# Patient Record
Sex: MALE | Race: BLACK OR AFRICAN AMERICAN | NOT HISPANIC OR LATINO
[De-identification: names, ages, dates, MRNs, and addresses within clinical notes are randomized per-mention and may not be internally consistent; named-entity substitution may affect disease eponyms.]

---

## 2017-01-23 ENCOUNTER — APPOINTMENT (OUTPATIENT)
Dept: ENDOCRINOLOGY | Facility: CLINIC | Age: 77
End: 2017-01-23

## 2017-01-23 VITALS
DIASTOLIC BLOOD PRESSURE: 83 MMHG | BODY MASS INDEX: 38.96 KG/M2 | SYSTOLIC BLOOD PRESSURE: 130 MMHG | WEIGHT: 213 LBS | HEART RATE: 69 BPM

## 2017-01-27 ENCOUNTER — OUTPATIENT (OUTPATIENT)
Dept: OUTPATIENT SERVICES | Facility: HOSPITAL | Age: 77
LOS: 1 days | End: 2017-01-27

## 2017-01-27 ENCOUNTER — APPOINTMENT (OUTPATIENT)
Dept: OPHTHALMOLOGY | Facility: CLINIC | Age: 77
End: 2017-01-27

## 2017-01-27 DIAGNOSIS — H40.1111 PRIMARY OPEN-ANGLE GLAUCOMA, RIGHT EYE, MILD STAGE: ICD-10-CM

## 2017-01-27 DIAGNOSIS — E89.0 POSTPROCEDURAL HYPOTHYROIDISM: Chronic | ICD-10-CM

## 2017-02-24 ENCOUNTER — APPOINTMENT (OUTPATIENT)
Dept: OPHTHALMOLOGY | Facility: CLINIC | Age: 77
End: 2017-02-24

## 2017-02-24 ENCOUNTER — OUTPATIENT (OUTPATIENT)
Dept: OUTPATIENT SERVICES | Facility: HOSPITAL | Age: 77
LOS: 1 days | End: 2017-02-24

## 2017-02-24 DIAGNOSIS — E89.0 POSTPROCEDURAL HYPOTHYROIDISM: Chronic | ICD-10-CM

## 2017-02-27 DIAGNOSIS — H40.89 OTHER SPECIFIED GLAUCOMA: ICD-10-CM

## 2017-03-16 ENCOUNTER — APPOINTMENT (OUTPATIENT)
Dept: ENDOCRINOLOGY | Facility: CLINIC | Age: 77
End: 2017-03-16

## 2017-03-17 ENCOUNTER — APPOINTMENT (OUTPATIENT)
Dept: OPHTHALMOLOGY | Facility: CLINIC | Age: 77
End: 2017-03-17

## 2017-03-31 ENCOUNTER — APPOINTMENT (OUTPATIENT)
Dept: OPHTHALMOLOGY | Facility: CLINIC | Age: 77
End: 2017-03-31

## 2017-03-31 ENCOUNTER — OUTPATIENT (OUTPATIENT)
Dept: OUTPATIENT SERVICES | Facility: HOSPITAL | Age: 77
LOS: 1 days | End: 2017-03-31

## 2017-03-31 DIAGNOSIS — E89.0 POSTPROCEDURAL HYPOTHYROIDISM: Chronic | ICD-10-CM

## 2017-03-31 DIAGNOSIS — H40.10X0 UNSPECIFIED OPEN-ANGLE GLAUCOMA, STAGE UNSPECIFIED: ICD-10-CM

## 2017-04-03 ENCOUNTER — APPOINTMENT (OUTPATIENT)
Dept: ENDOCRINOLOGY | Facility: CLINIC | Age: 77
End: 2017-04-03

## 2017-04-03 VITALS
SYSTOLIC BLOOD PRESSURE: 136 MMHG | BODY MASS INDEX: 38.96 KG/M2 | DIASTOLIC BLOOD PRESSURE: 87 MMHG | HEART RATE: 78 BPM | WEIGHT: 213 LBS

## 2017-04-07 ENCOUNTER — OUTPATIENT (OUTPATIENT)
Dept: OUTPATIENT SERVICES | Facility: HOSPITAL | Age: 77
LOS: 1 days | End: 2017-04-07

## 2017-04-07 ENCOUNTER — APPOINTMENT (OUTPATIENT)
Dept: OPHTHALMOLOGY | Facility: CLINIC | Age: 77
End: 2017-04-07

## 2017-04-07 DIAGNOSIS — E89.0 POSTPROCEDURAL HYPOTHYROIDISM: Chronic | ICD-10-CM

## 2017-04-10 DIAGNOSIS — H40.2290 CHRONIC ANGLE-CLOSURE GLAUCOMA, UNSPECIFIED EYE, STAGE UNSPECIFIED: ICD-10-CM

## 2017-04-11 ENCOUNTER — OUTPATIENT (OUTPATIENT)
Dept: OUTPATIENT SERVICES | Facility: HOSPITAL | Age: 77
LOS: 1 days | End: 2017-04-11

## 2017-04-11 ENCOUNTER — APPOINTMENT (OUTPATIENT)
Dept: OPHTHALMOLOGY | Facility: CLINIC | Age: 77
End: 2017-04-11

## 2017-04-11 DIAGNOSIS — E89.0 POSTPROCEDURAL HYPOTHYROIDISM: Chronic | ICD-10-CM

## 2017-04-11 DIAGNOSIS — H40.10X0 UNSPECIFIED OPEN-ANGLE GLAUCOMA, STAGE UNSPECIFIED: ICD-10-CM

## 2017-05-09 ENCOUNTER — OUTPATIENT (OUTPATIENT)
Dept: OUTPATIENT SERVICES | Facility: HOSPITAL | Age: 77
LOS: 1 days | End: 2017-05-09

## 2017-05-09 ENCOUNTER — APPOINTMENT (OUTPATIENT)
Dept: OPHTHALMOLOGY | Facility: CLINIC | Age: 77
End: 2017-05-09

## 2017-05-09 DIAGNOSIS — E89.0 POSTPROCEDURAL HYPOTHYROIDISM: Chronic | ICD-10-CM

## 2017-05-09 DIAGNOSIS — H40.9 UNSPECIFIED GLAUCOMA: ICD-10-CM

## 2017-06-09 ENCOUNTER — OUTPATIENT (OUTPATIENT)
Dept: OUTPATIENT SERVICES | Facility: HOSPITAL | Age: 77
LOS: 1 days | End: 2017-06-09

## 2017-06-09 ENCOUNTER — APPOINTMENT (OUTPATIENT)
Dept: OPHTHALMOLOGY | Facility: CLINIC | Age: 77
End: 2017-06-09

## 2017-06-09 DIAGNOSIS — E89.0 POSTPROCEDURAL HYPOTHYROIDISM: Chronic | ICD-10-CM

## 2017-06-12 DIAGNOSIS — H40.10X0 UNSPECIFIED OPEN-ANGLE GLAUCOMA, STAGE UNSPECIFIED: ICD-10-CM

## 2017-06-21 ENCOUNTER — OUTPATIENT (OUTPATIENT)
Dept: OUTPATIENT SERVICES | Facility: HOSPITAL | Age: 77
LOS: 1 days | End: 2017-06-21

## 2017-06-21 ENCOUNTER — APPOINTMENT (OUTPATIENT)
Dept: OPHTHALMOLOGY | Facility: CLINIC | Age: 77
End: 2017-06-21

## 2017-06-21 DIAGNOSIS — E89.0 POSTPROCEDURAL HYPOTHYROIDISM: Chronic | ICD-10-CM

## 2017-06-22 DIAGNOSIS — H40.2290 CHRONIC ANGLE-CLOSURE GLAUCOMA, UNSPECIFIED EYE, STAGE UNSPECIFIED: ICD-10-CM

## 2017-07-13 ENCOUNTER — OUTPATIENT (OUTPATIENT)
Dept: OUTPATIENT SERVICES | Facility: HOSPITAL | Age: 77
LOS: 1 days | End: 2017-07-13
Payer: MEDICARE

## 2017-07-13 ENCOUNTER — APPOINTMENT (OUTPATIENT)
Dept: OPHTHALMOLOGY | Facility: CLINIC | Age: 77
End: 2017-07-13

## 2017-07-13 DIAGNOSIS — E89.0 POSTPROCEDURAL HYPOTHYROIDISM: Chronic | ICD-10-CM

## 2017-07-13 DIAGNOSIS — E11.311 TYPE 2 DIABETES MELLITUS WITH UNSPECIFIED DIABETIC RETINOPATHY WITH MACULAR EDEMA: ICD-10-CM

## 2017-07-13 DIAGNOSIS — H40.1111 PRIMARY OPEN-ANGLE GLAUCOMA, RIGHT EYE, MILD STAGE: ICD-10-CM

## 2017-07-13 PROCEDURE — 99213 OFFICE O/P EST LOW 20 MIN: CPT

## 2017-07-13 PROCEDURE — 92134 CPTRZ OPH DX IMG PST SGM RTA: CPT | Mod: 26

## 2017-07-14 DIAGNOSIS — H40.1112 PRIMARY OPEN-ANGLE GLAUCOMA, RIGHT EYE, MODERATE STAGE: ICD-10-CM

## 2017-07-25 ENCOUNTER — APPOINTMENT (OUTPATIENT)
Dept: INTERNAL MEDICINE | Facility: CLINIC | Age: 77
End: 2017-07-25

## 2017-08-25 ENCOUNTER — APPOINTMENT (OUTPATIENT)
Dept: ENDOCRINOLOGY | Facility: CLINIC | Age: 77
End: 2017-08-25
Payer: MEDICARE

## 2017-08-25 VITALS — WEIGHT: 207 LBS | HEART RATE: 63 BPM | DIASTOLIC BLOOD PRESSURE: 95 MMHG | SYSTOLIC BLOOD PRESSURE: 164 MMHG

## 2017-08-25 PROCEDURE — 99214 OFFICE O/P EST MOD 30 MIN: CPT

## 2017-08-29 LAB
ALBUMIN SERPL ELPH-MCNC: 4.4 G/DL
ALP BLD-CCNC: 61 U/L
ALT SERPL-CCNC: 38 U/L
ANION GAP SERPL CALC-SCNC: 16 MMOL/L
AST SERPL-CCNC: 25 U/L
BILIRUB SERPL-MCNC: 0.4 MG/DL
BUN SERPL-MCNC: 13 MG/DL
CALCIUM SERPL-MCNC: 10.1 MG/DL
CHLORIDE SERPL-SCNC: 104 MMOL/L
CHOLEST SERPL-MCNC: 219 MG/DL
CHOLEST/HDLC SERPL: 3.3 RATIO
CO2 SERPL-SCNC: 22 MMOL/L
CREAT SERPL-MCNC: 1.01 MG/DL
GLUCOSE SERPL-MCNC: 154 MG/DL
HBA1C MFR BLD HPLC: 9.2 %
HDLC SERPL-MCNC: 66 MG/DL
LDLC SERPL CALC-MCNC: 119 MG/DL
POTASSIUM SERPL-SCNC: 4.5 MMOL/L
PROT SERPL-MCNC: 7.7 G/DL
SODIUM SERPL-SCNC: 142 MMOL/L
TRIGL SERPL-MCNC: 171 MG/DL
TSH SERPL-ACNC: 1.49 UIU/ML

## 2017-10-24 ENCOUNTER — APPOINTMENT (OUTPATIENT)
Dept: ENDOCRINOLOGY | Facility: CLINIC | Age: 77
End: 2017-10-24
Payer: MEDICARE

## 2017-10-24 VITALS
SYSTOLIC BLOOD PRESSURE: 158 MMHG | HEART RATE: 73 BPM | WEIGHT: 205.5 LBS | HEIGHT: 62 IN | BODY MASS INDEX: 37.81 KG/M2 | DIASTOLIC BLOOD PRESSURE: 93 MMHG

## 2017-10-24 PROCEDURE — 99214 OFFICE O/P EST MOD 30 MIN: CPT

## 2017-10-24 PROCEDURE — 97802 MEDICAL NUTRITION INDIV IN: CPT

## 2017-10-25 LAB
ANION GAP SERPL CALC-SCNC: 13 MMOL/L
BUN SERPL-MCNC: 10 MG/DL
CALCIUM SERPL-MCNC: 10 MG/DL
CHLORIDE SERPL-SCNC: 104 MMOL/L
CO2 SERPL-SCNC: 23 MMOL/L
CREAT SERPL-MCNC: 1 MG/DL
GLUCOSE SERPL-MCNC: 127 MG/DL
HBA1C MFR BLD HPLC: 7.7 %
POTASSIUM SERPL-SCNC: 4.9 MMOL/L
SODIUM SERPL-SCNC: 140 MMOL/L

## 2017-11-02 ENCOUNTER — APPOINTMENT (OUTPATIENT)
Dept: INTERNAL MEDICINE | Facility: HOME HEALTH | Age: 77
End: 2017-11-02
Payer: MEDICARE

## 2017-11-02 VITALS
SYSTOLIC BLOOD PRESSURE: 150 MMHG | HEIGHT: 69 IN | OXYGEN SATURATION: 97 % | WEIGHT: 211 LBS | TEMPERATURE: 97.6 F | BODY MASS INDEX: 31.25 KG/M2 | DIASTOLIC BLOOD PRESSURE: 100 MMHG | HEART RATE: 82 BPM | RESPIRATION RATE: 14 BRPM

## 2017-11-02 VITALS — DIASTOLIC BLOOD PRESSURE: 100 MMHG | SYSTOLIC BLOOD PRESSURE: 152 MMHG

## 2017-11-02 DIAGNOSIS — Z80.9 FAMILY HISTORY OF MALIGNANT NEOPLASM, UNSPECIFIED: ICD-10-CM

## 2017-11-02 DIAGNOSIS — Z86.79 PERSONAL HISTORY OF OTHER DISEASES OF THE CIRCULATORY SYSTEM: ICD-10-CM

## 2017-11-02 DIAGNOSIS — Z82.3 FAMILY HISTORY OF STROKE: ICD-10-CM

## 2017-11-02 DIAGNOSIS — Z82.49 FAMILY HISTORY OF ISCHEMIC HEART DISEASE AND OTHER DISEASES OF THE CIRCULATORY SYSTEM: ICD-10-CM

## 2017-11-02 PROCEDURE — 99345 HOME/RES VST NEW HIGH MDM 75: CPT

## 2017-11-02 PROCEDURE — 99497 ADVNCD CARE PLAN 30 MIN: CPT

## 2017-11-03 PROBLEM — Z82.49 FAMILY HISTORY OF ESSENTIAL HYPERTENSION: Status: ACTIVE | Noted: 2017-11-03

## 2017-11-03 PROBLEM — Z80.9 FAMILY HISTORY OF MALIGNANT NEOPLASM: Status: ACTIVE | Noted: 2017-11-03

## 2017-11-03 PROBLEM — Z86.79 HISTORY OF HYPERTENSION: Status: RESOLVED | Noted: 2017-11-03 | Resolved: 2017-11-03

## 2017-11-03 PROBLEM — Z82.3 FAMILY HISTORY OF CEREBROVASCULAR ACCIDENT (CVA): Status: ACTIVE | Noted: 2017-11-03

## 2017-11-30 ENCOUNTER — APPOINTMENT (OUTPATIENT)
Dept: INTERNAL MEDICINE | Facility: HOME HEALTH | Age: 77
End: 2017-11-30
Payer: MEDICARE

## 2017-11-30 PROCEDURE — 99349 HOME/RES VST EST MOD MDM 40: CPT

## 2017-12-01 VITALS
HEART RATE: 80 BPM | RESPIRATION RATE: 14 BRPM | SYSTOLIC BLOOD PRESSURE: 140 MMHG | TEMPERATURE: 97.4 F | OXYGEN SATURATION: 98 % | DIASTOLIC BLOOD PRESSURE: 80 MMHG

## 2017-12-01 RX ORDER — METFORMIN HYDROCHLORIDE 500 MG/1
500 TABLET, COATED ORAL
Qty: 60 | Refills: 0 | Status: DISCONTINUED | COMMUNITY
Start: 2016-11-01

## 2018-01-05 ENCOUNTER — APPOINTMENT (OUTPATIENT)
Dept: INTERNAL MEDICINE | Facility: HOME HEALTH | Age: 78
End: 2018-01-05
Payer: MEDICARE

## 2018-01-05 VITALS
HEART RATE: 75 BPM | TEMPERATURE: 97.4 F | DIASTOLIC BLOOD PRESSURE: 100 MMHG | SYSTOLIC BLOOD PRESSURE: 150 MMHG | RESPIRATION RATE: 14 BRPM | OXYGEN SATURATION: 98 %

## 2018-01-05 PROCEDURE — 99349 HOME/RES VST EST MOD MDM 40: CPT

## 2018-01-15 ENCOUNTER — RX RENEWAL (OUTPATIENT)
Age: 78
End: 2018-01-15

## 2018-01-18 ENCOUNTER — APPOINTMENT (OUTPATIENT)
Dept: ENDOCRINOLOGY | Facility: CLINIC | Age: 78
End: 2018-01-18
Payer: MEDICARE

## 2018-01-18 VITALS
HEIGHT: 69 IN | BODY MASS INDEX: 29.92 KG/M2 | DIASTOLIC BLOOD PRESSURE: 96 MMHG | SYSTOLIC BLOOD PRESSURE: 140 MMHG | WEIGHT: 202 LBS | HEART RATE: 89 BPM

## 2018-01-18 PROCEDURE — 99214 OFFICE O/P EST MOD 30 MIN: CPT

## 2018-01-18 RX ORDER — ATENOLOL 25 MG/1
25 TABLET ORAL
Qty: 60 | Refills: 0 | Status: DISCONTINUED | COMMUNITY
Start: 2017-10-03 | End: 2018-01-18

## 2018-02-15 ENCOUNTER — APPOINTMENT (OUTPATIENT)
Dept: INTERNAL MEDICINE | Facility: HOME HEALTH | Age: 78
End: 2018-02-15
Payer: MEDICARE

## 2018-02-15 VITALS
DIASTOLIC BLOOD PRESSURE: 80 MMHG | HEART RATE: 71 BPM | SYSTOLIC BLOOD PRESSURE: 140 MMHG | RESPIRATION RATE: 14 BRPM | OXYGEN SATURATION: 98 %

## 2018-02-15 PROCEDURE — 99349 HOME/RES VST EST MOD MDM 40: CPT

## 2018-02-22 ENCOUNTER — APPOINTMENT (OUTPATIENT)
Dept: INTERNAL MEDICINE | Facility: HOME HEALTH | Age: 78
End: 2018-02-22
Payer: MEDICARE

## 2018-02-22 VITALS
TEMPERATURE: 96 F | RESPIRATION RATE: 14 BRPM | OXYGEN SATURATION: 97 % | HEART RATE: 78 BPM | DIASTOLIC BLOOD PRESSURE: 100 MMHG | SYSTOLIC BLOOD PRESSURE: 140 MMHG

## 2018-02-22 DIAGNOSIS — M43.10 SPONDYLOLISTHESIS, SITE UNSPECIFIED: ICD-10-CM

## 2018-02-22 PROCEDURE — 99349 HOME/RES VST EST MOD MDM 40: CPT

## 2018-02-23 VITALS — DIASTOLIC BLOOD PRESSURE: 88 MMHG | SYSTOLIC BLOOD PRESSURE: 138 MMHG

## 2018-02-23 PROBLEM — M43.10 RETROLISTHESIS OF VERTEBRAE: Status: ACTIVE | Noted: 2018-02-23

## 2018-03-15 ENCOUNTER — APPOINTMENT (OUTPATIENT)
Dept: INTERNAL MEDICINE | Facility: HOME HEALTH | Age: 78
End: 2018-03-15
Payer: MEDICARE

## 2018-03-15 ENCOUNTER — APPOINTMENT (OUTPATIENT)
Dept: INTERNAL MEDICINE | Facility: HOME HEALTH | Age: 78
End: 2018-03-15

## 2018-03-15 VITALS
RESPIRATION RATE: 16 BRPM | DIASTOLIC BLOOD PRESSURE: 100 MMHG | HEART RATE: 66 BPM | SYSTOLIC BLOOD PRESSURE: 130 MMHG | OXYGEN SATURATION: 98 % | TEMPERATURE: 97.52 F

## 2018-03-15 VITALS — SYSTOLIC BLOOD PRESSURE: 144 MMHG | DIASTOLIC BLOOD PRESSURE: 78 MMHG

## 2018-03-15 PROCEDURE — 99350 HOME/RES VST EST HIGH MDM 60: CPT

## 2018-03-19 ENCOUNTER — OTHER (OUTPATIENT)
Age: 78
End: 2018-03-19

## 2018-03-22 ENCOUNTER — RX RENEWAL (OUTPATIENT)
Age: 78
End: 2018-03-22

## 2018-03-29 ENCOUNTER — OTHER (OUTPATIENT)
Age: 78
End: 2018-03-29

## 2018-04-04 ENCOUNTER — RX RENEWAL (OUTPATIENT)
Age: 78
End: 2018-04-04

## 2018-04-19 ENCOUNTER — APPOINTMENT (OUTPATIENT)
Dept: INTERNAL MEDICINE | Facility: HOME HEALTH | Age: 78
End: 2018-04-19
Payer: MEDICARE

## 2018-04-19 VITALS
RESPIRATION RATE: 14 BRPM | HEART RATE: 66 BPM | OXYGEN SATURATION: 98 % | TEMPERATURE: 97.3 F | DIASTOLIC BLOOD PRESSURE: 80 MMHG | SYSTOLIC BLOOD PRESSURE: 140 MMHG

## 2018-04-19 PROCEDURE — 99349 HOME/RES VST EST MOD MDM 40: CPT

## 2018-04-23 ENCOUNTER — LABORATORY RESULT (OUTPATIENT)
Age: 78
End: 2018-04-23

## 2018-04-24 LAB
ALBUMIN SERPL ELPH-MCNC: 5 G/DL
ALP BLD-CCNC: 67 U/L
ALT SERPL-CCNC: 37 U/L
ANION GAP SERPL CALC-SCNC: 17 MMOL/L
AST SERPL-CCNC: 26 U/L
BASOPHILS # BLD AUTO: 0.02 K/UL
BASOPHILS NFR BLD AUTO: 0.3 %
BILIRUB SERPL-MCNC: 0.3 MG/DL
BUN SERPL-MCNC: 19 MG/DL
CALCIUM SERPL-MCNC: 10.5 MG/DL
CHLORIDE SERPL-SCNC: 101 MMOL/L
CHOLEST SERPL-MCNC: 223 MG/DL
CHOLEST/HDLC SERPL: 2.9 RATIO
CO2 SERPL-SCNC: 23 MMOL/L
CREAT SERPL-MCNC: 0.97 MG/DL
EOSINOPHIL # BLD AUTO: 0.56 K/UL
EOSINOPHIL NFR BLD AUTO: 8 %
GLUCOSE SERPL-MCNC: 127 MG/DL
HBA1C MFR BLD HPLC: 7.2 %
HCT VFR BLD CALC: 41.8 %
HDLC SERPL-MCNC: 77 MG/DL
HGB BLD-MCNC: 13.9 G/DL
IMM GRANULOCYTES NFR BLD AUTO: 0.1 %
LDLC SERPL CALC-MCNC: 115 MG/DL
LYMPHOCYTES # BLD AUTO: 2.67 K/UL
LYMPHOCYTES NFR BLD AUTO: 38.3 %
MAN DIFF?: NORMAL
MCHC RBC-ENTMCNC: 27.3 PG
MCHC RBC-ENTMCNC: 33.3 GM/DL
MCV RBC AUTO: 82.1 FL
MONOCYTES # BLD AUTO: 0.43 K/UL
MONOCYTES NFR BLD AUTO: 6.2 %
NEUTROPHILS # BLD AUTO: 3.28 K/UL
NEUTROPHILS NFR BLD AUTO: 47.1 %
PLATELET # BLD AUTO: 331 K/UL
POTASSIUM SERPL-SCNC: 4.6 MMOL/L
PROT SERPL-MCNC: 7.8 G/DL
RBC # BLD: 5.09 M/UL
RBC # FLD: 15.6 %
SODIUM SERPL-SCNC: 141 MMOL/L
TRIGL SERPL-MCNC: 157 MG/DL
WBC # FLD AUTO: 6.97 K/UL

## 2018-04-27 ENCOUNTER — APPOINTMENT (OUTPATIENT)
Dept: ENDOCRINOLOGY | Facility: CLINIC | Age: 78
End: 2018-04-27
Payer: MEDICARE

## 2018-04-27 VITALS
WEIGHT: 205 LBS | HEART RATE: 71 BPM | SYSTOLIC BLOOD PRESSURE: 153 MMHG | BODY MASS INDEX: 30.36 KG/M2 | HEIGHT: 69 IN | DIASTOLIC BLOOD PRESSURE: 90 MMHG

## 2018-04-27 PROCEDURE — 99213 OFFICE O/P EST LOW 20 MIN: CPT

## 2018-06-07 ENCOUNTER — APPOINTMENT (OUTPATIENT)
Dept: INTERNAL MEDICINE | Facility: HOME HEALTH | Age: 78
End: 2018-06-07
Payer: MEDICARE

## 2018-06-07 VITALS
DIASTOLIC BLOOD PRESSURE: 70 MMHG | HEART RATE: 86 BPM | TEMPERATURE: 95.9 F | OXYGEN SATURATION: 98 % | RESPIRATION RATE: 14 BRPM | SYSTOLIC BLOOD PRESSURE: 130 MMHG

## 2018-06-07 PROCEDURE — 99348 HOME/RES VST EST LOW MDM 30: CPT

## 2018-06-26 ENCOUNTER — RX RENEWAL (OUTPATIENT)
Age: 78
End: 2018-06-26

## 2018-06-28 ENCOUNTER — APPOINTMENT (OUTPATIENT)
Dept: INTERNAL MEDICINE | Facility: HOME HEALTH | Age: 78
End: 2018-06-28
Payer: MEDICARE

## 2018-06-28 VITALS
RESPIRATION RATE: 14 BRPM | DIASTOLIC BLOOD PRESSURE: 70 MMHG | TEMPERATURE: 97.2 F | HEART RATE: 72 BPM | WEIGHT: 205.13 LBS | SYSTOLIC BLOOD PRESSURE: 138 MMHG | OXYGEN SATURATION: 97 % | BODY MASS INDEX: 30.29 KG/M2

## 2018-06-28 PROCEDURE — G0439: CPT

## 2018-08-08 ENCOUNTER — RX RENEWAL (OUTPATIENT)
Age: 78
End: 2018-08-08

## 2018-08-09 ENCOUNTER — APPOINTMENT (OUTPATIENT)
Dept: INTERNAL MEDICINE | Facility: HOME HEALTH | Age: 78
End: 2018-08-09
Payer: MEDICARE

## 2018-08-09 DIAGNOSIS — Z96.659 PRESENCE OF UNSPECIFIED ARTIFICIAL KNEE JOINT: ICD-10-CM

## 2018-08-09 PROCEDURE — 99350 HOME/RES VST EST HIGH MDM 60: CPT

## 2018-08-10 VITALS
DIASTOLIC BLOOD PRESSURE: 88 MMHG | HEART RATE: 80 BPM | RESPIRATION RATE: 18 BRPM | OXYGEN SATURATION: 97 % | SYSTOLIC BLOOD PRESSURE: 120 MMHG | WEIGHT: 207 LBS | TEMPERATURE: 97.5 F | BODY MASS INDEX: 30.57 KG/M2

## 2018-08-10 RX ORDER — GABAPENTIN 100 MG/1
100 CAPSULE ORAL
Qty: 60 | Refills: 1 | Status: DISCONTINUED | COMMUNITY
Start: 2018-08-10 | End: 2018-08-10

## 2018-08-16 ENCOUNTER — RX RENEWAL (OUTPATIENT)
Age: 78
End: 2018-08-16

## 2018-08-23 ENCOUNTER — RX RENEWAL (OUTPATIENT)
Age: 78
End: 2018-08-23

## 2018-08-29 ENCOUNTER — RX RENEWAL (OUTPATIENT)
Age: 78
End: 2018-08-29

## 2018-09-06 ENCOUNTER — APPOINTMENT (OUTPATIENT)
Dept: INTERNAL MEDICINE | Facility: HOME HEALTH | Age: 78
End: 2018-09-06

## 2018-09-06 ENCOUNTER — APPOINTMENT (OUTPATIENT)
Dept: ENDOCRINOLOGY | Facility: CLINIC | Age: 78
End: 2018-09-06
Payer: MEDICARE

## 2018-09-06 VITALS
HEART RATE: 81 BPM | DIASTOLIC BLOOD PRESSURE: 94 MMHG | WEIGHT: 200 LBS | SYSTOLIC BLOOD PRESSURE: 149 MMHG | BODY MASS INDEX: 29.54 KG/M2

## 2018-09-06 LAB
GLUCOSE BLDC GLUCOMTR-MCNC: 155
HBA1C MFR BLD HPLC: 6.5

## 2018-09-06 PROCEDURE — 82962 GLUCOSE BLOOD TEST: CPT

## 2018-09-06 PROCEDURE — 99214 OFFICE O/P EST MOD 30 MIN: CPT | Mod: 25

## 2018-09-06 PROCEDURE — 83036 HEMOGLOBIN GLYCOSYLATED A1C: CPT | Mod: QW

## 2018-09-19 ENCOUNTER — RX RENEWAL (OUTPATIENT)
Age: 78
End: 2018-09-19

## 2018-09-27 ENCOUNTER — APPOINTMENT (OUTPATIENT)
Dept: INTERNAL MEDICINE | Facility: HOME HEALTH | Age: 78
End: 2018-09-27
Payer: MEDICARE

## 2018-09-27 VITALS
DIASTOLIC BLOOD PRESSURE: 80 MMHG | HEART RATE: 84 BPM | RESPIRATION RATE: 16 BRPM | SYSTOLIC BLOOD PRESSURE: 130 MMHG | OXYGEN SATURATION: 97 % | TEMPERATURE: 96.8 F

## 2018-09-27 PROCEDURE — 90662 IIV NO PRSV INCREASED AG IM: CPT

## 2018-09-27 PROCEDURE — G0008: CPT

## 2018-09-27 PROCEDURE — 99349 HOME/RES VST EST MOD MDM 40: CPT

## 2018-10-04 ENCOUNTER — OTHER (OUTPATIENT)
Age: 78
End: 2018-10-04

## 2018-10-05 LAB
BASOPHILS # BLD AUTO: 0.01 K/UL
BASOPHILS NFR BLD AUTO: 0.1 %
EOSINOPHIL # BLD AUTO: 0.64 K/UL
EOSINOPHIL NFR BLD AUTO: 8.1 %
HCT VFR BLD CALC: 42.5 %
HGB BLD-MCNC: 13.9 G/DL
IMM GRANULOCYTES NFR BLD AUTO: 0.3 %
LYMPHOCYTES # BLD AUTO: 2.41 K/UL
LYMPHOCYTES NFR BLD AUTO: 30.6 %
MAN DIFF?: NORMAL
MCHC RBC-ENTMCNC: 27.1 PG
MCHC RBC-ENTMCNC: 32.7 GM/DL
MCV RBC AUTO: 83 FL
MONOCYTES # BLD AUTO: 0.52 K/UL
MONOCYTES NFR BLD AUTO: 6.6 %
NEUTROPHILS # BLD AUTO: 4.27 K/UL
NEUTROPHILS NFR BLD AUTO: 54.3 %
PLATELET # BLD AUTO: 355 K/UL
RBC # BLD: 5.12 M/UL
RBC # FLD: 15.6 %
WBC # FLD AUTO: 7.87 K/UL

## 2018-10-07 LAB
ALBUMIN SERPL ELPH-MCNC: 4.8 G/DL
ALP BLD-CCNC: 66 U/L
ALT SERPL-CCNC: 24 U/L
ANION GAP SERPL CALC-SCNC: 15 MMOL/L
AST SERPL-CCNC: 16 U/L
BILIRUB SERPL-MCNC: 0.2 MG/DL
BUN SERPL-MCNC: 11 MG/DL
CALCIUM SERPL-MCNC: 10.2 MG/DL
CHLORIDE SERPL-SCNC: 102 MMOL/L
CHOLEST SERPL-MCNC: 174 MG/DL
CHOLEST/HDLC SERPL: 2.6 RATIO
CO2 SERPL-SCNC: 25 MMOL/L
CREAT SERPL-MCNC: 0.89 MG/DL
GLUCOSE SERPL-MCNC: 87 MG/DL
HDLC SERPL-MCNC: 68 MG/DL
LDLC SERPL CALC-MCNC: 74 MG/DL
POTASSIUM SERPL-SCNC: 4.4 MMOL/L
PROT SERPL-MCNC: 7.9 G/DL
PSA FREE FLD-MCNC: 10.1
PSA FREE SERPL-MCNC: 1.14 NG/ML
PSA SERPL-MCNC: 11.24 NG/ML
SODIUM SERPL-SCNC: 142 MMOL/L
TRIGL SERPL-MCNC: 159 MG/DL

## 2018-10-10 ENCOUNTER — APPOINTMENT (OUTPATIENT)
Dept: CT IMAGING | Facility: HOSPITAL | Age: 78
End: 2018-10-10

## 2018-10-10 ENCOUNTER — OUTPATIENT (OUTPATIENT)
Dept: OUTPATIENT SERVICES | Facility: HOSPITAL | Age: 78
LOS: 1 days | End: 2018-10-10
Payer: MEDICARE

## 2018-10-10 DIAGNOSIS — E89.0 POSTPROCEDURAL HYPOTHYROIDISM: Chronic | ICD-10-CM

## 2018-10-10 PROCEDURE — 72128 CT CHEST SPINE W/O DYE: CPT

## 2018-10-10 PROCEDURE — 72128 CT CHEST SPINE W/O DYE: CPT | Mod: 26

## 2018-10-19 ENCOUNTER — OTHER (OUTPATIENT)
Age: 78
End: 2018-10-19

## 2018-10-26 ENCOUNTER — RX RENEWAL (OUTPATIENT)
Age: 78
End: 2018-10-26

## 2018-10-29 ENCOUNTER — RX RENEWAL (OUTPATIENT)
Age: 78
End: 2018-10-29

## 2018-10-29 ENCOUNTER — APPOINTMENT (OUTPATIENT)
Dept: NEUROSURGERY | Facility: CLINIC | Age: 78
End: 2018-10-29

## 2018-11-05 ENCOUNTER — APPOINTMENT (OUTPATIENT)
Dept: NEUROSURGERY | Facility: CLINIC | Age: 78
End: 2018-11-05

## 2018-11-05 ENCOUNTER — APPOINTMENT (OUTPATIENT)
Dept: NEUROSURGERY | Facility: CLINIC | Age: 78
End: 2018-11-05
Payer: MEDICARE

## 2018-11-19 ENCOUNTER — APPOINTMENT (OUTPATIENT)
Dept: NEUROSURGERY | Facility: CLINIC | Age: 78
End: 2018-11-19
Payer: MEDICARE

## 2018-11-19 VITALS
RESPIRATION RATE: 16 BRPM | BODY MASS INDEX: 29.77 KG/M2 | OXYGEN SATURATION: 97 % | HEIGHT: 69 IN | WEIGHT: 201 LBS | SYSTOLIC BLOOD PRESSURE: 148 MMHG | HEART RATE: 71 BPM | DIASTOLIC BLOOD PRESSURE: 95 MMHG | TEMPERATURE: 98.6 F

## 2018-11-19 PROCEDURE — 99205 OFFICE O/P NEW HI 60 MIN: CPT

## 2018-12-03 ENCOUNTER — APPOINTMENT (OUTPATIENT)
Dept: ENDOCRINOLOGY | Facility: CLINIC | Age: 78
End: 2018-12-03
Payer: MEDICARE

## 2018-12-03 VITALS
DIASTOLIC BLOOD PRESSURE: 82 MMHG | BODY MASS INDEX: 29.39 KG/M2 | SYSTOLIC BLOOD PRESSURE: 130 MMHG | WEIGHT: 199 LBS | HEART RATE: 83 BPM

## 2018-12-03 LAB
GLUCOSE BLDC GLUCOMTR-MCNC: 223
HBA1C MFR BLD HPLC: 6.8

## 2018-12-03 PROCEDURE — 83036 HEMOGLOBIN GLYCOSYLATED A1C: CPT | Mod: QW

## 2018-12-03 PROCEDURE — 82962 GLUCOSE BLOOD TEST: CPT

## 2018-12-03 PROCEDURE — 99214 OFFICE O/P EST MOD 30 MIN: CPT | Mod: 25

## 2018-12-06 ENCOUNTER — RX RENEWAL (OUTPATIENT)
Age: 78
End: 2018-12-06

## 2018-12-19 RX ORDER — BRIMONIDINE TARTRATE 1.5 MG/ML
0.15 SOLUTION/ DROPS OPHTHALMIC
Qty: 15 | Refills: 0 | Status: DISCONTINUED | COMMUNITY
Start: 2017-02-24 | End: 2018-12-19

## 2018-12-19 RX ORDER — DORZOLAMIDE HYDROCHLORIDE TIMOLOL MALEATE 20; 5 MG/ML; MG/ML
22.3-6.8 SOLUTION/ DROPS OPHTHALMIC
Qty: 10 | Refills: 0 | Status: DISCONTINUED | COMMUNITY
Start: 2017-04-04 | End: 2018-12-19

## 2018-12-20 ENCOUNTER — APPOINTMENT (OUTPATIENT)
Dept: INTERNAL MEDICINE | Facility: HOME HEALTH | Age: 78
End: 2018-12-20
Payer: MEDICARE

## 2018-12-20 VITALS
SYSTOLIC BLOOD PRESSURE: 130 MMHG | HEART RATE: 74 BPM | TEMPERATURE: 96.1 F | RESPIRATION RATE: 14 BRPM | DIASTOLIC BLOOD PRESSURE: 70 MMHG | OXYGEN SATURATION: 98 %

## 2018-12-20 PROCEDURE — 99349 HOME/RES VST EST MOD MDM 40: CPT

## 2019-02-18 ENCOUNTER — RX RENEWAL (OUTPATIENT)
Age: 79
End: 2019-02-18

## 2019-03-08 ENCOUNTER — RX RENEWAL (OUTPATIENT)
Age: 79
End: 2019-03-08

## 2019-03-21 ENCOUNTER — APPOINTMENT (OUTPATIENT)
Dept: INTERNAL MEDICINE | Facility: HOME HEALTH | Age: 79
End: 2019-03-21
Payer: MEDICARE

## 2019-03-21 VITALS
SYSTOLIC BLOOD PRESSURE: 130 MMHG | TEMPERATURE: 97.2 F | RESPIRATION RATE: 14 BRPM | HEART RATE: 88 BPM | OXYGEN SATURATION: 97 % | DIASTOLIC BLOOD PRESSURE: 80 MMHG

## 2019-03-21 PROCEDURE — 99349 HOME/RES VST EST MOD MDM 40: CPT

## 2019-03-21 NOTE — PHYSICAL EXAM
[Well Nourished] : well nourished [Normal Sclera/Conjunctiva] : normal sclera/conjunctiva [Normal Outer Ear/Nose] : the ears and nose were normal in appearance [Supple] : the neck was supple [Clear to Auscultation] : lungs were clear to auscultation bilaterally [No Respiratory Distress] : no respiratory distress [Normal S1, S2] : normal S1 and S2 [No Accessory Muscle Use] : no accessory muscle use [No Edema] : there was no peripheral edema [Normal Bowel Sounds] : normal bowel sounds [Soft] : abdomen soft [Normal Gait] : normal gait [No Skin Lesions] : no skin lesions [Cranial Nerves Intact] : cranial nerves 2-12 were intact [No Gross Sensory Deficits] : no gross sensory deficits [Normal Mood] : the mood was normal [Oriented x3] : oriented to person, place, and time [Normal Affect] : the affect was normal [Normal Insight/Judgement] : insight and judgment were intact [Foot Ulcers] : no foot ulcers [de-identified] : unsteady gait order PT,

## 2019-03-21 NOTE — REVIEW OF SYSTEMS
[Vision Problems] : vision problems [Joint Pain] : joint pain [Muscle Pain] : muscle pain [Muscle Weakness] : muscle weakness [Negative] : Heme/Lymph [Fever] : no fever [Chills] : no chills [Discharge] : no discharge [Pain] : no pain [Earache] : no earache [Postnasal Drip] : no postnasal drip [Nasal Discharge] : no nasal discharge [Sore Throat] : no sore throat [Hoarseness] : no hoarseness [Chest Pain] : no chest pain [Palpitations] : no palpitations [Lower Ext Edema] : no lower extremity edema [Shortness Of Breath] : no shortness of breath [Wheezing] : no wheezing [Cough] : no cough [Abdominal Pain] : no abdominal pain [Constipation] : no constipation [Incontinence] : no incontinence [Impotence] : no impotency [Insomnia] : no insomnia [FreeTextEntry3] : new glasses, eye drops, f/u OPH appointments [FreeTextEntry9] : pain 5/10, balance impaired

## 2019-03-21 NOTE — REASON FOR VISIT
[Follow-Up] : a follow-up visit [Pre-Visit Preparation] : pre-visit preparation was done [FreeTextEntry2] : notes, meds, labs reviewed

## 2019-03-21 NOTE — HISTORY OF PRESENT ILLNESS
[Patient] : patient [FreeTextEntry1] : gait and balance impairment [FreeTextEntry2] : 77 y.o. male lives alone at [facility]. Pt is alert and oriented 3, he has dx of DM T2, HTN, glaucoma, knee pain, osteoarthritis. Pt is very concerned about his blood pressure and his eyes.\par \par Was seen by Dr. De La Torre, A1c was 6.8 mg/dl, pt has made tremendous improvements and is very pleased with the direction his blood sugar is going. Much more conscious about the types of foods he is eating. Explored ways he can increase his vegetable intake such as buying frozen vegetables and adding to his meals.\par \par Very concerned about his decreasing vision, wants to just use drops and not f/u with oph. Instructed him on risks of not knowing changes in vision problems with not seeing oph. Next appt in January did not keep appointment. States he will not be going back. Will refer him to another  Encouraged him that maintaining BP in range, today 130/70, will help maintain eye pressure within range to decrease progression of vision loss.\par \andry  Sees Dr. Gibson for psyche issues. Recently re connect with his family, his dgtr and grand children.\par \par

## 2019-03-27 LAB
25(OH)D3 SERPL-MCNC: 20.4 NG/ML
ALBUMIN SERPL ELPH-MCNC: 4.3 G/DL
ALP BLD-CCNC: 54 U/L
ALT SERPL-CCNC: 24 U/L
ANION GAP SERPL CALC-SCNC: 12 MMOL/L
AST SERPL-CCNC: 20 U/L
BASOPHILS # BLD AUTO: 0.03 K/UL
BASOPHILS NFR BLD AUTO: 0.5 %
BILIRUB SERPL-MCNC: 0.2 MG/DL
BUN SERPL-MCNC: 14 MG/DL
CALCIUM SERPL-MCNC: 9.9 MG/DL
CHLORIDE SERPL-SCNC: 105 MMOL/L
CHOLEST SERPL-MCNC: 196 MG/DL
CHOLEST/HDLC SERPL: 3.6 RATIO
CO2 SERPL-SCNC: 24 MMOL/L
CREAT SERPL-MCNC: 0.95 MG/DL
EOSINOPHIL # BLD AUTO: 0.46 K/UL
EOSINOPHIL NFR BLD AUTO: 7.4 %
GLUCOSE SERPL-MCNC: 116 MG/DL
HCT VFR BLD CALC: 43 %
HDLC SERPL-MCNC: 55 MG/DL
HGB BLD-MCNC: 13.9 G/DL
IMM GRANULOCYTES NFR BLD AUTO: 0.3 %
IRON SATN MFR SERPL: 32 %
IRON SERPL-MCNC: 90 UG/DL
LDLC SERPL CALC-MCNC: 70 MG/DL
LYMPHOCYTES # BLD AUTO: 2.28 K/UL
LYMPHOCYTES NFR BLD AUTO: 36.6 %
MAN DIFF?: NORMAL
MCHC RBC-ENTMCNC: 27.1 PG
MCHC RBC-ENTMCNC: 32.3 GM/DL
MCV RBC AUTO: 84 FL
MONOCYTES # BLD AUTO: 0.48 K/UL
MONOCYTES NFR BLD AUTO: 7.7 %
NEUTROPHILS # BLD AUTO: 2.96 K/UL
NEUTROPHILS NFR BLD AUTO: 47.5 %
PLATELET # BLD AUTO: 358 K/UL
POTASSIUM SERPL-SCNC: 5 MMOL/L
PROT SERPL-MCNC: 7.1 G/DL
RBC # BLD: 5.12 M/UL
RBC # FLD: 15.4 %
SODIUM SERPL-SCNC: 141 MMOL/L
TIBC SERPL-MCNC: 284 UG/DL
TRIGL SERPL-MCNC: 353 MG/DL
TSH SERPL-ACNC: 0.75 UIU/ML
UIBC SERPL-MCNC: 194 UG/DL
VIT B12 SERPL-MCNC: 388 PG/ML
WBC # FLD AUTO: 6.23 K/UL

## 2019-03-28 ENCOUNTER — APPOINTMENT (OUTPATIENT)
Dept: INTERNAL MEDICINE | Facility: HOME HEALTH | Age: 79
End: 2019-03-28
Payer: MEDICARE

## 2019-03-28 PROCEDURE — 99350 HOME/RES VST EST HIGH MDM 60: CPT

## 2019-03-30 NOTE — CURRENT MEDS
[Medication and Allergies Reconciled] : medication and allergies reconciled [High Risk Medications Reviewed and Reconciled (Beers Criteria)] : high risk medications reviewed and reconciled [Reviewed patient reported medication adherence from Comprehensive Assessment] : Reviewed patient reported medication adherence from comprehensive assessment

## 2019-03-30 NOTE — PHYSICAL EXAM
[Well Nourished] : well nourished [Normal Sclera/Conjunctiva] : normal sclera/conjunctiva [Normal Outer Ear/Nose] : the ears and nose were normal in appearance [Supple] : the neck was supple [No Respiratory Distress] : no respiratory distress [Clear to Auscultation] : lungs were clear to auscultation bilaterally [No Accessory Muscle Use] : no accessory muscle use [Normal S1, S2] : normal S1 and S2 [No Edema] : there was no peripheral edema [Normal Bowel Sounds] : normal bowel sounds [Soft] : abdomen soft [Normal Gait] : normal gait [No Skin Lesions] : no skin lesions [Cranial Nerves Intact] : cranial nerves 2-12 were intact [No Gross Sensory Deficits] : no gross sensory deficits [Oriented x3] : oriented to person, place, and time [Normal Affect] : the affect was normal [Normal Mood] : the mood was normal [Normal Insight/Judgement] : insight and judgment were intact [Foot Ulcers] : no foot ulcers [de-identified] : unsteady gait order PT,

## 2019-03-30 NOTE — HISTORY OF PRESENT ILLNESS
[Patient] : patient [FreeTextEntry1] : gait and balance impairment [FreeTextEntry2] : 77 y.o. male lives alone at [facility]. Pt is alert and oriented 3, he has dx of DM T2, HTN, glaucoma, knee pain, osteoarthritis. Pt is very concerned about his blood pressure and his eyes.\par \par Was seen by Dr. De La Torre, A1c was 6.8 mg/dl, pt has made tremendous improvements and is very pleased with the direction his blood sugar is going. Much more conscious about the types of foods he is eating. Explored ways he can increase his vegetable intake such as buying frozen vegetables and adding to his meals.\par \par Discuss lab results Triglycerides 300+, not on any statin at this time. Reviewed diet to decreased intake of sugary drinks, fatty meats, baked goods. [place] meals on wheels.\par \par  Sees Dr. Gibson for psyche issues. Recently re connect with his family, his dgtr and grand children.\par \par

## 2019-04-01 ENCOUNTER — RX RENEWAL (OUTPATIENT)
Age: 79
End: 2019-04-01

## 2019-04-04 ENCOUNTER — OTHER (OUTPATIENT)
Age: 79
End: 2019-04-04

## 2019-04-15 ENCOUNTER — APPOINTMENT (OUTPATIENT)
Dept: ENDOCRINOLOGY | Facility: CLINIC | Age: 79
End: 2019-04-15
Payer: MEDICARE

## 2019-04-15 VITALS
HEIGHT: 69 IN | DIASTOLIC BLOOD PRESSURE: 97 MMHG | HEART RATE: 81 BPM | BODY MASS INDEX: 29.77 KG/M2 | WEIGHT: 201 LBS | SYSTOLIC BLOOD PRESSURE: 138 MMHG

## 2019-04-15 LAB — HBA1C MFR BLD HPLC: 7.1

## 2019-04-15 PROCEDURE — 83036 HEMOGLOBIN GLYCOSYLATED A1C: CPT | Mod: QW

## 2019-04-15 PROCEDURE — 99214 OFFICE O/P EST MOD 30 MIN: CPT | Mod: 25

## 2019-04-15 NOTE — HISTORY OF PRESENT ILLNESS
[FreeTextEntry1] : no health issues since last visit\par eating salads.  like cheese and rolls.  not eating bagels\par walks to store daily, but store is only a few blocks away\par says is too lazy to exercise\par no polyuria, polydipsia, SOB, chest pain, or neuropathy symptoms \par no hypoglycemia symptoms\par says eyesight is "messed up"; seeing ophtho and using eye drops\par \par \par Meds: amlodipine 5mg, metoprolol ER 100mg/day\par metformin 850mg,BID;  glimepiride 2mg BID\par levothyroxine 75mcg/day\par Seroquel 400mg (2 tab) and 300mg (2 tab) but pt says he doesn't have it\par eye drops.\par

## 2019-04-15 NOTE — DATA REVIEWED
[FreeTextEntry1] : 3/19: A1c 7.1% (point of care).  TSH 0.75, tot chol 196, trig 353, HDL 55, LDL 70, B12 388\par 12/18: A1c 6.8%, point of care\par 10/18: tot chol 174, trig 159, HDL 68, LDL 74\par 9/18: A1c 6.5% (point of care)\par 4/18: A1c 7.2%, tot chol 223, trig 157, HDL 77, \par 1/18: A1c 6.8%, point of care\par 10/17: A1c 7.7%\par 8/17: A1c 9.2%, tot chol 219, trig 171, HDL 66, , TSH 1.49\par 4/17: A1c 8.0% (point of care)\par 1/17: A1c 9.0% (point of care)\par 11/16: A1c 10.2%, Cr 0.89, eGFR 97\par 8/16: A1c 7.7% (point of care)\par 6/16: A1c 8.9%, fructosamine 321\par 5/16: A1c 10.5%, tot chol 196, trig 184, HDL 60, LDL 99, urine microalb/cr 32, TSH 1.48,  Cr 0.93

## 2019-04-15 NOTE — ASSESSMENT
[FreeTextEntry1] : Diabetes, A1c at goal.  Limited insight to health conditions.  \par Encouraged more exercise; try to walk around the block a couple times a day.\par watch food portions; even though A1c is ok, it is higher than last time (6.8), so he is probably not eating as well as he was before.\par continue current regimen\par RTO 6 months

## 2019-04-15 NOTE — PHYSICAL EXAM
[Alert] : alert [Normal Voice/Communication] : normal voice communication [Healthy Appearance] : healthy appearance [No Lid Lag] : no lid lag [No Proptosis] : no proptosis [Thyroid Not Enlarged] : the thyroid was not enlarged [Clear to Auscultation] : lungs were clear to auscultation bilaterally [No Thyroid Nodules] : there were no palpable thyroid nodules [Normal S1, S2] : normal S1 and S2 [Regular Rhythm] : with a regular rhythm [No Edema] : there was no peripheral edema [Normal Sensation on Monofilament Testing] : normal sensation on monofilament testing of lower extremities [Normal Affect] : the affect was normal [Normal Mood] : the mood was normal [Foot Ulcers] : no foot ulcers [de-identified] : fingertsick 129, after coffee [de-identified] : slightly hard of hearing [de-identified] : reduced pedal pulse [de-identified] : no hair growth. mild to moderate acanthosis nigricans [de-identified] : limited insight to condition

## 2019-04-17 ENCOUNTER — RX RENEWAL (OUTPATIENT)
Age: 79
End: 2019-04-17

## 2019-04-17 DIAGNOSIS — E78.1 PURE HYPERGLYCERIDEMIA: ICD-10-CM

## 2019-05-16 ENCOUNTER — APPOINTMENT (OUTPATIENT)
Dept: INTERNAL MEDICINE | Facility: HOME HEALTH | Age: 79
End: 2019-05-16
Payer: MEDICARE

## 2019-05-16 VITALS
DIASTOLIC BLOOD PRESSURE: 80 MMHG | RESPIRATION RATE: 16 BRPM | SYSTOLIC BLOOD PRESSURE: 120 MMHG | TEMPERATURE: 96.5 F | OXYGEN SATURATION: 98 % | HEART RATE: 97 BPM

## 2019-05-16 PROCEDURE — 99349 HOME/RES VST EST MOD MDM 40: CPT

## 2019-05-16 NOTE — PHYSICAL EXAM
[Well Nourished] : well nourished [Normal Sclera/Conjunctiva] : normal sclera/conjunctiva [Normal Outer Ear/Nose] : the ears and nose were normal in appearance [Supple] : the neck was supple [No Respiratory Distress] : no respiratory distress [Clear to Auscultation] : lungs were clear to auscultation bilaterally [No Accessory Muscle Use] : no accessory muscle use [Normal S1, S2] : normal S1 and S2 [No Edema] : there was no peripheral edema [Normal Bowel Sounds] : normal bowel sounds [Soft] : abdomen soft [Normal Gait] : normal gait [Cranial Nerves Intact] : cranial nerves 2-12 were intact [No Skin Lesions] : no skin lesions [No Gross Sensory Deficits] : no gross sensory deficits [Oriented x3] : oriented to person, place, and time [Normal Affect] : the affect was normal [Normal Mood] : the mood was normal [Normal Insight/Judgement] : insight and judgment were intact [Foot Ulcers] : no foot ulcers [de-identified] : unsteady gait order PT,

## 2019-05-16 NOTE — HISTORY OF PRESENT ILLNESS
[Patient] : patient [FreeTextEntry2] : 77 y.o. male lives alone at [facility]. Pt is alert and oriented 3, he has dx of DM T2, HTN, glaucoma, knee pain, osteoarthritis. Pt request acute visit states " I have a problem with my penis"\par \par States he has abrasion to his  "penis tip" he has had this before when he bought a smaller size underwear. He had stopped wearing them but restarted "wearing them again", recently gained weight . Instructed pt to keep the area clean and dry. \par \par Refused to show the penis. Pt states he used Mupirocin in previous time with good results. Renewed. Labs ordered. [FreeTextEntry1] : gait and balance impairment

## 2019-05-23 ENCOUNTER — OTHER (OUTPATIENT)
Age: 79
End: 2019-05-23

## 2019-06-05 ENCOUNTER — RX RENEWAL (OUTPATIENT)
Age: 79
End: 2019-06-05

## 2019-06-06 ENCOUNTER — APPOINTMENT (OUTPATIENT)
Dept: INTERNAL MEDICINE | Facility: HOME HEALTH | Age: 79
End: 2019-06-06
Payer: MEDICARE

## 2019-06-06 VITALS
TEMPERATURE: 96.6 F | SYSTOLIC BLOOD PRESSURE: 130 MMHG | DIASTOLIC BLOOD PRESSURE: 70 MMHG | RESPIRATION RATE: 16 BRPM | WEIGHT: 204.5 LBS | HEART RATE: 74 BPM | OXYGEN SATURATION: 98 % | BODY MASS INDEX: 30.2 KG/M2

## 2019-06-06 PROCEDURE — 99349 HOME/RES VST EST MOD MDM 40: CPT

## 2019-06-07 LAB
BASOPHILS # BLD AUTO: 0.03 K/UL
BASOPHILS NFR BLD AUTO: 0.4 %
EOSINOPHIL # BLD AUTO: 0.56 K/UL
EOSINOPHIL NFR BLD AUTO: 8 %
HCT VFR BLD CALC: 42.2 %
HGB BLD-MCNC: 13.3 G/DL
IMM GRANULOCYTES NFR BLD AUTO: 0.1 %
LYMPHOCYTES # BLD AUTO: 2.52 K/UL
LYMPHOCYTES NFR BLD AUTO: 36.2 %
MAN DIFF?: NORMAL
MCHC RBC-ENTMCNC: 27 PG
MCHC RBC-ENTMCNC: 31.5 GM/DL
MCV RBC AUTO: 85.6 FL
MONOCYTES # BLD AUTO: 0.56 K/UL
MONOCYTES NFR BLD AUTO: 8 %
NEUTROPHILS # BLD AUTO: 3.29 K/UL
NEUTROPHILS NFR BLD AUTO: 47.3 %
PLATELET # BLD AUTO: 327 K/UL
RBC # BLD: 4.93 M/UL
RBC # FLD: 15 %
WBC # FLD AUTO: 6.97 K/UL

## 2019-07-01 LAB
ALBUMIN SERPL ELPH-MCNC: 4.5 G/DL
ALP BLD-CCNC: 52 U/L
ALT SERPL-CCNC: 30 U/L
ANION GAP SERPL CALC-SCNC: 20 MMOL/L
AST SERPL-CCNC: 25 U/L
BASOPHILS # BLD AUTO: 0.03 K/UL
BASOPHILS NFR BLD AUTO: 0.4 %
BILIRUB SERPL-MCNC: 0.4 MG/DL
BUN SERPL-MCNC: 14 MG/DL
CALCIUM SERPL-MCNC: 10 MG/DL
CHLORIDE SERPL-SCNC: 101 MMOL/L
CO2 SERPL-SCNC: 20 MMOL/L
CREAT SERPL-MCNC: 0.87 MG/DL
EOSINOPHIL # BLD AUTO: 0.23 K/UL
EOSINOPHIL NFR BLD AUTO: 2.9 %
GLUCOSE SERPL-MCNC: 174 MG/DL
HCT VFR BLD CALC: 44.1 %
HGB BLD-MCNC: 13.7 G/DL
IMM GRANULOCYTES NFR BLD AUTO: 0.4 %
LYMPHOCYTES # BLD AUTO: 2.06 K/UL
LYMPHOCYTES NFR BLD AUTO: 26.1 %
MAN DIFF?: NORMAL
MCHC RBC-ENTMCNC: 26.7 PG
MCHC RBC-ENTMCNC: 31.1 GM/DL
MCV RBC AUTO: 86 FL
MONOCYTES # BLD AUTO: 0.48 K/UL
MONOCYTES NFR BLD AUTO: 6.1 %
NEUTROPHILS # BLD AUTO: 5.06 K/UL
NEUTROPHILS NFR BLD AUTO: 64.1 %
NT-PROBNP SERPL-MCNC: 61 PG/ML
PLATELET # BLD AUTO: 294 K/UL
POTASSIUM SERPL-SCNC: 3.6 MMOL/L
PROT SERPL-MCNC: 7.6 G/DL
RBC # BLD: 5.13 M/UL
RBC # FLD: 16.5 %
SODIUM SERPL-SCNC: 141 MMOL/L
WBC # FLD AUTO: 7.89 K/UL

## 2019-07-11 NOTE — REVIEW OF SYSTEMS
[Vision Problems] : vision problems [Fever] : no fever [Chills] : no chills [Nasal Discharge] : no nasal discharge [Sore Throat] : no sore throat [Earache] : no earache [Lower Ext Edema] : no lower extremity edema [Chest Pain] : no chest pain [Wheezing] : no wheezing [Shortness Of Breath] : no shortness of breath [Cough] : no cough [FreeTextEntry3] : uses glasses and prescribed eye drops  [Anxiety] : no anxiety [FreeTextEntry4] : upper lip with swelling and abrasion

## 2019-07-11 NOTE — HISTORY OF PRESENT ILLNESS
[Patient] : patient [FreeTextEntry1] : gait and balance impairment [FreeTextEntry2] : 77 y.o. male lives alone at [facility]. Pt is alert and oriented 3, he has dx of DM T2, HTN, glaucoma, knee pain, osteoarthritis. Pt request acute visit states " I have a problem with my lips.\par \par Ppper lip is swollen with small abrasion, pt states he drank very hot black coffee prior to noticing the swelling, 3 days ago. Reports pain, discomfort, no s/s infections noted or reported.\par \par  Labs ordered,  previously to be done today. instructed on using cold compress on the area, Vit A & D on the abrasion and to avoid scalding hot food and proper mouth care. Neosporin ordered if pt continues to experience symptoms. Report to his SW if no change in symptoms.

## 2019-07-11 NOTE — PHYSICAL EXAM
[Well Nourished] : well nourished [Supple] : the neck was supple [Normal Sclera/Conjunctiva] : normal sclera/conjunctiva [No Respiratory Distress] : no respiratory distress [No Accessory Muscle Use] : no accessory muscle use [Clear to Auscultation] : lungs were clear to auscultation bilaterally [No Edema] : there was no peripheral edema [Normal Bowel Sounds] : normal bowel sounds [Normal S1, S2] : normal S1 and S2 [Normal Gait] : normal gait [Soft] : abdomen soft [No Gross Sensory Deficits] : no gross sensory deficits [Cranial Nerves Intact] : cranial nerves 2-12 were intact [No Skin Lesions] : no skin lesions [Oriented x3] : oriented to person, place, and time [Normal Affect] : the affect was normal [Normal Mood] : the mood was normal [Normal Insight/Judgement] : insight and judgment were intact [Foot Ulcers] : no foot ulcers [de-identified] : unsteady gait order PT,  [de-identified] : upper lip swollen, no other issues with mouth

## 2019-08-05 ENCOUNTER — RX RENEWAL (OUTPATIENT)
Age: 79
End: 2019-08-05

## 2019-08-06 ENCOUNTER — RX RENEWAL (OUTPATIENT)
Age: 79
End: 2019-08-06

## 2019-08-15 ENCOUNTER — APPOINTMENT (OUTPATIENT)
Dept: INTERNAL MEDICINE | Facility: HOME HEALTH | Age: 79
End: 2019-08-15
Payer: MEDICARE

## 2019-08-15 PROCEDURE — 99350 HOME/RES VST EST HIGH MDM 60: CPT | Mod: 25

## 2019-08-15 PROCEDURE — G0439: CPT

## 2019-08-16 VITALS
SYSTOLIC BLOOD PRESSURE: 130 MMHG | HEART RATE: 97 BPM | BODY MASS INDEX: 30.42 KG/M2 | WEIGHT: 206 LBS | OXYGEN SATURATION: 97 % | DIASTOLIC BLOOD PRESSURE: 88 MMHG | RESPIRATION RATE: 18 BRPM | TEMPERATURE: 95.4 F

## 2019-08-16 NOTE — COUNSELING
[Obese -  ( BMI  >29.9 )] : obese - ( BMI  >29.9 ) [Sodium restriction 2gm recommended] : sodium restriction 2 gm recommended [Non - Smoker] : non-smoker [Smoke/CO Detectors] : smoke/CO detectors [Decrease stress] : decrease stress [Maintain functional ability] : maintain functional ability [Discussed disease trajectory with patient/caregiver] : discussed disease trajectory with patient/caregiver [Established patient, extensive review of history, medical and functional status, risk factors and patient education] : Established patient, extensive review of history, medical and functional status, risk factors and patient education and counseling provided for subsequent annual wellness visit

## 2019-08-16 NOTE — HEALTH RISK ASSESSMENT
[HRA Reviewed] : Health risk assessment reviewed [Independent] : managing finances [Some assistance needed] : managing medications [No falls in past year] : Patient reported no falls in the past year [Yes] : The patient does have visual impairment

## 2019-08-20 ENCOUNTER — RX RENEWAL (OUTPATIENT)
Age: 79
End: 2019-08-20

## 2019-08-28 NOTE — HISTORY OF PRESENT ILLNESS
[Patient] : patient [FreeTextEntry1] : gait and balance impairment [FreeTextEntry2] : 77 [facility]. [facility] Pt is alert and oriented 3, he has dx of DM T2, HTN, glaucoma, knee pain, osteoarthritis. Being seen for annual assessment. \par \par Pt denies any s/s cardiac or respiratory changes, falls. Only concern is his decreasing vision. Refusing to go to the oph, not sure if he is using the eye drops. Discussed the relationship of eye pressure with blood pressure and importance of taking BP meds as prescribed. He has gained 2 lbs in 2 mths, not exercising. Will see Endo in a few mths. Denies s/s hypo/hyperglycemia. Skin is intact. Sees Dr. Gibson for psych f/u. \par \par   Report to his SW if no change in symptoms.

## 2019-08-28 NOTE — REASON FOR VISIT
[Subsequent Annual Medicare Wellness Visit] : a subsequent annual Medicare wellness visit [Pre-Visit Preparation] : pre-visit preparation was done [FreeTextEntry2] : notes, meds, labs reviewed

## 2019-08-28 NOTE — PHYSICAL EXAM
[Well Nourished] : well nourished [Normal Sclera/Conjunctiva] : normal sclera/conjunctiva [Supple] : the neck was supple [No Respiratory Distress] : no respiratory distress [Clear to Auscultation] : lungs were clear to auscultation bilaterally [No Accessory Muscle Use] : no accessory muscle use [Normal S1, S2] : normal S1 and S2 [No Edema] : there was no peripheral edema [Normal Bowel Sounds] : normal bowel sounds [Soft] : abdomen soft [Normal Gait] : normal gait [No Skin Lesions] : no skin lesions [Cranial Nerves Intact] : cranial nerves 2-12 were intact [No Gross Sensory Deficits] : no gross sensory deficits [Oriented x3] : oriented to person, place, and time [Normal Affect] : the affect was normal [Normal Mood] : the mood was normal [Normal Insight/Judgement] : insight and judgment were intact [Foot Ulcers] : no foot ulcers [de-identified] : upper lip swollen, no other issues with mouth [de-identified] : unsteady gait order PT,

## 2019-08-28 NOTE — REVIEW OF SYSTEMS
[Vision Problems] : vision problems [Fever] : no fever [Chills] : no chills [Earache] : no earache [Nasal Discharge] : no nasal discharge [Sore Throat] : no sore throat [Chest Pain] : no chest pain [Lower Ext Edema] : no lower extremity edema [Shortness Of Breath] : no shortness of breath [Wheezing] : no wheezing [Cough] : no cough [Anxiety] : no anxiety [FreeTextEntry3] : uses glasses and prescribed eye drops  [FreeTextEntry4] : upper lip with swelling and abrasion

## 2019-09-03 ENCOUNTER — RX RENEWAL (OUTPATIENT)
Age: 79
End: 2019-09-03

## 2019-09-19 ENCOUNTER — APPOINTMENT (OUTPATIENT)
Dept: INTERNAL MEDICINE | Facility: HOME HEALTH | Age: 79
End: 2019-09-19
Payer: MEDICARE

## 2019-09-19 VITALS
DIASTOLIC BLOOD PRESSURE: 60 MMHG | SYSTOLIC BLOOD PRESSURE: 140 MMHG | HEART RATE: 84 BPM | RESPIRATION RATE: 1 BRPM | OXYGEN SATURATION: 98 % | TEMPERATURE: 97.2 F

## 2019-09-19 PROCEDURE — 99349 HOME/RES VST EST MOD MDM 40: CPT

## 2019-09-19 NOTE — HEALTH RISK ASSESSMENT
[HRA Reviewed] : Health risk assessment reviewed [Independent] : managing finances [Some assistance needed] : using telephone [Full assistance needed] : using transportation

## 2019-09-20 NOTE — HISTORY OF PRESENT ILLNESS
[Patient] : patient [FreeTextEntry1] : gait and balance impairment [FreeTextEntry2] : 77 y.o. male lives alone at [facility]. Pt is alert and oriented 3, he has dx of DM T2, HTN, glaucoma, knee pain, osteoarthritis. Being seen for annual assessment. \par \par Pt denies any s/s cardiac or respiratory changes, falls. Only concern is his decreasing vision. Refusing to go to the oph, not sure if he is using the eye drops. He was referred to [facility] Was seen by Dr. Viviana Bowens at [phone], called her she will return call later. Spoke with the SW Valeria and Dr. Bowens recommended pt return to oph for further work-up as there was blood in the back of the eye.    Will discuss with his SW to set up appointment.                                        \par \par . Denies s/s hypo/hyperglycemia. Skin is intact. Sees Dr. Gibson for psych f/u. \par \par   Report to his SW if no change in symptoms.

## 2019-09-20 NOTE — PHYSICAL EXAM
[Well Nourished] : well nourished [Normal Sclera/Conjunctiva] : normal sclera/conjunctiva [Supple] : the neck was supple [No Respiratory Distress] : no respiratory distress [Clear to Auscultation] : lungs were clear to auscultation bilaterally [No Accessory Muscle Use] : no accessory muscle use [Normal S1, S2] : normal S1 and S2 [No Edema] : there was no peripheral edema [Normal Bowel Sounds] : normal bowel sounds [Soft] : abdomen soft [Normal Gait] : normal gait [No Skin Lesions] : no skin lesions [Cranial Nerves Intact] : cranial nerves 2-12 were intact [No Gross Sensory Deficits] : no gross sensory deficits [Oriented x3] : oriented to person, place, and time [Normal Affect] : the affect was normal [Normal Mood] : the mood was normal [Normal Insight/Judgement] : insight and judgment were intact [Foot Ulcers] : no foot ulcers [de-identified] : upper lip swollen, no other issues with mouth [de-identified] : unsteady gait order PT,

## 2019-09-20 NOTE — REASON FOR VISIT
[Follow-Up] : a follow-up visit [Pre-Visit Preparation] : pre-visit preparation was done [Intercurrent Specialty/Sub-specialty Visits] : the patient has intercurrent specialty/sub-specialty visits [FreeTextEntry2] : notes, meds, labs reviewed [FreeTextEntry3] [facility]

## 2019-10-14 ENCOUNTER — APPOINTMENT (OUTPATIENT)
Dept: ENDOCRINOLOGY | Facility: CLINIC | Age: 79
End: 2019-10-14
Payer: MEDICARE

## 2019-10-14 VITALS
SYSTOLIC BLOOD PRESSURE: 131 MMHG | BODY MASS INDEX: 30.21 KG/M2 | HEIGHT: 69 IN | WEIGHT: 204 LBS | DIASTOLIC BLOOD PRESSURE: 86 MMHG | HEART RATE: 85 BPM

## 2019-10-14 PROCEDURE — 90662 IIV NO PRSV INCREASED AG IM: CPT

## 2019-10-14 PROCEDURE — G0008: CPT

## 2019-10-14 PROCEDURE — 99214 OFFICE O/P EST MOD 30 MIN: CPT | Mod: 25

## 2019-10-14 NOTE — HISTORY OF PRESENT ILLNESS
[FreeTextEntry1] : no health issues since last visit\par still eating salads.  has bagels "not that often".  Drinking Sprite 1-2 cans per day with meals.\par walks to the store, 2 blocks away\par urinating " a lot".  nocturia 1-2x/night\par no  SOB, chest pain, or neuropathy symptoms \par no hypoglycemia symptoms\par up to date with ophtho, saw recently,  says he has problems with left eye\par last night dinner: fish and rice.  lunch--salad,  "not a breakfast person" (has coffee)\par trying to lose weight, says it is hard for him to move.\par \par Meds: amlodipine 5mg, metoprolol ER 100mg/day\par metformin 850mg,BID;  glimepiride 2mg BID\par levothyroxine 75mcg/day\par Seroquel 400mg (2 tab) and 300mg (2 tab) but pt says he doesn't have it\par eye drops.\par

## 2019-10-14 NOTE — DATA REVIEWED
[FreeTextEntry1] : 10/19: A1c 6.7%, point of care\par 7/19: Cr 0.87, eGFR 83\par 3/19: A1c 7.1% (point of care).  TSH 0.75, tot chol 196, trig 353, HDL 55, LDL 70, B12 388\par 12/18: A1c 6.8%, point of care\par 10/18: tot chol 174, trig 159, HDL 68, LDL 74\par 9/18: A1c 6.5% (point of care)\par 4/18: A1c 7.2%, tot chol 223, trig 157, HDL 77, \par 1/18: A1c 6.8%, point of care\par 10/17: A1c 7.7%\par 8/17: A1c 9.2%, tot chol 219, trig 171, HDL 66, , TSH 1.49\par 4/17: A1c 8.0% (point of care)\par 1/17: A1c 9.0% (point of care)\par 11/16: A1c 10.2%, Cr 0.89, eGFR 97\par 8/16: A1c 7.7% (point of care)\par 6/16: A1c 8.9%, fructosamine 321\par 5/16: A1c 10.5%, tot chol 196, trig 184, HDL 60, LDL 99, urine microalb/cr 32, TSH 1.48,  Cr 0.93

## 2019-10-14 NOTE — ASSESSMENT
[FreeTextEntry1] : Diabetes, A1c at goal.  Limited insight to health conditions.  \par continue current regimen.  Recommended not drinking Sprite and try to walk more (goal 10 blocks/day, in multiple sessions).  Not drinking soda will also help his weight loss efforts.\par flu vaccine given\par RTO 6 months

## 2019-10-14 NOTE — PHYSICAL EXAM
[Alert] : alert [Healthy Appearance] : healthy appearance [Normal Voice/Communication] : normal voice communication [No Proptosis] : no proptosis [No Lid Lag] : no lid lag [Thyroid Not Enlarged] : the thyroid was not enlarged [No Thyroid Nodules] : there were no palpable thyroid nodules [Clear to Auscultation] : lungs were clear to auscultation bilaterally [Normal S1, S2] : normal S1 and S2 [Regular Rhythm] : with a regular rhythm [No Edema] : there was no peripheral edema [Normal Affect] : the affect was normal [Normal Mood] : the mood was normal [de-identified] : jung 107, fasting [de-identified] : slightly hard of hearing [de-identified] : mild to moderate acanthosis nigricans [de-identified] : limited insight to condition

## 2019-10-15 ENCOUNTER — MEDICATION RENEWAL (OUTPATIENT)
Age: 79
End: 2019-10-15

## 2019-11-07 ENCOUNTER — APPOINTMENT (OUTPATIENT)
Dept: INTERNAL MEDICINE | Facility: HOME HEALTH | Age: 79
End: 2019-11-07
Payer: MEDICARE

## 2019-11-07 PROCEDURE — 99349 HOME/RES VST EST MOD MDM 40: CPT

## 2019-11-08 ENCOUNTER — APPOINTMENT (OUTPATIENT)
Dept: OPHTHALMOLOGY | Facility: CLINIC | Age: 79
End: 2019-11-08
Payer: MEDICARE

## 2019-11-08 ENCOUNTER — NON-APPOINTMENT (OUTPATIENT)
Age: 79
End: 2019-11-08

## 2019-11-08 VITALS
TEMPERATURE: 97 F | SYSTOLIC BLOOD PRESSURE: 130 MMHG | DIASTOLIC BLOOD PRESSURE: 80 MMHG | OXYGEN SATURATION: 97 % | HEART RATE: 86 BPM | RESPIRATION RATE: 16 BRPM

## 2019-11-08 PROCEDURE — 92004 COMPRE OPH EXAM NEW PT 1/>: CPT

## 2019-11-08 PROCEDURE — 92250 FUNDUS PHOTOGRAPHY W/I&R: CPT

## 2019-11-08 PROCEDURE — 76514 ECHO EXAM OF EYE THICKNESS: CPT

## 2019-11-08 PROCEDURE — 92083 EXTENDED VISUAL FIELD XM: CPT

## 2019-11-08 NOTE — REVIEW OF SYSTEMS
[Vision Problems] : vision problems [Earache] : no earache [Chills] : no chills [Fever] : no fever [Nasal Discharge] : no nasal discharge [Sore Throat] : no sore throat [Chest Pain] : no chest pain [Shortness Of Breath] : no shortness of breath [Lower Ext Edema] : no lower extremity edema [Cough] : no cough [Wheezing] : no wheezing [Anxiety] : no anxiety [FreeTextEntry3] : uses glasses and prescribed eye drops  [FreeTextEntry4] : upper lip with swelling and abrasion

## 2019-11-08 NOTE — HISTORY OF PRESENT ILLNESS
[Patient] : patient [FreeTextEntry2] : 77 y.o. male lives alone at [facility]. Pt is alert and oriented 3, he has dx of DM T2, HTN, glaucoma, knee pain, osteoarthritis. Being seen for annual assessment. \par \par Pt reports pain to right hip started 3 days ago, with heaviness to the right leg. Affects his ability to dress and undress. He denies any pain when ambulating, injury to the area, swelling, falls.                             \par \par . Denies s/s hypo/hyperglycemia. Skin is intact. Sees Dr. Gibson for psych f/u. \par \par   Report to his SW if no change in symptoms. [FreeTextEntry1] : gait and balance impairment

## 2019-11-08 NOTE — REASON FOR VISIT
[Acute] : an acute visit [Pre-Visit Preparation] : pre-visit preparation was done [FreeTextEntry2] : notes, meds, labs reviewed

## 2019-11-08 NOTE — PHYSICAL EXAM
[Well Nourished] : well nourished [Normal Sclera/Conjunctiva] : normal sclera/conjunctiva [Supple] : the neck was supple [No Respiratory Distress] : no respiratory distress [Clear to Auscultation] : lungs were clear to auscultation bilaterally [No Accessory Muscle Use] : no accessory muscle use [Normal S1, S2] : normal S1 and S2 [No Edema] : there was no peripheral edema [Normal Bowel Sounds] : normal bowel sounds [Soft] : abdomen soft [Normal Gait] : normal gait [No Skin Lesions] : no skin lesions [Cranial Nerves Intact] : cranial nerves 2-12 were intact [No Gross Sensory Deficits] : no gross sensory deficits [Oriented x3] : oriented to person, place, and time [Normal Affect] : the affect was normal [Normal Mood] : the mood was normal [Normal Insight/Judgement] : insight and judgment were intact [de-identified] : upper lip swollen, no other issues with mouth [Foot Ulcers] : no foot ulcers [de-identified] : unsteady gait order PT,

## 2019-11-13 ENCOUNTER — NON-APPOINTMENT (OUTPATIENT)
Age: 79
End: 2019-11-13

## 2019-11-13 ENCOUNTER — APPOINTMENT (OUTPATIENT)
Dept: OPHTHALMOLOGY | Facility: CLINIC | Age: 79
End: 2019-11-13
Payer: MEDICARE

## 2019-11-13 PROCEDURE — 92225: CPT | Mod: LT

## 2019-11-13 PROCEDURE — 92134 CPTRZ OPH DX IMG PST SGM RTA: CPT

## 2019-11-13 PROCEDURE — 92014 COMPRE OPH EXAM EST PT 1/>: CPT

## 2019-11-13 PROCEDURE — 92235 FLUORESCEIN ANGRPH MLTIFRAME: CPT

## 2019-11-21 ENCOUNTER — APPOINTMENT (OUTPATIENT)
Dept: OPHTHALMOLOGY | Facility: CLINIC | Age: 79
End: 2019-11-21

## 2019-11-22 ENCOUNTER — NON-APPOINTMENT (OUTPATIENT)
Age: 79
End: 2019-11-22

## 2019-11-22 ENCOUNTER — APPOINTMENT (OUTPATIENT)
Dept: OPHTHALMOLOGY | Facility: CLINIC | Age: 79
End: 2019-11-22
Payer: MEDICARE

## 2019-11-22 PROCEDURE — 92020 GONIOSCOPY: CPT

## 2019-11-22 PROCEDURE — 92014 COMPRE OPH EXAM EST PT 1/>: CPT

## 2019-11-22 PROCEDURE — 92250 FUNDUS PHOTOGRAPHY W/I&R: CPT

## 2019-12-06 ENCOUNTER — RX RENEWAL (OUTPATIENT)
Age: 79
End: 2019-12-06

## 2019-12-12 ENCOUNTER — APPOINTMENT (OUTPATIENT)
Dept: INTERNAL MEDICINE | Facility: HOME HEALTH | Age: 79
End: 2019-12-12
Payer: MEDICARE

## 2019-12-12 VITALS
OXYGEN SATURATION: 97 % | HEART RATE: 75 BPM | RESPIRATION RATE: 16 BRPM | DIASTOLIC BLOOD PRESSURE: 80 MMHG | SYSTOLIC BLOOD PRESSURE: 124 MMHG

## 2019-12-12 DIAGNOSIS — Z00.00 ENCOUNTER FOR GENERAL ADULT MEDICAL EXAMINATION W/OUT ABNORMAL FINDINGS: ICD-10-CM

## 2019-12-12 PROCEDURE — 99349 HOME/RES VST EST MOD MDM 40: CPT

## 2019-12-12 NOTE — PHYSICAL EXAM
[Well Nourished] : well nourished [Normal Sclera/Conjunctiva] : normal sclera/conjunctiva [Supple] : the neck was supple [No Respiratory Distress] : no respiratory distress [Clear to Auscultation] : lungs were clear to auscultation bilaterally [No Accessory Muscle Use] : no accessory muscle use [Normal S1, S2] : normal S1 and S2 [No Edema] : there was no peripheral edema [Normal Bowel Sounds] : normal bowel sounds [Soft] : abdomen soft [Normal Gait] : normal gait [No Skin Lesions] : no skin lesions [Cranial Nerves Intact] : cranial nerves 2-12 were intact [No Gross Sensory Deficits] : no gross sensory deficits [Oriented x3] : oriented to person, place, and time [Normal Affect] : the affect was normal [Normal Mood] : the mood was normal [Normal Insight/Judgement] : insight and judgment were intact [Foot Ulcers] : no foot ulcers [de-identified] : upper lip swollen, no other issues with mouth [de-identified] : unsteady gait order PT,

## 2019-12-12 NOTE — HISTORY OF PRESENT ILLNESS
[Patient] : patient [FreeTextEntry1] : gait and balance impairment [FreeTextEntry2] : 77 y.o. male lives alone at [facility]. Pt is alert and oriented 3, he has dx of DM T2, HTN, glaucoma, knee pain, osteoarthritis. Being seen for annual assessment. \par \par Pt reports pain to right hip started 3 days ago, with heaviness to the right leg. Affects his ability to dress and undress. He denies any pain when ambulating, injury to the area, swelling, falls. X-ray was done shows joint space is preserved, soft tissue is intact. study was unremarkable.                         \par \par . Denies s/s hypo/hyperglycemia. Skin is intact. Sees Dr. Gibson for psych f/u. \par \par   Pt to report to his SW if no change in symptoms.

## 2019-12-12 NOTE — HEALTH RISK ASSESSMENT
[HRA Reviewed] : Health risk assessment reviewed [No falls in past year] : Patient reported no falls in the past year

## 2019-12-12 NOTE — REASON FOR VISIT
[Follow-Up] : a follow-up visit [Pre-Visit Preparation] : pre-visit preparation was not done [FreeTextEntry2] : notes, meds, labs reviewed

## 2019-12-20 ENCOUNTER — NON-APPOINTMENT (OUTPATIENT)
Age: 79
End: 2019-12-20

## 2019-12-20 ENCOUNTER — APPOINTMENT (OUTPATIENT)
Dept: OPHTHALMOLOGY | Facility: CLINIC | Age: 79
End: 2019-12-20
Payer: MEDICARE

## 2019-12-20 PROCEDURE — 92083 EXTENDED VISUAL FIELD XM: CPT

## 2019-12-20 PROCEDURE — 92012 INTRM OPH EXAM EST PATIENT: CPT

## 2019-12-20 PROCEDURE — 92133 CPTRZD OPH DX IMG PST SGM ON: CPT

## 2019-12-27 ENCOUNTER — RX RENEWAL (OUTPATIENT)
Age: 79
End: 2019-12-27

## 2020-01-23 ENCOUNTER — APPOINTMENT (OUTPATIENT)
Dept: HOME HEALTH SERVICES | Facility: HOME HEALTH | Age: 80
End: 2020-01-23
Payer: MEDICARE

## 2020-01-23 VITALS
TEMPERATURE: 97.4 F | DIASTOLIC BLOOD PRESSURE: 70 MMHG | HEART RATE: 98 BPM | SYSTOLIC BLOOD PRESSURE: 120 MMHG | RESPIRATION RATE: 18 BRPM | OXYGEN SATURATION: 96 %

## 2020-01-23 PROCEDURE — 99349 HOME/RES VST EST MOD MDM 40: CPT

## 2020-01-23 NOTE — HISTORY OF PRESENT ILLNESS
[Patient] : patient [FreeTextEntry1] : gait and balance impairment [FreeTextEntry2] : 77 y.o. male lives alone at [facility]. Pt is alert and oriented 3, he has dx of DM T2, HTN, glaucoma, knee pain, osteoarthritis. Being seen for annual assessment. \par \par Pt reports pain to penis started 3 days ago, redness and skin break to head of penis, denies any pain or burning with urination. Thinks it could be from wearing tight clothing, denies any recent sexual activity. Refused to show to NP with someone else in the room. Requesting urology appointment. Appointment made for Dr. Santos [address] [place] [address], Tuesday 1/28/2020. 1:50 pm.\par \par . Denies s/s hypo/hyperglycemia. Skin is intact. Sees Dr. Gibson for psych f/u. \par \par JOSE reviewed and updated, pt elected DNR, DNI, send to the hospital, no feeding tube, no IV, use antibiotics, no dialysis or

## 2020-01-23 NOTE — REVIEW OF SYSTEMS
[Vision Problems] : vision problems [Fever] : no fever [Chills] : no chills [Fatigue] : no fatigue [Recent Change In Weight] : ~T no recent weight change [Earache] : no earache [Nasal Discharge] : no nasal discharge [Sore Throat] : no sore throat [Chest Pain] : no chest pain [Shortness Of Breath] : no shortness of breath [Wheezing] : no wheezing [Lower Ext Edema] : no lower extremity edema [Abdominal Pain] : no abdominal pain [Cough] : no cough [Constipation] : no constipation [Dysuria] : no dysuria [Incontinence] : no incontinence [Muscle Weakness] : no muscle weakness [Memory Loss] : no memory loss [Fainting] : no fainting [Suicidal] : not suicidal [Insomnia] : no insomnia [Easy Bleeding] : no easy bleeding [Anxiety] : no anxiety [FreeTextEntry3] : uses glasses and prescribed eye drops  [FreeTextEntry8] : pain and redness to head of penis with skin break

## 2020-01-23 NOTE — REASON FOR VISIT
[Pre-Visit Preparation] : pre-visit preparation was done [Acute] : an acute visit [FreeTextEntry2] : notes, meds, labs reviewed

## 2020-01-28 ENCOUNTER — APPOINTMENT (OUTPATIENT)
Dept: UROLOGY | Facility: CLINIC | Age: 80
End: 2020-01-28
Payer: MEDICARE

## 2020-01-28 VITALS — HEART RATE: 83 BPM | TEMPERATURE: 97.4 F | SYSTOLIC BLOOD PRESSURE: 136 MMHG | DIASTOLIC BLOOD PRESSURE: 79 MMHG

## 2020-01-28 PROCEDURE — 99203 OFFICE O/P NEW LOW 30 MIN: CPT

## 2020-01-28 NOTE — PHYSICAL EXAM
[General Appearance - Well Developed] : well developed [General Appearance - Well Nourished] : well nourished [Well Groomed] : well groomed [Normal Appearance] : normal appearance [General Appearance - In No Acute Distress] : no acute distress [] : no respiratory distress [Respiration, Rhythm And Depth] : normal respiratory rhythm and effort [Edema] : no peripheral edema [Exaggerated Use Of Accessory Muscles For Inspiration] : no accessory muscle use [Abdomen Soft] : soft [Abdomen Tenderness] : non-tender [Costovertebral Angle Tenderness] : no ~M costovertebral angle tenderness [Urinary Bladder Findings] : the bladder was normal on palpation [Penis Abnormality] : normal uncircumcised penis [Urethral Meatus] : meatus normal [Epididymis] : the epididymides were normal [Scrotum] : the scrotum was normal [Testes Tenderness] : no tenderness of the testes [Testes Mass (___cm)] : there were no testicular masses [FreeTextEntry1] : 1x2 cm area of blanching erythema, flat, no lesions, non tender [No Palpable Adenopathy] : no palpable adenopathy

## 2020-01-28 NOTE — HISTORY OF PRESENT ILLNESS
[FreeTextEntry1] : CC: rash on penis\par \par Two weeks ago, noticed a rash on penis.  A nurse gave him Mupirocin 2% and it has improved the rash.  He is not circumcised. The rash was on the glans penis.  It was "sore" feeling.  He has had this before. No urinary symptoms.  No burning/dysuria.  No pus.  No fever.  He states it is "better".  \par \par SOCIAL: past smoker\par SURG:  Knee replacement\par FAMHX: No known  cancer, disease \par ROS: negative 10 system other than glans rash

## 2020-01-28 NOTE — ASSESSMENT
[FreeTextEntry1] : Balanitis; will treat with nystatin/steroid cream \par If does not resolve in 2 weeks will need biopsy\par Patient informed of need to follow up and risks of cancer/cis/progression if he fails to follow up .

## 2020-02-11 ENCOUNTER — APPOINTMENT (OUTPATIENT)
Dept: UROLOGY | Facility: CLINIC | Age: 80
End: 2020-02-11

## 2020-02-26 ENCOUNTER — APPOINTMENT (OUTPATIENT)
Dept: OPHTHALMOLOGY | Facility: CLINIC | Age: 80
End: 2020-02-26

## 2020-03-01 ENCOUNTER — OUTPATIENT (OUTPATIENT)
Dept: OUTPATIENT SERVICES | Facility: HOSPITAL | Age: 80
LOS: 1 days | End: 2020-03-01
Payer: MEDICARE

## 2020-03-01 ENCOUNTER — RX RENEWAL (OUTPATIENT)
Age: 80
End: 2020-03-01

## 2020-03-01 DIAGNOSIS — E89.0 POSTPROCEDURAL HYPOTHYROIDISM: Chronic | ICD-10-CM

## 2020-03-01 PROCEDURE — G9001: CPT

## 2020-03-12 ENCOUNTER — APPOINTMENT (OUTPATIENT)
Dept: HOME HEALTH SERVICES | Facility: HOME HEALTH | Age: 80
End: 2020-03-12
Payer: MEDICARE

## 2020-03-12 DIAGNOSIS — Z28.21 IMMUNIZATION NOT CARRIED OUT BECAUSE OF PATIENT REFUSAL: ICD-10-CM

## 2020-03-12 PROCEDURE — 99350 HOME/RES VST EST HIGH MDM 60: CPT

## 2020-03-13 VITALS
TEMPERATURE: 97.5 F | OXYGEN SATURATION: 97 % | HEART RATE: 71 BPM | SYSTOLIC BLOOD PRESSURE: 120 MMHG | DIASTOLIC BLOOD PRESSURE: 80 MMHG | RESPIRATION RATE: 16 BRPM

## 2020-03-13 PROBLEM — Z28.21 REFUSED INFLUENZA VACCINE: Status: RESOLVED | Noted: 2017-11-03 | Resolved: 2020-03-13

## 2020-03-23 ENCOUNTER — INPATIENT (INPATIENT)
Facility: HOSPITAL | Age: 80
LOS: 23 days | Discharge: NURSING, REHAB OR LONG-TERM CARE | DRG: 871 | End: 2020-04-16
Attending: INTERNAL MEDICINE | Admitting: INTERNAL MEDICINE
Payer: MEDICARE

## 2020-03-23 VITALS
OXYGEN SATURATION: 95 % | HEART RATE: 100 BPM | TEMPERATURE: 98 F | DIASTOLIC BLOOD PRESSURE: 98 MMHG | RESPIRATION RATE: 17 BRPM | SYSTOLIC BLOOD PRESSURE: 164 MMHG

## 2020-03-23 DIAGNOSIS — E03.9 HYPOTHYROIDISM, UNSPECIFIED: ICD-10-CM

## 2020-03-23 DIAGNOSIS — R73.9 HYPERGLYCEMIA, UNSPECIFIED: ICD-10-CM

## 2020-03-23 DIAGNOSIS — I10 ESSENTIAL (PRIMARY) HYPERTENSION: ICD-10-CM

## 2020-03-23 DIAGNOSIS — E89.0 POSTPROCEDURAL HYPOTHYROIDISM: Chronic | ICD-10-CM

## 2020-03-23 DIAGNOSIS — R63.8 OTHER SYMPTOMS AND SIGNS CONCERNING FOOD AND FLUID INTAKE: ICD-10-CM

## 2020-03-23 DIAGNOSIS — J18.9 PNEUMONIA, UNSPECIFIED ORGANISM: ICD-10-CM

## 2020-03-23 DIAGNOSIS — E11.9 TYPE 2 DIABETES MELLITUS WITHOUT COMPLICATIONS: ICD-10-CM

## 2020-03-23 DIAGNOSIS — R53.1 WEAKNESS: ICD-10-CM

## 2020-03-23 DIAGNOSIS — A41.9 SEPSIS, UNSPECIFIED ORGANISM: ICD-10-CM

## 2020-03-23 DIAGNOSIS — E78.5 HYPERLIPIDEMIA, UNSPECIFIED: ICD-10-CM
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
## 2020-03-23 DIAGNOSIS — Z29.9 ENCOUNTER FOR PROPHYLACTIC MEASURES, UNSPECIFIED: ICD-10-CM

## 2020-03-23 DIAGNOSIS — R50.9 FEVER, UNSPECIFIED: ICD-10-CM

## 2020-03-23 LAB
ALBUMIN SERPL ELPH-MCNC: 3.3 G/DL — SIGNIFICANT CHANGE UP (ref 3.3–5)
ALBUMIN SERPL ELPH-MCNC: 3.4 G/DL — SIGNIFICANT CHANGE UP (ref 3.3–5)
ALP SERPL-CCNC: 39 U/L — LOW (ref 40–120)
ALP SERPL-CCNC: 39 U/L — LOW (ref 40–120)
ALT FLD-CCNC: 29 U/L — SIGNIFICANT CHANGE UP (ref 10–45)
ALT FLD-CCNC: SIGNIFICANT CHANGE UP U/L (ref 10–45)
ANION GAP SERPL CALC-SCNC: 15 MMOL/L — SIGNIFICANT CHANGE UP (ref 5–17)
ANION GAP SERPL CALC-SCNC: 16 MMOL/L — SIGNIFICANT CHANGE UP (ref 5–17)
APPEARANCE UR: CLEAR — SIGNIFICANT CHANGE UP
AST SERPL-CCNC: 39 U/L — SIGNIFICANT CHANGE UP (ref 10–40)
AST SERPL-CCNC: SIGNIFICANT CHANGE UP U/L (ref 10–40)
B-OH-BUTYR SERPL-SCNC: 1 MMOL/L — HIGH
B-OH-BUTYR SERPL-SCNC: 1.3 MMOL/L — HIGH
BASOPHILS # BLD AUTO: 0.05 K/UL — SIGNIFICANT CHANGE UP (ref 0–0.2)
BASOPHILS NFR BLD AUTO: 0.9 % — SIGNIFICANT CHANGE UP (ref 0–2)
BILIRUB SERPL-MCNC: 0.4 MG/DL — SIGNIFICANT CHANGE UP (ref 0.2–1.2)
BILIRUB SERPL-MCNC: 0.6 MG/DL — SIGNIFICANT CHANGE UP (ref 0.2–1.2)
BILIRUB UR-MCNC: NEGATIVE — SIGNIFICANT CHANGE UP
BUN SERPL-MCNC: 14 MG/DL — SIGNIFICANT CHANGE UP (ref 7–23)
BUN SERPL-MCNC: 16 MG/DL — SIGNIFICANT CHANGE UP (ref 7–23)
CALCIUM SERPL-MCNC: 8.9 MG/DL — SIGNIFICANT CHANGE UP (ref 8.4–10.5)
CALCIUM SERPL-MCNC: 9.1 MG/DL — SIGNIFICANT CHANGE UP (ref 8.4–10.5)
CHLORIDE SERPL-SCNC: 97 MMOL/L — SIGNIFICANT CHANGE UP (ref 96–108)
CHLORIDE SERPL-SCNC: 99 MMOL/L — SIGNIFICANT CHANGE UP (ref 96–108)
CK MB CFR SERPL CALC: <1 NG/ML — SIGNIFICANT CHANGE UP (ref 0–6.7)
CK SERPL-CCNC: 494 U/L — HIGH (ref 30–200)
CO2 SERPL-SCNC: 21 MMOL/L — LOW (ref 22–31)
CO2 SERPL-SCNC: 22 MMOL/L — SIGNIFICANT CHANGE UP (ref 22–31)
COLOR SPEC: YELLOW — SIGNIFICANT CHANGE UP
CREAT SERPL-MCNC: 0.97 MG/DL — SIGNIFICANT CHANGE UP (ref 0.5–1.3)
CREAT SERPL-MCNC: 1.06 MG/DL — SIGNIFICANT CHANGE UP (ref 0.5–1.3)
CRP SERPL-MCNC: 6.43 MG/DL — HIGH (ref 0–0.4)
DIFF PNL FLD: ABNORMAL
EOSINOPHIL # BLD AUTO: 0 K/UL — SIGNIFICANT CHANGE UP (ref 0–0.5)
EOSINOPHIL NFR BLD AUTO: 0 % — SIGNIFICANT CHANGE UP (ref 0–6)
ERYTHROCYTE [SEDIMENTATION RATE] IN BLOOD: 16 MM/HR — SIGNIFICANT CHANGE UP
FERRITIN SERPL-MCNC: 1070 NG/ML — HIGH (ref 30–400)
GLUCOSE BLDC GLUCOMTR-MCNC: 304 MG/DL — HIGH (ref 70–99)
GLUCOSE SERPL-MCNC: 298 MG/DL — HIGH (ref 70–99)
GLUCOSE SERPL-MCNC: 366 MG/DL — HIGH (ref 70–99)
GLUCOSE UR QL: 500
HCT VFR BLD CALC: 43.4 % — SIGNIFICANT CHANGE UP (ref 39–50)
HGB BLD-MCNC: 14.1 G/DL — SIGNIFICANT CHANGE UP (ref 13–17)
KETONES UR-MCNC: ABNORMAL MG/DL
LACTATE SERPL-SCNC: 2.2 MMOL/L — HIGH (ref 0.5–2)
LACTATE SERPL-SCNC: 2.3 MMOL/L — HIGH (ref 0.5–2)
LDH SERPL L TO P-CCNC: 376 U/L — HIGH (ref 50–242)
LEUKOCYTE ESTERASE UR-ACNC: NEGATIVE — SIGNIFICANT CHANGE UP
LIDOCAIN IGE QN: 25 U/L — SIGNIFICANT CHANGE UP (ref 7–60)
LYMPHOCYTES # BLD AUTO: 0.55 K/UL — LOW (ref 1–3.3)
LYMPHOCYTES # BLD AUTO: 10.7 % — LOW (ref 13–44)
MCHC RBC-ENTMCNC: 26.3 PG — LOW (ref 27–34)
MCHC RBC-ENTMCNC: 32.5 GM/DL — SIGNIFICANT CHANGE UP (ref 32–36)
MCV RBC AUTO: 81 FL — SIGNIFICANT CHANGE UP (ref 80–100)
MONOCYTES # BLD AUTO: 0.46 K/UL — SIGNIFICANT CHANGE UP (ref 0–0.9)
MONOCYTES NFR BLD AUTO: 8.9 % — SIGNIFICANT CHANGE UP (ref 2–14)
NEUTROPHILS # BLD AUTO: 4.05 K/UL — SIGNIFICANT CHANGE UP (ref 1.8–7.4)
NEUTROPHILS NFR BLD AUTO: 76.8 % — SIGNIFICANT CHANGE UP (ref 43–77)
NITRITE UR-MCNC: NEGATIVE — SIGNIFICANT CHANGE UP
NRBC # BLD: SIGNIFICANT CHANGE UP /100 WBCS (ref 0–0)
PH UR: 6 — SIGNIFICANT CHANGE UP (ref 5–8)
PLATELET # BLD AUTO: 177 K/UL — SIGNIFICANT CHANGE UP (ref 150–400)
POTASSIUM SERPL-MCNC: 4.5 MMOL/L — SIGNIFICANT CHANGE UP (ref 3.5–5.3)
POTASSIUM SERPL-MCNC: SIGNIFICANT CHANGE UP MMOL/L (ref 3.5–5.3)
POTASSIUM SERPL-SCNC: 4.5 MMOL/L — SIGNIFICANT CHANGE UP (ref 3.5–5.3)
POTASSIUM SERPL-SCNC: SIGNIFICANT CHANGE UP MMOL/L (ref 3.5–5.3)
PROT SERPL-MCNC: 6.8 G/DL — SIGNIFICANT CHANGE UP (ref 6–8.3)
PROT SERPL-MCNC: 7 G/DL — SIGNIFICANT CHANGE UP (ref 6–8.3)
PROT UR-MCNC: 100 MG/DL
RAPID RVP RESULT: SIGNIFICANT CHANGE UP
RBC # BLD: 5.36 M/UL — SIGNIFICANT CHANGE UP (ref 4.2–5.8)
RBC # FLD: 14.3 % — SIGNIFICANT CHANGE UP (ref 10.3–14.5)
SODIUM SERPL-SCNC: 134 MMOL/L — LOW (ref 135–145)
SODIUM SERPL-SCNC: 136 MMOL/L — SIGNIFICANT CHANGE UP (ref 135–145)
SP GR SPEC: >=1.03 — SIGNIFICANT CHANGE UP (ref 1–1.03)
TROPONIN T SERPL-MCNC: <0.01 NG/ML — SIGNIFICANT CHANGE UP (ref 0–0.01)
TSH SERPL-MCNC: 0.56 UIU/ML — SIGNIFICANT CHANGE UP (ref 0.35–4.94)
UROBILINOGEN FLD QL: 0.2 E.U./DL — SIGNIFICANT CHANGE UP
WBC # BLD: 5.15 K/UL — SIGNIFICANT CHANGE UP (ref 3.8–10.5)
WBC # FLD AUTO: 5.15 K/UL — SIGNIFICANT CHANGE UP (ref 3.8–10.5)

## 2020-03-23 PROCEDURE — 71045 X-RAY EXAM CHEST 1 VIEW: CPT | Mod: 26

## 2020-03-23 PROCEDURE — 99223 1ST HOSP IP/OBS HIGH 75: CPT | Mod: GC

## 2020-03-23 PROCEDURE — 99285 EMERGENCY DEPT VISIT HI MDM: CPT

## 2020-03-23 PROCEDURE — 93010 ELECTROCARDIOGRAM REPORT: CPT

## 2020-03-23 PROCEDURE — 70450 CT HEAD/BRAIN W/O DYE: CPT | Mod: 26

## 2020-03-23 PROCEDURE — 73523 X-RAY EXAM HIPS BI 5/> VIEWS: CPT | Mod: 26

## 2020-03-23 RX ORDER — DEXTROSE 50 % IN WATER 50 %
25 SYRINGE (ML) INTRAVENOUS ONCE
Refills: 0 | Status: DISCONTINUED | OUTPATIENT
Start: 2020-03-23 | End: 2020-04-16

## 2020-03-23 RX ORDER — INSULIN GLARGINE 100 [IU]/ML
10 INJECTION, SOLUTION SUBCUTANEOUS AT BEDTIME
Refills: 0 | Status: DISCONTINUED | OUTPATIENT
Start: 2020-03-23 | End: 2020-03-23

## 2020-03-23 RX ORDER — INSULIN LISPRO 100/ML
VIAL (ML) SUBCUTANEOUS
Refills: 0 | Status: DISCONTINUED | OUTPATIENT
Start: 2020-03-23 | End: 2020-03-29

## 2020-03-23 RX ORDER — AZITHROMYCIN 500 MG/1
250 TABLET, FILM COATED ORAL EVERY 24 HOURS
Refills: 0 | Status: DISCONTINUED | OUTPATIENT
Start: 2020-03-23 | End: 2020-03-27

## 2020-03-23 RX ORDER — INSULIN LISPRO 100/ML
5 VIAL (ML) SUBCUTANEOUS
Refills: 0 | Status: DISCONTINUED | OUTPATIENT
Start: 2020-03-24 | End: 2020-03-26

## 2020-03-23 RX ORDER — LEVOTHYROXINE SODIUM 125 MCG
75 TABLET ORAL DAILY
Refills: 0 | Status: DISCONTINUED | OUTPATIENT
Start: 2020-03-23 | End: 2020-04-16

## 2020-03-23 RX ORDER — DEXTROSE 50 % IN WATER 50 %
12.5 SYRINGE (ML) INTRAVENOUS ONCE
Refills: 0 | Status: DISCONTINUED | OUTPATIENT
Start: 2020-03-23 | End: 2020-04-16

## 2020-03-23 RX ORDER — ACETAMINOPHEN 500 MG
650 TABLET ORAL EVERY 6 HOURS
Refills: 0 | Status: DISCONTINUED | OUTPATIENT
Start: 2020-03-23 | End: 2020-03-25

## 2020-03-23 RX ORDER — FAMOTIDINE 10 MG/ML
20 INJECTION INTRAVENOUS DAILY
Refills: 0 | Status: DISCONTINUED | OUTPATIENT
Start: 2020-03-23 | End: 2020-03-27

## 2020-03-23 RX ORDER — THIAMINE MONONITRATE (VIT B1) 100 MG
200 TABLET ORAL
Refills: 0 | Status: DISCONTINUED | OUTPATIENT
Start: 2020-03-23 | End: 2020-03-24

## 2020-03-23 RX ORDER — SODIUM CHLORIDE 9 MG/ML
1000 INJECTION INTRAMUSCULAR; INTRAVENOUS; SUBCUTANEOUS ONCE
Refills: 0 | Status: COMPLETED | OUTPATIENT
Start: 2020-03-23 | End: 2020-03-23

## 2020-03-23 RX ORDER — ASCORBIC ACID 60 MG
1500 TABLET,CHEWABLE ORAL EVERY 6 HOURS
Refills: 0 | Status: DISCONTINUED | OUTPATIENT
Start: 2020-03-23 | End: 2020-03-26

## 2020-03-23 RX ORDER — ATORVASTATIN CALCIUM 80 MG/1
20 TABLET, FILM COATED ORAL AT BEDTIME
Refills: 0 | Status: DISCONTINUED | OUTPATIENT
Start: 2020-03-23 | End: 2020-03-24

## 2020-03-23 RX ORDER — INSULIN GLARGINE 100 [IU]/ML
15 INJECTION, SOLUTION SUBCUTANEOUS AT BEDTIME
Refills: 0 | Status: DISCONTINUED | OUTPATIENT
Start: 2020-03-23 | End: 2020-03-26

## 2020-03-23 RX ORDER — ASCORBIC ACID 60 MG
1500 TABLET,CHEWABLE ORAL
Refills: 0 | Status: DISCONTINUED | OUTPATIENT
Start: 2020-03-23 | End: 2020-03-23

## 2020-03-23 RX ORDER — DEXTROSE 50 % IN WATER 50 %
15 SYRINGE (ML) INTRAVENOUS ONCE
Refills: 0 | Status: DISCONTINUED | OUTPATIENT
Start: 2020-03-23 | End: 2020-04-16

## 2020-03-23 RX ORDER — SODIUM CHLORIDE 9 MG/ML
1000 INJECTION, SOLUTION INTRAVENOUS
Refills: 0 | Status: DISCONTINUED | OUTPATIENT
Start: 2020-03-23 | End: 2020-03-26

## 2020-03-23 RX ORDER — GLUCAGON INJECTION, SOLUTION 0.5 MG/.1ML
1 INJECTION, SOLUTION SUBCUTANEOUS ONCE
Refills: 0 | Status: DISCONTINUED | OUTPATIENT
Start: 2020-03-23 | End: 2020-04-16

## 2020-03-23 RX ORDER — SODIUM CHLORIDE 9 MG/ML
1000 INJECTION INTRAMUSCULAR; INTRAVENOUS; SUBCUTANEOUS
Refills: 0 | Status: DISCONTINUED | OUTPATIENT
Start: 2020-03-23 | End: 2020-03-23

## 2020-03-23 RX ORDER — HYDROXYCHLOROQUINE SULFATE 200 MG
400 TABLET ORAL EVERY 12 HOURS
Refills: 0 | Status: COMPLETED | OUTPATIENT
Start: 2020-03-23 | End: 2020-03-24

## 2020-03-23 RX ORDER — ENOXAPARIN SODIUM 100 MG/ML
40 INJECTION SUBCUTANEOUS EVERY 24 HOURS
Refills: 0 | Status: DISCONTINUED | OUTPATIENT
Start: 2020-03-23 | End: 2020-04-05

## 2020-03-23 RX ORDER — ACETAMINOPHEN 500 MG
650 TABLET ORAL ONCE
Refills: 0 | Status: COMPLETED | OUTPATIENT
Start: 2020-03-23 | End: 2020-03-23

## 2020-03-23 RX ADMIN — Medication 4: at 21:48

## 2020-03-23 RX ADMIN — SODIUM CHLORIDE 1000 MILLILITER(S): 9 INJECTION INTRAMUSCULAR; INTRAVENOUS; SUBCUTANEOUS at 15:22

## 2020-03-23 RX ADMIN — SODIUM CHLORIDE 1000 MILLILITER(S): 9 INJECTION INTRAMUSCULAR; INTRAVENOUS; SUBCUTANEOUS at 13:58

## 2020-03-23 RX ADMIN — INSULIN GLARGINE 15 UNIT(S): 100 INJECTION, SOLUTION SUBCUTANEOUS at 23:50

## 2020-03-23 RX ADMIN — SODIUM CHLORIDE 1000 MILLILITER(S): 9 INJECTION INTRAMUSCULAR; INTRAVENOUS; SUBCUTANEOUS at 22:34

## 2020-03-23 RX ADMIN — Medication 650 MILLIGRAM(S): at 16:26

## 2020-03-23 RX ADMIN — AZITHROMYCIN 250 MILLIGRAM(S): 500 TABLET, FILM COATED ORAL at 23:49

## 2020-03-23 RX ADMIN — Medication 400 MILLIGRAM(S): at 23:49

## 2020-03-23 RX ADMIN — SODIUM CHLORIDE 1000 MILLILITER(S): 9 INJECTION INTRAMUSCULAR; INTRAVENOUS; SUBCUTANEOUS at 12:58

## 2020-03-23 RX ADMIN — Medication 200 MILLIGRAM(S): at 23:48

## 2020-03-23 RX ADMIN — ATORVASTATIN CALCIUM 20 MILLIGRAM(S): 80 TABLET, FILM COATED ORAL at 21:49

## 2020-03-23 RX ADMIN — SODIUM CHLORIDE 1000 MILLILITER(S): 9 INJECTION INTRAMUSCULAR; INTRAVENOUS; SUBCUTANEOUS at 16:30

## 2020-03-23 RX ADMIN — Medication 153 MILLIGRAM(S): at 23:50

## 2020-03-23 RX ADMIN — Medication 650 MILLIGRAM(S): at 17:15

## 2020-03-23 NOTE — ED ADULT TRIAGE NOTE - OTHER COMPLAINTS
pt presents to ed s/p mechanical fall while at home. denies head trauma. c.o generalized weakness. fs [id number]

## 2020-03-23 NOTE — H&P ADULT - ASSESSMENT
79 M, poor historian, with past medical history of HTN, DM2, and hypothyroidism who presents to ED BIBEMS after being found down for a fall. Found to have elevated blood glucose 350's and elevated beta hydroxy buturate likely 2/2 starvation ketosis. In ED, found to be febrile and desaturated to 89% on room air. In setting of recent pandemic, swabbed for COVID 19 and admitted to [address] [place] [address]. 79 M, poor historian, with past medical history of HTN, DM2, and hypothyroidism who presents to ED BIBEMS after being found down for a fall. Found to have elevated beta hydroxy buturate likely 2/2 dehydration vs starvation ketosis. While being worked up for fall in ED, found to be febrile 101.1 and desaturated to 89% on room air. In setting of recent pandemic and CXR, swabbed for COVID 19 and admitted to [address] [place] [address]. 79 M, poor historian, with past medical history of HTN, DM2, and hypothyroidism who presents to ED BIBEMS after being found down for a fall. Found to have elevated beta hydroxy buturate likely 2/2 dehydration vs starvation ketosis. While being worked up for fall in ED, found to be febrile 101.1 and desaturated to 89% on room air. In setting of recent pandemic and CXR, swabbed for COVID 19 and admitted to [address] [place] [address].

## 2020-03-23 NOTE — H&P ADULT - PROBLEM SELECTOR PLAN 9
DVT PPx: Lovenox subq daily  GI PPx: Diet    Transition of Care:  - f/u with PCP Dr. Diya Padilla  - f/u with full med rec    DISPO: COVID RMTIANNA  COD: FULL F: NS @ 125cc/hr for 12 hours  E: Replete PRN for Mg<2 and K<4  N: DASH Diet (carb consistent) F: NS 1L bolus overnight  E: Replete PRN for Mg<2 and K<4  N: DASH Diet (carb consistent)

## 2020-03-23 NOTE — H&P ADULT - PROBLEM SELECTOR PLAN 3
Febrile to 101.1 in ED with desaturation to 89% on room air. Febrile to 101.1 in ED with desaturation to 89% on room air.   - f/u CXR  - f/u COVID PCR and COVID labs  - f/u blood cultures Febrile to 101.1 in ED with desaturation to 89% on room air.   - f/u RVP  - f/u CXR  - f/u COVID PCR and COVID labs  - f/u blood cultures Febrile to 101.1 in ED with desaturation to 89% on room air. No sick contacts, no diarrhea, no cough, fever, other respiratory symptoms.  - f/u RVP  - f/u CXR  - f/u COVID PCR and COVID labs  - f/u blood cultures Febrile to 101.1 in ED with desaturation to 89% on room air. No sick contacts, no diarrhea, no cough, fever, other respiratory symptoms.  - CXR, prelim wet read, abnormal with left lower lobe possible consolidation and right reticulonodular pattern consistent with infectious vs inflammatory process, new since 2018 (old CT).  - f/u final CXR read  - f/u RVP  - f/u COVID PCR and COVID labs  - f/u blood cultures Febrile to 101.1 in ED with desaturation to 89% on room air. No sick contacts, no diarrhea, no cough, fever, other respiratory symptoms.   - CXR, prelim wet read, abnormal with left lower lobe possible consolidation and right reticulonodular pattern consistent with infectious vs inflammatory process, new since 2018 (old CT).  - Ferritin elevated >1000 and lymphopenic.  - f/u final CXR read  - f/u RVP  - f/u COVID PCR and COVID labs  - f/u blood cultures Likely 2/2 weakness as described above.  CT Head: No acute intracranial hemorrhage.  Xray pelvis/hip: No acute osseous injury  Creatinine kinase: slightly elevated, 494  - physical therapy: will benefit from continued PT to address above deficits as tolerated in order to maximize functional independence and ensure safe D/C to home.  - f/u TSH Finger stick noted to be 400s in field per EMS. Finger stick on admission 366. Improved to Finger stick noted to be 400s in field per EMS. Finger stick on admission 366. Improved to 266. s/p 3L NS in ED.  - Will give 1 L NS overnight  - Started lantus 15units and lispro 5units premeal  - mISS  - f/u [id number]  - see diabetes plan below

## 2020-03-23 NOTE — PHYSICAL THERAPY INITIAL EVALUATION ADULT - PERTINENT HX OF CURRENT PROBLEM, REHAB EVAL
78 y/o M who lives in a resident home PMHx HTN, DM type 2 on all medications and hypothyroidism presents to the ED because pt was found on the floor this morning w/ finger stick in 400s. See [place] for further details. 78 y/o M who lives in a resident home PMHx HTN, DM type 2 on all medications and hypothyroidism presents to the ED because pt was found on the floor this morning w/ finger stick in 400s. Now febrile and r/o COVID-19. See [place] for further details.

## 2020-03-23 NOTE — H&P ADULT - PROBLEM SELECTOR PLAN 2
Likely 2/2 weakness as described above  CT Head  Xray hip  Creatinine kinase  physical therapy Likely 2/2 weakness as described above.  CT Head: No acute intracranial hemorrhage.  Xray pelvis/hip: No acute osseous injury  Creatinine kinase: slightly elevated, 494  - physical therapy: will benefit from continued PT to address above deficits as tolerated in order to maximize functional independence and ensure safe D/C to home. Likely 2/2 weakness as described above.  CT Head: No acute intracranial hemorrhage.  Xray pelvis/hip: No acute osseous injury  Creatinine kinase: slightly elevated, 494  - physical therapy: will benefit from continued PT to address above deficits as tolerated in order to maximize functional independence and ensure safe D/C to home.  - f/u TSH Likely 2/2 dehydration in setting of poor PO intake vs starvation ketoacidosis vs infectious etiology in setting of recent fever. Patient with couple week history of poor PO intake and only juice. Denies sick contacts. s/p 3L of NS in ED.  - c/w NS at 100 cc/hr for 12 hours  - encourage PO intake  - nutrition consult in AM DDx: includes viral infection r/o covid vs bacterial pna.  - see management per above DDx: includes viral infection r/o covid vs bacterial pna.  - see management per above    #Acute hypoxic respiratory failure  - supplemental O2

## 2020-03-23 NOTE — H&P ADULT - PROBLEM SELECTOR PLAN 1
Likely 2/2 dehydration in setting of poor PO intake vs starvation ketoacidosis. Patient with couple week history of poor PO intake and only juice. Likely 2/2 dehydration in setting of poor PO intake vs starvation ketoacidosis. Patient with couple week history of poor PO intake and only juice.  - NS at 125 cc/hr for 12 hours  - encouarage PO intake  - nutrition consult Likely 2/2 dehydration in setting of poor PO intake vs starvation ketoacidosis vs infectious etiology in setting of recent fever. Patient with couple week history of poor PO intake and only juice. Denies sick contacts. s/p 3L of NS in ED.  - c/w NS at 125 cc/hr for 12 hours  - encourage PO intake  - nutrition consult in AM Likely 2/2 dehydration in setting of poor PO intake vs starvation ketoacidosis vs infectious etiology in setting of recent fever. Patient with couple week history of poor PO intake and only juice. Denies sick contacts. s/p 3L of NS in ED.  - c/w NS at 100 cc/hr for 12 hours  - encourage PO intake  - nutrition consult in AM DDx: includes viral infection r/o covid vs bacterial pna. Febrile to 101.1 in ED with desaturation to 89% on room air. No sick contacts, no diarrhea, no cough, fever, other respiratory symptoms.   - CXR, prelim wet read, abnormal with left lower lobe possible consolidation and right reticulonodular pattern consistent with infectious vs inflammatory process, new since 2018 (old CT).  - Ferritin elevated >1000 and lymphopenic.  - f/u final CXR read  - f/u RVP  - f/u COVID PCR and COVID labs  - f/u blood cultures DDx: includes viral infection r/o covid vs bacterial pna. Febrile to 101.1 in ED with desaturation to 89% on room air. No sick contacts, no diarrhea, no cough, fever, other respiratory symptoms.   - CXR, prelim wet read, abnormal with left lower lobe possible consolidation and right reticulonodular pattern consistent with infectious vs inflammatory process, new since 2018 (old CT).  - Ferritin elevated >1000 and lymphopenic.  - f/u final CXR read  - f/u RVP  - f/u COVID PCR and COVID labs  - f/u blood cultures  - if procal elevated, would start CAP treatment with CTX and azithromycin  - hydroxychloroquine Sepsis (febrile 101.1 and HR 91) likely 2/2 viral infection r/o covid vs CAP. Febrile to 101.1 in ED with desaturation to 89% on room air. No sick contacts, no diarrhea, no cough, fever, other respiratory symptoms.   - CXR, prelim wet read, abnormal with left lower lobe possible consolidation and right reticulonodular pattern consistent with infectious vs inflammatory process, new since 2018 (old CT).  - RVP negative, Ferritin elevated >1000, and lymphopenic, leaning toward COVID infection  - f/u final CXR read  - f/u COVID PCR and COVID labs  - f/u blood cultures  - 1 L bolus NS o/n  - if procal elevated, would start CAP treatment with CTX and azithromycin  - Will start COVID protocol: Azithro, plaquenil, ascorbic acid, thiamine Sepsis (febrile 101.1 and HR 91) likely 2/2 viral infection r/o covid vs CAP. Febrile to 101.1 in ED with desaturation to 89% on room air. No sick contacts, no diarrhea, no cough, fever, other respiratory symptoms. s/p 3L NS in ED  - CXR, prelim wet read, abnormal with left lower lobe possible consolidation and right reticulonodular pattern consistent with infectious vs inflammatory process, new since 2018 (old CT).  - RVP negative, Ferritin elevated >1000, and lymphopenic, leaning toward COVID infection  - f/u final CXR read  - f/u COVID PCR and COVID labs  - f/u blood cultures  - 1 L bolus NS o/n  - if procal elevated, would start CAP treatment with CTX and azithromycin  - Will start COVID protocol: Azithro, plaquenil, ascorbic acid, thiamine Sepsis (febrile 101.1 and HR 91) likely 2/2 viral infection r/o covid vs CAP. Febrile to 101.1 in ED with desaturation to 89% on room air. No sick contacts, no diarrhea, no cough, fever, other respiratory symptoms. s/p 3L NS in ED  - CXR, prelim wet read, abnormal with left lower lobe possible consolidation and right reticulonodular pattern consistent with infectious vs inflammatory process, new since 2018 (old CT).  - RVP negative, Ferritin elevated >1000, and lymphopenic, leaning toward COVID infection  - f/u final CXR read  - f/u COVID PCR and COVID labs  - f/u blood cultures  - 1 L bolus NS o/n  - if procal elevated, would start CAP treatment with CTX and azithromycin  - Will start COVID treatment protocol: Azithro, plaquenil, ascorbic acid, thiamine

## 2020-03-23 NOTE — PHYSICAL THERAPY INITIAL EVALUATION ADULT - TRANSFER SAFETY CONCERNS NOTED: SIT/STAND, REHAB EVAL
decreased safety awareness/losing balance/decreased sequencing ability/decreased step length/decreased proprioception/decreased weight-shifting ability

## 2020-03-23 NOTE — ED PROVIDER NOTE - CARE PLAN
Principal Discharge DX:	Fall  Secondary Diagnosis:	Unsteady gait  Secondary Diagnosis:	Dehydration Principal Discharge DX:	Fever  Secondary Diagnosis:	Unsteady gait  Secondary Diagnosis:	Dehydration  Secondary Diagnosis:	Fall

## 2020-03-23 NOTE — H&P ADULT - HISTORY OF PRESENT ILLNESS
79 M, poor historian, with past medical history of HTN, DM2, and hypothyroidism who presents to ED [facility] after being found down for a fall. Patient states that his bed is tilted and that he slid off his bed and was unable to get up. Patient states that it happened in the last few days. Per ED provider, the patient was potentially down from yesterday evening to this morning and was found by workers at his assisted living home. It is unclear what kind of home, but he says single occupancy group home of some type. He does not have a rollator, walker, or cane at home. Patient denies hitting his head, loss of consciousness, or previous falls. Patient states that in past few weeks he has not been eating as much as he does not like the food around his home. He has mainly been drinking juices at home. Patient currently denies fever, headache, nausea, vomiting, chest pain, shortness of breath, abdominal pain. Patient does not feel as strong as usual. He states that he has bowel movements every few days and has had no changes in urination. Denies sick contacts or travel history.    ED Vitals: T 98.3-> P100 79 M, poor historian, with past medical history of HTN, DM2, and hypothyroidism who presents to ED [facility] after being found down for a fall. Patient states that his bed is tilted and that he slid off his bed and was unable to get up. Patient states that it happened in the last few days. Per ED provider, the patient was potentially down from yesterday evening to this morning and was found by workers at his assisted living home. It is unclear what kind of home, but he says single occupancy group home of some type. He does not have a rollator, walker, or cane at home. Patient denies hitting his head, loss of consciousness, or previous falls. Patient states that in past few weeks he has not been eating as much as he does not like the food around his home. He has mainly been drinking juices at home. Patient currently denies fever, headache, nausea, vomiting, chest pain, shortness of breath, abdominal pain. Patient does not feel as strong as usual. He states that he has bowel movements every few days and has had no changes in urination. Denies sick contacts or travel history.    ED Vitals: T 98.3, P100, /98, RR 17, O2 95% on RA ->> T 101.1 P 91 /89 RR 20 O2 89% on RA  ED Course: Patient received 3 L NS. With improvement in glucose. Received tyelenol for fever. Swabbed for RVP, COVID, pending CXR, blood cultures. Admitted to [facility] to r/o [facility] in setting of unknown fever. 79 M, poor historian, with past medical history of HTN, DM2, and hypothyroidism who presents to ED [facility] after being found down for a fall. Patient states that his bed is tilted and that he slid off his bed and was unable to get up. Patient states that it happened in the last few days. Per ED provider, the patient was potentially down from yesterday evening to this morning and was found by workers at his assisted living home. It is unclear what kind of home, but he says single occupancy group home of some type. He does not have a rollator, walker, or cane at home. Patient denies hitting his head, loss of consciousness, or previous falls. Patient states that in past few weeks he has not been eating as much as he does not like the food around his home. He has mainly been drinking juices at home. Patient currently denies fever, headache, nausea, vomiting, chest pain, shortness of breath, abdominal pain. Patient does not feel as strong as usual. He states that he has bowel movements every few days and has had no changes in urination. Denies sick contacts or travel history.    ED Vitals: T 98.3, P100, /98, RR 17, O2 95% on RA ->> T 101.1 P 91 /89 RR 20 O2 89% on RA  ED Course: Patient received 3 L NS. With improvement in glucose. Received tyelenol for fever. Swabbed for RVP, COVID, blood cultures. CXR, prelim wet read, abnormal with left lower lobe possible consolidation and right reticulonodular pattern consistent with infectious vs inflammatory process, new since 2018 (old CT). [facility] in setting of unknown fever. 79 M, poor historian, with past medical history of HTN, DM2, and hypothyroidism who presents to ED [facility] after being found down for a fall. Patient states that his bed is tilted and that he slid off his bed and was unable to get up. Patient states that it happened in the last few days. Per ED provider, the patient was potentially down from yesterday evening to this morning and was found by workers at his assisted living home. It is unclear what kind of home, but he says single occupancy group home of some type. He does not have a rollator, walker, or cane at home. Patient denies hitting his head, loss of consciousness, or previous falls. Patient states that in past few weeks he has not been eating as much as he does not like the food around his home. He has mainly been drinking juices at home. Patient currently denies fever, headache, nausea, vomiting, chest pain, shortness of breath, abdominal pain. Patient does not feel as strong as usual. He states that he has bowel movements every few days and has had no changes in urination. Denies sick contacts or travel history.    ED Vitals: T 98.3, P100, /98, RR 17, O2 95% on RA ->> T 101.1 P 91 /89 RR 20 O2 89% on RA  ED Course: Glucose 366-> 240s. Patient received 3 L NS With improvement in glucose. Received Tylenol for fever. Swabbed for RVP, COVID, blood cultures. CXR, prelim wet read, abnormal with left lower lobe possible consolidation and right reticulonodular pattern consistent with infectious vs inflammatory process, new since 2018 (old CT). Admitted to [facility] to r/o COVID in setting of unknown fever.

## 2020-03-23 NOTE — H&P ADULT - NSHPSOCIALHISTORY_GEN_ALL_CORE
Alcohol: Denies current alcohol usse  Tobacco: Endorses a multi-year smoking history >20 years, but is unable to quantify for how long or how many cigarretes per day  Drugs; Denies recreational drug use  Home: lives alone at home in assisted living facility

## 2020-03-23 NOTE — ED ADULT NURSE NOTE - OBJECTIVE STATEMENT
Pt w hx of HTN, DM BIBA from home, states he lives alone. Pt states he fell off the bed and "spent six hours waiting for assistance", states when EMS came "they did some checks and said I have to go to the hospital". Pt denies LOC or head injury. Pt w hx of HTN, DM BIBA from home, states he lives alone. Pt states he fell off the bed last night  and "spent six hours waiting for assistance", states when EMS came "they did some checks and said I have to go to the hospital". Pt denies LOC or head injury.

## 2020-03-23 NOTE — PHYSICAL THERAPY INITIAL EVALUATION ADULT - ADDITIONAL COMMENTS
Pt reports living alone in a senior living complex on the 4th floor with elevator access. Pt reports being completely independent with all ADL's and functional mobility without use of an assistive device.

## 2020-03-23 NOTE — H&P ADULT - NSICDXPASTMEDICALHX_GEN_ALL_CORE_FT
PAST MEDICAL HISTORY:  Diabetes mellitus, type 2     Essential hypertension Hypertension    Hypothyroid

## 2020-03-23 NOTE — H&P ADULT - PROBLEM SELECTOR PLAN 8
F: NS @ 125cc/hr for 12 hours  E: Replete PRN for Mg<2 and K<4  N: DASH Diet (carb consistent) - c/w home atorvastatin 20mg qhs

## 2020-03-23 NOTE — ED PROVIDER NOTE - PROGRESS NOTE DETAILS
Director - pt w elevated ck, bg improved w ivf.  PT eval pt and recommended admit for re-eval and poss LOYD.  Pt noted to have fever on repeat vs after PT eval - ? covid.  RVP/COVID, cxr ordered.  Pt w low sat on repeat vs - improves w o2, no resp distress.  Isolation initiated and admit order changed.

## 2020-03-23 NOTE — PHYSICAL THERAPY INITIAL EVALUATION ADULT - GAIT DEVIATIONS NOTED, PT EVAL
decreased step length/increased time in double stance/decreased stride length/decreased weight-shifting ability

## 2020-03-23 NOTE — H&P ADULT - PROBLEM SELECTOR PLAN 6
- c/w home synthroid 75mcg daily  - f/u TSH - hold home amlodipine 5mg in setting of severe sepsis  - Monitor pressure

## 2020-03-23 NOTE — H&P ADULT - PROBLEM SELECTOR PLAN 4
Hx of diabetes. Unknown last A1C. Previously recorded as on Januvia and glucophage. No diabetes medications at [facility], may use more than one pharmacy. Patient poor historian does not know what he takes at home  - moderate ISS  - Weight based insulin approx 200lbs-> 90 kg.  - Lantus 18 units and lispro 6 units premeal  - f/u Hgb A1c Hx of diabetes. Unknown last A1C. Previously recorded as on Januvia and glucophage. No diabetes medications at [facility], may use more than one pharmacy. Patient poor historian does not know what he takes at home  - Weight based insulin approx 90 kg per patient, would require (Lantus 18 units and lispro 6 units premeal)  - Will start 10 units of lantus tonight  - moderate ISS  - f/u Hgb A1c Likely 2/2 dehydration in setting of poor PO intake vs starvation ketoacidosis vs infectious etiology in setting of recent fever. Patient with couple week history of poor PO intake and only juice. Denies sick contacts. s/p 3L of NS in ED.    Likely 2/2 weakness as described above.  CT Head: No acute intracranial hemorrhage.  Xray pelvis/hip: No acute osseous injury  Creatinine kinase: slightly elevated, 494  - physical therapy: will benefit from continued PT to address above deficits as tolerated in order to maximize functional independence and ensure safe D/C to home.  - f/u TSH  - c/w NS at 100 cc/hr for 12 hours  - encourage PO intake  - nutrition consult in AM Likely 2/2 dehydration in setting of poor PO intake vs starvation ketoacidosis vs infectious etiology in setting of recent fever. Patient with couple week history of poor PO intake and only juice. Denies sick contacts. s/p 3L of NS in ED.  CT Head: No acute intracranial hemorrhage and Xray pelvis/hip: No acute osseous injury  Creatinine kinase: slightly elevated, 494  - physical therapy: will benefit from continued PT to address above deficits as tolerated in order to maximize functional independence and ensure safe D/C to home.  - f/u TSH  - c/w 1L NS o/n  - encourage PO intake  - nutrition consult in AM  - PT final recs

## 2020-03-23 NOTE — H&P ADULT - NSHPPHYSICALEXAM_GEN_ALL_CORE
.  VITAL SIGNS:  T(C): 37 (03-23-20 @ 18:25), Max: 38.4 (03-23-20 @ 16:30)  T(F): 98.6 (03-23-20 @ 18:25), Max: 101.1 (03-23-20 @ 16:30)  HR: 986 (03-23-20 @ 18:25) (91 - 986)  BP: 117/73 (03-23-20 @ 18:25) (117/73 - 164/98)  BP(mean): --  RR: 18 (03-23-20 @ 18:25) (17 - 20)  SpO2: 94% (03-23-20 @ 18:25) (89% - 95%)  Wt(kg): --    PHYSICAL EXAM:    Constitutional: obese male, NAD, answering questions in full sentences  Head: NC/AT  Eyes: PERRL, EOMI, anicteric sclera  ENT: no nasal discharge; uvula midline, no oropharyngeal erythema or exudates; dry mucious membranes  Neck: supple; no JVD or thyromegaly  Respiratory: bilateral posterior crackles in middle and lower lung fields, R>L. No wheezing appreciated.  Cardiac: +S1/S2; RRR; no M/R/G  Gastrointestinal: soft, moderately distended, non tender; no rebound or guarding; +BSx4  Extremities: WWP, no clubbing or cyanosis; no peripheral edema  Musculoskeletal: NROM x4; no joint swelling, tenderness or erythema  Vascular: 2+ radial, femoral, DP/PT pulses B/L  Dermatologic: skin warm, dry and intact; no rashes, wounds, or scars. Poor skin turgor.  Neurologic: AAOx3; CNII-XII grossly intact; no focal deficits  Psychiatric: poor insight and judgment, sometimes aloof, AAOx3, but poor memory .  VITAL SIGNS:  T(C): 37 (03-23-20 @ 18:25), Max: 38.4 (03-23-20 @ 16:30)  T(F): 98.6 (03-23-20 @ 18:25), Max: 101.1 (03-23-20 @ 16:30)  HR: 986 (03-23-20 @ 18:25) (91 - 986)  BP: 117/73 (03-23-20 @ 18:25) (117/73 - 164/98)  BP(mean): --  RR: 18 (03-23-20 @ 18:25) (17 - 20)  SpO2: 94% (03-23-20 @ 18:25) (89% - 95%)  Wt(kg): --    PHYSICAL EXAM:    Constitutional: obese male, NAD, answering questions in full sentences  Head: NC/AT  Eyes: PERRL, EOMI, anicteric sclera  ENT: no nasal discharge; uvula midline, no oropharyngeal erythema or exudates; dry mucious membranes  Neck: supple; no JVD or thyromegaly  Respiratory: bilateral posterior crackles in middle and lower lung fields, R>L. No wheezing appreciated.  Cardiac: +S1/S2; RRR; no M/R/G  Gastrointestinal: soft, moderately distended, non tender; no rebound or guarding; +BSx4  Extremities: WWP, no clubbing or cyanosis; no peripheral edema  Musculoskeletal: NROM x4; no joint swelling, tenderness or erythema  Vascular: 2+ radial, femoral, DP/PT pulses B/L  MSK: NROM, 3/5 strength upper and lower extremities  Dermatologic: skin warm, dry and intact; no rashes, wounds, or scars. Poor skin turgor.  Neurologic: AAOx3; CNII-XII grossly intact; no focal deficits  Psychiatric: poor insight and judgment, sometimes aloof, AAOx3, but poor memory

## 2020-03-23 NOTE — ED PROVIDER NOTE - CLINICAL SUMMARY MEDICAL DECISION MAKING FREE TEXT BOX
Patient s/p fall with unsteady gait normal xrays and glucose trending down with neg trop and elevated cpk. Lactic acid decrease by one. Pt was eval by PT determine fall risk and unsafe discharge. Upon admitting and pt re vital spike a fever. There was no prior c/o  of cold symptoms , cough  or sob when interviewed.  Pt was rvp. covid -19 swab  and cxr pending.

## 2020-03-23 NOTE — PHYSICAL THERAPY INITIAL EVALUATION ADULT - CRITERIA FOR SKILLED THERAPEUTIC INTERVENTIONS
rehab potential/risk reduction/prevention/therapy frequency/anticipated discharge recommendation/impairments found/anticipated equipment needs at discharge/functional limitations in following categories

## 2020-03-23 NOTE — H&P ADULT - PROBLEM SELECTOR PLAN 5
- c/w home amlodipine 5mg  - Monitor pressure Hx of diabetes. Unknown last A1C. Previously recorded as on Januvia and glucophage. No diabetes medications at [facility], may use more than one pharmacy. Patient poor historian does not know what he takes at home  - Weight based insulin approx 90 kg per patient, would require (Lantus 18 units and lispro 6 units premeal)  - Will start 10 units of lantus tonight  - moderate ISS  - f/u Hgb A1c Hx of diabetes. Unknown last A1C. Previously recorded as on Januvia and glucophage. No diabetes medications at [facility], may use more than one pharmacy. Patient poor historian does not know what he takes at home.  - Will start 15 units of lantus tonight and 5 units of lispro premeal  - moderate ISS  - f/u Hgb A1c

## 2020-03-23 NOTE — ED PROVIDER NOTE - OBJECTIVE STATEMENT
78 y/o M who lives in a resident home PMHx HTN, DM type 2 on all medications and hypothyroidism presents to the ED because pt was found on the floor this morning w/ finger stick in 400s. As per pt, yesterday at 6-7pm he tried to get up and complained that the bed was imbalanced and he skid off the bed falling onto his butt unable to get back up. He tried to call for help and no one answered. Denies head injury, recent illness, CP, dizziness, SOB, abdominal pain. Denies using walker or cane to normally walk at baseline but he was unable to pick his body up and stand. Pt is oriented x 2.

## 2020-03-23 NOTE — PHYSICAL THERAPY INITIAL EVALUATION ADULT - MODALITIES TREATMENT COMMENTS
Pt tolerated eval fairly. Pt requires CGA/min assist with all aspects of functional mobility secondary to LE weakness, fatigue and balance deficits. Pt was unable to ambulate without use of a RW and steadying assist from PT to prevent pt for falling/LOB. Pt will benefit from continued PT to address above deficits as tolerated in order to maximize functional independence and ensure safe D/C to home. Pt left in stretcher as found in NAD, all lines intact, ED RN / PA / MD / CM all informed. FIM: 1

## 2020-03-23 NOTE — PHYSICAL THERAPY INITIAL EVALUATION ADULT - PLANNED THERAPY INTERVENTIONS, PT EVAL
gait training/neuromuscular re-education/postural re-education/balance training/bed mobility training/transfer training/strengthening balance training/bed mobility training/gait training/transfer training

## 2020-03-23 NOTE — H&P ADULT - NSICDXPASTSURGICALHX_GEN_ALL_CORE_FT
PAST SURGICAL HISTORY:  H/O thyroidectomy     Other postprocedural status left total knee  replacement one   yr.   ago

## 2020-03-23 NOTE — ED PROVIDER NOTE - ATTENDING CONTRIBUTION TO CARE
80 yo male h/o HTN, DM type 2 on oral medications, hypothyroidism c/o fall - pt states he slid out of bed and then could not get up bc he was too weak.  Pt noted to have elevated bg.  Pt denies head trauma, neck/back pain, cp, sob, cough, abd pain, n/v/d.  Pt was on the floor since last night.  No sick contacts, travel.   Well appearing, nad, nc/at, lung cta, heart reg, abd soft, nt, neck/back nontender, ext no gross deformity, no gross neuro deficits  Pt w hyperglycemia, weakness, also on floor x many hrs - ? dehydration 2/2 hyperglycemia, ? dka, ? rhabdo; no apparent sig injury but will eval ct head, xray le/pelvis, PT eval for safe mobility vs need for LOYD.  Reassess.

## 2020-03-23 NOTE — ED PROVIDER NOTE - PHYSICAL EXAMINATION
VITAL SIGNS: I have reviewed nursing notes and confirm.  CONSTITUTIONAL: Well-developed; well-nourished; in no acute distress.   SKIN:  warm and dry, no acute rash.   HEAD:  No signs of acute head trauma. No fry signs behind mastoids or racoon eyes.   EYES: EOM intact; conjunctiva and sclera clear.  ENT: No nasal discharge; airway clear.   NECK: Supple; non tender.  CARD: S1, S2 normal; no murmurs, gallops, or rubs. Regular rate and rhythm.   RESP:  Clear to auscultation b/l, no wheezes, rales or rhonchi.  ABD: Normal bowel sounds; soft; non-distended; non-tender; no guarding/ rebound.  MSK: FROM of LE. Mild tenderness along buttock and hip area bilaterally w/ no noted swelling. Able to range UE/LE. No open abrasions or lac noted. No hematomas. No rib pain.   NEURO: Alert, oriented, grossly unremarkable  PSYCH: Cooperative, mood and affect appropriate.

## 2020-03-23 NOTE — H&P ADULT - PROBLEM SELECTOR PLAN 10
DVT PPx: Lovenox subq daily  GI PPx: Diet    Transition of Care:  - f/u with PCP Dr. Diya Padilla  - f/u with full med rec    DISPO: COVID RMTIANNA  COD: FULL DVT PPx: Lovenox subq daily  GI PPx: Famotidine 20mg    Transition of Care:  - f/u with PCP Dr. Diya Padilla  - f/u with full med rec    DISPO: COVID RMF  COD: FULL

## 2020-03-23 NOTE — PHYSICAL THERAPY INITIAL EVALUATION ADULT - GENERAL OBSERVATIONS, REHAB EVAL
Pt found on stretcher bed in ED in NAD, +hep lock, agreeable to PT Eval and cleared by Pt found on stretcher bed in ED in NAD, +hep lock, agreeable to PT Eval and cleared by Robina ARIZA. Pt found on stretcher bed in ED in NAD, without c/o pain, +hep lock, agreeable to PT Eval and cleared by Robina ARIZA.

## 2020-03-23 NOTE — PHYSICAL THERAPY INITIAL EVALUATION ADULT - IMPAIRMENTS FOUND, PT EVAL
arousal, attention, and cognition/gait, locomotion, and balance/aerobic capacity/endurance/muscle strength/poor safety awareness/posture/decreased midline orientation

## 2020-03-24 LAB
4/8 RATIO: 3.77 RATIO — SIGNIFICANT CHANGE UP (ref 0.86–4.14)
ABS CD8: 126 /UL — SIGNIFICANT CHANGE UP (ref 90–775)
ALBUMIN SERPL ELPH-MCNC: 2.8 G/DL — LOW (ref 3.3–5)
ALP SERPL-CCNC: 34 U/L — LOW (ref 40–120)
ALT FLD-CCNC: 27 U/L — SIGNIFICANT CHANGE UP (ref 10–45)
ANION GAP SERPL CALC-SCNC: 9 MMOL/L — SIGNIFICANT CHANGE UP (ref 5–17)
APTT BLD: 29.2 SEC — SIGNIFICANT CHANGE UP (ref 27.5–36.3)
AST SERPL-CCNC: 50 U/L — HIGH (ref 10–40)
BASOPHILS # BLD AUTO: 0 K/UL — SIGNIFICANT CHANGE UP (ref 0–0.2)
BASOPHILS NFR BLD AUTO: 0 % — SIGNIFICANT CHANGE UP (ref 0–2)
BILIRUB SERPL-MCNC: 0.4 MG/DL — SIGNIFICANT CHANGE UP (ref 0.2–1.2)
BUN SERPL-MCNC: 9 MG/DL — SIGNIFICANT CHANGE UP (ref 7–23)
CALCIUM SERPL-MCNC: 8.4 MG/DL — SIGNIFICANT CHANGE UP (ref 8.4–10.5)
CD3 BLASTS SPEC-ACNC: 59 % — SIGNIFICANT CHANGE UP (ref 58–84)
CD3 BLASTS SPEC-ACNC: 621 /UL — SIGNIFICANT CHANGE UP (ref 396–2024)
CD4 %: 45 % — SIGNIFICANT CHANGE UP (ref 30–56)
CD8 %: 12 % — SIGNIFICANT CHANGE UP (ref 11–43)
CHLORIDE SERPL-SCNC: 105 MMOL/L — SIGNIFICANT CHANGE UP (ref 96–108)
CK SERPL-CCNC: 956 U/L — HIGH (ref 30–200)
CO2 SERPL-SCNC: 25 MMOL/L — SIGNIFICANT CHANGE UP (ref 22–31)
CREAT SERPL-MCNC: 0.78 MG/DL — SIGNIFICANT CHANGE UP (ref 0.5–1.3)
CRP SERPL-MCNC: 5.88 MG/DL — HIGH (ref 0–0.4)
D DIMER BLD IA.RAPID-MCNC: 324 NG/ML DDU — HIGH
EOSINOPHIL # BLD AUTO: 0 K/UL — SIGNIFICANT CHANGE UP (ref 0–0.5)
EOSINOPHIL NFR BLD AUTO: 0 % — SIGNIFICANT CHANGE UP (ref 0–6)
ERYTHROCYTE [SEDIMENTATION RATE] IN BLOOD: 45 MM/HR — HIGH
FERRITIN SERPL-MCNC: 1104 NG/ML — HIGH (ref 30–400)
GLUCOSE BLDC GLUCOMTR-MCNC: 105 MG/DL — HIGH (ref 70–99)
GLUCOSE BLDC GLUCOMTR-MCNC: 124 MG/DL — HIGH (ref 70–99)
GLUCOSE BLDC GLUCOMTR-MCNC: 76 MG/DL — SIGNIFICANT CHANGE UP (ref 70–99)
GLUCOSE BLDC GLUCOMTR-MCNC: 91 MG/DL — SIGNIFICANT CHANGE UP (ref 70–99)
GLUCOSE SERPL-MCNC: 138 MG/DL — HIGH (ref 70–99)
HCT VFR BLD CALC: 41.4 % — SIGNIFICANT CHANGE UP (ref 39–50)
HGB BLD-MCNC: 13.4 G/DL — SIGNIFICANT CHANGE UP (ref 13–17)
INR BLD: 0.99 — SIGNIFICANT CHANGE UP (ref 0.88–1.16)
LYMPHOCYTES # BLD AUTO: 0.84 K/UL — LOW (ref 1–3.3)
LYMPHOCYTES # BLD AUTO: 13.3 % — SIGNIFICANT CHANGE UP (ref 13–44)
MAGNESIUM SERPL-MCNC: 2.1 MG/DL — SIGNIFICANT CHANGE UP (ref 1.6–2.6)
MCHC RBC-ENTMCNC: 26.6 PG — LOW (ref 27–34)
MCHC RBC-ENTMCNC: 32.4 GM/DL — SIGNIFICANT CHANGE UP (ref 32–36)
MCV RBC AUTO: 82.3 FL — SIGNIFICANT CHANGE UP (ref 80–100)
MONOCYTES # BLD AUTO: 0.22 K/UL — SIGNIFICANT CHANGE UP (ref 0–0.9)
MONOCYTES NFR BLD AUTO: 3.5 % — SIGNIFICANT CHANGE UP (ref 2–14)
NEUTROPHILS # BLD AUTO: 5.19 K/UL — SIGNIFICANT CHANGE UP (ref 1.8–7.4)
NEUTROPHILS NFR BLD AUTO: 82.3 % — HIGH (ref 43–77)
NT-PROBNP SERPL-SCNC: 227 PG/ML — SIGNIFICANT CHANGE UP (ref 0–300)
PHOSPHATE SERPL-MCNC: 3 MG/DL — SIGNIFICANT CHANGE UP (ref 2.5–4.5)
PLATELET # BLD AUTO: 179 K/UL — SIGNIFICANT CHANGE UP (ref 150–400)
POTASSIUM SERPL-MCNC: 4 MMOL/L — SIGNIFICANT CHANGE UP (ref 3.5–5.3)
POTASSIUM SERPL-SCNC: 4 MMOL/L — SIGNIFICANT CHANGE UP (ref 3.5–5.3)
PROCALCITONIN SERPL-MCNC: 0.15 NG/ML — HIGH (ref 0.02–0.1)
PROT SERPL-MCNC: 6.4 G/DL — SIGNIFICANT CHANGE UP (ref 6–8.3)
PROTHROM AB SERPL-ACNC: 11.3 SEC — SIGNIFICANT CHANGE UP (ref 10–12.9)
RBC # BLD: 5.03 M/UL — SIGNIFICANT CHANGE UP (ref 4.2–5.8)
RBC # FLD: 14.6 % — HIGH (ref 10.3–14.5)
SARS-COV-2 RNA SPEC QL NAA+PROBE: DETECTED
SODIUM SERPL-SCNC: 139 MMOL/L — SIGNIFICANT CHANGE UP (ref 135–145)
T-CELL CD4 SUBSET PNL BLD: 474 /UL — SIGNIFICANT CHANGE UP (ref 325–1251)
WBC # BLD: 6.31 K/UL — SIGNIFICANT CHANGE UP (ref 3.8–10.5)
WBC # FLD AUTO: 6.31 K/UL — SIGNIFICANT CHANGE UP (ref 3.8–10.5)

## 2020-03-24 PROCEDURE — 71045 X-RAY EXAM CHEST 1 VIEW: CPT | Mod: 26

## 2020-03-24 PROCEDURE — 93010 ELECTROCARDIOGRAM REPORT: CPT

## 2020-03-24 PROCEDURE — 99233 SBSQ HOSP IP/OBS HIGH 50: CPT | Mod: GC

## 2020-03-24 RX ORDER — ASPIRIN/CALCIUM CARB/MAGNESIUM 324 MG
81 TABLET ORAL DAILY
Refills: 0 | Status: DISCONTINUED | OUTPATIENT
Start: 2020-03-24 | End: 2020-03-25

## 2020-03-24 RX ORDER — HYDROXYCHLOROQUINE SULFATE 200 MG
400 TABLET ORAL EVERY 12 HOURS
Refills: 0 | Status: DISCONTINUED | OUTPATIENT
Start: 2020-03-24 | End: 2020-03-24

## 2020-03-24 RX ORDER — ATORVASTATIN CALCIUM 80 MG/1
80 TABLET, FILM COATED ORAL AT BEDTIME
Refills: 0 | Status: DISCONTINUED | OUTPATIENT
Start: 2020-03-24 | End: 2020-03-27

## 2020-03-24 RX ORDER — THIAMINE MONONITRATE (VIT B1) 100 MG
200 TABLET ORAL
Refills: 0 | Status: DISCONTINUED | OUTPATIENT
Start: 2020-03-24 | End: 2020-03-26

## 2020-03-24 RX ORDER — HYDROXYCHLOROQUINE SULFATE 200 MG
200 TABLET ORAL EVERY 12 HOURS
Refills: 0 | Status: DISCONTINUED | OUTPATIENT
Start: 2020-03-24 | End: 2020-03-24

## 2020-03-24 RX ORDER — CEFTRIAXONE 500 MG/1
1000 INJECTION, POWDER, FOR SOLUTION INTRAMUSCULAR; INTRAVENOUS EVERY 24 HOURS
Refills: 0 | Status: ACTIVE | OUTPATIENT
Start: 2020-03-24 | End: 2020-03-29

## 2020-03-24 RX ORDER — HYDROXYCHLOROQUINE SULFATE 200 MG
200 TABLET ORAL EVERY 12 HOURS
Refills: 0 | Status: COMPLETED | OUTPATIENT
Start: 2020-03-24 | End: 2020-03-28

## 2020-03-24 RX ADMIN — Medication 400 MILLIGRAM(S): at 07:49

## 2020-03-24 RX ADMIN — Medication 81 MILLIGRAM(S): at 17:05

## 2020-03-24 RX ADMIN — FAMOTIDINE 20 MILLIGRAM(S): 10 INJECTION INTRAVENOUS at 12:32

## 2020-03-24 RX ADMIN — INSULIN GLARGINE 15 UNIT(S): 100 INJECTION, SOLUTION SUBCUTANEOUS at 23:33

## 2020-03-24 RX ADMIN — Medication 153 MILLIGRAM(S): at 17:05

## 2020-03-24 RX ADMIN — Medication 153 MILLIGRAM(S): at 23:15

## 2020-03-24 RX ADMIN — Medication 5 UNIT(S): at 17:27

## 2020-03-24 RX ADMIN — Medication 153 MILLIGRAM(S): at 12:32

## 2020-03-24 RX ADMIN — Medication 5 UNIT(S): at 08:59

## 2020-03-24 RX ADMIN — Medication 75 MICROGRAM(S): at 07:50

## 2020-03-24 RX ADMIN — CEFTRIAXONE 100 MILLIGRAM(S): 500 INJECTION, POWDER, FOR SOLUTION INTRAMUSCULAR; INTRAVENOUS at 02:47

## 2020-03-24 RX ADMIN — ENOXAPARIN SODIUM 40 MILLIGRAM(S): 100 INJECTION SUBCUTANEOUS at 20:53

## 2020-03-24 RX ADMIN — Medication 650 MILLIGRAM(S): at 00:37

## 2020-03-24 RX ADMIN — Medication 102 MILLIGRAM(S): at 23:15

## 2020-03-24 RX ADMIN — ATORVASTATIN CALCIUM 80 MILLIGRAM(S): 80 TABLET, FILM COATED ORAL at 20:53

## 2020-03-24 RX ADMIN — Medication 200 MILLIGRAM(S): at 18:23

## 2020-03-24 RX ADMIN — Medication 153 MILLIGRAM(S): at 07:49

## 2020-03-24 RX ADMIN — Medication 200 MILLIGRAM(S): at 12:33

## 2020-03-24 RX ADMIN — AZITHROMYCIN 250 MILLIGRAM(S): 500 TABLET, FILM COATED ORAL at 20:53

## 2020-03-24 RX ADMIN — Medication 650 MILLIGRAM(S): at 01:35

## 2020-03-24 NOTE — DIETITIAN INITIAL EVALUATION ADULT. - OTHER INFO
78y/o male with history of HTN, Type 2 DM, Hypothyroidism, Weakness and decreased po x 1week.Admitted with severe sepsis/hyperglycemia/fevers/weakness 2/2 decreased po and now r/o COVID. RD spoke to patient over the phone due to isolation needs due to R/O COVID .As per patient, he is still has little appetite and admitted to taking mostly fluids and juice PTA. Denied N/V/D/C or pain. Skin intact. Presently 109% of IBW. RD encouraged patient to drink fluids and that I would be available for any additional nutritional needs. RD will monitor closely with RN and daily rounds for updates of appetite. Patient declined Glucerna shakes at this time. RD deferred detailed interview regarding diet compliance at this time due to noted weakness and fevers.

## 2020-03-24 NOTE — PROGRESS NOTE ADULT - ASSESSMENT
79 M, poor historian, with past medical history of HTN, DM2, and hypothyroidism who presents to ED BIBEMS after being found down for a fall. Found to have elevated beta hydroxy buturate likely 2/2 dehydration vs starvation ketosis. While being worked up for fall in ED, found to be febrile 101.1 and desaturated to 89% on room air. In setting of recent pandemic and CXR, swabbed for COVID 19 and admitted to [address] [place] [address].

## 2020-03-24 NOTE — PROGRESS NOTE ADULT - PROBLEM SELECTOR PLAN 4
Likely 2/2 dehydration in setting of poor PO intake vs starvation ketoacidosis vs infectious etiology in setting of recent fever. Patient with couple week history of poor PO intake and only juice. Denies sick contacts. s/p 3L of NS in ED.  CT Head: No acute intracranial hemorrhage and Xray pelvis/hip: No acute osseous injury  Creatinine kinase: slightly elevated, 494  - physical therapy: will benefit from continued PT to address above deficits as tolerated in order to maximize functional independence and ensure safe D/C to home.  - f/u TSH  - c/w 1L NS o/n  - encourage PO intake  - nutrition consult in AM  - PT final recs

## 2020-03-24 NOTE — PROGRESS NOTE ADULT - PROBLEM SELECTOR PLAN 1
Sepsis (febrile 101.1 and HR 91) likely 2/2 viral infection r/o covid vs CAP. Febrile to 101.1 in ED with desaturation to 89% on room air. No sick contacts, no diarrhea, no cough, fever, other respiratory symptoms. s/p 3L NS in ED  - CXR, prelim wet read, abnormal with left lower lobe possible consolidation and right reticulonodular pattern consistent with infectious vs inflammatory process, new since 2018 (old CT).  - RVP negative, Ferritin elevated >1000, and lymphopenic, leaning toward COVID infection  - f/u final CXR read  - f/u COVID PCR and COVID labs  - f/u blood cultures  - 1 L bolus NS o/n  - if procal elevated, would start CAP treatment with CTX and azithromycin  - Will start COVID treatment protocol: Azithro, plaquenil, ascorbic acid, thiamine Sepsis (febrile 101.1 and HR 91) likely 2/2 viral infection r/o covid vs CAP. Febrile to 101.1 in ED with desaturation to 89% on room air. No sick contacts, no diarrhea, no cough, fever, other respiratory symptoms. s/p 3L NS in ED  - CXR, prelim wet read, abnormal with left lower lobe possible consolidation and right reticulonodular pattern consistent with infectious vs inflammatory process, new since 2018 (old CT).  - RVP negative, Ferritin elevated >1000, and lymphopenic, leaning toward COVID infection  - CXr b/l infiltrates  - f/u COVID PCR and COVID labs  - f/u blood cultures  - 1 L bolus NS o/n  - if procal elevated, would start CAP treatment with CTX and azithromycin  - Will start COVID treatment protocol: Azithro, plaquenil, ascorbic acid, thiamine

## 2020-03-24 NOTE — CONSULT NOTE ADULT - SUBJECTIVE AND OBJECTIVE BOX
Patient is a 79y old  Male who presents with a chief complaint of weakness (24 Mar 2020 07:08)       HPI:  79 M, poor historian, with past medical history of HTN, DM2, and hypothyroidism who presents to ED [facility] after being found down for a fall. Patient states that his bed is tilted and that he slid off his bed and was unable to get up. Patient states that it happened in the last few days. Per ED provider, the patient was potentially down from yesterday evening to this morning and was found by workers at his assisted living home. It is unclear what kind of home, but he says single occupancy group home of some type. He does not have a rollator, walker, or cane at home. Patient denies hitting his head, loss of consciousness, or previous falls. Patient states that in past few weeks he has not been eating as much as he does not like the food around his home. He has mainly been drinking juices at home. Patient currently denies fever, headache, nausea, vomiting, chest pain, shortness of breath, abdominal pain. Patient does not feel as strong as usual. He states that he has bowel movements every few days and has had no changes in urination. Denies sick contacts or travel history.    ED Vitals: T 98.3, P100, /98, RR 17, O2 95% on RA ->> T 101.1 P 91 /89 RR 20 O2 89% on RA  ED Course: Glucose 366-> 240s. Patient received 3 L NS With improvement in glucose. Received Tylenol for fever. Swabbed for RVP, COVID, blood cultures. CXR, prelim wet read, abnormal with left lower lobe possible consolidation and right reticulonodular pattern consistent with infectious vs inflammatory process, new since 2018 (old CT). [facility] [place] [place] in setting of unknown fever. (23 Mar 2020 18:15)      PAST MEDICAL & SURGICAL HISTORY:  Diabetes mellitus, type 2  Hypothyroid  Essential hypertension: Hypertension  H/O thyroidectomy  Other postprocedural status: left total knee  replacement one   yr.   ago      MEDICATIONS  (STANDING):  ascorbic acid IVPB 1500 milliGRAM(s) IV Intermittent every 6 hours  atorvastatin 20 milliGRAM(s) Oral at bedtime  azithromycin   Tablet 250 milliGRAM(s) Oral every 24 hours  cefTRIAXone   IVPB 1000 milliGRAM(s) IV Intermittent every 24 hours  dextrose 5%. 1000 milliLiter(s) (50 mL/Hr) IV Continuous <Continuous>  dextrose 50% Injectable 12.5 Gram(s) IV Push once  dextrose 50% Injectable 25 Gram(s) IV Push once  dextrose 50% Injectable 25 Gram(s) IV Push once  enoxaparin Injectable 40 milliGRAM(s) SubCutaneous every 24 hours  famotidine    Tablet 20 milliGRAM(s) Oral daily  insulin glargine Injectable (LANTUS) 15 Unit(s) SubCutaneous at bedtime  insulin lispro (HumaLOG) corrective regimen sliding scale   SubCutaneous Before meals and at bedtime  insulin lispro Injectable (HumaLOG) 5 Unit(s) SubCutaneous three times a day before meals  levothyroxine 75 MICROGram(s) Oral daily  thiamine Injectable 200 milliGRAM(s) IV Push <User Schedule>    MEDICATIONS  (PRN):  acetaminophen   Tablet .. 650 milliGRAM(s) Oral every 6 hours PRN Temp greater or equal to 38C (100.4F)  dextrose 40% Gel 15 Gram(s) Oral once PRN Blood Glucose LESS THAN 70 milliGRAM(s)/deciliter  glucagon  Injectable 1 milliGRAM(s) IntraMuscular once PRN Glucose LESS THAN 70 milligrams/deciliter      Social History:           -  Occupation: X           -  Home Living Status: lives alone in senior housing, elevator accessible apartment building           -  Prior Home Care Services:  none    Baseline Functional Level Prior to Admission:           - ADL's:  states he is fully independent           - ambulates without assistive devices    FAMILY HISTORY:      CBC Full  -  ( 24 Mar 2020 08:22 )  WBC Count : 6.31 K/uL  RBC Count : 5.03 M/uL  Hemoglobin : 13.4 g/dL  Hematocrit : 41.4 %  Platelet Count - Automated : 179 K/uL  Mean Cell Volume : 82.3 fl  Mean Cell Hemoglobin : 26.6 pg  Mean Cell Hemoglobin Concentration : 32.4 gm/dL  Auto Neutrophil # : x  Auto Lymphocyte # : x  Auto Monocyte # : x  Auto Eosinophil # : x  Auto Basophil # : x  Auto Neutrophil % : x  Auto Lymphocyte % : x  Auto Monocyte % : x  Auto Eosinophil % : x  Auto Basophil % : x      03-23    136  |  99  |  14  ----------------------------<  298<H>  4.5   |  22  |  0.97    Ca    8.9      23 Mar 2020 15:26    TPro  6.8  /  Alb  3.4  /  TBili  0.6  /  DBili  x   /  AST  39  /  ALT  29  /  AlkPhos  39<L>  03-23      Urinalysis Basic - ( 23 Mar 2020 13:33 )    Color: Yellow / Appearance: Clear / SG: >=1.030 / pH: x  Gluc: x / Ketone: Trace mg/dL  / Bili: Negative / Urobili: 0.2 E.U./dL   Blood: x / Protein: 100 mg/dL / Nitrite: NEGATIVE   Leuk Esterase: NEGATIVE / RBC: 5-10 /HPF / WBC < 5 /HPF   Sq Epi: x / Non Sq Epi: x / Bacteria: Present /HPF          Radiology:    < from: Xray Pelvis 2 views (03.23.20 @ 14:04) >    EXAM:  XR PELVIS-1 OR 2 VIEWS                          EXAM:  XR HIPS BI 3-4V                          PROCEDURE DATE:  03/23/2020          INTERPRETATION:  INDICATION: hip pain    A single orthopedic view of the pelvis and 2 views of each hip havebeen submitted. A prior study performed 12/16/2013 is retrieved for correlation. No acute fracture or dislocation is present. There are minimal productive changes at each hip.      IMPRESSION: No acute osseous injury          Vital Signs Last 24 Hrs  T(C): 36.8 (24 Mar 2020 06:14), Max: 39.2 (24 Mar 2020 00:53)  T(F): 98.2 (24 Mar 2020 06:14), Max: 102.6 (24 Mar 2020 00:53)  HR: 91 (24 Mar 2020 06:14) (91 - 986)  BP: 125/71 (24 Mar 2020 06:14) (117/73 - 164/98)  BP(mean): --  RR: 17 (24 Mar 2020 06:14) (17 - 20)  SpO2: 91% (24 Mar 2020 06:14) (89% - 95%)    REVIEW OF SYSTEMS:    CONSTITUTIONAL:  fatigue  EYES: No eye pain, visual disturbances, or discharge  ENMT:  No difficulty hearing, tinnitus, vertigo; No sinus or throat pain  NECK: No pain or stiffness  BREASTS: No pain, masses, or nipple discharge  RESPIRATORY: No cough, wheezing, chills or hemoptysis; No shortness of breath  CARDIOVASCULAR: No chest pain, palpitations, dizziness, or leg swelling  GASTROINTESTINAL: No abdominal or epigastric pain. No nausea, vomiting, or hematemesis; No diarrhea or constipation. No melena or hematochezia.  GENITOURINARY: No dysuria, frequency, hematuria, or incontinence  NEUROLOGICAL: No headaches, memory loss, loss of strength, numbness, or tremors  SKIN: No itching, burning, rashes, or lesions   LYMPH NODES: No enlarged glands  ENDOCRINE: No heat or cold intolerance; No hair loss  MUSCULOSKELETAL: No joint pain or swelling; No muscle, back, or extremity pain  PSYCHIATRIC: No depression, anxiety, mood swings, or difficulty sleeping  HEME/LYMPH: No easy bruising, or bleeding gums  ALLERGY AND IMMUNOLOGIC: No hives or eczema  VASCULAR: no swelling, erythema        Physical Exam: on r/o COVID isolation precautions    Head: normocephalic, atraumatic    Eyes: PERRLA, EOMI, no nystagmus, sclera anicteric    ENT: nasal discharge, uvula midline, no oropharyngeal erythema/exudate    Neck: supple, negative JVD, negative carotid bruits, no thyromegaly    Chest: fine bibasilar crackles    Cardiovascular: regular rate and rhythm, neg murmurs/rubs/gallops    Abdomen: soft, non distended, non tender to palpation in all 4 quadrants, negative rebound/guarding, normal bowel sounds    Extremities: WWP, neg cyanosis/clubbing/edema, negative calf tenderness to palpation, negative Gena's sign      :     Neurologic Exam:    Alert and oriented to person, place, date/year, speech fluent w/o dysarthria, recent and remote memory intact, repetition intact, comprehension intact    Cranial Nerves:     II:                       pupils equal, round and reactive to light, visual fields intact   III/ IV/VI:            extraocular movements intact, neg nystagmus, neg ptosis  V:                       facial sensation intact, V1-3 normal  VII:                     face symmetric, no droop, normal eye closure and smile  VIII:                    hearing intact to finger rub bilaterally  IX/ X:                 soft palate rise symmetrical  XI:                      head turning, shoulder shrug normal  XII:                     tongue midline    Motor Exam:    Upper Extremities:     RIght:  no focal weakness               negative drift    Left :   no focal weakness               negative drift    Lower Extremities:                 Right:   no focal weakness                 Left:       no focal weakness                 Sensory:    intact to LT/PP in all UE/LE dermatomes                     DTR:             = biceps/     triceps/     brachioradialis                      = patella/   medial hamstring/ankle                      neg clonus                      neg Babinski                        Finger to Nose:  wnl    Heel to Shin:  wnl    Rapid Alternating movements:  wnl    Joint Position Sense:  intact    Romberg:  not tested    Tandem Walking:  not tested    Gait:  not tested        PM&R Impression:    1) s/p fall  2) deconditioned  3) no focal weakness    Plan:    1) Physical therapy focusing on therapeutic exercises, bed mobility/transfer out of bed evaluation, progressive ambulation with assistive devices prn.    2) Anticipated Disposition Plan/Recs:  pending functional progress

## 2020-03-24 NOTE — DIETITIAN INITIAL EVALUATION ADULT. - PROBLEM SELECTOR PLAN 1
Sepsis (febrile 101.1 and HR 91) likely 2/2 viral infection r/o covid vs CAP. Febrile to 101.1 in ED with desaturation to 89% on room air. No sick contacts, no diarrhea, no cough, fever, other respiratory symptoms. s/p 3L NS in ED  - CXR, prelim wet read, abnormal with left lower lobe possible consolidation and right reticulonodular pattern consistent with infectious vs inflammatory process, new since 2018 (old CT).  - RVP negative, Ferritin elevated >1000, and lymphopenic, leaning toward COVID infection  - f/u final CXR read  - f/u COVID PCR and COVID labs  - f/u blood cultures  - 1 L bolus NS o/n  - if procal elevated, would start CAP treatment with CTX and azithromycin  - Will start COVID treatment protocol: Azithro, plaquenil, ascorbic acid, thiamine

## 2020-03-24 NOTE — DIETITIAN INITIAL EVALUATION ADULT. - ADD RECOMMEND
1.Add Fluids 2.Honor food requests 3.Monitor po progression closely on daily rounds 4.Please encourage and assist as needed.

## 2020-03-24 NOTE — PROGRESS NOTE ADULT - PROBLEM SELECTOR PLAN 3
Finger stick noted to be 400s in field per EMS. Finger stick on admission 366. Improved to 266. s/p 3L NS in ED.  - Will give 1 L NS overnight  - Started lantus 15units and lispro 5units premeal  - mISS  - f/u hgba1c  - see diabetes plan below

## 2020-03-24 NOTE — DIETITIAN INITIAL EVALUATION ADULT. - PROBLEM SELECTOR PLAN 5
Hx of diabetes. Unknown last A1C. Previously recorded as on Januvia and glucophage. No diabetes medications at [facility], may use more than one pharmacy. Patient poor historian does not know what he takes at home.  - Will start 15 units of lantus tonight and 5 units of lispro premeal  - moderate ISS  - f/u Hgb A1c

## 2020-03-24 NOTE — PATIENT PROFILE ADULT - VISION (WITH CORRECTIVE LENSES IF THE PATIENT USUALLY WEARS THEM):
Uses reading glasses/Partially impaired: cannot see medication labels or newsprint, but can see obstacles in path, and the surrounding layout; can count fingers at arm's length

## 2020-03-24 NOTE — DIETITIAN INITIAL EVALUATION ADULT. - PHYSICAL APPEARANCE
debilitated/elderly male with fevers/co-morbidities/poor po.Unable to complete NFPE at this time due to isolation needs.

## 2020-03-24 NOTE — DIETITIAN INITIAL EVALUATION ADULT. - PROBLEM SELECTOR PLAN 10
DVT PPx: Lovenox subq daily  GI PPx: Famotidine 20mg    Transition of Care:  - f/u with PCP Dr. Diya Padilla  - f/u with full med rec    DISPO: COVID RMF  COD: FULL

## 2020-03-24 NOTE — DIETITIAN INITIAL EVALUATION ADULT. - ENERGY NEEDS
Ht:5ft 9inches,IBW:160lbs+/-10%,109% of IBW.BMI:25.9.Adjusted for adult demands and increased needs for fevers/COVID demands

## 2020-03-24 NOTE — DIETITIAN INITIAL EVALUATION ADULT. - PROBLEM SELECTOR PLAN 3
Stable.
Finger stick noted to be 400s in field per EMS. Finger stick on admission 366. Improved to 266. s/p 3L NS in ED.  - Will give 1 L NS overnight  - Started lantus 15units and lispro 5units premeal  - mISS  - f/u hgba1c  - see diabetes plan below

## 2020-03-24 NOTE — DIETITIAN INITIAL EVALUATION ADULT. - PROBLEM SELECTOR PLAN 2
DDx: includes viral infection r/o covid vs bacterial pna.  - see management per above    #Acute hypoxic respiratory failure  - supplemental O2

## 2020-03-24 NOTE — PROGRESS NOTE ADULT - SUBJECTIVE AND OBJECTIVE BOX
**Incomplete Note**    INTERVAL HPI/OVERNIGHT EVENTS:  Patient was seen and examined at bedside. As per nurse and patient, no o/n events, patient resting comfortably. No complaints at this time. Patient denies: fever, chills, dizziness, weakness, HA, Changes in vision, CP, palpitations, SOB, cough, N/V/D/C, dysuria, changes in bowel movements, LE edema. ROS otherwise negative.    VITAL SIGNS:  T(F): 98.2 (03-24-20 @ 06:14)  HR: 91 (03-24-20 @ 06:14)  BP: 125/71 (03-24-20 @ 06:14)  RR: 17 (03-24-20 @ 06:14)  SpO2: 91% (03-24-20 @ 06:14)  Wt(kg): --        PHYSICAL EXAM:    Constitutional: WDWN resting comfortably in bed; NAD  HEENT: NC/AT, PERRL, EOMI, anicteric sclera, no nasal discharge; uvula midline, no oropharyngeal erythema or exudates; MMM  Neck: supple; no JVD or thyromegaly  Respiratory: CTA B/L; no W/R/R, no retractions  Cardiac: +S1/S2; RRR; no M/R/G; PMI non-displaced  Gastrointestinal: soft, NT/ND; no rebound or guarding; +BSx4  Genitourinary: normal external genitalia  Back: spine midline, no bony tenderness or step-offs; no CVAT B/L  Extremities: WWP, no clubbing or cyanosis; no peripheral edema  Musculoskeletal: NROM x4; no joint swelling, tenderness or erythema  Vascular: 2+ radial, DP/PT pulses B/L  Dermatologic: skin warm, dry and intact; no rashes, wounds, or scars  Lymphatic: no submandibular or cervical LAD  Neurologic: AAOx3; CNII-XII grossly intact; no focal deficits  Psychiatric: affect and characteristics of appearance, verbalizations, behaviors are appropriate, denies SI/HI/AH/VH    MEDICATIONS  (STANDING):  ascorbic acid IVPB 1500 milliGRAM(s) IV Intermittent every 6 hours  atorvastatin 20 milliGRAM(s) Oral at bedtime  azithromycin   Tablet 250 milliGRAM(s) Oral every 24 hours  cefTRIAXone   IVPB 1000 milliGRAM(s) IV Intermittent every 24 hours  dextrose 5%. 1000 milliLiter(s) (50 mL/Hr) IV Continuous <Continuous>  dextrose 50% Injectable 12.5 Gram(s) IV Push once  dextrose 50% Injectable 25 Gram(s) IV Push once  dextrose 50% Injectable 25 Gram(s) IV Push once  enoxaparin Injectable 40 milliGRAM(s) SubCutaneous every 24 hours  famotidine    Tablet 20 milliGRAM(s) Oral daily  hydroxychloroquine 400 milliGRAM(s) Oral every 12 hours  insulin glargine Injectable (LANTUS) 15 Unit(s) SubCutaneous at bedtime  insulin lispro (HumaLOG) corrective regimen sliding scale   SubCutaneous Before meals and at bedtime  insulin lispro Injectable (HumaLOG) 5 Unit(s) SubCutaneous three times a day before meals  levothyroxine 75 MICROGram(s) Oral daily  thiamine Injectable 200 milliGRAM(s) IV Push <User Schedule>    MEDICATIONS  (PRN):  acetaminophen   Tablet .. 650 milliGRAM(s) Oral every 6 hours PRN Temp greater or equal to 38C (100.4F)  dextrose 40% Gel 15 Gram(s) Oral once PRN Blood Glucose LESS THAN 70 milliGRAM(s)/deciliter  glucagon  Injectable 1 milliGRAM(s) IntraMuscular once PRN Glucose LESS THAN 70 milligrams/deciliter      Allergies    No Known Allergies    Intolerances        LABS:                        14.1   5.15  )-----------( 177      ( 23 Mar 2020 12:45 )             43.4     03-23    136  |  99  |  14  ----------------------------<  298<H>  4.5   |  22  |  0.97    Ca    8.9      23 Mar 2020 15:26    TPro  6.8  /  Alb  3.4  /  TBili  0.6  /  DBili  x   /  AST  39  /  ALT  29  /  AlkPhos  39<L>  03-23      Urinalysis Basic - ( 23 Mar 2020 13:33 )    Color: Yellow / Appearance: Clear / SG: >=1.030 / pH: x  Gluc: x / Ketone: Trace mg/dL  / Bili: Negative / Urobili: 0.2 E.U./dL   Blood: x / Protein: 100 mg/dL / Nitrite: NEGATIVE   Leuk Esterase: NEGATIVE / RBC: 5-10 /HPF / WBC < 5 /HPF   Sq Epi: x / Non Sq Epi: x / Bacteria: Present /HPF        RADIOLOGY & ADDITIONAL TESTS:  Reviewed **Incomplete Note**    INTERVAL HPI/OVERNIGHT EVENTS:  Patient was seen and examined at bedside. As per nurse and patient, no o/n events, patient resting comfortably. No complaints at this time. Patient denies: fever, chills, dizziness, weakness, HA, Changes in vision, CP, palpitations, SOB, cough, N/V/D/C, dysuria, changes in bowel movements, LE edema. ROS otherwise negative.    VITAL SIGNS:  T(F): 98.2 (03-24-20 @ 06:14)  HR: 91 (03-24-20 @ 06:14)  BP: 125/71 (03-24-20 @ 06:14)  RR: 17 (03-24-20 @ 06:14)  SpO2: 91% (03-24-20 @ 06:14)  Wt(kg): --        PHYSICAL EXAM:    Constitutional: obese male, NAD, answering questions in full sentences  Head: NC/AT  Eyes: PERRL, EOMI, anicteric sclera  ENT: no nasal discharge; uvula midline, no oropharyngeal erythema or exudates; dry mucious membranes  Neck: supple; no JVD or thyromegaly  Respiratory: bilateral posterior crackles in middle and lower lung fields, R>L. No wheezing appreciated.  Cardiac: +S1/S2; RRR; no M/R/G  Gastrointestinal: soft, moderately distended, non tender; no rebound or guarding; +BSx4  Extremities: WWP, no clubbing or cyanosis; no peripheral edema  Musculoskeletal: NROM x4; no joint swelling, tenderness or erythema  Vascular: 2+ radial, femoral, DP/PT pulses B/L  MSK: NROM, 3/5 strength upper and lower extremities  Dermatologic: skin warm, dry and intact; no rashes, wounds, or scars. Poor skin turgor.  Neurologic: AAOx3; CNII-XII grossly intact; no focal deficits  Psychiatric: poor insight and judgment, sometimes aloof, AAOx3, but poor memory    MEDICATIONS  (STANDING):  ascorbic acid IVPB 1500 milliGRAM(s) IV Intermittent every 6 hours  atorvastatin 20 milliGRAM(s) Oral at bedtime  azithromycin   Tablet 250 milliGRAM(s) Oral every 24 hours  cefTRIAXone   IVPB 1000 milliGRAM(s) IV Intermittent every 24 hours  dextrose 5%. 1000 milliLiter(s) (50 mL/Hr) IV Continuous <Continuous>  dextrose 50% Injectable 12.5 Gram(s) IV Push once  dextrose 50% Injectable 25 Gram(s) IV Push once  dextrose 50% Injectable 25 Gram(s) IV Push once  enoxaparin Injectable 40 milliGRAM(s) SubCutaneous every 24 hours  famotidine    Tablet 20 milliGRAM(s) Oral daily  hydroxychloroquine 400 milliGRAM(s) Oral every 12 hours  insulin glargine Injectable (LANTUS) 15 Unit(s) SubCutaneous at bedtime  insulin lispro (HumaLOG) corrective regimen sliding scale   SubCutaneous Before meals and at bedtime  insulin lispro Injectable (HumaLOG) 5 Unit(s) SubCutaneous three times a day before meals  levothyroxine 75 MICROGram(s) Oral daily  thiamine Injectable 200 milliGRAM(s) IV Push <User Schedule>    MEDICATIONS  (PRN):  acetaminophen   Tablet .. 650 milliGRAM(s) Oral every 6 hours PRN Temp greater or equal to 38C (100.4F)  dextrose 40% Gel 15 Gram(s) Oral once PRN Blood Glucose LESS THAN 70 milliGRAM(s)/deciliter  glucagon  Injectable 1 milliGRAM(s) IntraMuscular once PRN Glucose LESS THAN 70 milligrams/deciliter      Allergies    No Known Allergies    Intolerances        LABS:                        14.1   5.15  )-----------( 177      ( 23 Mar 2020 12:45 )             43.4     03-23    136  |  99  |  14  ----------------------------<  298<H>  4.5   |  22  |  0.97    Ca    8.9      23 Mar 2020 15:26    TPro  6.8  /  Alb  3.4  /  TBili  0.6  /  DBili  x   /  AST  39  /  ALT  29  /  AlkPhos  39<L>  03-23      Urinalysis Basic - ( 23 Mar 2020 13:33 )    Color: Yellow / Appearance: Clear / SG: >=1.030 / pH: x  Gluc: x / Ketone: Trace mg/dL  / Bili: Negative / Urobili: 0.2 E.U./dL   Blood: x / Protein: 100 mg/dL / Nitrite: NEGATIVE   Leuk Esterase: NEGATIVE / RBC: 5-10 /HPF / WBC < 5 /HPF   Sq Epi: x / Non Sq Epi: x / Bacteria: Present /HPF        RADIOLOGY & ADDITIONAL TESTS:  Reviewed INTERVAL HPI/OVERNIGHT EVENTS:  Patient was seen and examined at bedside. As per nurse and patient, no o/n events, patient resting comfortably. No complaints at this time. Patient denies: fever, chills, dizziness, weakness, HA, Changes in vision, CP, palpitations, SOB, cough, N/V/D/C, dysuria, changes in bowel movements, LE edema. ROS otherwise negative.    VITAL SIGNS:  T(F): 98.2 (03-24-20 @ 06:14)  HR: 91 (03-24-20 @ 06:14)  BP: 125/71 (03-24-20 @ 06:14)  RR: 17 (03-24-20 @ 06:14)  SpO2: 91% (03-24-20 @ 06:14)  Wt(kg): --      PHYSICAL EXAM:    Constitutional: obese male, NAD, answering questions in full sentences  Head: NC/AT  Eyes: PERRL, EOMI, anicteric sclera  ENT: no nasal discharge; uvula midline, no oropharyngeal erythema or exudates; dry mucious membranes  Neck: supple; no JVD or thyromegaly  Respiratory: bilateral posterior crackles in middle and lower lung fields, R>L. No wheezing appreciated.  Cardiac: +S1/S2; RRR; no M/R/G  Gastrointestinal: soft, moderately distended, non tender; no rebound or guarding; +BSx4  Extremities: WWP, no clubbing or cyanosis; no peripheral edema  Musculoskeletal: NROM x4; no joint swelling, tenderness or erythema  Vascular: 2+ radial, femoral, DP/PT pulses B/L  MSK: NROM, 3/5 strength upper and lower extremities  Dermatologic: skin warm, dry and intact; no rashes, wounds, or scars. Poor skin turgor.  Neurologic: AAOx3; CNII-XII grossly intact; no focal deficits  Psychiatric: poor insight and judgment, sometimes aloof, AAOx3, but poor memory    MEDICATIONS  (STANDING):  ascorbic acid IVPB 1500 milliGRAM(s) IV Intermittent every 6 hours  atorvastatin 20 milliGRAM(s) Oral at bedtime  azithromycin   Tablet 250 milliGRAM(s) Oral every 24 hours  cefTRIAXone   IVPB 1000 milliGRAM(s) IV Intermittent every 24 hours  dextrose 5%. 1000 milliLiter(s) (50 mL/Hr) IV Continuous <Continuous>  dextrose 50% Injectable 12.5 Gram(s) IV Push once  dextrose 50% Injectable 25 Gram(s) IV Push once  dextrose 50% Injectable 25 Gram(s) IV Push once  enoxaparin Injectable 40 milliGRAM(s) SubCutaneous every 24 hours  famotidine    Tablet 20 milliGRAM(s) Oral daily  hydroxychloroquine 400 milliGRAM(s) Oral every 12 hours  insulin glargine Injectable (LANTUS) 15 Unit(s) SubCutaneous at bedtime  insulin lispro (HumaLOG) corrective regimen sliding scale   SubCutaneous Before meals and at bedtime  insulin lispro Injectable (HumaLOG) 5 Unit(s) SubCutaneous three times a day before meals  levothyroxine 75 MICROGram(s) Oral daily  thiamine Injectable 200 milliGRAM(s) IV Push <User Schedule>    MEDICATIONS  (PRN):  acetaminophen   Tablet .. 650 milliGRAM(s) Oral every 6 hours PRN Temp greater or equal to 38C (100.4F)  dextrose 40% Gel 15 Gram(s) Oral once PRN Blood Glucose LESS THAN 70 milliGRAM(s)/deciliter  glucagon  Injectable 1 milliGRAM(s) IntraMuscular once PRN Glucose LESS THAN 70 milligrams/deciliter      Allergies    No Known Allergies    Intolerances        LABS:                        14.1   5.15  )-----------( 177      ( 23 Mar 2020 12:45 )             43.4     03-23    136  |  99  |  14  ----------------------------<  298<H>  4.5   |  22  |  0.97    Ca    8.9      23 Mar 2020 15:26    TPro  6.8  /  Alb  3.4  /  TBili  0.6  /  DBili  x   /  AST  39  /  ALT  29  /  AlkPhos  39<L>  03-23      Urinalysis Basic - ( 23 Mar 2020 13:33 )    Color: Yellow / Appearance: Clear / SG: >=1.030 / pH: x  Gluc: x / Ketone: Trace mg/dL  / Bili: Negative / Urobili: 0.2 E.U./dL   Blood: x / Protein: 100 mg/dL / Nitrite: NEGATIVE   Leuk Esterase: NEGATIVE / RBC: 5-10 /HPF / WBC < 5 /HPF   Sq Epi: x / Non Sq Epi: x / Bacteria: Present /HPF        RADIOLOGY & ADDITIONAL TESTS:  Reviewed

## 2020-03-24 NOTE — PROGRESS NOTE ADULT - PROBLEM SELECTOR PLAN 2
DDx: includes viral infection r/o covid vs bacterial pna.  - see management per above    #Acute hypoxic respiratory failure  - supplemental O2 DDx: includes viral infection r/o covid vs bacterial pna.  - see management per above    #Acute hypoxic respiratory failure  - supplemental O2, goal of 95% spo2 and above    #R/O SARS-Coronavirus- elevated ferritin/CRP/LDH, b/l interstitial infiltrates worsening since admissions, see plan above  -trend LFTs, ferritin, CRP, ESR, d-dimer  -f/u COVID-19 PCR testing

## 2020-03-24 NOTE — CONSULT NOTE ADULT - ASSESSMENT
per Internal Medicine    79 M, poor historian, with past medical history of HTN, DM2, and hypothyroidism who presents to ED [facility] after being found down for a fall. Found to have elevated beta hydroxy buturate likely 2/2 dehydration vs starvation ketosis. While being worked up for fall in ED, found to be febrile 101.1 and desaturated to 89% on room air. In setting of recent pandemic and CXR, swabbed for COVID 19 and admitted to [address] [place] [address].        Problem/Plan - 1:  ·  Problem: Severe sepsis.  Plan: Sepsis (febrile 101.1 and HR 91) likely 2/2 viral infection r/o covid vs CAP. Febrile to 101.1 in ED with desaturation to 89% on room air. No sick contacts, no diarrhea, no cough, fever, other respiratory symptoms. s/p 3L NS in ED  - CXR, prelim wet read, abnormal with left lower lobe possible consolidation and right reticulonodular pattern consistent with infectious vs inflammatory process, new since 2018 (old CT).  - RVP negative, Ferritin elevated >1000, and lymphopenic, leaning toward COVID infection  - f/u final CXR read  - f/u COVID PCR and COVID labs  - f/u blood cultures  - 1 L bolus NS o/n  - if procal elevated, would start CAP treatment with CTX and azithromycin  - Will start COVID treatment protocol: Azithro, plaquenil, ascorbic acid, thiamine.     Problem/Plan - 2:  ·  Problem: Pneumonia.  Plan: DDx: includes viral infection r/o covid vs bacterial pna.  - see management per above    #Acute hypoxic respiratory failure  - supplemental O2.     Problem/Plan - 3:  ·  Problem: Hyperglycemia.  Plan: Finger stick noted to be 400s in field per EMS. Finger stick on admission 366. Improved to 266. s/p 3L NS in ED.  - Will give 1 L NS overnight  - Started lantus 15units and lispro 5units premeal  - mISS  - f/u hgba1c  - see diabetes plan below.     Problem/Plan - 4:  ·  Problem: Weakness.  Plan: Likely 2/2 dehydration in setting of poor PO intake vs starvation ketoacidosis vs infectious etiology in setting of recent fever. Patient with couple week history of poor PO intake and only juice. Denies sick contacts. s/p 3L of NS in ED.  CT Head: No acute intracranial hemorrhage and Xray pelvis/hip: No acute osseous injury  Creatinine kinase: slightly elevated, 494  - physical therapy: will benefit from continued PT to address above deficits as tolerated in order to maximize functional independence and ensure safe D/C to home.  - f/u TSH  - c/w 1L NS o/n  - encourage PO intake  - nutrition consult in AM    Problem/Plan - 5:  ·  Problem: Diabetes.  Plan: Hx of diabetes. Unknown last A1C. Previously recorded as on Januvia and glucophage. No diabetes medications at [facility], may use more than one pharmacy. Patient poor historian does not know what he takes at home.  - Will start 15 units of lantus tonight and 5 units of lispro premeal  - moderate ISS  - f/u Hgb A1c.     Problem/Plan - 6:  Problem: Essential hypertension. Plan: - hold home amlodipine 5mg in setting of severe sepsis  - Monitor pressure.    Problem/Plan - 7:  ·  Problem: Hypothyroidism.  Plan: - c/w home synthroid 75mcg daily  - f/u TSH.     Problem/Plan - 8:  ·  Problem: Hyperlipidemia.  Plan: - c/w home atorvastatin 20mg qhs.     Problem/Plan - 9:  ·  Problem: Nutrition, metabolism, and development symptoms.  Plan: F: NS 1L bolus overnight  E: Replete PRN for Mg<2 and K<4  N: DASH Diet (carb consistent).     Problem/Plan - 10:  Problem: Prophylactic measure. Plan; DVT PPx: Lovenox subq daily  GI PPx: Famotidine 20mg

## 2020-03-24 NOTE — PROGRESS NOTE ADULT - PROBLEM SELECTOR PLAN 5
Hx of diabetes. Unknown last A1C. Previously recorded as on Januvia and glucophage. No diabetes medications at [facility], may use more than one pharmacy. Patient poor historian does not know what he takes at home.  - Will start 15 units of lantus tonight and 5 units of lispro premeal  - moderate ISS  - f/u Hgb A1c

## 2020-03-25 LAB
ANION GAP SERPL CALC-SCNC: 14 MMOL/L — SIGNIFICANT CHANGE UP (ref 5–17)
BASOPHILS # BLD AUTO: 0 K/UL — SIGNIFICANT CHANGE UP (ref 0–0.2)
BASOPHILS NFR BLD AUTO: 0 % — SIGNIFICANT CHANGE UP (ref 0–2)
BUN SERPL-MCNC: 7 MG/DL — SIGNIFICANT CHANGE UP (ref 7–23)
CALCIUM SERPL-MCNC: 8.4 MG/DL — SIGNIFICANT CHANGE UP (ref 8.4–10.5)
CHLORIDE SERPL-SCNC: 104 MMOL/L — SIGNIFICANT CHANGE UP (ref 96–108)
CK MB CFR SERPL CALC: 1.3 NG/ML — SIGNIFICANT CHANGE UP (ref 0–6.7)
CO2 SERPL-SCNC: 23 MMOL/L — SIGNIFICANT CHANGE UP (ref 22–31)
CREAT SERPL-MCNC: 0.75 MG/DL — SIGNIFICANT CHANGE UP (ref 0.5–1.3)
CRP SERPL-MCNC: 6.38 MG/DL — HIGH (ref 0–0.4)
D DIMER BLD IA.RAPID-MCNC: 268 NG/ML DDU — HIGH
EOSINOPHIL # BLD AUTO: 0 K/UL — SIGNIFICANT CHANGE UP (ref 0–0.5)
EOSINOPHIL NFR BLD AUTO: 0 % — SIGNIFICANT CHANGE UP (ref 0–6)
ERYTHROCYTE [SEDIMENTATION RATE] IN BLOOD: 38 MM/HR — HIGH
FERRITIN SERPL-MCNC: 1320 NG/ML — HIGH (ref 30–400)
GAMMA INTERFERON BACKGROUND BLD IA-ACNC: 0.33 IU/ML — SIGNIFICANT CHANGE UP
GLUCOSE BLDC GLUCOMTR-MCNC: 118 MG/DL — HIGH (ref 70–99)
GLUCOSE BLDC GLUCOMTR-MCNC: 155 MG/DL — HIGH (ref 70–99)
GLUCOSE BLDC GLUCOMTR-MCNC: 86 MG/DL — SIGNIFICANT CHANGE UP (ref 70–99)
GLUCOSE BLDC GLUCOMTR-MCNC: 86 MG/DL — SIGNIFICANT CHANGE UP (ref 70–99)
GLUCOSE SERPL-MCNC: 89 MG/DL — SIGNIFICANT CHANGE UP (ref 70–99)
HCT VFR BLD CALC: 38 % — LOW (ref 39–50)
HGB BLD-MCNC: 12.7 G/DL — LOW (ref 13–17)
LYMPHOCYTES # BLD AUTO: 0.98 K/UL — LOW (ref 1–3.3)
LYMPHOCYTES # BLD AUTO: 13 % — SIGNIFICANT CHANGE UP (ref 13–44)
M TB IFN-G BLD-IMP: NEGATIVE — SIGNIFICANT CHANGE UP
M TB IFN-G CD4+ BCKGRND COR BLD-ACNC: 0.03 IU/ML — SIGNIFICANT CHANGE UP
M TB IFN-G CD4+CD8+ BCKGRND COR BLD-ACNC: 0.01 IU/ML — SIGNIFICANT CHANGE UP
MAGNESIUM SERPL-MCNC: 2.1 MG/DL — SIGNIFICANT CHANGE UP (ref 1.6–2.6)
MCHC RBC-ENTMCNC: 26.6 PG — LOW (ref 27–34)
MCHC RBC-ENTMCNC: 33.4 GM/DL — SIGNIFICANT CHANGE UP (ref 32–36)
MCV RBC AUTO: 79.7 FL — LOW (ref 80–100)
MONOCYTES # BLD AUTO: 0.2 K/UL — SIGNIFICANT CHANGE UP (ref 0–0.9)
MONOCYTES NFR BLD AUTO: 2.6 % — SIGNIFICANT CHANGE UP (ref 2–14)
NEUTROPHILS # BLD AUTO: 6.28 K/UL — SIGNIFICANT CHANGE UP (ref 1.8–7.4)
NEUTROPHILS NFR BLD AUTO: 82.6 % — HIGH (ref 43–77)
PHOSPHATE SERPL-MCNC: 3.2 MG/DL — SIGNIFICANT CHANGE UP (ref 2.5–4.5)
PLATELET # BLD AUTO: 206 K/UL — SIGNIFICANT CHANGE UP (ref 150–400)
POTASSIUM SERPL-MCNC: 3.8 MMOL/L — SIGNIFICANT CHANGE UP (ref 3.5–5.3)
POTASSIUM SERPL-SCNC: 3.8 MMOL/L — SIGNIFICANT CHANGE UP (ref 3.5–5.3)
PROLACTIN SERPL-MCNC: 8.1 NG/ML — SIGNIFICANT CHANGE UP (ref 4.1–18.4)
QUANT TB PLUS MITOGEN MINUS NIL: 4.59 IU/ML — SIGNIFICANT CHANGE UP
RBC # BLD: 4.77 M/UL — SIGNIFICANT CHANGE UP (ref 4.2–5.8)
RBC # FLD: 14.3 % — SIGNIFICANT CHANGE UP (ref 10.3–14.5)
SODIUM SERPL-SCNC: 141 MMOL/L — SIGNIFICANT CHANGE UP (ref 135–145)
WBC # BLD: 7.52 K/UL — SIGNIFICANT CHANGE UP (ref 3.8–10.5)
WBC # FLD AUTO: 7.52 K/UL — SIGNIFICANT CHANGE UP (ref 3.8–10.5)

## 2020-03-25 PROCEDURE — 71045 X-RAY EXAM CHEST 1 VIEW: CPT | Mod: 26

## 2020-03-25 PROCEDURE — 99233 SBSQ HOSP IP/OBS HIGH 50: CPT | Mod: GC

## 2020-03-25 PROCEDURE — 74018 RADEX ABDOMEN 1 VIEW: CPT | Mod: 26

## 2020-03-25 RX ORDER — ACETAMINOPHEN 500 MG
650 TABLET ORAL EVERY 6 HOURS
Refills: 0 | Status: DISCONTINUED | OUTPATIENT
Start: 2020-03-25 | End: 2020-03-27

## 2020-03-25 RX ADMIN — Medication 153 MILLIGRAM(S): at 22:30

## 2020-03-25 RX ADMIN — ATORVASTATIN CALCIUM 80 MILLIGRAM(S): 80 TABLET, FILM COATED ORAL at 22:30

## 2020-03-25 RX ADMIN — Medication 200 MILLIGRAM(S): at 17:56

## 2020-03-25 RX ADMIN — Medication 153 MILLIGRAM(S): at 05:14

## 2020-03-25 RX ADMIN — Medication 75 MICROGRAM(S): at 05:13

## 2020-03-25 RX ADMIN — Medication 650 MILLIGRAM(S): at 22:31

## 2020-03-25 RX ADMIN — Medication 153 MILLIGRAM(S): at 11:36

## 2020-03-25 RX ADMIN — Medication 153 MILLIGRAM(S): at 17:58

## 2020-03-25 RX ADMIN — Medication 81 MILLIGRAM(S): at 11:36

## 2020-03-25 RX ADMIN — Medication 102 MILLIGRAM(S): at 22:30

## 2020-03-25 RX ADMIN — Medication 102 MILLIGRAM(S): at 13:36

## 2020-03-25 RX ADMIN — Medication 200 MILLIGRAM(S): at 05:13

## 2020-03-25 RX ADMIN — FAMOTIDINE 20 MILLIGRAM(S): 10 INJECTION INTRAVENOUS at 11:36

## 2020-03-25 RX ADMIN — AZITHROMYCIN 250 MILLIGRAM(S): 500 TABLET, FILM COATED ORAL at 22:30

## 2020-03-25 RX ADMIN — Medication 1: at 12:02

## 2020-03-25 RX ADMIN — CEFTRIAXONE 100 MILLIGRAM(S): 500 INJECTION, POWDER, FOR SOLUTION INTRAMUSCULAR; INTRAVENOUS at 00:36

## 2020-03-25 RX ADMIN — Medication 5 UNIT(S): at 12:02

## 2020-03-25 RX ADMIN — ENOXAPARIN SODIUM 40 MILLIGRAM(S): 100 INJECTION SUBCUTANEOUS at 22:32

## 2020-03-25 NOTE — PROGRESS NOTE ADULT - PROBLEM SELECTOR PLAN 4
Hx of diabetes. Unknown last A1C. Previously recorded as on Januvia and glucophage. No diabetes medications at [facility], may use more than one pharmacy. Patient poor historian does not know what he takes at home.  - Will start 15 units of lantus tonight and 5 units of lispro premeal  - moderate ISS  - a1c 9.9, poor glycemic control  - out pt Endocrine f/u

## 2020-03-25 NOTE — PROGRESS NOTE ADULT - PROBLEM SELECTOR PLAN 3
Likely 2/2 dehydration in setting of poor PO intake vs starvation ketoacidosis vs infectious etiology in setting of recent fever. Patient with couple week history of poor PO intake and only juice. Denies sick contacts. s/p 3L of NS in ED.  CT Head: No acute intracranial hemorrhage and Xray pelvis/hip: No acute osseous injury  Creatinine kinase: slightly elevated, 494  - physical therapy: will benefit from continued PT to address above deficits as tolerated in order to maximize functional independence and ensure safe D/C to home.  - TSH WNL  - encourage PO intake  - PT final recs

## 2020-03-25 NOTE — PROGRESS NOTE ADULT - PROBLEM SELECTOR PLAN 1
Severe sepsis 2/2 viral pneumonia now COVID positive   - C/w Azithro, plaquenil, ascorbic acid, thiamine Severe sepsis 2/2 viral pneumonia now COVID positive   - C/w Azithro (day 3/5), plaquenil (Day 2/5), ascorbic acid, thiamine

## 2020-03-25 NOTE — PROGRESS NOTE ADULT - ASSESSMENT
per Internal Medicine    79 M, park historian, with past medical history of HTN, DM2, and hypothyroidism [facility]. Found to have elevated beta hydroxy buturate likely 2/2 dehydration vs starvation ketosis. While being worked up for fall in ED, found to be febrile 101.1 and desaturated to 89% on room air. In setting of recent pandemic and CXR, swabbed for COVID 19 and admitted to [address] [place] [address].        Problem/Plan - 1:  ·  Problem: Severe sepsis.  Plan: Severe sepsis 2/2 viral pneumonia now COVID positive   - C/w Azithro, plaquenil, ascorbic acid, thiamine.     Problem/Plan - 2:  ·  Problem: Pneumonia.  Plan: Viral pneumonia 2/2 COVID- see plan above  -Reswab in 03/31/2020, pt is from [facility]  -d/c CTX as low prob of CAP given low procal and elevated markers for COVID now with COVID + PCR    #Acute hypoxic respiratory failure- currently requiring   - supplemental O2, goal of 95% spo2 and above    #SARS-Coronavirus- COVID PCR positive   -see plan above.     Problem/Plan - 3:  ·  Problem: Weakness.  Plan: Likely 2/2 dehydration in setting of poor PO intake vs starvation ketoacidosis vs infectious etiology in setting of recent fever. Patient with couple week history of poor PO intake and only juice. Denies sick contacts. s/p 3L of NS in ED.  CT Head: No acute intracranial hemorrhage and Xray pelvis/hip: No acute osseous injury  Creatinine kinase: slightly elevated, 494  - physical therapy: will benefit from continued PT to address above deficits as tolerated in order to maximize functional independence and ensure safe D/C to home.  - TSH WNL  - encourage PO intake  - PT final recs.     Problem/Plan - 4:  ·  Problem: Diabetes.  Plan: Hx of diabetes. Unknown last A1C. Previously recorded as on Januvia and glucophage. No diabetes medications at [facility], may use more than one pharmacy. Patient park historian does not know what he takes at home.  - Will start 15 units of lantus tonight and 5 units of lispro premeal  - moderate ISS  - a1c 9.9, poor glycemic control  - out pt Endocrine f/u.     Problem/Plan - 5:  ·  Problem: Essential hypertension.  Plan: - hold home amlodipine 5mg in setting of severe sepsis  - Monitor pressure.     Problem/Plan - 6:  Problem: Hypothyroidism. Plan: - c/w home synthroid 75mcg daily  - TSH WNL.    Problem/Plan - 7:  ·  Problem: Hyperlipidemia.  Plan: - c/w home atorvastatin 20mg qhs.     Problem/Plan - 8:  ·  Problem: Nutrition, metabolism, and development symptoms.  Plan: F: NS 1L bolus overnight  E: Replete PRN for Mg<2 and K<4  N: DASH Diet (carb consistent).     Problem/Plan - 9:  ·  Problem: Prophylactic measure.  Plan: DVT PPx: Lovenox subq daily  GI PPx: Famotidine 20mg    Transition of Care:  - f/u with PCP Dr. Diya Padilla  - f/u with full med rec    DISPO: COVID RMF  COD: FULL.

## 2020-03-25 NOTE — PROGRESS NOTE ADULT - SUBJECTIVE AND OBJECTIVE BOX
**Incomplete Note**    INTERVAL HPI/OVERNIGHT EVENTS:  Patient was seen and examined at bedside. As per nurse and patient, no o/n events, patient resting comfortably. No complaints at this time. Patient denies: fever, chills, dizziness, weakness, HA, Changes in vision, CP, palpitations, SOB, cough, N/V/D/C, dysuria, changes in bowel movements, LE edema. ROS otherwise negative.    VITAL SIGNS:  T(F): 98 (03-25-20 @ 05:50)  HR: 85 (03-25-20 @ 05:50)  BP: 153/90 (03-25-20 @ 05:50)  RR: 18 (03-25-20 @ 05:50)  SpO2: 96% (03-25-20 @ 05:50)  Wt(kg): --        PHYSICAL EXAM:    Constitutional: obese male, NAD, answering questions in full sentences  Head: NC/AT  Eyes: PERRL, EOMI, anicteric sclera  ENT: no nasal discharge; uvula midline, no oropharyngeal erythema or exudates; dry mucious membranes  Neck: supple; no JVD or thyromegaly  Respiratory: bilateral posterior crackles in middle and lower lung fields, R>L. No wheezing appreciated.  Cardiac: +S1/S2; RRR; no M/R/G  Gastrointestinal: soft, moderately distended, non tender; no rebound or guarding; +BSx4  Extremities: WWP, no clubbing or cyanosis; no peripheral edema  Musculoskeletal: NROM x4; no joint swelling, tenderness or erythema  Vascular: 2+ radial, femoral, DP/PT pulses B/L  MSK: NROM, 3/5 strength upper and lower extremities  Dermatologic: skin warm, dry and intact; no rashes, wounds, or scars. Poor skin turgor.  Neurologic: AAOx3; CNII-XII grossly intact; no focal deficits  Psychiatric: poor insight and judgment, sometimes aloof, AAOx3, but poor memory    MEDICATIONS  (STANDING):  ascorbic acid IVPB 1500 milliGRAM(s) IV Intermittent every 6 hours  aspirin  chewable 81 milliGRAM(s) Oral daily  atorvastatin 80 milliGRAM(s) Oral at bedtime  azithromycin   Tablet 250 milliGRAM(s) Oral every 24 hours  cefTRIAXone   IVPB 1000 milliGRAM(s) IV Intermittent every 24 hours  dextrose 5%. 1000 milliLiter(s) (50 mL/Hr) IV Continuous <Continuous>  dextrose 50% Injectable 12.5 Gram(s) IV Push once  dextrose 50% Injectable 25 Gram(s) IV Push once  dextrose 50% Injectable 25 Gram(s) IV Push once  enoxaparin Injectable 40 milliGRAM(s) SubCutaneous every 24 hours  famotidine    Tablet 20 milliGRAM(s) Oral daily  hydroxychloroquine 200 milliGRAM(s) Oral every 12 hours  insulin glargine Injectable (LANTUS) 15 Unit(s) SubCutaneous at bedtime  insulin lispro (HumaLOG) corrective regimen sliding scale   SubCutaneous Before meals and at bedtime  insulin lispro Injectable (HumaLOG) 5 Unit(s) SubCutaneous three times a day before meals  levothyroxine 75 MICROGram(s) Oral daily  thiamine IVPB 200 milliGRAM(s) IV Intermittent <User Schedule>    MEDICATIONS  (PRN):  acetaminophen   Tablet .. 650 milliGRAM(s) Oral every 6 hours PRN Temp greater or equal to 38C (100.4F)  dextrose 40% Gel 15 Gram(s) Oral once PRN Blood Glucose LESS THAN 70 milliGRAM(s)/deciliter  glucagon  Injectable 1 milliGRAM(s) IntraMuscular once PRN Glucose LESS THAN 70 milligrams/deciliter      Allergies    No Known Allergies    Intolerances        LABS:                        13.4   6.31  )-----------( 179      ( 24 Mar 2020 08:22 )             41.4     03-24    139  |  105  |  9   ----------------------------<  138<H>  4.0   |  25  |  0.78    Ca    8.4      24 Mar 2020 08:22  Phos  3.0     03-24  Mg     2.1     03-24    TPro  6.4  /  Alb  2.8<L>  /  TBili  0.4  /  DBili  x   /  AST  50<H>  /  ALT  27  /  AlkPhos  34<L>  03-24    PT/INR - ( 24 Mar 2020 08:22 )   PT: 11.3 sec;   INR: 0.99          PTT - ( 24 Mar 2020 08:22 )  PTT:29.2 sec  Urinalysis Basic - ( 23 Mar 2020 13:33 )    Color: Yellow / Appearance: Clear / SG: >=1.030 / pH: x  Gluc: x / Ketone: Trace mg/dL  / Bili: Negative / Urobili: 0.2 E.U./dL   Blood: x / Protein: 100 mg/dL / Nitrite: NEGATIVE   Leuk Esterase: NEGATIVE / RBC: 5-10 /HPF / WBC < 5 /HPF   Sq Epi: x / Non Sq Epi: x / Bacteria: Present /HPF        RADIOLOGY & ADDITIONAL TESTS:  Reviewed INTERVAL HPI/OVERNIGHT EVENTS:  Patient was seen and examined at bedside. As per nurse and patient, no o/n events, patient resting comfortably. No complaints at this time. Patient denies: fever, chills, dizziness, weakness, HA, Changes in vision, CP, palpitations, SOB, cough, N/V/D/C, dysuria, changes in bowel movements, LE edema. ROS otherwise negative.    VITAL SIGNS:  T(F): 98 (03-25-20 @ 05:50)  HR: 85 (03-25-20 @ 05:50)  BP: 153/90 (03-25-20 @ 05:50)  RR: 18 (03-25-20 @ 05:50)  SpO2: 96% (03-25-20 @ 05:50)  Wt(kg): --        PHYSICAL EXAM:    Constitutional: obese male, NAD, answering questions in full sentences  Head: NC/AT  Eyes: PERRL, EOMI, anicteric sclera  ENT: no nasal discharge; uvula midline, no oropharyngeal erythema or exudates; dry mucious membranes  Neck: supple; no JVD or thyromegaly  Respiratory: bilateral posterior crackles in middle and lower lung fields, R>L. No wheezing appreciated.  Cardiac: +S1/S2; RRR; no M/R/G  Gastrointestinal: soft, moderately distended, non tender; no rebound or guarding; +BSx4  Extremities: WWP, no clubbing or cyanosis; no peripheral edema  Musculoskeletal: NROM x4; no joint swelling, tenderness or erythema  Vascular: 2+ radial, femoral, DP/PT pulses B/L  MSK: NROM, 3/5 strength upper and lower extremities  Dermatologic: skin warm, dry and intact; no rashes, wounds, or scars. Poor skin turgor.  Neurologic: AAOx3; CNII-XII grossly intact; no focal deficits  Psychiatric: poor insight and judgment, sometimes aloof, AAOx3, but poor memory    MEDICATIONS  (STANDING):  ascorbic acid IVPB 1500 milliGRAM(s) IV Intermittent every 6 hours  aspirin  chewable 81 milliGRAM(s) Oral daily  atorvastatin 80 milliGRAM(s) Oral at bedtime  azithromycin   Tablet 250 milliGRAM(s) Oral every 24 hours  cefTRIAXone   IVPB 1000 milliGRAM(s) IV Intermittent every 24 hours  dextrose 5%. 1000 milliLiter(s) (50 mL/Hr) IV Continuous <Continuous>  dextrose 50% Injectable 12.5 Gram(s) IV Push once  dextrose 50% Injectable 25 Gram(s) IV Push once  dextrose 50% Injectable 25 Gram(s) IV Push once  enoxaparin Injectable 40 milliGRAM(s) SubCutaneous every 24 hours  famotidine    Tablet 20 milliGRAM(s) Oral daily  hydroxychloroquine 200 milliGRAM(s) Oral every 12 hours  insulin glargine Injectable (LANTUS) 15 Unit(s) SubCutaneous at bedtime  insulin lispro (HumaLOG) corrective regimen sliding scale   SubCutaneous Before meals and at bedtime  insulin lispro Injectable (HumaLOG) 5 Unit(s) SubCutaneous three times a day before meals  levothyroxine 75 MICROGram(s) Oral daily  thiamine IVPB 200 milliGRAM(s) IV Intermittent <User Schedule>    MEDICATIONS  (PRN):  acetaminophen   Tablet .. 650 milliGRAM(s) Oral every 6 hours PRN Temp greater or equal to 38C (100.4F)  dextrose 40% Gel 15 Gram(s) Oral once PRN Blood Glucose LESS THAN 70 milliGRAM(s)/deciliter  glucagon  Injectable 1 milliGRAM(s) IntraMuscular once PRN Glucose LESS THAN 70 milligrams/deciliter      Allergies    No Known Allergies    Intolerances        LABS:                        13.4   6.31  )-----------( 179      ( 24 Mar 2020 08:22 )             41.4     03-24    139  |  105  |  9   ----------------------------<  138<H>  4.0   |  25  |  0.78    Ca    8.4      24 Mar 2020 08:22  Phos  3.0     03-24  Mg     2.1     03-24    TPro  6.4  /  Alb  2.8<L>  /  TBili  0.4  /  DBili  x   /  AST  50<H>  /  ALT  27  /  AlkPhos  34<L>  03-24    PT/INR - ( 24 Mar 2020 08:22 )   PT: 11.3 sec;   INR: 0.99          PTT - ( 24 Mar 2020 08:22 )  PTT:29.2 sec  Urinalysis Basic - ( 23 Mar 2020 13:33 )    Color: Yellow / Appearance: Clear / SG: >=1.030 / pH: x  Gluc: x / Ketone: Trace mg/dL  / Bili: Negative / Urobili: 0.2 E.U./dL   Blood: x / Protein: 100 mg/dL / Nitrite: NEGATIVE   Leuk Esterase: NEGATIVE / RBC: 5-10 /HPF / WBC < 5 /HPF   Sq Epi: x / Non Sq Epi: x / Bacteria: Present /HPF        RADIOLOGY & ADDITIONAL TESTS:  Reviewed

## 2020-03-25 NOTE — PROGRESS NOTE ADULT - SUBJECTIVE AND OBJECTIVE BOX
Physical Medicine and Rehabilitation Progress Note:    Patient is a 79y old  Male who presents with a chief complaint of weakness (25 Mar 2020 07:34)      HPI:  79 M, poor historian, with past medical history of HTN, DM2, and hypothyroidism who presents to ED [facility] after being found down for a fall. Patient states that his bed is tilted and that he slid off his bed and was unable to get up. Patient states that it happened in the last few days. Per ED provider, the patient was potentially down from yesterday evening to this morning and was found by workers at his assisted living home. It is unclear what kind of home, but he says single occupancy group home of some type. He does not have a rollator, walker, or cane at home. Patient denies hitting his head, loss of consciousness, or previous falls. Patient states that in past few weeks he has not been eating as much as he does not like the food around his home. He has mainly been drinking juices at home. Patient currently denies fever, headache, nausea, vomiting, chest pain, shortness of breath, abdominal pain. Patient does not feel as strong as usual. He states that he has bowel movements every few days and has had no changes in urination. Denies sick contacts or travel history.    ED Vitals: T 98.3, P100, /98, RR 17, O2 95% on RA ->> T 101.1 P 91 /89 RR 20 O2 89% on RA  ED Course: Glucose 366-> 240s. Patient received 3 L NS With improvement in glucose. Received Tylenol for fever. Swabbed for RVP, COVID, blood cultures. CXR, prelim wet read, abnormal with left lower lobe possible consolidation and right reticulonodular pattern consistent with infectious vs inflammatory process, new since 2018 (old CT). [facility] in setting of unknown fever. (23 Mar 2020 18:15)                            12.7   7.52  )-----------( 206      ( 25 Mar 2020 07:58 )             38.0       03-25    141  |  104  |  7   ----------------------------<  89  3.8   |  23  |  0.75    Ca    8.4      25 Mar 2020 07:58  Phos  3.2     03-25  Mg     2.1     03-25    TPro  6.4  /  Alb  2.8<L>  /  TBili  0.4  /  DBili  x   /  AST  50<H>  /  ALT  27  /  AlkPhos  34<L>  03-24    Vital Signs Last 24 Hrs  T(C): 36.7 (25 Mar 2020 05:50), Max: 36.9 (24 Mar 2020 21:11)  T(F): 98 (25 Mar 2020 05:50), Max: 98.4 (24 Mar 2020 21:11)  HR: 85 (25 Mar 2020 05:50) (81 - 88)  BP: 153/90 (25 Mar 2020 05:50) (132/82 - 153/90)  BP(mean): --  RR: 18 (25 Mar 2020 05:50) (18 - 18)  SpO2: 96% (25 Mar 2020 05:50) (94% - 97%)    MEDICATIONS  (STANDING):  ascorbic acid IVPB 1500 milliGRAM(s) IV Intermittent every 6 hours  aspirin  chewable 81 milliGRAM(s) Oral daily  atorvastatin 80 milliGRAM(s) Oral at bedtime  azithromycin   Tablet 250 milliGRAM(s) Oral every 24 hours  cefTRIAXone   IVPB 1000 milliGRAM(s) IV Intermittent every 24 hours  dextrose 5%. 1000 milliLiter(s) (50 mL/Hr) IV Continuous <Continuous>  dextrose 50% Injectable 12.5 Gram(s) IV Push once  dextrose 50% Injectable 25 Gram(s) IV Push once  dextrose 50% Injectable 25 Gram(s) IV Push once  enoxaparin Injectable 40 milliGRAM(s) SubCutaneous every 24 hours  famotidine    Tablet 20 milliGRAM(s) Oral daily  hydroxychloroquine 200 milliGRAM(s) Oral every 12 hours  insulin glargine Injectable (LANTUS) 15 Unit(s) SubCutaneous at bedtime  insulin lispro (HumaLOG) corrective regimen sliding scale   SubCutaneous Before meals and at bedtime  insulin lispro Injectable (HumaLOG) 5 Unit(s) SubCutaneous three times a day before meals  levothyroxine 75 MICROGram(s) Oral daily  thiamine IVPB 200 milliGRAM(s) IV Intermittent <User Schedule>    MEDICATIONS  (PRN):  acetaminophen   Tablet .. 650 milliGRAM(s) Oral every 6 hours PRN Temp greater or equal to 38C (100.4F)  dextrose 40% Gel 15 Gram(s) Oral once PRN Blood Glucose LESS THAN 70 milliGRAM(s)/deciliter  glucagon  Injectable 1 milliGRAM(s) IntraMuscular once PRN Glucose LESS THAN 70 milligrams/deciliter    Currently Undergoing Physical Therapy at bedside.    Initial Functional Status Assessment:     Previous Level of Function:     · Ambulation Skills	independent	  · Transfer Skills	independent	  · ADL Skills	independent	  · Additional Comments	Pt reports living alone in a senior living complex on the 4th floor with elevator access. Pt reports being completely independent with all ADL's and functional mobility without use of an assistive device.	    Cognitive Status Examination:   · Orientation	person; place; situation; Unable to state date	  · Level of Consciousness	alert	  · Follows Commands and Answers Questions	100% of the time; able to follow single-step instructions	  · Personal Safety and Judgment	intact	    Range of Motion Exam:   · Active Range of Motion Examination	bilateral upper extremity Active ROM was WFL (within functional limits); bilateral  lower extremity Active ROM was WFL (within functional limits)	    Manual Muscle Testing:   · Manual Muscle Testing Results	grossly >3+/5 t/o b/l UE's/LE's based on functional mobility	    Bed Mobility: Sit to Supine:     · Level of [place]	contact guard	  · Physical Assist/Nonphysical Assist	1 person assist; nonverbal cues (demo/gestures); verbal cues	    Bed Mobility: Supine to Sit:     · Level of [place]	contact guard; minimum assist (75% patients effort)	  · Physical Assist/Nonphysical Assist	1 person assist; nonverbal cues (demo/gestures); verbal cues	    Bed Mobility Analysis:     · Bed Mobility Limitations	decreased ability to use arms for pushing/pulling; decreased ability to use legs for bridging/pushing; impaired ability to control trunk for mobility	  · Impairments Contributing to Impaired Bed Mobility	impaired balance; impaired coordination; impaired postural control; decreased strength	    Transfer: Sit to Stand:     · Level of [place]	contact guard	  · Physical Assist/Nonphysical Assist	1 person assist; nonverbal cues (demo/gestures); verbal cues	  · Weight-Bearing Restrictions	weight-bearing as tolerated	  · Assistive Device	rolling walker	    Transfer: Stand to Sit:     · Level of [place]	minimum assist (75% patients effort)	  · Physical Assist/Nonphysical Assist	1 person assist; nonverbal cues (demo/gestures); verbal cues	  · Weight-Bearing Restrictions	weight-bearing as tolerated	  · Assistive Device	rolling walker	    Sit/Stand Transfer Safety Analysis:     · Transfer Safety Concerns Noted	decreased safety awareness; losing balance; decreased proprioception; decreased sequencing ability; decreased step length; decreased weight-shifting ability	  · Impairments Contributing to Impaired Transfers	impaired balance; impaired coordination; pain; impaired postural control; decreased strength	    Gait Skills:     · Level of [place]	contact guard; minimum assist (75% patients effort)	  · Physical Assist/Nonphysical Assist	1 person assist; nonverbal cues (demo/gestures); verbal cues	  · Weight-Bearing Restrictions	weight-bearing as tolerated	  · Assistive Device	rolling walker	  · Gait Distance	25 feet; Unable to ambulate further secondary to fatigue. 1 episode of LOB.	    Gait Analysis:     · Gait Pattern Used	2-point gait	  · Gait Deviations Noted	increased time in double stance; decreased step length; decreased weight-shifting ability; decreased stride length	  · Impairments Contributing to Gait Deviations	impaired balance; impaired coordination; impaired postural control; decreased strength	    Balance Skills Assessment:     · Sitting Balance: Static	good balance	  · Sitting Balance: Dynamic	fair balance	  · Sit-to-Stand Balance	fair balance	  · Standing Balance: Static	fair balance	  · Standing Balance: Dynamic	poor balance	  · Systems Impairment Contributing to Balance Disturbance	musculoskeletal	  · Identified Impairments Contributing to Balance Disturbance	decreased strength; impaired postural control	    Sensory Examination:   Sensory Examination:    Light Touch Sensation:   · Left UE	within normal limits	  · Right UE	within normal limits	  · Left LE	within normal limits	  · Right LE	within normal limits	      Treatment Location:   · Comments	Pt tolerated eval fairly. Pt requires CGA/min assist with all aspects of functional mobility secondary to LE weakness, fatigue and balance deficits. Pt was unable to ambulate without use of a RW and steadying assist from PT to prevent pt for falling/LOB. Pt will benefit from continued PT to address above deficits as tolerated in order to maximize functional independence and ensure safe D/C to home. Pt left in stretcher as found in NAD, all lines intact, ED RN / PA / MD / CM all informed. FIM: 1	    Clinical Impressions:   · Criteria for Skilled Therapeutic Interventions	impairments found; functional limitations in following categories; risk reduction/prevention; rehab potential; therapy frequency; anticipated equipment needs at discharge; anticipated discharge recommendation	  · Impairments Found (describe specific impairments)	aerobic capacity/endurance; decreased midline orientation; muscle strength; poor safety awareness; arousal, attention, and cognition; gait, locomotion, and balance; posture	  · Functional Limitations in Following Categories (describe specific limitations)	self-care; home management	  · Risk Reduction/Prevention (Describe Specific Areas of risk reduction/prevention)	risk factors	  · Risk Areas	fall	  · Rehab Potential	good, to achieve stated therapy goals	  · Therapy Frequency	2-3x/week	        PM&R Impression: as above    Current Disposition Plan Recommendations:  pending functional progress

## 2020-03-25 NOTE — CHART NOTE - NSCHARTNOTEFT_GEN_A_CORE
Spoke to Makenzie Brooke (sister) regarding pt's baseline mental status; according to her the pt is cognitively intact in executive fx, language, attention, and visuospatial skills (can write, navigate, etc). However, the pt has had an inability to find words and recollect most recent words for years. Additionally, he has had for years "odd tendencies such as confabulating stories and conversations that never occurred". On further asking of PMHX, the sister did endorse a significant drinking hx to the pt for at least 10-15 years. She could not provide how many drinks he would have per week or the sequelae of alcohol use such as liver cirrhosis, falls, eye issues concerning for Wernicke's encephalopathy. Spoke to Makenzie Brooke (sister) regarding pt's baseline mental status; according to her the pt is cognitively intact in executive fx, language, attention, and visuospatial skills (can write, navigate, etc). However, the pt has had an inability to find words and recollect most recent words for years. Additionally, he has had for years "odd tendencies such as making up stories and conversations that never occurred". On further asking of PMHX, the sister did endorse a significant drinking hx to the pt for at least 10-15 years. She could not provide how many drinks he would have per week or the sequelae of alcohol use such as liver cirrhosis, falls, eye issues concerning for Wernicke's encephalopathy.

## 2020-03-25 NOTE — PROGRESS NOTE ADULT - ASSESSMENT
79 M, poor historian, with past medical history of HTN, DM2, and hypothyroidism who presents to ED BIBEMS after being found down for a fall. Found to have elevated beta hydroxy buturate likely 2/2 dehydration vs starvation ketosis. While being worked up for fall in ED, found to be febrile 101.1 and desaturated to 89% on room air. In setting of recent pandemic and CXR, swabbed for COVID 19 and admitted to [address] [place] [address].

## 2020-03-26 DIAGNOSIS — Z71.89 OTHER SPECIFIED COUNSELING: ICD-10-CM

## 2020-03-26 LAB
ANION GAP SERPL CALC-SCNC: 14 MMOL/L — SIGNIFICANT CHANGE UP (ref 5–17)
BASE EXCESS BLDA CALC-SCNC: 0.4 MMOL/L — SIGNIFICANT CHANGE UP (ref -2–3)
BASOPHILS # BLD AUTO: 0 K/UL — SIGNIFICANT CHANGE UP (ref 0–0.2)
BASOPHILS NFR BLD AUTO: 0 % — SIGNIFICANT CHANGE UP (ref 0–2)
BUN SERPL-MCNC: 6 MG/DL — LOW (ref 7–23)
CALCIUM SERPL-MCNC: 8.4 MG/DL — SIGNIFICANT CHANGE UP (ref 8.4–10.5)
CHLORIDE SERPL-SCNC: 102 MMOL/L — SIGNIFICANT CHANGE UP (ref 96–108)
CK MB CFR SERPL CALC: 1.7 NG/ML — SIGNIFICANT CHANGE UP (ref 0–6.7)
CO2 SERPL-SCNC: 24 MMOL/L — SIGNIFICANT CHANGE UP (ref 22–31)
CREAT SERPL-MCNC: 0.64 MG/DL — SIGNIFICANT CHANGE UP (ref 0.5–1.3)
CRP SERPL-MCNC: 4.48 MG/DL — HIGH (ref 0–0.4)
D DIMER BLD IA.RAPID-MCNC: 254 NG/ML DDU — HIGH
EOSINOPHIL # BLD AUTO: 0 K/UL — SIGNIFICANT CHANGE UP (ref 0–0.5)
EOSINOPHIL NFR BLD AUTO: 0 % — SIGNIFICANT CHANGE UP (ref 0–6)
ERYTHROCYTE [SEDIMENTATION RATE] IN BLOOD: 48 MM/HR — HIGH
FERRITIN SERPL-MCNC: 1613 NG/ML — HIGH (ref 30–400)
G6PD RBC-CCNC: 14.7 U/G HGB — SIGNIFICANT CHANGE UP (ref 7–20.5)
GAS PNL BLDA: SIGNIFICANT CHANGE UP
GLUCOSE BLDC GLUCOMTR-MCNC: 117 MG/DL — HIGH (ref 70–99)
GLUCOSE BLDC GLUCOMTR-MCNC: 123 MG/DL — HIGH (ref 70–99)
GLUCOSE BLDC GLUCOMTR-MCNC: 64 MG/DL — LOW (ref 70–99)
GLUCOSE BLDC GLUCOMTR-MCNC: 89 MG/DL — SIGNIFICANT CHANGE UP (ref 70–99)
GLUCOSE BLDC GLUCOMTR-MCNC: 96 MG/DL — SIGNIFICANT CHANGE UP (ref 70–99)
GLUCOSE SERPL-MCNC: 77 MG/DL — SIGNIFICANT CHANGE UP (ref 70–99)
HCO3 BLDA-SCNC: 22 MMOL/L — SIGNIFICANT CHANGE UP (ref 21–28)
HCT VFR BLD CALC: 41.7 % — SIGNIFICANT CHANGE UP (ref 39–50)
HGB BLD-MCNC: 13.5 G/DL — SIGNIFICANT CHANGE UP (ref 13–17)
LYMPHOCYTES # BLD AUTO: 1.47 K/UL — SIGNIFICANT CHANGE UP (ref 1–3.3)
LYMPHOCYTES # BLD AUTO: 21.7 % — SIGNIFICANT CHANGE UP (ref 13–44)
MAGNESIUM SERPL-MCNC: 2 MG/DL — SIGNIFICANT CHANGE UP (ref 1.6–2.6)
MCHC RBC-ENTMCNC: 26.4 PG — LOW (ref 27–34)
MCHC RBC-ENTMCNC: 32.4 GM/DL — SIGNIFICANT CHANGE UP (ref 32–36)
MCV RBC AUTO: 81.4 FL — SIGNIFICANT CHANGE UP (ref 80–100)
MONOCYTES # BLD AUTO: 0.3 K/UL — SIGNIFICANT CHANGE UP (ref 0–0.9)
MONOCYTES NFR BLD AUTO: 4.4 % — SIGNIFICANT CHANGE UP (ref 2–14)
NEUTROPHILS # BLD AUTO: 5 K/UL — SIGNIFICANT CHANGE UP (ref 1.8–7.4)
NEUTROPHILS NFR BLD AUTO: 73.9 % — SIGNIFICANT CHANGE UP (ref 43–77)
PCO2 BLDA: 28 MMHG — LOW (ref 35–48)
PH BLDA: 7.52 — HIGH (ref 7.35–7.45)
PHOSPHATE SERPL-MCNC: 3.4 MG/DL — SIGNIFICANT CHANGE UP (ref 2.5–4.5)
PLATELET # BLD AUTO: 254 K/UL — SIGNIFICANT CHANGE UP (ref 150–400)
PO2 BLDA: 63 MMHG — LOW (ref 83–108)
POTASSIUM SERPL-MCNC: 3.4 MMOL/L — LOW (ref 3.5–5.3)
POTASSIUM SERPL-SCNC: 3.4 MMOL/L — LOW (ref 3.5–5.3)
RBC # BLD: 5.12 M/UL — SIGNIFICANT CHANGE UP (ref 4.2–5.8)
RBC # FLD: 14.3 % — SIGNIFICANT CHANGE UP (ref 10.3–14.5)
SAO2 % BLDA: 92 % — LOW (ref 95–100)
SODIUM SERPL-SCNC: 140 MMOL/L — SIGNIFICANT CHANGE UP (ref 135–145)
WBC # BLD: 6.76 K/UL — SIGNIFICANT CHANGE UP (ref 3.8–10.5)
WBC # FLD AUTO: 6.76 K/UL — SIGNIFICANT CHANGE UP (ref 3.8–10.5)

## 2020-03-26 PROCEDURE — 99233 SBSQ HOSP IP/OBS HIGH 50: CPT | Mod: GC

## 2020-03-26 PROCEDURE — 71045 X-RAY EXAM CHEST 1 VIEW: CPT | Mod: 26

## 2020-03-26 PROCEDURE — 93010 ELECTROCARDIOGRAM REPORT: CPT

## 2020-03-26 RX ORDER — AMLODIPINE BESYLATE 2.5 MG/1
5 TABLET ORAL EVERY 24 HOURS
Refills: 0 | Status: DISCONTINUED | OUTPATIENT
Start: 2020-03-26 | End: 2020-03-31

## 2020-03-26 RX ORDER — INSULIN GLARGINE 100 [IU]/ML
6 INJECTION, SOLUTION SUBCUTANEOUS ONCE
Refills: 0 | Status: COMPLETED | OUTPATIENT
Start: 2020-03-26 | End: 2020-03-26

## 2020-03-26 RX ORDER — INSULIN GLARGINE 100 [IU]/ML
10 INJECTION, SOLUTION SUBCUTANEOUS AT BEDTIME
Refills: 0 | Status: DISCONTINUED | OUTPATIENT
Start: 2020-03-26 | End: 2020-03-27

## 2020-03-26 RX ORDER — ASCORBIC ACID 60 MG
1500 TABLET,CHEWABLE ORAL
Refills: 0 | Status: COMPLETED | OUTPATIENT
Start: 2020-03-26 | End: 2020-03-28

## 2020-03-26 RX ORDER — POTASSIUM CHLORIDE 20 MEQ
40 PACKET (EA) ORAL ONCE
Refills: 0 | Status: COMPLETED | OUTPATIENT
Start: 2020-03-26 | End: 2020-03-26

## 2020-03-26 RX ORDER — THIAMINE MONONITRATE (VIT B1) 100 MG
200 TABLET ORAL
Refills: 0 | Status: COMPLETED | OUTPATIENT
Start: 2020-03-26 | End: 2020-03-29

## 2020-03-26 RX ORDER — CEFTRIAXONE 500 MG/1
1000 INJECTION, POWDER, FOR SOLUTION INTRAMUSCULAR; INTRAVENOUS EVERY 24 HOURS
Refills: 0 | Status: DISCONTINUED | OUTPATIENT
Start: 2020-03-26 | End: 2020-03-27

## 2020-03-26 RX ADMIN — INSULIN GLARGINE 10 UNIT(S): 100 INJECTION, SOLUTION SUBCUTANEOUS at 22:41

## 2020-03-26 RX ADMIN — Medication 200 MILLIGRAM(S): at 22:45

## 2020-03-26 RX ADMIN — Medication 153 MILLIGRAM(S): at 05:50

## 2020-03-26 RX ADMIN — Medication 200 MILLIGRAM(S): at 05:50

## 2020-03-26 RX ADMIN — AZITHROMYCIN 250 MILLIGRAM(S): 500 TABLET, FILM COATED ORAL at 22:18

## 2020-03-26 RX ADMIN — Medication 650 MILLIGRAM(S): at 18:18

## 2020-03-26 RX ADMIN — Medication 153 MILLIGRAM(S): at 10:50

## 2020-03-26 RX ADMIN — ATORVASTATIN CALCIUM 80 MILLIGRAM(S): 80 TABLET, FILM COATED ORAL at 22:17

## 2020-03-26 RX ADMIN — Medication 650 MILLIGRAM(S): at 14:01

## 2020-03-26 RX ADMIN — INSULIN GLARGINE 6 UNIT(S): 100 INJECTION, SOLUTION SUBCUTANEOUS at 02:53

## 2020-03-26 RX ADMIN — Medication 1500 MILLIGRAM(S): at 18:19

## 2020-03-26 RX ADMIN — Medication 650 MILLIGRAM(S): at 05:51

## 2020-03-26 RX ADMIN — Medication 200 MILLIGRAM(S): at 18:19

## 2020-03-26 RX ADMIN — FAMOTIDINE 20 MILLIGRAM(S): 10 INJECTION INTRAVENOUS at 14:01

## 2020-03-26 RX ADMIN — AMLODIPINE BESYLATE 5 MILLIGRAM(S): 2.5 TABLET ORAL at 18:19

## 2020-03-26 RX ADMIN — CEFTRIAXONE 100 MILLIGRAM(S): 500 INJECTION, POWDER, FOR SOLUTION INTRAMUSCULAR; INTRAVENOUS at 02:52

## 2020-03-26 RX ADMIN — Medication 40 MILLIEQUIVALENT(S): at 18:20

## 2020-03-26 RX ADMIN — ENOXAPARIN SODIUM 40 MILLIGRAM(S): 100 INJECTION SUBCUTANEOUS at 22:18

## 2020-03-26 RX ADMIN — Medication 650 MILLIGRAM(S): at 00:47

## 2020-03-26 NOTE — PROGRESS NOTE ADULT - PROBLEM SELECTOR PLAN 1
Severe sepsis 2/2 viral pneumonia now COVID positive   - C/w Azithro (day 3/5), plaquenil (Day 2/5), ascorbic acid, thiamine Severe sepsis 2/2 viral pneumonia now COVID positive   - C/w Azithro (day 4/5), plaquenil (Day 3/5), ascorbic acid, thiamine

## 2020-03-26 NOTE — PROGRESS NOTE ADULT - PROBLEM SELECTOR PLAN 4
Hx of diabetes. Unknown last A1C. Previously recorded as on Januvia and glucophage. No diabetes medications at [facility], may use more than one pharmacy. Patient poor historian does not know what he takes at home.  - Will start 15 units of lantus tonight and 5 units of lispro premeal  - moderate ISS  - a1c 9.9, poor glycemic control  - out pt Endocrine f/u Hx of diabetes. Unknown last A1C. Previously recorded as on Januvia and glucophage. No diabetes medications at [facility], may use more than one pharmacy. Patient poor historian does not know what he takes at home.  - Decreased Lantus to 10 units qhs given repeat episodes of relative hypoglycemia likely in setting of decreased PO intake/anorexia  - moderate ISS  - a1c 9.9, poor glycemic control  - out pt Endocrine f/u

## 2020-03-26 NOTE — PROGRESS NOTE ADULT - PROBLEM SELECTOR PLAN 2
Viral pneumonia 2/2 COVID- see plan above  -Reswab in 03/31/2020, pt is from [facility]  -d/c CTX as low prob of CAP given low procal and elevated markers for COVID now with COVID + PCR    #Acute hypoxic respiratory failure- currently requiring   - supplemental O2, goal of 95% spo2 and above    #SARS-Coronavirus- COVID PCR positive   -see plan above Viral pneumonia 2/2 COVID- see plan above  -Reswab in 03/31/2020, pt is from [facility]  -d/c CTX as low prob of CAP given low procal and elevated markers for COVID now with COVID + PCR    #Acute hypoxic respiratory failure- currently requiring   - Pt requiring 3L NC for sp02 of 94-95%, wean as tolerates with sp02 goal of 95%  - OOBTC     #SARS-Coronavirus- COVID PCR positive   -see plan above

## 2020-03-26 NOTE — PROGRESS NOTE ADULT - ASSESSMENT
79 M, poor historian, with past medical history of HTN, DM2, and hypothyroidism who presents to ED BIBEMS after being found down for a fall. Found to have elevated beta hydroxy buturate likely 2/2 dehydration vs starvation ketosis. While being worked up for fall in ED, found to be febrile 101.1 and desaturated to 89% on room air. In setting of recent pandemic and CXR, swabbed for COVID 19 and admitted to [address] [place] [address]. 79 M, poor historian, with past medical history of HTN, DM2, and hypothyroidism who presents to ED BIBEMS after being found down for a fall. Found to have elevated beta hydroxy buturate likely 2/2 dehydration vs starvation ketosis. While being worked up for fall in ED, found to be febrile 101.1 and desaturated to 89% on room air. In setting of recent pandemic and CXR, swabbed for COVID 19 and found positive now being further managed on RMF.

## 2020-03-27 LAB
ALBUMIN SERPL ELPH-MCNC: 2.9 G/DL — LOW (ref 3.3–5)
ALP SERPL-CCNC: 38 U/L — LOW (ref 40–120)
ALT FLD-CCNC: 34 U/L — SIGNIFICANT CHANGE UP (ref 10–45)
ANION GAP SERPL CALC-SCNC: 15 MMOL/L — SIGNIFICANT CHANGE UP (ref 5–17)
AST SERPL-CCNC: 60 U/L — HIGH (ref 10–40)
BASOPHILS # BLD AUTO: 0 K/UL — SIGNIFICANT CHANGE UP (ref 0–0.2)
BASOPHILS NFR BLD AUTO: 0 % — SIGNIFICANT CHANGE UP (ref 0–2)
BILIRUB SERPL-MCNC: 0.5 MG/DL — SIGNIFICANT CHANGE UP (ref 0.2–1.2)
BUN SERPL-MCNC: 8 MG/DL — SIGNIFICANT CHANGE UP (ref 7–23)
CALCIUM SERPL-MCNC: 8.5 MG/DL — SIGNIFICANT CHANGE UP (ref 8.4–10.5)
CHLORIDE SERPL-SCNC: 101 MMOL/L — SIGNIFICANT CHANGE UP (ref 96–108)
CO2 SERPL-SCNC: 23 MMOL/L — SIGNIFICANT CHANGE UP (ref 22–31)
CREAT SERPL-MCNC: 0.75 MG/DL — SIGNIFICANT CHANGE UP (ref 0.5–1.3)
CRP SERPL-MCNC: 5.51 MG/DL — HIGH (ref 0–0.4)
D DIMER BLD IA.RAPID-MCNC: 262 NG/ML DDU — HIGH
EOSINOPHIL # BLD AUTO: 0 K/UL — SIGNIFICANT CHANGE UP (ref 0–0.5)
EOSINOPHIL NFR BLD AUTO: 0 % — SIGNIFICANT CHANGE UP (ref 0–6)
FERRITIN SERPL-MCNC: 1782 NG/ML — HIGH (ref 30–400)
GLUCOSE SERPL-MCNC: 82 MG/DL — SIGNIFICANT CHANGE UP (ref 70–99)
HCT VFR BLD CALC: 39.5 % — SIGNIFICANT CHANGE UP (ref 39–50)
HGB BLD-MCNC: 13.1 G/DL — SIGNIFICANT CHANGE UP (ref 13–17)
LYMPHOCYTES # BLD AUTO: 1.21 K/UL — SIGNIFICANT CHANGE UP (ref 1–3.3)
LYMPHOCYTES # BLD AUTO: 20 % — SIGNIFICANT CHANGE UP (ref 13–44)
MAGNESIUM SERPL-MCNC: 2.2 MG/DL — SIGNIFICANT CHANGE UP (ref 1.6–2.6)
MCHC RBC-ENTMCNC: 26.5 PG — LOW (ref 27–34)
MCHC RBC-ENTMCNC: 33.2 GM/DL — SIGNIFICANT CHANGE UP (ref 32–36)
MCV RBC AUTO: 79.8 FL — LOW (ref 80–100)
MONOCYTES # BLD AUTO: 0.42 K/UL — SIGNIFICANT CHANGE UP (ref 0–0.9)
MONOCYTES NFR BLD AUTO: 7 % — SIGNIFICANT CHANGE UP (ref 2–14)
NEUTROPHILS # BLD AUTO: 4.41 K/UL — SIGNIFICANT CHANGE UP (ref 1.8–7.4)
NEUTROPHILS NFR BLD AUTO: 73 % — SIGNIFICANT CHANGE UP (ref 43–77)
PHOSPHATE SERPL-MCNC: 4.3 MG/DL — SIGNIFICANT CHANGE UP (ref 2.5–4.5)
PLATELET # BLD AUTO: 287 K/UL — SIGNIFICANT CHANGE UP (ref 150–400)
POTASSIUM SERPL-MCNC: 3.5 MMOL/L — SIGNIFICANT CHANGE UP (ref 3.5–5.3)
POTASSIUM SERPL-SCNC: 3.5 MMOL/L — SIGNIFICANT CHANGE UP (ref 3.5–5.3)
PROT SERPL-MCNC: 6.4 G/DL — SIGNIFICANT CHANGE UP (ref 6–8.3)
RBC # BLD: 4.95 M/UL — SIGNIFICANT CHANGE UP (ref 4.2–5.8)
RBC # FLD: 14.4 % — SIGNIFICANT CHANGE UP (ref 10.3–14.5)
SODIUM SERPL-SCNC: 139 MMOL/L — SIGNIFICANT CHANGE UP (ref 135–145)
WBC # BLD: 6.04 K/UL — SIGNIFICANT CHANGE UP (ref 3.8–10.5)
WBC # FLD AUTO: 6.04 K/UL — SIGNIFICANT CHANGE UP (ref 3.8–10.5)

## 2020-03-27 PROCEDURE — 93010 ELECTROCARDIOGRAM REPORT: CPT

## 2020-03-27 PROCEDURE — 99291 CRITICAL CARE FIRST HOUR: CPT | Mod: 25

## 2020-03-27 RX ORDER — LATANOPROST 0.05 MG/ML
1 SOLUTION/ DROPS OPHTHALMIC; TOPICAL AT BEDTIME
Refills: 0 | Status: DISCONTINUED | OUTPATIENT
Start: 2020-03-27 | End: 2020-04-16

## 2020-03-27 RX ORDER — ATORVASTATIN CALCIUM 80 MG/1
20 TABLET, FILM COATED ORAL AT BEDTIME
Refills: 0 | Status: DISCONTINUED | OUTPATIENT
Start: 2020-03-27 | End: 2020-04-07

## 2020-03-27 RX ORDER — QUETIAPINE FUMARATE 200 MG/1
400 TABLET, FILM COATED ORAL ONCE
Refills: 0 | Status: COMPLETED | OUTPATIENT
Start: 2020-03-27 | End: 2020-03-27

## 2020-03-27 RX ORDER — ACETAMINOPHEN 500 MG
650 TABLET ORAL EVERY 6 HOURS
Refills: 0 | Status: DISCONTINUED | OUTPATIENT
Start: 2020-03-27 | End: 2020-04-16

## 2020-03-27 RX ORDER — QUETIAPINE FUMARATE 200 MG/1
800 TABLET, FILM COATED ORAL AT BEDTIME
Refills: 0 | Status: DISCONTINUED | OUTPATIENT
Start: 2020-03-27 | End: 2020-04-16

## 2020-03-27 RX ORDER — TOCILIZUMAB 20 MG/ML
400 INJECTION, SOLUTION, CONCENTRATE INTRAVENOUS ONCE
Refills: 0 | Status: COMPLETED | OUTPATIENT
Start: 2020-03-27 | End: 2020-03-27

## 2020-03-27 RX ORDER — HALOPERIDOL DECANOATE 100 MG/ML
2 INJECTION INTRAMUSCULAR
Refills: 0 | Status: DISCONTINUED | OUTPATIENT
Start: 2020-03-27 | End: 2020-03-27

## 2020-03-27 RX ORDER — BRIMONIDINE TARTRATE 2 MG/MG
1 SOLUTION/ DROPS OPHTHALMIC
Refills: 0 | Status: DISCONTINUED | OUTPATIENT
Start: 2020-03-27 | End: 2020-04-16

## 2020-03-27 RX ORDER — DEXTROSE 50 % IN WATER 50 %
25 SYRINGE (ML) INTRAVENOUS ONCE
Refills: 0 | Status: COMPLETED | OUTPATIENT
Start: 2020-03-27 | End: 2020-03-27

## 2020-03-27 RX ORDER — AZITHROMYCIN 500 MG/1
250 TABLET, FILM COATED ORAL ONCE
Refills: 0 | Status: COMPLETED | OUTPATIENT
Start: 2020-03-27 | End: 2020-03-27

## 2020-03-27 RX ORDER — DORZOLAMIDE HYDROCHLORIDE TIMOLOL MALEATE 20; 5 MG/ML; MG/ML
1 SOLUTION/ DROPS OPHTHALMIC
Refills: 0 | Status: DISCONTINUED | OUTPATIENT
Start: 2020-03-27 | End: 2020-04-16

## 2020-03-27 RX ORDER — POLYETHYLENE GLYCOL 3350 17 G/17G
17 POWDER, FOR SOLUTION ORAL DAILY
Refills: 0 | Status: DISCONTINUED | OUTPATIENT
Start: 2020-03-27 | End: 2020-04-16

## 2020-03-27 RX ORDER — POTASSIUM CHLORIDE 20 MEQ
40 PACKET (EA) ORAL ONCE
Refills: 0 | Status: COMPLETED | OUTPATIENT
Start: 2020-03-27 | End: 2020-03-27

## 2020-03-27 RX ORDER — GABAPENTIN 400 MG/1
300 CAPSULE ORAL
Refills: 0 | Status: DISCONTINUED | OUTPATIENT
Start: 2020-03-27 | End: 2020-04-16

## 2020-03-27 RX ORDER — METOPROLOL TARTRATE 50 MG
100 TABLET ORAL DAILY
Refills: 0 | Status: DISCONTINUED | OUTPATIENT
Start: 2020-03-27 | End: 2020-04-16

## 2020-03-27 RX ORDER — INSULIN GLARGINE 100 [IU]/ML
5 INJECTION, SOLUTION SUBCUTANEOUS AT BEDTIME
Refills: 0 | Status: DISCONTINUED | OUTPATIENT
Start: 2020-03-27 | End: 2020-03-29

## 2020-03-27 RX ORDER — HALOPERIDOL DECANOATE 100 MG/ML
1 INJECTION INTRAMUSCULAR ONCE
Refills: 0 | Status: COMPLETED | OUTPATIENT
Start: 2020-03-27 | End: 2020-03-27

## 2020-03-27 RX ORDER — FENOFIBRATE,MICRONIZED 130 MG
48 CAPSULE ORAL DAILY
Refills: 0 | Status: DISCONTINUED | OUTPATIENT
Start: 2020-03-27 | End: 2020-04-16

## 2020-03-27 RX ORDER — DEXTROSE 50 % IN WATER 50 %
25 SYRINGE (ML) INTRAVENOUS ONCE
Refills: 0 | Status: DISCONTINUED | OUTPATIENT
Start: 2020-03-27 | End: 2020-03-27

## 2020-03-27 RX ORDER — SENNA PLUS 8.6 MG/1
10 TABLET ORAL DAILY
Refills: 0 | Status: DISCONTINUED | OUTPATIENT
Start: 2020-03-27 | End: 2020-04-05

## 2020-03-27 RX ORDER — FAMOTIDINE 10 MG/ML
20 INJECTION INTRAVENOUS
Refills: 0 | Status: DISCONTINUED | OUTPATIENT
Start: 2020-03-27 | End: 2020-04-16

## 2020-03-27 RX ADMIN — Medication 25 MILLILITER(S): at 18:11

## 2020-03-27 RX ADMIN — Medication 1500 MILLIGRAM(S): at 23:20

## 2020-03-27 RX ADMIN — ENOXAPARIN SODIUM 40 MILLIGRAM(S): 100 INJECTION SUBCUTANEOUS at 23:20

## 2020-03-27 RX ADMIN — Medication 1500 MILLIGRAM(S): at 17:13

## 2020-03-27 RX ADMIN — Medication 650 MILLIGRAM(S): at 14:20

## 2020-03-27 RX ADMIN — Medication 650 MILLIGRAM(S): at 20:06

## 2020-03-27 RX ADMIN — ATORVASTATIN CALCIUM 20 MILLIGRAM(S): 80 TABLET, FILM COATED ORAL at 23:19

## 2020-03-27 RX ADMIN — QUETIAPINE FUMARATE 800 MILLIGRAM(S): 200 TABLET, FILM COATED ORAL at 23:47

## 2020-03-27 RX ADMIN — TOCILIZUMAB 100 MILLIGRAM(S): 20 INJECTION, SOLUTION, CONCENTRATE INTRAVENOUS at 10:33

## 2020-03-27 RX ADMIN — Medication 650 MILLIGRAM(S): at 17:08

## 2020-03-27 RX ADMIN — FAMOTIDINE 20 MILLIGRAM(S): 10 INJECTION INTRAVENOUS at 17:12

## 2020-03-27 RX ADMIN — CEFTRIAXONE 100 MILLIGRAM(S): 500 INJECTION, POWDER, FOR SOLUTION INTRAMUSCULAR; INTRAVENOUS at 01:26

## 2020-03-27 RX ADMIN — Medication 200 MILLIGRAM(S): at 06:04

## 2020-03-27 RX ADMIN — Medication 1500 MILLIGRAM(S): at 14:19

## 2020-03-27 RX ADMIN — LATANOPROST 1 DROP(S): 0.05 SOLUTION/ DROPS OPHTHALMIC; TOPICAL at 23:22

## 2020-03-27 RX ADMIN — Medication 200 MILLIGRAM(S): at 17:14

## 2020-03-27 RX ADMIN — BRIMONIDINE TARTRATE 1 DROP(S): 2 SOLUTION/ DROPS OPHTHALMIC at 17:14

## 2020-03-27 RX ADMIN — Medication 200 MILLIGRAM(S): at 23:21

## 2020-03-27 RX ADMIN — Medication 40 MILLIGRAM(S): at 17:16

## 2020-03-27 RX ADMIN — Medication 100 MILLIGRAM(S): at 14:20

## 2020-03-27 RX ADMIN — Medication 1500 MILLIGRAM(S): at 06:04

## 2020-03-27 RX ADMIN — Medication 650 MILLIGRAM(S): at 06:07

## 2020-03-27 RX ADMIN — AZITHROMYCIN 250 MILLIGRAM(S): 500 TABLET, FILM COATED ORAL at 23:21

## 2020-03-27 RX ADMIN — Medication 40 MILLIEQUIVALENT(S): at 17:12

## 2020-03-27 RX ADMIN — QUETIAPINE FUMARATE 400 MILLIGRAM(S): 200 TABLET, FILM COATED ORAL at 02:23

## 2020-03-27 RX ADMIN — Medication 75 MICROGRAM(S): at 06:04

## 2020-03-27 RX ADMIN — GABAPENTIN 300 MILLIGRAM(S): 400 CAPSULE ORAL at 17:12

## 2020-03-27 RX ADMIN — Medication 200 MILLIGRAM(S): at 14:19

## 2020-03-27 RX ADMIN — INSULIN GLARGINE 5 UNIT(S): 100 INJECTION, SOLUTION SUBCUTANEOUS at 23:21

## 2020-03-27 RX ADMIN — DORZOLAMIDE HYDROCHLORIDE TIMOLOL MALEATE 1 DROP(S): 20; 5 SOLUTION/ DROPS OPHTHALMIC at 17:17

## 2020-03-27 RX ADMIN — AMLODIPINE BESYLATE 5 MILLIGRAM(S): 2.5 TABLET ORAL at 20:04

## 2020-03-27 RX ADMIN — Medication 48 MILLIGRAM(S): at 14:19

## 2020-03-27 NOTE — PROGRESS NOTE ADULT - SUBJECTIVE AND OBJECTIVE BOX
*** INCOMPLETE ***  HOSPITAL COURSE:    OVERNIGHT EVENTS:     INTERVAL EVENTS:    SUBJECTIVE HPI: Patient seen and examined at bedside.      VITAL SIGNS:  ICU Vital Signs Last 24 Hrs  T(C): 37 (27 Mar 2020 05:55), Max: 37.8 (26 Mar 2020 14:15)  T(F): 98.6 (27 Mar 2020 05:55), Max: 100.1 (26 Mar 2020 14:15)  HR: 105 (27 Mar 2020 05:55) (84 - 105)  BP: 122/76 (27 Mar 2020 05:55) (122/76 - 173/99)  BP(mean): --  ABP: --  ABP(mean): --  RR: 20 (27 Mar 2020 05:55) (18 - 32)  SpO2: 98% (27 Mar 2020 05:55) (91% - 98%)    PHYSICAL EXAM:  General: agitated, Ill-appearing, cachectic, in distress  Neurological: AAOx3, no focal deficits  HEENT: NC/AT; EOMI, PERRL, clear conjunctiva, no nasal or oropharyngeal discharge or exudates, MMM  Neck: supple, no cervical or post-auricular lymphadenopathy  Cardiovascular: +S1/S2, no murmurs/rubs/gallops, RRR  Respiratory: CTA B/L, no diminished breath sounds, no wheezes/rales/rhonchi, no increased work of breathing or accessory muscle use  Gastrointestinal: soft, NT/ND; active BSx4 quadrants  Genitourinary: no suprapubic tenderness, no CVA tenderness  Extremities: WWP; no edema, clubbing or cyanosis  Vascular: 2+ radial, DP/PT pulses B/L  Skin: no rashes  Lines/Drains:       MEDICATIONS:  MEDICATIONS  (STANDING):  acetaminophen   Tablet .. 650 milliGRAM(s) Oral every 6 hours  amLODIPine   Tablet 5 milliGRAM(s) Oral every 24 hours  ascorbic acid 1500 milliGRAM(s) Oral four times a day  atorvastatin 80 milliGRAM(s) Oral at bedtime  azithromycin   Tablet 250 milliGRAM(s) Oral every 24 hours  dextrose 50% Injectable 12.5 Gram(s) IV Push once  dextrose 50% Injectable 25 Gram(s) IV Push once  dextrose 50% Injectable 25 Gram(s) IV Push once  enoxaparin Injectable 40 milliGRAM(s) SubCutaneous every 24 hours  famotidine    Tablet 20 milliGRAM(s) Oral daily  hydroxychloroquine 200 milliGRAM(s) Oral every 12 hours  insulin glargine Injectable (LANTUS) 10 Unit(s) SubCutaneous at bedtime  insulin lispro (HumaLOG) corrective regimen sliding scale   SubCutaneous Before meals and at bedtime  levothyroxine 75 MICROGram(s) Oral daily  thiamine 200 milliGRAM(s) Oral <User Schedule>  tocilizumab IVPB 400 milliGRAM(s) IV Intermittent once    MEDICATIONS  (PRN):  dextrose 40% Gel 15 Gram(s) Oral once PRN Blood Glucose LESS THAN 70 milliGRAM(s)/deciliter  glucagon  Injectable 1 milliGRAM(s) IntraMuscular once PRN Glucose LESS THAN 70 milligrams/deciliter    ALLERGIES/INTOLERANCES:  Allergies- No Known Allergies    Intolerances    LABS:                        13.5   6.76  )-----------( 254      ( 26 Mar 2020 07:50 )             41.7     Auto Neutrophil %: 73.9 % (03-26-20 @ 07:50)  Auto Lymphocyte #: 1.47 K/uL (03-26-20 @ 07:50)      03-26    140  |  102  |  6<L>  ----------------------------<  77  3.4<L>   |  24  |  0.64    Ca    8.4      26 Mar 2020 07:50  Phos  3.4     03-26  Mg     2.0     03-26    CARDIAC MARKERS ( 26 Mar 2020 07:50 )  x     / x     / 587 U/L / x     / 1.7 ng/mL  CARDIAC MARKERS ( 25 Mar 2020 07:58 )  x     / x     / 961 U/L / x     / 1.3 ng/mL    ABG - ( 26 Mar 2020 23:55 )  pH, Arterial: 7.52  pH, Blood: x     /  pCO2: 28    /  pO2: 63    / HCO3: 22    / Base Excess: 0.4   /  SaO2: 92        CoVID-specific labs:  Ferritin, Serum: 1613 ng/mL <H> (03-26-20 @ 07:50)  C-Reactive Protein, Serum: 4.48 mg/dL <H> (03-26-20 @ 07:50)  D-Dimer Assay, Quantitative: 254 ng/mL DDU <H> (03-26-20 @ 07:50)    RADIOLOGY, EKG AND ADDITIONAL TESTS: Reviewed.  CoVID [facility] labs:  T Cell Subsets:  ABS CD3: 621 /uL (03-24-20 @ 11:57)  ABS CD4: 474 /uL (03-24-20 @ 11:57)  ABS CD8: 126 /uL (03-24-20 @ 11:57) HOSPITAL COURSE: Pt is a 80 y/o M with PMH of HTM, DM, and hypothyroidism found down in his apartment s/p fall and was brought to the ED presenting in starvation ketosis and febrile and desatting on room air to 89%. Found to have elevated blood glucose to >300s and elevated beta hydroxy buturate, suspected starvation ketosis. Swabbed for COVID, placed on nasal cannula and transferred to [facility]. Transferred to [facility] secondary to delirium and desatting overnight on 3/26    OVERNIGHT EVENTS: patient delirious and desaturating on nasal cannula. Switched to non-rebreather, which patient kept removing. Was given haldol 5mg IV & seroquel 400mg PO with mild relief. Transferred to [facility] for closer respiratory monitoring.     SUBJECTIVE HPI: Patient seen and examined at bedside.    VITAL SIGNS:  ICU Vital Signs Last 24 Hrs  T(C): 37 (27 Mar 2020 05:55), Max: 37.8 (26 Mar 2020 14:15)  T(F): 98.6 (27 Mar 2020 05:55), Max: 100.1 (26 Mar 2020 14:15)  HR: 105 (27 Mar 2020 05:55) (84 - 105)  BP: 122/76 (27 Mar 2020 05:55) (122/76 - 173/99)  BP(mean): --  ABP: --  ABP(mean): --  RR: 20 (27 Mar 2020 05:55) (18 - 32)  SpO2: 98% (27 Mar 2020 05:55) (91% - 98%)    PHYSICAL EXAM:  General: agitated, Ill-appearing, cachectic, in distress  Neurological: AAOx3, no focal deficits  HEENT: NC/AT; EOMI, PERRL, clear conjunctiva, no nasal or oropharyngeal discharge or exudates, MMM  Neck: supple, no cervical or post-auricular lymphadenopathy  Cardiovascular: +S1/S2, no murmurs/rubs/gallops, RRR  Respiratory: CTA B/L, no diminished breath sounds, no wheezes/rales/rhonchi, no increased work of breathing or accessory muscle use  Gastrointestinal: soft, NT/ND; active BSx4 quadrants  Genitourinary: no suprapubic tenderness, no CVA tenderness  Extremities: WWP; no edema, clubbing or cyanosis  Vascular: 2+ radial, DP/PT pulses B/L  Skin: no rashes  Lines/Drains: no drains or canseco in place    MEDICATIONS:  MEDICATIONS  (STANDING):  acetaminophen   Tablet .. 650 milliGRAM(s) Oral every 6 hours  amLODIPine   Tablet 5 milliGRAM(s) Oral every 24 hours  ascorbic acid 1500 milliGRAM(s) Oral four times a day  atorvastatin 80 milliGRAM(s) Oral at bedtime  azithromycin   Tablet 250 milliGRAM(s) Oral every 24 hours  dextrose 50% Injectable 12.5 Gram(s) IV Push once  dextrose 50% Injectable 25 Gram(s) IV Push once  dextrose 50% Injectable 25 Gram(s) IV Push once  enoxaparin Injectable 40 milliGRAM(s) SubCutaneous every 24 hours  famotidine    Tablet 20 milliGRAM(s) Oral daily  hydroxychloroquine 200 milliGRAM(s) Oral every 12 hours  insulin glargine Injectable (LANTUS) 10 Unit(s) SubCutaneous at bedtime  insulin lispro (HumaLOG) corrective regimen sliding scale   SubCutaneous Before meals and at bedtime  levothyroxine 75 MICROGram(s) Oral daily  thiamine 200 milliGRAM(s) Oral <User Schedule>  tocilizumab IVPB 400 milliGRAM(s) IV Intermittent once    MEDICATIONS  (PRN):  dextrose 40% Gel 15 Gram(s) Oral once PRN Blood Glucose LESS THAN 70 milliGRAM(s)/deciliter  glucagon  Injectable 1 milliGRAM(s) IntraMuscular once PRN Glucose LESS THAN 70 milligrams/deciliter    ALLERGIES/INTOLERANCES:  Allergies- No Known Allergies    Intolerances    LABS:                        13.5   6.76  )-----------( 254      ( 26 Mar 2020 07:50 )             41.7     Auto Neutrophil %: 73.9 % (03-26-20 @ 07:50)  Auto Lymphocyte #: 1.47 K/uL (03-26-20 @ 07:50)      03-26    140  |  102  |  6<L>  ----------------------------<  77  3.4<L>   |  24  |  0.64    Ca    8.4      26 Mar 2020 07:50  Phos  3.4     03-26  Mg     2.0     03-26    CARDIAC MARKERS ( 26 Mar 2020 07:50 )  x     / x     / 587 U/L / x     / 1.7 ng/mL  CARDIAC MARKERS ( 25 Mar 2020 07:58 )  x     / x     / 961 U/L / x     / 1.3 ng/mL    ABG - ( 26 Mar 2020 23:55 )  pH, Arterial: 7.52  pH, Blood: x     /  pCO2: 28    /  pO2: 63    / HCO3: 22    / Base Excess: 0.4   /  SaO2: 92        CoVID-specific labs:  Ferritin, Serum: 1613 ng/mL <H> (03-26-20 @ 07:50)  C-Reactive Protein, Serum: 4.48 mg/dL <H> (03-26-20 @ 07:50)  D-Dimer Assay, Quantitative: 254 ng/mL DDU <H> (03-26-20 @ 07:50)    RADIOLOGY, EKG AND ADDITIONAL TESTS: Reviewed.  CoVID [facility] labs:  T Cell Subsets:  ABS CD3: 621 /uL (03-24-20 @ 11:57)  ABS CD4: 474 /uL (03-24-20 @ 11:57)  ABS CD8: 126 /uL (03-24-20 @ 11:57) HOSPITAL COURSE: Pt is a 80 y/o M with PMH of HTM, DM, and hypothyroidism found down in his apartment s/p fall and was brought to the ED presenting in starvation ketosis and febrile and desatting on room air to 89%. Found to have elevated blood glucose to >300s and elevated beta hydroxy buturate, suspected starvation ketosis. Swabbed for COVID, placed on nasal cannula and transferred to [facility]. Transferred to [facility] secondary to delirium and desatting overnight on 3/26. Doing well after transfer, satting 100%on non-rebreather and maintaining once switched to nasal cannula    OVERNIGHT EVENTS: patient delirious and desaturating on nasal cannula. Switched to non-rebreather, which patient kept removing. Was given haldol 5mg IV & seroquel 400mg PO with mild relief. Transferred to [facility] for closer respiratory monitoring.     SUBJECTIVE HPI: Patient seen and examined at bedside.    VITAL SIGNS:  ICU Vital Signs Last 24 Hrs  T(C): 37 (27 Mar 2020 05:55), Max: 37.8 (26 Mar 2020 14:15)  T(F): 98.6 (27 Mar 2020 05:55), Max: 100.1 (26 Mar 2020 14:15)  HR: 105 (27 Mar 2020 05:55) (84 - 105)  BP: 122/76 (27 Mar 2020 05:55) (122/76 - 173/99)  BP(mean): --  ABP: --  ABP(mean): --  RR: 20 (27 Mar 2020 05:55) (18 - 32)  SpO2: 98% (27 Mar 2020 05:55) (91% - 98%)    PHYSICAL EXAM:  General: comfortable in bed  Neurological: AAOx3, no focal deficits  HEENT: NC/AT; EOMI, PERRL, clear conjunctiva, no nasal or oropharyngeal discharge or exudates, MMM  Neck: supple, no cervical or post-auricular lymphadenopathy  Cardiovascular: +S1/S2, no murmurs/rubs/gallops, RRR  Respiratory: CTA B/L, no diminished breath sounds, no wheezes/rales/rhonchi, no increased work of breathing or accessory muscle use  Gastrointestinal: soft, NT/ND; active BSx4 quadrants  Genitourinary: no suprapubic tenderness, no CVA tenderness  Extremities: WWP; no edema, clubbing or cyanosis  Vascular: 2+ radial, DP/PT pulses B/L  Skin: no rashes  Lines/Drains: no drains or canseco in place    MEDICATIONS:  MEDICATIONS  (STANDING):  acetaminophen   Tablet .. 650 milliGRAM(s) Oral every 6 hours  amLODIPine   Tablet 5 milliGRAM(s) Oral every 24 hours  ascorbic acid 1500 milliGRAM(s) Oral four times a day  atorvastatin 80 milliGRAM(s) Oral at bedtime  azithromycin   Tablet 250 milliGRAM(s) Oral every 24 hours  dextrose 50% Injectable 12.5 Gram(s) IV Push once  dextrose 50% Injectable 25 Gram(s) IV Push once  dextrose 50% Injectable 25 Gram(s) IV Push once  enoxaparin Injectable 40 milliGRAM(s) SubCutaneous every 24 hours  famotidine    Tablet 20 milliGRAM(s) Oral daily  hydroxychloroquine 200 milliGRAM(s) Oral every 12 hours  insulin glargine Injectable (LANTUS) 10 Unit(s) SubCutaneous at bedtime  insulin lispro (HumaLOG) corrective regimen sliding scale   SubCutaneous Before meals and at bedtime  levothyroxine 75 MICROGram(s) Oral daily  thiamine 200 milliGRAM(s) Oral <User Schedule>  tocilizumab IVPB 400 milliGRAM(s) IV Intermittent once    MEDICATIONS  (PRN):  dextrose 40% Gel 15 Gram(s) Oral once PRN Blood Glucose LESS THAN 70 milliGRAM(s)/deciliter  glucagon  Injectable 1 milliGRAM(s) IntraMuscular once PRN Glucose LESS THAN 70 milligrams/deciliter    ALLERGIES/INTOLERANCES:  Allergies- No Known Allergies    Intolerances    LABS:                        13.5   6.76  )-----------( 254      ( 26 Mar 2020 07:50 )             41.7     Auto Neutrophil %: 73.9 % (03-26-20 @ 07:50)  Auto Lymphocyte #: 1.47 K/uL (03-26-20 @ 07:50)      03-26    140  |  102  |  6<L>  ----------------------------<  77  3.4<L>   |  24  |  0.64    Ca    8.4      26 Mar 2020 07:50  Phos  3.4     03-26  Mg     2.0     03-26    CARDIAC MARKERS ( 26 Mar 2020 07:50 )  x     / x     / 587 U/L / x     / 1.7 ng/mL  CARDIAC MARKERS ( 25 Mar 2020 07:58 )  x     / x     / 961 U/L / x     / 1.3 ng/mL    ABG - ( 26 Mar 2020 23:55 )  pH, Arterial: 7.52  pH, Blood: x     /  pCO2: 28    /  pO2: 63    / HCO3: 22    / Base Excess: 0.4   /  SaO2: 92        CoVID-specific labs:  Ferritin, Serum: 1613 ng/mL <H> (03-26-20 @ 07:50)  C-Reactive Protein, Serum: 4.48 mg/dL <H> (03-26-20 @ 07:50)  D-Dimer Assay, Quantitative: 254 ng/mL DDU <H> (03-26-20 @ 07:50)    RADIOLOGY, EKG AND ADDITIONAL TESTS: Reviewed.  CoVID [facility] labs:  T Cell Subsets:  ABS CD3: 621 /uL (03-24-20 @ 11:57)  ABS CD4: 474 /uL (03-24-20 @ 11:57)  ABS CD8: 126 /uL (03-24-20 @ 11:57)

## 2020-03-27 NOTE — PROGRESS NOTE ADULT - SUBJECTIVE AND OBJECTIVE BOX
Transfer from [facility] to [facility]     Hospital Course:       SUBJECTIVE: Patient seen and examined at bedside.     CONSTITUTIONAL: No weakness, fevers or chills  EYES/ENT: No visual changes;  No vertigo or throat pain   NECK: No pain or stiffness  RESPIRATORY: No cough, wheezing, hemoptysis; No shortness of breath  CARDIOVASCULAR: No chest pain or palpitations  GASTROINTESTINAL: No abdominal or epigastric pain. No nausea, vomiting, or hematemesis; No diarrhea or constipation. No melena or hematochezia.  GENITOURINARY: No dysuria, frequency or hematuria  NEUROLOGICAL: No numbness or weakness  SKIN: No itching, rashes    OBJECTIVE:    VITAL SIGNS:  ICU Vital Signs Last 24 Hrs  T(C): 36.3 (26 Mar 2020 22:37), Max: 37.8 (26 Mar 2020 14:15)  T(F): 97.4 (26 Mar 2020 22:37), Max: 100.1 (26 Mar 2020 14:15)  HR: 89 (26 Mar 2020 22:37) (78 - 89)  BP: 156/93 (26 Mar 2020 22:37) (156/93 - 173/99)  BP(mean): --  ABP: --  ABP(mean): --  RR: 32 (26 Mar 2020 22:37) (18 - 32)  SpO2: 92% (26 Mar 2020 22:37) (91% - 94%)        CAPILLARY BLOOD GLUCOSE      POCT Blood Glucose.: 89 mg/dL (26 Mar 2020 22:22)      PHYSICAL EXAM:    General: NAD  HEENT: NC/AT; PERRL, clear conjunctiva  Neck: supple  Respiratory: CTA b/l  Cardiovascular: +S1/S2; RRR  Abdomen: soft, NT/ND; +BS x4  Extremities: WWP, 2+ peripheral pulses b/l; no LE edema  Skin: normal color and turgor; no rash  Neurological:    MEDICATIONS:  MEDICATIONS  (STANDING):  acetaminophen   Tablet .. 650 milliGRAM(s) Oral every 6 hours  amLODIPine   Tablet 5 milliGRAM(s) Oral every 24 hours  ascorbic acid 1500 milliGRAM(s) Oral four times a day  atorvastatin 80 milliGRAM(s) Oral at bedtime  azithromycin   Tablet 250 milliGRAM(s) Oral every 24 hours  cefTRIAXone   IVPB 1000 milliGRAM(s) IV Intermittent every 24 hours  dextrose 50% Injectable 12.5 Gram(s) IV Push once  dextrose 50% Injectable 25 Gram(s) IV Push once  dextrose 50% Injectable 25 Gram(s) IV Push once  enoxaparin Injectable 40 milliGRAM(s) SubCutaneous every 24 hours  famotidine    Tablet 20 milliGRAM(s) Oral daily  haloperidol    Injectable 1 milliGRAM(s) IntraMuscular once  hydroxychloroquine 200 milliGRAM(s) Oral every 12 hours  insulin glargine Injectable (LANTUS) 10 Unit(s) SubCutaneous at bedtime  insulin lispro (HumaLOG) corrective regimen sliding scale   SubCutaneous Before meals and at bedtime  levothyroxine 75 MICROGram(s) Oral daily  thiamine 200 milliGRAM(s) Oral <User Schedule>    MEDICATIONS  (PRN):  dextrose 40% Gel 15 Gram(s) Oral once PRN Blood Glucose LESS THAN 70 milliGRAM(s)/deciliter  glucagon  Injectable 1 milliGRAM(s) IntraMuscular once PRN Glucose LESS THAN 70 milligrams/deciliter      ALLERGIES:  Allergies    No Known Allergies    Intolerances        LABS:                        13.5   6.76  )-----------( 254      ( 26 Mar 2020 07:50 )             41.7     03-26    140  |  102  |  6<L>  ----------------------------<  77  3.4<L>   |  24  |  0.64    Ca    8.4      26 Mar 2020 07:50  Phos  3.4     03-26  Mg     2.0     03-26            RADIOLOGY & ADDITIONAL TESTS: Reviewed. INCOMPLETE  [facility]     Hospital Course:   79 M, poor historian, with HTN, DM2, and hypothyroidism who presents to ED BIBEMS after being found down for a fall. Found to have elevated beta hydroxy buturate likely 2/2 dehydration vs starvation ketosis. While being worked up for fall in ED, found to be febrile 101.1 and desaturated to 89% on room air, COVID+. Stepped up from [facility] to MICU for noncompliance on NRB and thus, desaturations, for possible intubation.     SUBJECTIVE: Patient seen and examined at bedside.     CONSTITUTIONAL: No weakness, fevers or chills  EYES/ENT: No visual changes;  No vertigo or throat pain   NECK: No pain or stiffness  RESPIRATORY: No cough, wheezing, hemoptysis; No shortness of breath  CARDIOVASCULAR: No chest pain or palpitations  GASTROINTESTINAL: No abdominal or epigastric pain. No nausea, vomiting, or hematemesis; No diarrhea or constipation. No melena or hematochezia.  GENITOURINARY: No dysuria, frequency or hematuria  NEUROLOGICAL: No numbness or weakness  SKIN: No itching, rashes    OBJECTIVE:    VITAL SIGNS:  ICU Vital Signs Last 24 Hrs  T(C): 36.3 (26 Mar 2020 22:37), Max: 37.8 (26 Mar 2020 14:15)  T(F): 97.4 (26 Mar 2020 22:37), Max: 100.1 (26 Mar 2020 14:15)  HR: 89 (26 Mar 2020 22:37) (78 - 89)  BP: 156/93 (26 Mar 2020 22:37) (156/93 - 173/99)  BP(mean): --  ABP: --  ABP(mean): --  RR: 32 (26 Mar 2020 22:37) (18 - 32)  SpO2: 92% (26 Mar 2020 22:37) (91% - 94%)        CAPILLARY BLOOD GLUCOSE      POCT Blood Glucose.: 89 mg/dL (26 Mar 2020 22:22)      PHYSICAL EXAM:    General: NAD  HEENT: NC/AT; PERRL, clear conjunctiva  Neck: supple  Respiratory: CTA b/l  Cardiovascular: +S1/S2; RRR  Abdomen: soft, NT/ND; +BS x4  Extremities: WWP, 2+ peripheral pulses b/l; no LE edema  Skin: normal color and turgor; no rash  Neurological:    MEDICATIONS:  MEDICATIONS  (STANDING):  acetaminophen   Tablet .. 650 milliGRAM(s) Oral every 6 hours  amLODIPine   Tablet 5 milliGRAM(s) Oral every 24 hours  ascorbic acid 1500 milliGRAM(s) Oral four times a day  atorvastatin 80 milliGRAM(s) Oral at bedtime  azithromycin   Tablet 250 milliGRAM(s) Oral every 24 hours  cefTRIAXone   IVPB 1000 milliGRAM(s) IV Intermittent every 24 hours  dextrose 50% Injectable 12.5 Gram(s) IV Push once  dextrose 50% Injectable 25 Gram(s) IV Push once  dextrose 50% Injectable 25 Gram(s) IV Push once  enoxaparin Injectable 40 milliGRAM(s) SubCutaneous every 24 hours  famotidine    Tablet 20 milliGRAM(s) Oral daily  haloperidol    Injectable 1 milliGRAM(s) IntraMuscular once  hydroxychloroquine 200 milliGRAM(s) Oral every 12 hours  insulin glargine Injectable (LANTUS) 10 Unit(s) SubCutaneous at bedtime  insulin lispro (HumaLOG) corrective regimen sliding scale   SubCutaneous Before meals and at bedtime  levothyroxine 75 MICROGram(s) Oral daily  thiamine 200 milliGRAM(s) Oral <User Schedule>    MEDICATIONS  (PRN):  dextrose 40% Gel 15 Gram(s) Oral once PRN Blood Glucose LESS THAN 70 milliGRAM(s)/deciliter  glucagon  Injectable 1 milliGRAM(s) IntraMuscular once PRN Glucose LESS THAN 70 milligrams/deciliter      ALLERGIES:  Allergies    No Known Allergies    Intolerances        LABS:                        13.5   6.76  )-----------( 254      ( 26 Mar 2020 07:50 )             41.7     03-26    140  |  102  |  6<L>  ----------------------------<  77  3.4<L>   |  24  |  0.64    Ca    8.4      26 Mar 2020 07:50  Phos  3.4     03-26  Mg     2.0     03-26            RADIOLOGY & ADDITIONAL TESTS: Reviewed. INCOMPLETE  [facility]     Hospital Course:   79 M, poor historian, with past medical history of HTN, DM2, and hypothyroidism who presents to ED BIBEMS after being found down for a fall. Found to have elevated beta hydroxy buturate likely 2/2 dehydration vs starvation ketosis. While being worked up for fall in ED, found to be febrile 101.1 and desaturated to 89% on room air. Patient admitted for acute hypoxic respiratory failure and severe sepsis 2/2 to COVID 19 and PNA. Started on Ceftriaxone/Azithromycin,  plaquenil, ascorbic acid, thiamine. Initially placed on NC 3L O2 saturating at 91-93%, however overnight patient became more hypoxic. He was found to be tachypneic, in the tripod position. He was transitioned to Venturi mask then NRB 15L min saturating at 95%.  Seroquel 400mg x1 and Haldol 1mg given as patient noncompliant with supplemental O2. ABG: pH 7.52, pCO2 28, pO2 63, HCO3 22, O2 sat 92. CXR with worsening b/l opacities. ICU consulted for increased work of breathing and concern for ARDS. Patient will be transferred to MICU for further management.     SUBJECTIVE: Patient seen and examined at bedside.     OBJECTIVE:    VITAL SIGNS:  ICU Vital Signs Last 24 Hrs  T(C): 36.3 (26 Mar 2020 22:37), Max: 37.8 (26 Mar 2020 14:15)  T(F): 97.4 (26 Mar 2020 22:37), Max: 100.1 (26 Mar 2020 14:15)  HR: 89 (26 Mar 2020 22:37) (78 - 89)  BP: 156/93 (26 Mar 2020 22:37) (156/93 - 173/99)  BP(mean): --  ABP: --  ABP(mean): --  RR: 32 (26 Mar 2020 22:37) (18 - 32)  SpO2: 92% (26 Mar 2020 22:37) (91% - 94%)        CAPILLARY BLOOD GLUCOSE      POCT Blood Glucose.: 89 mg/dL (26 Mar 2020 22:22)      PHYSICAL EXAM:    General: NAD  HEENT: NC/AT; PERRL, clear conjunctiva  Neck: supple  Respiratory: CTA b/l  Cardiovascular: +S1/S2; RRR  Abdomen: soft, NT/ND; +BS x4  Extremities: WWP, 2+ peripheral pulses b/l; no LE edema  Skin: normal color and turgor; no rash  Neurological:    MEDICATIONS:  MEDICATIONS  (STANDING):  acetaminophen   Tablet .. 650 milliGRAM(s) Oral every 6 hours  amLODIPine   Tablet 5 milliGRAM(s) Oral every 24 hours  ascorbic acid 1500 milliGRAM(s) Oral four times a day  atorvastatin 80 milliGRAM(s) Oral at bedtime  azithromycin   Tablet 250 milliGRAM(s) Oral every 24 hours  cefTRIAXone   IVPB 1000 milliGRAM(s) IV Intermittent every 24 hours  dextrose 50% Injectable 12.5 Gram(s) IV Push once  dextrose 50% Injectable 25 Gram(s) IV Push once  dextrose 50% Injectable 25 Gram(s) IV Push once  enoxaparin Injectable 40 milliGRAM(s) SubCutaneous every 24 hours  famotidine    Tablet 20 milliGRAM(s) Oral daily  haloperidol    Injectable 1 milliGRAM(s) IntraMuscular once  hydroxychloroquine 200 milliGRAM(s) Oral every 12 hours  insulin glargine Injectable (LANTUS) 10 Unit(s) SubCutaneous at bedtime  insulin lispro (HumaLOG) corrective regimen sliding scale   SubCutaneous Before meals and at bedtime  levothyroxine 75 MICROGram(s) Oral daily  thiamine 200 milliGRAM(s) Oral <User Schedule>    MEDICATIONS  (PRN):  dextrose 40% Gel 15 Gram(s) Oral once PRN Blood Glucose LESS THAN 70 milliGRAM(s)/deciliter  glucagon  Injectable 1 milliGRAM(s) IntraMuscular once PRN Glucose LESS THAN 70 milligrams/deciliter      ALLERGIES:  Allergies    No Known Allergies    Intolerances        LABS:                        13.5   6.76  )-----------( 254      ( 26 Mar 2020 07:50 )             41.7     03-26    140  |  102  |  6<L>  ----------------------------<  77  3.4<L>   |  24  |  0.64    Ca    8.4      26 Mar 2020 07:50  Phos  3.4     03-26  Mg     2.0     03-26            RADIOLOGY & ADDITIONAL TESTS: Reviewed. INCOMPLETE  Transfer from [facility] to MICU     Hospital Course:   79 M, poor historian, with past medical history of HTN, DM2, and hypothyroidism who presents to ED BIBEMS after being found down for a fall. Found to have elevated beta hydroxy buturate likely 2/2 dehydration vs starvation ketosis. While being worked up for fall in ED, found to be febrile 101.1 and desaturated to 89% on room air. Patient admitted for acute hypoxic respiratory failure and severe sepsis 2/2 to COVID 19 and PNA. Started on Ceftriaxone/Azithromycin,  plaquenil, ascorbic acid, thiamine. Initially placed on NC 3L O2 saturating at 91-93%, however overnight patient became more hypoxic. He was found to be tachypneic, in the tripod position. He was transitioned to Venturi mask then NRB 15L min saturating at 95%.  Seroquel 400mg x1 and Haldol 1mg given as patient noncompliant with supplemental O2. ABG: pH 7.52, pCO2 28, pO2 63, HCO3 22, O2 sat 92. CXR with worsening b/l opacities. ICU consulted for increased work of breathing and concern for ARDS. Patient will be transferred to MICU for further management.     SUBJECTIVE:    OBJECTIVE:    VITAL SIGNS:  ICU Vital Signs Last 24 Hrs  T(C): 36.3 (26 Mar 2020 22:37), Max: 37.8 (26 Mar 2020 14:15)  T(F): 97.4 (26 Mar 2020 22:37), Max: 100.1 (26 Mar 2020 14:15)  HR: 89 (26 Mar 2020 22:37) (78 - 89)  BP: 156/93 (26 Mar 2020 22:37) (156/93 - 173/99)  BP(mean): --  ABP: --  ABP(mean): --  RR: 32 (26 Mar 2020 22:37) (18 - 32)  SpO2: 92% (26 Mar 2020 22:37) (91% - 94%)        CAPILLARY BLOOD GLUCOSE      POCT Blood Glucose.: 89 mg/dL (26 Mar 2020 22:22)      PHYSICAL EXAM:    General: tachypneic,   HEENT: NC/AT; PERRL, clear conjunctiva  Neck: supple  Respiratory: bibilasilar crackles and poor inspiratory effort  Cardiovascular: +S1/S2; RRR  Abdomen: soft, NT/ND; +BS x4  Extremities: WWP, 2+ peripheral pulses b/l; no LE edema  Skin: normal color and turgor; no rash  Neurological: AOx3    MEDICATIONS:  MEDICATIONS  (STANDING):  acetaminophen   Tablet .. 650 milliGRAM(s) Oral every 6 hours  amLODIPine   Tablet 5 milliGRAM(s) Oral every 24 hours  ascorbic acid 1500 milliGRAM(s) Oral four times a day  atorvastatin 80 milliGRAM(s) Oral at bedtime  azithromycin   Tablet 250 milliGRAM(s) Oral every 24 hours  cefTRIAXone   IVPB 1000 milliGRAM(s) IV Intermittent every 24 hours  dextrose 50% Injectable 12.5 Gram(s) IV Push once  dextrose 50% Injectable 25 Gram(s) IV Push once  dextrose 50% Injectable 25 Gram(s) IV Push once  enoxaparin Injectable 40 milliGRAM(s) SubCutaneous every 24 hours  famotidine    Tablet 20 milliGRAM(s) Oral daily  haloperidol    Injectable 1 milliGRAM(s) IntraMuscular once  hydroxychloroquine 200 milliGRAM(s) Oral every 12 hours  insulin glargine Injectable (LANTUS) 10 Unit(s) SubCutaneous at bedtime  insulin lispro (HumaLOG) corrective regimen sliding scale   SubCutaneous Before meals and at bedtime  levothyroxine 75 MICROGram(s) Oral daily  thiamine 200 milliGRAM(s) Oral <User Schedule>    MEDICATIONS  (PRN):  dextrose 40% Gel 15 Gram(s) Oral once PRN Blood Glucose LESS THAN 70 milliGRAM(s)/deciliter  glucagon  Injectable 1 milliGRAM(s) IntraMuscular once PRN Glucose LESS THAN 70 milligrams/deciliter      ALLERGIES:  Allergies    No Known Allergies    Intolerances        LABS:                        13.5   6.76  )-----------( 254      ( 26 Mar 2020 07:50 )             41.7     03-26    140  |  102  |  6<L>  ----------------------------<  77  3.4<L>   |  24  |  0.64    Ca    8.4      26 Mar 2020 07:50  Phos  3.4     03-26  Mg     2.0     03-26            RADIOLOGY & ADDITIONAL TESTS: Reviewed. INCOMPLETE  Transfer from [facility] to MICU     Hospital Course:   79 M, poor historian, with past medical history of HTN, DM2, and hypothyroidism who presents to ED BIBEMS after being found down for a fall. Found to have elevated beta hydroxy buturate likely 2/2 dehydration vs starvation ketosis. While being worked up for fall in ED, found to be febrile 101.1 and desaturated to 89% on room air. Patient admitted for acute hypoxic respiratory failure and severe sepsis 2/2 to COVID 19 and PNA. Started on Ceftriaxone/Azithromycin,  plaquenil, ascorbic acid, thiamine. Initially placed on NC 3L O2 saturating at 91-93%, however overnight patient became more hypoxic. He was found to be tachypneic, in the tripod position. He was transitioned to Venturi mask then NRB 15L min saturating at 95%.  Seroquel 400mg x1 and Haldol 1mg given as patient noncompliant with supplemental O2. ABG: pH 7.52, pCO2 28, pO2 63, HCO3 22, O2 sat 92. CXR with worsening b/l opacities. ICU consulted for increased work of breathing and concern for ARDS. Patient will be transferred to MICU for further management.     SUBJECTIVE:    OBJECTIVE:    VITAL SIGNS:  ICU Vital Signs Last 24 Hrs  T(C): 36.3 (26 Mar 2020 22:37), Max: 37.8 (26 Mar 2020 14:15)  T(F): 97.4 (26 Mar 2020 22:37), Max: 100.1 (26 Mar 2020 14:15)  HR: 89 (26 Mar 2020 22:37) (78 - 89)  BP: 156/93 (26 Mar 2020 22:37) (156/93 - 173/99)  BP(mean): --  ABP: --  ABP(mean): --  RR: 32 (26 Mar 2020 22:37) (18 - 32)  SpO2: 92% (26 Mar 2020 22:37) (91% - 94%)        CAPILLARY BLOOD GLUCOSE      POCT Blood Glucose.: 89 mg/dL (26 Mar 2020 22:22)      PHYSICAL EXAM:    General: tachypneic, increased work of breathing, AOx1-2, on NRB  HEENT: NC/AT; PERRL, clear conjunctiva  Neck: supple  Respiratory: bibilasilar crackles and poor inspiratory effort, tachypneic, using accessory muscles and belly breathing   Neurological: AOx1-2    MEDICATIONS:  MEDICATIONS  (STANDING):  acetaminophen   Tablet .. 650 milliGRAM(s) Oral every 6 hours  amLODIPine   Tablet 5 milliGRAM(s) Oral every 24 hours  ascorbic acid 1500 milliGRAM(s) Oral four times a day  atorvastatin 80 milliGRAM(s) Oral at bedtime  azithromycin   Tablet 250 milliGRAM(s) Oral every 24 hours  cefTRIAXone   IVPB 1000 milliGRAM(s) IV Intermittent every 24 hours  dextrose 50% Injectable 12.5 Gram(s) IV Push once  dextrose 50% Injectable 25 Gram(s) IV Push once  dextrose 50% Injectable 25 Gram(s) IV Push once  enoxaparin Injectable 40 milliGRAM(s) SubCutaneous every 24 hours  famotidine    Tablet 20 milliGRAM(s) Oral daily  haloperidol    Injectable 1 milliGRAM(s) IntraMuscular once  hydroxychloroquine 200 milliGRAM(s) Oral every 12 hours  insulin glargine Injectable (LANTUS) 10 Unit(s) SubCutaneous at bedtime  insulin lispro (HumaLOG) corrective regimen sliding scale   SubCutaneous Before meals and at bedtime  levothyroxine 75 MICROGram(s) Oral daily  thiamine 200 milliGRAM(s) Oral <User Schedule>    MEDICATIONS  (PRN):  dextrose 40% Gel 15 Gram(s) Oral once PRN Blood Glucose LESS THAN 70 milliGRAM(s)/deciliter  glucagon  Injectable 1 milliGRAM(s) IntraMuscular once PRN Glucose LESS THAN 70 milligrams/deciliter      ALLERGIES:  Allergies    No Known Allergies    Intolerances        LABS:                        13.5   6.76  )-----------( 254      ( 26 Mar 2020 07:50 )             41.7     03-26    140  |  102  |  6<L>  ----------------------------<  77  3.4<L>   |  24  |  0.64    Ca    8.4      26 Mar 2020 07:50  Phos  3.4     03-26  Mg     2.0     03-26            RADIOLOGY & ADDITIONAL TESTS: Reviewed. Transfer from [facility] to MICU     Hospital Course:   79 M, poor historian, with past medical history of HTN, DM2, and hypothyroidism who presents to ED BIBEMS after being found down for a fall. Found to have elevated beta hydroxy buturate likely 2/2 dehydration vs starvation ketosis. While being worked up for fall in ED, found to be febrile 101.1 and desaturated to 89% on room air. Patient admitted for acute hypoxic respiratory failure and severe sepsis 2/2 to COVID 19 and PNA. Started on Ceftriaxone/Azithromycin,  plaquenil, ascorbic acid, thiamine. Initially placed on NC 3L O2 saturating at 91-93%, however overnight patient became more hypoxic. He was found to be tachypneic, in the tripod position. He was transitioned to Venturi mask then NRB 15L min saturating at 95%.  Seroquel 400mg x1 and Haldol 1mg given as patient noncompliant with supplemental O2. ABG: pH 7.52, pCO2 28, pO2 63, HCO3 22, O2 sat 92. CXR with worsening b/l opacities. ICU consulted for increased work of breathing and concern for ARDS. Patient will be transferred to MICU for further management.     SUBJECTIVE:    OBJECTIVE:    VITAL SIGNS:  ICU Vital Signs Last 24 Hrs  T(C): 36.3 (26 Mar 2020 22:37), Max: 37.8 (26 Mar 2020 14:15)  T(F): 97.4 (26 Mar 2020 22:37), Max: 100.1 (26 Mar 2020 14:15)  HR: 89 (26 Mar 2020 22:37) (78 - 89)  BP: 156/93 (26 Mar 2020 22:37) (156/93 - 173/99)  BP(mean): --  ABP: --  ABP(mean): --  RR: 32 (26 Mar 2020 22:37) (18 - 32)  SpO2: 92% (26 Mar 2020 22:37) (91% - 94%)        CAPILLARY BLOOD GLUCOSE      POCT Blood Glucose.: 89 mg/dL (26 Mar 2020 22:22)      PHYSICAL EXAM:    General: tachypneic, increased work of breathing, AOx1-2, on NRB  HEENT: NC/AT; PERRL, clear conjunctiva  Neck: supple  Respiratory: bibilasilar crackles and poor inspiratory effort, tachypneic, using accessory muscles and belly breathing   Neurological: AOx1-2    MEDICATIONS:  MEDICATIONS  (STANDING):  acetaminophen   Tablet .. 650 milliGRAM(s) Oral every 6 hours  amLODIPine   Tablet 5 milliGRAM(s) Oral every 24 hours  ascorbic acid 1500 milliGRAM(s) Oral four times a day  atorvastatin 80 milliGRAM(s) Oral at bedtime  azithromycin   Tablet 250 milliGRAM(s) Oral every 24 hours  cefTRIAXone   IVPB 1000 milliGRAM(s) IV Intermittent every 24 hours  dextrose 50% Injectable 12.5 Gram(s) IV Push once  dextrose 50% Injectable 25 Gram(s) IV Push once  dextrose 50% Injectable 25 Gram(s) IV Push once  enoxaparin Injectable 40 milliGRAM(s) SubCutaneous every 24 hours  famotidine    Tablet 20 milliGRAM(s) Oral daily  haloperidol    Injectable 1 milliGRAM(s) IntraMuscular once  hydroxychloroquine 200 milliGRAM(s) Oral every 12 hours  insulin glargine Injectable (LANTUS) 10 Unit(s) SubCutaneous at bedtime  insulin lispro (HumaLOG) corrective regimen sliding scale   SubCutaneous Before meals and at bedtime  levothyroxine 75 MICROGram(s) Oral daily  thiamine 200 milliGRAM(s) Oral <User Schedule>    MEDICATIONS  (PRN):  dextrose 40% Gel 15 Gram(s) Oral once PRN Blood Glucose LESS THAN 70 milliGRAM(s)/deciliter  glucagon  Injectable 1 milliGRAM(s) IntraMuscular once PRN Glucose LESS THAN 70 milligrams/deciliter      ALLERGIES:  Allergies    No Known Allergies    Intolerances        LABS:                        13.5   6.76  )-----------( 254      ( 26 Mar 2020 07:50 )             41.7     03-26    140  |  102  |  6<L>  ----------------------------<  77  3.4<L>   |  24  |  0.64    Ca    8.4      26 Mar 2020 07:50  Phos  3.4     03-26  Mg     2.0     03-26            RADIOLOGY & ADDITIONAL TESTS: Reviewed.

## 2020-03-27 NOTE — PROGRESS NOTE ADULT - ASSESSMENT
Pt is a 80 y/o M with PMH of HTM, DM, and hypothyroidism found down in his apartment s/p fall and was brought to the ED presenting in starvation ketosis and febrile and desatting on room air to 89%.    Neurology: AAOx3  - delirium overnight : given Haldol 5mg IV and Seroquel 400mg PO  	    Pulmonary:  #COVID+  Pt presented to ED s/p being found down for a fall but found to be febrile in the ED with desaturating to 89% on room air  - Continue to trend daily LFTs, ferritin, CRP, D-dimer  - Tocilizumab IV 400mg given today  - Azithromycin started 3/23  - Plaquenil started 3/24  - Thiamine & Vit C switched to PO  - Completed course of ceftriaxone for suspected pneumonia  - Tylenol PRN for fever, avoid NSAIDs  - Currently on non-rebreather     Cardiovascular:  #HLD   - continue home atorvastatin    Heme:     Gastrointestinal:  -starvation ketosis on admission    ID:   -finished course of ceftriazone for penumonia    Renal:   Cr: 0.64    Musculoskeletal: Not active    Endocrine:  h/o DM2  - insuline sliding scale   h/o hypothyroidism  - continue home synthroid     F: None  E: Replete PRN to K>4, Mg>2  N: Enteral feeds via NGT  PPx: famotidine  DVT: LVX 40mg QD  Code: FULL CODE  Dispo:  MICU Pt is a 80 y/o M with PMH of HTM, DM, and hypothyroidism found down in his apartment s/p fall and was brought to the ED presenting in starvation ketosis and febrile and desatting on room air to 89%.    Neurology: AAOx3  - delirium overnight : given Haldol 5mg IV and Seroquel 400mg PO  	    Pulmonary:  #COVID+  Pt presented to ED s/p being found down for a fall but found to be febrile in the ED with desaturating to 89% on room air  - Continue to trend daily LFTs, ferritin, CRP, D-dimer  - Tocilizumab IV 400mg given today  - Azithromycin started 3/23  - Plaquenil started 3/24  - Thiamine & Vit C switched to PO  - Completed course of ceftriaxone for suspected pneumonia  - Tylenol PRN for fever, avoid NSAIDs  - Currently on non-rebreather     Cardiovascular:  #HLD   - continue home     Heme:     Gastrointestinal:  -starvation ketosis on admission    ID:   -finished course of ceftriazone for penumonia    Renal:   Cr: 0.64    Musculoskeletal: Not active    Endocrine:  h/o DM2  - insuline sliding scale   h/o hypothyroidism  - continue home synthroid     F: None  E: Replete PRN to K>4, Mg>2  N: Enteral feeds via NGT  PPx: famotidine  DVT: LVX 40mg QD  Code: FULL CODE  Dispo:  MICU Pt is a 80 y/o M with PMH of HTM, DM, and hypothyroidism found down in his apartment s/p fall and was brought to the ED presenting in starvation ketosis and febrile and desatting on room air to 89%.    Neurology: AAOx3  - delirium overnight : given Haldol 5mg IV and Seroquel 400mg PO  - on seroquel 800mg qhs at home --> started home dose today    Pulmonary:  #COVID+  Pt presented to ED s/p being found down for a fall but found to be febrile in the ED with desaturating to 89% on room air  - Continue to trend daily LFTs, ferritin, CRP, D-dimer  - Tocilizumab IV 400mg given today  - Azithromycin started 3/23  - Plaquenil started 3/24  - Thiamine & Vit C switched to PO  - Completed course of ceftriaxone for suspected pneumonia  - Tylenol PRN for fever, avoid NSAIDs  - Currently on non-rebreather --> rotated to nasal cannula    Cardiovascular:  #HLD/HTN  - continie atovastatin & amlodipine & fenofibrate  - continue metoprolol ER 100mg ONCE daily   - takes ASA 81mg QD at home    Gastrointestinal:  -starvation ketosis on admission    ID:   -finished course of ceftriazone for penumonia    Renal:   Cr: 0.64    Musculoskeletal: Not active    Endocrine:  h/o DM2  - insuline sliding scale   - at home: on metformin 850mg BID & glipizide 2mg QD  h/o hypothyroidism  - continue home synthroid     F: None  E: Replete PRN to K>4, Mg>2  N: Enteral feeds via NGT  PPx: famotidine  DVT: LVX 40mg QD  Code: FULL CODE  Dispo:  MICU

## 2020-03-27 NOTE — PROGRESS NOTE ADULT - ASSESSMENT
79 M, poor historian, with past medical history of HTN, DM2, and hypothyroidism who presents to ED BIBEMS after being found down for a fall. Found to have elevated beta hydroxy buturate likely 2/2 dehydration vs starvation ketosis. While being worked up for fall in ED, found to be febrile 101.1 and desaturated to 89% on room air, COVID+. Stepped up from [facility] to MICU for noncompliance on NRB and thus, desaturations, possible intubation.     Neurology:   No active issues    Pulmonary:  #Sepsis 2/2 COVID+  - on arrival meeting 3/4 SIRS criteria (T 101/9F, , RR 24) with pulmonary source in setting of known COVID status  - WBC 7.8 with 87% neutrophils,  5.2% lymphocytes, lymphopenic to 410, lactate 1.3  - ferritin elevated to 1142, CRP elevated   - s/p 1L NS bolus in the ED  - hold further abx at present in setting of known COVID status  - UA negative, f/u UCx  - f/u BCx    #Hypoxic respiratory failure 2/2 ARDS 2/2 COVID+  - CXR on arrival with BL multilobar interstitial opacities c/w ARDS 2/2 COVID+  - O2sat 96% RA initially -->91% -->6L NC 96% with increasing work of breathing even on NRB  - will start hydroxychloroquine 400mg q12h x2 doses then 200mg q12h x8 doses (total 5 days)   - s/p IV tociluzumab 700mg x1  - azithromycin 250mg daily x14 days for anti-inflammatory effect(end 04/08)  - IV ascorbic acid 1500mg daily and IV thiamine 200mg daily   - tylenol 650mg q6h standing   - VBG on arrival: pH 7.39, pCO2 43, pO2 20  - repeat ABG post-intubation   - continue to trend ABG with vent adjustments  - daily CBC/CMP/Mg/Phos/CRP  - q3d labs: ESR, Tcell subset, d-dimer, LDH, ferritin, CK, trop, coags  - COVID isolation protocol      Cardiovascular:      #HTN   - holding home lisinopril 2.5mg daily in setting of sepsis     #HLD   - continue home simvastatin 20mg daily     Gastrointestinal:  #Prophylaxis  - famotidine 20mg daily     Genitourinary:  #monitoring urine output in critically ill   - canseco placed 3/25  - continue to monitor strict I&O  - UA neg  - Cr 1.11    ID:  #Sepsis 2/2 COVID+  - as above  - UA neg  - f/u BCx, UCx    Endocrine:   #DM2   - mISS  - hold home metformin     F: none  E: Replete K<4, Mg<2.5  N: NPO   G: none  DVT: lovenox 40mg subQ q24h    Code: FULL CODE    Dispo: Patient requires continued monitoring in MICU 79 M, poor historian, with HTN, DM2, and hypothyroidism who presents to ED BIBEMS after being found down for a fall. Found to have elevated beta hydroxy buturate likely 2/2 dehydration vs starvation ketosis. While being worked up for fall in ED, found to be febrile 101.1 and desaturated to 89% on room air, COVID+. Stepped up from [facility] to MICU for noncompliance on NRB and thus, desaturations, for possible intubation.       Pulmonary:  #severe sepsis 2/2 COVID+  - on arrival meeting 2/4 SIRS criteria (T 101.1F, HR 98) with pulmonary source --COVID+  - RR 20 and saturating 89% on RA  - WBC 5.15 with 76% neutrophils,  10.7% lymphocytes, lymphopenic to 550, lactate 2.3  - ferritin elevated to 1613, CRP elevated to 4.48, procalcitonin 0.15   - s/p 4L NS bolus in the ED  - hold further abx at present in setting of known COVID status  - UA negative, f/u UCx  - BCx NGTD    #Hypoxic respiratory failure 2/2 ARDS 2/2 COVID+  - CXR on arrival with BL multilobar interstitial opacities c/w ARDS 2/2 COVID+, now worsening  - O2sat 95% RA initially -->89% -->4L NC 92-94%; pt placed on NRB, however noncompliant and tenuous respiratory status; may need to be intubated  - s/p 4 doses of IV CTX 1g, d/c'ed due to no leukocytosis in setting of COVID+  - s/p hydroxychloroquine 400mg q12h x2 doses   - continue hydroxychloroquine 200mg q12h x 8 doses (total 5 days, end 3/28)  - azithromycin 250mg daily x5 days for anti-inflammatory effect (end 3/27)   - IV ascorbic acid 1500mg daily and IV thiamine 200mg daily x5 days  - tylenol 650mg q6h standing   - ABG before transfer: pH 7.52, pCO2 28, pO2 63, O2 sat 92%  - daily CBC/CMP/Mg/Phos/CRP  - q3d labs: ESR, Tcell subset, d-dimer, LDH, ferritin, CK, trop, coags  - COVID isolation protocol      Cardiovascular:  #HTN   - continue home amlodipine 5mg daily    #HLD   - continue home atorvastatin 20mg daily     Gastrointestinal:  #Prophylaxis  - famotidine 20mg daily     Genitourinary:  #monitoring urine output in critically ill   - canseco placed 3/25  - continue to monitor strict I&O  - UA neg  - Cr 1.11    ID:  #Sepsis 2/2 COVID+  - as above  - UA neg  - f/u BCx, UCx    Endocrine:   #DM2   Unknown last Hgb A1c. Previously recorded to be on Januvia and glucophage. No diabetes meds at [facility], however may use multiple pharmacies. Poor historian. Hgb A1c 9.9 this admission   - continue lantus 10U qhs   - mISS    #hypothyroidism   TSH wnl  - continue home synthroid 75mcg daily     Neurology:   #weakness   Pt presenting with weakness. CT head negative. Labs on admission most consistent with dehydration in setting of sepsis. Sepsis likely 2/2 COVID infection   - continue to monitor     F: none  E: Replete K<4, Mg<2.5  N: NPO   G: famotidine 20mg daily  DVT: lovenox 40mg subQ q24h    Code: FULL CODE    Dispo: Patient requires continued monitoring in MICU 79 M, poor historian, with HTN, DM2, and hypothyroidism who presents to ED BIBEMS after being found down for a fall. Found to have elevated beta hydroxy buturate likely 2/2 dehydration vs starvation ketosis. While being worked up for fall in ED, found to be febrile 101.1 and desaturated to 89% on room air, COVID+. Stepped up from [facility] to MICU for noncompliance on NRB and thus, desaturations, for possible intubation.       Pulmonary:  #severe sepsis 2/2 COVID+  - on arrival meeting 2/4 SIRS criteria (T 101.1F, HR 98) with pulmonary source --COVID+  - RR 20 and saturating 89% on RA  - WBC 5.15 with 76% neutrophils,  10.7% lymphocytes, lymphopenic to 550, lactate 2.3  - ferritin elevated to 1613, CRP elevated to 4.48, procalcitonin 0.15   - s/p 4L NS bolus in the ED  - hold further abx at present in setting of known COVID status  - UA negative, f/u UCx  - BCx NGTD    #Hypoxic respiratory failure 2/2 ARDS 2/2 COVID+  - CXR on arrival with BL multilobar interstitial opacities c/w ARDS 2/2 COVID+, now worsening  - O2sat 95% RA initially -->89% -->4L NC 92-94%; pt placed on NRB, however noncompliant and tenuous respiratory status; may need to be intubated  - s/p 4 doses of IV CTX 1g, d/c'ed due to no leukocytosis in setting of COVID+  - will dose for IV tocolizumab 400mg x1   - s/p hydroxychloroquine 400mg q12h x2 doses   - continue hydroxychloroquine 200mg q12h x 8 doses (total 5 days, end 3/28)  - azithromycin 250mg daily x5 days for anti-inflammatory effect (end 3/27)   - IV ascorbic acid 1500mg daily and IV thiamine 200mg daily x5 days  - tylenol 650mg q6h standing   - ABG before transfer: pH 7.52, pCO2 28, pO2 63, O2 sat 92%  - daily CBC/CMP/Mg/Phos/CRP  - q3d labs: ESR, Tcell subset, d-dimer, LDH, ferritin, CK, trop, coags  - COVID isolation protocol      Cardiovascular:  #HTN   - continue home amlodipine 5mg daily    #HLD   - continue home atorvastatin 20mg daily     Gastrointestinal:  #Prophylaxis  - famotidine 20mg daily     Genitourinary:  #monitoring urine output in critically ill   - canseco placed 3/25  - continue to monitor strict I&O  - UA neg  - Cr 1.11    ID:  #Sepsis 2/2 COVID+  - as above  - UA neg  - f/u BCx, UCx    Endocrine:   #DM2   Unknown last Hgb A1c. Previously recorded to be on Januvia and glucophage. No diabetes meds at [facility], however may use multiple pharmacies. Poor historian. Hgb A1c 9.9 this admission   - continue lantus 10U qhs   - mISS    #hypothyroidism   TSH wnl  - continue home synthroid 75mcg daily     Neurology:   #weakness   Pt presenting with weakness. CT head negative. Labs on admission most consistent with dehydration in setting of sepsis. Sepsis likely 2/2 COVID infection   - continue to monitor     F: none  E: Replete K<4, Mg<2.5  N: NPO   G: famotidine 20mg daily  DVT: lovenox 40mg subQ q24h    Code: FULL CODE    Dispo: Patient requires continued monitoring in MICU 79 M, poor historian, with HTN, DM2, and hypothyroidism who presents to ED BIBEMS after being found down for a fall. Found to have elevated beta hydroxy buturate likely 2/2 dehydration vs starvation ketosis. While being worked up for fall in ED, found to be febrile 101.1 and desaturated to 89% on room air, COVID+. Stepped up from [facility] to MICU for noncompliance on NRB and thus, desaturations, for possible intubation.       Pulmonary:  #severe sepsis 2/2 COVID+  - on arrival meeting 2/4 SIRS criteria (T 101.1F, HR 98) with pulmonary source --COVID+  - RR 20 and saturating 89% on RA  - WBC 5.15 with 76% neutrophils,  10.7% lymphocytes, lymphopenic to 550, lactate 2.3  - ferritin elevated to 1613, CRP elevated to 4.48, procalcitonin 0.15   - s/p 4L NS bolus in the ED  - hold further abx at present in setting of known COVID status  - UA negative, f/u UCx  - BCx NGTD    #Hypoxic respiratory failure 2/2 ARDS 2/2 COVID+  - CXR on arrival with BL multilobar interstitial opacities c/w ARDS 2/2 COVID+, now worsening  - O2sat 95% RA initially -->89% -->4L NC 92-94%; overnight, pt tachypneic and found to be tripoding, placed initially on venturi mask, then 15L NRB, however noncompliant and tenuous respiratory status; transferred to MICU early AM for possible intubation   - s/p 4 doses of IV CTX 1g, d/c'ed due to no leukocytosis in setting of COVID+  - will dose for IV tocolizumab 400mg x1   - s/p hydroxychloroquine 400mg q12h x2 doses   - continue hydroxychloroquine 200mg q12h x 8 doses (total 5 days, end 3/28)  - azithromycin 250mg daily x5 days for anti-inflammatory effect (end 3/27)   - IV ascorbic acid 1500mg daily and IV thiamine 200mg daily x5 days  - tylenol 650mg q6h standing   - ABG before transfer: pH 7.52, pCO2 28, pO2 63, O2 sat 92%  - daily CBC/CMP/Mg/Phos/CRP  - q3d labs: ESR, Tcell subset, d-dimer, LDH, ferritin, CK, trop, coags  - COVID isolation protocol      Cardiovascular:  #HTN   - continue home amlodipine 5mg daily    #HLD   - continue home atorvastatin 20mg daily     Gastrointestinal:  #Prophylaxis  - famotidine 20mg daily     Genitourinary:  #monitoring urine output in critically ill   - continue to monitor strict I&O  - UA neg  - Cr 0.64    ID:  #Sepsis 2/2 COVID+  - as above  - UA neg  - Bcx NGTD    Endocrine:   #DM2   Unknown last Hgb A1c. Previously recorded to be on Januvia and glucophage. No diabetes meds at [facility], however may use multiple pharmacies. Poor historian. Hgb A1c 9.9 this admission   - continue lantus 10U qhs   - mISS    #hypothyroidism   TSH wnl  - continue home synthroid 75mcg daily     Neurology:   #weakness   Pt presenting with weakness. CT head negative. Labs on admission most consistent with dehydration in setting of sepsis. Sepsis likely 2/2 COVID infection   - continue to monitor     F: none  E: Replete K<4, Mg<2.5  N: NPO   G: famotidine 20mg daily  DVT: lovenox 40mg subQ q24h    Code: FULL CODE    Dispo: Patient requires continued monitoring in MICU 79 M, poor historian, with HTN, DM2, and hypothyroidism who presents to ED BIBEMS after being found down for a fall. Found to have elevated beta hydroxy buturate likely 2/2 dehydration vs starvation ketosis. While being worked up for fall in ED, found to be febrile 101.1 and desaturated to 89% on room air, COVID+. Stepped up from [facility] to MICU for noncompliance on NRB and thus, desaturations, for possible intubation.       Pulmonary:  #severe sepsis 2/2 COVID+  - on arrival meeting 2/4 SIRS criteria (T 101.1F, HR 98) with pulmonary source --COVID+  - RR 20 and saturating 89% on RA  - WBC 5.15 with 76% neutrophils,  10.7% lymphocytes, lymphopenic to 550, lactate 2.3  - ferritin elevated to 1613, CRP elevated to 4.48, procalcitonin 0.15   - s/p 4L NS bolus in the ED  - hold further abx at present in setting of known COVID status  - UA negative, f/u UCx  - BCx NGTD    #Hypoxic respiratory failure 2/2 ARDS 2/2 COVID+  - CXR on arrival with BL multilobar interstitial opacities c/w ARDS 2/2 COVID+, now worsening  - O2sat 95% RA initially -->89% -->4L NC 92-94%; overnight, pt tachypneic and found to be tripoding, placed initially on venturi mask, then 15L NRB, however noncompliant and tenuous respiratory status; transferred to MICU early AM for possible intubation   - s/p 4 doses of IV CTX 1g, d/c'ed due to no leukocytosis in setting of COVID+  - will dose for IV tocolizumab 400mg x1   - s/p hydroxychloroquine 400mg q12h x2 doses   - continue hydroxychloroquine 200mg q12h x 8 doses (total 5 days, end 3/28)  - azithromycin 250mg daily x5 days for anti-inflammatory effect (end 3/27)   - IV ascorbic acid 1500mg daily and IV thiamine 200mg daily x5 days  - tylenol 650mg q6h standing   - ABG before transfer: pH 7.52, pCO2 28, pO2 63, O2 sat 92%  - daily CBC/CMP/Mg/Phos/CRP  - q3d labs: ESR, Tcell subset, d-dimer, LDH, ferritin, CK, trop, coags  - COVID isolation protocol      Cardiovascular:  #HTN   - continue home amlodipine 5mg daily    #HLD   - continue home atorvastatin 80mg daily     Gastrointestinal:  #Prophylaxis  - famotidine 20mg daily     Genitourinary:  #monitoring urine output in critically ill   - continue to monitor strict I&O  - UA neg  - Cr 0.64    ID:  #Sepsis 2/2 COVID+  - as above  - UA neg  - Bcx NGTD    Endocrine:   #DM2   Unknown last Hgb A1c. Previously recorded to be on Januvia and glucophage. No diabetes meds at [facility], however may use multiple pharmacies. Poor historian. Hgb A1c 9.9 this admission   - continue lantus 10U qhs   - mISS    #hypothyroidism   TSH wnl  - continue home synthroid 75mcg daily     Neurology:   #weakness   Pt presenting with weakness. CT head negative. Labs on admission most consistent with dehydration in setting of sepsis. Sepsis likely 2/2 COVID infection   - continue to monitor     F: none  E: Replete K<4, Mg<2.5  N: carb consistent diet  G: famotidine 20mg daily  DVT: lovenox 40mg subQ q24h    Code: FULL CODE    Dispo: Patient requires continued monitoring in MICU 79 M, poor historian, with HTN, DM2, and hypothyroidism who presents to ED BIBEMS after being found down for a fall. Found to have elevated beta hydroxy buturate likely 2/2 dehydration vs starvation ketosis. While being worked up for fall in ED, found to be febrile 101.1 and desaturated to 89% on room air, COVID+. Stepped up from [facility] to MICU for noncompliance on NRB and thus, desaturations, for possible intubation.       Pulmonary:  #severe sepsis 2/2 COVID+  - on arrival meeting 2/4 SIRS criteria (T 101.1F, HR 98) with pulmonary source --COVID+  - RR 20 and saturating 89% on RA  - WBC 5.15 with 76% neutrophils,  10.7% lymphocytes, lymphopenic to 550, lactate 2.3  - ferritin elevated to 1613, CRP elevated to 4.48, procalcitonin 0.15   - s/p 4L NS bolus in the ED  - hold further abx at present in setting of known COVID status  - UA negative, f/u UCx  - BCx NGTD    #Hypoxic respiratory failure 2/2 ARDS 2/2 COVID+  - CXR on arrival with BL multilobar interstitial opacities c/w ARDS 2/2 COVID+, now worsening  - O2sat 95% RA initially -->89% -->4L NC 92-94%; overnight, pt tachypneic and found to be tripoding, placed initially on venturi mask, then 15L NRB, however noncompliant and tenuous respiratory status; transferred to MICU early AM for possible intubation   - s/p 4 doses of IV CTX 1g, d/c'ed due to no leukocytosis in setting of COVID+  - will dose for IV tocolizumab 400mg x1   - s/p hydroxychloroquine 400mg q12h x2 doses   - continue hydroxychloroquine 200mg q12h x 8 doses (total 5 days, end 3/28)  - azithromycin 250mg daily x5 days for anti-inflammatory effect (end 3/27)   - IV ascorbic acid 1500mg daily and IV thiamine 200mg daily x5 days  - tylenol 650mg q6h standing   - ABG before transfer: pH 7.52, pCO2 28, pO2 63, O2 sat 92%  - daily CBC/CMP/Mg/Phos/CRP  - q3d labs: ESR, Tcell subset, d-dimer, LDH, ferritin, CK, trop, coags  - COVID isolation protocol    Cardiovascular:  #HTN   - continue home amlodipine 5mg daily    #HLD   - continue home atorvastatin 80mg daily     Gastrointestinal:  #Prophylaxis  - famotidine 20mg daily     ID:  #Sepsis 2/2 COVID+  - as above  - UA neg  - Bcx NGTD    Endocrine:   #DM2   Unknown last Hgb A1c. Previously recorded to be on Januvia and glucophage. No diabetes meds at [facility], however may use multiple pharmacies. Poor historian. Hgb A1c 9.9 this admission   - continue lantus 10U qhs   - mISS    #hypothyroidism   TSH wnl  - continue home synthroid 75mcg daily     Neurology:   #weakness   Pt presenting with weakness. CT head negative. Labs on admission most consistent with dehydration in setting of sepsis. Sepsis likely 2/2 COVID infection   - continue to monitor     F: none  E: Replete K<4, Mg<2.5  N: carb consistent diet  G: famotidine 20mg daily  DVT: lovenox 40mg subQ q24h    Code: FULL CODE    Dispo: Patient requires continued monitoring in MICU

## 2020-03-28 LAB
ALBUMIN SERPL ELPH-MCNC: 2.7 G/DL — LOW (ref 3.3–5)
ALP SERPL-CCNC: 42 U/L — SIGNIFICANT CHANGE UP (ref 40–120)
ALT FLD-CCNC: 45 U/L — SIGNIFICANT CHANGE UP (ref 10–45)
ANION GAP SERPL CALC-SCNC: 13 MMOL/L — SIGNIFICANT CHANGE UP (ref 5–17)
AST SERPL-CCNC: 69 U/L — HIGH (ref 10–40)
BASOPHILS # BLD AUTO: 0 K/UL — SIGNIFICANT CHANGE UP (ref 0–0.2)
BASOPHILS NFR BLD AUTO: 0 % — SIGNIFICANT CHANGE UP (ref 0–2)
BILIRUB SERPL-MCNC: 0.5 MG/DL — SIGNIFICANT CHANGE UP (ref 0.2–1.2)
BUN SERPL-MCNC: 11 MG/DL — SIGNIFICANT CHANGE UP (ref 7–23)
CALCIUM SERPL-MCNC: 8.6 MG/DL — SIGNIFICANT CHANGE UP (ref 8.4–10.5)
CHLORIDE SERPL-SCNC: 104 MMOL/L — SIGNIFICANT CHANGE UP (ref 96–108)
CO2 SERPL-SCNC: 21 MMOL/L — LOW (ref 22–31)
CREAT SERPL-MCNC: 0.66 MG/DL — SIGNIFICANT CHANGE UP (ref 0.5–1.3)
CRP SERPL-MCNC: 3.86 MG/DL — HIGH (ref 0–0.4)
CULTURE RESULTS: SIGNIFICANT CHANGE UP
CULTURE RESULTS: SIGNIFICANT CHANGE UP
D DIMER BLD IA.RAPID-MCNC: 342 NG/ML DDU — HIGH
EOSINOPHIL # BLD AUTO: 0 K/UL — SIGNIFICANT CHANGE UP (ref 0–0.5)
EOSINOPHIL NFR BLD AUTO: 0 % — SIGNIFICANT CHANGE UP (ref 0–6)
FERRITIN SERPL-MCNC: 1958 NG/ML — HIGH (ref 30–400)
GLUCOSE BLDC GLUCOMTR-MCNC: 177 MG/DL — HIGH (ref 70–99)
GLUCOSE BLDC GLUCOMTR-MCNC: 250 MG/DL — HIGH (ref 70–99)
GLUCOSE BLDC GLUCOMTR-MCNC: 253 MG/DL — HIGH (ref 70–99)
GLUCOSE BLDC GLUCOMTR-MCNC: 293 MG/DL — HIGH (ref 70–99)
GLUCOSE SERPL-MCNC: 218 MG/DL — HIGH (ref 70–99)
HCT VFR BLD CALC: 39.4 % — SIGNIFICANT CHANGE UP (ref 39–50)
HGB BLD-MCNC: 13.1 G/DL — SIGNIFICANT CHANGE UP (ref 13–17)
LYMPHOCYTES # BLD AUTO: 0.28 K/UL — LOW (ref 1–3.3)
LYMPHOCYTES # BLD AUTO: 5.6 % — LOW (ref 13–44)
MAGNESIUM SERPL-MCNC: 2.4 MG/DL — SIGNIFICANT CHANGE UP (ref 1.6–2.6)
MCHC RBC-ENTMCNC: 26.5 PG — LOW (ref 27–34)
MCHC RBC-ENTMCNC: 33.2 GM/DL — SIGNIFICANT CHANGE UP (ref 32–36)
MCV RBC AUTO: 79.8 FL — LOW (ref 80–100)
MONOCYTES # BLD AUTO: 0 K/UL — SIGNIFICANT CHANGE UP (ref 0–0.9)
MONOCYTES NFR BLD AUTO: 0 % — LOW (ref 2–14)
NEUTROPHILS # BLD AUTO: 4.48 K/UL — SIGNIFICANT CHANGE UP (ref 1.8–7.4)
NEUTROPHILS NFR BLD AUTO: 86.9 % — HIGH (ref 43–77)
NRBC # BLD: SIGNIFICANT CHANGE UP /100 WBCS (ref 0–0)
PHOSPHATE SERPL-MCNC: 3.2 MG/DL — SIGNIFICANT CHANGE UP (ref 2.5–4.5)
PLATELET # BLD AUTO: 333 K/UL — SIGNIFICANT CHANGE UP (ref 150–400)
POTASSIUM SERPL-MCNC: 4.4 MMOL/L — SIGNIFICANT CHANGE UP (ref 3.5–5.3)
POTASSIUM SERPL-SCNC: 4.4 MMOL/L — SIGNIFICANT CHANGE UP (ref 3.5–5.3)
PROT SERPL-MCNC: 6.3 G/DL — SIGNIFICANT CHANGE UP (ref 6–8.3)
RBC # BLD: 4.94 M/UL — SIGNIFICANT CHANGE UP (ref 4.2–5.8)
RBC # FLD: 14.5 % — SIGNIFICANT CHANGE UP (ref 10.3–14.5)
SODIUM SERPL-SCNC: 138 MMOL/L — SIGNIFICANT CHANGE UP (ref 135–145)
SPECIMEN SOURCE: SIGNIFICANT CHANGE UP
SPECIMEN SOURCE: SIGNIFICANT CHANGE UP
WBC # BLD: 4.95 K/UL — SIGNIFICANT CHANGE UP (ref 3.8–10.5)
WBC # FLD AUTO: 4.95 K/UL — SIGNIFICANT CHANGE UP (ref 3.8–10.5)

## 2020-03-28 PROCEDURE — 99233 SBSQ HOSP IP/OBS HIGH 50: CPT | Mod: GC

## 2020-03-28 RX ADMIN — POLYETHYLENE GLYCOL 3350 17 GRAM(S): 17 POWDER, FOR SOLUTION ORAL at 11:34

## 2020-03-28 RX ADMIN — Medication 650 MILLIGRAM(S): at 11:36

## 2020-03-28 RX ADMIN — AMLODIPINE BESYLATE 5 MILLIGRAM(S): 2.5 TABLET ORAL at 17:25

## 2020-03-28 RX ADMIN — ATORVASTATIN CALCIUM 20 MILLIGRAM(S): 80 TABLET, FILM COATED ORAL at 22:47

## 2020-03-28 RX ADMIN — Medication 2: at 22:59

## 2020-03-28 RX ADMIN — Medication 1500 MILLIGRAM(S): at 11:34

## 2020-03-28 RX ADMIN — Medication 650 MILLIGRAM(S): at 07:26

## 2020-03-28 RX ADMIN — Medication 650 MILLIGRAM(S): at 12:15

## 2020-03-28 RX ADMIN — Medication 650 MILLIGRAM(S): at 03:30

## 2020-03-28 RX ADMIN — FAMOTIDINE 20 MILLIGRAM(S): 10 INJECTION INTRAVENOUS at 05:20

## 2020-03-28 RX ADMIN — ENOXAPARIN SODIUM 40 MILLIGRAM(S): 100 INJECTION SUBCUTANEOUS at 22:48

## 2020-03-28 RX ADMIN — Medication 100 MILLIGRAM(S): at 05:21

## 2020-03-28 RX ADMIN — Medication 200 MILLIGRAM(S): at 17:26

## 2020-03-28 RX ADMIN — LATANOPROST 1 DROP(S): 0.05 SOLUTION/ DROPS OPHTHALMIC; TOPICAL at 22:59

## 2020-03-28 RX ADMIN — GABAPENTIN 300 MILLIGRAM(S): 400 CAPSULE ORAL at 17:25

## 2020-03-28 RX ADMIN — Medication 1500 MILLIGRAM(S): at 05:21

## 2020-03-28 RX ADMIN — Medication 650 MILLIGRAM(S): at 07:50

## 2020-03-28 RX ADMIN — Medication 1500 MILLIGRAM(S): at 17:25

## 2020-03-28 RX ADMIN — Medication 3: at 11:33

## 2020-03-28 RX ADMIN — Medication 200 MILLIGRAM(S): at 05:21

## 2020-03-28 RX ADMIN — Medication 1: at 07:25

## 2020-03-28 RX ADMIN — Medication 200 MILLIGRAM(S): at 22:57

## 2020-03-28 RX ADMIN — INSULIN GLARGINE 5 UNIT(S): 100 INJECTION, SOLUTION SUBCUTANEOUS at 22:57

## 2020-03-28 RX ADMIN — SENNA PLUS 10 MILLILITER(S): 8.6 TABLET ORAL at 11:34

## 2020-03-28 RX ADMIN — Medication 48 MILLIGRAM(S): at 11:34

## 2020-03-28 RX ADMIN — BRIMONIDINE TARTRATE 1 DROP(S): 2 SOLUTION/ DROPS OPHTHALMIC at 05:21

## 2020-03-28 RX ADMIN — Medication 650 MILLIGRAM(S): at 02:41

## 2020-03-28 RX ADMIN — Medication 40 MILLIGRAM(S): at 17:26

## 2020-03-28 RX ADMIN — Medication 40 MILLIGRAM(S): at 05:20

## 2020-03-28 RX ADMIN — DORZOLAMIDE HYDROCHLORIDE TIMOLOL MALEATE 1 DROP(S): 20; 5 SOLUTION/ DROPS OPHTHALMIC at 05:22

## 2020-03-28 RX ADMIN — QUETIAPINE FUMARATE 800 MILLIGRAM(S): 200 TABLET, FILM COATED ORAL at 22:48

## 2020-03-28 RX ADMIN — GABAPENTIN 300 MILLIGRAM(S): 400 CAPSULE ORAL at 05:21

## 2020-03-28 RX ADMIN — FAMOTIDINE 20 MILLIGRAM(S): 10 INJECTION INTRAVENOUS at 17:25

## 2020-03-28 RX ADMIN — BRIMONIDINE TARTRATE 1 DROP(S): 2 SOLUTION/ DROPS OPHTHALMIC at 17:27

## 2020-03-28 RX ADMIN — Medication 200 MILLIGRAM(S): at 11:34

## 2020-03-28 RX ADMIN — Medication 75 MICROGRAM(S): at 05:21

## 2020-03-28 RX ADMIN — DORZOLAMIDE HYDROCHLORIDE TIMOLOL MALEATE 1 DROP(S): 20; 5 SOLUTION/ DROPS OPHTHALMIC at 17:27

## 2020-03-28 RX ADMIN — Medication 3: at 17:26

## 2020-03-28 NOTE — PROGRESS NOTE ADULT - PROBLEM SELECTOR PLAN 2
Viral pneumonia 2/2 COVID- See plan above  -Reswab in 03/31/2020, [place]    #Acute hypoxic respiratory failure  - Pt requiring 3L NC for sp02 of 94-95%, wean as tolerates with sp02 goal of 95%  - OOBTC

## 2020-03-28 NOTE — PROGRESS NOTE ADULT - ASSESSMENT
79 M, poor historian, with past medical history of HTN, DM2, and hypothyroidism who presents to ED BIBEMS after being found down for a fall. Found to have elevated beta hydroxy buturate likely 2/2 dehydration vs starvation ketosis. While being worked up for fall in ED, found to be febrile 101.1 and desaturated to 89% on room air. In setting of recent pandemic and CXR, swabbed for COVID 19 and found positive now being further managed on RMF.

## 2020-03-28 NOTE — PROGRESS NOTE ADULT - PROBLEM SELECTOR PLAN 1
Severe sepsis 2/2 viral pneumonia and COVID 19   -See plan below     #SARS- Coronavirus COVID 19 PCR postive   - C/w Azithro,, plaquenil, ascorbic acid, thiamine  - S/p Tocilzumab 400mg x 1 (3/27)   - C/w Solumedrol 40mg BID   - Supplemental O2 prn

## 2020-03-28 NOTE — PROGRESS NOTE ADULT - PROBLEM SELECTOR PLAN 4
Hx of diabetes. Unknown last A1C. Previously recorded as on Januvia and glucophage. No diabetes medications at [facility], may use more than one pharmacy. Patient poor historian does not know what he takes at home.  - C/w Lantus 5u  - moderate ISS  - a1c 9.9, poor glycemic control  - out pt Endocrine f/u

## 2020-03-28 NOTE — PROGRESS NOTE ADULT - PROBLEM SELECTOR PLAN 5
- hold home amlodipine 5mg in setting of severe sepsis  - Monitor pressure - hold home amlodipine 5mg in setting of severe sepsis  - Monitor pressure    #Insomnia/Delirium   -Restarted on home Seroquel 800mg @ bedtime

## 2020-03-28 NOTE — PROGRESS NOTE ADULT - SUBJECTIVE AND OBJECTIVE BOX
Transfer [facility] MICU to [facility]   HOSPITAL COURSE:   Pt is a 78 y/o M with PMH of HTM, DM, and hypothyroidism found down in his apartment s/p fall and was brought to the ED presenting in starvation ketosis and febrile and desatting on room air to 89%. Found to have elevated blood glucose to >300s and elevated beta hydroxy buturate, suspected starvation ketosis. Swabbed for COVID, placed on nasal cannula and transferred to [facility]. Transferred to [facility] secondary to delirium and desatting overnight on 3/26. After transfer, transitioned to NRB, briefly to HFNC then to 3L NC satting at 94%. He received Tocilizumab 400mg x1 and started on Solumedrol 40mg BID and home Seroquel dose. At this time patient is stable for stepdown to COVID RMF.       SUBJECTIVE / INTERVAL HPI: Patient seen and examined at bedside. Patient resting in bed without any complaints at this time. Denies chest pain, sob, nausea, vomiting, dysuria.     VITAL SIGNS:  Vital Signs Last 24 Hrs  T(C): 36.1 (27 Mar 2020 17:43), Max: 37 (27 Mar 2020 05:55)  T(F): 96.9 (27 Mar 2020 17:43), Max: 98.6 (27 Mar 2020 05:55)  HR: 65 (27 Mar 2020 21:00) (60 - 105)  BP: 136/78 (27 Mar 2020 20:00) (118/73 - 185/93)  BP(mean): 101 (27 Mar 2020 20:00) (84 - 131)  RR: 23 (27 Mar 2020 21:00) (18 - 37)  SpO2: 94% (27 Mar 2020 21:00) (92% - 100%)    PHYSICAL EXAM:    General: WDWN  HEENT: NC/AT; PERRL, anicteric sclera; MMM  Neck: supple  Cardiovascular: +S1/S2; RRR  Respiratory: bibasilar crackles, no w/r   Gastrointestinal: soft, NT/ND; +BSx4  Extremities: WWP; no edema, clubbing or cyanosis  Vascular: 2+ radial, DP/PT pulses B/L  Neurological: AAOx3; no focal deficits    MEDICATIONS:  MEDICATIONS  (STANDING):  acetaminophen   Tablet .. 650 milliGRAM(s) Oral every 6 hours  amLODIPine   Tablet 5 milliGRAM(s) Oral every 24 hours  ascorbic acid 1500 milliGRAM(s) Oral four times a day  atorvastatin 20 milliGRAM(s) Oral at bedtime  brimonidine 0.2% Ophthalmic Solution 1 Drop(s) Both EYES two times a day  dextrose 50% Injectable 12.5 Gram(s) IV Push once  dextrose 50% Injectable 25 Gram(s) IV Push once  dextrose 50% Injectable 25 Gram(s) IV Push once  dorzolamide 2%/timolol 0.5% Ophthalmic Solution 1 Drop(s) Both EYES two times a day  enoxaparin Injectable 40 milliGRAM(s) SubCutaneous every 24 hours  famotidine    Tablet 20 milliGRAM(s) Oral two times a day  fenofibrate Tablet 48 milliGRAM(s) Oral daily  gabapentin 300 milliGRAM(s) Oral two times a day  hydroxychloroquine 200 milliGRAM(s) Oral every 12 hours  insulin glargine Injectable (LANTUS) 5 Unit(s) SubCutaneous at bedtime  insulin lispro (HumaLOG) corrective regimen sliding scale   SubCutaneous Before meals and at bedtime  latanoprost 0.005% Ophthalmic Solution 1 Drop(s) Both EYES at bedtime  levothyroxine 75 MICROGram(s) Oral daily  methylPREDNISolone sodium succinate Injectable 40 milliGRAM(s) IV Push every 12 hours  metoprolol succinate  milliGRAM(s) Oral daily  polyethylene glycol 3350 17 Gram(s) Oral daily  QUEtiapine 800 milliGRAM(s) Oral at bedtime  senna Syrup 10 milliLiter(s) Oral daily  thiamine 200 milliGRAM(s) Oral <User Schedule>    MEDICATIONS  (PRN):  bisacodyl Suppository 10 milliGRAM(s) Rectal daily PRN Constipation  dextrose 40% Gel 15 Gram(s) Oral once PRN Blood Glucose LESS THAN 70 milliGRAM(s)/deciliter  glucagon  Injectable 1 milliGRAM(s) IntraMuscular once PRN Glucose LESS THAN 70 milligrams/deciliter      ALLERGIES:  Allergies    No Known Allergies    Intolerances        LABS:                        13.1   6.04  )-----------( 287      ( 27 Mar 2020 11:09 )             39.5     03-27    139  |  101  |  8   ----------------------------<  82  3.5   |  23  |  0.75    Ca    8.5      27 Mar 2020 11:09  Phos  4.3     03-27  Mg     2.2     03-27    TPro  6.4  /  Alb  2.9<L>  /  TBili  0.5  /  DBili  x   /  AST  60<H>  /  ALT  34  /  AlkPhos  38<L>  03-27        CAPILLARY BLOOD GLUCOSE      POCT Blood Glucose.: 132 mg/dL (27 Mar 2020 18:53)      RADIOLOGY & ADDITIONAL TESTS: Reviewed.

## 2020-03-29 LAB
ALBUMIN SERPL ELPH-MCNC: 2.7 G/DL — LOW (ref 3.3–5)
ALP SERPL-CCNC: 51 U/L — SIGNIFICANT CHANGE UP (ref 40–120)
ALT FLD-CCNC: 63 U/L — HIGH (ref 10–45)
ANION GAP SERPL CALC-SCNC: 11 MMOL/L — SIGNIFICANT CHANGE UP (ref 5–17)
ANISOCYTOSIS BLD QL: SLIGHT — SIGNIFICANT CHANGE UP
AST SERPL-CCNC: 78 U/L — HIGH (ref 10–40)
BASOPHILS # BLD AUTO: 0 K/UL — SIGNIFICANT CHANGE UP (ref 0–0.2)
BASOPHILS NFR BLD AUTO: 0 % — SIGNIFICANT CHANGE UP (ref 0–2)
BILIRUB SERPL-MCNC: 0.3 MG/DL — SIGNIFICANT CHANGE UP (ref 0.2–1.2)
BUN SERPL-MCNC: 12 MG/DL — SIGNIFICANT CHANGE UP (ref 7–23)
BURR CELLS BLD QL SMEAR: PRESENT — SIGNIFICANT CHANGE UP
CALCIUM SERPL-MCNC: 8.9 MG/DL — SIGNIFICANT CHANGE UP (ref 8.4–10.5)
CHLORIDE SERPL-SCNC: 103 MMOL/L — SIGNIFICANT CHANGE UP (ref 96–108)
CO2 SERPL-SCNC: 24 MMOL/L — SIGNIFICANT CHANGE UP (ref 22–31)
CREAT SERPL-MCNC: 0.77 MG/DL — SIGNIFICANT CHANGE UP (ref 0.5–1.3)
CRP SERPL-MCNC: 1.5 MG/DL — HIGH (ref 0–0.4)
D DIMER BLD IA.RAPID-MCNC: 261 NG/ML DDU — HIGH
EOSINOPHIL # BLD AUTO: 0 K/UL — SIGNIFICANT CHANGE UP (ref 0–0.5)
EOSINOPHIL NFR BLD AUTO: 0 % — SIGNIFICANT CHANGE UP (ref 0–6)
ERYTHROCYTE [SEDIMENTATION RATE] IN BLOOD: 23 MM/HR — HIGH
FERRITIN SERPL-MCNC: 1601 NG/ML — HIGH (ref 30–400)
GIANT PLATELETS BLD QL SMEAR: PRESENT — SIGNIFICANT CHANGE UP
GLUCOSE BLDC GLUCOMTR-MCNC: 191 MG/DL — HIGH (ref 70–99)
GLUCOSE BLDC GLUCOMTR-MCNC: 198 MG/DL — HIGH (ref 70–99)
GLUCOSE BLDC GLUCOMTR-MCNC: 224 MG/DL — HIGH (ref 70–99)
GLUCOSE BLDC GLUCOMTR-MCNC: 272 MG/DL — HIGH (ref 70–99)
GLUCOSE SERPL-MCNC: 326 MG/DL — HIGH (ref 70–99)
HBA1C BLD-MCNC: 10.6 % — HIGH (ref 4–5.6)
HCT VFR BLD CALC: 41.6 % — SIGNIFICANT CHANGE UP (ref 39–50)
HGB BLD-MCNC: 13.2 G/DL — SIGNIFICANT CHANGE UP (ref 13–17)
LYMPHOCYTES # BLD AUTO: 0.65 K/UL — LOW (ref 1–3.3)
LYMPHOCYTES # BLD AUTO: 8 % — LOW (ref 13–44)
MAGNESIUM SERPL-MCNC: 2.3 MG/DL — SIGNIFICANT CHANGE UP (ref 1.6–2.6)
MANUAL SMEAR VERIFICATION: SIGNIFICANT CHANGE UP
MCHC RBC-ENTMCNC: 26 PG — LOW (ref 27–34)
MCHC RBC-ENTMCNC: 31.7 GM/DL — LOW (ref 32–36)
MCV RBC AUTO: 81.9 FL — SIGNIFICANT CHANGE UP (ref 80–100)
MONOCYTES # BLD AUTO: 0.36 K/UL — SIGNIFICANT CHANGE UP (ref 0–0.9)
MONOCYTES NFR BLD AUTO: 4.4 % — SIGNIFICANT CHANGE UP (ref 2–14)
NEUTROPHILS # BLD AUTO: 7.08 K/UL — SIGNIFICANT CHANGE UP (ref 1.8–7.4)
NEUTROPHILS NFR BLD AUTO: 84.9 % — HIGH (ref 43–77)
NEUTS BAND # BLD: 1.8 % — SIGNIFICANT CHANGE UP (ref 0–8)
NRBC # BLD: 1 /100 — HIGH (ref 0–0)
NRBC # BLD: SIGNIFICANT CHANGE UP /100 WBCS (ref 0–0)
OVALOCYTES BLD QL SMEAR: SLIGHT — SIGNIFICANT CHANGE UP
PHOSPHATE SERPL-MCNC: 2.5 MG/DL — SIGNIFICANT CHANGE UP (ref 2.5–4.5)
PLAT MORPH BLD: ABNORMAL
PLATELET # BLD AUTO: 463 K/UL — HIGH (ref 150–400)
POIKILOCYTOSIS BLD QL AUTO: SIGNIFICANT CHANGE UP
POTASSIUM SERPL-MCNC: 4 MMOL/L — SIGNIFICANT CHANGE UP (ref 3.5–5.3)
POTASSIUM SERPL-SCNC: 4 MMOL/L — SIGNIFICANT CHANGE UP (ref 3.5–5.3)
PROT SERPL-MCNC: 6.4 G/DL — SIGNIFICANT CHANGE UP (ref 6–8.3)
RBC # BLD: 5.08 M/UL — SIGNIFICANT CHANGE UP (ref 4.2–5.8)
RBC # FLD: 14.5 % — SIGNIFICANT CHANGE UP (ref 10.3–14.5)
RBC BLD AUTO: ABNORMAL
SODIUM SERPL-SCNC: 138 MMOL/L — SIGNIFICANT CHANGE UP (ref 135–145)
VARIANT LYMPHS # BLD: 0.9 % — SIGNIFICANT CHANGE UP (ref 0–6)
WBC # BLD: 8.17 K/UL — SIGNIFICANT CHANGE UP (ref 3.8–10.5)
WBC # FLD AUTO: 8.17 K/UL — SIGNIFICANT CHANGE UP (ref 3.8–10.5)

## 2020-03-29 PROCEDURE — 99233 SBSQ HOSP IP/OBS HIGH 50: CPT | Mod: GC

## 2020-03-29 PROCEDURE — 71045 X-RAY EXAM CHEST 1 VIEW: CPT | Mod: 26

## 2020-03-29 RX ORDER — INSULIN LISPRO 100/ML
3 VIAL (ML) SUBCUTANEOUS
Refills: 0 | Status: DISCONTINUED | OUTPATIENT
Start: 2020-03-29 | End: 2020-03-30

## 2020-03-29 RX ORDER — INSULIN LISPRO 100/ML
VIAL (ML) SUBCUTANEOUS
Refills: 0 | Status: DISCONTINUED | OUTPATIENT
Start: 2020-03-29 | End: 2020-04-01

## 2020-03-29 RX ORDER — INSULIN GLARGINE 100 [IU]/ML
10 INJECTION, SOLUTION SUBCUTANEOUS AT BEDTIME
Refills: 0 | Status: DISCONTINUED | OUTPATIENT
Start: 2020-03-29 | End: 2020-03-30

## 2020-03-29 RX ADMIN — LATANOPROST 1 DROP(S): 0.05 SOLUTION/ DROPS OPHTHALMIC; TOPICAL at 22:00

## 2020-03-29 RX ADMIN — Medication 40 MILLIGRAM(S): at 17:15

## 2020-03-29 RX ADMIN — Medication 4: at 21:58

## 2020-03-29 RX ADMIN — Medication 650 MILLIGRAM(S): at 07:56

## 2020-03-29 RX ADMIN — DORZOLAMIDE HYDROCHLORIDE TIMOLOL MALEATE 1 DROP(S): 20; 5 SOLUTION/ DROPS OPHTHALMIC at 06:52

## 2020-03-29 RX ADMIN — Medication 40 MILLIGRAM(S): at 06:52

## 2020-03-29 RX ADMIN — Medication 100 MILLIGRAM(S): at 06:49

## 2020-03-29 RX ADMIN — QUETIAPINE FUMARATE 800 MILLIGRAM(S): 200 TABLET, FILM COATED ORAL at 22:00

## 2020-03-29 RX ADMIN — Medication 6: at 17:21

## 2020-03-29 RX ADMIN — Medication 650 MILLIGRAM(S): at 22:00

## 2020-03-29 RX ADMIN — INSULIN GLARGINE 10 UNIT(S): 100 INJECTION, SOLUTION SUBCUTANEOUS at 22:01

## 2020-03-29 RX ADMIN — Medication 650 MILLIGRAM(S): at 23:00

## 2020-03-29 RX ADMIN — Medication 650 MILLIGRAM(S): at 02:37

## 2020-03-29 RX ADMIN — BRIMONIDINE TARTRATE 1 DROP(S): 2 SOLUTION/ DROPS OPHTHALMIC at 06:52

## 2020-03-29 RX ADMIN — Medication 650 MILLIGRAM(S): at 03:07

## 2020-03-29 RX ADMIN — GABAPENTIN 300 MILLIGRAM(S): 400 CAPSULE ORAL at 06:49

## 2020-03-29 RX ADMIN — ATORVASTATIN CALCIUM 20 MILLIGRAM(S): 80 TABLET, FILM COATED ORAL at 22:01

## 2020-03-29 RX ADMIN — FAMOTIDINE 20 MILLIGRAM(S): 10 INJECTION INTRAVENOUS at 06:48

## 2020-03-29 RX ADMIN — Medication 3 UNIT(S): at 17:16

## 2020-03-29 RX ADMIN — Medication 75 MICROGRAM(S): at 06:49

## 2020-03-29 RX ADMIN — ENOXAPARIN SODIUM 40 MILLIGRAM(S): 100 INJECTION SUBCUTANEOUS at 22:01

## 2020-03-29 RX ADMIN — Medication 1: at 09:16

## 2020-03-29 NOTE — PROGRESS NOTE ADULT - PROBLEM SELECTOR PLAN 5
English - hold home amlodipine 5mg in setting of severe sepsis  - Monitor pressure    #Insomnia/Delirium   -Restarted on home Seroquel 800mg @ bedtime

## 2020-03-29 NOTE — PROGRESS NOTE ADULT - ASSESSMENT
79 M, poor historian, with past medical history of HTN, DM2, and hypothyroidism who presents to ED BIBEMS after being found down for a fall. Found to have elevated beta hydroxy buturate likely 2/2 dehydration vs starvation ketosis; febrile 101.1 and desaturated to 89% on room air. In setting of recent pandemic and CXR, swabbed for COVID 19 and found positive now being further managed on RMF.

## 2020-03-29 NOTE — PROGRESS NOTE ADULT - SUBJECTIVE AND OBJECTIVE BOX
OVERNIGHT EVENTS: NAEO    SUBJECTIVE: Pt feeling better than prior, offers no concerns or complaints, asking when he can go home. Does not want to put nonrebreather mask on face. Denies fcnv, cp, sob at rest, abd pain, dysuria     Vital Signs Last 12 Hrs  T(F): 96.5 (03-29-20 @ 06:47), Max: 98.1 (03-29-20 @ 06:00)  HR: 86 (03-29-20 @ 06:00) (72 - 86)  BP: 181/777 (03-29-20 @ 06:00) (146/80 - 181/777)  BP(mean): --  RR: 20 (03-29-20 @ 06:00) (20 - 21)  SpO2: 93% (03-29-20 @ 06:00) (90% - 93%)  I&O's Summary    28 Mar 2020 07:01  -  29 Mar 2020 07:00  --------------------------------------------------------  IN: 180 mL / OUT: 0 mL / NET: 180 mL        PHYSICAL EXAM:  General: WDWN  HEENT: NC/AT; anicteric sclera; MMM  Neck: supple  Cardiovascular: +S1/S2; RRR  Respiratory: bibasilar crackles, no w/r   Gastrointestinal: soft, NT/ND; +BSx4  Extremities: WWP; no edema, clubbing or cyanosis  Vascular: 2+ radial, DP/PT pulses B/L  Neurological: AAOx3; no focal deficits      LABS:                        13.1   4.95  )-----------( 333      ( 28 Mar 2020 06:46 )             39.4     03-28    138  |  104  |  11  ----------------------------<  218<H>  4.4   |  21<L>  |  0.66    Ca    8.6      28 Mar 2020 06:46  Phos  3.2     03-28  Mg     2.4     03-28    TPro  6.3  /  Alb  2.7<L>  /  TBili  0.5  /  DBili  x   /  AST  69<H>  /  ALT  45  /  AlkPhos  42  03-28          RADIOLOGY & ADDITIONAL TESTS:    MEDICATIONS  (STANDING):  acetaminophen   Tablet .. 650 milliGRAM(s) Oral every 6 hours  amLODIPine   Tablet 5 milliGRAM(s) Oral every 24 hours  atorvastatin 20 milliGRAM(s) Oral at bedtime  brimonidine 0.2% Ophthalmic Solution 1 Drop(s) Both EYES two times a day  dextrose 50% Injectable 12.5 Gram(s) IV Push once  dextrose 50% Injectable 25 Gram(s) IV Push once  dextrose 50% Injectable 25 Gram(s) IV Push once  dorzolamide 2%/timolol 0.5% Ophthalmic Solution 1 Drop(s) Both EYES two times a day  enoxaparin Injectable 40 milliGRAM(s) SubCutaneous every 24 hours  famotidine    Tablet 20 milliGRAM(s) Oral two times a day  fenofibrate Tablet 48 milliGRAM(s) Oral daily  gabapentin 300 milliGRAM(s) Oral two times a day  insulin glargine Injectable (LANTUS) 5 Unit(s) SubCutaneous at bedtime  insulin lispro (HumaLOG) corrective regimen sliding scale   SubCutaneous Before meals and at bedtime  latanoprost 0.005% Ophthalmic Solution 1 Drop(s) Both EYES at bedtime  levothyroxine 75 MICROGram(s) Oral daily  methylPREDNISolone sodium succinate Injectable 40 milliGRAM(s) IV Push every 12 hours  metoprolol succinate  milliGRAM(s) Oral daily  polyethylene glycol 3350 17 Gram(s) Oral daily  QUEtiapine 800 milliGRAM(s) Oral at bedtime  senna Syrup 10 milliLiter(s) Oral daily  thiamine 200 milliGRAM(s) Oral <User Schedule>    MEDICATIONS  (PRN):  bisacodyl Suppository 10 milliGRAM(s) Rectal daily PRN Constipation  dextrose 40% Gel 15 Gram(s) Oral once PRN Blood Glucose LESS THAN 70 milliGRAM(s)/deciliter  glucagon  Injectable 1 milliGRAM(s) IntraMuscular once PRN Glucose LESS THAN 70 milligrams/deciliter OVERNIGHT EVENTS: NAEO    SUBJECTIVE: Pt feeling better than prior, offers no concerns or complaints, asking when he can go home. Does not want to put nonrebreather mask on face, also refusing nasal cannula saying it is more uncomfortable. Pt wearing mask half on/off, although sats are being documented on non-rebreather, pt is not using properly. Denies fcnv, cp, sob at rest, abd pain, dysuria     Vital Signs Last 12 Hrs  T(F): 96.5 (03-29-20 @ 06:47), Max: 98.1 (03-29-20 @ 06:00)  HR: 86 (03-29-20 @ 06:00) (72 - 86)  BP: 181/777 (03-29-20 @ 06:00) (146/80 - 181/777)  BP(mean): --  RR: 20 (03-29-20 @ 06:00) (20 - 21)  SpO2: 93% (03-29-20 @ 06:00) (90% - 93%)  I&O's Summary    28 Mar 2020 07:01  -  29 Mar 2020 07:00  --------------------------------------------------------  IN: 180 mL / OUT: 0 mL / NET: 180 mL        PHYSICAL EXAM:  General: WDWN  HEENT: NC/AT; anicteric sclera; MMM  Neck: supple  Cardiovascular: +S1/S2; RRR  Respiratory: bibasilar crackles, no w/r   Gastrointestinal: soft, NT/ND; +BSx4  Extremities: WWP; no edema, clubbing or cyanosis  Vascular: 2+ radial, DP/PT pulses B/L  Neurological: AAOx3; no focal deficits      LABS:                        13.1   4.95  )-----------( 333      ( 28 Mar 2020 06:46 )             39.4     03-28    138  |  104  |  11  ----------------------------<  218<H>  4.4   |  21<L>  |  0.66    Ca    8.6      28 Mar 2020 06:46  Phos  3.2     03-28  Mg     2.4     03-28    TPro  6.3  /  Alb  2.7<L>  /  TBili  0.5  /  DBili  x   /  AST  69<H>  /  ALT  45  /  AlkPhos  42  03-28          RADIOLOGY & ADDITIONAL TESTS:    MEDICATIONS  (STANDING):  acetaminophen   Tablet .. 650 milliGRAM(s) Oral every 6 hours  amLODIPine   Tablet 5 milliGRAM(s) Oral every 24 hours  atorvastatin 20 milliGRAM(s) Oral at bedtime  brimonidine 0.2% Ophthalmic Solution 1 Drop(s) Both EYES two times a day  dextrose 50% Injectable 12.5 Gram(s) IV Push once  dextrose 50% Injectable 25 Gram(s) IV Push once  dextrose 50% Injectable 25 Gram(s) IV Push once  dorzolamide 2%/timolol 0.5% Ophthalmic Solution 1 Drop(s) Both EYES two times a day  enoxaparin Injectable 40 milliGRAM(s) SubCutaneous every 24 hours  famotidine    Tablet 20 milliGRAM(s) Oral two times a day  fenofibrate Tablet 48 milliGRAM(s) Oral daily  gabapentin 300 milliGRAM(s) Oral two times a day  insulin glargine Injectable (LANTUS) 5 Unit(s) SubCutaneous at bedtime  insulin lispro (HumaLOG) corrective regimen sliding scale   SubCutaneous Before meals and at bedtime  latanoprost 0.005% Ophthalmic Solution 1 Drop(s) Both EYES at bedtime  levothyroxine 75 MICROGram(s) Oral daily  methylPREDNISolone sodium succinate Injectable 40 milliGRAM(s) IV Push every 12 hours  metoprolol succinate  milliGRAM(s) Oral daily  polyethylene glycol 3350 17 Gram(s) Oral daily  QUEtiapine 800 milliGRAM(s) Oral at bedtime  senna Syrup 10 milliLiter(s) Oral daily  thiamine 200 milliGRAM(s) Oral <User Schedule>    MEDICATIONS  (PRN):  bisacodyl Suppository 10 milliGRAM(s) Rectal daily PRN Constipation  dextrose 40% Gel 15 Gram(s) Oral once PRN Blood Glucose LESS THAN 70 milliGRAM(s)/deciliter  glucagon  Injectable 1 milliGRAM(s) IntraMuscular once PRN Glucose LESS THAN 70 milligrams/deciliter OVERNIGHT EVENTS: NAEO    SUBJECTIVE: Pt feeling better than prior, offers no concerns or complaints, asking when he can go home. Does not want to put nonrebreather mask on face, also refusing nasal cannula saying it is more uncomfortable. Pt wearing mask half on/off, although sats are being documented on non-rebreather, pt is not using properly. Refusing meds during day. Denies fcnv, cp, sob at rest, abd pain, dysuria     Vital Signs Last 12 Hrs  T(F): 96.5 (03-29-20 @ 06:47), Max: 98.1 (03-29-20 @ 06:00)  HR: 86 (03-29-20 @ 06:00) (72 - 86)  BP: 181/777 (03-29-20 @ 06:00) (146/80 - 181/777)  BP(mean): --  RR: 20 (03-29-20 @ 06:00) (20 - 21)  SpO2: 93% (03-29-20 @ 06:00) (90% - 93%)  I&O's Summary    28 Mar 2020 07:01  -  29 Mar 2020 07:00  --------------------------------------------------------  IN: 180 mL / OUT: 0 mL / NET: 180 mL        PHYSICAL EXAM:  General: WDWN  HEENT: NC/AT; anicteric sclera; MMM  Neck: supple  Cardiovascular: +S1/S2; RRR  Respiratory: bibasilar crackles, no w/r   Gastrointestinal: soft, NT/ND; +BSx4  Extremities: WWP; no edema, clubbing or cyanosis  Vascular: 2+ radial, DP/PT pulses B/L  Neurological: AAOx3; no focal deficits      LABS:                        13.1   4.95  )-----------( 333      ( 28 Mar 2020 06:46 )             39.4     03-28    138  |  104  |  11  ----------------------------<  218<H>  4.4   |  21<L>  |  0.66    Ca    8.6      28 Mar 2020 06:46  Phos  3.2     03-28  Mg     2.4     03-28    TPro  6.3  /  Alb  2.7<L>  /  TBili  0.5  /  DBili  x   /  AST  69<H>  /  ALT  45  /  AlkPhos  42  03-28          RADIOLOGY & ADDITIONAL TESTS:    MEDICATIONS  (STANDING):  acetaminophen   Tablet .. 650 milliGRAM(s) Oral every 6 hours  amLODIPine   Tablet 5 milliGRAM(s) Oral every 24 hours  atorvastatin 20 milliGRAM(s) Oral at bedtime  brimonidine 0.2% Ophthalmic Solution 1 Drop(s) Both EYES two times a day  dextrose 50% Injectable 12.5 Gram(s) IV Push once  dextrose 50% Injectable 25 Gram(s) IV Push once  dextrose 50% Injectable 25 Gram(s) IV Push once  dorzolamide 2%/timolol 0.5% Ophthalmic Solution 1 Drop(s) Both EYES two times a day  enoxaparin Injectable 40 milliGRAM(s) SubCutaneous every 24 hours  famotidine    Tablet 20 milliGRAM(s) Oral two times a day  fenofibrate Tablet 48 milliGRAM(s) Oral daily  gabapentin 300 milliGRAM(s) Oral two times a day  insulin glargine Injectable (LANTUS) 5 Unit(s) SubCutaneous at bedtime  insulin lispro (HumaLOG) corrective regimen sliding scale   SubCutaneous Before meals and at bedtime  latanoprost 0.005% Ophthalmic Solution 1 Drop(s) Both EYES at bedtime  levothyroxine 75 MICROGram(s) Oral daily  methylPREDNISolone sodium succinate Injectable 40 milliGRAM(s) IV Push every 12 hours  metoprolol succinate  milliGRAM(s) Oral daily  polyethylene glycol 3350 17 Gram(s) Oral daily  QUEtiapine 800 milliGRAM(s) Oral at bedtime  senna Syrup 10 milliLiter(s) Oral daily  thiamine 200 milliGRAM(s) Oral <User Schedule>    MEDICATIONS  (PRN):  bisacodyl Suppository 10 milliGRAM(s) Rectal daily PRN Constipation  dextrose 40% Gel 15 Gram(s) Oral once PRN Blood Glucose LESS THAN 70 milliGRAM(s)/deciliter  glucagon  Injectable 1 milliGRAM(s) IntraMuscular once PRN Glucose LESS THAN 70 milligrams/deciliter

## 2020-03-29 NOTE — PROGRESS NOTE ADULT - PROBLEM SELECTOR PLAN 2
Viral pneumonia 2/2 COVID- See plan above  -Reswab in 03/31/2020, pt is from [facility] side    #Acute hypoxic respiratory failure  - wean as tolerates with sp02 goal of 95%  - refusing to wear nonrebreather properly  - OOBTC

## 2020-03-29 NOTE — PROGRESS NOTE ADULT - PROBLEM SELECTOR PLAN 4
Hx of diabetes. Unknown last A1C. Previously recorded as on Januvia and glucophage. No diabetes medications at [facility], may use more than one pharmacy. Patient poor historian does not know what he takes at home.  - C/w Lantus 5u  - moderate ISS  - a1c 9.9, poor glycemic control  - out pt Endocrine f/u

## 2020-03-30 LAB
ALBUMIN SERPL ELPH-MCNC: 2.8 G/DL — LOW (ref 3.3–5)
ALP SERPL-CCNC: 49 U/L — SIGNIFICANT CHANGE UP (ref 40–120)
ALT FLD-CCNC: 65 U/L — HIGH (ref 10–45)
ANION GAP SERPL CALC-SCNC: 10 MMOL/L — SIGNIFICANT CHANGE UP (ref 5–17)
AST SERPL-CCNC: 58 U/L — HIGH (ref 10–40)
BASOPHILS # BLD AUTO: 0.01 K/UL — SIGNIFICANT CHANGE UP (ref 0–0.2)
BASOPHILS NFR BLD AUTO: 0.1 % — SIGNIFICANT CHANGE UP (ref 0–2)
BILIRUB SERPL-MCNC: 0.3 MG/DL — SIGNIFICANT CHANGE UP (ref 0.2–1.2)
BUN SERPL-MCNC: 13 MG/DL — SIGNIFICANT CHANGE UP (ref 7–23)
CALCIUM SERPL-MCNC: 8.9 MG/DL — SIGNIFICANT CHANGE UP (ref 8.4–10.5)
CHLORIDE SERPL-SCNC: 106 MMOL/L — SIGNIFICANT CHANGE UP (ref 96–108)
CO2 SERPL-SCNC: 26 MMOL/L — SIGNIFICANT CHANGE UP (ref 22–31)
CREAT SERPL-MCNC: 0.72 MG/DL — SIGNIFICANT CHANGE UP (ref 0.5–1.3)
CRP SERPL-MCNC: 0.81 MG/DL — HIGH (ref 0–0.4)
D DIMER BLD IA.RAPID-MCNC: 317 NG/ML DDU — HIGH
EOSINOPHIL # BLD AUTO: 0 K/UL — SIGNIFICANT CHANGE UP (ref 0–0.5)
EOSINOPHIL NFR BLD AUTO: 0 % — SIGNIFICANT CHANGE UP (ref 0–6)
FERRITIN SERPL-MCNC: 1105 NG/ML — HIGH (ref 30–400)
GLUCOSE BLDC GLUCOMTR-MCNC: 149 MG/DL — HIGH (ref 70–99)
GLUCOSE BLDC GLUCOMTR-MCNC: 190 MG/DL — HIGH (ref 70–99)
GLUCOSE BLDC GLUCOMTR-MCNC: 217 MG/DL — HIGH (ref 70–99)
GLUCOSE BLDC GLUCOMTR-MCNC: 252 MG/DL — HIGH (ref 70–99)
GLUCOSE SERPL-MCNC: 215 MG/DL — HIGH (ref 70–99)
HCT VFR BLD CALC: 38.2 % — LOW (ref 39–50)
HGB BLD-MCNC: 12.4 G/DL — LOW (ref 13–17)
IMM GRANULOCYTES NFR BLD AUTO: 4.7 % — HIGH (ref 0–1.5)
LYMPHOCYTES # BLD AUTO: 1.1 K/UL — SIGNIFICANT CHANGE UP (ref 1–3.3)
LYMPHOCYTES # BLD AUTO: 13.6 % — SIGNIFICANT CHANGE UP (ref 13–44)
MAGNESIUM SERPL-MCNC: 2.2 MG/DL — SIGNIFICANT CHANGE UP (ref 1.6–2.6)
MCHC RBC-ENTMCNC: 26.2 PG — LOW (ref 27–34)
MCHC RBC-ENTMCNC: 32.5 GM/DL — SIGNIFICANT CHANGE UP (ref 32–36)
MCV RBC AUTO: 80.8 FL — SIGNIFICANT CHANGE UP (ref 80–100)
MONOCYTES # BLD AUTO: 0.44 K/UL — SIGNIFICANT CHANGE UP (ref 0–0.9)
MONOCYTES NFR BLD AUTO: 5.5 % — SIGNIFICANT CHANGE UP (ref 2–14)
NEUTROPHILS # BLD AUTO: 6.14 K/UL — SIGNIFICANT CHANGE UP (ref 1.8–7.4)
NEUTROPHILS NFR BLD AUTO: 76.1 % — SIGNIFICANT CHANGE UP (ref 43–77)
NRBC # BLD: 0 /100 WBCS — SIGNIFICANT CHANGE UP (ref 0–0)
PHOSPHATE SERPL-MCNC: 2.6 MG/DL — SIGNIFICANT CHANGE UP (ref 2.5–4.5)
PLATELET # BLD AUTO: 479 K/UL — HIGH (ref 150–400)
POTASSIUM SERPL-MCNC: 4 MMOL/L — SIGNIFICANT CHANGE UP (ref 3.5–5.3)
POTASSIUM SERPL-SCNC: 4 MMOL/L — SIGNIFICANT CHANGE UP (ref 3.5–5.3)
PROT SERPL-MCNC: 6 G/DL — SIGNIFICANT CHANGE UP (ref 6–8.3)
RBC # BLD: 4.73 M/UL — SIGNIFICANT CHANGE UP (ref 4.2–5.8)
RBC # FLD: 14.4 % — SIGNIFICANT CHANGE UP (ref 10.3–14.5)
SODIUM SERPL-SCNC: 142 MMOL/L — SIGNIFICANT CHANGE UP (ref 135–145)
WBC # BLD: 8.07 K/UL — SIGNIFICANT CHANGE UP (ref 3.8–10.5)
WBC # FLD AUTO: 8.07 K/UL — SIGNIFICANT CHANGE UP (ref 3.8–10.5)

## 2020-03-30 PROCEDURE — 99233 SBSQ HOSP IP/OBS HIGH 50: CPT | Mod: GC

## 2020-03-30 PROCEDURE — 71045 X-RAY EXAM CHEST 1 VIEW: CPT | Mod: 26

## 2020-03-30 RX ORDER — LABETALOL HCL 100 MG
10 TABLET ORAL ONCE
Refills: 0 | Status: COMPLETED | OUTPATIENT
Start: 2020-03-30 | End: 2020-03-30

## 2020-03-30 RX ORDER — INSULIN LISPRO 100/ML
5 VIAL (ML) SUBCUTANEOUS
Refills: 0 | Status: DISCONTINUED | OUTPATIENT
Start: 2020-03-30 | End: 2020-04-01

## 2020-03-30 RX ORDER — INSULIN GLARGINE 100 [IU]/ML
16 INJECTION, SOLUTION SUBCUTANEOUS AT BEDTIME
Refills: 0 | Status: DISCONTINUED | OUTPATIENT
Start: 2020-03-30 | End: 2020-04-01

## 2020-03-30 RX ADMIN — SENNA PLUS 10 MILLILITER(S): 8.6 TABLET ORAL at 11:47

## 2020-03-30 RX ADMIN — Medication 10 MILLIGRAM(S): at 18:13

## 2020-03-30 RX ADMIN — Medication 2: at 07:25

## 2020-03-30 RX ADMIN — Medication 6: at 12:21

## 2020-03-30 RX ADMIN — Medication 3 UNIT(S): at 12:21

## 2020-03-30 RX ADMIN — Medication 10 MILLIGRAM(S): at 19:01

## 2020-03-30 RX ADMIN — DORZOLAMIDE HYDROCHLORIDE TIMOLOL MALEATE 1 DROP(S): 20; 5 SOLUTION/ DROPS OPHTHALMIC at 16:52

## 2020-03-30 RX ADMIN — GABAPENTIN 300 MILLIGRAM(S): 400 CAPSULE ORAL at 16:50

## 2020-03-30 RX ADMIN — BRIMONIDINE TARTRATE 1 DROP(S): 2 SOLUTION/ DROPS OPHTHALMIC at 06:32

## 2020-03-30 RX ADMIN — Medication 3 UNIT(S): at 07:25

## 2020-03-30 RX ADMIN — FAMOTIDINE 20 MILLIGRAM(S): 10 INJECTION INTRAVENOUS at 16:50

## 2020-03-30 RX ADMIN — Medication 650 MILLIGRAM(S): at 11:46

## 2020-03-30 RX ADMIN — AMLODIPINE BESYLATE 5 MILLIGRAM(S): 2.5 TABLET ORAL at 16:48

## 2020-03-30 RX ADMIN — Medication 40 MILLIGRAM(S): at 05:40

## 2020-03-30 RX ADMIN — ENOXAPARIN SODIUM 40 MILLIGRAM(S): 100 INJECTION SUBCUTANEOUS at 22:18

## 2020-03-30 RX ADMIN — LATANOPROST 1 DROP(S): 0.05 SOLUTION/ DROPS OPHTHALMIC; TOPICAL at 22:53

## 2020-03-30 RX ADMIN — ATORVASTATIN CALCIUM 20 MILLIGRAM(S): 80 TABLET, FILM COATED ORAL at 22:20

## 2020-03-30 RX ADMIN — Medication 5 UNIT(S): at 17:00

## 2020-03-30 RX ADMIN — Medication 75 MICROGRAM(S): at 05:41

## 2020-03-30 RX ADMIN — BRIMONIDINE TARTRATE 1 DROP(S): 2 SOLUTION/ DROPS OPHTHALMIC at 16:51

## 2020-03-30 RX ADMIN — GABAPENTIN 300 MILLIGRAM(S): 400 CAPSULE ORAL at 05:40

## 2020-03-30 RX ADMIN — Medication 4: at 22:18

## 2020-03-30 RX ADMIN — QUETIAPINE FUMARATE 800 MILLIGRAM(S): 200 TABLET, FILM COATED ORAL at 22:18

## 2020-03-30 RX ADMIN — Medication 650 MILLIGRAM(S): at 06:40

## 2020-03-30 RX ADMIN — Medication 40 MILLIGRAM(S): at 16:51

## 2020-03-30 RX ADMIN — DORZOLAMIDE HYDROCHLORIDE TIMOLOL MALEATE 1 DROP(S): 20; 5 SOLUTION/ DROPS OPHTHALMIC at 11:47

## 2020-03-30 RX ADMIN — POLYETHYLENE GLYCOL 3350 17 GRAM(S): 17 POWDER, FOR SOLUTION ORAL at 11:47

## 2020-03-30 RX ADMIN — Medication 650 MILLIGRAM(S): at 16:48

## 2020-03-30 RX ADMIN — Medication 48 MILLIGRAM(S): at 11:47

## 2020-03-30 RX ADMIN — Medication 100 MILLIGRAM(S): at 06:32

## 2020-03-30 RX ADMIN — INSULIN GLARGINE 16 UNIT(S): 100 INJECTION, SOLUTION SUBCUTANEOUS at 22:18

## 2020-03-30 RX ADMIN — Medication 650 MILLIGRAM(S): at 05:40

## 2020-03-30 RX ADMIN — FAMOTIDINE 20 MILLIGRAM(S): 10 INJECTION INTRAVENOUS at 05:41

## 2020-03-30 NOTE — PROGRESS NOTE ADULT - ASSESSMENT
79 M, poor historian, with past medical history of HTN, DM2, and hypothyroidism who presents to ED BIBEMS after being found down for a fall, found to have starvation ketosis now resolved, was desatting with imaging findings on cxr and found to be covid +, now continuing on oxygen support on RMF

## 2020-03-30 NOTE — CHART NOTE - NSCHARTNOTEFT_GEN_A_CORE
Admitting Diagnosis:   Patient is a 79y old  Male who presents with a chief complaint of weakness (29 Mar 2020 08:54)      PAST MEDICAL & SURGICAL HISTORY:  Diabetes mellitus, type 2  Hypothyroid  Essential hypertension: Hypertension  H/O thyroidectomy  Other postprocedural status: left total knee  replacement one   yr.   ago      Current Nutrition Order:   Diet, DASH/TLC:   Sodium & Cholesterol Restricted  Consistent Carbohydrate {No Snacks} (CSTCHO) (03-30-20 @ 08:59)      PO Intake: Good (%) [   ]  Fair (50-75%) [ x  ] Poor (<25%) [   ]- per team    GI Issues: +diarrhea, no n/v/d- per flow sheet    Pain: no reported pain per EMR    Skin Integrity: intact - per flow sheet     Labs:   03-30    142  |  106  |  13  ----------------------------<  215<H>  4.0   |  26  |  0.72    Ca    8.9      30 Mar 2020 09:23  Phos  2.6     03-30  Mg     2.2     03-30    TPro  6.0  /  Alb  2.8<L>  /  TBili  0.3  /  DBili  x   /  AST  58<H>  /  ALT  65<H>  /  AlkPhos  49  03-30    CAPILLARY BLOOD GLUCOSE      POCT Blood Glucose.: 190 mg/dL (30 Mar 2020 06:50)  POCT Blood Glucose.: 224 mg/dL (29 Mar 2020 21:55)  POCT Blood Glucose.: 272 mg/dL (29 Mar 2020 17:17)      Medications:  MEDICATIONS  (STANDING):  acetaminophen   Tablet .. 650 milliGRAM(s) Oral every 6 hours  amLODIPine   Tablet 5 milliGRAM(s) Oral every 24 hours  atorvastatin 20 milliGRAM(s) Oral at bedtime  brimonidine 0.2% Ophthalmic Solution 1 Drop(s) Both EYES two times a day  dextrose 50% Injectable 12.5 Gram(s) IV Push once  dextrose 50% Injectable 25 Gram(s) IV Push once  dextrose 50% Injectable 25 Gram(s) IV Push once  dorzolamide 2%/timolol 0.5% Ophthalmic Solution 1 Drop(s) Both EYES two times a day  enoxaparin Injectable 40 milliGRAM(s) SubCutaneous every 24 hours  famotidine    Tablet 20 milliGRAM(s) Oral two times a day  fenofibrate Tablet 48 milliGRAM(s) Oral daily  gabapentin 300 milliGRAM(s) Oral two times a day  insulin glargine Injectable (LANTUS) 10 Unit(s) SubCutaneous at bedtime  insulin lispro (HumaLOG) corrective regimen sliding scale   SubCutaneous Before meals and at bedtime  insulin lispro Injectable (HumaLOG) 3 Unit(s) SubCutaneous three times a day before meals  latanoprost 0.005% Ophthalmic Solution 1 Drop(s) Both EYES at bedtime  levothyroxine 75 MICROGram(s) Oral daily  methylPREDNISolone sodium succinate Injectable 40 milliGRAM(s) IV Push every 12 hours  metoprolol succinate  milliGRAM(s) Oral daily  polyethylene glycol 3350 17 Gram(s) Oral daily  QUEtiapine 800 milliGRAM(s) Oral at bedtime  senna Syrup 10 milliLiter(s) Oral daily    MEDICATIONS  (PRN):  bisacodyl Suppository 10 milliGRAM(s) Rectal daily PRN Constipation  dextrose 40% Gel 15 Gram(s) Oral once PRN Blood Glucose LESS THAN 70 milliGRAM(s)/deciliter  glucagon  Injectable 1 milliGRAM(s) IntraMuscular once PRN Glucose LESS THAN 70 milligrams/deciliter      Weight: 79.5kg     Weight Change: no new wts, please trend as feasible     Nutrition Focused Physical Exam: Completed [   ]  Not Pertinent [   ]- unable to perform 2/2 pt status     Estimated energy needs:   ABW used for calculations as pt between % of IBW.   ABW 79.5kg, IBW 72.7kg, 109% IBW, ht 69" (per report, no ht recorded in EMR), BMI 25.9   Adjusted for adult demands and increased needs for fevers/ COVID demands, fluid per team   1982-2379kcal (25-30kcal/kg)   79-95g pro (1-1.2g/kg pro)     Subjective:   80y/o male with history of HTN, Type 2 DM (A1C 10.6), Hypothyroidism, Weakness and decreased po x 1week.Admitted with severe sepsis/hyperglycemia/fevers/weakness 2/2 decreased po and now + COVID. RD spoke to patient over the phone on initial assessment due to isolation needs due to COVID. Writer attempted to call pt via room phone and cell phone, cell phone turned off and did not  room phone x2, information obtained from EMR and team. Discussed addition of Cst Cho diet with team this AM, added to DASH. Per report pt refusing meds, NRB and NC noting makes him uncomfortable. Pt eager to go home. Will continue to follow per protocol.     Previous Nutrition Diagnosis: Inadequate oral intake r/t decreased appetite 2/2 COVID AEB <25% EER PTA    Active [ x  ]  Resolved [   ]    If resolved, new PES:     Goal:  meet >75% of needs consistently    Recommendations:  1. Honor food preferences within guidelines   2. Manage pain prn   3. Monitor and replete lytes   4. Trend wts   5. Reinforce ed as needed     Education: unable to educate as pt did not answer phone upon multiple attempts. Unable to educate in person 2/2 pt status    Risk Level: High [   ] Moderate [ x  ] Low [   ]

## 2020-03-30 NOTE — PROGRESS NOTE ADULT - PROBLEM SELECTOR PLAN 5
- cw amlodipine 5mg, pt refusing meds despite logical explations to patient by staff  - Monitor pressure    #Insomnia/Delirium   -Restarted on home Seroquel 800mg @ bedtime

## 2020-03-30 NOTE — PROGRESS NOTE ADULT - PROBLEM SELECTOR PLAN 2
Viral pneumonia 2/2 COVID- See plan above  -Reswab in 03/31/2020, pt is from [facility] side    #Acute hypoxic respiratory failure  - wean as tolerates with sp02 goal of 95%  - refusing to wear nonrebreather properly at times  - OOBTC

## 2020-03-30 NOTE — PROGRESS NOTE ADULT - SUBJECTIVE AND OBJECTIVE BOX
OVERNIGHT EVENTS: NAEO    SUBJECTIVE: Pt continues to take of nonrebreather. In AM, asking to eat because he was hungry. Was desatting to mid 80s with mask off, was put back in place, up to 92 o2 sat. Pt was nonlabored and felt okay, just tired, was much happier after eat. Seen later in afternoon and was comfortable on NC eating lunch. Denies fcnv, chest pain, abd pain, dysuria, diarrhea.    Vital Signs Last 12 Hrs  T(F): 96.1 (03-30-20 @ 09:06), Max: 97 (03-30-20 @ 01:17)  HR: 76 (03-30-20 @ 11:52) (68 - 76)  BP: 166/77 (03-30-20 @ 11:52) (139/91 - 173/97)  BP(mean): --  RR: 18 (03-30-20 @ 11:52) (18 - 18)  SpO2: 92% (03-30-20 @ 11:52) (91% - 94%)  I&O's Summary      PHYSICAL EXAM:  General: WDWN  HEENT: NC/AT; anicteric sclera; MMM  Neck: supple  Cardiovascular: +S1/S2; RRR  Respiratory: bibasilar crackles, no w/r   Gastrointestinal: distended abdomen but soft and nontender  Extremities: WWP; no edema, clubbing or cyanosis  Vascular: 2+ radial, DP/PT pulses B/L  Neurological: AAOx3; no focal deficits        LABS:                        12.4   8.07  )-----------( 479      ( 30 Mar 2020 09:23 )             38.2     03-30    142  |  106  |  13  ----------------------------<  215<H>  4.0   |  26  |  0.72    Ca    8.9      30 Mar 2020 09:23  Phos  2.6     03-30  Mg     2.2     03-30    TPro  6.0  /  Alb  2.8<L>  /  TBili  0.3  /  DBili  x   /  AST  58<H>  /  ALT  65<H>  /  AlkPhos  49  03-30    RADIOLOGY & ADDITIONAL TESTS:    MEDICATIONS  (STANDING):  acetaminophen   Tablet .. 650 milliGRAM(s) Oral every 6 hours  amLODIPine   Tablet 5 milliGRAM(s) Oral every 24 hours  atorvastatin 20 milliGRAM(s) Oral at bedtime  brimonidine 0.2% Ophthalmic Solution 1 Drop(s) Both EYES two times a day  dextrose 50% Injectable 12.5 Gram(s) IV Push once  dextrose 50% Injectable 25 Gram(s) IV Push once  dextrose 50% Injectable 25 Gram(s) IV Push once  dorzolamide 2%/timolol 0.5% Ophthalmic Solution 1 Drop(s) Both EYES two times a day  enoxaparin Injectable 40 milliGRAM(s) SubCutaneous every 24 hours  famotidine    Tablet 20 milliGRAM(s) Oral two times a day  fenofibrate Tablet 48 milliGRAM(s) Oral daily  gabapentin 300 milliGRAM(s) Oral two times a day  insulin glargine Injectable (LANTUS) 10 Unit(s) SubCutaneous at bedtime  insulin lispro (HumaLOG) corrective regimen sliding scale   SubCutaneous Before meals and at bedtime  insulin lispro Injectable (HumaLOG) 3 Unit(s) SubCutaneous three times a day before meals  latanoprost 0.005% Ophthalmic Solution 1 Drop(s) Both EYES at bedtime  levothyroxine 75 MICROGram(s) Oral daily  methylPREDNISolone sodium succinate Injectable 40 milliGRAM(s) IV Push every 12 hours  metoprolol succinate  milliGRAM(s) Oral daily  polyethylene glycol 3350 17 Gram(s) Oral daily  QUEtiapine 800 milliGRAM(s) Oral at bedtime  senna Syrup 10 milliLiter(s) Oral daily    MEDICATIONS  (PRN):  bisacodyl Suppository 10 milliGRAM(s) Rectal daily PRN Constipation  dextrose 40% Gel 15 Gram(s) Oral once PRN Blood Glucose LESS THAN 70 milliGRAM(s)/deciliter  glucagon  Injectable 1 milliGRAM(s) IntraMuscular once PRN Glucose LESS THAN 70 milligrams/deciliter

## 2020-03-30 NOTE — PROGRESS NOTE ADULT - PROBLEM SELECTOR PLAN 4
Hx of diabetes. A1c 10 here, sugars high. Previously recorded as on Januvia and glucophage. No diabetes medications at [facility], may use more than one pharmacy. Patient poor historian does not know what he takes at home.  - on steroids from (3/27 - )  - lantus up to 10U  - 3U premeal  - MISS  - titrate insulin as needed, keep in mind steroids will end.   - consistent carb diet  - out pt Endocrine f/u

## 2020-03-31 LAB
ALBUMIN SERPL ELPH-MCNC: 3.1 G/DL — LOW (ref 3.3–5)
ALP SERPL-CCNC: 52 U/L — SIGNIFICANT CHANGE UP (ref 40–120)
ALT FLD-CCNC: SIGNIFICANT CHANGE UP U/L (ref 10–45)
ANION GAP SERPL CALC-SCNC: 15 MMOL/L — SIGNIFICANT CHANGE UP (ref 5–17)
AST SERPL-CCNC: SIGNIFICANT CHANGE UP U/L (ref 10–40)
BASOPHILS # BLD AUTO: 0.02 K/UL — SIGNIFICANT CHANGE UP (ref 0–0.2)
BASOPHILS NFR BLD AUTO: 0.2 % — SIGNIFICANT CHANGE UP (ref 0–2)
BILIRUB SERPL-MCNC: 0.3 MG/DL — SIGNIFICANT CHANGE UP (ref 0.2–1.2)
BUN SERPL-MCNC: 12 MG/DL — SIGNIFICANT CHANGE UP (ref 7–23)
CALCIUM SERPL-MCNC: 9.4 MG/DL — SIGNIFICANT CHANGE UP (ref 8.4–10.5)
CHLORIDE SERPL-SCNC: 102 MMOL/L — SIGNIFICANT CHANGE UP (ref 96–108)
CO2 SERPL-SCNC: 25 MMOL/L — SIGNIFICANT CHANGE UP (ref 22–31)
CREAT SERPL-MCNC: 0.66 MG/DL — SIGNIFICANT CHANGE UP (ref 0.5–1.3)
CRP SERPL-MCNC: 0.5 MG/DL — HIGH (ref 0–0.4)
D DIMER BLD IA.RAPID-MCNC: 334 NG/ML DDU — HIGH
EOSINOPHIL # BLD AUTO: 0.01 K/UL — SIGNIFICANT CHANGE UP (ref 0–0.5)
EOSINOPHIL NFR BLD AUTO: 0.1 % — SIGNIFICANT CHANGE UP (ref 0–6)
FERRITIN SERPL-MCNC: 970 NG/ML — HIGH (ref 30–400)
GLUCOSE BLDC GLUCOMTR-MCNC: 137 MG/DL — HIGH (ref 70–99)
GLUCOSE BLDC GLUCOMTR-MCNC: 160 MG/DL — HIGH (ref 70–99)
GLUCOSE BLDC GLUCOMTR-MCNC: 175 MG/DL — HIGH (ref 70–99)
GLUCOSE BLDC GLUCOMTR-MCNC: 231 MG/DL — HIGH (ref 70–99)
GLUCOSE SERPL-MCNC: 161 MG/DL — HIGH (ref 70–99)
HCT VFR BLD CALC: 42 % — SIGNIFICANT CHANGE UP (ref 39–50)
HGB BLD-MCNC: 13.3 G/DL — SIGNIFICANT CHANGE UP (ref 13–17)
IMM GRANULOCYTES NFR BLD AUTO: 5 % — HIGH (ref 0–1.5)
LDH SERPL L TO P-CCNC: SIGNIFICANT CHANGE UP U/L (ref 50–242)
LYMPHOCYTES # BLD AUTO: 1.45 K/UL — SIGNIFICANT CHANGE UP (ref 1–3.3)
LYMPHOCYTES # BLD AUTO: 16.1 % — SIGNIFICANT CHANGE UP (ref 13–44)
MAGNESIUM SERPL-MCNC: 2.2 MG/DL — SIGNIFICANT CHANGE UP (ref 1.6–2.6)
MCHC RBC-ENTMCNC: 26.3 PG — LOW (ref 27–34)
MCHC RBC-ENTMCNC: 31.7 GM/DL — LOW (ref 32–36)
MCV RBC AUTO: 83.2 FL — SIGNIFICANT CHANGE UP (ref 80–100)
MONOCYTES # BLD AUTO: 0.57 K/UL — SIGNIFICANT CHANGE UP (ref 0–0.9)
MONOCYTES NFR BLD AUTO: 6.3 % — SIGNIFICANT CHANGE UP (ref 2–14)
NEUTROPHILS # BLD AUTO: 6.53 K/UL — SIGNIFICANT CHANGE UP (ref 1.8–7.4)
NEUTROPHILS NFR BLD AUTO: 72.3 % — SIGNIFICANT CHANGE UP (ref 43–77)
NRBC # BLD: 0 /100 WBCS — SIGNIFICANT CHANGE UP (ref 0–0)
PHOSPHATE SERPL-MCNC: 3.4 MG/DL — SIGNIFICANT CHANGE UP (ref 2.5–4.5)
PLATELET # BLD AUTO: 485 K/UL — HIGH (ref 150–400)
POTASSIUM SERPL-MCNC: SIGNIFICANT CHANGE UP MMOL/L (ref 3.5–5.3)
POTASSIUM SERPL-SCNC: SIGNIFICANT CHANGE UP MMOL/L (ref 3.5–5.3)
PROT SERPL-MCNC: 6.5 G/DL — SIGNIFICANT CHANGE UP (ref 6–8.3)
RBC # BLD: 5.05 M/UL — SIGNIFICANT CHANGE UP (ref 4.2–5.8)
RBC # FLD: 14.5 % — SIGNIFICANT CHANGE UP (ref 10.3–14.5)
SARS-COV-2 RNA SPEC QL NAA+PROBE: DETECTED
SODIUM SERPL-SCNC: 142 MMOL/L — SIGNIFICANT CHANGE UP (ref 135–145)
WBC # BLD: 9.03 K/UL — SIGNIFICANT CHANGE UP (ref 3.8–10.5)
WBC # FLD AUTO: 9.03 K/UL — SIGNIFICANT CHANGE UP (ref 3.8–10.5)

## 2020-03-31 PROCEDURE — 71045 X-RAY EXAM CHEST 1 VIEW: CPT | Mod: 26

## 2020-03-31 PROCEDURE — 99233 SBSQ HOSP IP/OBS HIGH 50: CPT | Mod: GC

## 2020-03-31 RX ORDER — HYDRALAZINE HCL 50 MG
5 TABLET ORAL ONCE
Refills: 0 | Status: COMPLETED | OUTPATIENT
Start: 2020-03-31 | End: 2020-03-31

## 2020-03-31 RX ORDER — AMLODIPINE BESYLATE 2.5 MG/1
10 TABLET ORAL EVERY 24 HOURS
Refills: 0 | Status: DISCONTINUED | OUTPATIENT
Start: 2020-03-31 | End: 2020-04-16

## 2020-03-31 RX ADMIN — Medication 40 MILLIGRAM(S): at 16:31

## 2020-03-31 RX ADMIN — FAMOTIDINE 20 MILLIGRAM(S): 10 INJECTION INTRAVENOUS at 16:30

## 2020-03-31 RX ADMIN — Medication 5 UNIT(S): at 07:03

## 2020-03-31 RX ADMIN — INSULIN GLARGINE 16 UNIT(S): 100 INJECTION, SOLUTION SUBCUTANEOUS at 22:07

## 2020-03-31 RX ADMIN — Medication 5 UNIT(S): at 16:51

## 2020-03-31 RX ADMIN — Medication 4: at 11:54

## 2020-03-31 RX ADMIN — Medication 40 MILLIGRAM(S): at 05:20

## 2020-03-31 RX ADMIN — QUETIAPINE FUMARATE 800 MILLIGRAM(S): 200 TABLET, FILM COATED ORAL at 22:08

## 2020-03-31 RX ADMIN — Medication 75 MICROGRAM(S): at 05:19

## 2020-03-31 RX ADMIN — Medication 2: at 16:49

## 2020-03-31 RX ADMIN — DORZOLAMIDE HYDROCHLORIDE TIMOLOL MALEATE 1 DROP(S): 20; 5 SOLUTION/ DROPS OPHTHALMIC at 16:32

## 2020-03-31 RX ADMIN — ATORVASTATIN CALCIUM 20 MILLIGRAM(S): 80 TABLET, FILM COATED ORAL at 22:09

## 2020-03-31 RX ADMIN — GABAPENTIN 300 MILLIGRAM(S): 400 CAPSULE ORAL at 16:30

## 2020-03-31 RX ADMIN — Medication 650 MILLIGRAM(S): at 16:30

## 2020-03-31 RX ADMIN — Medication 2: at 07:03

## 2020-03-31 RX ADMIN — ENOXAPARIN SODIUM 40 MILLIGRAM(S): 100 INJECTION SUBCUTANEOUS at 22:08

## 2020-03-31 RX ADMIN — Medication 650 MILLIGRAM(S): at 22:08

## 2020-03-31 RX ADMIN — BRIMONIDINE TARTRATE 1 DROP(S): 2 SOLUTION/ DROPS OPHTHALMIC at 06:12

## 2020-03-31 RX ADMIN — Medication 5 MILLIGRAM(S): at 20:20

## 2020-03-31 RX ADMIN — DORZOLAMIDE HYDROCHLORIDE TIMOLOL MALEATE 1 DROP(S): 20; 5 SOLUTION/ DROPS OPHTHALMIC at 06:12

## 2020-03-31 RX ADMIN — SENNA PLUS 10 MILLILITER(S): 8.6 TABLET ORAL at 11:55

## 2020-03-31 RX ADMIN — FAMOTIDINE 20 MILLIGRAM(S): 10 INJECTION INTRAVENOUS at 05:20

## 2020-03-31 RX ADMIN — AMLODIPINE BESYLATE 10 MILLIGRAM(S): 2.5 TABLET ORAL at 16:30

## 2020-03-31 RX ADMIN — Medication 5 UNIT(S): at 11:54

## 2020-03-31 RX ADMIN — POLYETHYLENE GLYCOL 3350 17 GRAM(S): 17 POWDER, FOR SOLUTION ORAL at 11:54

## 2020-03-31 RX ADMIN — Medication 5 MILLIGRAM(S): at 09:27

## 2020-03-31 RX ADMIN — Medication 100 MILLIGRAM(S): at 05:20

## 2020-03-31 RX ADMIN — BRIMONIDINE TARTRATE 1 DROP(S): 2 SOLUTION/ DROPS OPHTHALMIC at 16:32

## 2020-03-31 RX ADMIN — Medication 48 MILLIGRAM(S): at 11:54

## 2020-03-31 RX ADMIN — Medication 650 MILLIGRAM(S): at 09:27

## 2020-03-31 RX ADMIN — GABAPENTIN 300 MILLIGRAM(S): 400 CAPSULE ORAL at 05:20

## 2020-03-31 NOTE — PROGRESS NOTE ADULT - PROBLEM SELECTOR PLAN 1
Severe sepsis 2/2 viral pneumonia and COVID 19   -See plan below     #SARS- Coronavirus COVID 19 PCR postive   - s/p Azithro, plaquenil, ascorbic acid, thiamine  - S/p Tocilzumab 400mg x 1 (3/27)   - C/w Solumedrol 40mg BID (3/27 PM - 4/1), for 5-7 days. Last day 4/1  - Supplemental O2 prn

## 2020-03-31 NOTE — PROGRESS NOTE ADULT - ASSESSMENT
per Internal Medicine    79 M, park historian, with past medical history of HTN, DM2, and hypothyroidism who presents to ED [facility] after being found down for a fall, found to have starvation ketosis now resolved, was desatting with imaging findings on cxr and found to be covid +, now continuing on oxygen support on RMF      Problem/Plan - 1:  ·  Problem: Severe sepsis.  Plan: Severe sepsis 2/2 viral pneumonia and COVID 19   -See plan below     #SARS- Coronavirus COVID 19 PCR postive   - s/p Azithro, plaquenil, ascorbic acid, thiamine  - S/p Tocilzumab 400mg x 1 (3/27)   - C/w Solumedrol 40mg BID (3/27 PM - 4/1), for 5-7 days. Last day 4/1  - Supplemental O2 prn.     Problem/Plan - 2:  ·  Problem: Pneumonia.  Plan: Viral pneumonia 2/2 COVID- See plan above  -Reswab in 03/31/2020, pt is from [facility]    #Acute hypoxic respiratory failure  - wean as tolerates with sp02 goal of 95%  - refusing to wear nonrebreather properly at times  - OOBTC.     Problem/Plan - 3:  ·  Problem: Weakness.  Plan: Likely 2/2 dehydration in setting of poor PO intake vs starvation ketoacidosis vs infectious etiology in setting of recent fever. Patient with couple week history of poor PO intake and only juice. Denies sick contacts. s/p 3L of NS in ED.  CT Head: No acute intracranial hemorrhage and Xray pelvis/hip: No acute osseous injury  Creatinine kinase: slightly elevated, 494  - physical therapy: will benefit from continued PT to address above deficits as tolerated in order to maximize functional independence and ensure safe D/C to home.  - TSH WNL  - encourage PO intake  - PT final recs.     Problem/Plan - 4:  ·  Problem: Diabetes.  Plan: Hx of diabetes. A1c 10 here, sugars high. Previously recorded as on Januvia and glucophage. No diabetes medications at [facility], may use more than one pharmacy. Patient park historian does not know what he takes at home.  - on steroids from (3/27 - 4/1)  - lantus up to 16U  - 5U premeal  - MISS  - titrate insulin as needed, keep in mind steroids will end.   - consistent carb diet  - out pt Endocrine f/u.     Problem/Plan - 5:  ·  Problem: Essential hypertension.  Plan: Pt received labetalol 10mg x2 and hydral on 3/30 and 3/31 for BPs ~190/100 in setting of refusing amlodipine day prior.  - Monitor pressure  - amlodipine up to 10mg on 3/31    #Insomnia/Delirium   -Restarted on home Seroquel 800mg @ bedtime.     Problem/Plan - 6:  Problem: Hypothyroidism. Plan: - c/w home synthroid 75mcg daily  - TSH WNL.    Problem/Plan - 7:  ·  Problem: Hyperlipidemia.  Plan: - c/w home atorvastatin 20mg qhs.     Problem/Plan - 8:  ·  Problem: Nutrition, metabolism, and development symptoms.  Plan: F: None   E: Replete PRN for Mg<2 and K<4  N: DASH Diet (carb consistent).     Problem/Plan - 9:  ·  Problem: Prophylactic measure.  Plan: DVT PPx: Lovenox subq daily  GI PPx: Famotidine 20mg    Transition of Care:  - f/u with PCP Dr. Diya Padilla  - f/u with full med rec    DISPO: COVID RMF  COD: FULL.

## 2020-03-31 NOTE — PROGRESS NOTE ADULT - SUBJECTIVE AND OBJECTIVE BOX
Physical Medicine and Rehabilitation Progress Note:    Patient is a 79y old  Male who presents with a chief complaint of weakness (31 Mar 2020 11:28)      HPI:  79 M, poor historian, with past medical history of HTN, DM2, and hypothyroidism who presents to ED [facility] after being found down for a fall. Patient states that his bed is tilted and that he slid off his bed and was unable to get up. Patient states that it happened in the last few days. Per ED provider, the patient was potentially down from yesterday evening to this morning and was found by workers at his assisted living home. It is unclear what kind of home, but he says single occupancy group home of some type. He does not have a rollator, walker, or cane at home. Patient denies hitting his head, loss of consciousness, or previous falls. Patient states that in past few weeks he has not been eating as much as he does not like the food around his home. He has mainly been drinking juices at home. Patient currently denies fever, headache, nausea, vomiting, chest pain, shortness of breath, abdominal pain. Patient does not feel as strong as usual. He states that he has bowel movements every few days and has had no changes in urination. Denies sick contacts or travel history.    ED Vitals: T 98.3, P100, /98, RR 17, O2 95% on RA ->> T 101.1 P 91 /89 RR 20 O2 89% on RA  ED Course: Glucose 366-> 240s. Patient received 3 L NS With improvement in glucose. Received Tylenol for fever. Swabbed for RVP, COVID, blood cultures. CXR, prelim wet read, abnormal with left lower lobe possible consolidation and right reticulonodular pattern consistent with infectious vs inflammatory process, new since 2018 (old CT). [facility] in setting of unknown fever. (23 Mar 2020 18:15)                            13.3   9.03  )-----------( 485      ( 31 Mar 2020 08:08 )             42.0       03-31    142  |  102  |  12  ----------------------------<  161<H>  See note   |  25  |  0.66    Ca    9.4      31 Mar 2020 08:08  Phos  3.4     03-31  Mg     2.2     03-31    TPro  6.5  /  Alb  3.1<L>  /  TBili  0.3  /  DBili  x   /  AST  See note  /  ALT  See note  /  AlkPhos  52  03-31    Vital Signs Last 24 Hrs  T(C): 36.4 (31 Mar 2020 08:35), Max: 37.2 (30 Mar 2020 20:41)  T(F): 97.6 (31 Mar 2020 08:35), Max: 98.9 (30 Mar 2020 20:41)  HR: 69 (31 Mar 2020 12:06) (66 - 69)  BP: 157/92 (31 Mar 2020 12:06) (138/68 - 193/104)  BP(mean): --  RR: 18 (31 Mar 2020 12:06) (18 - 18)  SpO2: 97% (31 Mar 2020 12:06) (91% - 97%)    MEDICATIONS  (STANDING):  acetaminophen   Tablet .. 650 milliGRAM(s) Oral every 6 hours  amLODIPine   Tablet 10 milliGRAM(s) Oral every 24 hours  atorvastatin 20 milliGRAM(s) Oral at bedtime  brimonidine 0.2% Ophthalmic Solution 1 Drop(s) Both EYES two times a day  dextrose 50% Injectable 12.5 Gram(s) IV Push once  dextrose 50% Injectable 25 Gram(s) IV Push once  dextrose 50% Injectable 25 Gram(s) IV Push once  dorzolamide 2%/timolol 0.5% Ophthalmic Solution 1 Drop(s) Both EYES two times a day  enoxaparin Injectable 40 milliGRAM(s) SubCutaneous every 24 hours  famotidine    Tablet 20 milliGRAM(s) Oral two times a day  fenofibrate Tablet 48 milliGRAM(s) Oral daily  gabapentin 300 milliGRAM(s) Oral two times a day  insulin glargine Injectable (LANTUS) 16 Unit(s) SubCutaneous at bedtime  insulin lispro (HumaLOG) corrective regimen sliding scale   SubCutaneous Before meals and at bedtime  insulin lispro Injectable (HumaLOG) 5 Unit(s) SubCutaneous three times a day before meals  latanoprost 0.005% Ophthalmic Solution 1 Drop(s) Both EYES at bedtime  levothyroxine 75 MICROGram(s) Oral daily  methylPREDNISolone sodium succinate Injectable 40 milliGRAM(s) IV Push every 12 hours  metoprolol succinate  milliGRAM(s) Oral daily  polyethylene glycol 3350 17 Gram(s) Oral daily  QUEtiapine 800 milliGRAM(s) Oral at bedtime  senna Syrup 10 milliLiter(s) Oral daily    MEDICATIONS  (PRN):  bisacodyl Suppository 10 milliGRAM(s) Rectal daily PRN Constipation  dextrose 40% Gel 15 Gram(s) Oral once PRN Blood Glucose LESS THAN 70 milliGRAM(s)/deciliter  glucagon  Injectable 1 milliGRAM(s) IntraMuscular once PRN Glucose LESS THAN 70 milligrams/deciliter    Currently Undergoing Physical Therapy at bedside.    Functional Status Assessment:  3/30/2020      Therapeutic Interventions      Bed Mobility  Bed Mobility Training Sit-to-Supine: minimum assist (75% patient effort);  1 person assist;  bed rails  Bed Mobility Training Supine-to-Sit: minimum assist (75% patient effort);  1 person assist;  bed rails  Bed Mobility Training Limitations: decreased strength    Sit-Stand Transfer Training  Transfer Training Sit-to-Stand Transfer: minimum assist (75% patient effort);  2 person assist;  verbal cues;  weight-bearing as tolerated   (B) handheld assist   Transfer Training Stand-to-Sit Transfer: minimum assist (75% patient effort);  1 person assist;  verbal cues;  weight-bearing as tolerated   (B) handheld assist   Sit-to-Stand Transfer Training Transfer Safety Analysis: decreased strength;  impaired balance    Gait Training  Gait Training: minimum assist (75% patient effort);  2 person assist;  verbal cues;  weight-bearing as tolerated   (B) handheld assist ;  Patient stood 2 x this session. First trial: took 10 marching steps in place. Second trial: 4 side steps along EOB. All completed with min assist x 2 person, (B) handheld assist.   Gait Analysis: swing-to gait   decreased juan francisco;  decreased step length;  decreased strength;  impaired balance;  impaired endurance    Therapeutic Exercise  Therapeutic Exercise Detail: Seated at EOB: knee extension x 10 reps             PM&R Impression: as above    Current Disposition Plan Recommendations: subacute rehab placement

## 2020-03-31 NOTE — PROGRESS NOTE ADULT - PROBLEM SELECTOR PLAN 5
Pt received labetalol 10mg x2 and hydral on 3/30 and 3/31 for BPs ~190/100 in setting of refusing amlodipine day prior.  - Monitor pressure  - amlodipine up to 10mg on 3/31    #Insomnia/Delirium   -Restarted on home Seroquel 800mg @ bedtime

## 2020-03-31 NOTE — PROGRESS NOTE ADULT - SUBJECTIVE AND OBJECTIVE BOX
***Pt continues to take off oxygen support at times, very difficult to accurately assess o2 requirements throughout the day without actively watchign patient over 5 minutes with constant reinforcement to keep on NC or NRB. Flowsheets difficult to keep accurate. Pt never having labored breathing, even when off o2 support, will desat to mid 80s though. Was comfortable on 6L NC this AM with sats ~94.    OVERNIGHT EVENTS: Yesterday with BPs from 170 SBP to 190, given IV labetalol 10mg x2 over 1 hr. Repeat BP in PM SBP 150s. Early AM with BPs again in 170s-190. Given hydral 5IV with f/u BPs 160s. Will start amlodipine 10mg daily and titrate up from there.    SUBJECTIVE: Pt examined, offers no complaints, was on nasal cannula 6L satting 92. Denies cp, sob, abd pain, headaches, lightheadedness, changes in vision, numbness/weakness/tingling.    Vital Signs Last 12 Hrs  T(F): 97.6 (03-31-20 @ 08:35), Max: 97.7 (03-31-20 @ 00:31)  HR: 67 (03-31-20 @ 08:35) (66 - 67)  BP: 167/88 (03-31-20 @ 09:32) (156/96 - 191/97)  BP(mean): --  RR: 18 (03-31-20 @ 09:44) (18 - 18)  SpO2: 93% (03-31-20 @ 09:44) (91% - 94%)  I&O's Summary    30 Mar 2020 07:01  -  31 Mar 2020 07:00  --------------------------------------------------------  IN: 0 mL / OUT: 650 mL / NET: -650 mL    PHYSICAL EXAM:  General: WDWN  HEENT: NC/AT; anicteric sclera; MMM  Neck: supple  Cardiovascular: +S1/S2; RRR  Respiratory: bibasilar crackles, no w/r   Gastrointestinal: distended abdomen but soft and nontender  Extremities: WWP; no edema, clubbing or cyanosis  Vascular: 2+ radial, DP/PT pulses B/L  Neurological: AAOx3; no focal deficits          LABS:                        13.3   9.03  )-----------( 485      ( 31 Mar 2020 08:08 )             42.0     03-31    142  |  102  |  12  ----------------------------<  161<H>  See note   |  25  |  0.66    Ca    9.4      31 Mar 2020 08:08  Phos  3.4     03-31  Mg     2.2     03-31    TPro  6.5  /  Alb  3.1<L>  /  TBili  0.3  /  DBili  x   /  AST  See note  /  ALT  See note  /  AlkPhos  52  03-31          RADIOLOGY & ADDITIONAL TESTS:    MEDICATIONS  (STANDING):  acetaminophen   Tablet .. 650 milliGRAM(s) Oral every 6 hours  amLODIPine   Tablet 10 milliGRAM(s) Oral every 24 hours  atorvastatin 20 milliGRAM(s) Oral at bedtime  brimonidine 0.2% Ophthalmic Solution 1 Drop(s) Both EYES two times a day  dextrose 50% Injectable 12.5 Gram(s) IV Push once  dextrose 50% Injectable 25 Gram(s) IV Push once  dextrose 50% Injectable 25 Gram(s) IV Push once  dorzolamide 2%/timolol 0.5% Ophthalmic Solution 1 Drop(s) Both EYES two times a day  enoxaparin Injectable 40 milliGRAM(s) SubCutaneous every 24 hours  famotidine    Tablet 20 milliGRAM(s) Oral two times a day  fenofibrate Tablet 48 milliGRAM(s) Oral daily  gabapentin 300 milliGRAM(s) Oral two times a day  insulin glargine Injectable (LANTUS) 16 Unit(s) SubCutaneous at bedtime  insulin lispro (HumaLOG) corrective regimen sliding scale   SubCutaneous Before meals and at bedtime  insulin lispro Injectable (HumaLOG) 5 Unit(s) SubCutaneous three times a day before meals  latanoprost 0.005% Ophthalmic Solution 1 Drop(s) Both EYES at bedtime  levothyroxine 75 MICROGram(s) Oral daily  methylPREDNISolone sodium succinate Injectable 40 milliGRAM(s) IV Push every 12 hours  metoprolol succinate  milliGRAM(s) Oral daily  polyethylene glycol 3350 17 Gram(s) Oral daily  QUEtiapine 800 milliGRAM(s) Oral at bedtime  senna Syrup 10 milliLiter(s) Oral daily    MEDICATIONS  (PRN):  bisacodyl Suppository 10 milliGRAM(s) Rectal daily PRN Constipation  dextrose 40% Gel 15 Gram(s) Oral once PRN Blood Glucose LESS THAN 70 milliGRAM(s)/deciliter  glucagon  Injectable 1 milliGRAM(s) IntraMuscular once PRN Glucose LESS THAN 70 milligrams/deciliter COVID Summary: Admitted 3/23 for fall and decreased strength, found with fevers, 89 on RA, covid positive. Finished quad therapy on 3/28. Got Toci 3/27 followed by initiation of IV steroids BID to end on 4/1. Insulin being uptitrated for hyperglycemia while on steroids, A1c 10. Pt intermittently refuses oxygen support (takes off NC and NRB) and pills. HTN needing labetalol and hydral on 3/30-31 for control in setting of refusing oral anti-htn. Active issues include weaning O2, titrating insulin while finishing steroids, HTN monitoring. Pt getting repeat swab on 3/31 (one week after first swab and has been afebrile for 72 hr).        OVERNIGHT EVENTS: Pt continues to take off oxygen support at times, very difficult to accurately assess o2 requirements throughout the day without actively watchign patient over 5 minutes with constant reinforcement to keep on NC or NRB. Flowsheets difficult to keep accurate. Pt never having labored breathing, even when off o2 support, will desat to mid 80s though. Was comfortable on 6L NC this AM with sats ~94.    Yesterday with BPs from 170 SBP to 190, given IV labetalol 10mg x2 over 1 hr. Repeat BP in PM SBP 150s. Early AM with BPs again in 170s-190. Given hydral 5IV with f/u BPs 160s. Will start amlodipine 10mg daily and titrate up from there.    SUBJECTIVE: Pt examined, offers no complaints, was on nasal cannula 6L satting 92. Denies cp, sob, abd pain, headaches, lightheadedness, changes in vision, numbness/weakness/tingling.    Vital Signs Last 12 Hrs  T(F): 97.6 (03-31-20 @ 08:35), Max: 97.7 (03-31-20 @ 00:31)  HR: 67 (03-31-20 @ 08:35) (66 - 67)  BP: 167/88 (03-31-20 @ 09:32) (156/96 - 191/97)  BP(mean): --  RR: 18 (03-31-20 @ 09:44) (18 - 18)  SpO2: 93% (03-31-20 @ 09:44) (91% - 94%)  I&O's Summary    30 Mar 2020 07:01  -  31 Mar 2020 07:00  --------------------------------------------------------  IN: 0 mL / OUT: 650 mL / NET: -650 mL    PHYSICAL EXAM:  General: WDWN  HEENT: NC/AT; anicteric sclera; MMM  Neck: supple  Cardiovascular: +S1/S2; RRR  Respiratory: bibasilar crackles, no w/r   Gastrointestinal: distended abdomen but soft and nontender  Extremities: WWP; no edema, clubbing or cyanosis  Vascular: 2+ radial, DP/PT pulses B/L  Neurological: AAOx3; no focal deficits          LABS:                        13.3   9.03  )-----------( 485      ( 31 Mar 2020 08:08 )             42.0     03-31    142  |  102  |  12  ----------------------------<  161<H>  See note   |  25  |  0.66    Ca    9.4      31 Mar 2020 08:08  Phos  3.4     03-31  Mg     2.2     03-31    TPro  6.5  /  Alb  3.1<L>  /  TBili  0.3  /  DBili  x   /  AST  See note  /  ALT  See note  /  AlkPhos  52  03-31          RADIOLOGY & ADDITIONAL TESTS:    MEDICATIONS  (STANDING):  acetaminophen   Tablet .. 650 milliGRAM(s) Oral every 6 hours  amLODIPine   Tablet 10 milliGRAM(s) Oral every 24 hours  atorvastatin 20 milliGRAM(s) Oral at bedtime  brimonidine 0.2% Ophthalmic Solution 1 Drop(s) Both EYES two times a day  dextrose 50% Injectable 12.5 Gram(s) IV Push once  dextrose 50% Injectable 25 Gram(s) IV Push once  dextrose 50% Injectable 25 Gram(s) IV Push once  dorzolamide 2%/timolol 0.5% Ophthalmic Solution 1 Drop(s) Both EYES two times a day  enoxaparin Injectable 40 milliGRAM(s) SubCutaneous every 24 hours  famotidine    Tablet 20 milliGRAM(s) Oral two times a day  fenofibrate Tablet 48 milliGRAM(s) Oral daily  gabapentin 300 milliGRAM(s) Oral two times a day  insulin glargine Injectable (LANTUS) 16 Unit(s) SubCutaneous at bedtime  insulin lispro (HumaLOG) corrective regimen sliding scale   SubCutaneous Before meals and at bedtime  insulin lispro Injectable (HumaLOG) 5 Unit(s) SubCutaneous three times a day before meals  latanoprost 0.005% Ophthalmic Solution 1 Drop(s) Both EYES at bedtime  levothyroxine 75 MICROGram(s) Oral daily  methylPREDNISolone sodium succinate Injectable 40 milliGRAM(s) IV Push every 12 hours  metoprolol succinate  milliGRAM(s) Oral daily  polyethylene glycol 3350 17 Gram(s) Oral daily  QUEtiapine 800 milliGRAM(s) Oral at bedtime  senna Syrup 10 milliLiter(s) Oral daily    MEDICATIONS  (PRN):  bisacodyl Suppository 10 milliGRAM(s) Rectal daily PRN Constipation  dextrose 40% Gel 15 Gram(s) Oral once PRN Blood Glucose LESS THAN 70 milliGRAM(s)/deciliter  glucagon  Injectable 1 milliGRAM(s) IntraMuscular once PRN Glucose LESS THAN 70 milligrams/deciliter COVID Summary: Admitted 3/23 for fall and decreased strength, found with fevers, 89 on RA, covid positive. Finished quad therapy on 3/28. Got Toci 3/27 followed by initiation of IV steroids BID to end on 4/1. Insulin being uptitrated for hyperglycemia while on steroids, A1c 10. Pt intermittently refuses oxygen support (takes off NC and NRB) and pills. HTN needing labetalol and hydral on 3/30-31 for control in setting of refusing oral anti-htn. Active issues include weaning O2, titrating insulin while finishing steroids, HTN monitoring. 3/31 covid reswab positive (one week after first swab and has been afebrile for 72 hr).        OVERNIGHT EVENTS: Pt continues to take off oxygen support at times, very difficult to accurately assess o2 requirements throughout the day without actively watchign patient over 5 minutes with constant reinforcement to keep on NC or NRB. Flowsheets difficult to keep accurate. Pt never having labored breathing, even when off o2 support, will desat to mid 80s though. Was comfortable on 6L NC this AM with sats ~94.    Yesterday with BPs from 170 SBP to 190, given IV labetalol 10mg x2 over 1 hr. Repeat BP in PM SBP 150s. Early AM with BPs again in 170s-190. Given hydral 5IV with f/u BPs 160s. Will start amlodipine 10mg daily and titrate up from there.    SUBJECTIVE: Pt examined, offers no complaints, was on nasal cannula 6L satting 92. Denies cp, sob, abd pain, headaches, lightheadedness, changes in vision, numbness/weakness/tingling.    Vital Signs Last 12 Hrs  T(F): 97.6 (03-31-20 @ 08:35), Max: 97.7 (03-31-20 @ 00:31)  HR: 67 (03-31-20 @ 08:35) (66 - 67)  BP: 167/88 (03-31-20 @ 09:32) (156/96 - 191/97)  BP(mean): --  RR: 18 (03-31-20 @ 09:44) (18 - 18)  SpO2: 93% (03-31-20 @ 09:44) (91% - 94%)  I&O's Summary    30 Mar 2020 07:01  -  31 Mar 2020 07:00  --------------------------------------------------------  IN: 0 mL / OUT: 650 mL / NET: -650 mL    PHYSICAL EXAM:  General: WDWN  HEENT: NC/AT; anicteric sclera; MMM  Neck: supple  Cardiovascular: +S1/S2; RRR  Respiratory: bibasilar crackles, no w/r   Gastrointestinal: distended abdomen but soft and nontender  Extremities: WWP; no edema, clubbing or cyanosis  Vascular: 2+ radial, DP/PT pulses B/L  Neurological: AAOx3; no focal deficits          LABS:                        13.3   9.03  )-----------( 485      ( 31 Mar 2020 08:08 )             42.0     03-31    142  |  102  |  12  ----------------------------<  161<H>  See note   |  25  |  0.66    Ca    9.4      31 Mar 2020 08:08  Phos  3.4     03-31  Mg     2.2     03-31    TPro  6.5  /  Alb  3.1<L>  /  TBili  0.3  /  DBili  x   /  AST  See note  /  ALT  See note  /  AlkPhos  52  03-31          RADIOLOGY & ADDITIONAL TESTS:    MEDICATIONS  (STANDING):  acetaminophen   Tablet .. 650 milliGRAM(s) Oral every 6 hours  amLODIPine   Tablet 10 milliGRAM(s) Oral every 24 hours  atorvastatin 20 milliGRAM(s) Oral at bedtime  brimonidine 0.2% Ophthalmic Solution 1 Drop(s) Both EYES two times a day  dextrose 50% Injectable 12.5 Gram(s) IV Push once  dextrose 50% Injectable 25 Gram(s) IV Push once  dextrose 50% Injectable 25 Gram(s) IV Push once  dorzolamide 2%/timolol 0.5% Ophthalmic Solution 1 Drop(s) Both EYES two times a day  enoxaparin Injectable 40 milliGRAM(s) SubCutaneous every 24 hours  famotidine    Tablet 20 milliGRAM(s) Oral two times a day  fenofibrate Tablet 48 milliGRAM(s) Oral daily  gabapentin 300 milliGRAM(s) Oral two times a day  insulin glargine Injectable (LANTUS) 16 Unit(s) SubCutaneous at bedtime  insulin lispro (HumaLOG) corrective regimen sliding scale   SubCutaneous Before meals and at bedtime  insulin lispro Injectable (HumaLOG) 5 Unit(s) SubCutaneous three times a day before meals  latanoprost 0.005% Ophthalmic Solution 1 Drop(s) Both EYES at bedtime  levothyroxine 75 MICROGram(s) Oral daily  methylPREDNISolone sodium succinate Injectable 40 milliGRAM(s) IV Push every 12 hours  metoprolol succinate  milliGRAM(s) Oral daily  polyethylene glycol 3350 17 Gram(s) Oral daily  QUEtiapine 800 milliGRAM(s) Oral at bedtime  senna Syrup 10 milliLiter(s) Oral daily    MEDICATIONS  (PRN):  bisacodyl Suppository 10 milliGRAM(s) Rectal daily PRN Constipation  dextrose 40% Gel 15 Gram(s) Oral once PRN Blood Glucose LESS THAN 70 milliGRAM(s)/deciliter  glucagon  Injectable 1 milliGRAM(s) IntraMuscular once PRN Glucose LESS THAN 70 milligrams/deciliter

## 2020-03-31 NOTE — PROGRESS NOTE ADULT - PROBLEM SELECTOR PLAN 2
Viral pneumonia 2/2 COVID- See plan above  -Reswab in 03/31/2020, pt is from [facility] side    #Acute hypoxic respiratory failure  - wean as tolerates with sp02 goal of 95%  - refusing to wear nonrebreather properly at times  - OOBTC Viral pneumonia 2/2 COVID- See plan above  -Reswab on 03/31/2020 positive. [place]  - Plan to reswab 4/7/2020, reassess as protocol changes often depending on dispo    #Acute hypoxic respiratory failure  - wean as tolerates with sp02 goal of 95%  - refusing to wear nonrebreather properly at times  - OOBTC

## 2020-03-31 NOTE — PROGRESS NOTE ADULT - PROBLEM SELECTOR PLAN 4
Hx of diabetes. A1c 10 here, sugars high. Previously recorded as on Januvia and glucophage. No diabetes medications at [facility], may use more than one pharmacy. Patient poor historian does not know what he takes at home.  - on steroids from (3/27 - 4/1)  - lantus up to 16U  - 5U premeal  - MISS  - titrate insulin as needed, keep in mind steroids will end.   - consistent carb diet  - out pt Endocrine f/u

## 2020-04-01 LAB
ALBUMIN SERPL ELPH-MCNC: 3.2 G/DL — LOW (ref 3.3–5)
ALP SERPL-CCNC: 54 U/L — SIGNIFICANT CHANGE UP (ref 40–120)
ALT FLD-CCNC: 56 U/L — HIGH (ref 10–45)
ANION GAP SERPL CALC-SCNC: 16 MMOL/L — SIGNIFICANT CHANGE UP (ref 5–17)
AST SERPL-CCNC: 33 U/L — SIGNIFICANT CHANGE UP (ref 10–40)
BASOPHILS # BLD AUTO: 0.03 K/UL — SIGNIFICANT CHANGE UP (ref 0–0.2)
BASOPHILS NFR BLD AUTO: 0.3 % — SIGNIFICANT CHANGE UP (ref 0–2)
BILIRUB SERPL-MCNC: 0.5 MG/DL — SIGNIFICANT CHANGE UP (ref 0.2–1.2)
BUN SERPL-MCNC: 15 MG/DL — SIGNIFICANT CHANGE UP (ref 7–23)
CALCIUM SERPL-MCNC: 9.8 MG/DL — SIGNIFICANT CHANGE UP (ref 8.4–10.5)
CHLORIDE SERPL-SCNC: 100 MMOL/L — SIGNIFICANT CHANGE UP (ref 96–108)
CO2 SERPL-SCNC: 25 MMOL/L — SIGNIFICANT CHANGE UP (ref 22–31)
CREAT SERPL-MCNC: 0.66 MG/DL — SIGNIFICANT CHANGE UP (ref 0.5–1.3)
CRP SERPL-MCNC: 0.32 MG/DL — SIGNIFICANT CHANGE UP (ref 0–0.4)
D DIMER BLD IA.RAPID-MCNC: 439 NG/ML DDU — HIGH
EOSINOPHIL # BLD AUTO: 0.01 K/UL — SIGNIFICANT CHANGE UP (ref 0–0.5)
EOSINOPHIL NFR BLD AUTO: 0.1 % — SIGNIFICANT CHANGE UP (ref 0–6)
FERRITIN SERPL-MCNC: 882 NG/ML — HIGH (ref 30–400)
GLUCOSE BLDC GLUCOMTR-MCNC: 164 MG/DL — HIGH (ref 70–99)
GLUCOSE BLDC GLUCOMTR-MCNC: 177 MG/DL — HIGH (ref 70–99)
GLUCOSE BLDC GLUCOMTR-MCNC: 209 MG/DL — HIGH (ref 70–99)
GLUCOSE BLDC GLUCOMTR-MCNC: 230 MG/DL — HIGH (ref 70–99)
GLUCOSE SERPL-MCNC: 178 MG/DL — HIGH (ref 70–99)
HCT VFR BLD CALC: 43.7 % — SIGNIFICANT CHANGE UP (ref 39–50)
HGB BLD-MCNC: 14.2 G/DL — SIGNIFICANT CHANGE UP (ref 13–17)
IMM GRANULOCYTES NFR BLD AUTO: 4.8 % — HIGH (ref 0–1.5)
LYMPHOCYTES # BLD AUTO: 1.73 K/UL — SIGNIFICANT CHANGE UP (ref 1–3.3)
LYMPHOCYTES # BLD AUTO: 15.6 % — SIGNIFICANT CHANGE UP (ref 13–44)
MAGNESIUM SERPL-MCNC: 2.1 MG/DL — SIGNIFICANT CHANGE UP (ref 1.6–2.6)
MCHC RBC-ENTMCNC: 26.2 PG — LOW (ref 27–34)
MCHC RBC-ENTMCNC: 32.5 GM/DL — SIGNIFICANT CHANGE UP (ref 32–36)
MCV RBC AUTO: 80.5 FL — SIGNIFICANT CHANGE UP (ref 80–100)
MONOCYTES # BLD AUTO: 0.55 K/UL — SIGNIFICANT CHANGE UP (ref 0–0.9)
MONOCYTES NFR BLD AUTO: 5 % — SIGNIFICANT CHANGE UP (ref 2–14)
NEUTROPHILS # BLD AUTO: 8.26 K/UL — HIGH (ref 1.8–7.4)
NEUTROPHILS NFR BLD AUTO: 74.2 % — SIGNIFICANT CHANGE UP (ref 43–77)
NRBC # BLD: 0 /100 WBCS — SIGNIFICANT CHANGE UP (ref 0–0)
PHOSPHATE SERPL-MCNC: 3.5 MG/DL — SIGNIFICANT CHANGE UP (ref 2.5–4.5)
PLATELET # BLD AUTO: 543 K/UL — HIGH (ref 150–400)
POTASSIUM SERPL-MCNC: 3.9 MMOL/L — SIGNIFICANT CHANGE UP (ref 3.5–5.3)
POTASSIUM SERPL-SCNC: 3.9 MMOL/L — SIGNIFICANT CHANGE UP (ref 3.5–5.3)
PROT SERPL-MCNC: 6.5 G/DL — SIGNIFICANT CHANGE UP (ref 6–8.3)
RBC # BLD: 5.43 M/UL — SIGNIFICANT CHANGE UP (ref 4.2–5.8)
RBC # FLD: 14.3 % — SIGNIFICANT CHANGE UP (ref 10.3–14.5)
SODIUM SERPL-SCNC: 141 MMOL/L — SIGNIFICANT CHANGE UP (ref 135–145)
WBC # BLD: 11.11 K/UL — HIGH (ref 3.8–10.5)
WBC # FLD AUTO: 11.11 K/UL — HIGH (ref 3.8–10.5)

## 2020-04-01 PROCEDURE — 99233 SBSQ HOSP IP/OBS HIGH 50: CPT | Mod: GC

## 2020-04-01 RX ORDER — INSULIN LISPRO 100/ML
VIAL (ML) SUBCUTANEOUS
Refills: 0 | Status: DISCONTINUED | OUTPATIENT
Start: 2020-04-01 | End: 2020-04-16

## 2020-04-01 RX ORDER — POTASSIUM CHLORIDE 20 MEQ
20 PACKET (EA) ORAL ONCE
Refills: 0 | Status: COMPLETED | OUTPATIENT
Start: 2020-04-01 | End: 2020-04-01

## 2020-04-01 RX ORDER — INSULIN GLARGINE 100 [IU]/ML
19 INJECTION, SOLUTION SUBCUTANEOUS AT BEDTIME
Refills: 0 | Status: DISCONTINUED | OUTPATIENT
Start: 2020-04-01 | End: 2020-04-02

## 2020-04-01 RX ORDER — HYDRALAZINE HCL 50 MG
10 TABLET ORAL EVERY 8 HOURS
Refills: 0 | Status: DISCONTINUED | OUTPATIENT
Start: 2020-04-01 | End: 2020-04-16

## 2020-04-01 RX ORDER — FUROSEMIDE 40 MG
20 TABLET ORAL ONCE
Refills: 0 | Status: COMPLETED | OUTPATIENT
Start: 2020-04-01 | End: 2020-04-01

## 2020-04-01 RX ORDER — INSULIN LISPRO 100/ML
6 VIAL (ML) SUBCUTANEOUS
Refills: 0 | Status: DISCONTINUED | OUTPATIENT
Start: 2020-04-01 | End: 2020-04-02

## 2020-04-01 RX ADMIN — LATANOPROST 1 DROP(S): 0.05 SOLUTION/ DROPS OPHTHALMIC; TOPICAL at 23:52

## 2020-04-01 RX ADMIN — Medication 6 UNIT(S): at 16:54

## 2020-04-01 RX ADMIN — DORZOLAMIDE HYDROCHLORIDE TIMOLOL MALEATE 1 DROP(S): 20; 5 SOLUTION/ DROPS OPHTHALMIC at 16:48

## 2020-04-01 RX ADMIN — GABAPENTIN 300 MILLIGRAM(S): 400 CAPSULE ORAL at 05:45

## 2020-04-01 RX ADMIN — Medication 5 MILLIGRAM(S): at 00:15

## 2020-04-01 RX ADMIN — FAMOTIDINE 20 MILLIGRAM(S): 10 INJECTION INTRAVENOUS at 05:45

## 2020-04-01 RX ADMIN — INSULIN GLARGINE 19 UNIT(S): 100 INJECTION, SOLUTION SUBCUTANEOUS at 22:05

## 2020-04-01 RX ADMIN — BRIMONIDINE TARTRATE 1 DROP(S): 2 SOLUTION/ DROPS OPHTHALMIC at 07:54

## 2020-04-01 RX ADMIN — SENNA PLUS 10 MILLILITER(S): 8.6 TABLET ORAL at 11:47

## 2020-04-01 RX ADMIN — ENOXAPARIN SODIUM 40 MILLIGRAM(S): 100 INJECTION SUBCUTANEOUS at 22:06

## 2020-04-01 RX ADMIN — DORZOLAMIDE HYDROCHLORIDE TIMOLOL MALEATE 1 DROP(S): 20; 5 SOLUTION/ DROPS OPHTHALMIC at 07:54

## 2020-04-01 RX ADMIN — Medication 10 MILLIGRAM(S): at 11:47

## 2020-04-01 RX ADMIN — Medication 10 MILLIGRAM(S): at 22:06

## 2020-04-01 RX ADMIN — Medication 40 MILLIGRAM(S): at 16:49

## 2020-04-01 RX ADMIN — BRIMONIDINE TARTRATE 1 DROP(S): 2 SOLUTION/ DROPS OPHTHALMIC at 16:48

## 2020-04-01 RX ADMIN — Medication 4: at 11:56

## 2020-04-01 RX ADMIN — LATANOPROST 1 DROP(S): 0.05 SOLUTION/ DROPS OPHTHALMIC; TOPICAL at 00:07

## 2020-04-01 RX ADMIN — Medication 4: at 08:59

## 2020-04-01 RX ADMIN — Medication 20 MILLIEQUIVALENT(S): at 16:47

## 2020-04-01 RX ADMIN — Medication 40 MILLIGRAM(S): at 05:45

## 2020-04-01 RX ADMIN — AMLODIPINE BESYLATE 10 MILLIGRAM(S): 2.5 TABLET ORAL at 11:48

## 2020-04-01 RX ADMIN — Medication 75 MICROGRAM(S): at 05:45

## 2020-04-01 RX ADMIN — Medication 650 MILLIGRAM(S): at 11:47

## 2020-04-01 RX ADMIN — GABAPENTIN 300 MILLIGRAM(S): 400 CAPSULE ORAL at 16:49

## 2020-04-01 RX ADMIN — ATORVASTATIN CALCIUM 20 MILLIGRAM(S): 80 TABLET, FILM COATED ORAL at 22:06

## 2020-04-01 RX ADMIN — Medication 100 MILLIGRAM(S): at 05:45

## 2020-04-01 RX ADMIN — Medication 650 MILLIGRAM(S): at 05:46

## 2020-04-01 RX ADMIN — Medication 20 MILLIGRAM(S): at 16:47

## 2020-04-01 RX ADMIN — POLYETHYLENE GLYCOL 3350 17 GRAM(S): 17 POWDER, FOR SOLUTION ORAL at 11:47

## 2020-04-01 RX ADMIN — Medication 48 MILLIGRAM(S): at 11:47

## 2020-04-01 RX ADMIN — QUETIAPINE FUMARATE 800 MILLIGRAM(S): 200 TABLET, FILM COATED ORAL at 22:06

## 2020-04-01 RX ADMIN — Medication 6 UNIT(S): at 11:56

## 2020-04-01 RX ADMIN — Medication 2: at 22:14

## 2020-04-01 RX ADMIN — FAMOTIDINE 20 MILLIGRAM(S): 10 INJECTION INTRAVENOUS at 16:49

## 2020-04-01 RX ADMIN — Medication 2: at 16:54

## 2020-04-01 RX ADMIN — Medication 5 UNIT(S): at 07:50

## 2020-04-01 RX ADMIN — Medication 650 MILLIGRAM(S): at 16:49

## 2020-04-01 NOTE — PROGRESS NOTE ADULT - ASSESSMENT
79 M, poor historian, with past medical history of HTN, DM2, and hypothyroidism who presents to ED BIBEMS after being found down for a fall, found to have starvation ketosis now resolved, was desatting with imaging findings on cxr and found to be covid +, now continuing on oxygen support on RMF. He is likely on NRB due to mouth breathing instead of worsening lung pathology.

## 2020-04-01 NOTE — PROGRESS NOTE ADULT - PROBLEM SELECTOR PLAN 2
Viral pneumonia 2/2 COVID- See plan above  -Reswab on 03/31/2020 positive. [place]  - Plan to reswab 4/7/2020, reassess as protocol changes often depending on dispo    #Acute hypoxic respiratory failure  - wean as tolerates with sp02 goal of 95%  - refusing to wear nonrebreather properly at times, however, NC is less than ideal as patient breathes with mouth open.   - OOBTC

## 2020-04-01 NOTE — PROGRESS NOTE ADULT - SUBJECTIVE AND OBJECTIVE BOX
COVID Summary: Admitted 3/23 for fall and decreased strength, found with fevers, 89 on RA, covid positive. Finished quad therapy on 3/28. Got Toci 3/27 followed by initiation of IV steroids BID to end on 4/1. Insulin being uptitrated for hyperglycemia while on steroids, A1c 10. Pt intermittently refuses oxygen support (takes off NC and NRB) and pills. HTN needing labetalol and hydral on 3/30-31 for control in setting of refusing oral anti-htn. Active issues include weaning O2, titrating insulin while finishing steroids, HTN monitoring. 3/31 covid reswab positive (one week after first swab and has been afebrile for 72 hr).    OVERNIGHT EVENTS: Pt continues to take off oxygen support at times, remains very difficult to accurately assess o2 requirements throughout the day, Pt never having labored breathing, even when off o2 support, continues to desat to mid 80s when off O2. Was comfortable on 6L NC this AM with sats ~93    Yesterday with BPs from 170 SBP to 190, given IV labetalol 10mg x2 over 1 hr. Repeat BP in PM SBP 150s. Early AM with BPs again in 170s-190. Given hydral 5IV with f/u BPs 160s. Will start amlodipine 10mg daily and titrate up from there.    SUBJECTIVE: Pt examined, offers no complaints, was on nasal cannula 6L satting 92. Denies cp, sob, abd pain, headaches, lightheadedness, changes in vision, numbness/weakness/tingling.    T(F): 98.8 (04-01-20 @ 11:57)  HR: 72 (04-01-20 @ 11:57)  BP: 125/72 (04-01-20 @ 11:57)  RR: 18 (04-01-20 @ 11:57)  SpO2: 91% (04-01-20 @ 11:57)    PHYSICAL EXAM:  General: WDWN, on NC while eating breakfast, of note, patient breathes with mouth open, limiting effectiveness of NC O2.   HEENT: NC/AT; anicteric sclera; MMM  Neck: supple  Cardiovascular: +S1/S2; RRR  Respiratory: bibasilar crackles, no w/r   Gastrointestinal: distended abdomen but soft and nontender  Extremities: WWP; no edema, clubbing or cyanosis  Vascular: 2+ radial, DP pulses B/L  Neurological: AAOx3; no focal deficits    LABS:                         14.2   11.11 )-----------( 543      ( 01 Apr 2020 07:38 )             43.7     04-01    141  |  100  |  15  ----------------------------<  178<H>  3.9   |  25  |  0.66    Ca    9.8      01 Apr 2020 07:38  Phos  3.5     04-01  Mg     2.1     04-01    TPro  6.5  /  Alb  3.2<L>  /  TBili  0.5  /  DBili  x   /  AST  33  /  ALT  56<H>  /  AlkPhos  54  04-01        RADIOLOGY, EKG & ADDITIONAL TESTS: Reviewed.     MEDICATIONS  (STANDING):  acetaminophen   Tablet .. 650 milliGRAM(s) Oral every 6 hours  amLODIPine   Tablet 10 milliGRAM(s) Oral every 24 hours  atorvastatin 20 milliGRAM(s) Oral at bedtime  brimonidine 0.2% Ophthalmic Solution 1 Drop(s) Both EYES two times a day  dextrose 50% Injectable 12.5 Gram(s) IV Push once  dextrose 50% Injectable 25 Gram(s) IV Push once  dextrose 50% Injectable 25 Gram(s) IV Push once  dorzolamide 2%/timolol 0.5% Ophthalmic Solution 1 Drop(s) Both EYES two times a day  enoxaparin Injectable 40 milliGRAM(s) SubCutaneous every 24 hours  famotidine    Tablet 20 milliGRAM(s) Oral two times a day  fenofibrate Tablet 48 milliGRAM(s) Oral daily  furosemide   Injectable 20 milliGRAM(s) IV Push once  gabapentin 300 milliGRAM(s) Oral two times a day  hydrALAZINE 10 milliGRAM(s) Oral every 8 hours  insulin glargine Injectable (LANTUS) 19 Unit(s) SubCutaneous at bedtime  insulin lispro (HumaLOG) corrective regimen sliding scale   SubCutaneous Before meals and at bedtime  insulin lispro Injectable (HumaLOG) 6 Unit(s) SubCutaneous three times a day before meals  latanoprost 0.005% Ophthalmic Solution 1 Drop(s) Both EYES at bedtime  levothyroxine 75 MICROGram(s) Oral daily  methylPREDNISolone sodium succinate Injectable 40 milliGRAM(s) IV Push every 12 hours  metoprolol succinate  milliGRAM(s) Oral daily  polyethylene glycol 3350 17 Gram(s) Oral daily  potassium chloride    Tablet ER 20 milliEquivalent(s) Oral once  QUEtiapine 800 milliGRAM(s) Oral at bedtime  senna Syrup 10 milliLiter(s) Oral daily    MEDICATIONS  (PRN):  bisacodyl Suppository 10 milliGRAM(s) Rectal daily PRN Constipation  dextrose 40% Gel 15 Gram(s) Oral once PRN Blood Glucose LESS THAN 70 milliGRAM(s)/deciliter  glucagon  Injectable 1 milliGRAM(s) IntraMuscular once PRN Glucose LESS THAN 70 milligrams/deciliter

## 2020-04-01 NOTE — PROGRESS NOTE ADULT - PROBLEM SELECTOR PLAN 4
Hx of diabetes. A1c 10 here, sugars high. Previously recorded as on Januvia and glucophage. No diabetes medications at [facility], may use more than one pharmacy. Patient poor historian does not know what he takes at home.  - on steroids from (3/27 - 4/1)  - lantus up to 19U  - 5U premeal increased to 6  - MISS  - titrate insulin as needed, keep in mind steroids will end.   - consistent carb diet  - out pt Endocrine f/u

## 2020-04-02 LAB
ALBUMIN SERPL ELPH-MCNC: 3.1 G/DL — LOW (ref 3.3–5)
ALP SERPL-CCNC: 50 U/L — SIGNIFICANT CHANGE UP (ref 40–120)
ALT FLD-CCNC: 40 U/L — SIGNIFICANT CHANGE UP (ref 10–45)
ANION GAP SERPL CALC-SCNC: 10 MMOL/L — SIGNIFICANT CHANGE UP (ref 5–17)
AST SERPL-CCNC: 25 U/L — SIGNIFICANT CHANGE UP (ref 10–40)
BASOPHILS # BLD AUTO: 0 K/UL — SIGNIFICANT CHANGE UP (ref 0–0.2)
BASOPHILS NFR BLD AUTO: 0 % — SIGNIFICANT CHANGE UP (ref 0–2)
BILIRUB SERPL-MCNC: 0.3 MG/DL — SIGNIFICANT CHANGE UP (ref 0.2–1.2)
BUN SERPL-MCNC: 24 MG/DL — HIGH (ref 7–23)
CALCIUM SERPL-MCNC: 9.5 MG/DL — SIGNIFICANT CHANGE UP (ref 8.4–10.5)
CHLORIDE SERPL-SCNC: 107 MMOL/L — SIGNIFICANT CHANGE UP (ref 96–108)
CO2 SERPL-SCNC: 24 MMOL/L — SIGNIFICANT CHANGE UP (ref 22–31)
CREAT SERPL-MCNC: 0.76 MG/DL — SIGNIFICANT CHANGE UP (ref 0.5–1.3)
CRP SERPL-MCNC: 0.22 MG/DL — SIGNIFICANT CHANGE UP (ref 0–0.4)
D DIMER BLD IA.RAPID-MCNC: 244 NG/ML DDU — HIGH
EOSINOPHIL # BLD AUTO: 0 K/UL — SIGNIFICANT CHANGE UP (ref 0–0.5)
EOSINOPHIL NFR BLD AUTO: 0 % — SIGNIFICANT CHANGE UP (ref 0–6)
FERRITIN SERPL-MCNC: 802 NG/ML — HIGH (ref 30–400)
GLUCOSE BLDC GLUCOMTR-MCNC: 115 MG/DL — HIGH (ref 70–99)
GLUCOSE BLDC GLUCOMTR-MCNC: 154 MG/DL — HIGH (ref 70–99)
GLUCOSE BLDC GLUCOMTR-MCNC: 156 MG/DL — HIGH (ref 70–99)
GLUCOSE BLDC GLUCOMTR-MCNC: 289 MG/DL — HIGH (ref 70–99)
GLUCOSE SERPL-MCNC: 131 MG/DL — HIGH (ref 70–99)
HCT VFR BLD CALC: 45.6 % — SIGNIFICANT CHANGE UP (ref 39–50)
HGB BLD-MCNC: 13.9 G/DL — SIGNIFICANT CHANGE UP (ref 13–17)
LYMPHOCYTES # BLD AUTO: 1.82 K/UL — SIGNIFICANT CHANGE UP (ref 1–3.3)
LYMPHOCYTES # BLD AUTO: 17 % — SIGNIFICANT CHANGE UP (ref 13–44)
MAGNESIUM SERPL-MCNC: 2.3 MG/DL — SIGNIFICANT CHANGE UP (ref 1.6–2.6)
MCHC RBC-ENTMCNC: 25.7 PG — LOW (ref 27–34)
MCHC RBC-ENTMCNC: 30.5 GM/DL — LOW (ref 32–36)
MCV RBC AUTO: 84.3 FL — SIGNIFICANT CHANGE UP (ref 80–100)
MONOCYTES # BLD AUTO: 0.11 K/UL — SIGNIFICANT CHANGE UP (ref 0–0.9)
MONOCYTES NFR BLD AUTO: 1 % — LOW (ref 2–14)
NEUTROPHILS # BLD AUTO: 8.65 K/UL — HIGH (ref 1.8–7.4)
NEUTROPHILS NFR BLD AUTO: 81 % — HIGH (ref 43–77)
NRBC # BLD: SIGNIFICANT CHANGE UP /100 WBCS (ref 0–0)
PHOSPHATE SERPL-MCNC: 4.9 MG/DL — HIGH (ref 2.5–4.5)
PLATELET # BLD AUTO: 423 K/UL — HIGH (ref 150–400)
POTASSIUM SERPL-MCNC: 4.5 MMOL/L — SIGNIFICANT CHANGE UP (ref 3.5–5.3)
POTASSIUM SERPL-SCNC: 4.5 MMOL/L — SIGNIFICANT CHANGE UP (ref 3.5–5.3)
PROT SERPL-MCNC: 6.3 G/DL — SIGNIFICANT CHANGE UP (ref 6–8.3)
RBC # BLD: 5.41 M/UL — SIGNIFICANT CHANGE UP (ref 4.2–5.8)
RBC # FLD: 15.3 % — HIGH (ref 10.3–14.5)
SODIUM SERPL-SCNC: 141 MMOL/L — SIGNIFICANT CHANGE UP (ref 135–145)
WBC # BLD: 10.68 K/UL — HIGH (ref 3.8–10.5)
WBC # FLD AUTO: 10.68 K/UL — HIGH (ref 3.8–10.5)

## 2020-04-02 PROCEDURE — 99233 SBSQ HOSP IP/OBS HIGH 50: CPT | Mod: GC

## 2020-04-02 RX ORDER — INSULIN GLARGINE 100 [IU]/ML
12 INJECTION, SOLUTION SUBCUTANEOUS AT BEDTIME
Refills: 0 | Status: DISCONTINUED | OUTPATIENT
Start: 2020-04-02 | End: 2020-04-16

## 2020-04-02 RX ORDER — FUROSEMIDE 40 MG
60 TABLET ORAL ONCE
Refills: 0 | Status: DISCONTINUED | OUTPATIENT
Start: 2020-04-02 | End: 2020-04-02

## 2020-04-02 RX ORDER — INSULIN LISPRO 100/ML
4 VIAL (ML) SUBCUTANEOUS
Refills: 0 | Status: DISCONTINUED | OUTPATIENT
Start: 2020-04-02 | End: 2020-04-16

## 2020-04-02 RX ORDER — FUROSEMIDE 40 MG
20 TABLET ORAL ONCE
Refills: 0 | Status: COMPLETED | OUTPATIENT
Start: 2020-04-02 | End: 2020-04-02

## 2020-04-02 RX ADMIN — Medication 40 MILLIGRAM(S): at 05:33

## 2020-04-02 RX ADMIN — DORZOLAMIDE HYDROCHLORIDE TIMOLOL MALEATE 1 DROP(S): 20; 5 SOLUTION/ DROPS OPHTHALMIC at 17:29

## 2020-04-02 RX ADMIN — FAMOTIDINE 20 MILLIGRAM(S): 10 INJECTION INTRAVENOUS at 05:33

## 2020-04-02 RX ADMIN — Medication 10 MILLIGRAM(S): at 05:33

## 2020-04-02 RX ADMIN — Medication 100 MILLIGRAM(S): at 05:33

## 2020-04-02 RX ADMIN — Medication 650 MILLIGRAM(S): at 17:29

## 2020-04-02 RX ADMIN — POLYETHYLENE GLYCOL 3350 17 GRAM(S): 17 POWDER, FOR SOLUTION ORAL at 13:02

## 2020-04-02 RX ADMIN — Medication 6 UNIT(S): at 08:31

## 2020-04-02 RX ADMIN — GABAPENTIN 300 MILLIGRAM(S): 400 CAPSULE ORAL at 17:29

## 2020-04-02 RX ADMIN — Medication 650 MILLIGRAM(S): at 01:17

## 2020-04-02 RX ADMIN — QUETIAPINE FUMARATE 800 MILLIGRAM(S): 200 TABLET, FILM COATED ORAL at 23:11

## 2020-04-02 RX ADMIN — ATORVASTATIN CALCIUM 20 MILLIGRAM(S): 80 TABLET, FILM COATED ORAL at 23:11

## 2020-04-02 RX ADMIN — FAMOTIDINE 20 MILLIGRAM(S): 10 INJECTION INTRAVENOUS at 17:29

## 2020-04-02 RX ADMIN — AMLODIPINE BESYLATE 10 MILLIGRAM(S): 2.5 TABLET ORAL at 12:50

## 2020-04-02 RX ADMIN — Medication 4 UNIT(S): at 12:54

## 2020-04-02 RX ADMIN — Medication 2: at 17:28

## 2020-04-02 RX ADMIN — Medication 10 MILLIGRAM(S): at 23:12

## 2020-04-02 RX ADMIN — Medication 75 MICROGRAM(S): at 05:33

## 2020-04-02 RX ADMIN — GABAPENTIN 300 MILLIGRAM(S): 400 CAPSULE ORAL at 05:33

## 2020-04-02 RX ADMIN — DORZOLAMIDE HYDROCHLORIDE TIMOLOL MALEATE 1 DROP(S): 20; 5 SOLUTION/ DROPS OPHTHALMIC at 08:31

## 2020-04-02 RX ADMIN — BRIMONIDINE TARTRATE 1 DROP(S): 2 SOLUTION/ DROPS OPHTHALMIC at 17:29

## 2020-04-02 RX ADMIN — Medication 6: at 12:00

## 2020-04-02 RX ADMIN — Medication 4 UNIT(S): at 17:28

## 2020-04-02 RX ADMIN — Medication 650 MILLIGRAM(S): at 12:00

## 2020-04-02 RX ADMIN — Medication 650 MILLIGRAM(S): at 05:33

## 2020-04-02 RX ADMIN — BRIMONIDINE TARTRATE 1 DROP(S): 2 SOLUTION/ DROPS OPHTHALMIC at 08:32

## 2020-04-02 RX ADMIN — Medication 20 MILLIGRAM(S): at 13:06

## 2020-04-02 RX ADMIN — Medication 10 MILLIGRAM(S): at 12:51

## 2020-04-02 RX ADMIN — Medication 48 MILLIGRAM(S): at 12:50

## 2020-04-02 RX ADMIN — INSULIN GLARGINE 12 UNIT(S): 100 INJECTION, SOLUTION SUBCUTANEOUS at 23:12

## 2020-04-02 NOTE — PROGRESS NOTE ADULT - PROBLEM SELECTOR PLAN 1
Severe sepsis 2/2 viral pneumonia and COVID 19   -See plan below     #SARS- Coronavirus COVID 19 PCR postive   - s/p Azithro, plaquenil, ascorbic acid, thiamine  - S/p Tocilzumab 400mg x 1 (3/27)   - C/w Solumedrol 40mg BID (3/27 PM - 4/1), for 5-7 days. Last day 4/1  - Supplemental O2 prn Severe sepsis 2/2 viral pneumonia and COVID 19   -See plan below     #SARS- Coronavirus COVID 19 PCR postive   - s/p Azithro, plaquenil, ascorbic acid, thiamine  - S/p Tocilzumab 400mg x 1 (3/27)   - Solumedrol 40mg BID (3/27 PM - 4/1), for 5-7 days. Last day 4/1  - Supplemental O2 prn

## 2020-04-02 NOTE — PROGRESS NOTE ADULT - SUBJECTIVE AND OBJECTIVE BOX
Incomplete    COVID Summary: Admitted 3/23 for fall and decreased strength, found with fevers, 89 on RA, covid positive. Finished quad therapy on 3/28. Got Toci 3/27 followed by initiation of IV steroids BID to end on 4/1. Insulin being uptitrated for hyperglycemia while on steroids, A1c 10. Pt intermittently refuses oxygen support (takes off NC and NRB) and pills. HTN needing labetalol and hydral on 3/30-31 for control in setting of refusing oral anti-htn. Active issues include weaning O2, titrating insulin while finishing steroids, HTN monitoring. 3/31 covid reswab positive (one week after first swab and has been afebrile for 72 hr).    OVERNIGHT EVENTS: None    Pt never having labored breathing, even when off o2 support, continues to desat to mid 80s when off O2. Was comfortable on 15 L NRB this AM with sats ~95, decreased NRB to 8L with Sat ~89%, increased to 10 L NRB w/sats 92%.       SUBJECTIVE: Pt examined, offers no complaints, was on nasal cannula 6L satting 92. Denies cp, sob, abd pain, headaches, lightheadedness, changes in vision, numbness/weakness/tingling.    Vital Signs Last 24 Hrs  T(C): 36.7 (02 Apr 2020 07:25), Max: 37.1 (01 Apr 2020 11:57)  T(F): 98.1 (02 Apr 2020 07:25), Max: 98.8 (01 Apr 2020 11:57)  HR: 86 (02 Apr 2020 08:35) (67 - 92)  BP: 110/60 (02 Apr 2020 08:35) (110/60 - 139/70)  BP(mean): 79 (02 Apr 2020 08:35) (79 - 79)  RR: 19 (02 Apr 2020 08:35) (18 - 19)  SpO2: 92% (02 Apr 2020 08:35) (90% - 94%)    PHYSICAL EXAM:  General: WDWN, on 15 L NRB, no Respiratory distress, but does have desaturation to mid 80s.   HEENT: NC/AT; anicteric sclera; MMM  Neck: supple  Cardiovascular: +S1/S2; RRR  Respiratory: non-labored breathing, no rhonchi, no crackles   Gastrointestinal: soft non-tender  Extremities: WWP; no edema, clubbing or cyanosis  Vascular: 2+ radial, DP pulses B/L  Neurological: AAOx3; no focal deficits    LABS:                         14.2   11.11 )-----------( 543      ( 01 Apr 2020 07:38 )             43.7     04-01    141  |  100  |  15  ----------------------------<  178<H>  3.9   |  25  |  0.66    Ca    9.8      01 Apr 2020 07:38  Phos  3.5     04-01  Mg     2.1     04-01    TPro  6.5  /  Alb  3.2<L>  /  TBili  0.5  /  DBili  x   /  AST  33  /  ALT  56<H>  /  AlkPhos  54  04-01        RADIOLOGY, EKG & ADDITIONAL TESTS: Reviewed.     MEDICATIONS  (STANDING):  acetaminophen   Tablet .. 650 milliGRAM(s) Oral every 6 hours  amLODIPine   Tablet 10 milliGRAM(s) Oral every 24 hours  atorvastatin 20 milliGRAM(s) Oral at bedtime  brimonidine 0.2% Ophthalmic Solution 1 Drop(s) Both EYES two times a day  dextrose 50% Injectable 12.5 Gram(s) IV Push once  dextrose 50% Injectable 25 Gram(s) IV Push once  dextrose 50% Injectable 25 Gram(s) IV Push once  dorzolamide 2%/timolol 0.5% Ophthalmic Solution 1 Drop(s) Both EYES two times a day  enoxaparin Injectable 40 milliGRAM(s) SubCutaneous every 24 hours  famotidine    Tablet 20 milliGRAM(s) Oral two times a day  fenofibrate Tablet 48 milliGRAM(s) Oral daily  furosemide   Injectable 20 milliGRAM(s) IV Push once  gabapentin 300 milliGRAM(s) Oral two times a day  hydrALAZINE 10 milliGRAM(s) Oral every 8 hours  insulin glargine Injectable (LANTUS) 19 Unit(s) SubCutaneous at bedtime  insulin lispro (HumaLOG) corrective regimen sliding scale   SubCutaneous Before meals and at bedtime  insulin lispro Injectable (HumaLOG) 6 Unit(s) SubCutaneous three times a day before meals  latanoprost 0.005% Ophthalmic Solution 1 Drop(s) Both EYES at bedtime  levothyroxine 75 MICROGram(s) Oral daily  methylPREDNISolone sodium succinate Injectable 40 milliGRAM(s) IV Push every 12 hours  metoprolol succinate  milliGRAM(s) Oral daily  polyethylene glycol 3350 17 Gram(s) Oral daily  potassium chloride    Tablet ER 20 milliEquivalent(s) Oral once  QUEtiapine 800 milliGRAM(s) Oral at bedtime  senna Syrup 10 milliLiter(s) Oral daily    MEDICATIONS  (PRN):  bisacodyl Suppository 10 milliGRAM(s) Rectal daily PRN Constipation  dextrose 40% Gel 15 Gram(s) Oral once PRN Blood Glucose LESS THAN 70 milliGRAM(s)/deciliter  glucagon  Injectable 1 milliGRAM(s) IntraMuscular once PRN Glucose LESS THAN 70 milligrams/deciliter

## 2020-04-02 NOTE — PROGRESS NOTE ADULT - SUBJECTIVE AND OBJECTIVE BOX
Physical Medicine and Rehabilitation Progress Note:    Patient is a 79y old  Male who presents with a chief complaint of weakness (02 Apr 2020 10:35)      HPI:  79 M, poor historian, with past medical history of HTN, DM2, and hypothyroidism who presents to ED [facility] after being found down for a fall. Patient states that his bed is tilted and that he slid off his bed and was unable to get up. Patient states that it happened in the last few days. Per ED provider, the patient was potentially down from yesterday evening to this morning and was found by workers at his assisted living home. It is unclear what kind of home, but he says single occupancy group home of some type. He does not have a rollator, walker, or cane at home. Patient denies hitting his head, loss of consciousness, or previous falls. Patient states that in past few weeks he has not been eating as much as he does not like the food around his home. He has mainly been drinking juices at home. Patient currently denies fever, headache, nausea, vomiting, chest pain, shortness of breath, abdominal pain. Patient does not feel as strong as usual. He states that he has bowel movements every few days and has had no changes in urination. Denies sick contacts or travel history.    ED Vitals: T 98.3, P100, /98, RR 17, O2 95% on RA ->> T 101.1 P 91 /89 RR 20 O2 89% on RA  ED Course: Glucose 366-> 240s. Patient received 3 L NS With improvement in glucose. Received Tylenol for fever. Swabbed for RVP, COVID, blood cultures. CXR, prelim wet read, abnormal with left lower lobe possible consolidation and right reticulonodular pattern consistent with infectious vs inflammatory process, new since 2018 (old CT). [facility] in setting of unknown fever. (23 Mar 2020 18:15)                            13.9   10.68 )-----------( 423      ( 02 Apr 2020 07:05 )             45.6       04-02    141  |  107  |  24<H>  ----------------------------<  131<H>  4.5   |  24  |  0.76    Ca    9.5      02 Apr 2020 07:05  Phos  4.9     04-02  Mg     2.3     04-02    TPro  6.3  /  Alb  3.1<L>  /  TBili  0.3  /  DBili  x   /  AST  25  /  ALT  40  /  AlkPhos  50  04-02    Vital Signs Last 24 Hrs  T(C): 37.3 (02 Apr 2020 12:21), Max: 37.3 (02 Apr 2020 12:21)  T(F): 99.1 (02 Apr 2020 12:21), Max: 99.1 (02 Apr 2020 12:21)  HR: 80 (02 Apr 2020 12:21) (67 - 92)  BP: 130/74 (02 Apr 2020 12:21) (110/60 - 139/70)  BP(mean): 95 (02 Apr 2020 12:21) (79 - 95)  RR: 20 (02 Apr 2020 12:21) (18 - 20)  SpO2: 90% (02 Apr 2020 12:21) (90% - 94%)    MEDICATIONS  (STANDING):  acetaminophen   Tablet .. 650 milliGRAM(s) Oral every 6 hours  amLODIPine   Tablet 10 milliGRAM(s) Oral every 24 hours  atorvastatin 20 milliGRAM(s) Oral at bedtime  brimonidine 0.2% Ophthalmic Solution 1 Drop(s) Both EYES two times a day  dextrose 50% Injectable 12.5 Gram(s) IV Push once  dextrose 50% Injectable 25 Gram(s) IV Push once  dextrose 50% Injectable 25 Gram(s) IV Push once  dorzolamide 2%/timolol 0.5% Ophthalmic Solution 1 Drop(s) Both EYES two times a day  enoxaparin Injectable 40 milliGRAM(s) SubCutaneous every 24 hours  famotidine    Tablet 20 milliGRAM(s) Oral two times a day  fenofibrate Tablet 48 milliGRAM(s) Oral daily  gabapentin 300 milliGRAM(s) Oral two times a day  hydrALAZINE 10 milliGRAM(s) Oral every 8 hours  insulin glargine Injectable (LANTUS) 12 Unit(s) SubCutaneous at bedtime  insulin lispro (HumaLOG) corrective regimen sliding scale   SubCutaneous Before meals and at bedtime  insulin lispro Injectable (HumaLOG) 4 Unit(s) SubCutaneous three times a day before meals  latanoprost 0.005% Ophthalmic Solution 1 Drop(s) Both EYES at bedtime  levothyroxine 75 MICROGram(s) Oral daily  metoprolol succinate  milliGRAM(s) Oral daily  polyethylene glycol 3350 17 Gram(s) Oral daily  QUEtiapine 800 milliGRAM(s) Oral at bedtime  senna Syrup 10 milliLiter(s) Oral daily    MEDICATIONS  (PRN):  bisacodyl Suppository 10 milliGRAM(s) Rectal daily PRN Constipation  dextrose 40% Gel 15 Gram(s) Oral once PRN Blood Glucose LESS THAN 70 milliGRAM(s)/deciliter  glucagon  Injectable 1 milliGRAM(s) IntraMuscular once PRN Glucose LESS THAN 70 milligrams/deciliter    Currently Undergoing Physical Therapy at bedside.    Functional Status Assessment:  4/1/2020    Therapeutic Interventions      Bed Mobility  Bed Mobility Training Sit-to-Supine: independent  Bed Mobility Training Supine-to-Sit: independent  Bed Mobility Training Limitations: decreased strength    Sit-Stand Transfer Training  Transfer Training Sit-to-Stand Transfer: independent;  weight-bearing as tolerated   no assistive device   Transfer Training Stand-to-Sit Transfer: independent;  weight-bearing as tolerated   no assistive device   Sit-to-Stand Transfer Training Transfer Safety Analysis: decreased strength    Gait Training  Gait Training: Patient stood at bedside to use urinal (~1min). Then ambulated 3 steps forward, 3 steps back x 5 reps without a rest break. Limited by space in room and 02 tubing. Seated rest break between standing to urinate and standing to ambulate. De-sat when standing to urinate to 86%. Improved with seated rest break to 92%. De-sat when ambulating to 85-86%, improved with seated rest break to 92-93%. Entire session completed on NRB mask.   Gait Analysis: swing-through gait   decreased juan francisco;  decreased step length;  decreased strength;  impaired endurance, gait fair steady without use of rolling walker.           PM&R Impression: as above    Current Disposition Plan Recommendations:  d/c home, home physical therapy

## 2020-04-02 NOTE — PROGRESS NOTE ADULT - PROBLEM SELECTOR PLAN 4
Hx of diabetes. A1c 10 here, sugars high. Previously recorded as on Januvia and glucophage. No diabetes medications at [facility], may use more than one pharmacy. Patient poor historian does not know what he takes at home.  - on steroids from (3/27 - 4/1)  - lantus up to 19U  - 5U premeal increased to 6  - MISS  - titrate insulin as needed, keep in mind steroids will end.   - consistent carb diet  - out pt Endocrine f/u

## 2020-04-02 NOTE — PROGRESS NOTE ADULT - ASSESSMENT
per Internal Medicine    79 M, park historian, with past medical history of HTN, DM2, and hypothyroidism [facility], found to have starvation ketosis now resolved, was desatting with imaging findings on cxr and found to be covid +, now continuing on oxygen support on RMF. He is likely on NRB due to mouth breathing instead of worsening lung pathology.       Problem/Plan - 1:  ·  Problem: Severe sepsis.  Plan: Severe sepsis 2/2 viral pneumonia and COVID 19   -See plan below     #SARS- Coronavirus COVID 19 PCR postive   - s/p Azithro, plaquenil, ascorbic acid, thiamine  - S/p Tocilzumab 400mg x 1 (3/27)   - C/w Solumedrol 40mg BID (3/27 PM - 4/1), for 5-7 days. Last day 4/1  - Supplemental O2 prn.     Problem/Plan - 2:  ·  Problem: Pneumonia.  Plan: Viral pneumonia 2/2 COVID- See plan above  -Reswab on 03/31/2020 positive. Pt is from [facility]  - Plan to reswab 4/7/2020, reassess as protocol changes often depending on dispo    #Acute hypoxic respiratory failure  - wean as tolerates with sp02 goal of 95%  - refusing to wear nonrebreather properly at times, however, NC is less than ideal as patient breathes with mouth open.   - OOBTC  - Started Lasix 60, moved OOBTC, placed condom cath.   - Monitor I&O.    Problem/Plan - 3:  ·  Problem: Weakness.  Plan: Likely 2/2 dehydration in setting of poor PO intake vs starvation ketoacidosis vs infectious etiology in setting of recent fever. Patient with couple week history of poor PO intake and only juice. Denies sick contacts. s/p 3L of NS in ED.  CT Head: No acute intracranial hemorrhage and Xray pelvis/hip: No acute osseous injury  Creatinine kinase: slightly elevated, 494  - physical therapy: will benefit from continued PT to address above deficits as tolerated in order to maximize functional independence and ensure safe D/C to home.  - TSH WNL  - encourage PO intake  - PT final recs.     Problem/Plan - 4:  ·  Problem: Diabetes.  Plan: Hx of diabetes. A1c 10 here, sugars high. Previously recorded as on Januvia and glucophage. No diabetes medications at [facility], may use more than one pharmacy. Patient poor historian does not know what he takes at home.  - on steroids from (3/27 - 4/1)  - lantus up to 19U  - 5U premeal increased to 6  - MISS  - titrate insulin as needed, keep in mind steroids will end.   - consistent carb diet  - out pt Endocrine f/u.     Problem/Plan - 5:  ·  Problem: Essential hypertension.  Plan: Pt received labetalol 10mg x2 and hydral on 3/30 and 3/31 for BPs ~190/100 in setting of refusing amlodipine day prior.  - Monitor pressure  - amlodipine up to 10mg on 3/31    #Insomnia/Delirium   -Restarted on home Seroquel 800mg @ bedtime.     Problem/Plan - 6:  Problem: Hypothyroidism. Plan: - c/w home synthroid 75mcg daily  - TSH WNL.    Problem/Plan - 7:  ·  Problem: Hyperlipidemia.  Plan: - c/w home atorvastatin 20mg qhs.     Problem/Plan - 8:  ·  Problem: Nutrition, metabolism, and development symptoms.  Plan: F: None   E: Replete PRN for Mg<2 and K<4  N: DASH Diet (carb consistent).     Problem/Plan - 9:  ·  Problem: Prophylactic measure.  Plan: DVT PPx: Lovenox subq daily  GI PPx: Famotidine 20mg    Transition of Care:  - f/u with PCP Dr. Diya Padilla  - f/u with full med rec    DISPO: COVID RMF  COD: FULL.

## 2020-04-02 NOTE — PROGRESS NOTE ADULT - PROBLEM SELECTOR PLAN 2
Viral pneumonia 2/2 COVID- See plan above  -Reswab on 03/31/2020 positive. [place]  - Plan to reswab 4/7/2020, reassess as protocol changes often depending on dispo    #Acute hypoxic respiratory failure  - wean as tolerates with sp02 goal of 95%  - refusing to wear nonrebreather properly at times, however, NC is less than ideal as patient breathes with mouth open.   - OOBTC Viral pneumonia 2/2 COVID- See plan above  -Reswab on 03/31/2020 positive. [place]  - Plan to reswab 4/7/2020, reassess as protocol changes often depending on dispo    #Acute hypoxic respiratory failure  - wean as tolerates with sp02 goal of 95%  - refusing to wear nonrebreather properly at times, however, NC is less than ideal as patient breathes with mouth open.   - OOBTC  - Started Lasix 60, moved OOBTC, placed condom cath.   - Monitor I&O Viral pneumonia 2/2 COVID- See plan above  -Reswab on 03/31/2020 positive. [place]  - Plan to reswab 4/7/2020, reassess as protocol changes often depending on dispo    #Acute hypoxic respiratory failure  - wean as tolerates with sp02 goal of 95%  - refusing to wear nonrebreather properly at times, however, NC is less than ideal as patient breathes with mouth open.   - OOBTC  - Started Lasix 20, moved OOBTC, placed condom cath.   - Monitor I&O

## 2020-04-03 LAB
ALBUMIN SERPL ELPH-MCNC: 3 G/DL — LOW (ref 3.3–5)
ALP SERPL-CCNC: 44 U/L — SIGNIFICANT CHANGE UP (ref 40–120)
ALT FLD-CCNC: 30 U/L — SIGNIFICANT CHANGE UP (ref 10–45)
ANION GAP SERPL CALC-SCNC: 12 MMOL/L — SIGNIFICANT CHANGE UP (ref 5–17)
AST SERPL-CCNC: 24 U/L — SIGNIFICANT CHANGE UP (ref 10–40)
BASOPHILS # BLD AUTO: 0.02 K/UL — SIGNIFICANT CHANGE UP (ref 0–0.2)
BASOPHILS NFR BLD AUTO: 0.2 % — SIGNIFICANT CHANGE UP (ref 0–2)
BILIRUB SERPL-MCNC: 0.3 MG/DL — SIGNIFICANT CHANGE UP (ref 0.2–1.2)
BUN SERPL-MCNC: 24 MG/DL — HIGH (ref 7–23)
CALCIUM SERPL-MCNC: 9.3 MG/DL — SIGNIFICANT CHANGE UP (ref 8.4–10.5)
CHLORIDE SERPL-SCNC: 104 MMOL/L — SIGNIFICANT CHANGE UP (ref 96–108)
CO2 SERPL-SCNC: 27 MMOL/L — SIGNIFICANT CHANGE UP (ref 22–31)
CREAT SERPL-MCNC: 0.83 MG/DL — SIGNIFICANT CHANGE UP (ref 0.5–1.3)
CRP SERPL-MCNC: 0.15 MG/DL — SIGNIFICANT CHANGE UP (ref 0–0.4)
D DIMER BLD IA.RAPID-MCNC: 387 NG/ML DDU — HIGH
EOSINOPHIL # BLD AUTO: 0.14 K/UL — SIGNIFICANT CHANGE UP (ref 0–0.5)
EOSINOPHIL NFR BLD AUTO: 1.4 % — SIGNIFICANT CHANGE UP (ref 0–6)
FERRITIN SERPL-MCNC: 631 NG/ML — HIGH (ref 30–400)
GLUCOSE BLDC GLUCOMTR-MCNC: 102 MG/DL — HIGH (ref 70–99)
GLUCOSE BLDC GLUCOMTR-MCNC: 112 MG/DL — HIGH (ref 70–99)
GLUCOSE BLDC GLUCOMTR-MCNC: 116 MG/DL — HIGH (ref 70–99)
GLUCOSE BLDC GLUCOMTR-MCNC: 130 MG/DL — HIGH (ref 70–99)
GLUCOSE BLDC GLUCOMTR-MCNC: 204 MG/DL — HIGH (ref 70–99)
GLUCOSE SERPL-MCNC: 120 MG/DL — HIGH (ref 70–99)
HCT VFR BLD CALC: 44.3 % — SIGNIFICANT CHANGE UP (ref 39–50)
HGB BLD-MCNC: 14.3 G/DL — SIGNIFICANT CHANGE UP (ref 13–17)
IMM GRANULOCYTES NFR BLD AUTO: 1.8 % — HIGH (ref 0–1.5)
LYMPHOCYTES # BLD AUTO: 1.68 K/UL — SIGNIFICANT CHANGE UP (ref 1–3.3)
LYMPHOCYTES # BLD AUTO: 17.2 % — SIGNIFICANT CHANGE UP (ref 13–44)
MAGNESIUM SERPL-MCNC: 2.2 MG/DL — SIGNIFICANT CHANGE UP (ref 1.6–2.6)
MCHC RBC-ENTMCNC: 26.2 PG — LOW (ref 27–34)
MCHC RBC-ENTMCNC: 32.3 GM/DL — SIGNIFICANT CHANGE UP (ref 32–36)
MCV RBC AUTO: 81.1 FL — SIGNIFICANT CHANGE UP (ref 80–100)
MONOCYTES # BLD AUTO: 0.43 K/UL — SIGNIFICANT CHANGE UP (ref 0–0.9)
MONOCYTES NFR BLD AUTO: 4.4 % — SIGNIFICANT CHANGE UP (ref 2–14)
NEUTROPHILS # BLD AUTO: 7.29 K/UL — SIGNIFICANT CHANGE UP (ref 1.8–7.4)
NEUTROPHILS NFR BLD AUTO: 75 % — SIGNIFICANT CHANGE UP (ref 43–77)
NRBC # BLD: 0 /100 WBCS — SIGNIFICANT CHANGE UP (ref 0–0)
PHOSPHATE SERPL-MCNC: 3.5 MG/DL — SIGNIFICANT CHANGE UP (ref 2.5–4.5)
PLATELET # BLD AUTO: 529 K/UL — HIGH (ref 150–400)
POTASSIUM SERPL-MCNC: 3.9 MMOL/L — SIGNIFICANT CHANGE UP (ref 3.5–5.3)
POTASSIUM SERPL-SCNC: 3.9 MMOL/L — SIGNIFICANT CHANGE UP (ref 3.5–5.3)
PROT SERPL-MCNC: 6.4 G/DL — SIGNIFICANT CHANGE UP (ref 6–8.3)
RBC # BLD: 5.46 M/UL — SIGNIFICANT CHANGE UP (ref 4.2–5.8)
RBC # FLD: 15.7 % — HIGH (ref 10.3–14.5)
SODIUM SERPL-SCNC: 143 MMOL/L — SIGNIFICANT CHANGE UP (ref 135–145)
WBC # BLD: 9.74 K/UL — SIGNIFICANT CHANGE UP (ref 3.8–10.5)
WBC # FLD AUTO: 9.74 K/UL — SIGNIFICANT CHANGE UP (ref 3.8–10.5)

## 2020-04-03 PROCEDURE — 71045 X-RAY EXAM CHEST 1 VIEW: CPT | Mod: 26

## 2020-04-03 PROCEDURE — 99233 SBSQ HOSP IP/OBS HIGH 50: CPT | Mod: GC

## 2020-04-03 RX ORDER — POTASSIUM CHLORIDE 20 MEQ
20 PACKET (EA) ORAL ONCE
Refills: 0 | Status: COMPLETED | OUTPATIENT
Start: 2020-04-03 | End: 2020-04-03

## 2020-04-03 RX ORDER — FUROSEMIDE 40 MG
40 TABLET ORAL ONCE
Refills: 0 | Status: COMPLETED | OUTPATIENT
Start: 2020-04-03 | End: 2020-04-03

## 2020-04-03 RX ORDER — FUROSEMIDE 40 MG
20 TABLET ORAL ONCE
Refills: 0 | Status: COMPLETED | OUTPATIENT
Start: 2020-04-03 | End: 2020-04-03

## 2020-04-03 RX ADMIN — Medication 4 UNIT(S): at 08:32

## 2020-04-03 RX ADMIN — Medication 100 MILLIGRAM(S): at 06:20

## 2020-04-03 RX ADMIN — LATANOPROST 1 DROP(S): 0.05 SOLUTION/ DROPS OPHTHALMIC; TOPICAL at 22:30

## 2020-04-03 RX ADMIN — Medication 650 MILLIGRAM(S): at 13:41

## 2020-04-03 RX ADMIN — ENOXAPARIN SODIUM 40 MILLIGRAM(S): 100 INJECTION SUBCUTANEOUS at 22:47

## 2020-04-03 RX ADMIN — GABAPENTIN 300 MILLIGRAM(S): 400 CAPSULE ORAL at 06:18

## 2020-04-03 RX ADMIN — Medication 4 UNIT(S): at 17:49

## 2020-04-03 RX ADMIN — Medication 650 MILLIGRAM(S): at 18:14

## 2020-04-03 RX ADMIN — Medication 48 MILLIGRAM(S): at 11:41

## 2020-04-03 RX ADMIN — DORZOLAMIDE HYDROCHLORIDE TIMOLOL MALEATE 1 DROP(S): 20; 5 SOLUTION/ DROPS OPHTHALMIC at 18:07

## 2020-04-03 RX ADMIN — Medication 4 UNIT(S): at 11:43

## 2020-04-03 RX ADMIN — BRIMONIDINE TARTRATE 1 DROP(S): 2 SOLUTION/ DROPS OPHTHALMIC at 18:06

## 2020-04-03 RX ADMIN — BRIMONIDINE TARTRATE 1 DROP(S): 2 SOLUTION/ DROPS OPHTHALMIC at 06:20

## 2020-04-03 RX ADMIN — LATANOPROST 1 DROP(S): 0.05 SOLUTION/ DROPS OPHTHALMIC; TOPICAL at 00:08

## 2020-04-03 RX ADMIN — Medication 4: at 11:43

## 2020-04-03 RX ADMIN — POLYETHYLENE GLYCOL 3350 17 GRAM(S): 17 POWDER, FOR SOLUTION ORAL at 11:45

## 2020-04-03 RX ADMIN — SENNA PLUS 10 MILLILITER(S): 8.6 TABLET ORAL at 11:42

## 2020-04-03 RX ADMIN — Medication 10 MILLIGRAM(S): at 06:18

## 2020-04-03 RX ADMIN — AMLODIPINE BESYLATE 10 MILLIGRAM(S): 2.5 TABLET ORAL at 11:41

## 2020-04-03 RX ADMIN — FAMOTIDINE 20 MILLIGRAM(S): 10 INJECTION INTRAVENOUS at 06:18

## 2020-04-03 RX ADMIN — Medication 10 MILLIGRAM(S): at 22:48

## 2020-04-03 RX ADMIN — QUETIAPINE FUMARATE 800 MILLIGRAM(S): 200 TABLET, FILM COATED ORAL at 22:48

## 2020-04-03 RX ADMIN — FAMOTIDINE 20 MILLIGRAM(S): 10 INJECTION INTRAVENOUS at 17:49

## 2020-04-03 RX ADMIN — Medication 20 MILLIGRAM(S): at 11:41

## 2020-04-03 RX ADMIN — Medication 10 MILLIGRAM(S): at 13:41

## 2020-04-03 RX ADMIN — Medication 20 MILLIEQUIVALENT(S): at 13:41

## 2020-04-03 RX ADMIN — Medication 650 MILLIGRAM(S): at 06:19

## 2020-04-03 RX ADMIN — DORZOLAMIDE HYDROCHLORIDE TIMOLOL MALEATE 1 DROP(S): 20; 5 SOLUTION/ DROPS OPHTHALMIC at 06:21

## 2020-04-03 RX ADMIN — INSULIN GLARGINE 12 UNIT(S): 100 INJECTION, SOLUTION SUBCUTANEOUS at 22:47

## 2020-04-03 RX ADMIN — GABAPENTIN 300 MILLIGRAM(S): 400 CAPSULE ORAL at 17:49

## 2020-04-03 RX ADMIN — Medication 40 MILLIGRAM(S): at 17:49

## 2020-04-03 RX ADMIN — Medication 75 MICROGRAM(S): at 06:18

## 2020-04-03 RX ADMIN — ATORVASTATIN CALCIUM 20 MILLIGRAM(S): 80 TABLET, FILM COATED ORAL at 22:47

## 2020-04-03 NOTE — PROGRESS NOTE ADULT - PROBLEM SELECTOR PLAN 2
Viral pneumonia 2/2 COVID- See plan above  -Reswab on 03/31/2020 positive. [place]  - Plan to reswab 4/7/2020, reassess as protocol changes often depending on dispo    #Acute hypoxic respiratory failure  - wean as tolerates with sp02 goal of 95%  - refusing to wear nonrebreather properly at times, however, NC is less than ideal as patient breathes with mouth open.   - OOBTC  - Started Lasix 20, moved OOBTC, strict I&O  - Monitor I&O

## 2020-04-03 NOTE — PROGRESS NOTE ADULT - ASSESSMENT
79 M, poor historian, with past medical history of HTN, DM2, and hypothyroidism who presents to ED BIBEMS after being found down for a fall, found to have starvation ketosis now resolved, was desatting with imaging findings on cxr and found to be covid +, now continuing on oxygen support on RMF. Now being evaluated by ICU consult for worsening respiratory status despite diuresis and maximal O2 support (NRB @ 15)

## 2020-04-03 NOTE — PROGRESS NOTE ADULT - SUBJECTIVE AND OBJECTIVE BOX
COVID Summary: Admitted 3/23 for fall and decreased strength, found with fevers, 89 on RA, covid positive. Finished quad therapy on 3/28. Got Toci 3/27 followed by initiation of IV steroids BID to end on 4/1. Insulin being uptitrated for hyperglycemia while on steroids, A1c 10. Pt intermittently refuses oxygen support (takes off NC and NRB) and pills. HTN needing labetalol and hydral on 3/30-31 for control in setting of refusing oral anti-htn. Failed attempt to wean O2, insulin requirement stable off steroids, HTN controled on hydral 10 TID, amlodipine 10,  3/31 covid reswab positive (one week after first swab and has been afebrile for 72 hr). Started solumedrol, completed 5 day course. Inflammatory markers significantly improved. Now tachypneic to 26,with hypoxia to the high 80s-low 90s despite 15L NRB.    OVERNIGHT EVENTS: None    Pt never having labored breathing, even when off o2 support, continues to desat to mid 80s when off O2. Was comfortable on 15 L NRB this AM, however, worsening respiratory distress this afternoon with desaturation 89-91%.     SUBJECTIVE: Pt examined, complaining of shortness of breath and intermittent cough.    Vital Signs Last 24 Hrs    Vital Signs Last 24 Hrs  T(C): 36.1 (03 Apr 2020 07:12), Max: 37 (02 Apr 2020 20:42)  T(F): 96.9 (03 Apr 2020 07:12), Max: 98.6 (02 Apr 2020 20:42)  HR: 71 (03 Apr 2020 13:45) (71 - 84)  BP: 117/72 (03 Apr 2020 13:45) (104/58 - 132/77)  BP(mean): --  RR: 26 (03 Apr 2020 13:45) (20 - 26)  SpO2: 90% (03 Apr 2020 13:45) (88% - 94%) on 15L NRB    PHYSICAL EXAM:  General: WDWN, on 15 L NRB, no Respiratory distress, but does have desaturation to mid 80s.   HEENT: NC/AT; anicteric sclera; MMM  Neck: supple  Cardiovascular: +S1/S2; RRR  Respiratory: ;+ labored breathing, + accessory muscle use.   Gastrointestinal: soft non-tender  Extremities: WWP; no edema, clubbing or cyanosis  Vascular: 2+ radial, DP pulses B/L  Neurological: AAOx3; no focal deficits    LABS:   COVID-19 PCR: Detected <!!> (03-31-20 @ 15:58)  COVID-19 PCR: Detected <!!> (03-24-20 @ 00:47)                          14.3   9.74  )-----------( 529      ( 03 Apr 2020 08:00 )             44.3     04-03    143  |  104  |  24<H>  ----------------------------<  120<H>  3.9   |  27  |  0.83    Ca    9.3      03 Apr 2020 08:00  Phos  3.5     04-03  Mg     2.2     04-03    TPro  6.4  /  Alb  3.0<L>  /  TBili  0.3  /  DBili  x   /  AST  24  /  ALT  30  /  AlkPhos  44  04-03        Auto Neutrophil #: 7.29 K/uL [1.80 - 7.40] (04-03-20 @ 08:00)  Auto Lymphocyte #: 1.68 K/uL [1.00 - 3.30] (04-03-20 @ 08:00)  Ferritin, Serum: 631 ng/mL <H> [30 - 400] (04-03-20 @ 08:00)  C-Reactive Protein, Serum: 0.15 mg/dL [0.00 - 0.40] (04-03-20 @ 08:00)  D-Dimer Assay, Quantitative: 387 ng/mL DDU <H> (04-03-20 @ 08:00)  Auto Neutrophil #: 8.65 K/uL <H> [1.80 - 7.40] (04-02-20 @ 07:05)  Auto Lymphocyte #: 1.82 K/uL [1.00 - 3.30] (04-02-20 @ 07:05)  Ferritin, Serum: 802 ng/mL <H> [30 - 400] (04-02-20 @ 07:05)  C-Reactive Protein, Serum: 0.22 mg/dL [0.00 - 0.40] (04-02-20 @ 07:05)  D-Dimer Assay, Quantitative: 244 ng/mL DDU <H> (04-02-20 @ 07:05)  D-Dimer Assay, Quantitative: 439 ng/mL DDU <H> (04-01-20 @ 07:39)  Auto Neutrophil #: 8.26 K/uL <H> [1.80 - 7.40] (04-01-20 @ 07:38)  Auto Lymphocyte #: 1.73 K/uL [1.00 - 3.30] (04-01-20 @ 07:38)  Ferritin, Serum: 882 ng/mL <H> [30 - 400] (04-01-20 @ 07:38)  C-Reactive Protein, Serum: 0.32 mg/dL [0.00 - 0.40] (04-01-20 @ 07:38)        RADIOLOGY, EKG & ADDITIONAL TESTS: Reviewed.

## 2020-04-03 NOTE — PROGRESS NOTE ADULT - SUBJECTIVE AND OBJECTIVE BOX
Physical Medicine and Rehabilitation Progress Note:    Patient is a 79y old  Male who presents with a chief complaint of weakness (03 Apr 2020 07:18)      HPI:  79 M, poor historian, with past medical history of HTN, DM2, and hypothyroidism who presents to ED [facility] after being found down for a fall. Patient states that his bed is tilted and that he slid off his bed and was unable to get up. Patient states that it happened in the last few days. Per ED provider, the patient was potentially down from yesterday evening to this morning and was found by workers at his assisted living home. It is unclear what kind of home, but he says single occupancy group home of some type. He does not have a rollator, walker, or cane at home. Patient denies hitting his head, loss of consciousness, or previous falls. Patient states that in past few weeks he has not been eating as much as he does not like the food around his home. He has mainly been drinking juices at home. Patient currently denies fever, headache, nausea, vomiting, chest pain, shortness of breath, abdominal pain. Patient does not feel as strong as usual. He states that he has bowel movements every few days and has had no changes in urination. Denies sick contacts or travel history.    ED Vitals: T 98.3, P100, /98, RR 17, O2 95% on RA ->> T 101.1 P 91 /89 RR 20 O2 89% on RA  ED Course: Glucose 366-> 240s. Patient received 3 L NS With improvement in glucose. Received Tylenol for fever. Swabbed for RVP, COVID, blood cultures. CXR, prelim wet read, abnormal with left lower lobe possible consolidation and right reticulonodular pattern consistent with infectious vs inflammatory process, new since 2018 (old CT). Admitted to [facility] to r/o [facility] in setting of unknown fever. (23 Mar 2020 18:15)                            14.3   9.74  )-----------( 529      ( 03 Apr 2020 08:00 )             44.3       04-03    143  |  104  |  24<H>  ----------------------------<  120<H>  3.9   |  27  |  0.83    Ca    9.3      03 Apr 2020 08:00  Phos  3.5     04-03  Mg     2.2     04-03    TPro  6.4  /  Alb  3.0<L>  /  TBili  0.3  /  DBili  x   /  AST  24  /  ALT  30  /  AlkPhos  44  04-03    Vital Signs Last 24 Hrs  T(C): 36.1 (03 Apr 2020 07:12), Max: 37.4 (02 Apr 2020 15:12)  T(F): 96.9 (03 Apr 2020 07:12), Max: 99.3 (02 Apr 2020 15:12)  HR: 73 (03 Apr 2020 09:29) (72 - 84)  BP: 125/72 (03 Apr 2020 11:50) (104/58 - 138/78)  BP(mean): 100 (02 Apr 2020 16:28) (100 - 100)  RR: 24 (03 Apr 2020 09:29) (19 - 24)  SpO2: 94% (03 Apr 2020 11:50) (88% - 94%)    MEDICATIONS  (STANDING):  acetaminophen   Tablet .. 650 milliGRAM(s) Oral every 6 hours  amLODIPine   Tablet 10 milliGRAM(s) Oral every 24 hours  atorvastatin 20 milliGRAM(s) Oral at bedtime  brimonidine 0.2% Ophthalmic Solution 1 Drop(s) Both EYES two times a day  dextrose 50% Injectable 12.5 Gram(s) IV Push once  dextrose 50% Injectable 25 Gram(s) IV Push once  dextrose 50% Injectable 25 Gram(s) IV Push once  dorzolamide 2%/timolol 0.5% Ophthalmic Solution 1 Drop(s) Both EYES two times a day  enoxaparin Injectable 40 milliGRAM(s) SubCutaneous every 24 hours  famotidine    Tablet 20 milliGRAM(s) Oral two times a day  fenofibrate Tablet 48 milliGRAM(s) Oral daily  gabapentin 300 milliGRAM(s) Oral two times a day  hydrALAZINE 10 milliGRAM(s) Oral every 8 hours  insulin glargine Injectable (LANTUS) 12 Unit(s) SubCutaneous at bedtime  insulin lispro (HumaLOG) corrective regimen sliding scale   SubCutaneous Before meals and at bedtime  insulin lispro Injectable (HumaLOG) 4 Unit(s) SubCutaneous three times a day before meals  latanoprost 0.005% Ophthalmic Solution 1 Drop(s) Both EYES at bedtime  levothyroxine 75 MICROGram(s) Oral daily  metoprolol succinate  milliGRAM(s) Oral daily  polyethylene glycol 3350 17 Gram(s) Oral daily  potassium chloride    Tablet ER 20 milliEquivalent(s) Oral once  QUEtiapine 800 milliGRAM(s) Oral at bedtime  senna Syrup 10 milliLiter(s) Oral daily    MEDICATIONS  (PRN):  bisacodyl Suppository 10 milliGRAM(s) Rectal daily PRN Constipation  dextrose 40% Gel 15 Gram(s) Oral once PRN Blood Glucose LESS THAN 70 milliGRAM(s)/deciliter  glucagon  Injectable 1 milliGRAM(s) IntraMuscular once PRN Glucose LESS THAN 70 milligrams/deciliter    Currently Undergoing Physical Therapy at bedside.    Functional Status Assessment:      Therapeutic Interventions      Bed Mobility  Bed Mobility Training Sit-to-Supine: supervision;  supervision  Bed Mobility Training Supine-to-Sit: supervision;  supervision  Bed Mobility Training Limitations: decreased ability to use legs for bridging/pushing;  impaired balance;  decreased strength    Sit-Stand Transfer Training  Transfer Training Sit-to-Stand Transfer: contact guard;  1 person assist  Transfer Training Stand-to-Sit Transfer: contact guard;  1 person assist  Sit-to-Stand Transfer Training Transfer Safety Analysis: decreased balance;  decreased strength;  impaired balance    Gait Training  Gait Training: contact guard;  1 person assist;  unilateral hand-hold assist ;  3 ft x 2, 10 ft x 2  Gait Analysis: swing-through gait   decreased velocity of limb motion;  decreased hip/knee flexion;  decreased step length;  decreased stride length;  decreased gait speed, lateral trunk sway, decreased B hip flexion;  decreased strength;  impaired balance    Therapeutic Exercise  Therapeutic Exercise Detail: Seated:Shoulder Flexion with Deep Breathing - 10 reps, BUE | Scapular Retractions with Deep Breathing - 10 reps, BUE             PM&R Impression: as above    Current Disposition Plan Recommendations:  d/c home, home physical therapy

## 2020-04-03 NOTE — PROGRESS NOTE ADULT - PROBLEM SELECTOR PLAN 4
Hx of diabetes. A1c 10 here, sugars high. Previously recorded as on Januvia and glucophage. No diabetes medications at [facility], may use more than one pharmacy. Patient poor historian does not know what he takes at home.  - on steroids from (3/27 - 4/1)  - lantus down to 12 units  - 4U premeal   - MISS  - titrate insulin as needed, keep in mind steroids will end.   - consistent carb diet  - out pt Endocrine f/u

## 2020-04-03 NOTE — PROGRESS NOTE ADULT - PROBLEM SELECTOR PLAN 1
Severe sepsis 2/2 viral pneumonia and COVID 19   -See plan below     #SARS- Coronavirus COVID 19 PCR postive   - s/p Azithro, plaquenil, ascorbic acid, thiamine  - S/p Tocilzumab 400mg x 1 (3/27)   - Solumedrol 40mg BID (3/27 PM - 4/1), for 5-7 days. Last day 4/1  - Supplemental O2 w/NRB  - Lasix 20 IV x 2 w/initially good UOP now decreased

## 2020-04-03 NOTE — PROGRESS NOTE ADULT - PROBLEM SELECTOR PLAN 9
DVT PPx: Lovenox subq daily  GI PPx: Famotidine 20mg    Transition of Care:  - f/u with PCP Dr. Diya Padilla  - f/u with full med rec    DISPO: COVID RMF ==> ICU Consult  COD: FULL

## 2020-04-03 NOTE — PROGRESS NOTE ADULT - ASSESSMENT
per Internal Medicine    79 M, park historian, with past medical history of HTN, DM2, and hypothyroidism who presents to ED BIBEMS after being found down for a fall, found to have starvation ketosis now resolved, was desatting with imaging findings on cxr and found to be covid +, now continuing on oxygen support on RMF. He is likely on NRB due to mouth breathing instead of worsening lung pathology.       Problem/Plan - 1:  ·  Problem: Severe sepsis.  Plan: Severe sepsis 2/2 viral pneumonia and COVID 19   -See plan below     #SARS- Coronavirus COVID 19 PCR postive   - s/p Azithro, plaquenil, ascorbic acid, thiamine  - S/p Tocilzumab 400mg x 1 (3/27)   - Solumedrol 40mg BID (3/27 PM - 4/1), for 5-7 days. Last day 4/1  - Supplemental O2 prn.    Problem/Plan - 2:  ·  Problem: Pneumonia.  Plan: Viral pneumonia 2/2 COVID- See plan above  -Reswab on 03/31/2020 positive. Pt is from [facility]  - Plan to reswab 4/7/2020, reassess as protocol changes often depending on dispo    #Acute hypoxic respiratory failure  - wean as tolerates with sp02 goal of 95%  - refusing to wear nonrebreather properly at times, however, NC is less than ideal as patient breathes with mouth open.   - OOBTC  - Started Lasix 20, moved OOBTC, placed condom cath.   - Monitor I&O.    Problem/Plan - 3:  ·  Problem: Weakness.  Plan: Likely 2/2 dehydration in setting of poor PO intake vs starvation ketoacidosis vs infectious etiology in setting of recent fever. Patient with couple week history of poor PO intake and only juice. Denies sick contacts. s/p 3L of NS in ED.  CT Head: No acute intracranial hemorrhage and Xray pelvis/hip: No acute osseous injury  Creatinine kinase: slightly elevated, 494  - physical therapy: will benefit from continued PT to address above deficits as tolerated in order to maximize functional independence and ensure safe D/C to home.  - TSH WNL  - encourage PO intake  - PT final recs.     Problem/Plan - 4:  ·  Problem: Diabetes.  Plan: Hx of diabetes. A1c 10 here, sugars high. Previously recorded as on Januvia and glucophage. No diabetes medications at [facility], may use more than one pharmacy. Patient poor historian does not know what he takes at home.  - on steroids from (3/27 - 4/1)  - lantus up to 19U  - 5U premeal increased to 6  - MISS  - titrate insulin as needed, keep in mind steroids will end.   - consistent carb diet  - out pt Endocrine f/u.     Problem/Plan - 5:  ·  Problem: Essential hypertension.  Plan: Pt received labetalol 10mg x2 and hydral on 3/30 and 3/31 for BPs ~190/100 in setting of refusing amlodipine day prior.  - Monitor pressure  - amlodipine up to 10mg on 3/31    #Insomnia/Delirium   -Restarted on home Seroquel 800mg @ bedtime.     Problem/Plan - 6:  Problem: Hypothyroidism. Plan: - c/w home synthroid 75mcg daily  - TSH WNL.    Problem/Plan - 7:  ·  Problem: Hyperlipidemia.  Plan: - c/w home atorvastatin 20mg qhs.     Problem/Plan - 8:  ·  Problem: Nutrition, metabolism, and development symptoms.  Plan: F: None   E: Replete PRN for Mg<2 and K<4  N: DASH Diet (carb consistent).     Problem/Plan - 9:  ·  Problem: Prophylactic measure.  Plan: DVT PPx: Lovenox subq daily  GI PPx: Famotidine 20mg    Transition of Care:  - f/u with PCP Dr. Diya Padilal  - f/u with full med rec    DISPO: COVID RMF  COD: FULL.

## 2020-04-04 LAB
ALBUMIN SERPL ELPH-MCNC: 2.9 G/DL — LOW (ref 3.3–5)
ALP SERPL-CCNC: 38 U/L — LOW (ref 40–120)
ALT FLD-CCNC: SIGNIFICANT CHANGE UP U/L (ref 10–45)
ANION GAP SERPL CALC-SCNC: 11 MMOL/L — SIGNIFICANT CHANGE UP (ref 5–17)
ANION GAP SERPL CALC-SCNC: 8 MMOL/L — SIGNIFICANT CHANGE UP (ref 5–17)
AST SERPL-CCNC: SIGNIFICANT CHANGE UP U/L (ref 10–40)
BASOPHILS # BLD AUTO: 0.03 K/UL — SIGNIFICANT CHANGE UP (ref 0–0.2)
BASOPHILS NFR BLD AUTO: 0.3 % — SIGNIFICANT CHANGE UP (ref 0–2)
BILIRUB SERPL-MCNC: 0.5 MG/DL — SIGNIFICANT CHANGE UP (ref 0.2–1.2)
BUN SERPL-MCNC: 24 MG/DL — HIGH (ref 7–23)
BUN SERPL-MCNC: 27 MG/DL — HIGH (ref 7–23)
CALCIUM SERPL-MCNC: 9.3 MG/DL — SIGNIFICANT CHANGE UP (ref 8.4–10.5)
CALCIUM SERPL-MCNC: 9.4 MG/DL — SIGNIFICANT CHANGE UP (ref 8.4–10.5)
CHLORIDE SERPL-SCNC: 103 MMOL/L — SIGNIFICANT CHANGE UP (ref 96–108)
CHLORIDE SERPL-SCNC: 104 MMOL/L — SIGNIFICANT CHANGE UP (ref 96–108)
CO2 SERPL-SCNC: 26 MMOL/L — SIGNIFICANT CHANGE UP (ref 22–31)
CO2 SERPL-SCNC: 28 MMOL/L — SIGNIFICANT CHANGE UP (ref 22–31)
CREAT SERPL-MCNC: 0.75 MG/DL — SIGNIFICANT CHANGE UP (ref 0.5–1.3)
CREAT SERPL-MCNC: 0.86 MG/DL — SIGNIFICANT CHANGE UP (ref 0.5–1.3)
CRP SERPL-MCNC: 0.17 MG/DL — SIGNIFICANT CHANGE UP (ref 0–0.4)
D DIMER BLD IA.RAPID-MCNC: 470 NG/ML DDU — HIGH
EOSINOPHIL # BLD AUTO: 0.19 K/UL — SIGNIFICANT CHANGE UP (ref 0–0.5)
EOSINOPHIL NFR BLD AUTO: 2.1 % — SIGNIFICANT CHANGE UP (ref 0–6)
FERRITIN SERPL-MCNC: 674 NG/ML — HIGH (ref 30–400)
GLUCOSE BLDC GLUCOMTR-MCNC: 113 MG/DL — HIGH (ref 70–99)
GLUCOSE BLDC GLUCOMTR-MCNC: 137 MG/DL — HIGH (ref 70–99)
GLUCOSE BLDC GLUCOMTR-MCNC: 192 MG/DL — HIGH (ref 70–99)
GLUCOSE BLDC GLUCOMTR-MCNC: 79 MG/DL — SIGNIFICANT CHANGE UP (ref 70–99)
GLUCOSE SERPL-MCNC: 135 MG/DL — HIGH (ref 70–99)
GLUCOSE SERPL-MCNC: 87 MG/DL — SIGNIFICANT CHANGE UP (ref 70–99)
HCT VFR BLD CALC: 45.7 % — SIGNIFICANT CHANGE UP (ref 39–50)
HCT VFR BLD CALC: 48 % — SIGNIFICANT CHANGE UP (ref 39–50)
HGB BLD-MCNC: 14.7 G/DL — SIGNIFICANT CHANGE UP (ref 13–17)
HGB BLD-MCNC: 15 G/DL — SIGNIFICANT CHANGE UP (ref 13–17)
IMM GRANULOCYTES NFR BLD AUTO: 1.5 % — SIGNIFICANT CHANGE UP (ref 0–1.5)
LYMPHOCYTES # BLD AUTO: 1.87 K/UL — SIGNIFICANT CHANGE UP (ref 1–3.3)
LYMPHOCYTES # BLD AUTO: 21 % — SIGNIFICANT CHANGE UP (ref 13–44)
MAGNESIUM SERPL-MCNC: 2.1 MG/DL — SIGNIFICANT CHANGE UP (ref 1.6–2.6)
MCHC RBC-ENTMCNC: 26.1 PG — LOW (ref 27–34)
MCHC RBC-ENTMCNC: 26.1 PG — LOW (ref 27–34)
MCHC RBC-ENTMCNC: 31.3 GM/DL — LOW (ref 32–36)
MCHC RBC-ENTMCNC: 32.2 GM/DL — SIGNIFICANT CHANGE UP (ref 32–36)
MCV RBC AUTO: 81.2 FL — SIGNIFICANT CHANGE UP (ref 80–100)
MCV RBC AUTO: 83.6 FL — SIGNIFICANT CHANGE UP (ref 80–100)
MONOCYTES # BLD AUTO: 0.53 K/UL — SIGNIFICANT CHANGE UP (ref 0–0.9)
MONOCYTES NFR BLD AUTO: 6 % — SIGNIFICANT CHANGE UP (ref 2–14)
NEUTROPHILS # BLD AUTO: 6.14 K/UL — SIGNIFICANT CHANGE UP (ref 1.8–7.4)
NEUTROPHILS NFR BLD AUTO: 69.1 % — SIGNIFICANT CHANGE UP (ref 43–77)
NRBC # BLD: 0 /100 WBCS — SIGNIFICANT CHANGE UP (ref 0–0)
NRBC # BLD: 0 /100 WBCS — SIGNIFICANT CHANGE UP (ref 0–0)
PHOSPHATE SERPL-MCNC: 3.7 MG/DL — SIGNIFICANT CHANGE UP (ref 2.5–4.5)
PLATELET # BLD AUTO: 500 K/UL — HIGH (ref 150–400)
PLATELET # BLD AUTO: 548 K/UL — HIGH (ref 150–400)
POTASSIUM SERPL-MCNC: 4.1 MMOL/L — SIGNIFICANT CHANGE UP (ref 3.5–5.3)
POTASSIUM SERPL-MCNC: SIGNIFICANT CHANGE UP MMOL/L (ref 3.5–5.3)
POTASSIUM SERPL-SCNC: 4.1 MMOL/L — SIGNIFICANT CHANGE UP (ref 3.5–5.3)
POTASSIUM SERPL-SCNC: SIGNIFICANT CHANGE UP MMOL/L (ref 3.5–5.3)
PROT SERPL-MCNC: 6.2 G/DL — SIGNIFICANT CHANGE UP (ref 6–8.3)
RBC # BLD: 5.63 M/UL — SIGNIFICANT CHANGE UP (ref 4.2–5.8)
RBC # BLD: 5.74 M/UL — SIGNIFICANT CHANGE UP (ref 4.2–5.8)
RBC # FLD: 15 % — HIGH (ref 10.3–14.5)
RBC # FLD: 15.9 % — HIGH (ref 10.3–14.5)
SODIUM SERPL-SCNC: 140 MMOL/L — SIGNIFICANT CHANGE UP (ref 135–145)
SODIUM SERPL-SCNC: 140 MMOL/L — SIGNIFICANT CHANGE UP (ref 135–145)
WBC # BLD: 8.89 K/UL — SIGNIFICANT CHANGE UP (ref 3.8–10.5)
WBC # BLD: 9.75 K/UL — SIGNIFICANT CHANGE UP (ref 3.8–10.5)
WBC # FLD AUTO: 8.89 K/UL — SIGNIFICANT CHANGE UP (ref 3.8–10.5)
WBC # FLD AUTO: 9.75 K/UL — SIGNIFICANT CHANGE UP (ref 3.8–10.5)

## 2020-04-04 PROCEDURE — 99222 1ST HOSP IP/OBS MODERATE 55: CPT

## 2020-04-04 PROCEDURE — 70496 CT ANGIOGRAPHY HEAD: CPT | Mod: 26

## 2020-04-04 PROCEDURE — 70498 CT ANGIOGRAPHY NECK: CPT | Mod: 26

## 2020-04-04 PROCEDURE — 70450 CT HEAD/BRAIN W/O DYE: CPT | Mod: 26,59

## 2020-04-04 PROCEDURE — 99233 SBSQ HOSP IP/OBS HIGH 50: CPT | Mod: GC

## 2020-04-04 RX ORDER — ASPIRIN/CALCIUM CARB/MAGNESIUM 324 MG
81 TABLET ORAL DAILY
Refills: 0 | Status: DISCONTINUED | OUTPATIENT
Start: 2020-04-04 | End: 2020-04-05

## 2020-04-04 RX ORDER — FUROSEMIDE 40 MG
40 TABLET ORAL ONCE
Refills: 0 | Status: COMPLETED | OUTPATIENT
Start: 2020-04-04 | End: 2020-04-04

## 2020-04-04 RX ADMIN — INSULIN GLARGINE 12 UNIT(S): 100 INJECTION, SOLUTION SUBCUTANEOUS at 22:25

## 2020-04-04 RX ADMIN — Medication 10 MILLIGRAM(S): at 13:37

## 2020-04-04 RX ADMIN — Medication 650 MILLIGRAM(S): at 18:25

## 2020-04-04 RX ADMIN — POLYETHYLENE GLYCOL 3350 17 GRAM(S): 17 POWDER, FOR SOLUTION ORAL at 13:35

## 2020-04-04 RX ADMIN — DORZOLAMIDE HYDROCHLORIDE TIMOLOL MALEATE 1 DROP(S): 20; 5 SOLUTION/ DROPS OPHTHALMIC at 06:53

## 2020-04-04 RX ADMIN — Medication 4 UNIT(S): at 18:25

## 2020-04-04 RX ADMIN — Medication 48 MILLIGRAM(S): at 13:35

## 2020-04-04 RX ADMIN — Medication 10 MILLIGRAM(S): at 07:52

## 2020-04-04 RX ADMIN — FAMOTIDINE 20 MILLIGRAM(S): 10 INJECTION INTRAVENOUS at 06:00

## 2020-04-04 RX ADMIN — FAMOTIDINE 20 MILLIGRAM(S): 10 INJECTION INTRAVENOUS at 18:26

## 2020-04-04 RX ADMIN — BRIMONIDINE TARTRATE 1 DROP(S): 2 SOLUTION/ DROPS OPHTHALMIC at 18:26

## 2020-04-04 RX ADMIN — ENOXAPARIN SODIUM 40 MILLIGRAM(S): 100 INJECTION SUBCUTANEOUS at 22:24

## 2020-04-04 RX ADMIN — Medication 650 MILLIGRAM(S): at 06:01

## 2020-04-04 RX ADMIN — AMLODIPINE BESYLATE 10 MILLIGRAM(S): 2.5 TABLET ORAL at 13:36

## 2020-04-04 RX ADMIN — QUETIAPINE FUMARATE 800 MILLIGRAM(S): 200 TABLET, FILM COATED ORAL at 22:25

## 2020-04-04 RX ADMIN — Medication 40 MILLIGRAM(S): at 13:39

## 2020-04-04 RX ADMIN — BRIMONIDINE TARTRATE 1 DROP(S): 2 SOLUTION/ DROPS OPHTHALMIC at 06:53

## 2020-04-04 RX ADMIN — SENNA PLUS 10 MILLILITER(S): 8.6 TABLET ORAL at 13:36

## 2020-04-04 RX ADMIN — Medication 2: at 22:24

## 2020-04-04 RX ADMIN — Medication 650 MILLIGRAM(S): at 13:37

## 2020-04-04 RX ADMIN — LATANOPROST 1 DROP(S): 0.05 SOLUTION/ DROPS OPHTHALMIC; TOPICAL at 22:25

## 2020-04-04 RX ADMIN — GABAPENTIN 300 MILLIGRAM(S): 400 CAPSULE ORAL at 06:00

## 2020-04-04 RX ADMIN — GABAPENTIN 300 MILLIGRAM(S): 400 CAPSULE ORAL at 18:26

## 2020-04-04 RX ADMIN — Medication 10 MILLIGRAM(S): at 22:24

## 2020-04-04 RX ADMIN — Medication 75 MICROGRAM(S): at 06:00

## 2020-04-04 RX ADMIN — DORZOLAMIDE HYDROCHLORIDE TIMOLOL MALEATE 1 DROP(S): 20; 5 SOLUTION/ DROPS OPHTHALMIC at 18:26

## 2020-04-04 RX ADMIN — Medication 4 UNIT(S): at 13:41

## 2020-04-04 RX ADMIN — Medication 10 MILLIGRAM(S): at 06:01

## 2020-04-04 RX ADMIN — ATORVASTATIN CALCIUM 20 MILLIGRAM(S): 80 TABLET, FILM COATED ORAL at 22:24

## 2020-04-04 RX ADMIN — Medication 100 MILLIGRAM(S): at 06:05

## 2020-04-04 RX ADMIN — Medication 4 UNIT(S): at 07:56

## 2020-04-04 NOTE — PROGRESS NOTE ADULT - PROBLEM SELECTOR PLAN 6
- c/w home synthroid 75mcg daily  - TSH WNL Pt received labetalol 10mg x2 and hydral on 3/30 and 3/31 for BPs ~190/100 in setting of refusing amlodipine day prior.  - Monitor pressure  - amlodipine up to 10mg on 3/31, continued hydral 10 q 8 po    #Insomnia/Delirium   -Restarted on home Seroquel 800mg @ bedtime

## 2020-04-04 NOTE — PROGRESS NOTE ADULT - PROBLEM SELECTOR PLAN 2
Viral pneumonia 2/2 COVID- See plan above  -Reswab on 03/31/2020 positive. [place]  - Plan to reswab 4/7/2020, reassess as protocol changes often depending on dispo    #Acute hypoxic respiratory failure  - wean as tolerates with sp02 goal of 95%  - refusing to wear nonrebreather properly at times, however, NC is less than ideal as patient breathes with mouth open.   - OOBTC  - Started Lasix 20, moved OOBTC, strict I&O  - Monitor I&O Patient with L sided weakness in Upper and Lower extremity. NIHSS 7  -CT Head, CT perfusion   -Stroke code called  -F/u stroke code note.

## 2020-04-04 NOTE — PROGRESS NOTE ADULT - PROBLEM SELECTOR PLAN 5
Pt received labetalol 10mg x2 and hydral on 3/30 and 3/31 for BPs ~190/100 in setting of refusing amlodipine day prior.  - Monitor pressure  - amlodipine up to 10mg on 3/31, continued hydral 10 q 8 po    #Insomnia/Delirium   -Restarted on home Seroquel 800mg @ bedtime Hx of diabetes. A1c 10 here, sugars high. Previously recorded as on Januvia and glucophage. No diabetes medications at [facility], may use more than one pharmacy. Patient poor historian does not know what he takes at home.  - on steroids from (3/27 - 4/1)  - lantus down to 12 units  - 4U premeal   - MISS  - consistent carb diet  - out pt Endocrine f/u

## 2020-04-04 NOTE — PROGRESS NOTE ADULT - PROBLEM SELECTOR PLAN 9
DVT PPx: Lovenox subq daily  GI PPx: Famotidine 20mg  Transition of Care:  - f/u with PCP Dr. Diya Padilla  - f/u with full med rec  DISPO: COVID RMF  COD: FULL F: None   E: Replete PRN for Mg<2 and K<4  N: DASH Diet (carb consistent)

## 2020-04-04 NOTE — PROGRESS NOTE ADULT - PROBLEM SELECTOR PLAN 8
F: None   E: Replete PRN for Mg<2 and K<4  N: DASH Diet (carb consistent) - c/w home atorvastatin 20mg qhs

## 2020-04-04 NOTE — CONSULT NOTE ADULT - SUBJECTIVE AND OBJECTIVE BOX
**STROKE CODE CONSULT NOTE**    Last known well time/Time of onset of symptoms: unknown of exact time, sometime in morning of 4/4    HPI: 79y Male with PMHx of HTN, DM2, former smoker, hypothyroidism who presented to ED BIBEMS on 3/23 after being found down for a fall, found to have starvation ketosis now resolved, was desatting with imaging findings on CXR and found to be COVID positive, now on nonrebreather for oxygen support at 15. The patient is currently being evaluated by ICU consult for worsening respiratory status despite diuresis and maximal O2 support. Stroke code was called on 4/4 for left sided weakness noticed by PT when came to evaluate and patient states that this has started this morning but cannot recall exact timing. HCT negative for acute stroke/hemorrhage. CTA with moderate to severe left carotid stenosis. There was delay in transport to CT scanner due to COVID positive status.     The patient denies acute onset of numbness, weakness, tingling, slurred speech, blurry vision, double vision, dizziness, headache.    BP on stroke team arrival 123/80, NIHSS 7, tpa not given due to out of the window/unclear time of onset.     T(C): 36.9 (04-04-20 @ 14:49), Max: 36.9 (04-03-20 @ 22:24)  HR: 77 (04-04-20 @ 15:00) (77 - 94)  BP: 136/78 (04-04-20 @ 15:00) (110/71 - 148/98)  RR: 26 (04-04-20 @ 15:00) (21 - 28)  SpO2: 94% (04-04-20 @ 15:00) (90% - 94%)    PAST MEDICAL & SURGICAL HISTORY:  Diabetes mellitus, type 2  Hypothyroid  Essential hypertension: Hypertension  H/O thyroidectomy  Other postprocedural status: left total knee  replacement one   yr.   ago      FAMILY HISTORY:      SOCIAL HISTORY:  Denies smoking, drinking, or drug use    ROS:   as per HPI     MEDICATIONS  (STANDING):  acetaminophen   Tablet .. 650 milliGRAM(s) Oral every 6 hours  amLODIPine   Tablet 10 milliGRAM(s) Oral every 24 hours  atorvastatin 20 milliGRAM(s) Oral at bedtime  brimonidine 0.2% Ophthalmic Solution 1 Drop(s) Both EYES two times a day  dextrose 50% Injectable 12.5 Gram(s) IV Push once  dextrose 50% Injectable 25 Gram(s) IV Push once  dextrose 50% Injectable 25 Gram(s) IV Push once  dorzolamide 2%/timolol 0.5% Ophthalmic Solution 1 Drop(s) Both EYES two times a day  enoxaparin Injectable 40 milliGRAM(s) SubCutaneous every 24 hours  famotidine    Tablet 20 milliGRAM(s) Oral two times a day  fenofibrate Tablet 48 milliGRAM(s) Oral daily  gabapentin 300 milliGRAM(s) Oral two times a day  hydrALAZINE 10 milliGRAM(s) Oral every 8 hours  insulin glargine Injectable (LANTUS) 12 Unit(s) SubCutaneous at bedtime  insulin lispro (HumaLOG) corrective regimen sliding scale   SubCutaneous Before meals and at bedtime  insulin lispro Injectable (HumaLOG) 4 Unit(s) SubCutaneous three times a day before meals  latanoprost 0.005% Ophthalmic Solution 1 Drop(s) Both EYES at bedtime  levothyroxine 75 MICROGram(s) Oral daily  metoprolol succinate  milliGRAM(s) Oral daily  polyethylene glycol 3350 17 Gram(s) Oral daily  QUEtiapine 800 milliGRAM(s) Oral at bedtime  senna Syrup 10 milliLiter(s) Oral daily    MEDICATIONS  (PRN):  bisacodyl Suppository 10 milliGRAM(s) Rectal daily PRN Constipation  dextrose 40% Gel 15 Gram(s) Oral once PRN Blood Glucose LESS THAN 70 milliGRAM(s)/deciliter  glucagon  Injectable 1 milliGRAM(s) IntraMuscular once PRN Glucose LESS THAN 70 milligrams/deciliter    Allergies    No Known Allergies    Intolerances    Vital Signs Last 24 Hrs  T(C): 36.9 (04 Apr 2020 14:49), Max: 36.9 (03 Apr 2020 22:24)  T(F): 98.4 (04 Apr 2020 14:49), Max: 98.4 (03 Apr 2020 22:24)  HR: 77 (04 Apr 2020 15:00) (77 - 94)  BP: 136/78 (04 Apr 2020 15:00) (110/71 - 148/98)  BP(mean): --  RR: 26 (04 Apr 2020 15:00) (21 - 28)  SpO2: 94% (04 Apr 2020 15:00) (90% - 94%)    Physical exam:  General: No acute distress, awake and alert  Cardiovascular: Regular rate and rhythm, no murmurs, rubs, or gallops. No bruits  Pulmonary: Anterior breath sounds clear bilaterally, no crackles or wheezing. No use of accessory muscles  Extremities: Radial and DP pulses +2, no edema    Neurologic:  -Mental status: Awake, alert, oriented to person, date of birth, year.  Speech is fluent with intact naming (hand, color blue, key), repetition, and comprehension, no dysarthria. Recent and remote memory intact. Follows commands (stick out tongue, show two fingers, smile, close eyes) . Attention/concentration intact. Fund of knowledge appropriate.  -Cranial nerves:   II: Visual fields are full to confrontation.  III, IV, VI: Extraocular movements are intact without nystagmus. Pupils equally round and reactive to light  V:  Facial sensation V1-V3 equal and intact   VII: Face is symmetric with normal eye closure and smile  VIII: Hearing is bilaterally intact to finger rub  IX, X: Uvula is midline and soft palate rises symmetrically  XI: Head turning and shoulder shrug are intact.  XII: Tongue protrudes midline  Motor: Normal bulk and tone. Right upper extremity 4+/5. Bilateral hand  5/5. Left upper extremity 3/5. Lifts antigravity, however hits bed before 10 seconds. Right lower extremity 4/5, lifts off bed for 5 seconds with some drift. Left lower extremity 3/5, antigravity however hits bed before 5 seconds.  Sensation: Intact to light touch bilaterally. No neglect or extinction on double simultaneous testing.  Coordination: No dysmetria on finger-to-nose  Reflexes: Downgoing toes bilaterally     NIHSS: 7    Fingerstick Blood Glucose: CAPILLARY BLOOD GLUCOSE      POCT Blood Glucose.: 137 mg/dL (04 Apr 2020 17:43)    LABS:                        15.0   9.75  )-----------( 548      ( 04 Apr 2020 12:17 )             48.0     04-04    140  |  104  |  27<H>  ----------------------------<  135<H>  4.1   |  28  |  0.86    Ca    9.4      04 Apr 2020 12:17  Phos  3.7     04-04  Mg     2.1     04-04    TPro  6.2  /  Alb  2.9<L>  /  TBili  0.5  /  DBili  x   /  AST  see note  /  ALT  see note  /  AlkPhos  38<L>  04-04        RADIOLOGY & ADDITIONAL STUDIES:    HCT:  CT Head No Cont (03.23.20 @ 13:47) >  IMPRESSION:    No acute intracranial hemorrhage.    CTA:  < from: CT Angio Neck w/ IV Cont (04.04.20 @ 12:29) >  IMPRESSION:  No large vessel occlusion.    < from: CT Angio Neck w/ IV Cont (04.04.20 @ 12:29) >    IMPRESSION: Moderate to severe left carotid stenosis.  -----------------------------------------------------------------------------------------------------------------  IV-tPA (Y/N):   No                           Reason IV-tPA not given: out of the window for tpa     ASSESSMENT/PLAN:    79y Male w/ PMH y Male with PMHx of HTN, DM2, former smoker, hypothyroidism who presented to ED [facility] on 3/23 after being found down for a fall, found to have starvation ketosis now resolved, was desatting with imaging findings on CXR and found to be COVID positive, now on nonrebreather for oxygen support at 15. Stroke called on 4/4 for left sided weakness. NIHSS 7.  HCT negative  CTA showed moderate to severe left carotid stenosis. Given that the exact last known well was unknown tPA was not administered. Given left sided weakness it is possible that patient had stroke vs. sequela of infection secondary to COVID. It is recommended that patient has MRI without contrast to definitely rule out stroke, however, due to COVID crisis, it may be more feasible to have repeat CT of Head done in a coupe of days.     - Closely follow neuro checks every 2-4 hours   - Repeat HCT for acute changes in neuro exam  - Consider MRI of brain when medically appropriate  - Obtain TTE with bubble  - obtain bilateral carotid dopplers due to moderate to severe left carotid stenosis   - Goal SBP < 200  - Bedside Dysphagia Screen  - PT/OT/SLP   - Order Lipid Profile Panel, and TSH  - Hemoglobin A1C 10.8    Secondary Stroke Prevention Medication  - Start Aspirin 81mg once daily   - Continue atorvastatin 20 mg   - Start heparin SQ and SCDs for DVT prophylaxis **STROKE CODE CONSULT NOTE**    Last known well time/Time of onset of symptoms: unknown of exact time, sometime in morning of 4/4    HPI: 79y Male with PMHx of HTN, DM2, former smoker, hypothyroidism who presented to ED BIBEMS on 3/23 after being found down for a fall, found to have starvation ketosis now resolved, was desatting with imaging findings on CXR and found to be COVID positive, now on nonrebreather for oxygen support at 15. The patient is currently being evaluated by ICU consult for worsening respiratory status despite diuresis and maximal O2 support. Stroke code was called on 4/4 for left sided weakness noticed by PT when came to evaluate and patient states that this has started this morning but cannot recall exact timing. HCT negative for acute stroke/hemorrhage. CTA with moderate to severe left carotid stenosis. There was delay in transport to CT scanner due to COVID positive status.     The patient denies acute onset of numbness, weakness, tingling, slurred speech, blurry vision, double vision, dizziness, headache.    BP on stroke team arrival 123/80, NIHSS 7, tpa not given due to out of the window/unclear time of onset.     T(C): 36.9 (04-04-20 @ 14:49), Max: 36.9 (04-03-20 @ 22:24)  HR: 77 (04-04-20 @ 15:00) (77 - 94)  BP: 136/78 (04-04-20 @ 15:00) (110/71 - 148/98)  RR: 26 (04-04-20 @ 15:00) (21 - 28)  SpO2: 94% (04-04-20 @ 15:00) (90% - 94%)    PAST MEDICAL & SURGICAL HISTORY:  Diabetes mellitus, type 2  Hypothyroid  Essential hypertension: Hypertension  H/O thyroidectomy  Other postprocedural status: left total knee  replacement one   yr.   ago      FAMILY HISTORY:      SOCIAL HISTORY:  Denies smoking, drinking, or drug use    ROS:   as per HPI     MEDICATIONS  (STANDING):  acetaminophen   Tablet .. 650 milliGRAM(s) Oral every 6 hours  amLODIPine   Tablet 10 milliGRAM(s) Oral every 24 hours  atorvastatin 20 milliGRAM(s) Oral at bedtime  brimonidine 0.2% Ophthalmic Solution 1 Drop(s) Both EYES two times a day  dextrose 50% Injectable 12.5 Gram(s) IV Push once  dextrose 50% Injectable 25 Gram(s) IV Push once  dextrose 50% Injectable 25 Gram(s) IV Push once  dorzolamide 2%/timolol 0.5% Ophthalmic Solution 1 Drop(s) Both EYES two times a day  enoxaparin Injectable 40 milliGRAM(s) SubCutaneous every 24 hours  famotidine    Tablet 20 milliGRAM(s) Oral two times a day  fenofibrate Tablet 48 milliGRAM(s) Oral daily  gabapentin 300 milliGRAM(s) Oral two times a day  hydrALAZINE 10 milliGRAM(s) Oral every 8 hours  insulin glargine Injectable (LANTUS) 12 Unit(s) SubCutaneous at bedtime  insulin lispro (HumaLOG) corrective regimen sliding scale   SubCutaneous Before meals and at bedtime  insulin lispro Injectable (HumaLOG) 4 Unit(s) SubCutaneous three times a day before meals  latanoprost 0.005% Ophthalmic Solution 1 Drop(s) Both EYES at bedtime  levothyroxine 75 MICROGram(s) Oral daily  metoprolol succinate  milliGRAM(s) Oral daily  polyethylene glycol 3350 17 Gram(s) Oral daily  QUEtiapine 800 milliGRAM(s) Oral at bedtime  senna Syrup 10 milliLiter(s) Oral daily    MEDICATIONS  (PRN):  bisacodyl Suppository 10 milliGRAM(s) Rectal daily PRN Constipation  dextrose 40% Gel 15 Gram(s) Oral once PRN Blood Glucose LESS THAN 70 milliGRAM(s)/deciliter  glucagon  Injectable 1 milliGRAM(s) IntraMuscular once PRN Glucose LESS THAN 70 milligrams/deciliter    Allergies    No Known Allergies    Intolerances    Vital Signs Last 24 Hrs  T(C): 36.9 (04 Apr 2020 14:49), Max: 36.9 (03 Apr 2020 22:24)  T(F): 98.4 (04 Apr 2020 14:49), Max: 98.4 (03 Apr 2020 22:24)  HR: 77 (04 Apr 2020 15:00) (77 - 94)  BP: 136/78 (04 Apr 2020 15:00) (110/71 - 148/98)  BP(mean): --  RR: 26 (04 Apr 2020 15:00) (21 - 28)  SpO2: 94% (04 Apr 2020 15:00) (90% - 94%)    Physical exam:  General: No acute distress, awake and alert  Cardiovascular: Regular rate and rhythm, no murmurs, rubs, or gallops. No bruits  Pulmonary: Anterior breath sounds clear bilaterally, no crackles or wheezing. No use of accessory muscles  Extremities: Radial and DP pulses +2, no edema    Neurologic:  -Mental status: Awake, alert, oriented to person, date of birth, year.  Speech is fluent with intact naming (hand, color blue, key), repetition, and comprehension, no dysarthria. Recent and remote memory intact. Follows commands (stick out tongue, show two fingers, smile, close eyes) . Attention/concentration intact. Fund of knowledge appropriate.  -Cranial nerves:   II: Visual fields are full to confrontation.  III, IV, VI: Extraocular movements are intact without nystagmus. Pupils equally round and reactive to light  V:  Facial sensation V1-V3 equal and intact   VII: Face is symmetric with normal eye closure and smile  VIII: Hearing is bilaterally intact to finger rub  IX, X: Uvula is midline and soft palate rises symmetrically  XI: Head turning and shoulder shrug are intact.  XII: Tongue protrudes midline  Motor: Normal bulk and tone. Right upper extremity 4+/5. Bilateral hand  5/5. Left upper extremity 3/5. Lifts antigravity, however hits bed before 10 seconds. Right lower extremity 4/5, lifts off bed for 5 seconds with some drift. Left lower extremity 3/5, antigravity however hits bed before 5 seconds.  Sensation: Intact to light touch bilaterally. No neglect or extinction on double simultaneous testing.  Coordination: No dysmetria on finger-to-nose  Reflexes: Downgoing toes bilaterally     NIHSS: 7    Fingerstick Blood Glucose: CAPILLARY BLOOD GLUCOSE      POCT Blood Glucose.: 137 mg/dL (04 Apr 2020 17:43)    LABS:                        15.0   9.75  )-----------( 548      ( 04 Apr 2020 12:17 )             48.0     04-04    140  |  104  |  27<H>  ----------------------------<  135<H>  4.1   |  28  |  0.86    Ca    9.4      04 Apr 2020 12:17  Phos  3.7     04-04  Mg     2.1     04-04    TPro  6.2  /  Alb  2.9<L>  /  TBili  0.5  /  DBili  x   /  AST  see note  /  ALT  see note  /  AlkPhos  38<L>  04-04        RADIOLOGY & ADDITIONAL STUDIES:    HCT:  CT Head No Cont (03.23.20 @ 13:47) >  IMPRESSION:    No acute intracranial hemorrhage.    CTA:  < from: CT Angio Neck w/ IV Cont (04.04.20 @ 12:29) >  IMPRESSION:  No large vessel occlusion.    < from: CT Angio Neck w/ IV Cont (04.04.20 @ 12:29) >    IMPRESSION: Moderate to severe left carotid stenosis.  -----------------------------------------------------------------------------------------------------------------  IV-tPA (Y/N):   No                           Reason IV-tPA not given: out of the window for tpa     ASSESSMENT/PLAN:    79y Male w/ PMH y Male with PMHx of HTN, DM2, former smoker, hypothyroidism who presented to ED [facility] on 3/23 after being found down for a fall, found to have starvation ketosis now resolved, was desatting with imaging findings on CXR and found to be COVID positive, now on nonrebreather for oxygen support at 15. Stroke called on 4/4 for left sided weakness. NIHSS 7.  HCT negative  CTA showed moderate to severe left carotid stenosis. Given that the exact last known well was unknown tPA was not administered. Given left sided weakness it is possible that patient had stroke vs. sequela of infection secondary to COVID. It is recommended that patient has MRI without contrast to definitely rule out stroke, however, due to COVID crisis, it may be more feasible to have repeat CT of Head done in a coupe of days.     - Closely follow neuro checks every 2-4 hours   - Repeat HCT for acute changes in neuro exam  - will consider MRI of brain when medically appropriate  - Obtain TTE with bubble  - obtain bilateral carotid dopplers due to moderate to severe left carotid stenosis   - Goal SBP < 200 - permissive X48h  - Bedside Dysphagia Screen  - PT/OT/SLP   - Order Lipid Profile Panel, and TSH  - Hemoglobin A1C 10.8    Secondary Stroke Prevention Medication  - Start Aspirin 81mg once daily   - Continue atorvastatin 20 mg   - Start heparin SQ and SCDs for DVT prophylaxis **STROKE CODE CONSULT NOTE**    Last known well time/Time of onset of symptoms: unknown of exact time, sometime in morning of 4/4    HPI: 79y Male with PMHx of HTN, DM2, former smoker, hypothyroidism who presented to ED BIBEMS on 3/23 after being found down for a fall, found to have starvation ketosis now resolved, was desatting with imaging findings on CXR and found to be COVID positive, now on nonrebreather for oxygen support at 15. The patient is currently being evaluated by ICU consult for worsening respiratory status despite diuresis and maximal O2 support. Stroke code was called on 4/4 for left sided weakness noticed by PT when came to evaluate and patient states that this has started this morning but cannot recall exact timing. HCT negative for acute stroke/hemorrhage. CTA with moderate to severe left carotid stenosis. There was delay in transport to CT scanner due to COVID positive status.     The patient denies acute onset of numbness, weakness, tingling, slurred speech, blurry vision, double vision, dizziness, headache.    BP on stroke team arrival 123/80, NIHSS 7, tpa not given due to out of the window/unclear time of onset.     T(C): 36.9 (04-04-20 @ 14:49), Max: 36.9 (04-03-20 @ 22:24)  HR: 77 (04-04-20 @ 15:00) (77 - 94)  BP: 136/78 (04-04-20 @ 15:00) (110/71 - 148/98)  RR: 26 (04-04-20 @ 15:00) (21 - 28)  SpO2: 94% (04-04-20 @ 15:00) (90% - 94%)    PAST MEDICAL & SURGICAL HISTORY:  Diabetes mellitus, type 2  Hypothyroid  Essential hypertension: Hypertension  H/O thyroidectomy  Other postprocedural status: left total knee  replacement one   yr.   ago      FAMILY HISTORY:      SOCIAL HISTORY:  Denies smoking, drinking, or drug use    ROS:   as per HPI     MEDICATIONS  (STANDING):  acetaminophen   Tablet .. 650 milliGRAM(s) Oral every 6 hours  amLODIPine   Tablet 10 milliGRAM(s) Oral every 24 hours  atorvastatin 20 milliGRAM(s) Oral at bedtime  brimonidine 0.2% Ophthalmic Solution 1 Drop(s) Both EYES two times a day  dextrose 50% Injectable 12.5 Gram(s) IV Push once  dextrose 50% Injectable 25 Gram(s) IV Push once  dextrose 50% Injectable 25 Gram(s) IV Push once  dorzolamide 2%/timolol 0.5% Ophthalmic Solution 1 Drop(s) Both EYES two times a day  enoxaparin Injectable 40 milliGRAM(s) SubCutaneous every 24 hours  famotidine    Tablet 20 milliGRAM(s) Oral two times a day  fenofibrate Tablet 48 milliGRAM(s) Oral daily  gabapentin 300 milliGRAM(s) Oral two times a day  hydrALAZINE 10 milliGRAM(s) Oral every 8 hours  insulin glargine Injectable (LANTUS) 12 Unit(s) SubCutaneous at bedtime  insulin lispro (HumaLOG) corrective regimen sliding scale   SubCutaneous Before meals and at bedtime  insulin lispro Injectable (HumaLOG) 4 Unit(s) SubCutaneous three times a day before meals  latanoprost 0.005% Ophthalmic Solution 1 Drop(s) Both EYES at bedtime  levothyroxine 75 MICROGram(s) Oral daily  metoprolol succinate  milliGRAM(s) Oral daily  polyethylene glycol 3350 17 Gram(s) Oral daily  QUEtiapine 800 milliGRAM(s) Oral at bedtime  senna Syrup 10 milliLiter(s) Oral daily    MEDICATIONS  (PRN):  bisacodyl Suppository 10 milliGRAM(s) Rectal daily PRN Constipation  dextrose 40% Gel 15 Gram(s) Oral once PRN Blood Glucose LESS THAN 70 milliGRAM(s)/deciliter  glucagon  Injectable 1 milliGRAM(s) IntraMuscular once PRN Glucose LESS THAN 70 milligrams/deciliter    Allergies    No Known Allergies    Intolerances    Vital Signs Last 24 Hrs  T(C): 36.9 (04 Apr 2020 14:49), Max: 36.9 (03 Apr 2020 22:24)  T(F): 98.4 (04 Apr 2020 14:49), Max: 98.4 (03 Apr 2020 22:24)  HR: 77 (04 Apr 2020 15:00) (77 - 94)  BP: 136/78 (04 Apr 2020 15:00) (110/71 - 148/98)  BP(mean): --  RR: 26 (04 Apr 2020 15:00) (21 - 28)  SpO2: 94% (04 Apr 2020 15:00) (90% - 94%)    Physical exam:  General: No acute distress, awake and alert  Cardiovascular: Regular rate and rhythm, no murmurs, rubs, or gallops. No bruits  Pulmonary: Anterior breath sounds clear bilaterally, no crackles or wheezing. No use of accessory muscles  Extremities: Radial and DP pulses +2, no edema    Neurologic:  -Mental status: Awake, alert, oriented to person, date of birth, year.  Speech is fluent with intact naming (hand, color blue, key), repetition, and comprehension, no dysarthria. Recent and remote memory intact. Follows commands (stick out tongue, show two fingers, smile, close eyes) . Attention/concentration intact. Fund of knowledge appropriate.  -Cranial nerves:   II: Visual fields are full to confrontation.  III, IV, VI: Extraocular movements are intact without nystagmus. Pupils equally round and reactive to light  V:  Facial sensation V1-V3 equal and intact   VII: Face is symmetric with normal eye closure and smile  VIII: Hearing is bilaterally intact to finger rub  IX, X: Uvula is midline and soft palate rises symmetrically  XI: Head turning and shoulder shrug are intact.  XII: Tongue protrudes midline  Motor: Normal bulk and tone. Right upper extremity 4+/5. Bilateral hand  5/5. Left upper extremity 3/5. Lifts antigravity, however hits bed before 10 seconds. Right lower extremity 4/5, lifts off bed for 5 seconds with some drift. Left lower extremity 3/5, antigravity however hits bed before 5 seconds.  Sensation: Intact to light touch bilaterally. No neglect or extinction on double simultaneous testing.  Coordination: No dysmetria on finger-to-nose  Reflexes: Downgoing toes bilaterally     NIHSS: 7    Fingerstick Blood Glucose: CAPILLARY BLOOD GLUCOSE      POCT Blood Glucose.: 137 mg/dL (04 Apr 2020 17:43)    LABS:                        15.0   9.75  )-----------( 548      ( 04 Apr 2020 12:17 )             48.0     04-04    140  |  104  |  27<H>  ----------------------------<  135<H>  4.1   |  28  |  0.86    Ca    9.4      04 Apr 2020 12:17  Phos  3.7     04-04  Mg     2.1     04-04    TPro  6.2  /  Alb  2.9<L>  /  TBili  0.5  /  DBili  x   /  AST  see note  /  ALT  see note  /  AlkPhos  38<L>  04-04        RADIOLOGY & ADDITIONAL STUDIES:    HCT:  CT Head No Cont (03.23.20 @ 13:47) >  IMPRESSION:    No acute intracranial hemorrhage.    CTA:  < from: CT Angio Neck w/ IV Cont (04.04.20 @ 12:29) >  IMPRESSION:  No large vessel occlusion.    < from: CT Angio Neck w/ IV Cont (04.04.20 @ 12:29) >    IMPRESSION: Moderate to severe left carotid stenosis.  -----------------------------------------------------------------------------------------------------------------  IV-tPA (Y/N):   No                           Reason IV-tPA not given: out of the window for tpa     ASSESSMENT/PLAN:    79y Male w/ PMH y Male with PMHx of HTN, DM2, former smoker, hypothyroidism who presented to ED [facility] on 3/23 after being found down for a fall, found to have starvation ketosis now resolved, was desatting with imaging findings on CXR and found to be COVID positive, now on nonrebreather for oxygen support at 15. Stroke called on 4/4 for left sided weakness. NIHSS 7.  HCT negative  CTA showed moderate to severe left carotid stenosis. Given that the exact last known well was unknown tPA was not administered. Given left sided weakness it is possible that patient had stroke vs. sequela of infection secondary to COVID. It is recommended that patient has MRI without contrast to definitely rule out stroke, however, due to COVID crisis, it may be more feasible to have repeat CT of Head done in a coupe of days.     - Closely follow neuro checks every 2-4 hours   - Repeat HCT for acute changes in neuro exam  - will consider MRI of brain when medically appropriate  - Obtain TTE with bubble  - obtain bilateral carotid dopplers due to moderate to severe left carotid stenosis   - Goal SBP < 200 - permissive X48h  - Bedside Dysphagia Screen  - PT/OT/SLP   - Order Lipid Profile Panel, and TSH  - Hemoglobin A1C 10.8    Secondary Stroke Prevention Medication  - Start Aspirin 81mg once daily   - Continue atorvastatin 20 mg   - Continue Lovenox SQ and SCDs for DVT prophylaxis

## 2020-04-04 NOTE — PROGRESS NOTE ADULT - PROBLEM SELECTOR PLAN 10
DVT PPx: Lovenox subq daily  GI PPx: Famotidine 20mg    Transition of Care:  - f/u with PCP Dr. Diya Padilla  - f/u with full med rec    DISPO: COVID RMF  COD: FULL
DVT PPx: Lovenox subq daily  GI PPx: Famotidine 20mg  Transition of Care:  - f/u with PCP Dr. Diya Padilla  - f/u with full med rec  DISPO: COVID RMF  COD: FULL

## 2020-04-04 NOTE — PROGRESS NOTE ADULT - PROBLEM SELECTOR PLAN 1
Severe sepsis 2/2 viral pneumonia and COVID 19   -See plan below     #SARS- Coronavirus COVID 19 PCR postive   - s/p Azithro, plaquenil, ascorbic acid, thiamine  - S/p Tocilzumab 400mg x 1 (3/27)   - s/p Solumedrol 40mg BID (3/27 PM - 4/1), for 5-7 days. Last day 4/1  - Supplemental O2 w/NRB  - s/p Lasix 20 IV x 2 w/initially good UOP now decreased

## 2020-04-04 NOTE — PROGRESS NOTE ADULT - PROBLEM SELECTOR PLAN 3
Likely 2/2 dehydration in setting of poor PO intake vs starvation ketoacidosis vs infectious etiology in setting of recent fever. Patient with couple week history of poor PO intake and only juice. Denies sick contacts. s/p 3L of NS in ED.  CT Head: No acute intracranial hemorrhage and Xray pelvis/hip: No acute osseous injury  Creatinine kinase: slightly elevated, 494  - physical therapy: will benefit from continued PT to address above deficits as tolerated in order to maximize functional independence and ensure safe D/C to home.  - TSH WNL  - encourage PO intake  - PT home with home PT Viral pneumonia 2/2 COVID- See plan above  -Reswab on 03/31/2020 positive. [place]  - Plan to reswab 4/7/2020, reassess as protocol changes often depending on dispo    #Acute hypoxic respiratory failure  - wean as tolerates with sp02 goal of 95%  - refusing to wear nonrebreather properly at times, however, NC is less than ideal as patient breathes with mouth open.   - OOBTC  - Started Lasix 20, moved OOBTC, strict I&O  - Monitor I&O

## 2020-04-04 NOTE — PROGRESS NOTE ADULT - SUBJECTIVE AND OBJECTIVE BOX
LABS:   COVID-19 PCR: Detected <!!> (03-31-20 @ 15:58)  COVID-19 PCR: Detected <!!> (03-24-20 @ 00:47)                        14.7   8.89  )-----------( 500      ( 04 Apr 2020 06:35 )             45.7     04-04    140  |  103  |  24<H>  ----------------------------<  87  see note   |  26  |  0.75    Ca    9.3      04 Apr 2020 06:35  Phos  3.7     04-04  Mg     2.1     04-04    TPro  6.2  /  Alb  2.9<L>  /  TBili  0.5  /  DBili  x   /  AST  see note  /  ALT  see note  /  AlkPhos  38<L>  04-04      Auto Neutrophil #: 6.14 K/uL [1.80 - 7.40] (04-04-20 @ 06:35)  Auto Lymphocyte #: 1.87 K/uL [1.00 - 3.30] (04-04-20 @ 06:35)  Ferritin, Serum: 674 ng/mL <H> [30 - 400] (04-04-20 @ 06:35)  C-Reactive Protein, Serum: 0.17 mg/dL [0.00 - 0.40] (04-04-20 @ 06:35)  D-Dimer Assay, Quantitative: 470 ng/mL DDU <H> (04-04-20 @ 06:35)  Auto Neutrophil #: 7.29 K/uL [1.80 - 7.40] (04-03-20 @ 08:00)  Auto Lymphocyte #: 1.68 K/uL [1.00 - 3.30] (04-03-20 @ 08:00)  Ferritin, Serum: 631 ng/mL <H> [30 - 400] (04-03-20 @ 08:00)  C-Reactive Protein, Serum: 0.15 mg/dL [0.00 - 0.40] (04-03-20 @ 08:00)  D-Dimer Assay, Quantitative: 387 ng/mL DDU <H> (04-03-20 @ 08:00)  Auto Neutrophil #: 8.65 K/uL <H> [1.80 - 7.40] (04-02-20 @ 07:05)  Auto Lymphocyte #: 1.82 K/uL [1.00 - 3.30] (04-02-20 @ 07:05)  Ferritin, Serum: 802 ng/mL <H> [30 - 400] (04-02-20 @ 07:05)  C-Reactive Protein, Serum: 0.22 mg/dL [0.00 - 0.40] (04-02-20 @ 07:05)  D-Dimer Assay, Quantitative: 244 ng/mL DDU <H> (04-02-20 @ 07:05)      RADIOLOGY, EKG & ADDITIONAL TESTS: Reviewed. COVID Summary: Admitted 3/23 for fall and decreased strength, found with fevers, 89 on RA, covid positive. Finished quad therapy on 3/28. Got Toci 3/27 followed by initiation of IV steroids BID to end on 4/1. Insulin being uptitrated for hyperglycemia while on steroids, A1c 10. Pt intermittently refuses oxygen support (takes off NC and NRB) and pills. HTN needing labetalol and hydral on 3/30-31 for control in setting of refusing oral anti-htn. Failed attempt to wean O2, insulin requirement stable off steroids, HTN controled on hydral 10 TID, amlodipine 10,  3/31 covid reswab positive (one week after first swab and has been afebrile for 72 hr). Started solumedrol, completed 5 day course. Inflammatory markers significantly improved. Now tachypneic to 26,with hypoxia to the high 80s-low 90s despite 15L NRB.    OVERNIGHT EVENTS: ICU consult late afternoon for desaturation to 88-91 w/RR 26 & belly breathing on 15L NRB, desats to 70s with ambulation.    SUBJECTIVE:   T(F): 97.9 (04-04-20 @ 06:36)  HR: 86 (04-04-20 @ 06:36)  BP: 122/77 (04-04-20 @ 06:36)  RR: 21 (04-04-20 @ 06:36)  SpO2: 93% (04-04-20 @ 06:36)    PHYSICAL EXAM:  General: WDWN, on 15 L NRB, no Respiratory distress, but does have desaturation to mid 80s.   HEENT: NC/AT; anicteric sclera; MMM  Neck: supple  Cardiovascular: +S1/S2; RRR  Respiratory: ;+ labored breathing, + accessory muscle use.   Gastrointestinal: soft non-tender  Extremities: WWP; no edema, clubbing or cyanosis  Neurological: AAOx3; no focal deficits        LABS:   COVID-19 PCR: Detected <!!> (03-31-20 @ 15:58)  COVID-19 PCR: Detected <!!> (03-24-20 @ 00:47)                          14.7   8.89  )-----------( 500      ( 04 Apr 2020 06:35 )             45.7     04-04    140  |  103  |  24<H>  ----------------------------<  87  see note   |  26  |  0.75    Ca    9.3      04 Apr 2020 06:35  Phos  3.7     04-04  Mg     2.1     04-04    TPro  6.2  /  Alb  2.9<L>  /  TBili  0.5  /  DBili  x   /  AST  see note  /  ALT  see note  /  AlkPhos  38<L>  04-04        Auto Neutrophil #: 6.14 K/uL [1.80 - 7.40] (04-04-20 @ 06:35)  Auto Lymphocyte #: 1.87 K/uL [1.00 - 3.30] (04-04-20 @ 06:35)  Ferritin, Serum: 674 ng/mL <H> [30 - 400] (04-04-20 @ 06:35)  C-Reactive Protein, Serum: 0.17 mg/dL [0.00 - 0.40] (04-04-20 @ 06:35)  D-Dimer Assay, Quantitative: 470 ng/mL DDU <H> (04-04-20 @ 06:35)  Auto Neutrophil #: 7.29 K/uL [1.80 - 7.40] (04-03-20 @ 08:00)  Auto Lymphocyte #: 1.68 K/uL [1.00 - 3.30] (04-03-20 @ 08:00)  Ferritin, Serum: 631 ng/mL <H> [30 - 400] (04-03-20 @ 08:00)  C-Reactive Protein, Serum: 0.15 mg/dL [0.00 - 0.40] (04-03-20 @ 08:00)  D-Dimer Assay, Quantitative: 387 ng/mL DDU <H> (04-03-20 @ 08:00)  Auto Neutrophil #: 8.65 K/uL <H> [1.80 - 7.40] (04-02-20 @ 07:05)  Auto Lymphocyte #: 1.82 K/uL [1.00 - 3.30] (04-02-20 @ 07:05)  Ferritin, Serum: 802 ng/mL <H> [30 - 400] (04-02-20 @ 07:05)  C-Reactive Protein, Serum: 0.22 mg/dL [0.00 - 0.40] (04-02-20 @ 07:05)  D-Dimer Assay, Quantitative: 244 ng/mL DDU <H> (04-02-20 @ 07:05)          RADIOLOGY, EKG & ADDITIONAL TESTS: Reviewed.       MEDICATIONS  (STANDING):  acetaminophen   Tablet .. 650 milliGRAM(s) Oral every 6 hours  amLODIPine   Tablet 10 milliGRAM(s) Oral every 24 hours  atorvastatin 20 milliGRAM(s) Oral at bedtime  brimonidine 0.2% Ophthalmic Solution 1 Drop(s) Both EYES two times a day  dextrose 50% Injectable 12.5 Gram(s) IV Push once  dextrose 50% Injectable 25 Gram(s) IV Push once  dextrose 50% Injectable 25 Gram(s) IV Push once  dorzolamide 2%/timolol 0.5% Ophthalmic Solution 1 Drop(s) Both EYES two times a day  enoxaparin Injectable 40 milliGRAM(s) SubCutaneous every 24 hours  famotidine    Tablet 20 milliGRAM(s) Oral two times a day  fenofibrate Tablet 48 milliGRAM(s) Oral daily  gabapentin 300 milliGRAM(s) Oral two times a day  hydrALAZINE 10 milliGRAM(s) Oral every 8 hours  insulin glargine Injectable (LANTUS) 12 Unit(s) SubCutaneous at bedtime  insulin lispro (HumaLOG) corrective regimen sliding scale   SubCutaneous Before meals and at bedtime  insulin lispro Injectable (HumaLOG) 4 Unit(s) SubCutaneous three times a day before meals  latanoprost 0.005% Ophthalmic Solution 1 Drop(s) Both EYES at bedtime  levothyroxine 75 MICROGram(s) Oral daily  metoprolol succinate  milliGRAM(s) Oral daily  polyethylene glycol 3350 17 Gram(s) Oral daily  QUEtiapine 800 milliGRAM(s) Oral at bedtime  senna Syrup 10 milliLiter(s) Oral daily    MEDICATIONS  (PRN):  bisacodyl Suppository 10 milliGRAM(s) Rectal daily PRN Constipation  dextrose 40% Gel 15 Gram(s) Oral once PRN Blood Glucose LESS THAN 70 milliGRAM(s)/deciliter  glucagon  Injectable 1 milliGRAM(s) IntraMuscular once PRN Glucose LESS THAN 70 milligrams/deciliter COVID Summary: Admitted 3/23 for fall and decreased strength, found with fevers, 89 on RA, covid positive. Finished quad therapy on 3/28. Got Toci 3/27 followed by initiation of IV steroids BID to end on 4/1. Insulin being uptitrated for hyperglycemia while on steroids, A1c 10. Pt intermittently refuses oxygen support (takes off NC and NRB) and pills. HTN needing labetalol and hydral on 3/30-31 for control in setting of refusing oral anti-htn. Failed attempt to wean O2, insulin requirement stable off steroids, HTN controled on hydral 10 TID, amlodipine 10,  3/31 covid reswab positive (one week after first swab and has been afebrile for 72 hr). Started solumedrol, completed 5 day course. Inflammatory markers significantly improved. 4/3 tachypneic to 26,with hypoxia to the high 80s-low 90s despite 15L NRB. ICU consult evaluated the patient, they ambulated the patient during which time he desaturated to the 70s, he was placed back on NRB at 15L and he was comfortable. They stated that the patient can remain on the RMF, awaiting formal note.     OVERNIGHT EVENTS: ICU consult late afternoon for desaturation to 88-91 w/RR 26 & belly breathing on 15L NRB, desats to 70s with ambulation. ICU consult evaluated.     SUBJECTIVE:   T(F): 97.9 (04-04-20 @ 06:36)  HR: 86 (04-04-20 @ 06:36)  BP: 122/77 (04-04-20 @ 06:36)  RR: 21 (04-04-20 @ 06:36)  SpO2: 93% (04-04-20 @ 06:36)    PHYSICAL EXAM:  General: WDWN, on 15 L NRB, no Respiratory distress, but does have desaturation to mid 80s.   HEENT: NC/AT; anicteric sclera; MMM  Neck: supple  Cardiovascular: +S1/S2; RRR  Respiratory: ;+ labored breathing, + accessory muscle use.   Gastrointestinal: soft non-tender  Extremities: WWP; no edema, clubbing or cyanosis  Neurological: AAOx3; no focal deficits        LABS:   COVID-19 PCR: Detected <!!> (03-31-20 @ 15:58)  COVID-19 PCR: Detected <!!> (03-24-20 @ 00:47)                          14.7   8.89  )-----------( 500      ( 04 Apr 2020 06:35 )             45.7     04-04    140  |  103  |  24<H>  ----------------------------<  87  see note   |  26  |  0.75    Ca    9.3      04 Apr 2020 06:35  Phos  3.7     04-04  Mg     2.1     04-04    TPro  6.2  /  Alb  2.9<L>  /  TBili  0.5  /  DBili  x   /  AST  see note  /  ALT  see note  /  AlkPhos  38<L>  04-04        Auto Neutrophil #: 6.14 K/uL [1.80 - 7.40] (04-04-20 @ 06:35)  Auto Lymphocyte #: 1.87 K/uL [1.00 - 3.30] (04-04-20 @ 06:35)  Ferritin, Serum: 674 ng/mL <H> [30 - 400] (04-04-20 @ 06:35)  C-Reactive Protein, Serum: 0.17 mg/dL [0.00 - 0.40] (04-04-20 @ 06:35)  D-Dimer Assay, Quantitative: 470 ng/mL DDU <H> (04-04-20 @ 06:35)  Auto Neutrophil #: 7.29 K/uL [1.80 - 7.40] (04-03-20 @ 08:00)  Auto Lymphocyte #: 1.68 K/uL [1.00 - 3.30] (04-03-20 @ 08:00)  Ferritin, Serum: 631 ng/mL <H> [30 - 400] (04-03-20 @ 08:00)  C-Reactive Protein, Serum: 0.15 mg/dL [0.00 - 0.40] (04-03-20 @ 08:00)  D-Dimer Assay, Quantitative: 387 ng/mL DDU <H> (04-03-20 @ 08:00)  Auto Neutrophil #: 8.65 K/uL <H> [1.80 - 7.40] (04-02-20 @ 07:05)  Auto Lymphocyte #: 1.82 K/uL [1.00 - 3.30] (04-02-20 @ 07:05)  Ferritin, Serum: 802 ng/mL <H> [30 - 400] (04-02-20 @ 07:05)  C-Reactive Protein, Serum: 0.22 mg/dL [0.00 - 0.40] (04-02-20 @ 07:05)  D-Dimer Assay, Quantitative: 244 ng/mL DDU <H> (04-02-20 @ 07:05)          RADIOLOGY, EKG & ADDITIONAL TESTS: Reviewed.       MEDICATIONS  (STANDING):  acetaminophen   Tablet .. 650 milliGRAM(s) Oral every 6 hours  amLODIPine   Tablet 10 milliGRAM(s) Oral every 24 hours  atorvastatin 20 milliGRAM(s) Oral at bedtime  brimonidine 0.2% Ophthalmic Solution 1 Drop(s) Both EYES two times a day  dextrose 50% Injectable 12.5 Gram(s) IV Push once  dextrose 50% Injectable 25 Gram(s) IV Push once  dextrose 50% Injectable 25 Gram(s) IV Push once  dorzolamide 2%/timolol 0.5% Ophthalmic Solution 1 Drop(s) Both EYES two times a day  enoxaparin Injectable 40 milliGRAM(s) SubCutaneous every 24 hours  famotidine    Tablet 20 milliGRAM(s) Oral two times a day  fenofibrate Tablet 48 milliGRAM(s) Oral daily  gabapentin 300 milliGRAM(s) Oral two times a day  hydrALAZINE 10 milliGRAM(s) Oral every 8 hours  insulin glargine Injectable (LANTUS) 12 Unit(s) SubCutaneous at bedtime  insulin lispro (HumaLOG) corrective regimen sliding scale   SubCutaneous Before meals and at bedtime  insulin lispro Injectable (HumaLOG) 4 Unit(s) SubCutaneous three times a day before meals  latanoprost 0.005% Ophthalmic Solution 1 Drop(s) Both EYES at bedtime  levothyroxine 75 MICROGram(s) Oral daily  metoprolol succinate  milliGRAM(s) Oral daily  polyethylene glycol 3350 17 Gram(s) Oral daily  QUEtiapine 800 milliGRAM(s) Oral at bedtime  senna Syrup 10 milliLiter(s) Oral daily    MEDICATIONS  (PRN):  bisacodyl Suppository 10 milliGRAM(s) Rectal daily PRN Constipation  dextrose 40% Gel 15 Gram(s) Oral once PRN Blood Glucose LESS THAN 70 milliGRAM(s)/deciliter  glucagon  Injectable 1 milliGRAM(s) IntraMuscular once PRN Glucose LESS THAN 70 milligrams/deciliter COVID Summary: Admitted 3/23 for fall and decreased strength, found with fevers, 89 on RA, covid positive. Finished quad therapy on 3/28. Got Toci 3/27 followed by initiation of IV steroids BID to end on 4/1. Insulin being uptitrated for hyperglycemia while on steroids, A1c 10. Pt intermittently refuses oxygen support (takes off NC and NRB) and pills. HTN needing labetalol and hydral on 3/30-31 for control in setting of refusing oral anti-htn. Failed attempt to wean O2, insulin requirement stable off steroids, HTN controled on hydral 10 TID, amlodipine 10,  3/31 covid reswab positive (one week after first swab and has been afebrile for 72 hr). Started solumedrol, completed 5 day course. Inflammatory markers significantly improved. 4/3 tachypneic to 26,with hypoxia to the high 80s-low 90s despite 15L NRB. ICU consult evaluated the patient, they ambulated the patient during which time he desaturated to the 70s, he was placed back on NRB at 15L and he was comfortable. They stated that the patient can remain on the RMF, awaiting formal note.     OVERNIGHT EVENTS: ICU consult late afternoon for desaturation to 88-91 w/RR 26 & belly breathing on 15L NRB, desats to 70s with ambulation. ICU consult evaluated.     SUBJECTIVE:   T(F): 97.9 (04-04-20 @ 06:36)  HR: 86 (04-04-20 @ 06:36)  BP: 122/77 (04-04-20 @ 06:36)  RR: 21 (04-04-20 @ 06:36)  SpO2: 93% (04-04-20 @ 06:36)    PHYSICAL EXAM:  General: WN, on 15 L NRB, no Respiratory distress  HEENT: NC/AT; anicteric sclera; MMM  Neck: supple  Respiratory: non labored breathing, w/o accessory muscle use.   Gastrointestinal: soft non-tender  Extremities: WWP; no edema, clubbing or cyanosis  Neurological: AAOx3; L arm weakness, unable to move against gravity.      LABS:   COVID-19 PCR: Detected <!!> (03-31-20 @ 15:58)  COVID-19 PCR: Detected <!!> (03-24-20 @ 00:47)                          14.7   8.89  )-----------( 500      ( 04 Apr 2020 06:35 )             45.7     04-04    140  |  103  |  24<H>  ----------------------------<  87  see note   |  26  |  0.75    Ca    9.3      04 Apr 2020 06:35  Phos  3.7     04-04  Mg     2.1     04-04    TPro  6.2  /  Alb  2.9<L>  /  TBili  0.5  /  DBili  x   /  AST  see note  /  ALT  see note  /  AlkPhos  38<L>  04-04        Auto Neutrophil #: 6.14 K/uL [1.80 - 7.40] (04-04-20 @ 06:35)  Auto Lymphocyte #: 1.87 K/uL [1.00 - 3.30] (04-04-20 @ 06:35)  Ferritin, Serum: 674 ng/mL <H> [30 - 400] (04-04-20 @ 06:35)  C-Reactive Protein, Serum: 0.17 mg/dL [0.00 - 0.40] (04-04-20 @ 06:35)  D-Dimer Assay, Quantitative: 470 ng/mL DDU <H> (04-04-20 @ 06:35)  Auto Neutrophil #: 7.29 K/uL [1.80 - 7.40] (04-03-20 @ 08:00)  Auto Lymphocyte #: 1.68 K/uL [1.00 - 3.30] (04-03-20 @ 08:00)  Ferritin, Serum: 631 ng/mL <H> [30 - 400] (04-03-20 @ 08:00)  C-Reactive Protein, Serum: 0.15 mg/dL [0.00 - 0.40] (04-03-20 @ 08:00)  D-Dimer Assay, Quantitative: 387 ng/mL DDU <H> (04-03-20 @ 08:00)  Auto Neutrophil #: 8.65 K/uL <H> [1.80 - 7.40] (04-02-20 @ 07:05)  Auto Lymphocyte #: 1.82 K/uL [1.00 - 3.30] (04-02-20 @ 07:05)  Ferritin, Serum: 802 ng/mL <H> [30 - 400] (04-02-20 @ 07:05)  C-Reactive Protein, Serum: 0.22 mg/dL [0.00 - 0.40] (04-02-20 @ 07:05)  D-Dimer Assay, Quantitative: 244 ng/mL DDU <H> (04-02-20 @ 07:05)          RADIOLOGY, EKG & ADDITIONAL TESTS: Reviewed.       MEDICATIONS  (STANDING):  acetaminophen   Tablet .. 650 milliGRAM(s) Oral every 6 hours  amLODIPine   Tablet 10 milliGRAM(s) Oral every 24 hours  atorvastatin 20 milliGRAM(s) Oral at bedtime  brimonidine 0.2% Ophthalmic Solution 1 Drop(s) Both EYES two times a day  dextrose 50% Injectable 12.5 Gram(s) IV Push once  dextrose 50% Injectable 25 Gram(s) IV Push once  dextrose 50% Injectable 25 Gram(s) IV Push once  dorzolamide 2%/timolol 0.5% Ophthalmic Solution 1 Drop(s) Both EYES two times a day  enoxaparin Injectable 40 milliGRAM(s) SubCutaneous every 24 hours  famotidine    Tablet 20 milliGRAM(s) Oral two times a day  fenofibrate Tablet 48 milliGRAM(s) Oral daily  gabapentin 300 milliGRAM(s) Oral two times a day  hydrALAZINE 10 milliGRAM(s) Oral every 8 hours  insulin glargine Injectable (LANTUS) 12 Unit(s) SubCutaneous at bedtime  insulin lispro (HumaLOG) corrective regimen sliding scale   SubCutaneous Before meals and at bedtime  insulin lispro Injectable (HumaLOG) 4 Unit(s) SubCutaneous three times a day before meals  latanoprost 0.005% Ophthalmic Solution 1 Drop(s) Both EYES at bedtime  levothyroxine 75 MICROGram(s) Oral daily  metoprolol succinate  milliGRAM(s) Oral daily  polyethylene glycol 3350 17 Gram(s) Oral daily  QUEtiapine 800 milliGRAM(s) Oral at bedtime  senna Syrup 10 milliLiter(s) Oral daily    MEDICATIONS  (PRN):  bisacodyl Suppository 10 milliGRAM(s) Rectal daily PRN Constipation  dextrose 40% Gel 15 Gram(s) Oral once PRN Blood Glucose LESS THAN 70 milliGRAM(s)/deciliter  glucagon  Injectable 1 milliGRAM(s) IntraMuscular once PRN Glucose LESS THAN 70 milligrams/deciliter

## 2020-04-04 NOTE — PROGRESS NOTE ADULT - PROBLEM SELECTOR PLAN 4
Hx of diabetes. A1c 10 here, sugars high. Previously recorded as on Januvia and glucophage. No diabetes medications at [facility], may use more than one pharmacy. Patient poor historian does not know what he takes at home.  - on steroids from (3/27 - 4/1)  - lantus down to 12 units  - 4U premeal   - MISS  - consistent carb diet  - out pt Endocrine f/u Likely 2/2 dehydration in setting of poor PO intake vs starvation ketoacidosis vs infectious etiology in setting of recent fever. Patient with couple week history of poor PO intake and only juice. Denies sick contacts. s/p 3L of NS in ED.  CT Head: No acute intracranial hemorrhage and Xray pelvis/hip: No acute osseous injury  Creatinine kinase: slightly elevated, 494  - physical therapy: will benefit from continued PT to address above deficits as tolerated in order to maximize functional independence and ensure safe D/C to home.  - TSH WNL  - encourage PO intake  - PT home with home PT

## 2020-04-05 DIAGNOSIS — J96.01 ACUTE RESPIRATORY FAILURE WITH HYPOXIA: ICD-10-CM

## 2020-04-05 LAB
ALBUMIN SERPL ELPH-MCNC: 3 G/DL — LOW (ref 3.3–5)
ALP SERPL-CCNC: 40 U/L — SIGNIFICANT CHANGE UP (ref 40–120)
ALT FLD-CCNC: 23 U/L — SIGNIFICANT CHANGE UP (ref 10–45)
ANION GAP SERPL CALC-SCNC: 9 MMOL/L — SIGNIFICANT CHANGE UP (ref 5–17)
AST SERPL-CCNC: 20 U/L — SIGNIFICANT CHANGE UP (ref 10–40)
BASOPHILS # BLD AUTO: 0.01 K/UL — SIGNIFICANT CHANGE UP (ref 0–0.2)
BASOPHILS NFR BLD AUTO: 0.1 % — SIGNIFICANT CHANGE UP (ref 0–2)
BILIRUB SERPL-MCNC: 0.5 MG/DL — SIGNIFICANT CHANGE UP (ref 0.2–1.2)
BUN SERPL-MCNC: 22 MG/DL — SIGNIFICANT CHANGE UP (ref 7–23)
CALCIUM SERPL-MCNC: 9.2 MG/DL — SIGNIFICANT CHANGE UP (ref 8.4–10.5)
CHLORIDE SERPL-SCNC: 104 MMOL/L — SIGNIFICANT CHANGE UP (ref 96–108)
CO2 SERPL-SCNC: 28 MMOL/L — SIGNIFICANT CHANGE UP (ref 22–31)
CREAT SERPL-MCNC: 0.82 MG/DL — SIGNIFICANT CHANGE UP (ref 0.5–1.3)
CRP SERPL-MCNC: 0.12 MG/DL — SIGNIFICANT CHANGE UP (ref 0–0.4)
D DIMER BLD IA.RAPID-MCNC: 480 NG/ML DDU — HIGH
EOSINOPHIL # BLD AUTO: 0.11 K/UL — SIGNIFICANT CHANGE UP (ref 0–0.5)
EOSINOPHIL NFR BLD AUTO: 1.4 % — SIGNIFICANT CHANGE UP (ref 0–6)
FERRITIN SERPL-MCNC: 670 NG/ML — HIGH (ref 30–400)
GLUCOSE BLDC GLUCOMTR-MCNC: 158 MG/DL — HIGH (ref 70–99)
GLUCOSE BLDC GLUCOMTR-MCNC: 247 MG/DL — HIGH (ref 70–99)
GLUCOSE BLDC GLUCOMTR-MCNC: 97 MG/DL — SIGNIFICANT CHANGE UP (ref 70–99)
GLUCOSE SERPL-MCNC: 119 MG/DL — HIGH (ref 70–99)
HCT VFR BLD CALC: 44.1 % — SIGNIFICANT CHANGE UP (ref 39–50)
HGB BLD-MCNC: 14 G/DL — SIGNIFICANT CHANGE UP (ref 13–17)
IMM GRANULOCYTES NFR BLD AUTO: 1 % — SIGNIFICANT CHANGE UP (ref 0–1.5)
LYMPHOCYTES # BLD AUTO: 1.36 K/UL — SIGNIFICANT CHANGE UP (ref 1–3.3)
LYMPHOCYTES # BLD AUTO: 17.8 % — SIGNIFICANT CHANGE UP (ref 13–44)
MAGNESIUM SERPL-MCNC: 2.2 MG/DL — SIGNIFICANT CHANGE UP (ref 1.6–2.6)
MCHC RBC-ENTMCNC: 26.2 PG — LOW (ref 27–34)
MCHC RBC-ENTMCNC: 31.7 GM/DL — LOW (ref 32–36)
MCV RBC AUTO: 82.4 FL — SIGNIFICANT CHANGE UP (ref 80–100)
MONOCYTES # BLD AUTO: 0.57 K/UL — SIGNIFICANT CHANGE UP (ref 0–0.9)
MONOCYTES NFR BLD AUTO: 7.5 % — SIGNIFICANT CHANGE UP (ref 2–14)
NEUTROPHILS # BLD AUTO: 5.52 K/UL — SIGNIFICANT CHANGE UP (ref 1.8–7.4)
NEUTROPHILS NFR BLD AUTO: 72.2 % — SIGNIFICANT CHANGE UP (ref 43–77)
NRBC # BLD: 0 /100 WBCS — SIGNIFICANT CHANGE UP (ref 0–0)
PHOSPHATE SERPL-MCNC: 3.4 MG/DL — SIGNIFICANT CHANGE UP (ref 2.5–4.5)
PLATELET # BLD AUTO: 450 K/UL — HIGH (ref 150–400)
POTASSIUM SERPL-MCNC: 3.7 MMOL/L — SIGNIFICANT CHANGE UP (ref 3.5–5.3)
POTASSIUM SERPL-SCNC: 3.7 MMOL/L — SIGNIFICANT CHANGE UP (ref 3.5–5.3)
PROT SERPL-MCNC: 6.2 G/DL — SIGNIFICANT CHANGE UP (ref 6–8.3)
RBC # BLD: 5.35 M/UL — SIGNIFICANT CHANGE UP (ref 4.2–5.8)
RBC # FLD: 15.1 % — HIGH (ref 10.3–14.5)
SODIUM SERPL-SCNC: 141 MMOL/L — SIGNIFICANT CHANGE UP (ref 135–145)
WBC # BLD: 7.65 K/UL — SIGNIFICANT CHANGE UP (ref 3.8–10.5)
WBC # FLD AUTO: 7.65 K/UL — SIGNIFICANT CHANGE UP (ref 3.8–10.5)

## 2020-04-05 PROCEDURE — 99233 SBSQ HOSP IP/OBS HIGH 50: CPT | Mod: GC

## 2020-04-05 PROCEDURE — 70553 MRI BRAIN STEM W/O & W/DYE: CPT | Mod: 26

## 2020-04-05 PROCEDURE — 93970 EXTREMITY STUDY: CPT | Mod: 26

## 2020-04-05 RX ORDER — FUROSEMIDE 40 MG
40 TABLET ORAL ONCE
Refills: 0 | Status: COMPLETED | OUTPATIENT
Start: 2020-04-05 | End: 2020-04-05

## 2020-04-05 RX ORDER — SENNA PLUS 8.6 MG/1
2 TABLET ORAL DAILY
Refills: 0 | Status: DISCONTINUED | OUTPATIENT
Start: 2020-04-05 | End: 2020-04-14

## 2020-04-05 RX ORDER — ENOXAPARIN SODIUM 100 MG/ML
80 INJECTION SUBCUTANEOUS
Refills: 0 | Status: DISCONTINUED | OUTPATIENT
Start: 2020-04-05 | End: 2020-04-16

## 2020-04-05 RX ADMIN — Medication 650 MILLIGRAM(S): at 12:23

## 2020-04-05 RX ADMIN — Medication 10 MILLIGRAM(S): at 23:30

## 2020-04-05 RX ADMIN — FAMOTIDINE 20 MILLIGRAM(S): 10 INJECTION INTRAVENOUS at 07:22

## 2020-04-05 RX ADMIN — Medication 4 UNIT(S): at 09:03

## 2020-04-05 RX ADMIN — QUETIAPINE FUMARATE 800 MILLIGRAM(S): 200 TABLET, FILM COATED ORAL at 23:35

## 2020-04-05 RX ADMIN — Medication 4: at 12:19

## 2020-04-05 RX ADMIN — ENOXAPARIN SODIUM 80 MILLIGRAM(S): 100 INJECTION SUBCUTANEOUS at 16:07

## 2020-04-05 RX ADMIN — POLYETHYLENE GLYCOL 3350 17 GRAM(S): 17 POWDER, FOR SOLUTION ORAL at 12:19

## 2020-04-05 RX ADMIN — GABAPENTIN 300 MILLIGRAM(S): 400 CAPSULE ORAL at 18:30

## 2020-04-05 RX ADMIN — Medication 2: at 18:21

## 2020-04-05 RX ADMIN — SENNA PLUS 2 TABLET(S): 8.6 TABLET ORAL at 12:23

## 2020-04-05 RX ADMIN — Medication 48 MILLIGRAM(S): at 12:23

## 2020-04-05 RX ADMIN — Medication 4 UNIT(S): at 12:59

## 2020-04-05 RX ADMIN — Medication 75 MICROGRAM(S): at 07:22

## 2020-04-05 RX ADMIN — AMLODIPINE BESYLATE 10 MILLIGRAM(S): 2.5 TABLET ORAL at 12:23

## 2020-04-05 RX ADMIN — INSULIN GLARGINE 12 UNIT(S): 100 INJECTION, SOLUTION SUBCUTANEOUS at 23:35

## 2020-04-05 RX ADMIN — Medication 4 UNIT(S): at 18:21

## 2020-04-05 RX ADMIN — Medication 100 MILLIGRAM(S): at 16:06

## 2020-04-05 RX ADMIN — BRIMONIDINE TARTRATE 1 DROP(S): 2 SOLUTION/ DROPS OPHTHALMIC at 19:12

## 2020-04-05 RX ADMIN — Medication 2: at 23:08

## 2020-04-05 RX ADMIN — BRIMONIDINE TARTRATE 1 DROP(S): 2 SOLUTION/ DROPS OPHTHALMIC at 07:18

## 2020-04-05 RX ADMIN — FAMOTIDINE 20 MILLIGRAM(S): 10 INJECTION INTRAVENOUS at 18:30

## 2020-04-05 RX ADMIN — ATORVASTATIN CALCIUM 20 MILLIGRAM(S): 80 TABLET, FILM COATED ORAL at 23:31

## 2020-04-05 RX ADMIN — Medication 40 MILLIGRAM(S): at 16:06

## 2020-04-05 RX ADMIN — DORZOLAMIDE HYDROCHLORIDE TIMOLOL MALEATE 1 DROP(S): 20; 5 SOLUTION/ DROPS OPHTHALMIC at 07:18

## 2020-04-05 RX ADMIN — GABAPENTIN 300 MILLIGRAM(S): 400 CAPSULE ORAL at 07:22

## 2020-04-05 RX ADMIN — Medication 10 MILLIGRAM(S): at 07:22

## 2020-04-05 RX ADMIN — DORZOLAMIDE HYDROCHLORIDE TIMOLOL MALEATE 1 DROP(S): 20; 5 SOLUTION/ DROPS OPHTHALMIC at 19:13

## 2020-04-05 RX ADMIN — Medication 650 MILLIGRAM(S): at 18:36

## 2020-04-05 RX ADMIN — Medication 10 MILLIGRAM(S): at 12:24

## 2020-04-05 NOTE — PROGRESS NOTE ADULT - SUBJECTIVE AND OBJECTIVE BOX
SUBJECTIVE / INTERVAL HPI: Patient seen and examined at bedside. Mentions feeling better compared to yesterday and the day before, has no complaints at this moment.    VITAL SIGNS:  Vital Signs Last 24 Hrs  T(C): 35.9 (05 Apr 2020 12:22), Max: 36.9 (04 Apr 2020 14:49)  T(F): 96.7 (05 Apr 2020 12:22), Max: 98.4 (04 Apr 2020 14:49)  HR: 80 (05 Apr 2020 08:45) (71 - 86)  BP: 123/79 (05 Apr 2020 08:45) (115/74 - 136/78)  BP(mean): --  RR: 18 (05 Apr 2020 08:45) (18 - 26)  SpO2: 96% (05 Apr 2020 08:45) (91% - 96%)    PHYSICAL EXAM:  General: WDWN  HEENT: NC/AT; MMM  Neck: supple  Cardiovascular: +S1/S2; RRR  Respiratory: CTA B/L; no W/R/R  Gastrointestinal: soft, NT/ND; +BSx4  Extremities: WWP; no edema, clubbing or cyanosis  Vascular: 2+ radial, DP/PT pulses B/L  Neurological: AAOx2-3 (knows self, place and year but unsure on month/day); RUE 4+/5, LUE weakness with 3/5 MS, bilateral LE weakness L>R, sensation intact     MEDICATIONS  (STANDING):  acetaminophen   Tablet .. 650 milliGRAM(s) Oral every 6 hours  amLODIPine   Tablet 10 milliGRAM(s) Oral every 24 hours  atorvastatin 20 milliGRAM(s) Oral at bedtime  brimonidine 0.2% Ophthalmic Solution 1 Drop(s) Both EYES two times a day  dextrose 50% Injectable 12.5 Gram(s) IV Push once  dextrose 50% Injectable 25 Gram(s) IV Push once  dextrose 50% Injectable 25 Gram(s) IV Push once  dorzolamide 2%/timolol 0.5% Ophthalmic Solution 1 Drop(s) Both EYES two times a day  enoxaparin Injectable 80 milliGRAM(s) SubCutaneous two times a day  famotidine    Tablet 20 milliGRAM(s) Oral two times a day  fenofibrate Tablet 48 milliGRAM(s) Oral daily  gabapentin 300 milliGRAM(s) Oral two times a day  hydrALAZINE 10 milliGRAM(s) Oral every 8 hours  insulin glargine Injectable (LANTUS) 12 Unit(s) SubCutaneous at bedtime  insulin lispro (HumaLOG) corrective regimen sliding scale   SubCutaneous Before meals and at bedtime  insulin lispro Injectable (HumaLOG) 4 Unit(s) SubCutaneous three times a day before meals  latanoprost 0.005% Ophthalmic Solution 1 Drop(s) Both EYES at bedtime  levothyroxine 75 MICROGram(s) Oral daily  metoprolol succinate  milliGRAM(s) Oral daily  polyethylene glycol 3350 17 Gram(s) Oral daily  QUEtiapine 800 milliGRAM(s) Oral at bedtime  senna 2 Tablet(s) Oral daily    MEDICATIONS  (PRN):  bisacodyl Suppository 10 milliGRAM(s) Rectal daily PRN Constipation  dextrose 40% Gel 15 Gram(s) Oral once PRN Blood Glucose LESS THAN 70 milliGRAM(s)/deciliter  glucagon  Injectable 1 milliGRAM(s) IntraMuscular once PRN Glucose LESS THAN 70 milligrams/deciliter    ALLERGIES:  No Known Allergies    LABS:                        14.0   7.65  )-----------( 450      ( 05 Apr 2020 06:25 )             44.1     04-05    141  |  104  |  22  ----------------------------<  119<H>  3.7   |  28  |  0.82    Ca    9.2      05 Apr 2020 06:25  Phos  3.4     04-05  Mg     2.2     04-05    TPro  6.2  /  Alb  3.0<L>  /  TBili  0.5  /  DBili  x   /  AST  20  /  ALT  23  /  AlkPhos  40  04-05    CAPILLARY BLOOD GLUCOSE    POCT Blood Glucose.: 247 mg/dL (05 Apr 2020 11:42)    RADIOLOGY & ADDITIONAL TESTS: Reviewed.

## 2020-04-05 NOTE — PROGRESS NOTE ADULT - SUBJECTIVE AND OBJECTIVE BOX
Neurology Stroke Progress Note    INTERVAL HPI/OVERNIGHT EVENTS:  Patient seen and examined this morning with continuing left sided weakness, mri brain showed acute right parietal and posterior frontal strokes with chronic right external capsule and bilateral thalamic and right parietal strokes.     MEDICATIONS  (STANDING):  acetaminophen   Tablet .. 650 milliGRAM(s) Oral every 6 hours  amLODIPine   Tablet 10 milliGRAM(s) Oral every 24 hours  atorvastatin 20 milliGRAM(s) Oral at bedtime  brimonidine 0.2% Ophthalmic Solution 1 Drop(s) Both EYES two times a day  dextrose 50% Injectable 12.5 Gram(s) IV Push once  dextrose 50% Injectable 25 Gram(s) IV Push once  dextrose 50% Injectable 25 Gram(s) IV Push once  dorzolamide 2%/timolol 0.5% Ophthalmic Solution 1 Drop(s) Both EYES two times a day  enoxaparin Injectable 80 milliGRAM(s) SubCutaneous two times a day  famotidine    Tablet 20 milliGRAM(s) Oral two times a day  fenofibrate Tablet 48 milliGRAM(s) Oral daily  gabapentin 300 milliGRAM(s) Oral two times a day  hydrALAZINE 10 milliGRAM(s) Oral every 8 hours  insulin glargine Injectable (LANTUS) 12 Unit(s) SubCutaneous at bedtime  insulin lispro (HumaLOG) corrective regimen sliding scale   SubCutaneous Before meals and at bedtime  insulin lispro Injectable (HumaLOG) 4 Unit(s) SubCutaneous three times a day before meals  latanoprost 0.005% Ophthalmic Solution 1 Drop(s) Both EYES at bedtime  levothyroxine 75 MICROGram(s) Oral daily  metoprolol succinate  milliGRAM(s) Oral daily  polyethylene glycol 3350 17 Gram(s) Oral daily  QUEtiapine 800 milliGRAM(s) Oral at bedtime  senna 2 Tablet(s) Oral daily    MEDICATIONS  (PRN):  bisacodyl Suppository 10 milliGRAM(s) Rectal daily PRN Constipation  dextrose 40% Gel 15 Gram(s) Oral once PRN Blood Glucose LESS THAN 70 milliGRAM(s)/deciliter  glucagon  Injectable 1 milliGRAM(s) IntraMuscular once PRN Glucose LESS THAN 70 milligrams/deciliter      Allergies    No Known Allergies    Intolerances      Vital Signs Last 24 Hrs  T(C): 36.2 (05 Apr 2020 15:38), Max: 36.6 (05 Apr 2020 01:20)  T(F): 97.1 (05 Apr 2020 15:38), Max: 97.8 (05 Apr 2020 01:20)  HR: 89 (05 Apr 2020 15:38) (71 - 89)  BP: 125/76 (05 Apr 2020 15:38) (115/74 - 132/77)  BP(mean): --  RR: 18 (05 Apr 2020 15:38) (18 - 25)  SpO2: 97% (05 Apr 2020 15:38) (91% - 97%)    Physical exam:  Neurologic:  -Mental status: Awake, alert, oriented to person, place, and year. Speech is fluent with intact naming, repetition, and comprehension, no dysarthria. Recent and remote memory intact. Follows commands  -Cranial nerves:   II: Visual fields are full to confrontation.  III, IV, VI: Extraocular movements are intact without nystagmus. Pupils equally round and reactive to light  V:  Facial sensation V1-V3 equal and intact   VII: Face is symmetric with normal eye closure and smile  Motor: Normal bulk and tone. Right arm 4+/5, right leg 4/5, bilateral handgrip 5/5. Left arm 3/5, left leg 3/5.   Sensation: Intact to light touch bilaterally. No neglect or extinction on double simultaneous testing.  Coordination: No dysmetria on finger-to-nose     LABS:                        14.0   7.65  )-----------( 450      ( 05 Apr 2020 06:25 )             44.1     04-05    141  |  104  |  22  ----------------------------<  119<H>  3.7   |  28  |  0.82    Ca    9.2      05 Apr 2020 06:25  Phos  3.4     04-05  Mg     2.2     04-05    TPro  6.2  /  Alb  3.0<L>  /  TBili  0.5  /  DBili  x   /  AST  20  /  ALT  23  /  AlkPhos  40  04-05          RADIOLOGY & ADDITIONAL TESTS:  < from: MR Head w/wo IV Cont (04.05.20 @ 15:19) >  Findings: There is motion artifact that limits this evaluation. There are small acute infarctions in the right posterior frontal cortex, precentral gyrus,right frontal and parietal subcortical white matter and a punctate focus within the post central cortex .    There is a right external capsule small chronic lacunar infarct. There are bilateral thalamic chronic lacunar infarcts. There is a tiny rightparietal chronic infarct. There are patchy foci of T2 and Flair signal hyperintensities within the white matter and moderate patchy areas within the wilmer that are nonspecific however likely consistent moderate microvascular disease in a patient of this age. There is no evidence of mass-effect or midline shift. There is no evidence of intra or extra-axial fluid collection. The ventricles are of normal size and caliber. There is no evidence of acute infarction. Evaluation of the intracranial vascular flow-voids appear within normal limits.    There are gradient echo hypointensities identified in the right posterior mesial parietal occipital lobe (image #7 series 4) posterior to the atrium. There is also a punctate focus of gradient echo hypointensity within the mesial occipital lobe (image #18 series 14) there is a punctate focus of gradient echo hypointensity within the right paracentral wilmer (image 14 series 14).    There is no evidence of abnormal enhancement on this very limited evaluation    The visualized paranasal sinuses and bilateral mastoid air cells are clear.    Impression: Limited evaluation due to motion artifact. Small acute infarctions in the right posterior frontal and parietal lobe. Moderate microvascular disease. RIGHT external capsule small chronic lacunar infarct. Bilateral thalamic chronic lacunar infarcts. Tiny right parietal chronic infarct.     Assessment and Plan  79y Male w/ PMH y Male with PMHx of HTN, DM2, former smoker, hypothyroidism who presented to ED BIBEMS on 3/23 after being found down for a fall, found to have starvation ketosis now resolved, was desatting with imaging findings on CXR and found to be COVID positive, now on nonrebreather for oxygen support at 15. Stroke called on 4/4 for left sided weakness. NIHSS 7.  HCT negative  CTA showed moderate to severe left carotid stenosis. LE dopplers were negative for DVT. 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 Patient with uptrending d-dimer. Stroke etiology likely secondary to hypercoagulable status in the setting of COVID however cannot rule out cardioembolic case of stroke less likely large vessel atherosclerosis 2/2 Left carotid stenosis, recommend upgrading patient to telemetry, further workup is needed.     1)Secondary stroke prevention  - lovenox 80mg BID for full anticoagulation given hypercoagulable state  - increase to atorvastatin 80mg PO daily  - goal SBP <200     2) Stroke risk factors  - A1C 10.6  -   - HTN    3) Further workup   - consider outpatient loop   - recommend trending d-dimer  - please obtain cytokine panel in AM (cytokines are diurnal)     DVT prophylaxis   - lovenox full AC as above and SCDs    Germania Ch  Neurology Stroke NP  [phone]

## 2020-04-05 NOTE — PROGRESS NOTE ADULT - NSHPATTENDINGPLANDISCUSS_GEN_ALL_CORE
HS
HS
House staff
HS, patient
house staff
HS
house staff
HS, patient
HS, patient
HS

## 2020-04-05 NOTE — PROGRESS NOTE ADULT - PROBLEM SELECTOR PLAN 2
Patient with L sided weakness in Upper and Lower extremity. NIHSS 7. No acute events on CTH.   - Carotid doppler ordered given carotid atherosclerosis seen in CT  - MRI ordered, f/u  - F/u Echo

## 2020-04-05 NOTE — PROGRESS NOTE ADULT - PROBLEM SELECTOR PLAN 4
- lantus down to 12 units  - 4U premeal   - MISS  - consistent carb diet  - out pt Endocrine f/u - lantus 12 units qhs  - 4U premeal   - MISS  - consistent carb diet  - out pt Endocrine f/u

## 2020-04-05 NOTE — PROGRESS NOTE ADULT - ASSESSMENT
79 M, poor historian, with past medical history of HTN, DM2, and hypothyroidism who presents to ED BIBEMS after being found down for a fall, found to have starvation ketosis now resolved, found to be covid +, now continuing on oxygen support on RMF and persistent weakness

## 2020-04-05 NOTE — PROGRESS NOTE ADULT - PROBLEM SELECTOR PLAN 1
2/2 COVID   - Patient s/p a 5 days course of azithromycin, plaquenil, vitamin C and thiamine, as well as steroids for 7 days   - S/p tociluzumab  - Still requiring 15L on NRB however, now more comfortable and not tachypneic. Today he was 88-89% on RA with sats improving to 96-97% on NRB (improved from low 80's on RA and low 90's on 15L NRB 2-3 days ago)  - Will continue diuresing him and keeping strict I's and O's. 40mg x1 given today.  - OOBTC

## 2020-04-06 ENCOUNTER — APPOINTMENT (OUTPATIENT)
Dept: ENDOCRINOLOGY | Facility: CLINIC | Age: 80
End: 2020-04-06

## 2020-04-06 DIAGNOSIS — I63.9 CEREBRAL INFARCTION, UNSPECIFIED: ICD-10-CM

## 2020-04-06 LAB
ALBUMIN SERPL ELPH-MCNC: 2.9 G/DL — LOW (ref 3.3–5)
ALP SERPL-CCNC: 39 U/L — LOW (ref 40–120)
ALT FLD-CCNC: 25 U/L — SIGNIFICANT CHANGE UP (ref 10–45)
ANION GAP SERPL CALC-SCNC: 10 MMOL/L — SIGNIFICANT CHANGE UP (ref 5–17)
AST SERPL-CCNC: 20 U/L — SIGNIFICANT CHANGE UP (ref 10–40)
BASOPHILS # BLD AUTO: 0.01 K/UL — SIGNIFICANT CHANGE UP (ref 0–0.2)
BASOPHILS NFR BLD AUTO: 0.2 % — SIGNIFICANT CHANGE UP (ref 0–2)
BILIRUB SERPL-MCNC: 0.5 MG/DL — SIGNIFICANT CHANGE UP (ref 0.2–1.2)
BUN SERPL-MCNC: 22 MG/DL — SIGNIFICANT CHANGE UP (ref 7–23)
CALCIUM SERPL-MCNC: 9.3 MG/DL — SIGNIFICANT CHANGE UP (ref 8.4–10.5)
CHLORIDE SERPL-SCNC: 100 MMOL/L — SIGNIFICANT CHANGE UP (ref 96–108)
CO2 SERPL-SCNC: 28 MMOL/L — SIGNIFICANT CHANGE UP (ref 22–31)
CREAT SERPL-MCNC: 0.91 MG/DL — SIGNIFICANT CHANGE UP (ref 0.5–1.3)
CRP SERPL-MCNC: 0.08 MG/DL — SIGNIFICANT CHANGE UP (ref 0–0.4)
D DIMER BLD IA.RAPID-MCNC: 318 NG/ML DDU — HIGH
EOSINOPHIL # BLD AUTO: 0.08 K/UL — SIGNIFICANT CHANGE UP (ref 0–0.5)
EOSINOPHIL NFR BLD AUTO: 1.5 % — SIGNIFICANT CHANGE UP (ref 0–6)
FERRITIN SERPL-MCNC: 638 NG/ML — HIGH (ref 30–400)
GLUCOSE SERPL-MCNC: 131 MG/DL — HIGH (ref 70–99)
HCT VFR BLD CALC: 43.3 % — SIGNIFICANT CHANGE UP (ref 39–50)
HGB BLD-MCNC: 13.8 G/DL — SIGNIFICANT CHANGE UP (ref 13–17)
IMM GRANULOCYTES NFR BLD AUTO: 0.9 % — SIGNIFICANT CHANGE UP (ref 0–1.5)
LYMPHOCYTES # BLD AUTO: 1.5 K/UL — SIGNIFICANT CHANGE UP (ref 1–3.3)
LYMPHOCYTES # BLD AUTO: 27.4 % — SIGNIFICANT CHANGE UP (ref 13–44)
MAGNESIUM SERPL-MCNC: 2.1 MG/DL — SIGNIFICANT CHANGE UP (ref 1.6–2.6)
MCHC RBC-ENTMCNC: 26.4 PG — LOW (ref 27–34)
MCHC RBC-ENTMCNC: 31.9 GM/DL — LOW (ref 32–36)
MCV RBC AUTO: 82.8 FL — SIGNIFICANT CHANGE UP (ref 80–100)
MONOCYTES # BLD AUTO: 0.48 K/UL — SIGNIFICANT CHANGE UP (ref 0–0.9)
MONOCYTES NFR BLD AUTO: 8.8 % — SIGNIFICANT CHANGE UP (ref 2–14)
NEUTROPHILS # BLD AUTO: 3.36 K/UL — SIGNIFICANT CHANGE UP (ref 1.8–7.4)
NEUTROPHILS NFR BLD AUTO: 61.2 % — SIGNIFICANT CHANGE UP (ref 43–77)
NRBC # BLD: 0 /100 WBCS — SIGNIFICANT CHANGE UP (ref 0–0)
PHOSPHATE SERPL-MCNC: 3.5 MG/DL — SIGNIFICANT CHANGE UP (ref 2.5–4.5)
PLATELET # BLD AUTO: 435 K/UL — HIGH (ref 150–400)
POTASSIUM SERPL-MCNC: 3.7 MMOL/L — SIGNIFICANT CHANGE UP (ref 3.5–5.3)
POTASSIUM SERPL-SCNC: 3.7 MMOL/L — SIGNIFICANT CHANGE UP (ref 3.5–5.3)
PROT SERPL-MCNC: 5.9 G/DL — LOW (ref 6–8.3)
RBC # BLD: 5.23 M/UL — SIGNIFICANT CHANGE UP (ref 4.2–5.8)
RBC # FLD: 15 % — HIGH (ref 10.3–14.5)
SODIUM SERPL-SCNC: 138 MMOL/L — SIGNIFICANT CHANGE UP (ref 135–145)
WBC # BLD: 5.48 K/UL — SIGNIFICANT CHANGE UP (ref 3.8–10.5)
WBC # FLD AUTO: 5.48 K/UL — SIGNIFICANT CHANGE UP (ref 3.8–10.5)

## 2020-04-06 RX ORDER — POTASSIUM CHLORIDE 20 MEQ
40 PACKET (EA) ORAL ONCE
Refills: 0 | Status: COMPLETED | OUTPATIENT
Start: 2020-04-06 | End: 2020-04-06

## 2020-04-06 RX ADMIN — QUETIAPINE FUMARATE 800 MILLIGRAM(S): 200 TABLET, FILM COATED ORAL at 23:01

## 2020-04-06 RX ADMIN — Medication 4 UNIT(S): at 17:24

## 2020-04-06 RX ADMIN — ENOXAPARIN SODIUM 80 MILLIGRAM(S): 100 INJECTION SUBCUTANEOUS at 05:16

## 2020-04-06 RX ADMIN — Medication 650 MILLIGRAM(S): at 14:00

## 2020-04-06 RX ADMIN — Medication 10 MILLIGRAM(S): at 23:01

## 2020-04-06 RX ADMIN — Medication 48 MILLIGRAM(S): at 12:33

## 2020-04-06 RX ADMIN — LATANOPROST 1 DROP(S): 0.05 SOLUTION/ DROPS OPHTHALMIC; TOPICAL at 01:53

## 2020-04-06 RX ADMIN — DORZOLAMIDE HYDROCHLORIDE TIMOLOL MALEATE 1 DROP(S): 20; 5 SOLUTION/ DROPS OPHTHALMIC at 05:38

## 2020-04-06 RX ADMIN — BRIMONIDINE TARTRATE 1 DROP(S): 2 SOLUTION/ DROPS OPHTHALMIC at 23:00

## 2020-04-06 RX ADMIN — POLYETHYLENE GLYCOL 3350 17 GRAM(S): 17 POWDER, FOR SOLUTION ORAL at 12:33

## 2020-04-06 RX ADMIN — GABAPENTIN 300 MILLIGRAM(S): 400 CAPSULE ORAL at 05:47

## 2020-04-06 RX ADMIN — FAMOTIDINE 20 MILLIGRAM(S): 10 INJECTION INTRAVENOUS at 05:47

## 2020-04-06 RX ADMIN — Medication 40 MILLIEQUIVALENT(S): at 14:00

## 2020-04-06 RX ADMIN — INSULIN GLARGINE 12 UNIT(S): 100 INJECTION, SOLUTION SUBCUTANEOUS at 23:04

## 2020-04-06 RX ADMIN — AMLODIPINE BESYLATE 10 MILLIGRAM(S): 2.5 TABLET ORAL at 12:33

## 2020-04-06 RX ADMIN — Medication 10 MILLIGRAM(S): at 17:23

## 2020-04-06 RX ADMIN — Medication 10 MILLIGRAM(S): at 05:47

## 2020-04-06 RX ADMIN — BRIMONIDINE TARTRATE 1 DROP(S): 2 SOLUTION/ DROPS OPHTHALMIC at 05:39

## 2020-04-06 RX ADMIN — GABAPENTIN 300 MILLIGRAM(S): 400 CAPSULE ORAL at 17:24

## 2020-04-06 RX ADMIN — Medication 650 MILLIGRAM(S): at 21:11

## 2020-04-06 RX ADMIN — ATORVASTATIN CALCIUM 20 MILLIGRAM(S): 80 TABLET, FILM COATED ORAL at 23:00

## 2020-04-06 RX ADMIN — LATANOPROST 1 DROP(S): 0.05 SOLUTION/ DROPS OPHTHALMIC; TOPICAL at 21:04

## 2020-04-06 RX ADMIN — Medication 75 MICROGRAM(S): at 05:47

## 2020-04-06 RX ADMIN — ENOXAPARIN SODIUM 80 MILLIGRAM(S): 100 INJECTION SUBCUTANEOUS at 17:23

## 2020-04-06 RX ADMIN — Medication 100 MILLIGRAM(S): at 09:35

## 2020-04-06 RX ADMIN — Medication 4 UNIT(S): at 08:21

## 2020-04-06 RX ADMIN — Medication 650 MILLIGRAM(S): at 08:21

## 2020-04-06 RX ADMIN — SENNA PLUS 2 TABLET(S): 8.6 TABLET ORAL at 12:34

## 2020-04-06 RX ADMIN — Medication 650 MILLIGRAM(S): at 02:12

## 2020-04-06 RX ADMIN — DORZOLAMIDE HYDROCHLORIDE TIMOLOL MALEATE 1 DROP(S): 20; 5 SOLUTION/ DROPS OPHTHALMIC at 23:12

## 2020-04-06 RX ADMIN — Medication 2: at 12:30

## 2020-04-06 RX ADMIN — Medication 4 UNIT(S): at 12:29

## 2020-04-06 RX ADMIN — FAMOTIDINE 20 MILLIGRAM(S): 10 INJECTION INTRAVENOUS at 17:24

## 2020-04-06 NOTE — PROGRESS NOTE ADULT - SUBJECTIVE AND OBJECTIVE BOX
INTERVAL HPI/OVERNIGHT EVENTS: MRI brain w/wo contrast with right acute infarct in parietal lobe and internal capsule. Started lovenox at 80mg BID. Neuro following. Satting 95% on NRB.    SUBJECTIVE: Patient seen and examined at bedside. Reports feeling ok.     OBJECTIVE:    VITAL SIGNS:  ICU Vital Signs Last 24 Hrs  T(C): 36.2 (06 Apr 2020 09:26), Max: 36.3 (05 Apr 2020 22:46)  T(F): 97.2 (06 Apr 2020 09:26), Max: 97.4 (05 Apr 2020 22:46)  HR: 82 (06 Apr 2020 09:30) (78 - 96)  BP: 117/83 (06 Apr 2020 09:30) (117/79 - 147/84)  BP(mean): --  ABP: --  ABP(mean): --  RR: 20 (06 Apr 2020 09:30) (18 - 20)  SpO2: 95% (06 Apr 2020 09:30) (95% - 99%)        04-05 @ 07:01  -  04-06 @ 07:00  --------------------------------------------------------  IN: 480 mL / OUT: 900 mL / NET: -420 mL    04-06 @ 07:01  -  04-06 @ 11:55  --------------------------------------------------------  IN: 180 mL / OUT: 0 mL / NET: 180 mL      CAPILLARY BLOOD GLUCOSE      POCT Blood Glucose.: 118 mg/dL (06 Apr 2020 08:06)      PHYSICAL EXAM:    General: NAD  HEENT: NC/AT; PERRL, clear conjunctiva  Neck: supple  Respiratory: non-labored  Cardiovascular: +S1/S2; RRR  Abdomen: soft, NT/ND; +BS x4  Extremities: WWP, 2+ peripheral pulses b/l; no LE edema  Skin: normal color and turgor; no rash  Neurological:  L sided 3/5 UE weakness, 4/5 b/l LE weakness.      MEDICATIONS:  MEDICATIONS  (STANDING):  acetaminophen   Tablet .. 650 milliGRAM(s) Oral every 6 hours  amLODIPine   Tablet 10 milliGRAM(s) Oral every 24 hours  atorvastatin 20 milliGRAM(s) Oral at bedtime  brimonidine 0.2% Ophthalmic Solution 1 Drop(s) Both EYES two times a day  dextrose 50% Injectable 12.5 Gram(s) IV Push once  dextrose 50% Injectable 25 Gram(s) IV Push once  dextrose 50% Injectable 25 Gram(s) IV Push once  dorzolamide 2%/timolol 0.5% Ophthalmic Solution 1 Drop(s) Both EYES two times a day  enoxaparin Injectable 80 milliGRAM(s) SubCutaneous two times a day  famotidine    Tablet 20 milliGRAM(s) Oral two times a day  fenofibrate Tablet 48 milliGRAM(s) Oral daily  gabapentin 300 milliGRAM(s) Oral two times a day  hydrALAZINE 10 milliGRAM(s) Oral every 8 hours  insulin glargine Injectable (LANTUS) 12 Unit(s) SubCutaneous at bedtime  insulin lispro (HumaLOG) corrective regimen sliding scale   SubCutaneous Before meals and at bedtime  insulin lispro Injectable (HumaLOG) 4 Unit(s) SubCutaneous three times a day before meals  latanoprost 0.005% Ophthalmic Solution 1 Drop(s) Both EYES at bedtime  levothyroxine 75 MICROGram(s) Oral daily  metoprolol succinate  milliGRAM(s) Oral daily  polyethylene glycol 3350 17 Gram(s) Oral daily  QUEtiapine 800 milliGRAM(s) Oral at bedtime  senna 2 Tablet(s) Oral daily    MEDICATIONS  (PRN):  bisacodyl Suppository 10 milliGRAM(s) Rectal daily PRN Constipation  dextrose 40% Gel 15 Gram(s) Oral once PRN Blood Glucose LESS THAN 70 milliGRAM(s)/deciliter  glucagon  Injectable 1 milliGRAM(s) IntraMuscular once PRN Glucose LESS THAN 70 milligrams/deciliter      ALLERGIES:  Allergies    No Known Allergies    Intolerances        LABS:                        13.8   5.48  )-----------( 435      ( 06 Apr 2020 06:57 )             43.3     04-06    138  |  100  |  22  ----------------------------<  131<H>  3.7   |  28  |  0.91    Ca    9.3      06 Apr 2020 06:57  Phos  3.5     04-06  Mg     2.1     04-06    TPro  5.9<L>  /  Alb  2.9<L>  /  TBili  0.5  /  DBili  x   /  AST  20  /  ALT  25  /  AlkPhos  39<L>  04-06          Daily COVID labs  Procalcitonin:   D-dimer: D-Dimer Assay, Quantitative: 318 ng/mL DDU (04-06-20 @ 06:57)  D-Dimer Assay, Quantitative: 480 ng/mL DDU (04-05-20 @ 06:25)  D-Dimer Assay, Quantitative: 470 ng/mL DDU (04-04-20 @ 06:35)    ESR:   CRP: C-Reactive Protein, Serum: 0.08 mg/dL (04-06-20 @ 06:57)  C-Reactive Protein, Serum: 0.12 mg/dL (04-05-20 @ 06:25)  C-Reactive Protein, Serum: 0.17 mg/dL (04-04-20 @ 06:35)    LDH:   Ferritin: Ferritin, Serum: 638 ng/mL (04-06-20 @ 06:57)  Ferritin, Serum: 670 ng/mL (04-05-20 @ 06:25)  Ferritin, Serum: 674 ng/mL (04-04-20 @ 06:35)    Lactate: lc  Trop I:   Ck:     Admission COVID Labs  Full T cell subset: ABS CD3: 621 /uL (03-24-20 @ 11:57)  ABS CD4: 474 /uL (03-24-20 @ 11:57)  ABS CD8: 126 /uL (03-24-20 @ 11:57)    G6PD: Glucose-6-Phosphate Dehydrogenase: 14.7 U/g Hgb (03-24-20 @ 11:16)    Immunoglobulins panel:   Quantiferon Gold Tb:   Triglyceride level:           RADIOLOGY & ADDITIONAL TESTS: Reviewed.

## 2020-04-06 NOTE — PROGRESS NOTE ADULT - PROBLEM SELECTOR PLAN 2
Patient with L sided weakness in Upper and Lower extremity. NIHSS 7. No acute events on CTH.   - Carotid doppler ordered given carotid atherosclerosis seen in CT  - MRI brain w/wo contrast with right acute infarct in parietal lobe and internal capsule. Started lovenox at 80mg BID.   - F/u Echo

## 2020-04-06 NOTE — OCCUPATIONAL THERAPY INITIAL EVALUATION ADULT - ADDITIONAL COMMENTS
Patient poor historian, distractible throughout eval, however per PT eval [facility], independent in all functional mobility and ADLs w/o AD/AE.

## 2020-04-06 NOTE — OCCUPATIONAL THERAPY INITIAL EVALUATION ADULT - PRECAUTIONS/LIMITATIONS, REHAB EVAL
non-rebreather/fall precautions/oxygen therapy device and L/min non-rebreather 15 L/m/oxygen therapy device and L/min/fall precautions

## 2020-04-06 NOTE — OCCUPATIONAL THERAPY INITIAL EVALUATION ADULT - PLANNED THERAPY INTERVENTIONS, OT EVAL
ROM/strengthening/balance training/cognitive, visual perceptual/neuromuscular re-education/fine motor coordination training/transfer training/ADL retraining/bed mobility training/motor coordination training

## 2020-04-06 NOTE — CHART NOTE - NSCHARTNOTEFT_GEN_A_CORE
Admitting Diagnosis:   Patient is a 79y old  Male who presents with a chief complaint of weakness (06 Apr 2020 13:50)      PAST MEDICAL & SURGICAL HISTORY:  Diabetes mellitus, type 2  Hypothyroid  Essential hypertension: Hypertension  H/O thyroidectomy  Other postprocedural status: left total knee  replacement one   yr.   ago      Current Nutrition Order: DASH/TLC + Cst Cho no snacks        PO Intake: Good (%) [   ]  Fair (50-75%) [   ] Poor (<25%) [   ]- unsure as pt did not answer x2 attempts, RN unavailable x2 attempts     GI Issues: + diarrhea/ distention per flow sheet, no noted v/c/n    Pain: no pain noted per flow sheet     Skin Integrity: deangelo 19     Labs:   04-06    138  |  100  |  22  ----------------------------<  131<H>  3.7   |  28  |  0.91    Ca    9.3      06 Apr 2020 06:57  Phos  3.5     04-06  Mg     2.1     04-06    TPro  5.9<L>  /  Alb  2.9<L>  /  TBili  0.5  /  DBili  x   /  AST  20  /  ALT  25  /  AlkPhos  39<L>  04-06    CAPILLARY BLOOD GLUCOSE      POCT Blood Glucose.: 155 mg/dL (06 Apr 2020 12:20)  POCT Blood Glucose.: 118 mg/dL (06 Apr 2020 08:06)  POCT Blood Glucose.: 195 mg/dL (05 Apr 2020 22:13)  POCT Blood Glucose.: 158 mg/dL (05 Apr 2020 17:40)      Medications:  MEDICATIONS  (STANDING):  acetaminophen   Tablet .. 650 milliGRAM(s) Oral every 6 hours  amLODIPine   Tablet 10 milliGRAM(s) Oral every 24 hours  atorvastatin 20 milliGRAM(s) Oral at bedtime  brimonidine 0.2% Ophthalmic Solution 1 Drop(s) Both EYES two times a day  dextrose 50% Injectable 12.5 Gram(s) IV Push once  dextrose 50% Injectable 25 Gram(s) IV Push once  dextrose 50% Injectable 25 Gram(s) IV Push once  dorzolamide 2%/timolol 0.5% Ophthalmic Solution 1 Drop(s) Both EYES two times a day  enoxaparin Injectable 80 milliGRAM(s) SubCutaneous two times a day  famotidine    Tablet 20 milliGRAM(s) Oral two times a day  fenofibrate Tablet 48 milliGRAM(s) Oral daily  gabapentin 300 milliGRAM(s) Oral two times a day  hydrALAZINE 10 milliGRAM(s) Oral every 8 hours  insulin glargine Injectable (LANTUS) 12 Unit(s) SubCutaneous at bedtime  insulin lispro (HumaLOG) corrective regimen sliding scale   SubCutaneous Before meals and at bedtime  insulin lispro Injectable (HumaLOG) 4 Unit(s) SubCutaneous three times a day before meals  latanoprost 0.005% Ophthalmic Solution 1 Drop(s) Both EYES at bedtime  levothyroxine 75 MICROGram(s) Oral daily  metoprolol succinate  milliGRAM(s) Oral daily  polyethylene glycol 3350 17 Gram(s) Oral daily  potassium chloride    Tablet ER 40 milliEquivalent(s) Oral once  QUEtiapine 800 milliGRAM(s) Oral at bedtime  senna 2 Tablet(s) Oral daily    MEDICATIONS  (PRN):  bisacodyl Suppository 10 milliGRAM(s) Rectal daily PRN Constipation  dextrose 40% Gel 15 Gram(s) Oral once PRN Blood Glucose LESS THAN 70 milliGRAM(s)/deciliter  glucagon  Injectable 1 milliGRAM(s) IntraMuscular once PRN Glucose LESS THAN 70 milligrams/deciliter      Weight: 79.5kg     Weight Change: no new wts     Nutrition Focused Physical Exam: Completed [   ]  Not Pertinent [ x  ]    Estimated energy needs:   ABW used for calculations as pt between % of IBW.   ABW 79.5kg, IBW 72.7kg, 109% IBW, ht 69, BMI 25.9   Adjusted for adult demands and increased needs for fevers/ COVID demands, fluid per team   1982-2379kcal (25-30kcal/kg)   79-95g pro (1-1.2g/kg pro)     Subjective:   79M with history of HTN, Type 2 DM (A1C 10.6), Hypothyroidism, Weakness and decreased po x 1week. Admitted with severe sepsis/hyperglycemia/fevers/weakness 2/2 decreased po and now + COVID. RD spoke to patient over the phone on initial assessment due to isolation needs due to COVID. Writer again attempted to call pt via room phone and cell phone, cell phone turned off and did not  room phone. Course c/b CVA 4/4, confirmed on MRI. Now satting 95% on NRB, notes feeling "ok" per EMR. Continues to be managed at this time. Will follow per protocol.     Previous Nutrition Diagnosis:  Inadequate oral intake r/t decreased appetite 2/2 COVID AEB <25% EER PTA   Active [   ]  Resolved [ x  ]    If resolved, new PES: Increased nutrient needs r/t COVID AEB hypermetabolic state     Goal: meet >75% EER consistently     Recommendations:  1. Honor food preferences within guidelines   2. Manage pain prn   3. Monitor and replete lytes   4. Trend wts   5. Reinforce ed as needed     Education: unable to educate as pt did not answer phone upon multiple attempts. Unable to educate in person 2/2 pt status    Risk Level: High [   ] Moderate [ x  ] Low [   ]

## 2020-04-06 NOTE — OCCUPATIONAL THERAPY INITIAL EVALUATION ADULT - MANUAL MUSCLE TESTING RESULTS, REHAB EVAL
RUE grossly >3+/5 patient w/ decreased command following to complete MMT, LUE 3-/5 throughout. Patient unable to follow commands to complete  CN testing at this time.

## 2020-04-06 NOTE — OCCUPATIONAL THERAPY INITIAL EVALUATION ADULT - VISUAL ASSESSMENT: TRACKING
Patient unable to follow commands to complete full visual assessment, patient able to track to L side based on observation.

## 2020-04-06 NOTE — OCCUPATIONAL THERAPY INITIAL EVALUATION ADULT - GENERAL OBSERVATIONS, REHAB EVAL
R hand dominant, patient cleared for OT by CURT Camara. Patient received semi-supine, NAD, +tele, +heplock, +non-rebreather.

## 2020-04-06 NOTE — PROGRESS NOTE ADULT - SUBJECTIVE AND OBJECTIVE BOX
Neurology Stroke Progress Note  INCOMPLETE ***  INTERVAL HPI/OVERNIGHT EVENTS:  Patient seen and examined.     MEDICATIONS  (STANDING):  acetaminophen   Tablet .. 650 milliGRAM(s) Oral every 6 hours  amLODIPine   Tablet 10 milliGRAM(s) Oral every 24 hours  atorvastatin 20 milliGRAM(s) Oral at bedtime  brimonidine 0.2% Ophthalmic Solution 1 Drop(s) Both EYES two times a day  dextrose 50% Injectable 12.5 Gram(s) IV Push once  dextrose 50% Injectable 25 Gram(s) IV Push once  dextrose 50% Injectable 25 Gram(s) IV Push once  dorzolamide 2%/timolol 0.5% Ophthalmic Solution 1 Drop(s) Both EYES two times a day  enoxaparin Injectable 80 milliGRAM(s) SubCutaneous two times a day  famotidine    Tablet 20 milliGRAM(s) Oral two times a day  fenofibrate Tablet 48 milliGRAM(s) Oral daily  gabapentin 300 milliGRAM(s) Oral two times a day  hydrALAZINE 10 milliGRAM(s) Oral every 8 hours  insulin glargine Injectable (LANTUS) 12 Unit(s) SubCutaneous at bedtime  insulin lispro (HumaLOG) corrective regimen sliding scale   SubCutaneous Before meals and at bedtime  insulin lispro Injectable (HumaLOG) 4 Unit(s) SubCutaneous three times a day before meals  latanoprost 0.005% Ophthalmic Solution 1 Drop(s) Both EYES at bedtime  levothyroxine 75 MICROGram(s) Oral daily  metoprolol succinate  milliGRAM(s) Oral daily  polyethylene glycol 3350 17 Gram(s) Oral daily  potassium chloride    Tablet ER 40 milliEquivalent(s) Oral once  QUEtiapine 800 milliGRAM(s) Oral at bedtime  senna 2 Tablet(s) Oral daily    MEDICATIONS  (PRN):  bisacodyl Suppository 10 milliGRAM(s) Rectal daily PRN Constipation  dextrose 40% Gel 15 Gram(s) Oral once PRN Blood Glucose LESS THAN 70 milliGRAM(s)/deciliter  glucagon  Injectable 1 milliGRAM(s) IntraMuscular once PRN Glucose LESS THAN 70 milligrams/deciliter      Allergies    No Known Allergies    Intolerances        ROS: As per HPI, otherwise negative    Vital Signs Last 24 Hrs  T(C): 36.2 (06 Apr 2020 09:26), Max: 36.3 (05 Apr 2020 22:46)  T(F): 97.2 (06 Apr 2020 09:26), Max: 97.4 (05 Apr 2020 22:46)  HR: 82 (06 Apr 2020 09:30) (78 - 96)  BP: 117/83 (06 Apr 2020 09:30) (117/79 - 147/84)  BP(mean): --  RR: 20 (06 Apr 2020 09:30) (18 - 20)  SpO2: 95% (06 Apr 2020 09:30) (95% - 99%)    Physical exam:  General: awake and alert, sitting comfortably, no acute distress    Neurologic:  Mental status: awake, alert, oriented to person, place, and time. Speech is fluent, able to name objects. Follows commands. Attention/concentration intact. No dysarthria, no aphasia.  Cranial nerves:   II: visual fields are full to confrontation. pupils equally round and reactive to light,   III, IV, VI: EOMI without nystagmus  V:  V1-V3 sensation intact,   VII: no facial droop, facie is symmetric with normal eye closure and smile  VIII: hearing is intact to finger rub  IX, X: Uvula is midline and soft palate rises symmetrically  XI: head turning and shoulder shrug are intact.  XII: tongue midline  Motor: Normal bulk and tone, strength 5/5 in b/l UE and LE,  strength 5/5. No pronator drift  Sensation: intact to light touch. No neglect.  Coordination: No dysmetria on finger-to-nose and heel-to-shin  Reflexes: downgoing toes bilaterally  Gait: narrow and steady, no ataxia. Romberg negative        LABS:                        13.8   5.48  )-----------( 435      ( 06 Apr 2020 06:57 )             43.3     04-06    138  |  100  |  22  ----------------------------<  131<H>  3.7   |  28  |  0.91    Ca    9.3      06 Apr 2020 06:57  Phos  3.5     04-06  Mg     2.1     04-06    TPro  5.9<L>  /  Alb  2.9<L>  /  TBili  0.5  /  DBili  x   /  AST  20  /  ALT  25  /  AlkPhos  39<L>  04-06          RADIOLOGY & ADDITIONAL TESTS:      Assessment and Plan  79y Male    1)Secondary stroke prevention  -ASA 81 and Plavix 75  -Atorvastatin 80    2) Stroke risk factors  -    3) Further workup     DVT prophylaxis   -Heparin SQ TID and SCDs Neurology Stroke Progress Note    INTERVAL HPI/OVERNIGHT EVENTS:  Patient seen and examined.     MEDICATIONS  (STANDING):  acetaminophen   Tablet .. 650 milliGRAM(s) Oral every 6 hours  amLODIPine   Tablet 10 milliGRAM(s) Oral every 24 hours  atorvastatin 20 milliGRAM(s) Oral at bedtime  brimonidine 0.2% Ophthalmic Solution 1 Drop(s) Both EYES two times a day  dextrose 50% Injectable 12.5 Gram(s) IV Push once  dextrose 50% Injectable 25 Gram(s) IV Push once  dextrose 50% Injectable 25 Gram(s) IV Push once  dorzolamide 2%/timolol 0.5% Ophthalmic Solution 1 Drop(s) Both EYES two times a day  enoxaparin Injectable 80 milliGRAM(s) SubCutaneous two times a day  famotidine    Tablet 20 milliGRAM(s) Oral two times a day  fenofibrate Tablet 48 milliGRAM(s) Oral daily  gabapentin 300 milliGRAM(s) Oral two times a day  hydrALAZINE 10 milliGRAM(s) Oral every 8 hours  insulin glargine Injectable (LANTUS) 12 Unit(s) SubCutaneous at bedtime  insulin lispro (HumaLOG) corrective regimen sliding scale   SubCutaneous Before meals and at bedtime  insulin lispro Injectable (HumaLOG) 4 Unit(s) SubCutaneous three times a day before meals  latanoprost 0.005% Ophthalmic Solution 1 Drop(s) Both EYES at bedtime  levothyroxine 75 MICROGram(s) Oral daily  metoprolol succinate  milliGRAM(s) Oral daily  polyethylene glycol 3350 17 Gram(s) Oral daily  potassium chloride    Tablet ER 40 milliEquivalent(s) Oral once  QUEtiapine 800 milliGRAM(s) Oral at bedtime  senna 2 Tablet(s) Oral daily    MEDICATIONS  (PRN):  bisacodyl Suppository 10 milliGRAM(s) Rectal daily PRN Constipation  dextrose 40% Gel 15 Gram(s) Oral once PRN Blood Glucose LESS THAN 70 milliGRAM(s)/deciliter  glucagon  Injectable 1 milliGRAM(s) IntraMuscular once PRN Glucose LESS THAN 70 milligrams/deciliter      Allergies  No Known Allergies      ROS: As per HPI, otherwise negative    Vital Signs Last 24 Hrs  T(C): 36.2 (06 Apr 2020 09:26), Max: 36.3 (05 Apr 2020 22:46)  T(F): 97.2 (06 Apr 2020 09:26), Max: 97.4 (05 Apr 2020 22:46)  HR: 82 (06 Apr 2020 09:30) (78 - 96)  BP: 117/83 (06 Apr 2020 09:30) (117/79 - 147/84)  BP(mean): --  RR: 20 (06 Apr 2020 09:30) (18 - 20)  SpO2: 95% (06 Apr 2020 09:30) (95% - 99%)    Physical exam:  Neurologic:  -Mental status: Awake, alert, oriented to person, place, and year. Speech is fluent with intact naming, repetition, and comprehension, no dysarthria. Recent and remote memory intact. Follows commands.     -Cranial nerves:   II: Visual fields are full to confrontation.  III, IV, VI: Extraocular movements are intact without nystagmus. Pupils equally round and reactive to light b/l.   V:  Facial sensation V1-V3 equal and intact.   VII: No facial droop. Face is symmetric with normal eye closure and smile  Motor: Normal bulk and tone. Right arm 4+/5, right leg 4/5, bilateral handgrip 5/5. Left arm and leg 4+/5 throughout.    Sensation: Intact to light touch bilaterally. No neglect or extinction on double simultaneous testing.  Coordination: No dysmetria on finger-to-nose b/l.   Gait: Deferred.       LABS:                        13.8   5.48  )-----------( 435      ( 06 Apr 2020 06:57 )             43.3     04-06    138  |  100  |  22  ----------------------------<  131<H>  3.7   |  28  |  0.91    Ca    9.3      06 Apr 2020 06:57  Phos  3.5     04-06  Mg     2.1     04-06    TPro  5.9<L>  /  Alb  2.9<L>  /  TBili  0.5  /  DBili  x   /  AST  20  /  ALT  25  /  AlkPhos  39<L>  04-06          RADIOLOGY & ADDITIONAL TESTS:  - MRI brain 4/5/2020  < from: MR Head w/wo IV Cont (04.05.20 @ 15:19) >    Impression: Limited evaluation due to motion artifact. Small acute infarctions in the right posterior frontal and parietal lobe. Moderate microvascular disease. RIGHT external capsule small chronic lacunar infarct. Bilateral thalamic chronic lacunar infarcts. Tiny right parietal chronic infarct.       Assessment and Plan  79M w/ PMHx of HTN, DM2, former smoker, hypothyroidism who presented to ED BIBEMS on 3/23 after being found down for a fall, found to have starvation ketosis now resolved, was desatting with imaging findings on CXR and found to be COVID positive, now on nonrebreather for 02 support. SC 4/4 for new left sided weakness - NIHSS 7.  HCT negative, CTA showed moderate to severe left carotid stenosis. 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 Stroke etiology likely 2/2 to hypercoagulable status in the setting of COVID; however, cannot rule out cardioembolic case of stroke less likely large vessel atherosclerosis 2/2 to left carotid stenosis. Neuro exam 4/6 unchanged. Will continue to follow, recommend TTE w/ bubble.      1)Secondary stroke prevention  - Continue full dose Lovenox 80mg BID for full anticoagulation given hypercoagulable state  - Increase to Atorvastatin 80mg PO daily  - SBP goal <200   - PT/OT reccs     2) Stroke risk factors  - A1C 10.6  -   - HTN  - Former smoker     3) Further workup   - consider outpatient loop   - recommend trending d-dimer  - please obtain cytokine panel in AM (cytokines are diurnal)     4_ DVT prophylaxis   - Lovenox full dose AC as above and SCDs

## 2020-04-06 NOTE — OCCUPATIONAL THERAPY INITIAL EVALUATION ADULT - MD ORDER
Per chart, 79 M, poor historian, with past medical history of HTN, DM2, and hypothyroidism [facility]. Patient states that his bed is tilted and that he slid off his bed and was unable to get up. Patient s/p stroke code 04/04/2020. CTH unremarkable, MRI Head acute infarcts R posterior frontal and parietal lobe, R external capsule chronic lacunar infarct, b/l thalamic chronic lacunar infarcts, R parietal chronic infarct.

## 2020-04-07 LAB
ALBUMIN SERPL ELPH-MCNC: 3.1 G/DL — LOW (ref 3.3–5)
ALP SERPL-CCNC: 39 U/L — LOW (ref 40–120)
ALT FLD-CCNC: 25 U/L — SIGNIFICANT CHANGE UP (ref 10–45)
ANION GAP SERPL CALC-SCNC: 9 MMOL/L — SIGNIFICANT CHANGE UP (ref 5–17)
AST SERPL-CCNC: 21 U/L — SIGNIFICANT CHANGE UP (ref 10–40)
BASOPHILS # BLD AUTO: 0.01 K/UL — SIGNIFICANT CHANGE UP (ref 0–0.2)
BASOPHILS NFR BLD AUTO: 0.2 % — SIGNIFICANT CHANGE UP (ref 0–2)
BILIRUB SERPL-MCNC: 0.5 MG/DL — SIGNIFICANT CHANGE UP (ref 0.2–1.2)
BUN SERPL-MCNC: 15 MG/DL — SIGNIFICANT CHANGE UP (ref 7–23)
CALCIUM SERPL-MCNC: 9.1 MG/DL — SIGNIFICANT CHANGE UP (ref 8.4–10.5)
CHLORIDE SERPL-SCNC: 100 MMOL/L — SIGNIFICANT CHANGE UP (ref 96–108)
CO2 SERPL-SCNC: 29 MMOL/L — SIGNIFICANT CHANGE UP (ref 22–31)
CREAT SERPL-MCNC: 1 MG/DL — SIGNIFICANT CHANGE UP (ref 0.5–1.3)
CRP SERPL-MCNC: 0.06 MG/DL — SIGNIFICANT CHANGE UP (ref 0–0.4)
D DIMER BLD IA.RAPID-MCNC: 495 NG/ML DDU — HIGH
EOSINOPHIL # BLD AUTO: 0.08 K/UL — SIGNIFICANT CHANGE UP (ref 0–0.5)
EOSINOPHIL NFR BLD AUTO: 1.4 % — SIGNIFICANT CHANGE UP (ref 0–6)
FERRITIN SERPL-MCNC: 586 NG/ML — HIGH (ref 30–400)
GLUCOSE SERPL-MCNC: 140 MG/DL — HIGH (ref 70–99)
HCT VFR BLD CALC: 43.4 % — SIGNIFICANT CHANGE UP (ref 39–50)
HGB BLD-MCNC: 13.8 G/DL — SIGNIFICANT CHANGE UP (ref 13–17)
IMM GRANULOCYTES NFR BLD AUTO: 0.3 % — SIGNIFICANT CHANGE UP (ref 0–1.5)
LYMPHOCYTES # BLD AUTO: 1.38 K/UL — SIGNIFICANT CHANGE UP (ref 1–3.3)
LYMPHOCYTES # BLD AUTO: 23.8 % — SIGNIFICANT CHANGE UP (ref 13–44)
MAGNESIUM SERPL-MCNC: 2.1 MG/DL — SIGNIFICANT CHANGE UP (ref 1.6–2.6)
MCHC RBC-ENTMCNC: 26.6 PG — LOW (ref 27–34)
MCHC RBC-ENTMCNC: 31.8 GM/DL — LOW (ref 32–36)
MCV RBC AUTO: 83.6 FL — SIGNIFICANT CHANGE UP (ref 80–100)
MONOCYTES # BLD AUTO: 0.56 K/UL — SIGNIFICANT CHANGE UP (ref 0–0.9)
MONOCYTES NFR BLD AUTO: 9.7 % — SIGNIFICANT CHANGE UP (ref 2–14)
NEUTROPHILS # BLD AUTO: 3.74 K/UL — SIGNIFICANT CHANGE UP (ref 1.8–7.4)
NEUTROPHILS NFR BLD AUTO: 64.6 % — SIGNIFICANT CHANGE UP (ref 43–77)
NRBC # BLD: 0 /100 WBCS — SIGNIFICANT CHANGE UP (ref 0–0)
PHOSPHATE SERPL-MCNC: 3.1 MG/DL — SIGNIFICANT CHANGE UP (ref 2.5–4.5)
PLATELET # BLD AUTO: 384 K/UL — SIGNIFICANT CHANGE UP (ref 150–400)
POTASSIUM SERPL-MCNC: 4.2 MMOL/L — SIGNIFICANT CHANGE UP (ref 3.5–5.3)
POTASSIUM SERPL-SCNC: 4.2 MMOL/L — SIGNIFICANT CHANGE UP (ref 3.5–5.3)
PROT SERPL-MCNC: 5.9 G/DL — LOW (ref 6–8.3)
RBC # BLD: 5.19 M/UL — SIGNIFICANT CHANGE UP (ref 4.2–5.8)
RBC # FLD: 15.4 % — HIGH (ref 10.3–14.5)
SODIUM SERPL-SCNC: 138 MMOL/L — SIGNIFICANT CHANGE UP (ref 135–145)
VANCOMYCIN TROUGH SERPL-MCNC: <4 UG/ML — LOW (ref 10–20)
WBC # BLD: 5.79 K/UL — SIGNIFICANT CHANGE UP (ref 3.8–10.5)
WBC # FLD AUTO: 5.79 K/UL — SIGNIFICANT CHANGE UP (ref 3.8–10.5)

## 2020-04-07 PROCEDURE — 99233 SBSQ HOSP IP/OBS HIGH 50: CPT | Mod: CS

## 2020-04-07 PROCEDURE — 71045 X-RAY EXAM CHEST 1 VIEW: CPT | Mod: 26

## 2020-04-07 PROCEDURE — 93306 TTE W/DOPPLER COMPLETE: CPT | Mod: 26

## 2020-04-07 RX ORDER — ATORVASTATIN CALCIUM 80 MG/1
80 TABLET, FILM COATED ORAL AT BEDTIME
Refills: 0 | Status: DISCONTINUED | OUTPATIENT
Start: 2020-04-07 | End: 2020-04-16

## 2020-04-07 RX ADMIN — Medication 10 MILLIGRAM(S): at 06:04

## 2020-04-07 RX ADMIN — BRIMONIDINE TARTRATE 1 DROP(S): 2 SOLUTION/ DROPS OPHTHALMIC at 22:51

## 2020-04-07 RX ADMIN — ENOXAPARIN SODIUM 80 MILLIGRAM(S): 100 INJECTION SUBCUTANEOUS at 05:25

## 2020-04-07 RX ADMIN — Medication 100 MILLIGRAM(S): at 06:04

## 2020-04-07 RX ADMIN — LATANOPROST 1 DROP(S): 0.05 SOLUTION/ DROPS OPHTHALMIC; TOPICAL at 22:52

## 2020-04-07 RX ADMIN — FAMOTIDINE 20 MILLIGRAM(S): 10 INJECTION INTRAVENOUS at 17:12

## 2020-04-07 RX ADMIN — SENNA PLUS 2 TABLET(S): 8.6 TABLET ORAL at 12:54

## 2020-04-07 RX ADMIN — INSULIN GLARGINE 12 UNIT(S): 100 INJECTION, SOLUTION SUBCUTANEOUS at 22:42

## 2020-04-07 RX ADMIN — AMLODIPINE BESYLATE 10 MILLIGRAM(S): 2.5 TABLET ORAL at 12:54

## 2020-04-07 RX ADMIN — Medication 10 MILLIGRAM(S): at 13:04

## 2020-04-07 RX ADMIN — Medication 75 MICROGRAM(S): at 06:04

## 2020-04-07 RX ADMIN — QUETIAPINE FUMARATE 800 MILLIGRAM(S): 200 TABLET, FILM COATED ORAL at 22:40

## 2020-04-07 RX ADMIN — FAMOTIDINE 20 MILLIGRAM(S): 10 INJECTION INTRAVENOUS at 06:03

## 2020-04-07 RX ADMIN — Medication 650 MILLIGRAM(S): at 21:08

## 2020-04-07 RX ADMIN — Medication 650 MILLIGRAM(S): at 13:04

## 2020-04-07 RX ADMIN — ENOXAPARIN SODIUM 80 MILLIGRAM(S): 100 INJECTION SUBCUTANEOUS at 17:11

## 2020-04-07 RX ADMIN — Medication 650 MILLIGRAM(S): at 04:34

## 2020-04-07 RX ADMIN — BRIMONIDINE TARTRATE 1 DROP(S): 2 SOLUTION/ DROPS OPHTHALMIC at 10:52

## 2020-04-07 RX ADMIN — POLYETHYLENE GLYCOL 3350 17 GRAM(S): 17 POWDER, FOR SOLUTION ORAL at 12:54

## 2020-04-07 RX ADMIN — Medication 2: at 12:36

## 2020-04-07 RX ADMIN — DORZOLAMIDE HYDROCHLORIDE TIMOLOL MALEATE 1 DROP(S): 20; 5 SOLUTION/ DROPS OPHTHALMIC at 22:51

## 2020-04-07 RX ADMIN — Medication 4 UNIT(S): at 08:17

## 2020-04-07 RX ADMIN — Medication 48 MILLIGRAM(S): at 12:55

## 2020-04-07 RX ADMIN — ATORVASTATIN CALCIUM 80 MILLIGRAM(S): 80 TABLET, FILM COATED ORAL at 22:40

## 2020-04-07 RX ADMIN — Medication 4 UNIT(S): at 17:11

## 2020-04-07 RX ADMIN — DORZOLAMIDE HYDROCHLORIDE TIMOLOL MALEATE 1 DROP(S): 20; 5 SOLUTION/ DROPS OPHTHALMIC at 10:52

## 2020-04-07 RX ADMIN — GABAPENTIN 300 MILLIGRAM(S): 400 CAPSULE ORAL at 17:12

## 2020-04-07 RX ADMIN — Medication 4 UNIT(S): at 12:36

## 2020-04-07 RX ADMIN — Medication 10 MILLIGRAM(S): at 22:40

## 2020-04-07 RX ADMIN — GABAPENTIN 300 MILLIGRAM(S): 400 CAPSULE ORAL at 06:04

## 2020-04-07 RX ADMIN — Medication 650 MILLIGRAM(S): at 09:41

## 2020-04-07 NOTE — PROGRESS NOTE ADULT - ASSESSMENT
per Internal Medicine    79 M, poor historian, with past medical history of HTN, DM2, and hypothyroidism who presents to ED [facility] after being found down for a fall, found to have starvation ketosis now resolved, found to be covid +, now continuing on oxygen support on RMF. Found to have right parietal stroke, left arm weakness. Patient requiring additional oxygen requirement, now on NRB.     Problem/Plan - 1:  ·  Problem: Acute respiratory failure with hypoxia.  Plan: 2/2 COVID   - s/p a 5 days course of azithromycin, plaquenil, vitamin C and thiamine, as well as steroids for 7 days   - S/p tociluzumab  - Still requiring 15L on NRB however, now more comfortable and not tachypneic.  - Diurese as needed, 40mg Lasix  - OOBTC.     Problem/Plan - 2:  ·  Problem: Stroke.  Plan: Patient with L sided weakness in Upper and Lower extremity. NIHSS 7. No acute events on CTH.   - Carotid doppler ordered given carotid atherosclerosis seen in CT  - MRI brain w/wo contrast with right acute infarct in parietal lobe and internal capsule. Started lovenox at 80mg BID.   - Atorvastatin 80mg daily  - Echo showed EF of 61%.     Problem/Plan - 3:  ·  Problem: Weakness.  Plan: Neurology following  - PT, OT.     Problem/Plan - 4:  ·  Problem: Diabetes.  Plan: - lantus 12 units qhs  - 4U premeal   - MISS  - consistent carb diet  - out pt Endocrine f/u.     Problem/Plan - 5:  ·  Problem: Essential hypertension.  Plan: Well controlled on current regimen  - c/w hydral 10mg q8  - c/w amlodipine 10mg qd.     Problem/Plan - 6:  Problem: Hypothyroidism. Plan: - c/w home synthroid 75mcg daily.    Problem/Plan - 7:  ·  Problem: Hyperlipidemia.  Plan: home atorvastatin 20mg qhs  - Increased to atovastatin 80mg qhs, given recent stroke.     Problem/Plan - 8:  ·  Problem: Prophylactic measure.  Plan: DVT PPx: Lovenox 80mg BID  GI PPx: Famotidine 20mg  Full code.

## 2020-04-07 NOTE — PROGRESS NOTE ADULT - SUBJECTIVE AND OBJECTIVE BOX
Neurology Stroke Progress Note    INTERVAL HPI/OVERNIGHT EVENTS:  Patient seen and examined by attending, Dr. Cooper.     MEDICATIONS  (STANDING):  acetaminophen   Tablet .. 650 milliGRAM(s) Oral every 6 hours  amLODIPine   Tablet 10 milliGRAM(s) Oral every 24 hours  atorvastatin 80 milliGRAM(s) Oral at bedtime  brimonidine 0.2% Ophthalmic Solution 1 Drop(s) Both EYES two times a day  dextrose 50% Injectable 12.5 Gram(s) IV Push once  dextrose 50% Injectable 25 Gram(s) IV Push once  dextrose 50% Injectable 25 Gram(s) IV Push once  dorzolamide 2%/timolol 0.5% Ophthalmic Solution 1 Drop(s) Both EYES two times a day  enoxaparin Injectable 80 milliGRAM(s) SubCutaneous two times a day  famotidine    Tablet 20 milliGRAM(s) Oral two times a day  fenofibrate Tablet 48 milliGRAM(s) Oral daily  gabapentin 300 milliGRAM(s) Oral two times a day  hydrALAZINE 10 milliGRAM(s) Oral every 8 hours  insulin glargine Injectable (LANTUS) 12 Unit(s) SubCutaneous at bedtime  insulin lispro (HumaLOG) corrective regimen sliding scale   SubCutaneous Before meals and at bedtime  insulin lispro Injectable (HumaLOG) 4 Unit(s) SubCutaneous three times a day before meals  latanoprost 0.005% Ophthalmic Solution 1 Drop(s) Both EYES at bedtime  levothyroxine 75 MICROGram(s) Oral daily  metoprolol succinate  milliGRAM(s) Oral daily  polyethylene glycol 3350 17 Gram(s) Oral daily  QUEtiapine 800 milliGRAM(s) Oral at bedtime  senna 2 Tablet(s) Oral daily    MEDICATIONS  (PRN):  bisacodyl Suppository 10 milliGRAM(s) Rectal daily PRN Constipation  dextrose 40% Gel 15 Gram(s) Oral once PRN Blood Glucose LESS THAN 70 milliGRAM(s)/deciliter  glucagon  Injectable 1 milliGRAM(s) IntraMuscular once PRN Glucose LESS THAN 70 milligrams/deciliter      Allergies    No Known Allergies    Intolerances        ROS: As per HPI, otherwise negative    Vital Signs Last 24 Hrs  T(C): 36.6 (07 Apr 2020 13:30), Max: 36.6 (07 Apr 2020 13:30)  T(F): 97.8 (07 Apr 2020 13:30), Max: 97.8 (07 Apr 2020 13:30)  HR: 74 (07 Apr 2020 17:33) (67 - 78)  BP: 128/85 (07 Apr 2020 17:33) (119/4 - 152/89)  BP(mean): 98 (06 Apr 2020 23:15) (98 - 107)  RR: 18 (07 Apr 2020 17:33) (18 - 20)  SpO2: 95% (07 Apr 2020 17:33) (93% - 97%)    Physical exam as per Dr. Cooper:   General: awake and alert, sitting comfortably, no acute distress    Neurologic:  -Mental status: Awake, alert, oriented to person, place, and year. Speech is fluent with intact naming, repetition, and comprehension, no dysarthria. Recent and remote memory intact. Follows commands.     -Cranial nerves:   II: Visual fields are full to confrontation.  III, IV, VI: Extraocular movements are intact without nystagmus. Pupils equally round and reactive to light b/l.   V:  Facial sensation V1-V3 equal and intact.   VII: No facial droop. Face is symmetric with normal eye closure and smile  Motor: Normal bulk and tone. Improving Strength. Right arm 5-/5, right leg 4+/5, bilateral handgrip 5/5. Left arm and leg 5-/5 throughout.    Sensation: Intact to light touch bilaterally. No neglect or extinction on double simultaneous testing.  Coordination: No dysmetria on finger-to-nose b/l.   Gait: Deferred.       LABS:                        13.8   5.79  )-----------( 384      ( 07 Apr 2020 10:45 )             43.4     04-07    138  |  100  |  15  ----------------------------<  140<H>  4.2   |  29  |  1.00    Ca    9.1      07 Apr 2020 10:45  Phos  3.1     04-07  Mg     2.1     04-07    TPro  5.9<L>  /  Alb  3.1<L>  /  TBili  0.5  /  DBili  x   /  AST  21  /  ALT  25  /  AlkPhos  39<L>  04-07          RADIOLOGY & ADDITIONAL TESTS:    no new cerebral imaging.     < from: MR Head w/wo IV Cont (04.05.20 @ 15:19) >  Impression: Limited evaluation due to motion artifact. Small acute infarctions in the right posterior frontal and parietal lobe. Moderate microvascular disease. RIGHT external capsule small chronic lacunar infarct. Bilateral thalamic chronic lacunar infarcts. Tiny right parietal chronic infarct.      TTE Limited Echo w/o Cont (04.07.20 @ 09:06) >  CONCLUSIONS:     1. Normal left and right ventricular size and systolic function.   2. Grade I left ventricular diastolic dysfunction.   3. Moderate symmetric left ventricular hypertrophy.   4. Aortic sclerosis without significant stenosis.   5. No pericardial effusion.   6. No cardiac source of embolism detected.      Assessment and Plan   Assessment and Plan  79M w/ PMHx of HTN, DM2, former smoker, hypothyroidism who presented to ED BIBEMS on 3/23 after being found down for a fall, found to have starvation ketosis now resolved, was desatting with imaging findings on CXR and found to be COVID positive, now on nonrebreather for 02 support. SC 4/4 for new left sided weakness - NIHSS 7.  HCT negative, CTA showed moderate to severe left carotid stenosis. 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 Stroke etiology likely 2/2 to hypercoagulable status in the setting of COVID; however, cannot rule out cardioembolic case of stroke less likely large vessel atherosclerosis 2/2 to left carotid stenosis. Neuro exam 4/7 unchanged. Will continue to follow. TTE within normal limits, EF 61%.     1)Secondary stroke prevention  - Continue full dose Lovenox 80mg BID for full anticoagulation given hypercoagulable state  - Continue Atorvastatin 80mg PO daily  - SBP goal <200   - OT - rec for acute rehab      2) Stroke risk factors  - A1C 10.6  -   - HTN  - Former smoker     3) Further workup   - consider outpatient loop   - recommend trending d-dimer    4) DVT prophylaxis   - Lovenox full dose AC as above and SCDs Neurology Stroke Progress Note    INTERVAL HPI/OVERNIGHT EVENTS:  Patient seen and examined by attending, Dr. Cooper.     MEDICATIONS  (STANDING):  acetaminophen   Tablet .. 650 milliGRAM(s) Oral every 6 hours  amLODIPine   Tablet 10 milliGRAM(s) Oral every 24 hours  atorvastatin 80 milliGRAM(s) Oral at bedtime  brimonidine 0.2% Ophthalmic Solution 1 Drop(s) Both EYES two times a day  dextrose 50% Injectable 12.5 Gram(s) IV Push once  dextrose 50% Injectable 25 Gram(s) IV Push once  dextrose 50% Injectable 25 Gram(s) IV Push once  dorzolamide 2%/timolol 0.5% Ophthalmic Solution 1 Drop(s) Both EYES two times a day  enoxaparin Injectable 80 milliGRAM(s) SubCutaneous two times a day  famotidine    Tablet 20 milliGRAM(s) Oral two times a day  fenofibrate Tablet 48 milliGRAM(s) Oral daily  gabapentin 300 milliGRAM(s) Oral two times a day  hydrALAZINE 10 milliGRAM(s) Oral every 8 hours  insulin glargine Injectable (LANTUS) 12 Unit(s) SubCutaneous at bedtime  insulin lispro (HumaLOG) corrective regimen sliding scale   SubCutaneous Before meals and at bedtime  insulin lispro Injectable (HumaLOG) 4 Unit(s) SubCutaneous three times a day before meals  latanoprost 0.005% Ophthalmic Solution 1 Drop(s) Both EYES at bedtime  levothyroxine 75 MICROGram(s) Oral daily  metoprolol succinate  milliGRAM(s) Oral daily  polyethylene glycol 3350 17 Gram(s) Oral daily  QUEtiapine 800 milliGRAM(s) Oral at bedtime  senna 2 Tablet(s) Oral daily    MEDICATIONS  (PRN):  bisacodyl Suppository 10 milliGRAM(s) Rectal daily PRN Constipation  dextrose 40% Gel 15 Gram(s) Oral once PRN Blood Glucose LESS THAN 70 milliGRAM(s)/deciliter  glucagon  Injectable 1 milliGRAM(s) IntraMuscular once PRN Glucose LESS THAN 70 milligrams/deciliter      Allergies    No Known Allergies    Intolerances        ROS: As per HPI, otherwise negative    Vital Signs Last 24 Hrs  T(C): 36.6 (07 Apr 2020 13:30), Max: 36.6 (07 Apr 2020 13:30)  T(F): 97.8 (07 Apr 2020 13:30), Max: 97.8 (07 Apr 2020 13:30)  HR: 74 (07 Apr 2020 17:33) (67 - 78)  BP: 128/85 (07 Apr 2020 17:33) (119/4 - 152/89)  BP(mean): 98 (06 Apr 2020 23:15) (98 - 107)  RR: 18 (07 Apr 2020 17:33) (18 - 20)  SpO2: 95% (07 Apr 2020 17:33) (93% - 97%)    Physical exam as per Dr. Cooper:   General: awake and alert, sitting comfortably, no acute distress    Neurologic:  -Mental status: Awake, alert, oriented to person, place, and year. Speech is fluent with intact naming, repetition, and comprehension, no dysarthria. Recent and remote memory intact. Follows commands.     -Cranial nerves:   II: Visual fields are full to confrontation.  III, IV, VI: Extraocular movements are intact without nystagmus. Pupils equally round and reactive to light b/l.   V:  Facial sensation V1-V3 equal and intact.   VII: No facial droop. Face is symmetric with normal eye closure and smile  Motor: Normal bulk and tone. Improving Strength. Right arm 5-/5, right leg 4+/5, bilateral handgrip 5/5. Left arm and leg 5-/5 throughout.    Sensation: Intact to light touch bilaterally. No neglect or extinction on double simultaneous testing.  Coordination: No dysmetria on finger-to-nose b/l.   Gait: Deferred.       LABS:                        13.8   5.79  )-----------( 384      ( 07 Apr 2020 10:45 )             43.4     04-07    138  |  100  |  15  ----------------------------<  140<H>  4.2   |  29  |  1.00    Ca    9.1      07 Apr 2020 10:45  Phos  3.1     04-07  Mg     2.1     04-07    TPro  5.9<L>  /  Alb  3.1<L>  /  TBili  0.5  /  DBili  x   /  AST  21  /  ALT  25  /  AlkPhos  39<L>  04-07          RADIOLOGY & ADDITIONAL TESTS:    no new cerebral imaging.     < from: MR Head w/wo IV Cont (04.05.20 @ 15:19) >  Impression: Limited evaluation due to motion artifact. Small acute infarctions in the right posterior frontal and parietal lobe. Moderate microvascular disease. RIGHT external capsule small chronic lacunar infarct. Bilateral thalamic chronic lacunar infarcts. Tiny right parietal chronic infarct.      TTE Limited Echo w/o Cont (04.07.20 @ 09:06) >  CONCLUSIONS:     1. Normal left and right ventricular size and systolic function.   2. Grade I left ventricular diastolic dysfunction.   3. Moderate symmetric left ventricular hypertrophy.   4. Aortic sclerosis without significant stenosis.   5. No pericardial effusion.   6. No cardiac source of embolism detected.      Assessment and Plan   Assessment and Plan  79M w/ PMHx of HTN, DM2, former smoker, hypothyroidism who presented to ED BIBEMS on 3/23 after being found down for a fall, found to have starvation ketosis now resolved, was desatting with imaging findings on CXR and found to be COVID positive, now on nonrebreather for 02 support. SC 4/4 for new left sided weakness - NIHSS 7.  HCT negative, CTA showed moderate to severe left carotid stenosis. 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 Stroke etiology likely 2/2 to hypercoagulable status in the setting of COVID; however, cannot rule out cardioembolic case of stroke less likely large vessel atherosclerosis 2/2 to left carotid stenosis. Neuro exam 4/7 unchanged. Will continue to follow. TTE within normal limits, EF 61%.     1)Secondary stroke prevention  - Continue full dose Lovenox 80mg BID for full anticoagulation given hypercoagulable state, can transition to Eliquis 5mg PO BID when discharged   - Continue Atorvastatin 80mg PO daily  - SBP goal <200   - OT - rec for acute rehab      2) Stroke risk factors  - A1C 10.6  -   - HTN  - Former smoker     3) Further workup   - consider outpatient loop   - recommend trending d-dimer    4) DVT prophylaxis   - Lovenox full dose AC as above and SCDs

## 2020-04-07 NOTE — PROGRESS NOTE ADULT - SUBJECTIVE AND OBJECTIVE BOX
Physical Medicine and Rehabilitation Progress Note:    Patient is a 79y old  Male who presents with a chief complaint of covid, right parietal stroke (07 Apr 2020 11:36)      HPI:  79 M, poor historian, with past medical history of HTN, DM2, and hypothyroidism who presents to ED [facility] after being found down for a fall. Patient states that his bed is tilted and that he slid off his bed and was unable to get up. Patient states that it happened in the last few days. Per ED provider, the patient was potentially down from yesterday evening to this morning and was found by workers at his assisted living home. It is unclear what kind of home, but he says single occupancy group home of some type. He does not have a rollator, walker, or cane at home. Patient denies hitting his head, loss of consciousness, or previous falls. Patient states that in past few weeks he has not been eating as much as he does not like the food around his home. He has mainly been drinking juices at home. Patient currently denies fever, headache, nausea, vomiting, chest pain, shortness of breath, abdominal pain. Patient does not feel as strong as usual. He states that he has bowel movements every few days and has had no changes in urination. Denies sick contacts or travel history.    ED Vitals: T 98.3, P100, /98, RR 17, O2 95% on RA ->> T 101.1 P 91 /89 RR 20 O2 89% on RA  ED Course: Glucose 366-> 240s. Patient received 3 L NS With improvement in glucose. Received Tylenol for fever. Swabbed for RVP, COVID, blood cultures. CXR, prelim wet read, abnormal with left lower lobe possible consolidation and right reticulonodular pattern consistent with infectious vs inflammatory process, new since 2018 (old CT). [facility] in setting of unknown fever. (23 Mar 2020 18:15)                            13.8   5.79  )-----------( 384      ( 07 Apr 2020 10:45 )             43.4       04-07    138  |  100  |  15  ----------------------------<  140<H>  4.2   |  29  |  1.00    Ca    9.1      07 Apr 2020 10:45  Phos  3.1     04-07  Mg     2.1     04-07    TPro  5.9<L>  /  Alb  3.1<L>  /  TBili  0.5  /  DBili  x   /  AST  21  /  ALT  25  /  AlkPhos  39<L>  04-07    Vital Signs Last 24 Hrs  T(C): 36 (07 Apr 2020 06:19), Max: 36.3 (06 Apr 2020 21:17)  T(F): 96.8 (07 Apr 2020 06:19), Max: 97.4 (06 Apr 2020 21:17)  HR: 70 (07 Apr 2020 09:56) (67 - 77)  BP: 129/82 (07 Apr 2020 09:56) (119/4 - 152/89)  BP(mean): 98 (06 Apr 2020 23:15) (98 - 107)  RR: 20 (07 Apr 2020 09:56) (20 - 93)  SpO2: 97% (07 Apr 2020 09:56) (93% - 97%)    MEDICATIONS  (STANDING):  acetaminophen   Tablet .. 650 milliGRAM(s) Oral every 6 hours  amLODIPine   Tablet 10 milliGRAM(s) Oral every 24 hours  atorvastatin 80 milliGRAM(s) Oral at bedtime  brimonidine 0.2% Ophthalmic Solution 1 Drop(s) Both EYES two times a day  dextrose 50% Injectable 12.5 Gram(s) IV Push once  dextrose 50% Injectable 25 Gram(s) IV Push once  dextrose 50% Injectable 25 Gram(s) IV Push once  dorzolamide 2%/timolol 0.5% Ophthalmic Solution 1 Drop(s) Both EYES two times a day  enoxaparin Injectable 80 milliGRAM(s) SubCutaneous two times a day  famotidine    Tablet 20 milliGRAM(s) Oral two times a day  fenofibrate Tablet 48 milliGRAM(s) Oral daily  gabapentin 300 milliGRAM(s) Oral two times a day  hydrALAZINE 10 milliGRAM(s) Oral every 8 hours  insulin glargine Injectable (LANTUS) 12 Unit(s) SubCutaneous at bedtime  insulin lispro (HumaLOG) corrective regimen sliding scale   SubCutaneous Before meals and at bedtime  insulin lispro Injectable (HumaLOG) 4 Unit(s) SubCutaneous three times a day before meals  latanoprost 0.005% Ophthalmic Solution 1 Drop(s) Both EYES at bedtime  levothyroxine 75 MICROGram(s) Oral daily  metoprolol succinate  milliGRAM(s) Oral daily  polyethylene glycol 3350 17 Gram(s) Oral daily  QUEtiapine 800 milliGRAM(s) Oral at bedtime  senna 2 Tablet(s) Oral daily    MEDICATIONS  (PRN):  bisacodyl Suppository 10 milliGRAM(s) Rectal daily PRN Constipation  dextrose 40% Gel 15 Gram(s) Oral once PRN Blood Glucose LESS THAN 70 milliGRAM(s)/deciliter  glucagon  Injectable 1 milliGRAM(s) IntraMuscular once PRN Glucose LESS THAN 70 milligrams/deciliter    Currently Undergoing Physical Therapy at bedside.    Functional Status Assessment:  4/6/2020      Cognitive/Perceptual/Neuro  Cognitive/Neuro/Behavioral [WDL Definition: Alert; opens eyes spontaneously; arouses to voice or touch; oriented x 4; follows commands; speech spontaneous, logical; purposeful motor response; behavior appropriate to situation]: WDL except  Orientation: disoriented to;  time  Speech: clear  Mood/Behavior: calm;  cooperative    Musculoskeletal      Muscle Strength Grading  Muscle Strength Grading LLE: 3 = active movement against gravity  Muscle Strength Grading RLE: 4 = active movement against gravity and resistance    Therapeutic Interventions      Bed Mobility  Bed Mobility Training Rolling/Turning: moderate assist (50% patient effort);  set-up required;  supervision;  verbal cues;  1 person assist;  hand over hand;  bed rails  Bed Mobility Training Scooting: moderate assist (50% patient effort);  1 person assist;  2 person assist  Bed Mobility Training Sit-to-Supine: moderate assist (50% patient effort);  2 person assist  Bed Mobility Training Supine-to-Sit: moderate assist (50% patient effort);  minimum assist (75% patient effort);  2 person assist;  hand over hand;  set-up required;  supervision;  verbal cues;  bed rails  Bed Mobility Training Limitations: decreased ability to use arms for pushing/pulling;  decreased ability to use legs for bridging/pushing;  impaired ability to control trunk for mobility;  decreased ROM;  decreased strength;  impaired balance;  impaired motor control;  impaired postural control    Sit-Stand Transfer Training  Transfer Training Sit-to-Stand Transfer: moderate assist (50% patient effort);  minimum assist (75% patient effort);  set-up required;  supervision;  verbal cues;  2 person assist;  weight-bearing as tolerated   B Hand Held Assist ;  supervision  Transfer Training Stand-to-Sit Transfer: minimum assist (75% patient effort);  moderate assist (50% patient effort);  supervision;  set-up required;  verbal cues;  2 person assist;  weight-bearing as tolerated   B Hand Held Assist   Sit-to-Stand Transfer Training Transfer Safety Analysis: decreased balance;  decreased weight-shifting ability;  performed x 3 trials;  decreased strength;  impaired balance;  impaired motor control    Gait Training  Gait Training: moderate assist (50% patient effort);  minimum assist (75% patient effort);  set-up required;  supervision;  verbal cues;  2 person assist;  weight-bearing as tolerated   B Hand Held Assist ;  5 sidesteps  Gait Analysis: 2-point gait   decreased juan francisco;  decreased hip/knee flexion;  increased time in double stance;  decreased step length;  decreased stride length;  decreased strength;  impaired balance    Therapeutic Exercise  Therapeutic Exercise Detail: seated - ankle pumps, long arc quad x 8           PM&R Impression: as above    Current Disposition Plan Recommendations:     acute rehab placement

## 2020-04-07 NOTE — PROGRESS NOTE ADULT - SUBJECTIVE AND OBJECTIVE BOX
*** NOTE IN PROGRESS ***    INTERVAL HPI/OVERNIGHT EVENTS: There is moderate concentric left ventricular hypertrophy. The left ventricle is normal in size and systolic function with a calculated ejection fraction of 61%. D-dimer is 495. Started on atorvastatin 80mg.     SUBJECTIVE: Patient seen and examined at bedside. Sitting comfortably in bed.    OBJECTIVE:    VITAL SIGNS:  ICU Vital Signs Last 24 Hrs  T(C): 36 (07 Apr 2020 06:19), Max: 36.3 (06 Apr 2020 21:17)  T(F): 96.8 (07 Apr 2020 06:19), Max: 97.4 (06 Apr 2020 21:17)  HR: 70 (07 Apr 2020 09:56) (67 - 78)  BP: 129/82 (07 Apr 2020 09:56) (115/86 - 152/89)  BP(mean): 98 (06 Apr 2020 23:15) (98 - 107)  ABP: --  ABP(mean): --  RR: 20 (07 Apr 2020 09:56) (20 - 93)  SpO2: 97% (07 Apr 2020 09:56) (92% - 97%)        04-06 @ 07:01  -  04-07 @ 07:00  --------------------------------------------------------  IN: 180 mL / OUT: 500 mL / NET: -320 mL      CAPILLARY BLOOD GLUCOSE      POCT Blood Glucose.: 104 mg/dL (07 Apr 2020 08:15)      PHYSICAL EXAM:    General: NAD  HEENT: NC/AT; PERRL, clear conjunctiva  Neck: supple  Respiratory: non-labored  Cardiovascular: +S1/S2; RRR  Abdomen: soft, NT/ND; +BS x4  Extremities: WWP, 2+ peripheral pulses b/l; no LE edema  Skin: normal color and turgor; no rash  Neurological:  L sided 3/5 UE weakness, 4/5 b/l LE weakness.    MEDICATIONS:  MEDICATIONS  (STANDING):  acetaminophen   Tablet .. 650 milliGRAM(s) Oral every 6 hours  amLODIPine   Tablet 10 milliGRAM(s) Oral every 24 hours  atorvastatin 80 milliGRAM(s) Oral at bedtime  brimonidine 0.2% Ophthalmic Solution 1 Drop(s) Both EYES two times a day  dextrose 50% Injectable 12.5 Gram(s) IV Push once  dextrose 50% Injectable 25 Gram(s) IV Push once  dextrose 50% Injectable 25 Gram(s) IV Push once  dorzolamide 2%/timolol 0.5% Ophthalmic Solution 1 Drop(s) Both EYES two times a day  enoxaparin Injectable 80 milliGRAM(s) SubCutaneous two times a day  famotidine    Tablet 20 milliGRAM(s) Oral two times a day  fenofibrate Tablet 48 milliGRAM(s) Oral daily  gabapentin 300 milliGRAM(s) Oral two times a day  hydrALAZINE 10 milliGRAM(s) Oral every 8 hours  insulin glargine Injectable (LANTUS) 12 Unit(s) SubCutaneous at bedtime  insulin lispro (HumaLOG) corrective regimen sliding scale   SubCutaneous Before meals and at bedtime  insulin lispro Injectable (HumaLOG) 4 Unit(s) SubCutaneous three times a day before meals  latanoprost 0.005% Ophthalmic Solution 1 Drop(s) Both EYES at bedtime  levothyroxine 75 MICROGram(s) Oral daily  metoprolol succinate  milliGRAM(s) Oral daily  polyethylene glycol 3350 17 Gram(s) Oral daily  QUEtiapine 800 milliGRAM(s) Oral at bedtime  senna 2 Tablet(s) Oral daily    MEDICATIONS  (PRN):  bisacodyl Suppository 10 milliGRAM(s) Rectal daily PRN Constipation  dextrose 40% Gel 15 Gram(s) Oral once PRN Blood Glucose LESS THAN 70 milliGRAM(s)/deciliter  glucagon  Injectable 1 milliGRAM(s) IntraMuscular once PRN Glucose LESS THAN 70 milligrams/deciliter      ALLERGIES:  Allergies    No Known Allergies    Intolerances        LABS:                        13.8   5.79  )-----------( 384      ( 07 Apr 2020 10:45 )             43.4     04-06    138  |  100  |  22  ----------------------------<  131<H>  3.7   |  28  |  0.91    Ca    9.3      06 Apr 2020 06:57  Phos  3.5     04-06  Mg     2.1     04-06    TPro  5.9<L>  /  Alb  2.9<L>  /  TBili  0.5  /  DBili  x   /  AST  20  /  ALT  25  /  AlkPhos  39<L>  04-06          Daily COVID labs  Procalcitonin: Procalcitonin, Serum: 0.06 ng/mL (04-06-20 @ 06:57)    D-dimer: D-Dimer Assay, Quantitative: 495 ng/mL DDU (04-07-20 @ 10:45)  D-Dimer Assay, Quantitative: 318 ng/mL DDU (04-06-20 @ 06:57)  D-Dimer Assay, Quantitative: 480 ng/mL DDU (04-05-20 @ 06:25)    ESR:   CRP: C-Reactive Protein, Serum: 0.08 mg/dL (04-06-20 @ 06:57)  C-Reactive Protein, Serum: 0.12 mg/dL (04-05-20 @ 06:25)    LDH:   Ferritin: Ferritin, Serum: 638 ng/mL (04-06-20 @ 06:57)  Ferritin, Serum: 670 ng/mL (04-05-20 @ 06:25)    Lactate: lc  Trop I:   Ck:     Admission COVID Labs  Full T cell subset: ABS CD3: 621 /uL (03-24-20 @ 11:57)  ABS CD4: 474 /uL (03-24-20 @ 11:57)  ABS CD8: 126 /uL (03-24-20 @ 11:57)    G6PD: Glucose-6-Phosphate Dehydrogenase: 14.7 U/g Hgb (03-24-20 @ 11:16)    Immunoglobulins panel:   Quantiferon Gold Tb:   Triglyceride level:           RADIOLOGY & ADDITIONAL TESTS: Reviewed.

## 2020-04-07 NOTE — PROGRESS NOTE ADULT - PROBLEM SELECTOR PLAN 2
Patient with L sided weakness in Upper and Lower extremity. NIHSS 7. No acute events on CTH.   - Carotid doppler ordered given carotid atherosclerosis seen in CT  - MRI brain w/wo contrast with right acute infarct in parietal lobe and internal capsule. Started lovenox at 80mg BID.   - Atorvastatin 80mg daily  - Echo showed EF of 61%

## 2020-04-07 NOTE — PROGRESS NOTE ADULT - ASSESSMENT
79 M, poor historian, with past medical history of HTN, DM2, and [facility]othyroidism [facility], found to have starvation ketosis now resolved, found to be covid +, now continuing on oxygen support on RMF. Found to have right parietal stroke, left arm weakness. Patient requiring additional oxygen requirement, now on NRB.

## 2020-04-07 NOTE — PROGRESS NOTE ADULT - PROBLEM SELECTOR PLAN 1
2/2 COVID   - s/p a 5 days course of azithromycin, plaquenil, vitamin C and thiamine, as well as steroids for 7 days   - S/p tociluzumab  - Still requiring 15L on NRB however, now more comfortable and not tachypneic.  - Diurese as needed, 40mg Lasix  - OOBTC

## 2020-04-08 LAB
ALBUMIN SERPL ELPH-MCNC: 3.4 G/DL — SIGNIFICANT CHANGE UP (ref 3.3–5)
ALP SERPL-CCNC: 41 U/L — SIGNIFICANT CHANGE UP (ref 40–120)
ALT FLD-CCNC: 24 U/L — SIGNIFICANT CHANGE UP (ref 10–45)
ANION GAP SERPL CALC-SCNC: 12 MMOL/L — SIGNIFICANT CHANGE UP (ref 5–17)
AST SERPL-CCNC: 20 U/L — SIGNIFICANT CHANGE UP (ref 10–40)
BASOPHILS # BLD AUTO: 0.02 K/UL — SIGNIFICANT CHANGE UP (ref 0–0.2)
BASOPHILS NFR BLD AUTO: 0.3 % — SIGNIFICANT CHANGE UP (ref 0–2)
BILIRUB SERPL-MCNC: 0.4 MG/DL — SIGNIFICANT CHANGE UP (ref 0.2–1.2)
BUN SERPL-MCNC: 16 MG/DL — SIGNIFICANT CHANGE UP (ref 7–23)
CALCIUM SERPL-MCNC: 9.4 MG/DL — SIGNIFICANT CHANGE UP (ref 8.4–10.5)
CHLORIDE SERPL-SCNC: 104 MMOL/L — SIGNIFICANT CHANGE UP (ref 96–108)
CO2 SERPL-SCNC: 27 MMOL/L — SIGNIFICANT CHANGE UP (ref 22–31)
CREAT SERPL-MCNC: 0.89 MG/DL — SIGNIFICANT CHANGE UP (ref 0.5–1.3)
CRP SERPL-MCNC: 0.06 MG/DL — SIGNIFICANT CHANGE UP (ref 0–0.4)
D DIMER BLD IA.RAPID-MCNC: 251 NG/ML DDU — HIGH
EOSINOPHIL # BLD AUTO: 0.09 K/UL — SIGNIFICANT CHANGE UP (ref 0–0.5)
EOSINOPHIL NFR BLD AUTO: 1.5 % — SIGNIFICANT CHANGE UP (ref 0–6)
FERRITIN SERPL-MCNC: 547 NG/ML — HIGH (ref 30–400)
GLUCOSE SERPL-MCNC: 118 MG/DL — HIGH (ref 70–99)
HCT VFR BLD CALC: 41.8 % — SIGNIFICANT CHANGE UP (ref 39–50)
HGB BLD-MCNC: 13.5 G/DL — SIGNIFICANT CHANGE UP (ref 13–17)
IMM GRANULOCYTES NFR BLD AUTO: 0.5 % — SIGNIFICANT CHANGE UP (ref 0–1.5)
LYMPHOCYTES # BLD AUTO: 1.65 K/UL — SIGNIFICANT CHANGE UP (ref 1–3.3)
LYMPHOCYTES # BLD AUTO: 28.4 % — SIGNIFICANT CHANGE UP (ref 13–44)
MAGNESIUM SERPL-MCNC: 1.9 MG/DL — SIGNIFICANT CHANGE UP (ref 1.6–2.6)
MCHC RBC-ENTMCNC: 26.7 PG — LOW (ref 27–34)
MCHC RBC-ENTMCNC: 32.3 GM/DL — SIGNIFICANT CHANGE UP (ref 32–36)
MCV RBC AUTO: 82.6 FL — SIGNIFICANT CHANGE UP (ref 80–100)
MONOCYTES # BLD AUTO: 0.5 K/UL — SIGNIFICANT CHANGE UP (ref 0–0.9)
MONOCYTES NFR BLD AUTO: 8.6 % — SIGNIFICANT CHANGE UP (ref 2–14)
NEUTROPHILS # BLD AUTO: 3.52 K/UL — SIGNIFICANT CHANGE UP (ref 1.8–7.4)
NEUTROPHILS NFR BLD AUTO: 60.7 % — SIGNIFICANT CHANGE UP (ref 43–77)
NRBC # BLD: 0 /100 WBCS — SIGNIFICANT CHANGE UP (ref 0–0)
PHOSPHATE SERPL-MCNC: 2.9 MG/DL — SIGNIFICANT CHANGE UP (ref 2.5–4.5)
PLATELET # BLD AUTO: 360 K/UL — SIGNIFICANT CHANGE UP (ref 150–400)
POTASSIUM SERPL-MCNC: 4.1 MMOL/L — SIGNIFICANT CHANGE UP (ref 3.5–5.3)
POTASSIUM SERPL-SCNC: 4.1 MMOL/L — SIGNIFICANT CHANGE UP (ref 3.5–5.3)
PROT SERPL-MCNC: 5.9 G/DL — LOW (ref 6–8.3)
RBC # BLD: 5.06 M/UL — SIGNIFICANT CHANGE UP (ref 4.2–5.8)
RBC # FLD: 15.7 % — HIGH (ref 10.3–14.5)
SODIUM SERPL-SCNC: 143 MMOL/L — SIGNIFICANT CHANGE UP (ref 135–145)
WBC # BLD: 5.81 K/UL — SIGNIFICANT CHANGE UP (ref 3.8–10.5)
WBC # FLD AUTO: 5.81 K/UL — SIGNIFICANT CHANGE UP (ref 3.8–10.5)

## 2020-04-08 PROCEDURE — 99233 SBSQ HOSP IP/OBS HIGH 50: CPT | Mod: CS

## 2020-04-08 RX ADMIN — ENOXAPARIN SODIUM 80 MILLIGRAM(S): 100 INJECTION SUBCUTANEOUS at 16:54

## 2020-04-08 RX ADMIN — SENNA PLUS 2 TABLET(S): 8.6 TABLET ORAL at 12:22

## 2020-04-08 RX ADMIN — Medication 75 MICROGRAM(S): at 06:19

## 2020-04-08 RX ADMIN — Medication 48 MILLIGRAM(S): at 15:06

## 2020-04-08 RX ADMIN — AMLODIPINE BESYLATE 10 MILLIGRAM(S): 2.5 TABLET ORAL at 12:22

## 2020-04-08 RX ADMIN — BRIMONIDINE TARTRATE 1 DROP(S): 2 SOLUTION/ DROPS OPHTHALMIC at 23:14

## 2020-04-08 RX ADMIN — Medication 10 MILLIGRAM(S): at 23:13

## 2020-04-08 RX ADMIN — ENOXAPARIN SODIUM 80 MILLIGRAM(S): 100 INJECTION SUBCUTANEOUS at 04:09

## 2020-04-08 RX ADMIN — GABAPENTIN 300 MILLIGRAM(S): 400 CAPSULE ORAL at 16:57

## 2020-04-08 RX ADMIN — Medication 4 UNIT(S): at 09:08

## 2020-04-08 RX ADMIN — DORZOLAMIDE HYDROCHLORIDE TIMOLOL MALEATE 1 DROP(S): 20; 5 SOLUTION/ DROPS OPHTHALMIC at 23:14

## 2020-04-08 RX ADMIN — Medication 650 MILLIGRAM(S): at 12:22

## 2020-04-08 RX ADMIN — Medication 650 MILLIGRAM(S): at 04:07

## 2020-04-08 RX ADMIN — Medication 10 MILLIGRAM(S): at 06:19

## 2020-04-08 RX ADMIN — BRIMONIDINE TARTRATE 1 DROP(S): 2 SOLUTION/ DROPS OPHTHALMIC at 13:14

## 2020-04-08 RX ADMIN — Medication 650 MILLIGRAM(S): at 16:57

## 2020-04-08 RX ADMIN — Medication 10 MILLIGRAM(S): at 15:06

## 2020-04-08 RX ADMIN — FAMOTIDINE 20 MILLIGRAM(S): 10 INJECTION INTRAVENOUS at 16:57

## 2020-04-08 RX ADMIN — QUETIAPINE FUMARATE 800 MILLIGRAM(S): 200 TABLET, FILM COATED ORAL at 23:12

## 2020-04-08 RX ADMIN — Medication 4 UNIT(S): at 13:49

## 2020-04-08 RX ADMIN — Medication 100 MILLIGRAM(S): at 06:19

## 2020-04-08 RX ADMIN — ATORVASTATIN CALCIUM 80 MILLIGRAM(S): 80 TABLET, FILM COATED ORAL at 23:13

## 2020-04-08 RX ADMIN — Medication 4 UNIT(S): at 16:55

## 2020-04-08 RX ADMIN — Medication 650 MILLIGRAM(S): at 23:10

## 2020-04-08 RX ADMIN — GABAPENTIN 300 MILLIGRAM(S): 400 CAPSULE ORAL at 06:19

## 2020-04-08 RX ADMIN — FAMOTIDINE 20 MILLIGRAM(S): 10 INJECTION INTRAVENOUS at 06:19

## 2020-04-08 RX ADMIN — LATANOPROST 1 DROP(S): 0.05 SOLUTION/ DROPS OPHTHALMIC; TOPICAL at 23:14

## 2020-04-08 RX ADMIN — DORZOLAMIDE HYDROCHLORIDE TIMOLOL MALEATE 1 DROP(S): 20; 5 SOLUTION/ DROPS OPHTHALMIC at 14:34

## 2020-04-08 RX ADMIN — INSULIN GLARGINE 12 UNIT(S): 100 INJECTION, SOLUTION SUBCUTANEOUS at 23:13

## 2020-04-08 NOTE — PROGRESS NOTE ADULT - ASSESSMENT
79M w/ PMHx of HTN, DM2, former smoker, hypothyroidism who presented to ED [facility] on 3/23 after being found down for a fall, found to have starvation ketosis now resolved, was desatting with imaging findings on CXR and found to be COVID positive, now on nonrebreather for 02 support. SC 4/4 for new left sided weakness - NIHSS 7.  HCT negative, CTA showed moderate to severe left carotid stenosis. 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 Stroke etiology likely 2/2 to hypercoagulable status in the setting of COVID; however, cannot rule out cardioembolic case of stroke less likely large vessel atherosclerosis 2/2 to left carotid stenosis. Neuro exam 4/8 unchanged. Will continue to follow. TTE within normal limits, EF 61%.

## 2020-04-08 NOTE — PROGRESS NOTE ADULT - SUBJECTIVE AND OBJECTIVE BOX
INTERVAL HPI/OVERNIGHT EVENTS: ARTURO, Tolerating NRB    SUBJECTIVE: Patient seen and examined at bedside. Patient comfortable no acute complaints at this time    OBJECTIVE:    VITAL SIGNS:  ICU Vital Signs Last 24 Hrs  T(C): 36.1 (08 Apr 2020 06:01), Max: 36.6 (07 Apr 2020 13:30)  T(F): 96.9 (08 Apr 2020 06:01), Max: 97.8 (07 Apr 2020 13:30)  HR: 78 (08 Apr 2020 06:55) (70 - 78)  BP: 120/77 (08 Apr 2020 06:55) (110/66 - 131/77)  BP(mean): --  ABP: --  ABP(mean): --  RR: 20 (08 Apr 2020 06:55) (18 - 20)  SpO2: 93% (08 Apr 2020 06:55) (93% - 97%)        04-07 @ 07:01  -  04-08 @ 07:00  --------------------------------------------------------  IN: 0 mL / OUT: 750 mL / NET: -750 mL      CAPILLARY BLOOD GLUCOSE      POCT Blood Glucose.: 103 mg/dL (08 Apr 2020 08:59)      PHYSICAL EXAM:    General: NAD  HEENT: NC/AT; PERRL, clear conjunctiva  Neck: supple  Respiratory: bibasilar crackles, non-labored breathing   Abdomen: soft, NT/ND; +BS x4  Extremities: WWP, 2+ peripheral pulses b/l; no LE edema  Skin: normal color and turgor; no rash  Neurological:     MEDICATIONS:  MEDICATIONS  (STANDING):  acetaminophen   Tablet .. 650 milliGRAM(s) Oral every 6 hours  amLODIPine   Tablet 10 milliGRAM(s) Oral every 24 hours  atorvastatin 80 milliGRAM(s) Oral at bedtime  brimonidine 0.2% Ophthalmic Solution 1 Drop(s) Both EYES two times a day  dextrose 50% Injectable 12.5 Gram(s) IV Push once  dextrose 50% Injectable 25 Gram(s) IV Push once  dextrose 50% Injectable 25 Gram(s) IV Push once  dorzolamide 2%/timolol 0.5% Ophthalmic Solution 1 Drop(s) Both EYES two times a day  enoxaparin Injectable 80 milliGRAM(s) SubCutaneous two times a day  famotidine    Tablet 20 milliGRAM(s) Oral two times a day  fenofibrate Tablet 48 milliGRAM(s) Oral daily  gabapentin 300 milliGRAM(s) Oral two times a day  hydrALAZINE 10 milliGRAM(s) Oral every 8 hours  insulin glargine Injectable (LANTUS) 12 Unit(s) SubCutaneous at bedtime  insulin lispro (HumaLOG) corrective regimen sliding scale   SubCutaneous Before meals and at bedtime  insulin lispro Injectable (HumaLOG) 4 Unit(s) SubCutaneous three times a day before meals  latanoprost 0.005% Ophthalmic Solution 1 Drop(s) Both EYES at bedtime  levothyroxine 75 MICROGram(s) Oral daily  metoprolol succinate  milliGRAM(s) Oral daily  polyethylene glycol 3350 17 Gram(s) Oral daily  QUEtiapine 800 milliGRAM(s) Oral at bedtime  senna 2 Tablet(s) Oral daily    MEDICATIONS  (PRN):  bisacodyl Suppository 10 milliGRAM(s) Rectal daily PRN Constipation  dextrose 40% Gel 15 Gram(s) Oral once PRN Blood Glucose LESS THAN 70 milliGRAM(s)/deciliter  glucagon  Injectable 1 milliGRAM(s) IntraMuscular once PRN Glucose LESS THAN 70 milligrams/deciliter      ALLERGIES:  Allergies    No Known Allergies    Intolerances        LABS:                        13.5   5.81  )-----------( 360      ( 08 Apr 2020 07:13 )             41.8     04-08    143  |  104  |  16  ----------------------------<  118<H>  4.1   |  27  |  0.89    Ca    9.4      08 Apr 2020 07:13  Phos  2.9     04-08  Mg     1.9     04-08    TPro  5.9<L>  /  Alb  3.4  /  TBili  0.4  /  DBili  x   /  AST  20  /  ALT  24  /  AlkPhos  41  04-08          Daily COVID labs  Procalcitonin: Procalcitonin, Serum: 0.06 ng/mL (04-06-20 @ 06:57)    D-dimer: D-Dimer Assay, Quantitative: 251 ng/mL DDU (04-08-20 @ 07:13)  D-Dimer Assay, Quantitative: 495 ng/mL DDU (04-07-20 @ 10:45)  D-Dimer Assay, Quantitative: 318 ng/mL DDU (04-06-20 @ 06:57)    ESR:   CRP: C-Reactive Protein, Serum: 0.06 mg/dL (04-08-20 @ 07:13)  C-Reactive Protein, Serum: 0.06 mg/dL (04-07-20 @ 10:45)  C-Reactive Protein, Serum: 0.08 mg/dL (04-06-20 @ 06:57)    LDH:   Ferritin: Ferritin, Serum: 547 ng/mL (04-08-20 @ 07:13)  Ferritin, Serum: 586 ng/mL (04-07-20 @ 10:45)  Ferritin, Serum: 638 ng/mL (04-06-20 @ 06:57)    Lactate: lc  Trop I:   Ck: Creatine Kinase, Serum: 21 U/L (04-07-20 @ 13:34)      Admission COVID Labs  Full T cell subset: ABS CD3: 621 /uL (03-24-20 @ 11:57)  ABS CD4: 474 /uL (03-24-20 @ 11:57)  ABS CD8: 126 /uL (03-24-20 @ 11:57)    G6PD: Glucose-6-Phosphate Dehydrogenase: 14.7 U/g Hgb (03-24-20 @ 11:16)    Immunoglobulins panel:   Quantiferon Gold Tb:   Triglyceride level:           RADIOLOGY & ADDITIONAL TESTS: Reviewed.

## 2020-04-08 NOTE — PROGRESS NOTE ADULT - SUBJECTIVE AND OBJECTIVE BOX
Physical Medicine and Rehabilitation Progress Note:    Patient is a 79y old  Male who presents with a chief complaint of weakness (08 Apr 2020 09:53)      HPI:  79 M, poor historian, with past medical history of HTN, DM2, and hypothyroidism who presents to ED [facility] after being found down for a fall. Patient states that his bed is tilted and that he slid off his bed and was unable to get up. Patient states that it happened in the last few days. Per ED provider, the patient was potentially down from yesterday evening to this morning and was found by workers at his assisted living home. It is unclear what kind of home, but he says single occupancy group home of some type. He does not have a rollator, walker, or cane at home. Patient denies hitting his head, loss of consciousness, or previous falls. Patient states that in past few weeks he has not been eating as much as he does not like the food around his home. He has mainly been drinking juices at home. Patient currently denies fever, headache, nausea, vomiting, chest pain, shortness of breath, abdominal pain. Patient does not feel as strong as usual. He states that he has bowel movements every few days and has had no changes in urination. Denies sick contacts or travel history.    ED Vitals: T 98.3, P100, /98, RR 17, O2 95% on RA ->> T 101.1 P 91 /89 RR 20 O2 89% on RA  ED Course: Glucose 366-> 240s. Patient received 3 L NS With improvement in glucose. Received Tylenol for fever. Swabbed for RVP, COVID, blood cultures. CXR, prelim wet read, abnormal with left lower lobe possible consolidation and right reticulonodular pattern consistent with infectious vs inflammatory process, new since 2018 (old CT). [facility] in setting of unknown fever. (23 Mar 2020 18:15)                            13.5   5.81  )-----------( 360      ( 08 Apr 2020 07:13 )             41.8       04-08    143  |  104  |  16  ----------------------------<  118<H>  4.1   |  27  |  0.89    Ca    9.4      08 Apr 2020 07:13  Phos  2.9     04-08  Mg     1.9     04-08    TPro  5.9<L>  /  Alb  3.4  /  TBili  0.4  /  DBili  x   /  AST  20  /  ALT  24  /  AlkPhos  41  04-08    Vital Signs Last 24 Hrs  T(C): 36.1 (08 Apr 2020 06:01), Max: 36.6 (07 Apr 2020 13:30)  T(F): 96.9 (08 Apr 2020 06:01), Max: 97.8 (07 Apr 2020 13:30)  HR: 80 (08 Apr 2020 09:55) (72 - 84)  BP: 134/84 (08 Apr 2020 09:55) (110/66 - 134/84)  BP(mean): --  RR: 20 (08 Apr 2020 09:00) (18 - 20)  SpO2: 95% (08 Apr 2020 09:55) (91% - 97%)    MEDICATIONS  (STANDING):  acetaminophen   Tablet .. 650 milliGRAM(s) Oral every 6 hours  amLODIPine   Tablet 10 milliGRAM(s) Oral every 24 hours  atorvastatin 80 milliGRAM(s) Oral at bedtime  brimonidine 0.2% Ophthalmic Solution 1 Drop(s) Both EYES two times a day  dextrose 50% Injectable 12.5 Gram(s) IV Push once  dextrose 50% Injectable 25 Gram(s) IV Push once  dextrose 50% Injectable 25 Gram(s) IV Push once  dorzolamide 2%/timolol 0.5% Ophthalmic Solution 1 Drop(s) Both EYES two times a day  enoxaparin Injectable 80 milliGRAM(s) SubCutaneous two times a day  famotidine    Tablet 20 milliGRAM(s) Oral two times a day  fenofibrate Tablet 48 milliGRAM(s) Oral daily  gabapentin 300 milliGRAM(s) Oral two times a day  hydrALAZINE 10 milliGRAM(s) Oral every 8 hours  insulin glargine Injectable (LANTUS) 12 Unit(s) SubCutaneous at bedtime  insulin lispro (HumaLOG) corrective regimen sliding scale   SubCutaneous Before meals and at bedtime  insulin lispro Injectable (HumaLOG) 4 Unit(s) SubCutaneous three times a day before meals  latanoprost 0.005% Ophthalmic Solution 1 Drop(s) Both EYES at bedtime  levothyroxine 75 MICROGram(s) Oral daily  metoprolol succinate  milliGRAM(s) Oral daily  polyethylene glycol 3350 17 Gram(s) Oral daily  QUEtiapine 800 milliGRAM(s) Oral at bedtime  senna 2 Tablet(s) Oral daily    MEDICATIONS  (PRN):  bisacodyl Suppository 10 milliGRAM(s) Rectal daily PRN Constipation  dextrose 40% Gel 15 Gram(s) Oral once PRN Blood Glucose LESS THAN 70 milliGRAM(s)/deciliter  glucagon  Injectable 1 milliGRAM(s) IntraMuscular once PRN Glucose LESS THAN 70 milligrams/deciliter    Currently Undergoing Physical Therapy at bedside.    Functional Status Assessment:      Therapeutic Interventions      Bed Mobility  Bed Mobility Training Scooting: minimum assist (75% patient effort);  verbal cues;  1 person assist  Bed Mobility Training Sit-to-Supine: Not assessed, pt returned seated in the chair   Bed Mobility Training Supine-to-Sit: minimum assist (75% patient effort);  verbal cues;  1 person assist;  bed rails  Bed Mobility Training Limitations: decreased ability to use arms for pushing/pulling;  decreased ability to use legs for bridging/pushing;  impaired ability to control trunk for mobility;  decreased strength;  impaired balance;  impaired postural control;  cognitive, decreased safety awareness;  decreased aerobic capacity/endurance     Sit-Stand Transfer Training  Transfer Training Sit-to-Stand Transfer: minimum assist (75% patient effort);  verbal cues;  2 person assist;  weight-bearing as tolerated   rolling walker  Transfer Training Stand-to-Sit Transfer: contact guard;  verbal cues;  2 person assist;  weight-bearing as tolerated   rolling walker  Sit-to-Stand Transfer Training Transfer Safety Analysis: decreased balance;  decreased sequencing ability;  decreased step length;  decreased weight-shifting ability;  decreased strength;  impaired balance;  impaired postural control;  decreased aerobic capacity/endurance ;  cognitive, decreased safety awareness;  rolling walker    Gait Training  Gait Training Symptoms Noted During/After Treatment: fatigue;  shortness of breath;  Pt with poor wavelengths on SpO2 reading during ambulation, unable to determine drop in SpO2, however upon return to sitting, SpO2 in low 80s requiring 2min seated rest to increase to 95% (CURT goff)  Gait Training: minimum assist (75% patient effort);  verbal cues;  2 person assist;  weight-bearing as tolerated   rolling walker;  12 feet  Gait Analysis: 3-point gait   decreased juan francisco;  decreased step length;  decreased stride length;  decreased weight-shifting ability;  shuffling;  decreased strength;  impaired balance;  impaired postural control;  decreased aerobic capacity/endurance ;  cognitive, decreased safety awareness;  12 feet;  rolling walker    Therapeutic Exercise  Therapeutic Exercise Detail: Seated EOB: Marching through 50% ROM bilateral x15, LAQ bilateral x15             PM&R Impression: as above    Current Disposition Plan Recommendations:    acute rehab placement

## 2020-04-08 NOTE — PROGRESS NOTE ADULT - ASSESSMENT
per Internal Medicine    79M w/ PMHx of HTN, DM2, former smoker, hypothyroidism [facility] being [facility] down for a fall, found to have starvation ketosis now resolved, was desatting with imaging findings on CXR and found to be COVID positive, now on nonrebreather for 02 support. SC 4/4 for new left sided weakness - NIHSS 7.  HCT negative, CTA showed moderate to severe left carotid stenosis. 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 Stroke etiology likely 2/2 to hypercoagulable status in the setting of COVID; however, cannot rule out cardioembolic case of stroke less likely large vessel atherosclerosis 2/2 to left carotid stenosis. Neuro exam 4/8 unchanged. Will continue to follow. TTE within normal limits, EF 61%.     Problem/Plan - 1:  ·  Problem: Acute respiratory failure with hypoxia.  Plan: 2/2 COVID   - s/p a 5 days course of azithromycin, plaquenil, vitamin C and thiamine, as well as steroids for 7 days   - S/p tociluzumab  - Still requiring 15L on NRB however, now more comfortable and not tachypneic.  - Diurese as needed, 40mg Lasix  - OOBTC.     Problem/Plan - 2:  ·  Problem: Stroke.  Plan: Patient with L sided weakness in Upper and Lower extremity. NIHSS 7. No acute events on CTH.   - Carotid doppler ordered given carotid atherosclerosis seen in CT  - MRI brain w/wo contrast with right acute infarct in parietal lobe and internal capsule. Started lovenox at 80mg BID.   - Atorvastatin 80mg daily  - Echo showed EF of 61%.     Problem/Plan - 3:  ·  Problem: Weakness.  Plan: Neurology following  - PT, OT.     Problem/Plan - 4:  ·  Problem: Diabetes.  Plan: - lantus 12 units qhs  - 4U premeal   - MISS  - consistent carb diet  - out pt Endocrine f/u.     Problem/Plan - 5:  ·  Problem: Essential hypertension.  Plan: Well controlled on current regimen  - c/w hydral 10mg q8  - c/w amlodipine 10mg qd.     Problem/Plan - 6:  Problem: Hypothyroidism. Plan: - c/w home synthroid 75mcg daily.    Problem/Plan - 7:  ·  Problem: Hyperlipidemia.  Plan: home atorvastatin 20mg qhs  - Increased to atovastatin 80mg qhs, given recent stroke.     Problem/Plan - 8:  ·  Problem: Prophylactic measure.  Plan: DVT PPx: Lovenox 80mg BID  GI PPx: Famotidine 20mg  Full code.

## 2020-04-09 LAB
ALBUMIN SERPL ELPH-MCNC: 2.7 G/DL — LOW (ref 3.3–5)
ALP SERPL-CCNC: 30 U/L — LOW (ref 40–120)
ALT FLD-CCNC: SIGNIFICANT CHANGE UP U/L (ref 10–45)
ANION GAP SERPL CALC-SCNC: 11 MMOL/L — SIGNIFICANT CHANGE UP (ref 5–17)
AST SERPL-CCNC: SIGNIFICANT CHANGE UP U/L (ref 10–40)
BASOPHILS # BLD AUTO: 0.01 K/UL — SIGNIFICANT CHANGE UP (ref 0–0.2)
BASOPHILS NFR BLD AUTO: 0.2 % — SIGNIFICANT CHANGE UP (ref 0–2)
BILIRUB SERPL-MCNC: 0.3 MG/DL — SIGNIFICANT CHANGE UP (ref 0.2–1.2)
BUN SERPL-MCNC: 15 MG/DL — SIGNIFICANT CHANGE UP (ref 7–23)
CALCIUM SERPL-MCNC: 8.8 MG/DL — SIGNIFICANT CHANGE UP (ref 8.4–10.5)
CHLORIDE SERPL-SCNC: 105 MMOL/L — SIGNIFICANT CHANGE UP (ref 96–108)
CO2 SERPL-SCNC: 22 MMOL/L — SIGNIFICANT CHANGE UP (ref 22–31)
CREAT SERPL-MCNC: 0.81 MG/DL — SIGNIFICANT CHANGE UP (ref 0.5–1.3)
CRP SERPL-MCNC: 0.04 MG/DL — SIGNIFICANT CHANGE UP (ref 0–0.4)
D DIMER BLD IA.RAPID-MCNC: 170 NG/ML DDU — SIGNIFICANT CHANGE UP
EOSINOPHIL # BLD AUTO: 0.08 K/UL — SIGNIFICANT CHANGE UP (ref 0–0.5)
EOSINOPHIL NFR BLD AUTO: 1.6 % — SIGNIFICANT CHANGE UP (ref 0–6)
FERRITIN SERPL-MCNC: 457 NG/ML — HIGH (ref 30–400)
GLUCOSE SERPL-MCNC: 113 MG/DL — HIGH (ref 70–99)
HCT VFR BLD CALC: 42.6 % — SIGNIFICANT CHANGE UP (ref 39–50)
HGB BLD-MCNC: 13.1 G/DL — SIGNIFICANT CHANGE UP (ref 13–17)
IMM GRANULOCYTES NFR BLD AUTO: 0.6 % — SIGNIFICANT CHANGE UP (ref 0–1.5)
LYMPHOCYTES # BLD AUTO: 1.73 K/UL — SIGNIFICANT CHANGE UP (ref 1–3.3)
LYMPHOCYTES # BLD AUTO: 34.5 % — SIGNIFICANT CHANGE UP (ref 13–44)
MAGNESIUM SERPL-MCNC: 2 MG/DL — SIGNIFICANT CHANGE UP (ref 1.6–2.6)
MCHC RBC-ENTMCNC: 26.5 PG — LOW (ref 27–34)
MCHC RBC-ENTMCNC: 30.8 GM/DL — LOW (ref 32–36)
MCV RBC AUTO: 86.2 FL — SIGNIFICANT CHANGE UP (ref 80–100)
MONOCYTES # BLD AUTO: 0.47 K/UL — SIGNIFICANT CHANGE UP (ref 0–0.9)
MONOCYTES NFR BLD AUTO: 9.4 % — SIGNIFICANT CHANGE UP (ref 2–14)
NEUTROPHILS # BLD AUTO: 2.7 K/UL — SIGNIFICANT CHANGE UP (ref 1.8–7.4)
NEUTROPHILS NFR BLD AUTO: 53.7 % — SIGNIFICANT CHANGE UP (ref 43–77)
NRBC # BLD: 0 /100 WBCS — SIGNIFICANT CHANGE UP (ref 0–0)
PHOSPHATE SERPL-MCNC: 3.2 MG/DL — SIGNIFICANT CHANGE UP (ref 2.5–4.5)
PLATELET # BLD AUTO: 306 K/UL — SIGNIFICANT CHANGE UP (ref 150–400)
POTASSIUM SERPL-MCNC: SIGNIFICANT CHANGE UP MMOL/L (ref 3.5–5.3)
POTASSIUM SERPL-SCNC: SIGNIFICANT CHANGE UP MMOL/L (ref 3.5–5.3)
PROT SERPL-MCNC: 5.7 G/DL — LOW (ref 6–8.3)
RBC # BLD: 4.94 M/UL — SIGNIFICANT CHANGE UP (ref 4.2–5.8)
RBC # FLD: 15.7 % — HIGH (ref 10.3–14.5)
SODIUM SERPL-SCNC: 138 MMOL/L — SIGNIFICANT CHANGE UP (ref 135–145)
WBC # BLD: 5.02 K/UL — SIGNIFICANT CHANGE UP (ref 3.8–10.5)
WBC # FLD AUTO: 5.02 K/UL — SIGNIFICANT CHANGE UP (ref 3.8–10.5)

## 2020-04-09 PROCEDURE — 99233 SBSQ HOSP IP/OBS HIGH 50: CPT | Mod: CS

## 2020-04-09 RX ADMIN — DORZOLAMIDE HYDROCHLORIDE TIMOLOL MALEATE 1 DROP(S): 20; 5 SOLUTION/ DROPS OPHTHALMIC at 10:20

## 2020-04-09 RX ADMIN — Medication 2: at 11:57

## 2020-04-09 RX ADMIN — Medication 650 MILLIGRAM(S): at 12:17

## 2020-04-09 RX ADMIN — ENOXAPARIN SODIUM 80 MILLIGRAM(S): 100 INJECTION SUBCUTANEOUS at 07:34

## 2020-04-09 RX ADMIN — DORZOLAMIDE HYDROCHLORIDE TIMOLOL MALEATE 1 DROP(S): 20; 5 SOLUTION/ DROPS OPHTHALMIC at 22:54

## 2020-04-09 RX ADMIN — ATORVASTATIN CALCIUM 80 MILLIGRAM(S): 80 TABLET, FILM COATED ORAL at 22:53

## 2020-04-09 RX ADMIN — FAMOTIDINE 20 MILLIGRAM(S): 10 INJECTION INTRAVENOUS at 07:30

## 2020-04-09 RX ADMIN — LATANOPROST 1 DROP(S): 0.05 SOLUTION/ DROPS OPHTHALMIC; TOPICAL at 22:54

## 2020-04-09 RX ADMIN — ENOXAPARIN SODIUM 80 MILLIGRAM(S): 100 INJECTION SUBCUTANEOUS at 17:14

## 2020-04-09 RX ADMIN — GABAPENTIN 300 MILLIGRAM(S): 400 CAPSULE ORAL at 07:34

## 2020-04-09 RX ADMIN — Medication 10 MILLIGRAM(S): at 13:51

## 2020-04-09 RX ADMIN — INSULIN GLARGINE 12 UNIT(S): 100 INJECTION, SOLUTION SUBCUTANEOUS at 22:53

## 2020-04-09 RX ADMIN — Medication 10 MILLIGRAM(S): at 07:35

## 2020-04-09 RX ADMIN — Medication 48 MILLIGRAM(S): at 11:05

## 2020-04-09 RX ADMIN — Medication 10 MILLIGRAM(S): at 22:53

## 2020-04-09 RX ADMIN — BRIMONIDINE TARTRATE 1 DROP(S): 2 SOLUTION/ DROPS OPHTHALMIC at 10:20

## 2020-04-09 RX ADMIN — Medication 4 UNIT(S): at 17:14

## 2020-04-09 RX ADMIN — Medication 4 UNIT(S): at 11:56

## 2020-04-09 RX ADMIN — AMLODIPINE BESYLATE 10 MILLIGRAM(S): 2.5 TABLET ORAL at 11:06

## 2020-04-09 RX ADMIN — GABAPENTIN 300 MILLIGRAM(S): 400 CAPSULE ORAL at 17:14

## 2020-04-09 RX ADMIN — Medication 650 MILLIGRAM(S): at 07:34

## 2020-04-09 RX ADMIN — Medication 75 MICROGRAM(S): at 07:28

## 2020-04-09 RX ADMIN — QUETIAPINE FUMARATE 800 MILLIGRAM(S): 200 TABLET, FILM COATED ORAL at 22:53

## 2020-04-09 RX ADMIN — Medication 4 UNIT(S): at 08:42

## 2020-04-09 RX ADMIN — Medication 650 MILLIGRAM(S): at 19:56

## 2020-04-09 RX ADMIN — FAMOTIDINE 20 MILLIGRAM(S): 10 INJECTION INTRAVENOUS at 17:15

## 2020-04-09 RX ADMIN — Medication 100 MILLIGRAM(S): at 07:29

## 2020-04-09 RX ADMIN — BRIMONIDINE TARTRATE 1 DROP(S): 2 SOLUTION/ DROPS OPHTHALMIC at 22:53

## 2020-04-09 NOTE — PROGRESS NOTE ADULT - ASSESSMENT
79M w/ PMHx of HTN, DM2, former smoker, hypothyroidism who presented to ED [facility] on 3/23 after being found down for a fall, found to have starvation ketosis now resolved, was desatting with imaging findings on CXR and found to be COVID positive, now on nonrebreather for 02 support. SC 4/4 for new left sided weakness - NIHSS 7.  HCT negative, CTA showed moderate to severe left carotid stenosis. 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 Stroke etiology likely 2/2 to hypercoagulable status in the setting of COVID; however, cannot rule out cardioembolic case of stroke less likely large vessel atherosclerosis 2/2 to left carotid stenosis. Neuro exam 4/9 unchanged. Will continue to follow. TTE within normal limits, EF 61%.

## 2020-04-09 NOTE — PROGRESS NOTE ADULT - SUBJECTIVE AND OBJECTIVE BOX
Mini Neurology Progress Note    Patient's chart reviewed today with Dr. Sanon. Patient remains on full dose anticoagulation, lovenox 80mg BID, for stroke likely 2/2 to hypercoagulable state 2/2 COVID. While Lovenox 80mg BID is needed inpatient while patient is actively being treated for COVID, when patient is stable to discharge from a respiratory COVID perspective, patient should discharge on asa 81mg and plavix 75mg daily and stop full dose lovenox. Patient will remain on asa 81mg and plavix 75mg daily for about 21 days and follow up with stroke neurology outpatient, eventually down-titrating to only one antiplatelet likely asa 81mg daily.     Neurology will continue to follow peripherally but not see daily, please call with any questions.     Stroke ACP Phone: [phone]

## 2020-04-09 NOTE — PROGRESS NOTE ADULT - SUBJECTIVE AND OBJECTIVE BOX
INTERVAL HPI/OVERNIGHT EVENTS: ARTURO, attempting trial of venturi mask. neurology following    SUBJECTIVE: Patient seen and examined at bedside. No acute complaints at this time.     OBJECTIVE:    VITAL SIGNS:  ICU Vital Signs Last 24 Hrs  T(C): 35.9 (09 Apr 2020 06:01), Max: 36.4 (08 Apr 2020 21:59)  T(F): 96.6 (09 Apr 2020 06:01), Max: 97.5 (08 Apr 2020 21:59)  HR: 69 (09 Apr 2020 08:45) (69 - 80)  BP: 101/69 (09 Apr 2020 08:45) (101/69 - 155/77)  BP(mean): --  ABP: --  ABP(mean): --  RR: 20 (09 Apr 2020 08:45) (20 - 24)  SpO2: 96% (09 Apr 2020 08:45) (94% - 97%)        04-08 @ 07:01  -  04-09 @ 07:00  --------------------------------------------------------  IN: 0 mL / OUT: 625 mL / NET: -625 mL      CAPILLARY BLOOD GLUCOSE      POCT Blood Glucose.: 125 mg/dL (09 Apr 2020 06:46)      PHYSICAL EXAM:    General: NAD  HEENT: NC/AT; PERRL, clear conjunctiva  Neck: supple  Respiratory: non-labored breathing  Cardiovascular: +S1/S2; RRR  Abdomen: soft, NT/ND; +BS x4  Extremities: WWP, 2+ peripheral pulses b/l; no LE edema  Skin: normal color and turgor; no rash  Neurological:     MEDICATIONS:  MEDICATIONS  (STANDING):  acetaminophen   Tablet .. 650 milliGRAM(s) Oral every 6 hours  amLODIPine   Tablet 10 milliGRAM(s) Oral every 24 hours  atorvastatin 80 milliGRAM(s) Oral at bedtime  brimonidine 0.2% Ophthalmic Solution 1 Drop(s) Both EYES two times a day  dextrose 50% Injectable 12.5 Gram(s) IV Push once  dextrose 50% Injectable 25 Gram(s) IV Push once  dextrose 50% Injectable 25 Gram(s) IV Push once  dorzolamide 2%/timolol 0.5% Ophthalmic Solution 1 Drop(s) Both EYES two times a day  enoxaparin Injectable 80 milliGRAM(s) SubCutaneous two times a day  famotidine    Tablet 20 milliGRAM(s) Oral two times a day  fenofibrate Tablet 48 milliGRAM(s) Oral daily  gabapentin 300 milliGRAM(s) Oral two times a day  hydrALAZINE 10 milliGRAM(s) Oral every 8 hours  insulin glargine Injectable (LANTUS) 12 Unit(s) SubCutaneous at bedtime  insulin lispro (HumaLOG) corrective regimen sliding scale   SubCutaneous Before meals and at bedtime  insulin lispro Injectable (HumaLOG) 4 Unit(s) SubCutaneous three times a day before meals  latanoprost 0.005% Ophthalmic Solution 1 Drop(s) Both EYES at bedtime  levothyroxine 75 MICROGram(s) Oral daily  metoprolol succinate  milliGRAM(s) Oral daily  polyethylene glycol 3350 17 Gram(s) Oral daily  QUEtiapine 800 milliGRAM(s) Oral at bedtime  senna 2 Tablet(s) Oral daily    MEDICATIONS  (PRN):  bisacodyl Suppository 10 milliGRAM(s) Rectal daily PRN Constipation  dextrose 40% Gel 15 Gram(s) Oral once PRN Blood Glucose LESS THAN 70 milliGRAM(s)/deciliter  glucagon  Injectable 1 milliGRAM(s) IntraMuscular once PRN Glucose LESS THAN 70 milligrams/deciliter      ALLERGIES:  Allergies    No Known Allergies    Intolerances        LABS:                        13.1   5.02  )-----------( 306      ( 09 Apr 2020 07:19 )             42.6     04-09    138  |  105  |  15  ----------------------------<  113<H>  See note   |  22  |  0.81    Ca    8.8      09 Apr 2020 07:19  Phos  3.2     04-09  Mg     2.0     04-09    TPro  5.7<L>  /  Alb  2.7<L>  /  TBili  0.3  /  DBili  x   /  AST  See note  /  ALT  See note  /  AlkPhos  30<L>  04-09          Daily COVID labs  Procalcitonin:   D-dimer: D-Dimer Assay, Quantitative: 170 ng/mL DDU (04-09-20 @ 07:19)  D-Dimer Assay, Quantitative: 251 ng/mL DDU (04-08-20 @ 07:13)  D-Dimer Assay, Quantitative: 495 ng/mL DDU (04-07-20 @ 10:45)    ESR:   CRP: C-Reactive Protein, Serum: 0.04 mg/dL (04-09-20 @ 07:19)  C-Reactive Protein, Serum: 0.06 mg/dL (04-08-20 @ 07:13)  C-Reactive Protein, Serum: 0.06 mg/dL (04-07-20 @ 10:45)    LDH:   Ferritin: Ferritin, Serum: 457 ng/mL (04-09-20 @ 07:19)  Ferritin, Serum: 547 ng/mL (04-08-20 @ 07:13)  Ferritin, Serum: 586 ng/mL (04-07-20 @ 10:45)    Lactate: lc  Trop I:   Ck: Creatine Kinase, Serum: 21 U/L (04-07-20 @ 13:34)      Admission COVID Labs  Full T cell subset: ABS CD3: 621 /uL (03-24-20 @ 11:57)  ABS CD4: 474 /uL (03-24-20 @ 11:57)  ABS CD8: 126 /uL (03-24-20 @ 11:57)    G6PD: Glucose-6-Phosphate Dehydrogenase: 14.7 U/g Hgb (03-24-20 @ 11:16)    Immunoglobulins panel:   Quantiferon Gold Tb:   Triglyceride level:           RADIOLOGY & ADDITIONAL TESTS: Reviewed.

## 2020-04-10 LAB
ALBUMIN SERPL ELPH-MCNC: 3.2 G/DL — LOW (ref 3.3–5)
ALP SERPL-CCNC: 39 U/L — LOW (ref 40–120)
ALT FLD-CCNC: 33 U/L — SIGNIFICANT CHANGE UP (ref 10–45)
ANION GAP SERPL CALC-SCNC: 9 MMOL/L — SIGNIFICANT CHANGE UP (ref 5–17)
AST SERPL-CCNC: 29 U/L — SIGNIFICANT CHANGE UP (ref 10–40)
BASOPHILS # BLD AUTO: 0.01 K/UL — SIGNIFICANT CHANGE UP (ref 0–0.2)
BASOPHILS NFR BLD AUTO: 0.2 % — SIGNIFICANT CHANGE UP (ref 0–2)
BILIRUB SERPL-MCNC: 0.3 MG/DL — SIGNIFICANT CHANGE UP (ref 0.2–1.2)
BUN SERPL-MCNC: 13 MG/DL — SIGNIFICANT CHANGE UP (ref 7–23)
CALCIUM SERPL-MCNC: 9.2 MG/DL — SIGNIFICANT CHANGE UP (ref 8.4–10.5)
CHLORIDE SERPL-SCNC: 108 MMOL/L — SIGNIFICANT CHANGE UP (ref 96–108)
CO2 SERPL-SCNC: 27 MMOL/L — SIGNIFICANT CHANGE UP (ref 22–31)
CREAT SERPL-MCNC: 0.81 MG/DL — SIGNIFICANT CHANGE UP (ref 0.5–1.3)
CRP SERPL-MCNC: 0.04 MG/DL — SIGNIFICANT CHANGE UP (ref 0–0.4)
D DIMER BLD IA.RAPID-MCNC: 194 NG/ML DDU — SIGNIFICANT CHANGE UP
EOSINOPHIL # BLD AUTO: 0.13 K/UL — SIGNIFICANT CHANGE UP (ref 0–0.5)
EOSINOPHIL NFR BLD AUTO: 2.5 % — SIGNIFICANT CHANGE UP (ref 0–6)
FERRITIN SERPL-MCNC: 477 NG/ML — HIGH (ref 30–400)
GLUCOSE SERPL-MCNC: 110 MG/DL — HIGH (ref 70–99)
HCT VFR BLD CALC: 39.2 % — SIGNIFICANT CHANGE UP (ref 39–50)
HCT VFR BLD CALC: 40.1 % — SIGNIFICANT CHANGE UP (ref 39–50)
HGB BLD-MCNC: 12.3 G/DL — LOW (ref 13–17)
HGB BLD-MCNC: 12.5 G/DL — LOW (ref 13–17)
IMM GRANULOCYTES NFR BLD AUTO: 0.2 % — SIGNIFICANT CHANGE UP (ref 0–1.5)
LYMPHOCYTES # BLD AUTO: 2.04 K/UL — SIGNIFICANT CHANGE UP (ref 1–3.3)
LYMPHOCYTES # BLD AUTO: 39.2 % — SIGNIFICANT CHANGE UP (ref 13–44)
MAGNESIUM SERPL-MCNC: 1.9 MG/DL — SIGNIFICANT CHANGE UP (ref 1.6–2.6)
MCHC RBC-ENTMCNC: 26.1 PG — LOW (ref 27–34)
MCHC RBC-ENTMCNC: 27 PG — SIGNIFICANT CHANGE UP (ref 27–34)
MCHC RBC-ENTMCNC: 30.7 GM/DL — LOW (ref 32–36)
MCHC RBC-ENTMCNC: 31.9 GM/DL — LOW (ref 32–36)
MCV RBC AUTO: 84.7 FL — SIGNIFICANT CHANGE UP (ref 80–100)
MCV RBC AUTO: 85 FL — SIGNIFICANT CHANGE UP (ref 80–100)
MONOCYTES # BLD AUTO: 0.44 K/UL — SIGNIFICANT CHANGE UP (ref 0–0.9)
MONOCYTES NFR BLD AUTO: 8.5 % — SIGNIFICANT CHANGE UP (ref 2–14)
NEUTROPHILS # BLD AUTO: 2.57 K/UL — SIGNIFICANT CHANGE UP (ref 1.8–7.4)
NEUTROPHILS NFR BLD AUTO: 49.4 % — SIGNIFICANT CHANGE UP (ref 43–77)
NRBC # BLD: 0 /100 WBCS — SIGNIFICANT CHANGE UP (ref 0–0)
NRBC # BLD: 0 /100 WBCS — SIGNIFICANT CHANGE UP (ref 0–0)
PHOSPHATE SERPL-MCNC: 2.7 MG/DL — SIGNIFICANT CHANGE UP (ref 2.5–4.5)
PLATELET # BLD AUTO: 301 K/UL — SIGNIFICANT CHANGE UP (ref 150–400)
PLATELET # BLD AUTO: 322 K/UL — SIGNIFICANT CHANGE UP (ref 150–400)
POTASSIUM SERPL-MCNC: 4 MMOL/L — SIGNIFICANT CHANGE UP (ref 3.5–5.3)
POTASSIUM SERPL-SCNC: 4 MMOL/L — SIGNIFICANT CHANGE UP (ref 3.5–5.3)
PROT SERPL-MCNC: 5.9 G/DL — LOW (ref 6–8.3)
RBC # BLD: 4.63 M/UL — SIGNIFICANT CHANGE UP (ref 4.2–5.8)
RBC # BLD: 4.72 M/UL — SIGNIFICANT CHANGE UP (ref 4.2–5.8)
RBC # FLD: 15.7 % — HIGH (ref 10.3–14.5)
RBC # FLD: 15.7 % — HIGH (ref 10.3–14.5)
SODIUM SERPL-SCNC: 144 MMOL/L — SIGNIFICANT CHANGE UP (ref 135–145)
WBC # BLD: 5.2 K/UL — SIGNIFICANT CHANGE UP (ref 3.8–10.5)
WBC # BLD: 6.37 K/UL — SIGNIFICANT CHANGE UP (ref 3.8–10.5)
WBC # FLD AUTO: 5.2 K/UL — SIGNIFICANT CHANGE UP (ref 3.8–10.5)
WBC # FLD AUTO: 6.37 K/UL — SIGNIFICANT CHANGE UP (ref 3.8–10.5)

## 2020-04-10 PROCEDURE — 99233 SBSQ HOSP IP/OBS HIGH 50: CPT | Mod: CS

## 2020-04-10 RX ADMIN — ENOXAPARIN SODIUM 80 MILLIGRAM(S): 100 INJECTION SUBCUTANEOUS at 04:08

## 2020-04-10 RX ADMIN — Medication 650 MILLIGRAM(S): at 19:21

## 2020-04-10 RX ADMIN — DORZOLAMIDE HYDROCHLORIDE TIMOLOL MALEATE 1 DROP(S): 20; 5 SOLUTION/ DROPS OPHTHALMIC at 12:07

## 2020-04-10 RX ADMIN — BRIMONIDINE TARTRATE 1 DROP(S): 2 SOLUTION/ DROPS OPHTHALMIC at 12:07

## 2020-04-10 RX ADMIN — Medication 650 MILLIGRAM(S): at 14:59

## 2020-04-10 RX ADMIN — AMLODIPINE BESYLATE 10 MILLIGRAM(S): 2.5 TABLET ORAL at 12:07

## 2020-04-10 RX ADMIN — GABAPENTIN 300 MILLIGRAM(S): 400 CAPSULE ORAL at 07:18

## 2020-04-10 RX ADMIN — FAMOTIDINE 20 MILLIGRAM(S): 10 INJECTION INTRAVENOUS at 07:17

## 2020-04-10 RX ADMIN — Medication 48 MILLIGRAM(S): at 12:07

## 2020-04-10 RX ADMIN — Medication 100 MILLIGRAM(S): at 07:40

## 2020-04-10 RX ADMIN — Medication 650 MILLIGRAM(S): at 07:23

## 2020-04-10 RX ADMIN — INSULIN GLARGINE 12 UNIT(S): 100 INJECTION, SOLUTION SUBCUTANEOUS at 21:38

## 2020-04-10 RX ADMIN — Medication 4 UNIT(S): at 17:03

## 2020-04-10 RX ADMIN — Medication 10 MILLIGRAM(S): at 14:59

## 2020-04-10 RX ADMIN — ATORVASTATIN CALCIUM 80 MILLIGRAM(S): 80 TABLET, FILM COATED ORAL at 21:38

## 2020-04-10 RX ADMIN — Medication 4 UNIT(S): at 12:07

## 2020-04-10 RX ADMIN — Medication 75 MICROGRAM(S): at 07:18

## 2020-04-10 RX ADMIN — Medication 4 UNIT(S): at 08:50

## 2020-04-10 RX ADMIN — QUETIAPINE FUMARATE 800 MILLIGRAM(S): 200 TABLET, FILM COATED ORAL at 21:38

## 2020-04-10 RX ADMIN — DORZOLAMIDE HYDROCHLORIDE TIMOLOL MALEATE 1 DROP(S): 20; 5 SOLUTION/ DROPS OPHTHALMIC at 21:39

## 2020-04-10 RX ADMIN — Medication 10 MILLIGRAM(S): at 21:38

## 2020-04-10 RX ADMIN — BRIMONIDINE TARTRATE 1 DROP(S): 2 SOLUTION/ DROPS OPHTHALMIC at 21:39

## 2020-04-10 RX ADMIN — FAMOTIDINE 20 MILLIGRAM(S): 10 INJECTION INTRAVENOUS at 17:03

## 2020-04-10 RX ADMIN — Medication 10 MILLIGRAM(S): at 07:18

## 2020-04-10 RX ADMIN — LATANOPROST 1 DROP(S): 0.05 SOLUTION/ DROPS OPHTHALMIC; TOPICAL at 21:40

## 2020-04-10 RX ADMIN — GABAPENTIN 300 MILLIGRAM(S): 400 CAPSULE ORAL at 17:03

## 2020-04-10 RX ADMIN — ENOXAPARIN SODIUM 80 MILLIGRAM(S): 100 INJECTION SUBCUTANEOUS at 17:03

## 2020-04-10 NOTE — PROGRESS NOTE ADULT - SUBJECTIVE AND OBJECTIVE BOX
INTERVAL HPI/OVERNIGHT EVENTS: lab holiday for tomorrow, PMR recs - acute rehab, venturi --> nasal canula    SUBJECTIVE: Patient seen and examined at bedside. Comfortable, still with residual left arm deficit.     OBJECTIVE:    VITAL SIGNS:  ICU Vital Signs Last 24 Hrs  T(C): 35.8 (10 Apr 2020 13:47), Max: 36.6 (09 Apr 2020 21:55)  T(F): 96.4 (10 Apr 2020 13:47), Max: 97.8 (09 Apr 2020 21:55)  HR: 75 (10 Apr 2020 13:14) (66 - 82)  BP: 115/76 (10 Apr 2020 13:14) (115/76 - 147/93)  BP(mean): --  ABP: --  ABP(mean): --  RR: 20 (10 Apr 2020 13:14) (20 - 28)  SpO2: 97% (10 Apr 2020 13:14) (90% - 97%)        04-09 @ 07:01  -  04-10 @ 07:00  --------------------------------------------------------  IN: 0 mL / OUT: 770 mL / NET: -770 mL    04-10 @ 07:01  -  04-10 @ 14:35  --------------------------------------------------------  IN: 0 mL / OUT: 200 mL / NET: -200 mL      CAPILLARY BLOOD GLUCOSE      POCT Blood Glucose.: 110 mg/dL (10 Apr 2020 11:14)      PHYSICAL EXAM:    General: NAD  HEENT: NC/AT; PERRL, clear conjunctiva  Neck: supple  Respiratory: non-labored,   Cardiovascular: +S1/S2; RRR  Abdomen: soft, NT/ND; +BS x4  Extremities: WWP, 2+ peripheral pulses b/l; no LE edema  Skin: normal color and turgor; no rash  Neurological:     MEDICATIONS:  MEDICATIONS  (STANDING):  acetaminophen   Tablet .. 650 milliGRAM(s) Oral every 6 hours  amLODIPine   Tablet 10 milliGRAM(s) Oral every 24 hours  atorvastatin 80 milliGRAM(s) Oral at bedtime  brimonidine 0.2% Ophthalmic Solution 1 Drop(s) Both EYES two times a day  dextrose 50% Injectable 12.5 Gram(s) IV Push once  dextrose 50% Injectable 25 Gram(s) IV Push once  dextrose 50% Injectable 25 Gram(s) IV Push once  dorzolamide 2%/timolol 0.5% Ophthalmic Solution 1 Drop(s) Both EYES two times a day  enoxaparin Injectable 80 milliGRAM(s) SubCutaneous two times a day  famotidine    Tablet 20 milliGRAM(s) Oral two times a day  fenofibrate Tablet 48 milliGRAM(s) Oral daily  gabapentin 300 milliGRAM(s) Oral two times a day  hydrALAZINE 10 milliGRAM(s) Oral every 8 hours  insulin glargine Injectable (LANTUS) 12 Unit(s) SubCutaneous at bedtime  insulin lispro (HumaLOG) corrective regimen sliding scale   SubCutaneous Before meals and at bedtime  insulin lispro Injectable (HumaLOG) 4 Unit(s) SubCutaneous three times a day before meals  latanoprost 0.005% Ophthalmic Solution 1 Drop(s) Both EYES at bedtime  levothyroxine 75 MICROGram(s) Oral daily  metoprolol succinate  milliGRAM(s) Oral daily  polyethylene glycol 3350 17 Gram(s) Oral daily  QUEtiapine 800 milliGRAM(s) Oral at bedtime  senna 2 Tablet(s) Oral daily    MEDICATIONS  (PRN):  bisacodyl Suppository 10 milliGRAM(s) Rectal daily PRN Constipation  dextrose 40% Gel 15 Gram(s) Oral once PRN Blood Glucose LESS THAN 70 milliGRAM(s)/deciliter  glucagon  Injectable 1 milliGRAM(s) IntraMuscular once PRN Glucose LESS THAN 70 milligrams/deciliter      ALLERGIES:  Allergies    No Known Allergies    Intolerances        LABS:                        12.5   5.20  )-----------( 301      ( 10 Apr 2020 06:51 )             39.2     04-10    144  |  108  |  13  ----------------------------<  110<H>  4.0   |  27  |  0.81    Ca    9.2      10 Apr 2020 06:51  Phos  2.7     04-10  Mg     1.9     04-10    TPro  5.9<L>  /  Alb  3.2<L>  /  TBili  0.3  /  DBili  x   /  AST  29  /  ALT  33  /  AlkPhos  39<L>  04-10          Daily COVID labs  Procalcitonin:   D-dimer: D-Dimer Assay, Quantitative: 194 ng/mL DDU (04-10-20 @ 06:51)  D-Dimer Assay, Quantitative: 170 ng/mL DDU (04-09-20 @ 07:19)  D-Dimer Assay, Quantitative: 251 ng/mL DDU (04-08-20 @ 07:13)    ESR:   CRP: C-Reactive Protein, Serum: 0.04 mg/dL (04-10-20 @ 06:51)  C-Reactive Protein, Serum: 0.04 mg/dL (04-09-20 @ 07:19)  C-Reactive Protein, Serum: 0.06 mg/dL (04-08-20 @ 07:13)    LDH:   Ferritin: Ferritin, Serum: 477 ng/mL (04-10-20 @ 06:51)  Ferritin, Serum: 457 ng/mL (04-09-20 @ 07:19)  Ferritin, Serum: 547 ng/mL (04-08-20 @ 07:13)    Lactate: lc  Trop I:   Ck:     Admission COVID Labs  Full T cell subset: ABS CD3: 621 /uL (03-24-20 @ 11:57)  ABS CD4: 474 /uL (03-24-20 @ 11:57)  ABS CD8: 126 /uL (03-24-20 @ 11:57)    G6PD: Glucose-6-Phosphate Dehydrogenase: 14.7 U/g Hgb (03-24-20 @ 11:16)    Immunoglobulins panel:   Quantiferon Gold Tb:   Triglyceride level:           RADIOLOGY & ADDITIONAL TESTS: Reviewed.

## 2020-04-10 NOTE — CONSULT NOTE ADULT - ASSESSMENT
79M w/ PMHx of HTN, DM2, former smoker, hypothyroidism, COVID+, stroke with new onset hematuria  -canseco with light red urine  -given smoking history will need hematuria work up   -ct urogram now or outpatient  -will flush canseco prn  -f/u in [facility] clinic for further work up

## 2020-04-10 NOTE — CONSULT NOTE ADULT - CONSULT REASON
GI BLEED WITH 6-8 BLOODY STOOLS X 1000HRS YESTERDAY
Rehab evaluation
new hematuria
Left sided weakness

## 2020-04-10 NOTE — PROGRESS NOTE ADULT - PROBLEM SELECTOR PLAN 1
2/2 COVID   - s/p a 5 days course of azithromycin, plaquenil, vitamin C and thiamine, as well as steroids for 7 days   - S/p tociluzumab  - Now on nasal canula  - Diurese as needed, 40mg Lasix  - OOBTC

## 2020-04-10 NOTE — CONSULT NOTE ADULT - SUBJECTIVE AND OBJECTIVE BOX
HPI:  79 M, poor historian, with past medical history of HTN, DM2, and hypothyroidism who presents to ED [facility] after being found down for a fall. Patient states that his bed is tilted and that he slid off his bed and was unable to get up. Patient states that it happened in the last few days. Per ED provider, the patient was potentially down from yesterday evening to this morning and was found by workers at his assisted living home. It is unclear what kind of home, but he says single occupancy group home of some type. He does not have a rollator, walker, or cane at home. Patient denies hitting his head, loss of consciousness, or previous falls. Patient states that in past few weeks he has not been eating as much as he does not like the food around his home. He has mainly been drinking juices at home. Patient currently denies fever, headache, nausea, vomiting, chest pain, shortness of breath, abdominal pain. Patient does not feel as strong as usual. He states that he has bowel movements every few days and has had no changes in urination. Denies sick contacts or travel history.    ED Vitals: T 98.3, P100, /98, RR 17, O2 95% on RA ->> T 101.1 P 91 /89 RR 20 O2 89% on RA  ED Course: Glucose 366-> 240s. Patient received 3 L NS With improvement in glucose. Received Tylenol for fever. Swabbed for RVP, COVID, blood cultures. CXR, prelim wet read, abnormal with left lower lobe possible consolidation and right reticulonodular pattern consistent with infectious vs inflammatory process, new since 2018 (old CT). Admitted to [facility] to r/o COVID in setting of unknown fever. (23 Mar 2020 18:15)     addendum: Consulted for new hematuria. On AC for stroke. Canseco with light red urine    PAST MEDICAL & SURGICAL HISTORY:  Diabetes mellitus, type 2  Hypothyroid  Essential hypertension: Hypertension  H/O thyroidectomy  Other postprocedural status: left total knee  replacement one   yr.   ago      REVIEW OF SYSTEMS - neg except as per hpi      MEDICATIONS  (STANDING):  acetaminophen   Tablet .. 650 milliGRAM(s) Oral every 6 hours  amLODIPine   Tablet 10 milliGRAM(s) Oral every 24 hours  atorvastatin 80 milliGRAM(s) Oral at bedtime  brimonidine 0.2% Ophthalmic Solution 1 Drop(s) Both EYES two times a day  dextrose 50% Injectable 12.5 Gram(s) IV Push once  dextrose 50% Injectable 25 Gram(s) IV Push once  dextrose 50% Injectable 25 Gram(s) IV Push once  dorzolamide 2%/timolol 0.5% Ophthalmic Solution 1 Drop(s) Both EYES two times a day  enoxaparin Injectable 80 milliGRAM(s) SubCutaneous two times a day  famotidine    Tablet 20 milliGRAM(s) Oral two times a day  fenofibrate Tablet 48 milliGRAM(s) Oral daily  gabapentin 300 milliGRAM(s) Oral two times a day  hydrALAZINE 10 milliGRAM(s) Oral every 8 hours  insulin glargine Injectable (LANTUS) 12 Unit(s) SubCutaneous at bedtime  insulin lispro (HumaLOG) corrective regimen sliding scale   SubCutaneous Before meals and at bedtime  insulin lispro Injectable (HumaLOG) 4 Unit(s) SubCutaneous three times a day before meals  latanoprost 0.005% Ophthalmic Solution 1 Drop(s) Both EYES at bedtime  levothyroxine 75 MICROGram(s) Oral daily  metoprolol succinate  milliGRAM(s) Oral daily  polyethylene glycol 3350 17 Gram(s) Oral daily  QUEtiapine 800 milliGRAM(s) Oral at bedtime  senna 2 Tablet(s) Oral daily    MEDICATIONS  (PRN):  bisacodyl Suppository 10 milliGRAM(s) Rectal daily PRN Constipation  dextrose 40% Gel 15 Gram(s) Oral once PRN Blood Glucose LESS THAN 70 milliGRAM(s)/deciliter  glucagon  Injectable 1 milliGRAM(s) IntraMuscular once PRN Glucose LESS THAN 70 milligrams/deciliter      Allergies    No Known Allergies    Intolerances        SOCIAL HISTORY:    FAMILY HISTORY:      Vital Signs Last 24 Hrs  T(C): 35.8 (10 Apr 2020 13:47), Max: 36.6 (09 Apr 2020 21:55)  T(F): 96.4 (10 Apr 2020 13:47), Max: 97.8 (09 Apr 2020 21:55)  HR: 78 (10 Apr 2020 15:53) (66 - 78)  BP: 124/72 (10 Apr 2020 15:53) (115/76 - 136/78)  BP(mean): --  RR: 20 (10 Apr 2020 15:53) (20 - 28)  SpO2: 95% (10 Apr 2020 15:53) (90% - 97%)    PHYSICAL EXAM: pt not examined, chart reviewed, canseco tubing seen from door to minimize covid exposure risk        LABS:                        12.5   5.20  )-----------( 301      ( 10 Apr 2020 06:51 )             39.2     04-10    144  |  108  |  13  ----------------------------<  110<H>  4.0   |  27  |  0.81    Ca    9.2      10 Apr 2020 06:51  Phos  2.7     04-10  Mg     1.9     04-10    TPro  5.9<L>  /  Alb  3.2<L>  /  TBili  0.3  /  DBili  x   /  AST  29  /  ALT  33  /  AlkPhos  39<L>  04-10          RADIOLOGY & ADDITIONAL STUDIES:

## 2020-04-11 LAB
ALBUMIN SERPL ELPH-MCNC: 3.1 G/DL — LOW (ref 3.3–5)
ALP SERPL-CCNC: 36 U/L — LOW (ref 40–120)
ALT FLD-CCNC: 42 U/L — SIGNIFICANT CHANGE UP (ref 10–45)
ANION GAP SERPL CALC-SCNC: 8 MMOL/L — SIGNIFICANT CHANGE UP (ref 5–17)
AST SERPL-CCNC: 34 U/L — SIGNIFICANT CHANGE UP (ref 10–40)
BASOPHILS # BLD AUTO: 0.02 K/UL — SIGNIFICANT CHANGE UP (ref 0–0.2)
BASOPHILS NFR BLD AUTO: 0.4 % — SIGNIFICANT CHANGE UP (ref 0–2)
BILIRUB SERPL-MCNC: 0.3 MG/DL — SIGNIFICANT CHANGE UP (ref 0.2–1.2)
BUN SERPL-MCNC: 12 MG/DL — SIGNIFICANT CHANGE UP (ref 7–23)
CALCIUM SERPL-MCNC: 8.9 MG/DL — SIGNIFICANT CHANGE UP (ref 8.4–10.5)
CHLORIDE SERPL-SCNC: 106 MMOL/L — SIGNIFICANT CHANGE UP (ref 96–108)
CO2 SERPL-SCNC: 27 MMOL/L — SIGNIFICANT CHANGE UP (ref 22–31)
CREAT SERPL-MCNC: 0.78 MG/DL — SIGNIFICANT CHANGE UP (ref 0.5–1.3)
CRP SERPL-MCNC: 0.04 MG/DL — SIGNIFICANT CHANGE UP (ref 0–0.4)
D DIMER BLD IA.RAPID-MCNC: 210 NG/ML DDU — SIGNIFICANT CHANGE UP
EOSINOPHIL # BLD AUTO: 0.18 K/UL — SIGNIFICANT CHANGE UP (ref 0–0.5)
EOSINOPHIL NFR BLD AUTO: 3.7 % — SIGNIFICANT CHANGE UP (ref 0–6)
FERRITIN SERPL-MCNC: 391 NG/ML — SIGNIFICANT CHANGE UP (ref 30–400)
GLUCOSE SERPL-MCNC: 86 MG/DL — SIGNIFICANT CHANGE UP (ref 70–99)
HCT VFR BLD CALC: 38.4 % — LOW (ref 39–50)
HGB BLD-MCNC: 12.1 G/DL — LOW (ref 13–17)
IMM GRANULOCYTES NFR BLD AUTO: 0.2 % — SIGNIFICANT CHANGE UP (ref 0–1.5)
LYMPHOCYTES # BLD AUTO: 1.94 K/UL — SIGNIFICANT CHANGE UP (ref 1–3.3)
LYMPHOCYTES # BLD AUTO: 39.7 % — SIGNIFICANT CHANGE UP (ref 13–44)
MAGNESIUM SERPL-MCNC: 2 MG/DL — SIGNIFICANT CHANGE UP (ref 1.6–2.6)
MCHC RBC-ENTMCNC: 26.4 PG — LOW (ref 27–34)
MCHC RBC-ENTMCNC: 31.5 GM/DL — LOW (ref 32–36)
MCV RBC AUTO: 83.8 FL — SIGNIFICANT CHANGE UP (ref 80–100)
MONOCYTES # BLD AUTO: 0.47 K/UL — SIGNIFICANT CHANGE UP (ref 0–0.9)
MONOCYTES NFR BLD AUTO: 9.6 % — SIGNIFICANT CHANGE UP (ref 2–14)
NEUTROPHILS # BLD AUTO: 2.27 K/UL — SIGNIFICANT CHANGE UP (ref 1.8–7.4)
NEUTROPHILS NFR BLD AUTO: 46.4 % — SIGNIFICANT CHANGE UP (ref 43–77)
NRBC # BLD: 0 /100 WBCS — SIGNIFICANT CHANGE UP (ref 0–0)
PHOSPHATE SERPL-MCNC: 2.8 MG/DL — SIGNIFICANT CHANGE UP (ref 2.5–4.5)
PLATELET # BLD AUTO: 287 K/UL — SIGNIFICANT CHANGE UP (ref 150–400)
POTASSIUM SERPL-MCNC: 3.7 MMOL/L — SIGNIFICANT CHANGE UP (ref 3.5–5.3)
POTASSIUM SERPL-SCNC: 3.7 MMOL/L — SIGNIFICANT CHANGE UP (ref 3.5–5.3)
PROT SERPL-MCNC: 5.6 G/DL — LOW (ref 6–8.3)
RBC # BLD: 4.58 M/UL — SIGNIFICANT CHANGE UP (ref 4.2–5.8)
RBC # FLD: 15.9 % — HIGH (ref 10.3–14.5)
SODIUM SERPL-SCNC: 141 MMOL/L — SIGNIFICANT CHANGE UP (ref 135–145)
WBC # BLD: 4.89 K/UL — SIGNIFICANT CHANGE UP (ref 3.8–10.5)
WBC # FLD AUTO: 4.89 K/UL — SIGNIFICANT CHANGE UP (ref 3.8–10.5)

## 2020-04-11 PROCEDURE — 99233 SBSQ HOSP IP/OBS HIGH 50: CPT | Mod: CS

## 2020-04-11 RX ORDER — POTASSIUM CHLORIDE 20 MEQ
20 PACKET (EA) ORAL ONCE
Refills: 0 | Status: COMPLETED | OUTPATIENT
Start: 2020-04-11 | End: 2020-04-11

## 2020-04-11 RX ADMIN — Medication 650 MILLIGRAM(S): at 17:14

## 2020-04-11 RX ADMIN — DORZOLAMIDE HYDROCHLORIDE TIMOLOL MALEATE 1 DROP(S): 20; 5 SOLUTION/ DROPS OPHTHALMIC at 12:02

## 2020-04-11 RX ADMIN — ENOXAPARIN SODIUM 80 MILLIGRAM(S): 100 INJECTION SUBCUTANEOUS at 17:13

## 2020-04-11 RX ADMIN — Medication 20 MILLIEQUIVALENT(S): at 12:16

## 2020-04-11 RX ADMIN — FAMOTIDINE 20 MILLIGRAM(S): 10 INJECTION INTRAVENOUS at 17:13

## 2020-04-11 RX ADMIN — POLYETHYLENE GLYCOL 3350 17 GRAM(S): 17 POWDER, FOR SOLUTION ORAL at 12:00

## 2020-04-11 RX ADMIN — Medication 48 MILLIGRAM(S): at 12:00

## 2020-04-11 RX ADMIN — Medication 10 MILLIGRAM(S): at 11:59

## 2020-04-11 RX ADMIN — INSULIN GLARGINE 12 UNIT(S): 100 INJECTION, SOLUTION SUBCUTANEOUS at 23:55

## 2020-04-11 RX ADMIN — Medication 4 UNIT(S): at 17:14

## 2020-04-11 RX ADMIN — Medication 10 MILLIGRAM(S): at 23:55

## 2020-04-11 RX ADMIN — BRIMONIDINE TARTRATE 1 DROP(S): 2 SOLUTION/ DROPS OPHTHALMIC at 12:02

## 2020-04-11 RX ADMIN — Medication 75 MICROGRAM(S): at 05:58

## 2020-04-11 RX ADMIN — ATORVASTATIN CALCIUM 80 MILLIGRAM(S): 80 TABLET, FILM COATED ORAL at 23:55

## 2020-04-11 RX ADMIN — GABAPENTIN 300 MILLIGRAM(S): 400 CAPSULE ORAL at 17:14

## 2020-04-11 RX ADMIN — Medication 650 MILLIGRAM(S): at 12:00

## 2020-04-11 RX ADMIN — Medication 4 UNIT(S): at 11:59

## 2020-04-11 RX ADMIN — AMLODIPINE BESYLATE 10 MILLIGRAM(S): 2.5 TABLET ORAL at 12:00

## 2020-04-11 RX ADMIN — QUETIAPINE FUMARATE 800 MILLIGRAM(S): 200 TABLET, FILM COATED ORAL at 23:22

## 2020-04-11 RX ADMIN — Medication 4 UNIT(S): at 07:16

## 2020-04-11 RX ADMIN — Medication 650 MILLIGRAM(S): at 23:54

## 2020-04-11 RX ADMIN — ENOXAPARIN SODIUM 80 MILLIGRAM(S): 100 INJECTION SUBCUTANEOUS at 05:59

## 2020-04-11 RX ADMIN — Medication 650 MILLIGRAM(S): at 05:58

## 2020-04-11 RX ADMIN — FAMOTIDINE 20 MILLIGRAM(S): 10 INJECTION INTRAVENOUS at 05:58

## 2020-04-11 RX ADMIN — Medication 100 MILLIGRAM(S): at 05:58

## 2020-04-11 RX ADMIN — Medication 10 MILLIGRAM(S): at 05:58

## 2020-04-11 RX ADMIN — GABAPENTIN 300 MILLIGRAM(S): 400 CAPSULE ORAL at 05:58

## 2020-04-11 RX ADMIN — SENNA PLUS 2 TABLET(S): 8.6 TABLET ORAL at 12:00

## 2020-04-11 NOTE — PROGRESS NOTE ADULT - SUBJECTIVE AND OBJECTIVE BOX
INTERVAL HPI/OVERNIGHT EVENTS: No acute events overnight.     SUBJECTIVE: Patient seen and examined at bedside.     OBJECTIVE:    VITAL SIGNS:  ICU Vital Signs Last 24 Hrs  T(C): 36 (11 Apr 2020 05:05), Max: 36.6 (10 Apr 2020 20:26)  T(F): 96.8 (11 Apr 2020 05:05), Max: 97.9 (10 Apr 2020 20:26)  HR: 66 (11 Apr 2020 06:29) (66 - 78)  BP: 134/74 (11 Apr 2020 06:29) (115/76 - 134/74)  BP(mean): --  ABP: --  ABP(mean): --  RR: 20 (11 Apr 2020 06:29) (20 - 20)  SpO2: 96% (11 Apr 2020 06:29) (95% - 99%)        04-10 @ 07:01  -  04-11 @ 07:00  --------------------------------------------------------  IN: 0 mL / OUT: 775 mL / NET: -775 mL      CAPILLARY BLOOD GLUCOSE      POCT Blood Glucose.: 94 mg/dL (11 Apr 2020 06:18)      PHYSICAL EXAM:    General: NAD  HEENT: NC/AT; clear conjunctiva  Neck: supple  Respiratory: bibasilar crackles  Cardiovascular: +S1/S2; RRR, no murmurs, rubs, or gallops appreciated  Abdomen: soft, NT/ND; +BS x4  Extremities: no lower extremity edema noted  Neurological: alert and oriented to person and situation (time not assessed), no focal deficits appreciated    MEDICATIONS:  MEDICATIONS  (STANDING):  acetaminophen   Tablet .. 650 milliGRAM(s) Oral every 6 hours  amLODIPine   Tablet 10 milliGRAM(s) Oral every 24 hours  atorvastatin 80 milliGRAM(s) Oral at bedtime  brimonidine 0.2% Ophthalmic Solution 1 Drop(s) Both EYES two times a day  dextrose 50% Injectable 12.5 Gram(s) IV Push once  dextrose 50% Injectable 25 Gram(s) IV Push once  dextrose 50% Injectable 25 Gram(s) IV Push once  dorzolamide 2%/timolol 0.5% Ophthalmic Solution 1 Drop(s) Both EYES two times a day  enoxaparin Injectable 80 milliGRAM(s) SubCutaneous two times a day  famotidine    Tablet 20 milliGRAM(s) Oral two times a day  fenofibrate Tablet 48 milliGRAM(s) Oral daily  gabapentin 300 milliGRAM(s) Oral two times a day  hydrALAZINE 10 milliGRAM(s) Oral every 8 hours  insulin glargine Injectable (LANTUS) 12 Unit(s) SubCutaneous at bedtime  insulin lispro (HumaLOG) corrective regimen sliding scale   SubCutaneous Before meals and at bedtime  insulin lispro Injectable (HumaLOG) 4 Unit(s) SubCutaneous three times a day before meals  latanoprost 0.005% Ophthalmic Solution 1 Drop(s) Both EYES at bedtime  levothyroxine 75 MICROGram(s) Oral daily  metoprolol succinate  milliGRAM(s) Oral daily  polyethylene glycol 3350 17 Gram(s) Oral daily  QUEtiapine 800 milliGRAM(s) Oral at bedtime  senna 2 Tablet(s) Oral daily    MEDICATIONS  (PRN):  bisacodyl Suppository 10 milliGRAM(s) Rectal daily PRN Constipation  dextrose 40% Gel 15 Gram(s) Oral once PRN Blood Glucose LESS THAN 70 milliGRAM(s)/deciliter  glucagon  Injectable 1 milliGRAM(s) IntraMuscular once PRN Glucose LESS THAN 70 milligrams/deciliter      ALLERGIES:  Allergies    No Known Allergies    Intolerances        LABS:                        12.3   6.37  )-----------( 322      ( 10 Apr 2020 21:29 )             40.1     04-10    144  |  108  |  13  ----------------------------<  110<H>  4.0   |  27  |  0.81    Ca    9.2      10 Apr 2020 06:51  Phos  2.7     04-10  Mg     1.9     04-10    TPro  5.9<L>  /  Alb  3.2<L>  /  TBili  0.3  /  DBili  x   /  AST  29  /  ALT  33  /  AlkPhos  39<L>  04-10          RADIOLOGY & ADDITIONAL TESTS: Reviewed. Hospital Course     Pt is a 80 y/o M with PMH of HTM, DM, and hypothyroidism found down in his apartment s/p fall and was brought to the ED presenting in starvation ketosis and febrile and desatting on room air to 89%. Found to have elevated blood glucose to >300s and elevated beta hydroxy buturate, suspected starvation ketosis. Swabbed for COVID, placed on nasal cannula and transferred to [facility]. Transferred to [facility] secondary to delirium and desatting overnight on 3/26. Doing well after transfer, satting 100%on non-rebreather and maintaining once switched to nasal cannula. DDeveloped hyperglycemia; endocrine consulted; started on insulin. Was hypertensive at times to . NRB downtitrated to nasal cannula.     On 04/04, stroked code called for L-sided hemiparesis. CT neg for stroke, but MRI showed right acute infarct in parietal lobe and internal capsule. Started Lovenox full A/C and high dose statin, and will need to stop lovenox and transiiton to ASA and plavix on discharge. Patient now stable for COVID-RMF.     INTERVAL HPI/OVERNIGHT EVENTS: No acute events overnight.     SUBJECTIVE: Patient seen and examined at bedside.     OBJECTIVE:    VITAL SIGNS:  ICU Vital Signs Last 24 Hrs  T(C): 36 (11 Apr 2020 05:05), Max: 36.6 (10 Apr 2020 20:26)  T(F): 96.8 (11 Apr 2020 05:05), Max: 97.9 (10 Apr 2020 20:26)  HR: 66 (11 Apr 2020 06:29) (66 - 78)  BP: 134/74 (11 Apr 2020 06:29) (115/76 - 134/74)  BP(mean): --  ABP: --  ABP(mean): --  RR: 20 (11 Apr 2020 06:29) (20 - 20)  SpO2: 96% (11 Apr 2020 06:29) (95% - 99%)        04-10 @ 07:01  -  04-11 @ 07:00  --------------------------------------------------------  IN: 0 mL / OUT: 775 mL / NET: -775 mL      CAPILLARY BLOOD GLUCOSE      POCT Blood Glucose.: 94 mg/dL (11 Apr 2020 06:18)      PHYSICAL EXAM:    General: NAD  HEENT: NC/AT; clear conjunctiva  Neck: supple  Respiratory: bibasilar crackles  Cardiovascular: +S1/S2; RRR, no murmurs, rubs, or gallops appreciated  Abdomen: soft, NT/ND; +BS x4  Extremities: no lower extremity edema noted  Neurological: alert and oriented to person and situation (time not assessed), no focal deficits appreciated    MEDICATIONS:  MEDICATIONS  (STANDING):  acetaminophen   Tablet .. 650 milliGRAM(s) Oral every 6 hours  amLODIPine   Tablet 10 milliGRAM(s) Oral every 24 hours  atorvastatin 80 milliGRAM(s) Oral at bedtime  brimonidine 0.2% Ophthalmic Solution 1 Drop(s) Both EYES two times a day  dextrose 50% Injectable 12.5 Gram(s) IV Push once  dextrose 50% Injectable 25 Gram(s) IV Push once  dextrose 50% Injectable 25 Gram(s) IV Push once  dorzolamide 2%/timolol 0.5% Ophthalmic Solution 1 Drop(s) Both EYES two times a day  enoxaparin Injectable 80 milliGRAM(s) SubCutaneous two times a day  famotidine    Tablet 20 milliGRAM(s) Oral two times a day  fenofibrate Tablet 48 milliGRAM(s) Oral daily  gabapentin 300 milliGRAM(s) Oral two times a day  hydrALAZINE 10 milliGRAM(s) Oral every 8 hours  insulin glargine Injectable (LANTUS) 12 Unit(s) SubCutaneous at bedtime  insulin lispro (HumaLOG) corrective regimen sliding scale   SubCutaneous Before meals and at bedtime  insulin lispro Injectable (HumaLOG) 4 Unit(s) SubCutaneous three times a day before meals  latanoprost 0.005% Ophthalmic Solution 1 Drop(s) Both EYES at bedtime  levothyroxine 75 MICROGram(s) Oral daily  metoprolol succinate  milliGRAM(s) Oral daily  polyethylene glycol 3350 17 Gram(s) Oral daily  QUEtiapine 800 milliGRAM(s) Oral at bedtime  senna 2 Tablet(s) Oral daily    MEDICATIONS  (PRN):  bisacodyl Suppository 10 milliGRAM(s) Rectal daily PRN Constipation  dextrose 40% Gel 15 Gram(s) Oral once PRN Blood Glucose LESS THAN 70 milliGRAM(s)/deciliter  glucagon  Injectable 1 milliGRAM(s) IntraMuscular once PRN Glucose LESS THAN 70 milligrams/deciliter      ALLERGIES:  Allergies    No Known Allergies    Intolerances        LABS:                        12.3   6.37  )-----------( 322      ( 10 Apr 2020 21:29 )             40.1     04-10    144  |  108  |  13  ----------------------------<  110<H>  4.0   |  27  |  0.81    Ca    9.2      10 Apr 2020 06:51  Phos  2.7     04-10  Mg     1.9     04-10    TPro  5.9<L>  /  Alb  3.2<L>  /  TBili  0.3  /  DBili  x   /  AST  29  /  ALT  33  /  AlkPhos  39<L>  04-10          RADIOLOGY & ADDITIONAL TESTS: Reviewed.

## 2020-04-12 LAB
ALBUMIN SERPL ELPH-MCNC: 3 G/DL — LOW (ref 3.3–5)
ALP SERPL-CCNC: 36 U/L — LOW (ref 40–120)
ALT FLD-CCNC: 42 U/L — SIGNIFICANT CHANGE UP (ref 10–45)
ANION GAP SERPL CALC-SCNC: 10 MMOL/L — SIGNIFICANT CHANGE UP (ref 5–17)
AST SERPL-CCNC: 32 U/L — SIGNIFICANT CHANGE UP (ref 10–40)
BASOPHILS # BLD AUTO: 0.02 K/UL — SIGNIFICANT CHANGE UP (ref 0–0.2)
BASOPHILS NFR BLD AUTO: 0.4 % — SIGNIFICANT CHANGE UP (ref 0–2)
BILIRUB SERPL-MCNC: 0.3 MG/DL — SIGNIFICANT CHANGE UP (ref 0.2–1.2)
BUN SERPL-MCNC: 12 MG/DL — SIGNIFICANT CHANGE UP (ref 7–23)
CALCIUM SERPL-MCNC: 8.9 MG/DL — SIGNIFICANT CHANGE UP (ref 8.4–10.5)
CHLORIDE SERPL-SCNC: 108 MMOL/L — SIGNIFICANT CHANGE UP (ref 96–108)
CO2 SERPL-SCNC: 22 MMOL/L — SIGNIFICANT CHANGE UP (ref 22–31)
CREAT SERPL-MCNC: 0.77 MG/DL — SIGNIFICANT CHANGE UP (ref 0.5–1.3)
CRP SERPL-MCNC: <0.03 MG/DL — SIGNIFICANT CHANGE UP (ref 0–0.4)
D DIMER BLD IA.RAPID-MCNC: 159 NG/ML DDU — SIGNIFICANT CHANGE UP
EOSINOPHIL # BLD AUTO: 0.27 K/UL — SIGNIFICANT CHANGE UP (ref 0–0.5)
EOSINOPHIL NFR BLD AUTO: 5.7 % — SIGNIFICANT CHANGE UP (ref 0–6)
FERRITIN SERPL-MCNC: 386 NG/ML — SIGNIFICANT CHANGE UP (ref 30–400)
GLUCOSE BLDC GLUCOMTR-MCNC: 106 MG/DL — HIGH (ref 70–99)
GLUCOSE BLDC GLUCOMTR-MCNC: 128 MG/DL — HIGH (ref 70–99)
GLUCOSE BLDC GLUCOMTR-MCNC: 175 MG/DL — HIGH (ref 70–99)
GLUCOSE SERPL-MCNC: 127 MG/DL — HIGH (ref 70–99)
HCT VFR BLD CALC: 37.7 % — LOW (ref 39–50)
HGB BLD-MCNC: 12 G/DL — LOW (ref 13–17)
IMM GRANULOCYTES NFR BLD AUTO: 0.2 % — SIGNIFICANT CHANGE UP (ref 0–1.5)
LYMPHOCYTES # BLD AUTO: 1.79 K/UL — SIGNIFICANT CHANGE UP (ref 1–3.3)
LYMPHOCYTES # BLD AUTO: 37.8 % — SIGNIFICANT CHANGE UP (ref 13–44)
MAGNESIUM SERPL-MCNC: 1.8 MG/DL — SIGNIFICANT CHANGE UP (ref 1.6–2.6)
MCHC RBC-ENTMCNC: 26.7 PG — LOW (ref 27–34)
MCHC RBC-ENTMCNC: 31.8 GM/DL — LOW (ref 32–36)
MCV RBC AUTO: 84 FL — SIGNIFICANT CHANGE UP (ref 80–100)
MONOCYTES # BLD AUTO: 0.45 K/UL — SIGNIFICANT CHANGE UP (ref 0–0.9)
MONOCYTES NFR BLD AUTO: 9.5 % — SIGNIFICANT CHANGE UP (ref 2–14)
NEUTROPHILS # BLD AUTO: 2.2 K/UL — SIGNIFICANT CHANGE UP (ref 1.8–7.4)
NEUTROPHILS NFR BLD AUTO: 46.4 % — SIGNIFICANT CHANGE UP (ref 43–77)
NRBC # BLD: 0 /100 WBCS — SIGNIFICANT CHANGE UP (ref 0–0)
PHOSPHATE SERPL-MCNC: 2.6 MG/DL — SIGNIFICANT CHANGE UP (ref 2.5–4.5)
PLATELET # BLD AUTO: 283 K/UL — SIGNIFICANT CHANGE UP (ref 150–400)
POTASSIUM SERPL-MCNC: 4.2 MMOL/L — SIGNIFICANT CHANGE UP (ref 3.5–5.3)
POTASSIUM SERPL-SCNC: 4.2 MMOL/L — SIGNIFICANT CHANGE UP (ref 3.5–5.3)
PROT SERPL-MCNC: 5.8 G/DL — LOW (ref 6–8.3)
RBC # BLD: 4.49 M/UL — SIGNIFICANT CHANGE UP (ref 4.2–5.8)
RBC # FLD: 16.3 % — HIGH (ref 10.3–14.5)
SODIUM SERPL-SCNC: 140 MMOL/L — SIGNIFICANT CHANGE UP (ref 135–145)
WBC # BLD: 4.74 K/UL — SIGNIFICANT CHANGE UP (ref 3.8–10.5)
WBC # FLD AUTO: 4.74 K/UL — SIGNIFICANT CHANGE UP (ref 3.8–10.5)

## 2020-04-12 PROCEDURE — 99233 SBSQ HOSP IP/OBS HIGH 50: CPT | Mod: GC

## 2020-04-12 RX ORDER — TAMSULOSIN HYDROCHLORIDE 0.4 MG/1
0.4 CAPSULE ORAL AT BEDTIME
Refills: 0 | Status: DISCONTINUED | OUTPATIENT
Start: 2020-04-12 | End: 2020-04-16

## 2020-04-12 RX ADMIN — LATANOPROST 1 DROP(S): 0.05 SOLUTION/ DROPS OPHTHALMIC; TOPICAL at 22:54

## 2020-04-12 RX ADMIN — ENOXAPARIN SODIUM 80 MILLIGRAM(S): 100 INJECTION SUBCUTANEOUS at 05:00

## 2020-04-12 RX ADMIN — Medication 650 MILLIGRAM(S): at 17:24

## 2020-04-12 RX ADMIN — BRIMONIDINE TARTRATE 1 DROP(S): 2 SOLUTION/ DROPS OPHTHALMIC at 11:45

## 2020-04-12 RX ADMIN — Medication 10 MILLIGRAM(S): at 21:53

## 2020-04-12 RX ADMIN — Medication 650 MILLIGRAM(S): at 12:22

## 2020-04-12 RX ADMIN — QUETIAPINE FUMARATE 800 MILLIGRAM(S): 200 TABLET, FILM COATED ORAL at 21:54

## 2020-04-12 RX ADMIN — AMLODIPINE BESYLATE 10 MILLIGRAM(S): 2.5 TABLET ORAL at 12:22

## 2020-04-12 RX ADMIN — ENOXAPARIN SODIUM 80 MILLIGRAM(S): 100 INJECTION SUBCUTANEOUS at 17:24

## 2020-04-12 RX ADMIN — FAMOTIDINE 20 MILLIGRAM(S): 10 INJECTION INTRAVENOUS at 17:24

## 2020-04-12 RX ADMIN — GABAPENTIN 300 MILLIGRAM(S): 400 CAPSULE ORAL at 17:24

## 2020-04-12 RX ADMIN — Medication 75 MICROGRAM(S): at 07:26

## 2020-04-12 RX ADMIN — DORZOLAMIDE HYDROCHLORIDE TIMOLOL MALEATE 1 DROP(S): 20; 5 SOLUTION/ DROPS OPHTHALMIC at 22:55

## 2020-04-12 RX ADMIN — Medication 100 MILLIGRAM(S): at 07:23

## 2020-04-12 RX ADMIN — DORZOLAMIDE HYDROCHLORIDE TIMOLOL MALEATE 1 DROP(S): 20; 5 SOLUTION/ DROPS OPHTHALMIC at 11:45

## 2020-04-12 RX ADMIN — INSULIN GLARGINE 12 UNIT(S): 100 INJECTION, SOLUTION SUBCUTANEOUS at 22:52

## 2020-04-12 RX ADMIN — FAMOTIDINE 20 MILLIGRAM(S): 10 INJECTION INTRAVENOUS at 07:22

## 2020-04-12 RX ADMIN — Medication 10 MILLIGRAM(S): at 07:23

## 2020-04-12 RX ADMIN — BRIMONIDINE TARTRATE 1 DROP(S): 2 SOLUTION/ DROPS OPHTHALMIC at 22:54

## 2020-04-12 RX ADMIN — GABAPENTIN 300 MILLIGRAM(S): 400 CAPSULE ORAL at 07:22

## 2020-04-12 RX ADMIN — ATORVASTATIN CALCIUM 80 MILLIGRAM(S): 80 TABLET, FILM COATED ORAL at 21:52

## 2020-04-12 RX ADMIN — Medication 10 MILLIGRAM(S): at 15:39

## 2020-04-12 RX ADMIN — Medication 2: at 12:09

## 2020-04-12 RX ADMIN — Medication 48 MILLIGRAM(S): at 12:22

## 2020-04-12 RX ADMIN — Medication 650 MILLIGRAM(S): at 07:22

## 2020-04-12 RX ADMIN — Medication 4 UNIT(S): at 07:31

## 2020-04-12 RX ADMIN — Medication 4 UNIT(S): at 16:57

## 2020-04-12 RX ADMIN — SENNA PLUS 2 TABLET(S): 8.6 TABLET ORAL at 12:23

## 2020-04-12 RX ADMIN — Medication 4 UNIT(S): at 12:09

## 2020-04-12 RX ADMIN — TAMSULOSIN HYDROCHLORIDE 0.4 MILLIGRAM(S): 0.4 CAPSULE ORAL at 21:57

## 2020-04-12 NOTE — PROGRESS NOTE ADULT - ASSESSMENT
79M w/ PMHx of HTN, DM2, former smoker, hypothyroidism [facility] down for a fall, found to have starvation ketosis now resolved, was desatting with imaging findings on CXR and found to be COVID positive, now on nonrebreather for 02 support. SC 4/4 for new left sided weakness - NIHSS 7.  HCT negative, CTA showed moderate to severe left carotid stenosis. 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 Stroke etiology likely 2/2 to hypercoagulable status in the setting of COVID; however, cannot rule out cardioembolic case of stroke less likely large vessel atherosclerosis 2/2 to left carotid stenosis. Neuro exam 4/9 unchanged. Will continue to follow. TTE within normal limits, EF 61%.      Problem/Plan - 1:  ·  Problem: Acute respiratory failure with hypoxia.  Plan: 2/2 COVID   - s/p a 5 days course of azithromycin, plaquenil, vitamin C and thiamine, as well as steroids for 7 days   - S/p tociluzumab  - Now on nasal canula  - Diurese as needed, 40mg Lasix  - OOBTC.      Problem/Plan - 2:  ·  Problem: Stroke.  Plan: Patient with L sided weakness in Upper and Lower extremity. NIHSS 7. No acute events on CTH.   - Carotid doppler ordered given carotid atherosclerosis seen in CT  - MRI brain w/wo contrast with right acute infarct in parietal lobe and internal capsule. Started lovenox at 80mg BID. Change to aspirin and plavix at discharge.  - Atorvastatin 80mg daily  - Echo showed EF of 61%.      Problem/Plan - 3:  ·  Problem: Weakness.  Plan: Neurology following  - PT, OT.      Problem/Plan - 4:  ·  Problem: Hematuria.    - Canseco catheter  - Urology consulted  - discharge to [facility] with canseco  - f/u with urology as outpt.     Problem/Plan - 4:  ·  Problem: Diabetes.  Plan: - lantus 12 units qhs  - 4U premeal   - MISS  - consistent carb diet  - out pt Endocrine f/u.      Problem/Plan - 5:  ·  Problem: Essential hypertension.  Plan: Well controlled on current regimen  - c/w hydral 10mg q8  - c/w amlodipine 10mg qd.      Problem/Plan - 6:  Problem: Hypothyroidism. Plan: - c/w home synthroid 75mcg daily.     Problem/Plan - 7:  ·  Problem: Hyperlipidemia.  Plan: home atorvastatin 20mg qhs  - Increased to atovastatin 80mg qhs, given recent stroke.      Problem/Plan - 8:  ·  Problem: Prophylactic measure.  Plan: DVT PPx: Lovenox 80mg BID  GI PPx: Famotidine 20mg  Full code.

## 2020-04-12 NOTE — PROGRESS NOTE ADULT - SUBJECTIVE AND OBJECTIVE BOX
Medicine Progress Note    Patient examined in bed. Reports fatigue, but denies SOB or cough. Catheter in place with scant hematuria.      SUBJECTIVE / OVERNIGHT EVENTS:    ADDITIONAL REVIEW OF SYSTEMS:    MEDICATIONS  (STANDING):  acetaminophen   Tablet .. 650 milliGRAM(s) Oral every 6 hours  amLODIPine   Tablet 10 milliGRAM(s) Oral every 24 hours  atorvastatin 80 milliGRAM(s) Oral at bedtime  brimonidine 0.2% Ophthalmic Solution 1 Drop(s) Both EYES two times a day  dextrose 50% Injectable 12.5 Gram(s) IV Push once  dextrose 50% Injectable 25 Gram(s) IV Push once  dextrose 50% Injectable 25 Gram(s) IV Push once  dorzolamide 2%/timolol 0.5% Ophthalmic Solution 1 Drop(s) Both EYES two times a day  enoxaparin Injectable 80 milliGRAM(s) SubCutaneous two times a day  famotidine    Tablet 20 milliGRAM(s) Oral two times a day  fenofibrate Tablet 48 milliGRAM(s) Oral daily  gabapentin 300 milliGRAM(s) Oral two times a day  hydrALAZINE 10 milliGRAM(s) Oral every 8 hours  insulin glargine Injectable (LANTUS) 12 Unit(s) SubCutaneous at bedtime  insulin lispro (HumaLOG) corrective regimen sliding scale   SubCutaneous Before meals and at bedtime  insulin lispro Injectable (HumaLOG) 4 Unit(s) SubCutaneous three times a day before meals  latanoprost 0.005% Ophthalmic Solution 1 Drop(s) Both EYES at bedtime  levothyroxine 75 MICROGram(s) Oral daily  metoprolol succinate  milliGRAM(s) Oral daily  polyethylene glycol 3350 17 Gram(s) Oral daily  QUEtiapine 800 milliGRAM(s) Oral at bedtime  senna 2 Tablet(s) Oral daily    MEDICATIONS  (PRN):  bisacodyl Suppository 10 milliGRAM(s) Rectal daily PRN Constipation  dextrose 40% Gel 15 Gram(s) Oral once PRN Blood Glucose LESS THAN 70 milliGRAM(s)/deciliter  glucagon  Injectable 1 milliGRAM(s) IntraMuscular once PRN Glucose LESS THAN 70 milligrams/deciliter    CAPILLARY BLOOD GLUCOSE      POCT Blood Glucose.: 128 mg/dL (12 Apr 2020 06:58)  POCT Blood Glucose.: 140 mg/dL (11 Apr 2020 23:22)  POCT Blood Glucose.: 115 mg/dL (11 Apr 2020 16:49)  POCT Blood Glucose.: 144 mg/dL (11 Apr 2020 11:20)    I&O's Summary    11 Apr 2020 07:01  -  12 Apr 2020 07:00  --------------------------------------------------------  IN: 0 mL / OUT: 250 mL / NET: -250 mL        PHYSICAL EXAM:  Vital Signs Last 24 Hrs  T(C): 36 (12 Apr 2020 05:27), Max: 36.1 (11 Apr 2020 12:48)  T(F): 96.8 (12 Apr 2020 05:27), Max: 97 (11 Apr 2020 21:00)  HR: 72 (12 Apr 2020 05:27) (66 - 74)  BP: 132/70 (12 Apr 2020 05:27) (123/72 - 132/70)  BP(mean): 95 (11 Apr 2020 17:27) (90 - 95)  RR: 20 (12 Apr 2020 05:27) (20 - 20)  SpO2: 92% (12 Apr 2020 05:27) (91% - 95%)  CONSTITUTIONAL: NAD, well-developed, well-groomed  ENMT: Moist oral mucosa, no pharyngeal injection or exudates; normal dentition  RESPIRATORY: Normal respiratory effort; lungs are clear to auscultation bilaterally  CARDIOVASCULAR: Regular rate and rhythm, normal S1 and S2, no murmur/rub/gallop; No lower extremity edema; Peripheral pulses are 2+ bilaterally  ABDOMEN: Nontender to palpation, normoactive bowel sounds, no rebound/guarding; No hepatosplenomegaly  PSYCH: A+O to person, place, and time; affect appropriate  NEUROLOGY: CN 2-12 are intact and symmetric; no gross sensory deficits   SKIN: No rashes; no palpable lesions    LABS:                        12.1   4.89  )-----------( 287      ( 11 Apr 2020 09:34 )             38.4     04-11    141  |  106  |  12  ----------------------------<  86  3.7   |  27  |  0.78    Ca    8.9      11 Apr 2020 09:34  Phos  2.8     04-11  Mg     2.0     04-11    TPro  5.6<L>  /  Alb  3.1<L>  /  TBili  0.3  /  DBili  x   /  AST  34  /  ALT  42  /  AlkPhos  36<L>  04-11              COVID-19 PCR: Detected (31 Mar 2020 15:58)      RADIOLOGY & ADDITIONAL TESTS:  Imaging from Last 24 Hours:    Electrocardiogram/QTc Interval:    COORDINATION OF CARE:  Care Discussed with Consultants/Other Providers: Medicine Progress Note    Patient examined in bed. Reports fatigue, but denies SOB or cough. Catheter in place with scant hematuria. Resting comfortably on 4 L NC with Pox 92%. Ambulates with 1 person assist without overt dyspnea.    SUBJECTIVE / OVERNIGHT EVENTS:    ADDITIONAL REVIEW OF SYSTEMS:    MEDICATIONS  (STANDING):  acetaminophen   Tablet .. 650 milliGRAM(s) Oral every 6 hours  amLODIPine   Tablet 10 milliGRAM(s) Oral every 24 hours  atorvastatin 80 milliGRAM(s) Oral at bedtime  brimonidine 0.2% Ophthalmic Solution 1 Drop(s) Both EYES two times a day  dextrose 50% Injectable 12.5 Gram(s) IV Push once  dextrose 50% Injectable 25 Gram(s) IV Push once  dextrose 50% Injectable 25 Gram(s) IV Push once  dorzolamide 2%/timolol 0.5% Ophthalmic Solution 1 Drop(s) Both EYES two times a day  enoxaparin Injectable 80 milliGRAM(s) SubCutaneous two times a day  famotidine    Tablet 20 milliGRAM(s) Oral two times a day  fenofibrate Tablet 48 milliGRAM(s) Oral daily  gabapentin 300 milliGRAM(s) Oral two times a day  hydrALAZINE 10 milliGRAM(s) Oral every 8 hours  insulin glargine Injectable (LANTUS) 12 Unit(s) SubCutaneous at bedtime  insulin lispro (HumaLOG) corrective regimen sliding scale   SubCutaneous Before meals and at bedtime  insulin lispro Injectable (HumaLOG) 4 Unit(s) SubCutaneous three times a day before meals  latanoprost 0.005% Ophthalmic Solution 1 Drop(s) Both EYES at bedtime  levothyroxine 75 MICROGram(s) Oral daily  metoprolol succinate  milliGRAM(s) Oral daily  polyethylene glycol 3350 17 Gram(s) Oral daily  QUEtiapine 800 milliGRAM(s) Oral at bedtime  senna 2 Tablet(s) Oral daily    MEDICATIONS  (PRN):  bisacodyl Suppository 10 milliGRAM(s) Rectal daily PRN Constipation  dextrose 40% Gel 15 Gram(s) Oral once PRN Blood Glucose LESS THAN 70 milliGRAM(s)/deciliter  glucagon  Injectable 1 milliGRAM(s) IntraMuscular once PRN Glucose LESS THAN 70 milligrams/deciliter    CAPILLARY BLOOD GLUCOSE      POCT Blood Glucose.: 128 mg/dL (12 Apr 2020 06:58)  POCT Blood Glucose.: 140 mg/dL (11 Apr 2020 23:22)  POCT Blood Glucose.: 115 mg/dL (11 Apr 2020 16:49)  POCT Blood Glucose.: 144 mg/dL (11 Apr 2020 11:20)    I&O's Summary    11 Apr 2020 07:01  -  12 Apr 2020 07:00  --------------------------------------------------------  IN: 0 mL / OUT: 250 mL / NET: -250 mL        PHYSICAL EXAM:  Vital Signs Last 24 Hrs  T(C): 36 (12 Apr 2020 05:27), Max: 36.1 (11 Apr 2020 12:48)  T(F): 96.8 (12 Apr 2020 05:27), Max: 97 (11 Apr 2020 21:00)  HR: 72 (12 Apr 2020 05:27) (66 - 74)  BP: 132/70 (12 Apr 2020 05:27) (123/72 - 132/70)  BP(mean): 95 (11 Apr 2020 17:27) (90 - 95)  RR: 20 (12 Apr 2020 05:27) (20 - 20)  SpO2: 92% (12 Apr 2020 05:27) (91% - 95%)    CONSTITUTIONAL: NAD, well-developed, well-groomed  ENMT: Moist oral mucosa, no pharyngeal injection or exudates; normal dentition  RESPIRATORY: Normal respiratory effort; lungs are clear to auscultation bilaterally  CARDIOVASCULAR: Regular rate and rhythm, normal S1 and S2, no murmur/rub/gallop; No lower extremity edema; Peripheral pulses are 2+ bilaterally  ABDOMEN: Nontender to palpation, normoactive bowel sounds, no rebound/guarding; No hepatosplenomegaly  PSYCH: A+O to person, place, and time, forgetful; affect appropriate  NEUROLOGY: CN 2-12 are intact and symmetric; no gross sensory deficits   SKIN: No rashes; no palpable lesions    LABS:                        12.1   4.89  )-----------( 287      ( 11 Apr 2020 09:34 )             38.4     04-11    141  |  106  |  12  ----------------------------<  86  3.7   |  27  |  0.78    Ca    8.9      11 Apr 2020 09:34  Phos  2.8     04-11  Mg     2.0     04-11    TPro  5.6<L>  /  Alb  3.1<L>  /  TBili  0.3  /  DBili  x   /  AST  34  /  ALT  42  /  AlkPhos  36<L>  04-11              COVID-19 PCR: Detected (31 Mar 2020 15:58)      RADIOLOGY & ADDITIONAL TESTS:  Imaging from Last 24 Hours:    Electrocardiogram/QTc Interval:    COORDINATION OF CARE:  Care Discussed with Consultants/Other Providers:

## 2020-04-13 LAB
ALBUMIN SERPL ELPH-MCNC: 3.2 G/DL — LOW (ref 3.3–5)
ALP SERPL-CCNC: 37 U/L — LOW (ref 40–120)
ALT FLD-CCNC: 43 U/L — SIGNIFICANT CHANGE UP (ref 10–45)
ANION GAP SERPL CALC-SCNC: 9 MMOL/L — SIGNIFICANT CHANGE UP (ref 5–17)
AST SERPL-CCNC: 30 U/L — SIGNIFICANT CHANGE UP (ref 10–40)
BASOPHILS # BLD AUTO: 0.01 K/UL — SIGNIFICANT CHANGE UP (ref 0–0.2)
BASOPHILS NFR BLD AUTO: 0.2 % — SIGNIFICANT CHANGE UP (ref 0–2)
BILIRUB SERPL-MCNC: 0.3 MG/DL — SIGNIFICANT CHANGE UP (ref 0.2–1.2)
BUN SERPL-MCNC: 11 MG/DL — SIGNIFICANT CHANGE UP (ref 7–23)
CALCIUM SERPL-MCNC: 8.9 MG/DL — SIGNIFICANT CHANGE UP (ref 8.4–10.5)
CHLORIDE SERPL-SCNC: 109 MMOL/L — HIGH (ref 96–108)
CO2 SERPL-SCNC: 24 MMOL/L — SIGNIFICANT CHANGE UP (ref 22–31)
CREAT SERPL-MCNC: 0.83 MG/DL — SIGNIFICANT CHANGE UP (ref 0.5–1.3)
CRP SERPL-MCNC: <0.03 MG/DL — SIGNIFICANT CHANGE UP (ref 0–0.4)
D DIMER BLD IA.RAPID-MCNC: 680 NG/ML DDU — HIGH
EOSINOPHIL # BLD AUTO: 0.42 K/UL — SIGNIFICANT CHANGE UP (ref 0–0.5)
EOSINOPHIL NFR BLD AUTO: 9.4 % — HIGH (ref 0–6)
FERRITIN SERPL-MCNC: 356 NG/ML — SIGNIFICANT CHANGE UP (ref 30–400)
GLUCOSE BLDC GLUCOMTR-MCNC: 112 MG/DL — HIGH (ref 70–99)
GLUCOSE BLDC GLUCOMTR-MCNC: 156 MG/DL — HIGH (ref 70–99)
GLUCOSE BLDC GLUCOMTR-MCNC: 208 MG/DL — HIGH (ref 70–99)
GLUCOSE BLDC GLUCOMTR-MCNC: 252 MG/DL — HIGH (ref 70–99)
GLUCOSE BLDC GLUCOMTR-MCNC: 83 MG/DL — SIGNIFICANT CHANGE UP (ref 70–99)
GLUCOSE SERPL-MCNC: 119 MG/DL — HIGH (ref 70–99)
HCT VFR BLD CALC: 39.2 % — SIGNIFICANT CHANGE UP (ref 39–50)
HGB BLD-MCNC: 12.3 G/DL — LOW (ref 13–17)
IMM GRANULOCYTES NFR BLD AUTO: 0.2 % — SIGNIFICANT CHANGE UP (ref 0–1.5)
LYMPHOCYTES # BLD AUTO: 1.91 K/UL — SIGNIFICANT CHANGE UP (ref 1–3.3)
LYMPHOCYTES # BLD AUTO: 42.8 % — SIGNIFICANT CHANGE UP (ref 13–44)
MAGNESIUM SERPL-MCNC: 1.9 MG/DL — SIGNIFICANT CHANGE UP (ref 1.6–2.6)
MCHC RBC-ENTMCNC: 26.6 PG — LOW (ref 27–34)
MCHC RBC-ENTMCNC: 31.4 GM/DL — LOW (ref 32–36)
MCV RBC AUTO: 84.7 FL — SIGNIFICANT CHANGE UP (ref 80–100)
MONOCYTES # BLD AUTO: 0.37 K/UL — SIGNIFICANT CHANGE UP (ref 0–0.9)
MONOCYTES NFR BLD AUTO: 8.3 % — SIGNIFICANT CHANGE UP (ref 2–14)
NEUTROPHILS # BLD AUTO: 1.74 K/UL — LOW (ref 1.8–7.4)
NEUTROPHILS NFR BLD AUTO: 39.1 % — LOW (ref 43–77)
NRBC # BLD: 0 /100 WBCS — SIGNIFICANT CHANGE UP (ref 0–0)
PHOSPHATE SERPL-MCNC: 2.8 MG/DL — SIGNIFICANT CHANGE UP (ref 2.5–4.5)
PLATELET # BLD AUTO: 278 K/UL — SIGNIFICANT CHANGE UP (ref 150–400)
POTASSIUM SERPL-MCNC: 4.1 MMOL/L — SIGNIFICANT CHANGE UP (ref 3.5–5.3)
POTASSIUM SERPL-SCNC: 4.1 MMOL/L — SIGNIFICANT CHANGE UP (ref 3.5–5.3)
PROT SERPL-MCNC: 5.9 G/DL — LOW (ref 6–8.3)
RBC # BLD: 4.63 M/UL — SIGNIFICANT CHANGE UP (ref 4.2–5.8)
RBC # FLD: 16.7 % — HIGH (ref 10.3–14.5)
SODIUM SERPL-SCNC: 142 MMOL/L — SIGNIFICANT CHANGE UP (ref 135–145)
WBC # BLD: 4.46 K/UL — SIGNIFICANT CHANGE UP (ref 3.8–10.5)
WBC # FLD AUTO: 4.46 K/UL — SIGNIFICANT CHANGE UP (ref 3.8–10.5)

## 2020-04-13 PROCEDURE — 99232 SBSQ HOSP IP/OBS MODERATE 35: CPT | Mod: CS,GC

## 2020-04-13 RX ADMIN — Medication 100 MILLIGRAM(S): at 05:45

## 2020-04-13 RX ADMIN — DORZOLAMIDE HYDROCHLORIDE TIMOLOL MALEATE 1 DROP(S): 20; 5 SOLUTION/ DROPS OPHTHALMIC at 23:24

## 2020-04-13 RX ADMIN — BRIMONIDINE TARTRATE 1 DROP(S): 2 SOLUTION/ DROPS OPHTHALMIC at 23:24

## 2020-04-13 RX ADMIN — Medication 650 MILLIGRAM(S): at 16:57

## 2020-04-13 RX ADMIN — QUETIAPINE FUMARATE 800 MILLIGRAM(S): 200 TABLET, FILM COATED ORAL at 23:22

## 2020-04-13 RX ADMIN — Medication 2: at 12:36

## 2020-04-13 RX ADMIN — Medication 650 MILLIGRAM(S): at 05:38

## 2020-04-13 RX ADMIN — Medication 650 MILLIGRAM(S): at 12:07

## 2020-04-13 RX ADMIN — AMLODIPINE BESYLATE 10 MILLIGRAM(S): 2.5 TABLET ORAL at 12:08

## 2020-04-13 RX ADMIN — Medication 650 MILLIGRAM(S): at 23:23

## 2020-04-13 RX ADMIN — TAMSULOSIN HYDROCHLORIDE 0.4 MILLIGRAM(S): 0.4 CAPSULE ORAL at 23:22

## 2020-04-13 RX ADMIN — Medication 4 UNIT(S): at 08:41

## 2020-04-13 RX ADMIN — ENOXAPARIN SODIUM 80 MILLIGRAM(S): 100 INJECTION SUBCUTANEOUS at 16:58

## 2020-04-13 RX ADMIN — Medication 650 MILLIGRAM(S): at 00:38

## 2020-04-13 RX ADMIN — GABAPENTIN 300 MILLIGRAM(S): 400 CAPSULE ORAL at 05:45

## 2020-04-13 RX ADMIN — Medication 48 MILLIGRAM(S): at 13:06

## 2020-04-13 RX ADMIN — Medication 4: at 23:39

## 2020-04-13 RX ADMIN — Medication 4 UNIT(S): at 12:37

## 2020-04-13 RX ADMIN — Medication 10 MILLIGRAM(S): at 05:45

## 2020-04-13 RX ADMIN — DORZOLAMIDE HYDROCHLORIDE TIMOLOL MALEATE 1 DROP(S): 20; 5 SOLUTION/ DROPS OPHTHALMIC at 12:07

## 2020-04-13 RX ADMIN — Medication 75 MICROGRAM(S): at 05:45

## 2020-04-13 RX ADMIN — FAMOTIDINE 20 MILLIGRAM(S): 10 INJECTION INTRAVENOUS at 05:46

## 2020-04-13 RX ADMIN — Medication 10 MILLIGRAM(S): at 12:21

## 2020-04-13 RX ADMIN — LATANOPROST 1 DROP(S): 0.05 SOLUTION/ DROPS OPHTHALMIC; TOPICAL at 23:23

## 2020-04-13 RX ADMIN — Medication 10 MILLIGRAM(S): at 23:24

## 2020-04-13 RX ADMIN — ATORVASTATIN CALCIUM 80 MILLIGRAM(S): 80 TABLET, FILM COATED ORAL at 23:22

## 2020-04-13 RX ADMIN — ENOXAPARIN SODIUM 80 MILLIGRAM(S): 100 INJECTION SUBCUTANEOUS at 05:46

## 2020-04-13 RX ADMIN — GABAPENTIN 300 MILLIGRAM(S): 400 CAPSULE ORAL at 16:58

## 2020-04-13 RX ADMIN — BRIMONIDINE TARTRATE 1 DROP(S): 2 SOLUTION/ DROPS OPHTHALMIC at 12:07

## 2020-04-13 RX ADMIN — FAMOTIDINE 20 MILLIGRAM(S): 10 INJECTION INTRAVENOUS at 16:58

## 2020-04-13 NOTE — PROGRESS NOTE ADULT - SUBJECTIVE AND OBJECTIVE BOX
PA Adult Transfer Note [facility]    Subjective Assessment: Patient seen and examined at bedside sleeping comfortably. Nasal cannula not in his nose and O2 sats >93%.    Hospital course: Pt is a 78 y/o M with PMH of HTM, DM, and hypothyroidism found down in his apartment s/p fall and was brought to the ED presenting in starvation ketosis and febrile and desatting on room air to 89%. Found to have elevated blood glucose to >300s and elevated beta hydroxy buturate, suspected starvation ketosis. Swabbed for COVID, placed on nasal cannula and transferred to [facility]. Transferred to [facility] secondary to delirium and desatting overnight on 3/26. Doing well after transfer, satting 100%on non-rebreather and maintaining once switched to nasal cannula. Developed hyperglycemia; endocrine consulted; started on insulin. Was hypertensive at times to . NRB downtitrated to nasal cannula.  On 04/04, stroked code called for L-sided hemiparesis. CT neg for stroke, but MRI showed right acute infarct in parietal lobe and internal capsule. Started Lovenox full A/C and high dose statin, and will need to stop lovenox and transition to ASA and plavix on discharge. Patient tx-d to [facility] and stabilized. PT saw pt and recommended LOYD. He is now stable for stepdown to [facility] CAP unit while awaiting LOYD.     	  MEDICATIONS:  amLODIPine   Tablet 10 milliGRAM(s) Oral every 24 hours  hydrALAZINE 10 milliGRAM(s) Oral every 8 hours  metoprolol succinate  milliGRAM(s) Oral daily  tamsulosin 0.4 milliGRAM(s) Oral at bedtime  acetaminophen   Tablet .. 650 milliGRAM(s) Oral every 6 hours  gabapentin 300 milliGRAM(s) Oral two times a day  QUEtiapine 800 milliGRAM(s) Oral at bedtime  bisacodyl Suppository 10 milliGRAM(s) Rectal daily PRN  famotidine    Tablet 20 milliGRAM(s) Oral two times a day  polyethylene glycol 3350 17 Gram(s) Oral daily  senna 2 Tablet(s) Oral daily  atorvastatin 80 milliGRAM(s) Oral at bedtime  dextrose 40% Gel 15 Gram(s) Oral once PRN  dextrose 50% Injectable 12.5 Gram(s) IV Push once  dextrose 50% Injectable 25 Gram(s) IV Push once  dextrose 50% Injectable 25 Gram(s) IV Push once  fenofibrate Tablet 48 milliGRAM(s) Oral daily  glucagon  Injectable 1 milliGRAM(s) IntraMuscular once PRN  insulin glargine Injectable (LANTUS) 12 Unit(s) SubCutaneous at bedtime  insulin lispro (HumaLOG) corrective regimen sliding scale   SubCutaneous Before meals and at bedtime  insulin lispro Injectable (HumaLOG) 4 Unit(s) SubCutaneous three times a day before meals  levothyroxine 75 MICROGram(s) Oral daily  brimonidine 0.2% Ophthalmic Solution 1 Drop(s) Both EYES two times a day  dorzolamide 2%/timolol 0.5% Ophthalmic Solution 1 Drop(s) Both EYES two times a day  enoxaparin Injectable 80 milliGRAM(s) SubCutaneous two times a day  latanoprost 0.005% Ophthalmic Solution 1 Drop(s) Both EYES at bedtime        [PHYSICAL EXAM:  TELEMETRY:  T(C): 36.3 (04-13-20 @ 06:18), Max: 36.6 (04-12-20 @ 22:55)  HR: 74 (04-13-20 @ 06:18) (74 - 79)  BP: 105/69 (04-13-20 @ 06:18) (105/69 - 121/77)  RR: 17 (04-12-20 @ 10:00) (17 - 17)  SpO2: 93% (04-13-20 @ 06:18) (93% - 96%)  Wt(kg): --  I&O's Summary    12 Apr 2020 07:01  -  13 Apr 2020 07:00  --------------------------------------------------------  IN: 462 mL / OUT: 850 mL / NET: -388 mL                                        Appearance: NAD	  HEENT:   Normal oral mucosa, PERRL, EOMI	  Neck: Supple, + JVD/ - JVD; Carotid Bruit   Cardiovascular: Normal S1 S2, No JVD, No murmurs,   Respiratory: Lungs clear to auscultation  Gastrointestinal:  Soft, Non-tender, + BS	  Skin: No rashes, No ecchymoses, No cyanosis  Extremities: Normal range of motion, No clubbing, cyanosis or edema  Vascular: Peripheral pulses palpable 2+ bilaterally  Neurologic: Non-focal  Psychiatry: A & O x 3, Mood & affect appropriate    	    LABS:	 	                        12.0   4.74  )-----------( 283      ( 12 Apr 2020 09:27 )             37.7     04-12    140  |  108  |  12  ----------------------------<  127<H>  4.2   |  22  |  0.77    Ca    8.9      12 Apr 2020 09:27  Phos  2.6     04-12  Mg     1.8     04-12    TPro  5.8<L>  /  Alb  3.0<L>  /  TBili  0.3  /  DBili  x   /  AST  32  /  ALT  42  /  AlkPhos  36<L>  04-12

## 2020-04-13 NOTE — PROGRESS NOTE ADULT - PROBLEM SELECTOR PLAN 1
2/2 COVID   - s/p a 5 days course of azithromycin, plaquenil, vitamin C and thiamine, as well as steroids for 7 days   - S/p tociluzumab  - Now on nasal canula  - Diurese as needed, 40mg Lasix PRN  - OOBTC

## 2020-04-13 NOTE — PROGRESS NOTE ADULT - ASSESSMENT
per Internal Medicine    79M w/ PMHx of HTN, DM2, former smoker, hypothyroidism [facility] being found down for a fall, found to have starvation ketosis now resolved, was desatting with imaging findings on CXR and found to be COVID positive, now on nonrebreather for 02 support. SC 4/4 for new left sided weakness - NIHSS 7.  HCT negative, CTA showed moderate to severe left carotid stenosis. 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 Stroke etiology likely 2/2 to hypercoagulable status in the setting of COVID; however, cannot rule out cardioembolic case of stroke less likely large vessel atherosclerosis 2/2 to left carotid stenosis. Neuro exam 4/9 unchanged. Will continue to follow. TTE within normal limits, EF 61%.     Problem/Plan - 1:  ·  Problem: Acute respiratory failure with hypoxia.  Plan: 2/2 COVID   - s/p a 5 days course of azithromycin, plaquenil, vitamin C and thiamine, as well as steroids for 7 days   - S/p tociluzumab  - Now on nasal canula  - Diurese as needed, 40mg Lasix PRN  - OOBTC.     Problem/Plan - 2:  ·  Problem: Stroke.  Plan: Patient with L sided weakness in Upper and Lower extremity. NIHSS 7. No acute events on CTH.   - Carotid doppler ordered given carotid atherosclerosis seen in CT  - MRI brain w/wo contrast with right acute infarct in parietal lobe and internal capsule. Started lovenox at 80mg BID. Transition to ASA/plavix upon d/c  - Atorvastatin 80mg daily  - Echo showed EF of 61%.     Problem/Plan - 3:  ·  Problem: Weakness.  Plan: Neurology following  - PT, OT.     Problem/Plan - 4:  ·  Problem: Diabetes.  Plan: - lantus 12 units qhs  - 4U premeal   - MISS  - consistent carb diet  - out pt Endocrine f/u.     Problem/Plan - 5:  ·  Problem: Essential hypertension.  Plan: Well controlled on current regimen  - c/w hydral 10mg q8  - c/w amlodipine 10mg qd.     Problem/Plan - 6:  Problem: Hypothyroidism. Plan: - c/w home synthroid 75mcg daily.    Problem/Plan - 7:  ·  Problem: Hyperlipidemia.  Plan: home atorvastatin 20mg qhs  - Increased to atovastatin 80mg qhs, given recent stroke.     Problem/Plan - 8:  ·  Problem: Prophylactic measure.  Plan: DVT PPx: Lovenox 80mg BID  GI PPx: Famotidine 20mg  Full code.

## 2020-04-13 NOTE — PROGRESS NOTE ADULT - SUBJECTIVE AND OBJECTIVE BOX
Physical Medicine and Rehabilitation Progress Note:    Patient is a 79y old  Male who presents with a chief complaint of weakness (13 Apr 2020 08:01)      HPI:  79 M, poor historian, with past medical history of HTN, DM2, and hypothyroidism who presents to ED [facility] after being found down for a fall. Patient states that his bed is tilted and that he slid off his bed and was unable to get up. Patient states that it happened in the last few days. Per ED provider, the patient was potentially down from yesterday evening to this morning and was found by workers at his assisted living home. It is unclear what kind of home, but he says single occupancy group home of some type. He does not have a rollator, walker, or cane at home. Patient denies hitting his head, loss of consciousness, or previous falls. Patient states that in past few weeks he has not been eating as much as he does not like the food around his home. He has mainly been drinking juices at home. Patient currently denies fever, headache, nausea, vomiting, chest pain, shortness of breath, abdominal pain. Patient does not feel as strong as usual. He states that he has bowel movements every few days and has had no changes in urination. Denies sick contacts or travel history.    ED Vitals: T 98.3, P100, /98, RR 17, O2 95% on RA ->> T 101.1 P 91 /89 RR 20 O2 89% on RA  ED Course: Glucose 366-> 240s. Patient received 3 L NS With improvement in glucose. Received Tylenol for fever. Swabbed for RVP, COVID, blood cultures. CXR, prelim wet read, abnormal with left lower lobe possible consolidation and right reticulonodular pattern consistent with infectious vs inflammatory process, new since 2018 (old CT). [facility] in setting of unknown fever. (23 Mar 2020 18:15)                            12.3   4.46  )-----------( 278      ( 13 Apr 2020 08:22 )             39.2       04-13    142  |  109<H>  |  11  ----------------------------<  119<H>  4.1   |  24  |  0.83    Ca    8.9      13 Apr 2020 08:18  Phos  2.8     04-13  Mg     1.9     04-13    TPro  5.9<L>  /  Alb  3.2<L>  /  TBili  0.3  /  DBili  x   /  AST  30  /  ALT  43  /  AlkPhos  37<L>  04-13    Vital Signs Last 24 Hrs  T(C): 35.9 (13 Apr 2020 12:09), Max: 36.6 (12 Apr 2020 22:55)  T(F): 96.7 (13 Apr 2020 12:09), Max: 97.9 (12 Apr 2020 22:55)  HR: 68 (13 Apr 2020 12:09) (68 - 77)  BP: 118/73 (13 Apr 2020 12:09) (105/69 - 121/77)  BP(mean): 91 (13 Apr 2020 12:09) (91 - 91)  RR: 18 (13 Apr 2020 12:09) (18 - 18)  SpO2: 92% (13 Apr 2020 12:09) (92% - 96%)    MEDICATIONS  (STANDING):  acetaminophen   Tablet .. 650 milliGRAM(s) Oral every 6 hours  amLODIPine   Tablet 10 milliGRAM(s) Oral every 24 hours  atorvastatin 80 milliGRAM(s) Oral at bedtime  brimonidine 0.2% Ophthalmic Solution 1 Drop(s) Both EYES two times a day  dextrose 50% Injectable 12.5 Gram(s) IV Push once  dextrose 50% Injectable 25 Gram(s) IV Push once  dextrose 50% Injectable 25 Gram(s) IV Push once  dorzolamide 2%/timolol 0.5% Ophthalmic Solution 1 Drop(s) Both EYES two times a day  enoxaparin Injectable 80 milliGRAM(s) SubCutaneous two times a day  famotidine    Tablet 20 milliGRAM(s) Oral two times a day  fenofibrate Tablet 48 milliGRAM(s) Oral daily  gabapentin 300 milliGRAM(s) Oral two times a day  hydrALAZINE 10 milliGRAM(s) Oral every 8 hours  insulin glargine Injectable (LANTUS) 12 Unit(s) SubCutaneous at bedtime  insulin lispro (HumaLOG) corrective regimen sliding scale   SubCutaneous Before meals and at bedtime  insulin lispro Injectable (HumaLOG) 4 Unit(s) SubCutaneous three times a day before meals  latanoprost 0.005% Ophthalmic Solution 1 Drop(s) Both EYES at bedtime  levothyroxine 75 MICROGram(s) Oral daily  metoprolol succinate  milliGRAM(s) Oral daily  polyethylene glycol 3350 17 Gram(s) Oral daily  QUEtiapine 800 milliGRAM(s) Oral at bedtime  senna 2 Tablet(s) Oral daily  tamsulosin 0.4 milliGRAM(s) Oral at bedtime    MEDICATIONS  (PRN):  bisacodyl Suppository 10 milliGRAM(s) Rectal daily PRN Constipation  dextrose 40% Gel 15 Gram(s) Oral once PRN Blood Glucose LESS THAN 70 milliGRAM(s)/deciliter  glucagon  Injectable 1 milliGRAM(s) IntraMuscular once PRN Glucose LESS THAN 70 milligrams/deciliter    Currently Undergoing Physical Therapy at bedside.    Functional Status Assessment:  4/10/2020    Therapeutic Interventions      Bed Mobility  Bed Mobility Training Supine-to-Sit: minimum assist (75% patient effort);  1 person assist;  verbal cues;  verbal cues for bed rail use  Bed Mobility Training Limitations: decreased ability to use legs for bridging/pushing;  decreased strength;  impaired balance    Sit-Stand Transfer Training  Transfer Training Sit-to-Stand Transfer: minimum assist (75% patient effort);  1 person assist;  rolling walker  Transfer Training Stand-to-Sit Transfer: minimum assist (75% patient effort);  1 person assist;  rolling walker  Sit-to-Stand Transfer Training Transfer Safety Analysis: decreased balance;  decreased strength;  impaired balance    Gait Training  Gait Training: contact guard;  1 person assist;  rolling walker;  20 feet  Gait Analysis: swing-through gait   decreased hip/knee flexion;  decreased velocity of limb motion;  decreased step length;  decreased stride length;  decreased gait speed;  decreased strength;  impaired balance    Therapeutic Exercise  Therapeutic Exercise Detail: Seated:Push/Pulls - 10 reps, minimal manual resistance           PM&R Impression: as above    Current Disposition Plan Recommendations:    acute rehab placement

## 2020-04-13 NOTE — PROGRESS NOTE ADULT - PROBLEM SELECTOR PLAN 2
Patient with L sided weakness in Upper and Lower extremity. NIHSS 7. No acute events on CTH.   - Carotid doppler ordered given carotid atherosclerosis seen in CT  - MRI brain w/wo contrast with right acute infarct in parietal lobe and internal capsule. Started lovenox at 80mg BID. Transition to ASA/plavix upon d/c  - Atorvastatin 80mg daily  - Echo showed EF of 61%

## 2020-04-14 LAB
GLUCOSE BLDC GLUCOMTR-MCNC: 103 MG/DL — HIGH (ref 70–99)
GLUCOSE BLDC GLUCOMTR-MCNC: 104 MG/DL — HIGH (ref 70–99)
GLUCOSE BLDC GLUCOMTR-MCNC: 116 MG/DL — HIGH (ref 70–99)
GLUCOSE BLDC GLUCOMTR-MCNC: 286 MG/DL — HIGH (ref 70–99)

## 2020-04-14 PROCEDURE — 99232 SBSQ HOSP IP/OBS MODERATE 35: CPT | Mod: CS,GC

## 2020-04-14 RX ADMIN — BRIMONIDINE TARTRATE 1 DROP(S): 2 SOLUTION/ DROPS OPHTHALMIC at 11:22

## 2020-04-14 RX ADMIN — INSULIN GLARGINE 12 UNIT(S): 100 INJECTION, SOLUTION SUBCUTANEOUS at 00:05

## 2020-04-14 RX ADMIN — Medication 6: at 11:28

## 2020-04-14 RX ADMIN — FAMOTIDINE 20 MILLIGRAM(S): 10 INJECTION INTRAVENOUS at 05:29

## 2020-04-14 RX ADMIN — Medication 650 MILLIGRAM(S): at 18:01

## 2020-04-14 RX ADMIN — INSULIN GLARGINE 12 UNIT(S): 100 INJECTION, SOLUTION SUBCUTANEOUS at 21:58

## 2020-04-14 RX ADMIN — LATANOPROST 1 DROP(S): 0.05 SOLUTION/ DROPS OPHTHALMIC; TOPICAL at 21:47

## 2020-04-14 RX ADMIN — SENNA PLUS 2 TABLET(S): 8.6 TABLET ORAL at 11:20

## 2020-04-14 RX ADMIN — BRIMONIDINE TARTRATE 1 DROP(S): 2 SOLUTION/ DROPS OPHTHALMIC at 21:48

## 2020-04-14 RX ADMIN — Medication 100 MILLIGRAM(S): at 05:31

## 2020-04-14 RX ADMIN — DORZOLAMIDE HYDROCHLORIDE TIMOLOL MALEATE 1 DROP(S): 20; 5 SOLUTION/ DROPS OPHTHALMIC at 14:14

## 2020-04-14 RX ADMIN — GABAPENTIN 300 MILLIGRAM(S): 400 CAPSULE ORAL at 18:01

## 2020-04-14 RX ADMIN — ENOXAPARIN SODIUM 80 MILLIGRAM(S): 100 INJECTION SUBCUTANEOUS at 18:01

## 2020-04-14 RX ADMIN — Medication 48 MILLIGRAM(S): at 14:13

## 2020-04-14 RX ADMIN — Medication 10 MILLIGRAM(S): at 14:14

## 2020-04-14 RX ADMIN — AMLODIPINE BESYLATE 10 MILLIGRAM(S): 2.5 TABLET ORAL at 11:21

## 2020-04-14 RX ADMIN — DORZOLAMIDE HYDROCHLORIDE TIMOLOL MALEATE 1 DROP(S): 20; 5 SOLUTION/ DROPS OPHTHALMIC at 21:47

## 2020-04-14 RX ADMIN — Medication 650 MILLIGRAM(S): at 05:30

## 2020-04-14 RX ADMIN — POLYETHYLENE GLYCOL 3350 17 GRAM(S): 17 POWDER, FOR SOLUTION ORAL at 11:21

## 2020-04-14 RX ADMIN — FAMOTIDINE 20 MILLIGRAM(S): 10 INJECTION INTRAVENOUS at 18:01

## 2020-04-14 RX ADMIN — Medication 650 MILLIGRAM(S): at 11:21

## 2020-04-14 RX ADMIN — ENOXAPARIN SODIUM 80 MILLIGRAM(S): 100 INJECTION SUBCUTANEOUS at 05:29

## 2020-04-14 RX ADMIN — TAMSULOSIN HYDROCHLORIDE 0.4 MILLIGRAM(S): 0.4 CAPSULE ORAL at 21:40

## 2020-04-14 RX ADMIN — Medication 4 UNIT(S): at 18:05

## 2020-04-14 RX ADMIN — Medication 75 MICROGRAM(S): at 05:29

## 2020-04-14 RX ADMIN — ATORVASTATIN CALCIUM 80 MILLIGRAM(S): 80 TABLET, FILM COATED ORAL at 21:38

## 2020-04-14 RX ADMIN — Medication 4 UNIT(S): at 11:29

## 2020-04-14 RX ADMIN — GABAPENTIN 300 MILLIGRAM(S): 400 CAPSULE ORAL at 05:29

## 2020-04-14 RX ADMIN — QUETIAPINE FUMARATE 800 MILLIGRAM(S): 200 TABLET, FILM COATED ORAL at 21:39

## 2020-04-14 RX ADMIN — Medication 10 MILLIGRAM(S): at 21:39

## 2020-04-14 NOTE — CHART NOTE - NSCHARTNOTEFT_GEN_A_CORE
Admitting Diagnosis: acute respiratory failure with hypoxia  Patient is a 79y old  Male who presents with a chief complaint of weakness (14 Apr 2020 09:43)      PAST MEDICAL & SURGICAL HISTORY:  Diabetes mellitus, type 2  Hypothyroid  Essential hypertension: Hypertension  H/O thyroidectomy  Other postprocedural status: left total knee  replacement one   yr.   ago      Current Nutrition Order:  Diet, DASH/TLC:   Sodium & Cholesterol Restricted  Consistent Carbohydrate {No Snacks} (CSTCHO) (03-30-20 @ 08:59)      PO Intake: Good (%) [X]  Fair (50-75%) [   ] Poor (<25%) [   ]    GI Issues: denies n/v/c/d    Pain: denies    Skin Integrity: intact    Labs:   04-13    142  |  109<H>  |  11  ----------------------------<  119<H>  4.1   |  24  |  0.83    Ca    8.9      13 Apr 2020 08:18  Phos  2.8     04-13  Mg     1.9     04-13    TPro  5.9<L>  /  Alb  3.2<L>  /  TBili  0.3  /  DBili  x   /  AST  30  /  ALT  43  /  AlkPhos  37<L>  04-13    CAPILLARY BLOOD GLUCOSE      POCT Blood Glucose.: 286 mg/dL (14 Apr 2020 11:13)  POCT Blood Glucose.: 103 mg/dL (14 Apr 2020 06:35)  POCT Blood Glucose.: 208 mg/dL (13 Apr 2020 23:38)  POCT Blood Glucose.: 252 mg/dL (13 Apr 2020 22:18)  POCT Blood Glucose.: 83 mg/dL (13 Apr 2020 16:24)      Medications:  MEDICATIONS  (STANDING):  acetaminophen   Tablet .. 650 milliGRAM(s) Oral every 6 hours  amLODIPine   Tablet 10 milliGRAM(s) Oral every 24 hours  atorvastatin 80 milliGRAM(s) Oral at bedtime  brimonidine 0.2% Ophthalmic Solution 1 Drop(s) Both EYES two times a day  dextrose 50% Injectable 12.5 Gram(s) IV Push once  dextrose 50% Injectable 25 Gram(s) IV Push once  dextrose 50% Injectable 25 Gram(s) IV Push once  dorzolamide 2%/timolol 0.5% Ophthalmic Solution 1 Drop(s) Both EYES two times a day  enoxaparin Injectable 80 milliGRAM(s) SubCutaneous two times a day  famotidine    Tablet 20 milliGRAM(s) Oral two times a day  fenofibrate Tablet 48 milliGRAM(s) Oral daily  gabapentin 300 milliGRAM(s) Oral two times a day  hydrALAZINE 10 milliGRAM(s) Oral every 8 hours  insulin glargine Injectable (LANTUS) 12 Unit(s) SubCutaneous at bedtime  insulin lispro (HumaLOG) corrective regimen sliding scale   SubCutaneous Before meals and at bedtime  insulin lispro Injectable (HumaLOG) 4 Unit(s) SubCutaneous three times a day before meals  latanoprost 0.005% Ophthalmic Solution 1 Drop(s) Both EYES at bedtime  levothyroxine 75 MICROGram(s) Oral daily  metoprolol succinate  milliGRAM(s) Oral daily  polyethylene glycol 3350 17 Gram(s) Oral daily  QUEtiapine 800 milliGRAM(s) Oral at bedtime  senna 2 Tablet(s) Oral daily  tamsulosin 0.4 milliGRAM(s) Oral at bedtime    MEDICATIONS  (PRN):  bisacodyl Suppository 10 milliGRAM(s) Rectal daily PRN Constipation  dextrose 40% Gel 15 Gram(s) Oral once PRN Blood Glucose LESS THAN 70 milliGRAM(s)/deciliter  glucagon  Injectable 1 milliGRAM(s) IntraMuscular once PRN Glucose LESS THAN 70 milligrams/deciliter      Anthropometrics:  No new weights to assess. Pt denies any noticeable weight changes.    IBW: 72.7kg    % IBW: 109%    Weight Change: pt denies. weight consistent.    Nutrition Focused Physical Exam: Completed [   ]  Not Pertinent [   ]- N/A secondary to medical DX    Estimated energy needs:   Calories: 2580-1224 kcal/day based on 25-30 kcal/kg  Protein: 79-95 gm/day based on (1-1.25 gm protein/kg)  Fluid: per team.     Previous Nutrition Diagnosis: Inadequate intake related to decreased appetite in the setting of COVID-19 as evidenced by 25% EER pta    Active [   ]  Resolved [X] - per discussion with pt over the phone with this writer, appetite is good and pt is eating well. Offers no complaints or preferences at this time.     If resolved, new PES: No nutrition diagnosis at this time.     Goal: continue to meet 75% needs consistently    Recommendations:   Monitor and replete electrolytes.   Continue with current diet order (DASH/TLC).    Education: Pt declines DASH diet nutrition education at this time.    Risk Level: High [   ] Moderate [   ] Low [X]

## 2020-04-14 NOTE — PROGRESS NOTE ADULT - SUBJECTIVE AND OBJECTIVE BOX
PA Adult Transfer Note [facility]    Subjective Assessment: Patient seen and examined at bedside sleeping comfortably on RA. O2 sats >93%.    Hospital course: Pt is a 80 y/o M with PMH of HTM, DM, and hypothyroidism found down in his apartment s/p fall and was brought to the ED presenting in starvation ketosis and febrile and desatting on room air to 89%. Found to have elevated blood glucose to >300s and elevated beta hydroxy buturate, suspected starvation ketosis. Swabbed for COVID, placed on nasal cannula and transferred to [facility]. Transferred to [facility] secondary to delirium and desatting overnight on 3/26. Doing well after transfer, satting 100%on non-rebreather and maintaining once switched to nasal cannula. Developed hyperglycemia; endocrine consulted; started on insulin. Was hypertensive at times to . NRB downtitrated to nasal cannula.  On 04/04, stroked code called for L-sided hemiparesis. CT neg for stroke, but MRI showed right acute infarct in parietal lobe and internal capsule. Started Lovenox full A/C and high dose statin, and will need to stop lovenox and transition to ASA and plavix on discharge. Patient tx-d to [facility] and stabilized. PT saw pt and recommended LOYD. He is now stable for stepdown to [facility] CAP unit while awaiting LOYD.     	  MEDICATIONS:  amLODIPine   Tablet 10 milliGRAM(s) Oral every 24 hours  hydrALAZINE 10 milliGRAM(s) Oral every 8 hours  metoprolol succinate  milliGRAM(s) Oral daily  tamsulosin 0.4 milliGRAM(s) Oral at bedtime  acetaminophen   Tablet .. 650 milliGRAM(s) Oral every 6 hours  gabapentin 300 milliGRAM(s) Oral two times a day  QUEtiapine 800 milliGRAM(s) Oral at bedtime  bisacodyl Suppository 10 milliGRAM(s) Rectal daily PRN  famotidine    Tablet 20 milliGRAM(s) Oral two times a day  polyethylene glycol 3350 17 Gram(s) Oral daily  senna 2 Tablet(s) Oral daily  atorvastatin 80 milliGRAM(s) Oral at bedtime  dextrose 40% Gel 15 Gram(s) Oral once PRN  dextrose 50% Injectable 12.5 Gram(s) IV Push once  dextrose 50% Injectable 25 Gram(s) IV Push once  dextrose 50% Injectable 25 Gram(s) IV Push once  fenofibrate Tablet 48 milliGRAM(s) Oral daily  glucagon  Injectable 1 milliGRAM(s) IntraMuscular once PRN  insulin glargine Injectable (LANTUS) 12 Unit(s) SubCutaneous at bedtime  insulin lispro (HumaLOG) corrective regimen sliding scale   SubCutaneous Before meals and at bedtime  insulin lispro Injectable (HumaLOG) 4 Unit(s) SubCutaneous three times a day before meals  levothyroxine 75 MICROGram(s) Oral daily  brimonidine 0.2% Ophthalmic Solution 1 Drop(s) Both EYES two times a day  dorzolamide 2%/timolol 0.5% Ophthalmic Solution 1 Drop(s) Both EYES two times a day  enoxaparin Injectable 80 milliGRAM(s) SubCutaneous two times a day  latanoprost 0.005% Ophthalmic Solution 1 Drop(s) Both EYES at bedtime    Vital Signs Last 24 Hrs  T(C): 35.7 (14 Apr 2020 05:23), Max: 36.1 (13 Apr 2020 23:26)  T(F): 96.3 (14 Apr 2020 05:23), Max: 97 (13 Apr 2020 23:26)  HR: 76 (14 Apr 2020 05:23) (68 - 77)  BP: 100/58 (14 Apr 2020 05:23) (100/58 - 146/101)  BP(mean): 77 (14 Apr 2020 05:23) (77 - 110)  RR: 20 (14 Apr 2020 05:23) (17 - 20)  SpO2: 94% (14 Apr 2020 05:23) (92% - 95%)    	                                        Appearance: NAD	  HEENT:   Normal oral mucosa, PERRL, EOMI	  Neck: Supple, + JVD/ - JVD; Carotid Bruit   Cardiovascular: Normal S1 S2, No JVD, No murmurs,   Respiratory: Lungs clear to auscultation  Gastrointestinal:  Soft, Non-tender, + BS	  Skin: No rashes, No ecchymoses, No cyanosis  Extremities: Normal range of motion, No clubbing, cyanosis or edema  Vascular: Peripheral pulses palpable 2+ bilaterally  Neurologic: Non-focal  Psychiatry: A & O x 3, Mood & affect appropriate    	    LABS:	 	                           12.3   4.46  )-----------( 278      ( 13 Apr 2020 08:22 )             39.2     04-13    142  |  109<H>  |  11  ----------------------------<  119<H>  4.1   |  24  |  0.83    Ca    8.9      13 Apr 2020 08:18  Phos  2.8     04-13  Mg     1.9     04-13    TPro  5.9<L>  /  Alb  3.2<L>  /  TBili  0.3  /  DBili  x   /  AST  30  /  ALT  43  /  AlkPhos  37<L>  04-13

## 2020-04-14 NOTE — PROGRESS NOTE ADULT - PROBLEM SELECTOR PROBLEM 1
Acute respiratory failure with hypoxia
Severe sepsis

## 2020-04-14 NOTE — CHART NOTE - NSCHARTNOTEFT_GEN_A_CORE
Pt transferred from [facility] and accepted to [facility] CAP Unit.   Sign out received from primary [facility] team and reviewed.   Pt states he lives alone in a single room apartment, he fell at home for which he was transferred to [facility] and there he was incidentally found to be COVID positive.   Vital signs stable, pt is alert and oriented x3.  He is moving all extremities, notes weakness to his left hand from his stroke this admission.    strength 4/5 RLE, 3/5 LLE  EHL/TA 5/5 BLE. GS/FHL 4/5 LLE.   Abd firm but compressible, notes he needs to use the bathroom. Was seen using bathroom with RN.   All inpatient orders and medications have been reviewed and updated.   Neuro recs appreciated.  Goals have been discussed. Pt is awaiting LOYD. Pt transferred from [facility] and accepted to [facility] CAP Unit.   Sign out received from primary [facility] team and reviewed.   Pt states he lives alone in a single room apartment, he fell at home for which he was transferred to [facility] and there he was incidentally found to be COVID positive and suffered a stroke. Since stroke, he notes he has weakness to his left hand and trouble remember/finding words that he should know.   Vital signs stable, pt is alert and oriented x3.  He is moving all extremities, notes weakness to his left hand from his stroke this admission.    strength 4/5 RLE, 3/5 LLE  EHL/TA 5/5 BLE. GS/FHL 4/5 LLE.   Abd firm but compressible, notes he needs to use the bathroom. Was seen using bathroom with RN.   All inpatient orders and medications have been reviewed and updated.   Neuro recs appreciated.  Goals have been discussed. Pt is awaiting LOYD.

## 2020-04-15 LAB
GLUCOSE BLDC GLUCOMTR-MCNC: 131 MG/DL — HIGH (ref 70–99)
GLUCOSE BLDC GLUCOMTR-MCNC: 150 MG/DL — HIGH (ref 70–99)
GLUCOSE BLDC GLUCOMTR-MCNC: 154 MG/DL — HIGH (ref 70–99)
GLUCOSE BLDC GLUCOMTR-MCNC: 196 MG/DL — HIGH (ref 70–99)

## 2020-04-15 PROCEDURE — 99231 SBSQ HOSP IP/OBS SF/LOW 25: CPT | Mod: CS

## 2020-04-15 RX ADMIN — FAMOTIDINE 20 MILLIGRAM(S): 10 INJECTION INTRAVENOUS at 07:40

## 2020-04-15 RX ADMIN — ENOXAPARIN SODIUM 80 MILLIGRAM(S): 100 INJECTION SUBCUTANEOUS at 17:39

## 2020-04-15 RX ADMIN — TAMSULOSIN HYDROCHLORIDE 0.4 MILLIGRAM(S): 0.4 CAPSULE ORAL at 22:58

## 2020-04-15 RX ADMIN — Medication 10 MILLIGRAM(S): at 22:57

## 2020-04-15 RX ADMIN — QUETIAPINE FUMARATE 800 MILLIGRAM(S): 200 TABLET, FILM COATED ORAL at 22:58

## 2020-04-15 RX ADMIN — ATORVASTATIN CALCIUM 80 MILLIGRAM(S): 80 TABLET, FILM COATED ORAL at 22:56

## 2020-04-15 RX ADMIN — LATANOPROST 1 DROP(S): 0.05 SOLUTION/ DROPS OPHTHALMIC; TOPICAL at 22:57

## 2020-04-15 RX ADMIN — Medication 650 MILLIGRAM(S): at 07:40

## 2020-04-15 RX ADMIN — BRIMONIDINE TARTRATE 1 DROP(S): 2 SOLUTION/ DROPS OPHTHALMIC at 22:57

## 2020-04-15 RX ADMIN — Medication 650 MILLIGRAM(S): at 01:19

## 2020-04-15 RX ADMIN — POLYETHYLENE GLYCOL 3350 17 GRAM(S): 17 POWDER, FOR SOLUTION ORAL at 12:31

## 2020-04-15 RX ADMIN — Medication 650 MILLIGRAM(S): at 17:39

## 2020-04-15 RX ADMIN — Medication 10 MILLIGRAM(S): at 14:18

## 2020-04-15 RX ADMIN — Medication 4 UNIT(S): at 07:46

## 2020-04-15 RX ADMIN — Medication 650 MILLIGRAM(S): at 12:31

## 2020-04-15 RX ADMIN — Medication 2: at 22:55

## 2020-04-15 RX ADMIN — DORZOLAMIDE HYDROCHLORIDE TIMOLOL MALEATE 1 DROP(S): 20; 5 SOLUTION/ DROPS OPHTHALMIC at 22:57

## 2020-04-15 RX ADMIN — GABAPENTIN 300 MILLIGRAM(S): 400 CAPSULE ORAL at 17:39

## 2020-04-15 RX ADMIN — Medication 4 UNIT(S): at 17:00

## 2020-04-15 RX ADMIN — INSULIN GLARGINE 12 UNIT(S): 100 INJECTION, SOLUTION SUBCUTANEOUS at 23:11

## 2020-04-15 RX ADMIN — ENOXAPARIN SODIUM 80 MILLIGRAM(S): 100 INJECTION SUBCUTANEOUS at 07:35

## 2020-04-15 RX ADMIN — Medication 4 UNIT(S): at 11:22

## 2020-04-15 RX ADMIN — DORZOLAMIDE HYDROCHLORIDE TIMOLOL MALEATE 1 DROP(S): 20; 5 SOLUTION/ DROPS OPHTHALMIC at 09:33

## 2020-04-15 RX ADMIN — Medication 48 MILLIGRAM(S): at 12:31

## 2020-04-15 RX ADMIN — Medication 75 MICROGRAM(S): at 07:40

## 2020-04-15 RX ADMIN — FAMOTIDINE 20 MILLIGRAM(S): 10 INJECTION INTRAVENOUS at 17:39

## 2020-04-15 RX ADMIN — GABAPENTIN 300 MILLIGRAM(S): 400 CAPSULE ORAL at 07:40

## 2020-04-15 RX ADMIN — Medication 650 MILLIGRAM(S): at 23:09

## 2020-04-15 RX ADMIN — BRIMONIDINE TARTRATE 1 DROP(S): 2 SOLUTION/ DROPS OPHTHALMIC at 09:32

## 2020-04-15 RX ADMIN — AMLODIPINE BESYLATE 10 MILLIGRAM(S): 2.5 TABLET ORAL at 12:31

## 2020-04-15 RX ADMIN — Medication 10 MILLIGRAM(S): at 07:40

## 2020-04-15 RX ADMIN — Medication 100 MILLIGRAM(S): at 07:41

## 2020-04-15 NOTE — PROGRESS NOTE ADULT - ATTENDING COMMENTS
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  on 3-4L NC.    # Acute CVA: Improving, c/w anti-coagulation, high dose statin, no ASA in setting of full A/C.    # HTN: Hydralazine + amlodipine    Dispo: Acute rehab (likely txr to [facility])
I was physically present for the key portions of the evaluation and management (E/M) service provided.  I agree with the above history, physical, and plan which I have reviewed and edited where appropriate.    Patient COVID+ also found to have CVA during this 23 day admission.  Patient transferred awaiting discharge to [facility].  Patient has been ambulating with PT.  Needs small amount of supplemental oxygen when ambulating.  Still with left sided weakness.  Still with canseco.    Will take canseco out today and monitor urine output.  Will work with  regarding discharge.
Type II DM, COVID+. Acute hypoxemic respiratory failure. Doing well on NRB, deescalate to nasal cannula. RMF eligible.
Type II DM, COVID+. Acute hypoxemic respiratory failure. Doing well on NRB, deescalate to nasal cannula. RMF eligible.
Case discussed with resident and fellow. Agree with plan as detailed above.     Based on COVID crisis, intern exam used for assessment to reduced exposure. Remainder of clinical data reviewed.
Case discussed with residents and interns. Agree with plan as detailed above.     Due to COVID crisis and patient being stable, physical exam was done by resident to limit number of exposure to the entire team.
Case discussed with residents and interns. Agree with plan as detailed above.     Due to COVID crisis, resident's exam was used to reduce exposure. Clinical data reviewed.
Discussed with team and agree with assessment; PEx limited to one team member due to covid.
- Inflammatory markers improving  - Pt intermittently using NRB but when he does, sats are improved from prior  - BP elevated but in the setting of patient refusing meds. Medication compliance encouraged  - Insulin will be titrated based on requirements (18u in past 24hrs). Will increase lantus 10 --> 16u and premeals 3 --> 5u  - EKG to monitor QTc  - Will reswab tomorrow
- Insulin adjusted based on requirements in last day. Tomorrow is last day of steroids so likely will need to start lowering insulin then.  - Given increased BP, started hydralazine 10mg tid and will titrate as needed  - Patient non compliant with use of NRB or NC, encouraged to use  - Given congestion on CXR, and difficulty weaning him off of O2, will try giving a dose of lasix  - PT consult
Inflammatory markers improving although patient worsening clinically. Patient endorsing shortness of breath which has worsened today. He is tachypneic to mid 20's and having lower O2Sats on 15L on NRB (which he has been using this time although previously taking it off multiple times)  - He has completed a 5 days course of azithromycin, plaquenil, vitamin C and thiamine, as well as tociluzumab and steroids for 7 days   - Will continue diuresing him and keeping strict I's and O's  - Given increased WOB and oxygen requirements, will consult ICU for possible escalation of care
agree with the above assessment and plan.   patient sat good today on NC   comfortably breathing  low threshold to escalate to NRBR  s/p toci 3/27 x 1  CXR tomorrow a.m.
patient refusing nonrebreather and nasal canula.   often in room without any oxgen so desat to 88-89%  will attempt to keep supplemental oxygen as needed  however, markers improving, oxygenation improving.   will continue to try to wean/manage Oxygen as needed for now  continue current management  continue IV steroids , potentially stop after 3/31 if patient stable
Exam per resident  I have personally reviewed patient's labs and vital signs    Patient has completed course of plaquenil+azithro+thiamine+vitaminC as well as steroids and tociluzumab. However, O2Sat not improving. Continues requiring 10L on NRB (same as on NC, however, patient's sats dip on NC but because he breaths through his mouth).  - Started lasix 20mg yesterday, will continue diuresing him and will place [facility] to monitor I's and O's with the goal to keep him net even or negative  - OOBTC and incentive spirometry since atelectasis could be playing a role in patient's low sats

## 2020-04-15 NOTE — PROGRESS NOTE ADULT - REASON FOR ADMISSION
weakness
covid, right parietal stroke
weakness

## 2020-04-15 NOTE — PROGRESS NOTE ADULT - SUBJECTIVE AND OBJECTIVE BOX
INTERVAL HPI/OVERNIGHT EVENTS:  Patient was seen and examined at bedside. As per nurse and patient, no o/n events, patient resting comfortably. No complaints at this time. Patient denies: fever, chills, dizziness, weakness, HA, CP, palpitations, SOB, cough, N/V/D/C.    VITAL SIGNS:  T(F): 97 (04-15-20 @ 08:00)  HR: 76 (04-15-20 @ 08:00)  BP: 134/85 (04-15-20 @ 08:00)  RR: 16 (04-15-20 @ 08:00)  SpO2: 93% (04-15-20 @ 08:00)  Wt(kg): --    PHYSICAL EXAM:  General: NAD  HEENT: PERRL, EOMI, sclera non-icteric, neck supple, trachea midline  Respiratory: CTA b/l, good air entry b/l, no wheezing, no rhonchi, no rales, no accessory muscle use and no intercostal retractions  Cardiovascular: RRR  Gastrointestinal: abdomen soft, NT  Extremities: Warm, well perfused, pulses intact bilateral upper and lower extremities, no edema  Neurological: AAOx3, CNs Grossly intact  Skin: Normal temperature, warm, dry    Allergies    No Known Allergies    Intolerances      MEDICATIONS  (STANDING):  acetaminophen   Tablet .. 650 milliGRAM(s) Oral every 6 hours  amLODIPine   Tablet 10 milliGRAM(s) Oral every 24 hours  atorvastatin 80 milliGRAM(s) Oral at bedtime  brimonidine 0.2% Ophthalmic Solution 1 Drop(s) Both EYES two times a day  dextrose 50% Injectable 12.5 Gram(s) IV Push once  dextrose 50% Injectable 25 Gram(s) IV Push once  dextrose 50% Injectable 25 Gram(s) IV Push once  dorzolamide 2%/timolol 0.5% Ophthalmic Solution 1 Drop(s) Both EYES two times a day  enoxaparin Injectable 80 milliGRAM(s) SubCutaneous two times a day  famotidine    Tablet 20 milliGRAM(s) Oral two times a day  fenofibrate Tablet 48 milliGRAM(s) Oral daily  gabapentin 300 milliGRAM(s) Oral two times a day  hydrALAZINE 10 milliGRAM(s) Oral every 8 hours  insulin glargine Injectable (LANTUS) 12 Unit(s) SubCutaneous at bedtime  insulin lispro (HumaLOG) corrective regimen sliding scale   SubCutaneous Before meals and at bedtime  insulin lispro Injectable (HumaLOG) 4 Unit(s) SubCutaneous three times a day before meals  latanoprost 0.005% Ophthalmic Solution 1 Drop(s) Both EYES at bedtime  levothyroxine 75 MICROGram(s) Oral daily  metoprolol succinate  milliGRAM(s) Oral daily  polyethylene glycol 3350 17 Gram(s) Oral daily  QUEtiapine 800 milliGRAM(s) Oral at bedtime  tamsulosin 0.4 milliGRAM(s) Oral at bedtime    MEDICATIONS  (PRN):  bisacodyl Suppository 10 milliGRAM(s) Rectal daily PRN Constipation  dextrose 40% Gel 15 Gram(s) Oral once PRN Blood Glucose LESS THAN 70 milliGRAM(s)/deciliter  glucagon  Injectable 1 milliGRAM(s) IntraMuscular once PRN Glucose LESS THAN 70 milligrams/deciliter    I&O's Summary    14 Apr 2020 07:01  -  15 Apr 2020 07:00  --------------------------------------------------------  IN: 0 mL / OUT: 50 mL / NET: -50 mL      79M w/ PMHx of HTN, DM2, former smoker, hypothyroidism [facility] for a fall, found to have starvation ketosis now resolved, was desatting with imaging findings on CXR and found to be COVID positive, now on nonrebreather for 02 support. SC 4/4 for new left sided weakness - NIHSS 7.  HCT negative, CTA showed moderate to severe left carotid stenosis. 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 Stroke etiology likely 2/2 to hypercoagulable status in the setting of COVID; however, cannot rule out cardioembolic case of stroke less likely large vessel atherosclerosis 2/2 to left carotid stenosis. Neuro exam 4/9 unchanged. Will continue to follow. TTE within normal limits, EF 61%.      Problem/Plan - 1:  ·  Problem: Acute respiratory failure with hypoxia.  Plan: 2/2 COVID   - s/p a 5 days course of azithromycin, plaquenil, vitamin C and thiamine, as well as steroids for 7 days   - S/p tociluzumab  - Now on nasal canula  - Diurese as needed, 40mg Lasix PRN  - OOBTC.      Problem/Plan - 2:  ·  Problem: Stroke.  Plan: Patient with L sided weakness in Upper and Lower extremity. NIHSS 7. No acute events on CTH.   - Carotid doppler ordered given carotid atherosclerosis seen in CT  - MRI brain w/wo contrast with right acute infarct in parietal lobe and internal capsule. Started lovenox at 80mg BID. Transition to ASA/plavix upon d/c  - Atorvastatin 80mg daily  - Echo showed EF of 61%.      Problem/Plan - 3:  ·  Problem: Weakness.  Plan: Neurology following  - PT, OT.      Problem/Plan - 4:  ·  Problem: Diabetes.  Plan: - lantus 12 units qhs  - 4U premeal   - MISS  - consistent carb diet  - out pt Endocrine f/u.      Problem/Plan - 5:  ·  Problem: Essential hypertension.  Plan: Well controlled on current regimen  - c/w hydral 10mg q8  - c/w amlodipine 10mg qd.      Problem/Plan - 6:  Problem: Hypothyroidism. Plan: - c/w home synthroid 75mcg daily.     Problem/Plan - 7:  ·  Problem: Hyperlipidemia.  Plan: home atorvastatin 20mg qhs  - Increased to atovastatin 80mg qhs, given recent stroke.      Problem/Plan - 8:  ·  Problem: Prophylactic measure.  Plan: DVT PPx: Lovenox 80mg BID  GI PPx: Famotidine 20mg  Full code. INTERVAL HPI/OVERNIGHT EVENTS:  Patient was seen and examined at bedside. Got OOB with PT desat to 89%, back in bed 92% on 2L NC. No complaints at this time. Patient denies: fever, chills, dizziness, weakness, HA, CP, palpitations, SOB, cough, N/V/D/C.    VITAL SIGNS:  T(F): 97 (04-15-20 @ 08:00)  HR: 76 (04-15-20 @ 08:00)  BP: 134/85 (04-15-20 @ 08:00)  RR: 16 (04-15-20 @ 08:00)  SpO2: 92% (04-15-20 @ 08:00)    PHYSICAL EXAM:  General: NAD  HEENT: EOMI, sclera non-icteric, neck supple, trachea midline  Respiratory: Good air entry b/l, no wheezing, no accessory muscle use and no intercostal retractions  Cardiovascular: RRR  Gastrointestinal: abdomen soft, NT  Extremities: Warm, well perfused, no edema  Neurological: AAOx3, CNs Grossly intact  Skin: Normal temperature, warm, dry    Allergies    No Known Allergies      MEDICATIONS  (STANDING):  acetaminophen   Tablet .. 650 milliGRAM(s) Oral every 6 hours  amLODIPine   Tablet 10 milliGRAM(s) Oral every 24 hours  atorvastatin 80 milliGRAM(s) Oral at bedtime  brimonidine 0.2% Ophthalmic Solution 1 Drop(s) Both EYES two times a day  dextrose 50% Injectable 12.5 Gram(s) IV Push once  dextrose 50% Injectable 25 Gram(s) IV Push once  dextrose 50% Injectable 25 Gram(s) IV Push once  dorzolamide 2%/timolol 0.5% Ophthalmic Solution 1 Drop(s) Both EYES two times a day  enoxaparin Injectable 80 milliGRAM(s) SubCutaneous two times a day  famotidine    Tablet 20 milliGRAM(s) Oral two times a day  fenofibrate Tablet 48 milliGRAM(s) Oral daily  gabapentin 300 milliGRAM(s) Oral two times a day  hydrALAZINE 10 milliGRAM(s) Oral every 8 hours  insulin glargine Injectable (LANTUS) 12 Unit(s) SubCutaneous at bedtime  insulin lispro (HumaLOG) corrective regimen sliding scale   SubCutaneous Before meals and at bedtime  insulin lispro Injectable (HumaLOG) 4 Unit(s) SubCutaneous three times a day before meals  latanoprost 0.005% Ophthalmic Solution 1 Drop(s) Both EYES at bedtime  levothyroxine 75 MICROGram(s) Oral daily  metoprolol succinate  milliGRAM(s) Oral daily  polyethylene glycol 3350 17 Gram(s) Oral daily  QUEtiapine 800 milliGRAM(s) Oral at bedtime  tamsulosin 0.4 milliGRAM(s) Oral at bedtime    MEDICATIONS  (PRN):  bisacodyl Suppository 10 milliGRAM(s) Rectal daily PRN Constipation  dextrose 40% Gel 15 Gram(s) Oral once PRN Blood Glucose LESS THAN 70 milliGRAM(s)/deciliter  glucagon  Injectable 1 milliGRAM(s) IntraMuscular once PRN Glucose LESS THAN 70 milligrams/deciliter    I&O's Summary    14 Apr 2020 07:01  -  15 Apr 2020 07:00  --------------------------------------------------------  IN: 0 mL / OUT: 50 mL / NET: -50 mL      79M w/ PMHx of HTN, DM2, former smoker, hypothyroidism who presented to ED [facility] on 3/23 after being found down for a fall, found to have starvation ketosis now resolved, was desatting with imaging findings on CXR and found to be COVID positive, now on nonrebreather for 02 support. SC 4/4 for new left sided weakness - NIHSS 7.  HCT negative, CTA showed moderate to severe left carotid stenosis. 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 Stroke etiology likely 2/2 to hypercoagulable status in the setting of COVID; however, cannot rule out cardioembolic case of stroke less likely large vessel atherosclerosis 2/2 to left carotid stenosis. Neuro exam 4/9 unchanged. Will continue to follow. TTE within normal limits, EF 61%.      Problem/Plan - 1:  ·  Problem: Acute respiratory failure with hypoxia.  Plan: 2/2 COVID   - s/p a 5 days course of azithromycin, plaquenil, vitamin C and thiamine, as well as steroids for 7 days   - S/p tociluzumab  - Now on nasal canula  - Diurese as needed, 40mg Lasix PRN  - OOBTC.      Problem/Plan - 2:  ·  Problem: Stroke.  Plan: Patient with L sided weakness in Upper and Lower extremity. NIHSS 7. No acute events on CTH.   - Carotid doppler ordered given carotid atherosclerosis seen in CT  - MRI brain w/wo contrast with right acute infarct in parietal lobe and internal capsule. Started lovenox at 80mg BID. Transition to ASA/plavix upon d/c  - Atorvastatin 80mg daily  - Echo showed EF of 61%.      Problem/Plan - 3:  ·  Problem: Weakness.  Plan: Neurology following  - PT, OT.      Problem/Plan - 4:  ·  Problem: Diabetes.  Plan: - lantus 12 units qhs  - 4U premeal   - MISS  - consistent carb diet  - out pt Endocrine f/u.      Problem/Plan - 5:  ·  Problem: Essential hypertension.  Plan: Well controlled on current regimen  - c/w hydral 10mg q8  - c/w amlodipine 10mg qd.      Problem/Plan - 6:  Problem: Hypothyroidism. Plan: - c/w home synthroid 75mcg daily.     Problem/Plan - 7:  ·  Problem: Hyperlipidemia.  Plan: home atorvastatin 20mg qhs  - Increased to atovastatin 80mg qhs, given recent stroke.      Problem/Plan - 8:  ·  Problem: Prophylactic measure.  Plan: DVT PPx: Lovenox 80mg BID  GI PPx: Famotidine 20mg  Full code. INTERVAL HPI/OVERNIGHT EVENTS:  Patient was seen and examined at bedside. Got OOB with PT desat to 89%, back in bed 92% on 2L NC. No complaints at this time. Patient denies: fever, chills, dizziness, weakness, HA, CP, palpitations, SOB, cough, N/V/D/C.    VITAL SIGNS:  T(F): 97 (04-15-20 @ 08:00)  HR: 76 (04-15-20 @ 08:00)  BP: 134/85 (04-15-20 @ 08:00)  RR: 16 (04-15-20 @ 08:00)  SpO2: 92% (04-15-20 @ 08:00)    PHYSICAL EXAM:  General: NAD  HEENT: EOMI, sclera non-icteric, neck supple, trachea midline  Respiratory: Good air entry b/l, no wheezing, no accessory muscle use and no intercostal retractions  Cardiovascular: RRR  Gastrointestinal: abdomen soft, NT  Extremities: Warm, well perfused, no edema  Neurological: AAOx3, CNs Grossly intact  Skin: Normal temperature, warm, dry    Allergies    No Known Allergies      MEDICATIONS  (STANDING):  acetaminophen   Tablet .. 650 milliGRAM(s) Oral every 6 hours  amLODIPine   Tablet 10 milliGRAM(s) Oral every 24 hours  atorvastatin 80 milliGRAM(s) Oral at bedtime  brimonidine 0.2% Ophthalmic Solution 1 Drop(s) Both EYES two times a day  dextrose 50% Injectable 12.5 Gram(s) IV Push once  dextrose 50% Injectable 25 Gram(s) IV Push once  dextrose 50% Injectable 25 Gram(s) IV Push once  dorzolamide 2%/timolol 0.5% Ophthalmic Solution 1 Drop(s) Both EYES two times a day  enoxaparin Injectable 80 milliGRAM(s) SubCutaneous two times a day  famotidine    Tablet 20 milliGRAM(s) Oral two times a day  fenofibrate Tablet 48 milliGRAM(s) Oral daily  gabapentin 300 milliGRAM(s) Oral two times a day  hydrALAZINE 10 milliGRAM(s) Oral every 8 hours  insulin glargine Injectable (LANTUS) 12 Unit(s) SubCutaneous at bedtime  insulin lispro (HumaLOG) corrective regimen sliding scale   SubCutaneous Before meals and at bedtime  insulin lispro Injectable (HumaLOG) 4 Unit(s) SubCutaneous three times a day before meals  latanoprost 0.005% Ophthalmic Solution 1 Drop(s) Both EYES at bedtime  levothyroxine 75 MICROGram(s) Oral daily  metoprolol succinate  milliGRAM(s) Oral daily  polyethylene glycol 3350 17 Gram(s) Oral daily  QUEtiapine 800 milliGRAM(s) Oral at bedtime  tamsulosin 0.4 milliGRAM(s) Oral at bedtime    MEDICATIONS  (PRN):  bisacodyl Suppository 10 milliGRAM(s) Rectal daily PRN Constipation  dextrose 40% Gel 15 Gram(s) Oral once PRN Blood Glucose LESS THAN 70 milliGRAM(s)/deciliter  glucagon  Injectable 1 milliGRAM(s) IntraMuscular once PRN Glucose LESS THAN 70 milligrams/deciliter    I&O's Summary    14 Apr 2020 07:01  -  15 Apr 2020 07:00  --------------------------------------------------------  IN: 0 mL / OUT: 50 mL / NET: -50 mL    A/P:  79M PMHx of HTN, DM2, hypothyroidism, CV[facility] during [facility] for a [facility], found to have starvation ketosis now resolved, was desatting with imaging findings on CXR and found to be COVID positive, now on nonrebreather for 02 support. SC 4/4 for new left sided weakness - NIHSS 7.  HCT negative, CTA showed moderate to severe left carotid stenosis. 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 Stroke etiology likely 2/2 to hypercoagulable status in the setting of COVID; however, cannot rule out cardioembolic case of stroke less likely large vessel atherosclerosis 2/2 to left carotid stenosis. Neuro exam 4/9 unchanged. Will continue to follow. TTE within normal limits, EF 61%.      Problem/Plan - 1:  ·  Problem: Acute respiratory failure with hypoxia.  Plan: 2/2 COVID   - s/p a 5 days course of azithromycin, plaquenil, vitamin C and thiamine, as well as steroids for 7 days   - S/p tociluzumab  - Now on nasal canula  - Diurese as needed, 40mg Lasix PRN  - OOBTC.      Problem/Plan - 2:  ·  Problem: Stroke.  Plan: Patient with L sided weakness in Upper and Lower extremity. NIHSS 7. No acute events on CTH.   - Carotid doppler ordered given carotid atherosclerosis seen in CT  - MRI brain w/wo contrast with right acute infarct in parietal lobe and internal capsule. Started lovenox at 80mg BID. Transition to ASA/plavix upon d/c  - Atorvastatin 80mg daily  - Echo showed EF of 61%.      Problem/Plan - 3:  ·  Problem: Weakness.  Plan: Neurology following  - PT, OT.      Problem/Plan - 4:  ·  Problem: Diabetes.  Plan: - lantus 12 units qhs  - 4U premeal   - MISS  - consistent carb diet  - out pt Endocrine f/u.      Problem/Plan - 5:  ·  Problem: Essential hypertension.  Plan: Well controlled on current regimen  - c/w hydral 10mg q8  - c/w amlodipine 10mg qd.      Problem/Plan - 6:  Problem: Hypothyroidism. Plan: - c/w home synthroid 75mcg daily.     Problem/Plan - 7:  ·  Problem: Hyperlipidemia.  Plan: home atorvastatin 20mg qhs  - Increased to atovastatin 80mg qhs, given recent stroke.      Problem/Plan - 8:  ·  Problem: Prophylactic measure.  Plan: DVT PPx: Lovenox 80mg BID  GI PPx: Famotidine 20mg  Full code. INTERVAL HPI/OVERNIGHT EVENTS:  Patient was seen and examined at bedside. Got OOB with PT desat to 89%, back in bed 92% on 2L NC. No complaints at this time. Patient denies: fever, chills, dizziness, weakness, HA, CP, palpitations, SOB, cough, N/V/D/C.    VITAL SIGNS:  T(F): 97 (04-15-20 @ 08:00)  HR: 76 (04-15-20 @ 08:00)  BP: 134/85 (04-15-20 @ 08:00)  RR: 16 (04-15-20 @ 08:00)  SpO2: 92% (04-15-20 @ 08:00)    PHYSICAL EXAM:  General: NAD  HEENT: EOMI, sclera non-icteric, neck supple, trachea midline  Respiratory: Good air entry b/l, no wheezing, no accessory muscle use and no intercostal retractions  Cardiovascular: RRR  Gastrointestinal: abdomen soft, NT  Extremities: Warm, well perfused, no edema  Neurological: AAOx3, CNs Grossly intact  Skin: Normal temperature, warm, dry    Allergies    No Known Allergies      MEDICATIONS  (STANDING):  acetaminophen   Tablet .. 650 milliGRAM(s) Oral every 6 hours  amLODIPine   Tablet 10 milliGRAM(s) Oral every 24 hours  atorvastatin 80 milliGRAM(s) Oral at bedtime  brimonidine 0.2% Ophthalmic Solution 1 Drop(s) Both EYES two times a day  dextrose 50% Injectable 12.5 Gram(s) IV Push once  dextrose 50% Injectable 25 Gram(s) IV Push once  dextrose 50% Injectable 25 Gram(s) IV Push once  dorzolamide 2%/timolol 0.5% Ophthalmic Solution 1 Drop(s) Both EYES two times a day  enoxaparin Injectable 80 milliGRAM(s) SubCutaneous two times a day  famotidine    Tablet 20 milliGRAM(s) Oral two times a day  fenofibrate Tablet 48 milliGRAM(s) Oral daily  gabapentin 300 milliGRAM(s) Oral two times a day  hydrALAZINE 10 milliGRAM(s) Oral every 8 hours  insulin glargine Injectable (LANTUS) 12 Unit(s) SubCutaneous at bedtime  insulin lispro (HumaLOG) corrective regimen sliding scale   SubCutaneous Before meals and at bedtime  insulin lispro Injectable (HumaLOG) 4 Unit(s) SubCutaneous three times a day before meals  latanoprost 0.005% Ophthalmic Solution 1 Drop(s) Both EYES at bedtime  levothyroxine 75 MICROGram(s) Oral daily  metoprolol succinate  milliGRAM(s) Oral daily  polyethylene glycol 3350 17 Gram(s) Oral daily  QUEtiapine 800 milliGRAM(s) Oral at bedtime  tamsulosin 0.4 milliGRAM(s) Oral at bedtime    MEDICATIONS  (PRN):  bisacodyl Suppository 10 milliGRAM(s) Rectal daily PRN Constipation  dextrose 40% Gel 15 Gram(s) Oral once PRN Blood Glucose LESS THAN 70 milliGRAM(s)/deciliter  glucagon  Injectable 1 milliGRAM(s) IntraMuscular once PRN Glucose LESS THAN 70 milligrams/deciliter    I&O's Summary    14 Apr 2020 07:01  -  15 Apr 2020 07:00  --------------------------------------------------------  IN: 0 mL / OUT: 50 mL / NET: -50 mL    A/P:  79M PMHx of HTN, DM2, hypothyroidism, CV[facility] during [facility] for a fall, found to have starvation ketosis now resolved, was desatting with imaging findings on CXR and found to be COVID positive. SC 4/4 for new left sided weakness - NIHSS 7.  HCT negative, CTA showed moderate to severe left carotid stenosis. 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 Stroke etiology likely 2/2 to hypercoagulable status in the setting of COVID; however, cannot rule out cardioembolic case of stroke less likely large vessel atherosclerosis 2/2 to left carotid stenosis. TTE within normal limits, EF 61%.      Problem/Plan - 1:  ·  Problem: Acute respiratory failure with hypoxia.  Plan: COVID+  - s/p a 5 days course of azithromycin, plaquenil, vitamin C and thiamine, as well as steroids for 7 days   - S/p tociluzumab  - On 2L NC  - Diurese as needed, 40mg Lasix PRN     Problem/Plan - 2:  ·  Problem: Stroke.  Plan: Patient with L sided weakness in Upper and Lower extremity. NIHSS 7. No acute events on CTH.   - PT  - ASA/Plavix for discharge  - Atorvastatin 80mg daily  - f/u with neuro in 1 month     Problem/Plan - 3:  ·  Problem: Diabetes.  Plan: - Glucose control  - ISS, Lantus, Humalog   - consistent carb diet     Problem/Plan - 4:  ·  Problem: Essential hypertension.  Plan: Controlled  - c/w current regimen     Problem/Plan - 5:   Problem: Hypothyroidism. Plan: - c/w home synthroid 75mcg daily.     Problem/Plan - 6:  ·  Problem: Hyperlipidemia.  Plan: home atorvastatin 20mg qhs  - Increased to atovastatin 80mg qhs, given recent stroke.      Problem/Plan - 7:  ·  Problem: Hematuria in canseco.  Plan: Canseco was placed & had urology consult  - D/C canseco, trial of void    Dispo: [facility], Discussed with Dr. Marie and patient brother and sister.

## 2020-04-16 ENCOUNTER — TRANSCRIPTION ENCOUNTER (OUTPATIENT)
Age: 80
End: 2020-04-16

## 2020-04-16 VITALS — OXYGEN SATURATION: 90 % | HEART RATE: 90 BPM

## 2020-04-16 LAB — GLUCOSE BLDC GLUCOMTR-MCNC: 122 MG/DL — HIGH (ref 70–99)

## 2020-04-16 PROCEDURE — 99238 HOSP IP/OBS DSCHRG MGMT 30/<: CPT | Mod: CS

## 2020-04-16 RX ORDER — TAMSULOSIN HYDROCHLORIDE 0.4 MG/1
1 CAPSULE ORAL
Qty: 0 | Refills: 0 | DISCHARGE
Start: 2020-04-16

## 2020-04-16 RX ORDER — ACETAMINOPHEN 500 MG
2 TABLET ORAL
Qty: 0 | Refills: 0 | DISCHARGE
Start: 2020-04-16

## 2020-04-16 RX ADMIN — Medication 650 MILLIGRAM(S): at 07:08

## 2020-04-16 RX ADMIN — GABAPENTIN 300 MILLIGRAM(S): 400 CAPSULE ORAL at 07:09

## 2020-04-16 RX ADMIN — ENOXAPARIN SODIUM 80 MILLIGRAM(S): 100 INJECTION SUBCUTANEOUS at 07:09

## 2020-04-16 RX ADMIN — Medication 75 MICROGRAM(S): at 07:09

## 2020-04-16 RX ADMIN — Medication 100 MILLIGRAM(S): at 07:09

## 2020-04-16 RX ADMIN — Medication 4 UNIT(S): at 08:18

## 2020-04-16 RX ADMIN — Medication 10 MILLIGRAM(S): at 07:09

## 2020-04-16 RX ADMIN — FAMOTIDINE 20 MILLIGRAM(S): 10 INJECTION INTRAVENOUS at 07:09

## 2020-04-16 NOTE — DISCHARGE NOTE NURSING/CASE MANAGEMENT/SOCIAL WORK - PATIENT PORTAL LINK FT
You can access the FollowMyHealth Patient Portal offered by [facility] by registering at the following website: [email or web address]. By joining [employer]’s FollowMyHealth portal, you will also be able to view your health information using other applications (apps) compatible with our system.

## 2020-04-16 NOTE — DISCHARGE NOTE NURSING/CASE MANAGEMENT/SOCIAL WORK - NSDCPEPTSTRK_GEN_ALL_CORE
Call 911 for stroke/Prescribed medications/Risk factors for stroke/Stroke education booklet/Stroke warning signs and symptoms/Signs and symptoms of stroke/Need for follow up after discharge/Stroke support groups for patients, families, and friends

## 2020-04-16 NOTE — DISCHARGE NOTE PROVIDER - NSFOLLOWUPCLINICS_GEN_ALL_ED_FT
[facility]rology [facility]  Urology  [address] [place], [address] [place] [address]  Phone: [phone]  Fax:     [facility]  Neurology  [address] [place] [address]  Phone: [phone]  Fax: [phone]  Follow Up Time: 1 month

## 2020-04-16 NOTE — DISCHARGE NOTE PROVIDER - NSDCCPCAREPLAN_GEN_ALL_CORE_FT
PRINCIPAL DISCHARGE DIAGNOSIS  Diagnosis: COVID-19  Assessment and Plan of Treatment:       SECONDARY DISCHARGE DIAGNOSES  Diagnosis: Fall  Assessment and Plan of Treatment:     Diagnosis: Dehydration  Assessment and Plan of Treatment:     Diagnosis: Unsteady gait  Assessment and Plan of Treatment:

## 2020-04-16 NOTE — DISCHARGE NOTE PROVIDER - NSDCMRMEDTOKEN_GEN_ALL_CORE_FT
Alphagan 0.2% ophthalmic solution: 1 drop(s) to each affected eye once a day  amLODIPine: 5 milligram(s) orally once a day  aspirin 81 mg oral tablet: 1 tab(s) orally once a day  dorzolamide-timolol 2%-0.5% preservative-free ophthalmic solution: 1 drop(s) to each affected eye 2 times a day  dorzolamide-timolol 2%-0.5% preservative-free ophthalmic solution: 1 drop(s) to each affected eye 2 times a day  fenofibrate 48 mg oral tablet: 1 tab(s) orally once a day  gabapentin 300 mg oral tablet: 1  orally 2 times a day  gabapentin 400 mg oral tablet: 2 cap(s) orally once a day (at bedtime)  glimepiride 2 mg oral tablet: 1 tab(s) orally once a day  latanoprost 0.005% ophthalmic solution: 1 drop(s) to each affected eye once a day (in the evening)  Lipitor 20 mg oral tablet: 1 tab(s) orally once a day (at bedtime)  metFORMIN 850 mg oral tablet: 1 tab(s) orally 2 times a day  SEROquel 400 mg oral tablet: 1 tab(s) orally 2 times a day at bedtime  Singulair 10 mg oral tablet: 1 tab(s) orally once a day (in the evening)  Synthroid: 75 milligram(s) orally once a day  Toprol- mg oral tablet, extended release: 1 tab(s) orally once a day acetaminophen 325 mg oral tablet: 2 tab(s) orally every 6 hours  Alphagan 0.2% ophthalmic solution: 1 drop(s) to each affected eye once a day  amLODIPine: 5 milligram(s) orally once a day  aspirin 81 mg oral tablet: 1 tab(s) orally once a day  dorzolamide-timolol 2%-0.5% preservative-free ophthalmic solution: 1 drop(s) to each affected eye 2 times a day  dorzolamide-timolol 2%-0.5% preservative-free ophthalmic solution: 1 drop(s) to each affected eye 2 times a day  fenofibrate 48 mg oral tablet: 1 tab(s) orally once a day  gabapentin 300 mg oral tablet: 1  orally 2 times a day  gabapentin 400 mg oral tablet: 2 cap(s) orally once a day (at bedtime)  glimepiride 2 mg oral tablet: 1 tab(s) orally once a day  latanoprost 0.005% ophthalmic solution: 1 drop(s) to each affected eye once a day (in the evening)  Lipitor 20 mg oral tablet: 1 tab(s) orally once a day (at bedtime)  metFORMIN 850 mg oral tablet: 1 tab(s) orally 2 times a day  Plavix 75 mg oral tablet: 1 tab(s) orally once a day  SEROquel 400 mg oral tablet: 1 tab(s) orally 2 times a day at bedtime  Singulair 10 mg oral tablet: 1 tab(s) orally once a day (in the evening)  Synthroid: 75 milligram(s) orally once a day  tamsulosin 0.4 mg oral capsule: 1 cap(s) orally once a day (at bedtime)  Toprol- mg oral tablet, extended release: 1 tab(s) orally once a day

## 2020-04-16 NOTE — DISCHARGE NOTE NURSING/CASE MANAGEMENT/SOCIAL WORK - NSDCPEWEB_GEN_ALL_CORE
[facility] for Tobacco Control website --- [email or web address] --- [email or web address]

## 2020-04-16 NOTE — DISCHARGE NOTE NURSING/CASE MANAGEMENT/SOCIAL WORK - NSDCPEEMAIL_GEN_ALL_CORE
[facility] email [email or web address]

## 2020-04-16 NOTE — DISCHARGE NOTE PROVIDER - HOSPITAL COURSE
79M PMHx of HTN, DM2, hypothyroidism, CVA during admission who presented to ED [facility] on 3/23 after being found down for a fall, found to have starvation ketosis now resolved, was desatting with imaging findings on CXR and found to be COVID positive. SC 4/4 for new left sided weakness - NIHSS 7.  HCT negative, CTA showed moderate to severe left carotid stenosis. 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 Stroke etiology likely 2/2 to hypercoagulable status in the setting of COVID; however, cannot rule out cardioembolic case of stroke less likely large vessel atherosclerosis 2/2 to left carotid stenosis. TTE within normal limits, EF 61%. Patient transferred to [facility] on 4/13 and deemed stable for discharge to [facility] today.

## 2020-04-16 NOTE — DISCHARGE NOTE PROVIDER - NSDCFUADDINST_GEN_ALL_CORE_FT
Please follow up with your primary care doctor within 7 days of discharge. Please call their office at your earliest convenience to schedule your appointment. Please call ahead to notify the office staff that you have tested positive for COVID-19.     Follow up with neurologist in 1 month.    Follow up with urology for hematuria, outpatient workup needed with CT urogram.    Warning Signs:  Please call your doctor or nurse practitioner if you experience the following:  *You experience new chest pain, pressure, squeezing or tightness.  *New or worsening cough, shortness of breath, or wheeze.  *If you are vomiting and cannot keep down fluids or your medications.  *You are getting dehydrated due to continued vomiting, diarrhea, or other reasons. Signs of dehydration include dry mouth, rapid heartbeat, or feeling dizzy or faint when standing.  *You see blood or dark/black material when you vomit or have a bowel movement.  *You experience burning when you urinate, have blood in your urine, or experience a discharge.  *Your pain is not improving within 8-12 hours or is not gone within 24 hours. Call or return immediately if your pain is getting worse, changes location, or moves to your chest or back.  *You have shaking chills, or fever greater than 101.5 degrees Fahrenheit or 38 degrees Celsius.  *Any change in your symptoms, or any new symptoms that concern you.

## 2020-04-23 DIAGNOSIS — I65.22 OCCLUSION AND STENOSIS OF LEFT CAROTID ARTERY: ICD-10-CM

## 2020-04-23 DIAGNOSIS — R31.9 HEMATURIA, UNSPECIFIED: ICD-10-CM

## 2020-04-23 DIAGNOSIS — D68.69 OTHER THROMBOPHILIA: ICD-10-CM

## 2020-04-23 DIAGNOSIS — E78.5 HYPERLIPIDEMIA, UNSPECIFIED: ICD-10-CM

## 2020-04-23 DIAGNOSIS — R65.20 SEVERE SEPSIS WITHOUT SEPTIC SHOCK: ICD-10-CM

## 2020-04-23 DIAGNOSIS — I63.89 OTHER CEREBRAL INFARCTION: ICD-10-CM

## 2020-04-23 DIAGNOSIS — J12.89 OTHER VIRAL PNEUMONIA: ICD-10-CM

## 2020-04-23 DIAGNOSIS — I10 ESSENTIAL (PRIMARY) HYPERTENSION: ICD-10-CM

## 2020-04-23 DIAGNOSIS — E86.0 DEHYDRATION: ICD-10-CM

## 2020-04-23 DIAGNOSIS — Z91.81 HISTORY OF FALLING: ICD-10-CM

## 2020-04-23 DIAGNOSIS — E88.89 OTHER SPECIFIED METABOLIC DISORDERS: ICD-10-CM

## 2020-04-23 DIAGNOSIS — R50.9 FEVER, UNSPECIFIED: ICD-10-CM

## 2020-04-23 DIAGNOSIS — R29.707 NIHSS SCORE 7: ICD-10-CM

## 2020-04-23 DIAGNOSIS — J80 ACUTE RESPIRATORY DISTRESS SYNDROME: ICD-10-CM

## 2020-04-23 DIAGNOSIS — Z87.891 PERSONAL HISTORY OF NICOTINE DEPENDENCE: ICD-10-CM

## 2020-04-23 DIAGNOSIS — U07.1 COVID-19: ICD-10-CM

## 2020-04-23 DIAGNOSIS — R41.0 DISORIENTATION, UNSPECIFIED: ICD-10-CM

## 2020-04-23 DIAGNOSIS — E11.65 TYPE 2 DIABETES MELLITUS WITH HYPERGLYCEMIA: ICD-10-CM

## 2020-04-23 DIAGNOSIS — E03.9 HYPOTHYROIDISM, UNSPECIFIED: ICD-10-CM

## 2020-04-23 DIAGNOSIS — G81.94 HEMIPLEGIA, UNSPECIFIED AFFECTING LEFT NONDOMINANT SIDE: ICD-10-CM

## 2020-04-23 DIAGNOSIS — A41.89 OTHER SPECIFIED SEPSIS: ICD-10-CM

## 2020-04-23 DIAGNOSIS — Z96.652 PRESENCE OF LEFT ARTIFICIAL KNEE JOINT: ICD-10-CM

## 2020-04-23 DIAGNOSIS — R26.81 UNSTEADINESS ON FEET: ICD-10-CM

## 2020-04-24 ENCOUNTER — APPOINTMENT (OUTPATIENT)
Dept: OPHTHALMOLOGY | Facility: CLINIC | Age: 80
End: 2020-04-24

## 2020-04-30 PROCEDURE — 70498 CT ANGIOGRAPHY NECK: CPT

## 2020-04-30 PROCEDURE — 36415 COLL VENOUS BLD VENIPUNCTURE: CPT

## 2020-04-30 PROCEDURE — 82955 ASSAY OF G6PD ENZYME: CPT

## 2020-04-30 PROCEDURE — A9585: CPT

## 2020-04-30 PROCEDURE — 85027 COMPLETE CBC AUTOMATED: CPT

## 2020-04-30 PROCEDURE — 85379 FIBRIN DEGRADATION QUANT: CPT

## 2020-04-30 PROCEDURE — 87486 CHLMYD PNEUM DNA AMP PROBE: CPT

## 2020-04-30 PROCEDURE — 85652 RBC SED RATE AUTOMATED: CPT

## 2020-04-30 PROCEDURE — 93970 EXTREMITY STUDY: CPT

## 2020-04-30 PROCEDURE — 83690 ASSAY OF LIPASE: CPT

## 2020-04-30 PROCEDURE — 83880 ASSAY OF NATRIURETIC PEPTIDE: CPT

## 2020-04-30 PROCEDURE — 83036 HEMOGLOBIN GLYCOSYLATED A1C: CPT

## 2020-04-30 PROCEDURE — 84443 ASSAY THYROID STIM HORMONE: CPT

## 2020-04-30 PROCEDURE — 83615 LACTATE (LD) (LDH) ENZYME: CPT

## 2020-04-30 PROCEDURE — 84145 PROCALCITONIN (PCT): CPT

## 2020-04-30 PROCEDURE — 80053 COMPREHEN METABOLIC PANEL: CPT

## 2020-04-30 PROCEDURE — 82803 BLOOD GASES ANY COMBINATION: CPT

## 2020-04-30 PROCEDURE — 70496 CT ANGIOGRAPHY HEAD: CPT

## 2020-04-30 PROCEDURE — 97535 SELF CARE MNGMENT TRAINING: CPT

## 2020-04-30 PROCEDURE — 70553 MRI BRAIN STEM W/O & W/DYE: CPT

## 2020-04-30 PROCEDURE — 82550 ASSAY OF CK (CPK): CPT

## 2020-04-30 PROCEDURE — 82010 KETONE BODYS QUAN: CPT

## 2020-04-30 PROCEDURE — 93005 ELECTROCARDIOGRAM TRACING: CPT

## 2020-04-30 PROCEDURE — 70450 CT HEAD/BRAIN W/O DYE: CPT

## 2020-04-30 PROCEDURE — 72170 X-RAY EXAM OF PELVIS: CPT

## 2020-04-30 PROCEDURE — 81001 URINALYSIS AUTO W/SCOPE: CPT

## 2020-04-30 PROCEDURE — 87581 M.PNEUMON DNA AMP PROBE: CPT

## 2020-04-30 PROCEDURE — 86480 TB TEST CELL IMMUN MEASURE: CPT

## 2020-04-30 PROCEDURE — 86140 C-REACTIVE PROTEIN: CPT

## 2020-04-30 PROCEDURE — 97164 PT RE-EVAL EST PLAN CARE: CPT

## 2020-04-30 PROCEDURE — 93321 DOPPLER ECHO F-UP/LMTD STD: CPT

## 2020-04-30 PROCEDURE — 84484 ASSAY OF TROPONIN QUANT: CPT

## 2020-04-30 PROCEDURE — 86901 BLOOD TYPING SEROLOGIC RH(D): CPT

## 2020-04-30 PROCEDURE — 85730 THROMBOPLASTIN TIME PARTIAL: CPT

## 2020-04-30 PROCEDURE — 97162 PT EVAL MOD COMPLEX 30 MIN: CPT

## 2020-04-30 PROCEDURE — 87798 DETECT AGENT NOS DNA AMP: CPT

## 2020-04-30 PROCEDURE — 85025 COMPLETE CBC W/AUTO DIFF WBC: CPT

## 2020-04-30 PROCEDURE — 87633 RESP VIRUS 12-25 TARGETS: CPT

## 2020-04-30 PROCEDURE — 96360 HYDRATION IV INFUSION INIT: CPT

## 2020-04-30 PROCEDURE — 97110 THERAPEUTIC EXERCISES: CPT

## 2020-04-30 PROCEDURE — 71045 X-RAY EXAM CHEST 1 VIEW: CPT

## 2020-04-30 PROCEDURE — 86850 RBC ANTIBODY SCREEN: CPT

## 2020-04-30 PROCEDURE — 74018 RADEX ABDOMEN 1 VIEW: CPT

## 2020-04-30 PROCEDURE — 85610 PROTHROMBIN TIME: CPT

## 2020-04-30 PROCEDURE — 82553 CREATINE MB FRACTION: CPT

## 2020-04-30 PROCEDURE — 80202 ASSAY OF VANCOMYCIN: CPT

## 2020-04-30 PROCEDURE — 97530 THERAPEUTIC ACTIVITIES: CPT

## 2020-04-30 PROCEDURE — 84146 ASSAY OF PROLACTIN: CPT

## 2020-04-30 PROCEDURE — 82962 GLUCOSE BLOOD TEST: CPT

## 2020-04-30 PROCEDURE — 97116 GAIT TRAINING THERAPY: CPT

## 2020-04-30 PROCEDURE — 73522 X-RAY EXAM HIPS BI 3-4 VIEWS: CPT

## 2020-04-30 PROCEDURE — 83735 ASSAY OF MAGNESIUM: CPT

## 2020-04-30 PROCEDURE — 80048 BASIC METABOLIC PNL TOTAL CA: CPT

## 2020-04-30 PROCEDURE — 83605 ASSAY OF LACTIC ACID: CPT

## 2020-04-30 PROCEDURE — 84100 ASSAY OF PHOSPHORUS: CPT

## 2020-04-30 PROCEDURE — 82728 ASSAY OF FERRITIN: CPT

## 2020-04-30 PROCEDURE — 86359 T CELLS TOTAL COUNT: CPT

## 2020-04-30 PROCEDURE — 87635 SARS-COV-2 COVID-19 AMP PRB: CPT

## 2020-04-30 PROCEDURE — 99285 EMERGENCY DEPT VISIT HI MDM: CPT | Mod: 25

## 2020-04-30 PROCEDURE — 87040 BLOOD CULTURE FOR BACTERIA: CPT

## 2020-05-21 ENCOUNTER — APPOINTMENT (OUTPATIENT)
Dept: HOME HEALTH SERVICES | Facility: HOME HEALTH | Age: 80
End: 2020-05-21

## 2020-05-21 DIAGNOSIS — D64.9 ANEMIA, UNSPECIFIED: ICD-10-CM

## 2020-05-21 RX ORDER — POLYETHYLENE GLYCOL 3350 17 G/17G
17 POWDER, FOR SOLUTION ORAL DAILY
Qty: 30 | Refills: 1 | Status: DISCONTINUED | COMMUNITY
Start: 2018-02-20 | End: 2020-05-21

## 2020-06-01 DIAGNOSIS — U07.1 COVID-19: ICD-10-CM

## 2020-06-02 ENCOUNTER — APPOINTMENT (OUTPATIENT)
Dept: UROLOGY | Facility: CLINIC | Age: 80
End: 2020-06-02

## 2020-06-04 ENCOUNTER — APPOINTMENT (OUTPATIENT)
Dept: HOME HEALTH SERVICES | Facility: HOME HEALTH | Age: 80
End: 2020-06-04

## 2020-06-04 ENCOUNTER — APPOINTMENT (OUTPATIENT)
Age: 80
End: 2020-06-04

## 2020-06-09 ENCOUNTER — APPOINTMENT (OUTPATIENT)
Dept: UROLOGY | Facility: CLINIC | Age: 80
End: 2020-06-09

## 2020-06-09 VITALS — TEMPERATURE: 97 F | DIASTOLIC BLOOD PRESSURE: 83 MMHG | SYSTOLIC BLOOD PRESSURE: 158 MMHG

## 2020-06-09 NOTE — HISTORY OF PRESENT ILLNESS
[FreeTextEntry1] : Patient upset today\par Repeatedly stating, " i am in the wrong place i need the neurologist."\par \par Recently had a stoke\par

## 2020-06-11 NOTE — PHYSICAL EXAM
[Well Nourished] : well nourished [Well Developed] : well developed [Normal Sclera/Conjunctiva] : normal sclera/conjunctiva [Supple] : the neck was supple [No Respiratory Distress] : no respiratory distress [Clear to Auscultation] : lungs were clear to auscultation bilaterally [No Accessory Muscle Use] : no accessory muscle use [Normal S1, S2] : normal S1 and S2 [No Edema] : there was no peripheral edema [Normal Bowel Sounds] : normal bowel sounds [Soft] : abdomen soft [Normal Gait] : normal gait [No Skin Lesions] : no skin lesions [Cranial Nerves Intact] : cranial nerves 2-12 were intact [No Gross Sensory Deficits] : no gross sensory deficits [Oriented x3] : oriented to person, place, and time [Normal Affect] : the affect was normal [Normal Mood] : the mood was normal [Normal Insight/Judgement] : insight and judgment were intact [Foot Ulcers] : no foot ulcers [de-identified] : unsteady gait order PT,  [de-identified] : pain, skin break and redness to penis

## 2020-06-11 NOTE — COUNSELING
[Non - Smoker] : non-smoker [Use assistive device to avoid falls] : use assistive device to avoid falls

## 2020-06-11 NOTE — REVIEW OF SYSTEMS
[Vision Problems] : vision problems [Fever] : no fever [Chills] : no chills [Recent Change In Weight] : ~T no recent weight change [Earache] : no earache [Fatigue] : no fatigue [Nasal Discharge] : no nasal discharge [Lower Ext Edema] : no lower extremity edema [Sore Throat] : no sore throat [Chest Pain] : no chest pain [Cough] : no cough [Wheezing] : no wheezing [Shortness Of Breath] : no shortness of breath [Dysuria] : no dysuria [Incontinence] : no incontinence [Constipation] : no constipation [Abdominal Pain] : no abdominal pain [Fainting] : no fainting [Muscle Weakness] : no muscle weakness [Memory Loss] : no memory loss [Insomnia] : no insomnia [Suicidal] : not suicidal [Easy Bleeding] : no easy bleeding [Anxiety] : no anxiety [FreeTextEntry3] : uses glasses and prescribed eye drops  [FreeTextEntry8] : pain and redness to head of penis with skin break

## 2020-06-11 NOTE — HISTORY OF PRESENT ILLNESS
[Patient] : patient [FreeTextEntry2] : 77 y.o. male lives alone at [facility]. Pt is alert and oriented 3, he has dx of DM T2, HTN, glaucoma, knee pain, osteoarthritis. Being seen for annual assessment. \par \par Pt reports vision is decreasing, states he was told he has "blood in the back of my left eye, my right eye and I can't have any surgery". Pt very upset he has "all these medications, why did you give me all these medications, its a scam the pharmacy is trying to scam me" Reviewed meds they were prescribed by Dr. De La Torre, discussed reasons for medications. He agreed and will take the medications.\par \par  Denies s/s hypo/hyperglycemia. States he will run out of Metformin, called the pharmacy with pt, pharmacist states pt picked up medications on 2/29, pt denies. Pharmacist promised to assist pt. Skin is intact. \par Sees Dr. Gibson for psych f/u. \par \par  [FreeTextEntry1] : gait and balance impairment

## 2020-06-11 NOTE — HEALTH RISK ASSESSMENT
[HRA Reviewed] : Health risk assessment reviewed [Independent] : using telephone [Full assistance needed] : managing finances [Two or more falls in past year] : Patient reported two or more falls in the past year [Yes] : The patient does have visual impairment [TimeGetUpGo] : 30

## 2020-06-12 ENCOUNTER — APPOINTMENT (OUTPATIENT)
Dept: NEUROLOGY | Facility: CLINIC | Age: 80
End: 2020-06-12
Payer: MEDICARE

## 2020-06-12 VITALS
HEIGHT: 69 IN | DIASTOLIC BLOOD PRESSURE: 84 MMHG | WEIGHT: 192 LBS | OXYGEN SATURATION: 96 % | BODY MASS INDEX: 28.44 KG/M2 | TEMPERATURE: 97.9 F | SYSTOLIC BLOOD PRESSURE: 139 MMHG | HEART RATE: 88 BPM

## 2020-06-12 DIAGNOSIS — Z86.73 PERSONAL HISTORY OF TRANSIENT ISCHEMIC ATTACK (TIA), AND CEREBRAL INFARCTION W/OUT RESIDUAL DEFICITS: ICD-10-CM

## 2020-06-12 DIAGNOSIS — Z86.19 PERSONAL HISTORY OF OTHER INFECTIOUS AND PARASITIC DISEASES: ICD-10-CM

## 2020-06-12 PROCEDURE — 99214 OFFICE O/P EST MOD 30 MIN: CPT

## 2020-06-15 LAB
ALBUMIN SERPL ELPH-MCNC: 4.7 G/DL
ALP BLD-CCNC: 55 U/L
ALT SERPL-CCNC: 14 U/L
ANION GAP SERPL CALC-SCNC: 14 MMOL/L
APTT BLD: 31.1 SEC
AST SERPL-CCNC: 11 U/L
AT III PPP CHRO-ACNC: 113 %
B2 GLYCOPROT1 AB SER QL: NEGATIVE
B2 GLYCOPROT1 IGA SERPL IA-ACNC: <5 SAU
B2 GLYCOPROT1 IGG SER-ACNC: <5 SGU
B2 GLYCOPROT1 IGM SER-ACNC: <5 SMU
BASOPHILS # BLD AUTO: 0.03 K/UL
BASOPHILS NFR BLD AUTO: 0.3 %
BILIRUB SERPL-MCNC: 0.3 MG/DL
BUN SERPL-MCNC: 15 MG/DL
CALCIUM SERPL-MCNC: 10.4 MG/DL
CARDIOLIPIN AB SER IA-ACNC: POSITIVE
CARDIOLIPIN IGM SER-MCNC: 21.3 GPL
CARDIOLIPIN IGM SER-MCNC: 5.6 MPL
CHLORIDE SERPL-SCNC: 103 MMOL/L
CO2 SERPL-SCNC: 22 MMOL/L
CONFIRM: 25.9 SEC
CREAT SERPL-MCNC: 0.78 MG/DL
DEPRECATED D DIMER PPP IA-ACNC: <150 NG/ML DDU
DRVVT IMM 1:2 NP PPP: NORMAL
DRVVT SCREEN TO CONFIRM RATIO: 0.87 RATIO
EOSINOPHIL # BLD AUTO: 1.43 K/UL
EOSINOPHIL NFR BLD AUTO: 15.6 %
GLUCOSE SERPL-MCNC: 165 MG/DL
HCT VFR BLD CALC: 43.4 %
HCYS SERPL-MCNC: 16.6 UMOL/L
HGB BLD-MCNC: 13.2 G/DL
IMM GRANULOCYTES NFR BLD AUTO: 0.2 %
INR PPP: 0.97 RATIO
LYMPHOCYTES # BLD AUTO: 3.11 K/UL
LYMPHOCYTES NFR BLD AUTO: 34 %
MAN DIFF?: NORMAL
MCHC RBC-ENTMCNC: 25.6 PG
MCHC RBC-ENTMCNC: 30.4 GM/DL
MCV RBC AUTO: 84.1 FL
MONOCYTES # BLD AUTO: 0.67 K/UL
MONOCYTES NFR BLD AUTO: 7.3 %
NEUTROPHILS # BLD AUTO: 3.9 K/UL
NEUTROPHILS NFR BLD AUTO: 42.6 %
PLATELET # BLD AUTO: 444 K/UL
POTASSIUM SERPL-SCNC: 4.3 MMOL/L
PROT C PPP CHRO-ACNC: 129 %
PROT S AG ACT/NOR PPP IA: 67 %
PROT SERPL-MCNC: 7.5 G/DL
PT BLD: 11.1 SEC
RBC # BLD: 5.16 M/UL
RBC # FLD: 16.2 %
SARS-COV-2 N GENE NPH QL NAA+PROBE: NOT DETECTED
SCREEN DRVVT: 24.8 SEC
SILICA CLOTTING TIME INTERPRETATION: NORMAL
SILICA CLOTTING TIME S/C: 0.84 RATIO
SODIUM SERPL-SCNC: 139 MMOL/L
VIT B12 SERPL-MCNC: 299 PG/ML
WBC # FLD AUTO: 9.16 K/UL

## 2020-06-15 NOTE — DATA REVIEWED
[de-identified] : Labs-  5/20/2020\par a1c 8.3\par Total chol 11\par TG 80\par HDL 55\par LDL 40 [de-identified] : EXAM:  MR BRAIN WAW IC                      \par \par PROCEDURE DATE:  04/05/2020  \par \par \par \par INTERPRETATION:  78 yo m with htn, DM covid+\par \par Technique: MRI of the brain was performed utilizing sagittal and axial T1, axial T2, axial gradient echo, axial and coronal FLAIR and diffusion imaging.\par \par There are no prior studies available for comparison.\par \par Findings: There is motion artifact that limits this evaluation. There are small acute infarctions in the right posterior frontal cortex, precentral gyrus, right frontal and parietal subcortical white matter and a punctate focus within the post central cortex .\par \par There is a right external capsule small chronic lacunar infarct. There are bilateral thalamic chronic lacunar infarcts. There is a tiny right parietal chronic infarct. There are patchy foci of T2 and Flair signal hyperintensities within the white matter and moderate patchy areas within the wilmer that are nonspecific however likely consistent moderate microvascular disease in a patient of this age. There is no evidence of mass-effect or midline shift. There is no evidence of intra or extra-axial fluid collection. The ventricles are of normal size and caliber. There is no evidence of acute infarction. Evaluation of the intracranial vascular flow-voids appear within normal limits.\par \par There are gradient echo hypointensities identified in the right posterior mesial parietal occipital lobe (image #7 series 4) posterior to the atrium. There is also a punctate focus of gradient echo hypointensity within the mesial occipital lobe (image #18 series 14) there is a punctate focus of gradient echo hypointensity within the right paracentral wilmer (image 14 series 14).\par \par There is no evidence of abnormal enhancement on this very limited evaluation\par \par The visualized paranasal sinuses and bilateral mastoid air cells are clear.\par \par Impression: Limited evaluation due to motion artifact. Small acute infarctions in the right posterior frontal and parietal lobe. Moderate microvascular disease. RIGHT external capsule small chronic lacunar infarct. Bilateral thalamic chronic lacunar infarcts. Tiny right parietal chronic infarct. \par \par

## 2020-06-15 NOTE — ASSESSMENT
[FreeTextEntry1] : 80 year old male patient, right handed, with past medical history of HTN, DM2, hypothyroidism, CVA presents for post-hospital follow up visit for CVA. Patient was admitted at [facility] on 3/23 and discharged 4/16. Neurological examination is intact. Per d/c, patient's stroke is unknown if cardioembolic or due to COVID. Labs needed for further workup.\par \par Plan for Stroke Factors\par -Continue dual antiplatelet therapy: aspirin and plavix. Remain for 90 days and reassess with accumetrics. Monitor for any blood in urine or stool.\par -Continue on Atorvastatin 20mg; LDL goal to be <70. Monitor for any muscle cramps/pain. Reassess lipid profile after 3 months.\par -BP is well controlled; BP goal <130/80. Counseled on daily adherence to medications; defer to PCP if needed.\par -Counseled on diet and exercise.\par -a1c is 8.3; defer to PCP.\par -COVID inflammatory profile labs\par \par f/u with telehealth \par

## 2020-06-15 NOTE — HISTORY OF PRESENT ILLNESS
[FreeTextEntry1] : 80 year old male patient, right handed, with past medical history of HTN, DM2, hypothyroidism, CVA presents for post-hospital follow up visit for CVA. Patient was admitted at [facility] on 3/23 and discharged .\par \par Reports he is staying in a new hotel- says a lot of folks in his building  due to COVID. \par He states he has been adherent to his medication plavix and aspirin 81mg- every day; denies blood in urine and stool. \par He reports taking lipitor 20mg- denies any muscle cramps or pain.\par \par Diet: he drinks 3 sodas a day as he reports.\par \par Denies any COVID symptoms. \par

## 2020-06-15 NOTE — PHYSICAL EXAM
[FreeTextEntry1] : Constitutional: alert, in no acute distress, well nourished and well developed.\par \par Neurologic: \par Mental Status: The patient is alert, attentive, and oriented to person, place, and time. Speech is clear and fluent with good repetition, comprehension, and naming. \par Cranial nerves:\par CN II: Visual fields are full to confrontation.Visual acuity is intact. \par CN III, IV, VI: EOMI, PERRLA\par CN V: Facial sensation is intact to pinprick in all 3 divisions bilaterally. Corneal responses are intact.\par CN VII: Face is symmetric with normal eye closure and smile.\par CN VIII: Hearing is normal to rubbing fingers\par CN IX, X: Palate elevates symmetrically. Phonation is normal.\par CN XI: Head turning and shoulder shrug are intact and symmetric.\par CN XII: Tongue is midline with normal movements and no atrophy and no deviation with protrusion.\par Motor: There is no pronator drift of out-stretched arms. Muscle bulk and tone is normal. Strength is full bilaterally 5/5 on both UE and both LE. \par Sensation: light touch is intact; pinprick intact; vibration b/l intact.\par Reflexes:Reflexes are 2+ and symmetric at the biceps, triceps, knees, and ankles.\par Coordination: Rapid alternating movements and fine finger movements are intact. There is no dysmetria on finger-to-nose and heel-knee-shin. There are no abnormal or extraneous movements. Negative Romberg. \par Gait/Stance: Posture is normal. Gait is steady with normal steps, base, arm swing, and turning. Heel and toe walking are normal. \par \par

## 2020-06-16 ENCOUNTER — APPOINTMENT (OUTPATIENT)
Dept: UROLOGY | Facility: CLINIC | Age: 80
End: 2020-06-16

## 2020-06-16 LAB — PROT S FREE PPP-ACNC: 50 % NORMAL

## 2020-06-17 LAB — DNA PLOIDY SPEC FC-IMP: NORMAL

## 2020-06-17 RX ORDER — CLOPIDOGREL BISULFATE 75 MG/1
75 TABLET, FILM COATED ORAL
Qty: 30 | Refills: 3 | Status: DISCONTINUED | COMMUNITY
Start: 2020-05-21 | End: 2020-06-17

## 2020-06-17 RX ORDER — ASPIRIN 81 MG/1
81 TABLET ORAL DAILY
Qty: 30 | Refills: 3 | Status: DISCONTINUED | COMMUNITY
Start: 2020-05-21 | End: 2020-06-17

## 2020-06-19 ENCOUNTER — NON-APPOINTMENT (OUTPATIENT)
Age: 80
End: 2020-06-19

## 2020-06-19 ENCOUNTER — APPOINTMENT (OUTPATIENT)
Dept: OPHTHALMOLOGY | Facility: CLINIC | Age: 80
End: 2020-06-19
Payer: MEDICARE

## 2020-06-19 LAB — METHYLMALONATE SERPL-SCNC: 147 NMOL/L

## 2020-06-19 PROCEDURE — 92083 EXTENDED VISUAL FIELD XM: CPT

## 2020-06-19 PROCEDURE — 92012 INTRM OPH EXAM EST PATIENT: CPT

## 2020-06-22 ENCOUNTER — APPOINTMENT (OUTPATIENT)
Dept: ENDOCRINOLOGY | Facility: CLINIC | Age: 80
End: 2020-06-22
Payer: MEDICARE

## 2020-06-22 VITALS
DIASTOLIC BLOOD PRESSURE: 91 MMHG | BODY MASS INDEX: 28.14 KG/M2 | HEIGHT: 69 IN | HEART RATE: 66 BPM | SYSTOLIC BLOOD PRESSURE: 160 MMHG | WEIGHT: 190 LBS

## 2020-06-22 LAB
GLUCOSE BLDC GLUCOMTR-MCNC: 230
HBA1C MFR BLD HPLC: 8

## 2020-06-22 PROCEDURE — 99214 OFFICE O/P EST MOD 30 MIN: CPT | Mod: 25

## 2020-06-22 PROCEDURE — 83036 HEMOGLOBIN GLYCOSYLATED A1C: CPT | Mod: QW

## 2020-06-22 PROCEDURE — 82962 GLUCOSE BLOOD TEST: CPT

## 2020-06-22 RX ORDER — QUETIAPINE FUMARATE 400 MG/1
400 TABLET ORAL
Qty: 60 | Refills: 0 | Status: DISCONTINUED | COMMUNITY
Start: 2017-05-10 | End: 2020-06-22

## 2020-06-22 RX ORDER — GABAPENTIN 300 MG/1
300 CAPSULE ORAL TWICE DAILY
Qty: 60 | Refills: 4 | Status: DISCONTINUED | COMMUNITY
Start: 2020-05-21 | End: 2020-06-22

## 2020-06-22 RX ORDER — QUETIAPINE FUMARATE 300 MG/1
300 TABLET ORAL
Qty: 30 | Refills: 0 | Status: DISCONTINUED | COMMUNITY
Start: 2017-11-13 | End: 2020-06-22

## 2020-06-22 RX ORDER — LEVOTHYROXINE SODIUM 0.07 MG/1
75 TABLET ORAL
Qty: 1 | Refills: 1 | Status: DISCONTINUED | COMMUNITY
Start: 2020-03-12 | End: 2020-06-22

## 2020-06-22 NOTE — ASSESSMENT
[FreeTextEntry1] : Diabetes, suboptimal.  goal A1c  near or under 7.0%.  \par Advised to avoid soda and juice and limit intake of sweets.  I think (I hope) since his hospitalization, his diet has improved\par continue statin therapy.\par \par Hypothyroid, euthyroid.  continue current thyroxine dose.\par RTO 4 months

## 2020-06-22 NOTE — PHYSICAL EXAM
[Alert] : alert [Healthy Appearance] : healthy appearance [No Proptosis] : no proptosis [No Lid Lag] : no lid lag [Normal Hearing] : hearing was normal [No LAD] : no lymphadenopathy [Thyroid Not Enlarged] : the thyroid was not enlarged [Clear to Auscultation] : lungs were clear to auscultation bilaterally [Normal S1, S2] : normal S1 and S2 [Regular Rhythm] : with a regular rhythm [No Edema] : no peripheral edema [Normal Sensation on Monofilament Testing] : normal sensation on monofilament testing of lower extremities [Normal Affect] : the affect was normal [Normal Mood] : the mood was normal [Foot Ulcers] : no foot ulcers [de-identified] : no hair growth on LE. mild acanthosis nigricans  [de-identified] : fingerstick 230, post breakfast (2 eggs on a roll, coffee with 1 sugar) [de-identified] : diminished pedal pulses [de-identified] : limited  insight to health conditions

## 2020-06-22 NOTE — DATA REVIEWED
[FreeTextEntry1] : \par 6/20: B12 299, , A1c 8.0% (point of care)\par 5/20: A1c 8.3%, tot cho 111, trig 80, HDL 55, LDL 40, TSH 0.83\par 10/19: A1c 6.7%, point of care\par 7/19: Cr 0.87, eGFR 83\par 3/19: A1c 7.1% (point of care).  TSH 0.75, tot chol 196, trig 353, HDL 55, LDL 70, B12 388\par 12/18: A1c 6.8%, point of care\par 10/18: tot chol 174, trig 159, HDL 68, LDL 74\par 9/18: A1c 6.5% (point of care)\par 4/18: A1c 7.2%, tot chol 223, trig 157, HDL 77, \par 1/18: A1c 6.8%, point of care\par 10/17: A1c 7.7%\par 8/17: A1c 9.2%, tot chol 219, trig 171, HDL 66, , TSH 1.49\par 4/17: A1c 8.0% (point of care)\par 1/17: A1c 9.0% (point of care)\par 11/16: A1c 10.2%, Cr 0.89, eGFR 97\par 8/16: A1c 7.7% (point of care)\par 6/16: A1c 8.9%, fructosamine 321\par 5/16: A1c 10.5%, tot chol 196, trig 184, HDL 60, LDL 99, urine microalb/cr 32, TSH 1.48,  Cr 0.93\par \par CT thoracic spine, 10/18:\par stable L adrenal adenoma 3.5cm (compared to 2015 and 2012)

## 2020-06-22 NOTE — HISTORY OF PRESENT ILLNESS
[FreeTextEntry1] : hospitalized 3/24-4/16 with stroke and Covid infection.   Then went to rehab and discharged to a hotel  on May 20.  now is back home. Sugars in the hospital were well controlled (in the 100s).\par he says he was eating too many donuts, chocolate cake and drinking Pepsi but now he has stopped this.\par no chest pain, SOB or neuropathy symptoms\par he is not sure of the medications he has.  he has not had metformin\par I called pharmacy to confirm his meds.\par \par Meds: amlodipine 5mg, metoprolol ER 100mg/day\par metformin 850mg,BID (not at pharmacy);  glimepiride 2mg/day\par fenofibrate 48mg, atorvastatin 20mg\par Plavix 75mg, Eliquis 5mg bid\par levothyroxine 75mcg/day\par gabapentin 400mg /day\par Seroquel 400mg (waiting to be picked up)\par montelukast 10mg\par eye drops.\par

## 2020-06-25 ENCOUNTER — APPOINTMENT (OUTPATIENT)
Dept: UROLOGY | Facility: CLINIC | Age: 80
End: 2020-06-25
Payer: MEDICARE

## 2020-06-25 VITALS
SYSTOLIC BLOOD PRESSURE: 134 MMHG | WEIGHT: 190 LBS | HEIGHT: 69 IN | HEART RATE: 71 BPM | DIASTOLIC BLOOD PRESSURE: 84 MMHG | TEMPERATURE: 97.1 F | BODY MASS INDEX: 28.14 KG/M2

## 2020-06-25 PROCEDURE — 99213 OFFICE O/P EST LOW 20 MIN: CPT

## 2020-06-25 NOTE — ASSESSMENT
[FreeTextEntry1] : Hematuria\par -recommend repeat UA, UCx, and cytology\par -CT urogram, cystoscopy\par -patient refusing cystoscopy at this time.  Explained in depth to Mr. Brooke given former smoking history cystoscopy recommended to rule out bladder cancer as possible cause of hematuria.  Patient still adamant about not having procedure completed.  Risk of progression of disease and missed treatment opportunities explained.\par \par Balanitis\par -resolved with nystatin/steroid cream

## 2020-06-25 NOTE — HISTORY OF PRESENT ILLNESS
[FreeTextEntry1] : 80 year old male recently hospitalized for CVA in March 2020\par PMHx HTN, DM2, former smoker, hypothyroidism\par \par While in hospital with urinary catheter new onset of hematuria noted.\par Feeling ok today, states" i feel fine, i don’t have any problems."\par Reports that rash on his penis has gone away after using medication.\par \par Has noticed a little " stuff in the urine" and a " color sometimes."  Can not state if red in color.\par Denies any dysuria, fever, chills, flank pain \par \par \par

## 2020-06-25 NOTE — PHYSICAL EXAM
[General Appearance - Well Developed] : well developed [General Appearance - Well Nourished] : well nourished [Normal Appearance] : normal appearance [Well Groomed] : well groomed [General Appearance - In No Acute Distress] : no acute distress [Abdomen Soft] : soft [Abdomen Tenderness] : non-tender [] : no respiratory distress [Costovertebral Angle Tenderness] : no ~M costovertebral angle tenderness [Respiration, Rhythm And Depth] : normal respiratory rhythm and effort [Exaggerated Use Of Accessory Muscles For Inspiration] : no accessory muscle use [Penis Abnormality] : normal uncircumcised penis [Urinary Bladder Findings] : the bladder was normal on palpation [Normal Station and Gait] : the gait and station were normal for the patient's age [FreeTextEntry1] : agitated

## 2020-07-02 ENCOUNTER — APPOINTMENT (OUTPATIENT)
Dept: HOME HEALTH SERVICES | Facility: HOME HEALTH | Age: 80
End: 2020-07-02
Payer: MEDICARE

## 2020-07-02 VITALS
HEART RATE: 76 BPM | TEMPERATURE: 96.8 F | OXYGEN SATURATION: 99 % | SYSTOLIC BLOOD PRESSURE: 150 MMHG | DIASTOLIC BLOOD PRESSURE: 90 MMHG | RESPIRATION RATE: 18 BRPM

## 2020-07-02 PROCEDURE — 99350 HOME/RES VST EST HIGH MDM 60: CPT

## 2020-07-02 NOTE — COUNSELING
[TLC diet recommended] : TLC diet recommended [Non - Smoker] : non-smoker [] : eye exam [Completed] : Aspirin use discussion completed

## 2020-07-04 NOTE — REASON FOR VISIT
[Follow-Up] : a follow-up visit [Other: _____] : [unfilled] [Pre-Visit Preparation] : pre-visit preparation was done [Intercurrent Specialty/Sub-specialty Visits] : the patient has intercurrent specialty/sub-specialty visits [FreeTextEntry3] : ENDO, URO, NEURO

## 2020-07-04 NOTE — REVIEW OF SYSTEMS
[Vision Problems] : vision problems [Fever] : no fever [Chills] : no chills [Fatigue] : no fatigue [Earache] : no earache [Nasal Discharge] : no nasal discharge [Recent Change In Weight] : ~T no recent weight change [Chest Pain] : no chest pain [Sore Throat] : no sore throat [Lower Ext Edema] : no lower extremity edema [Wheezing] : no wheezing [Shortness Of Breath] : no shortness of breath [Cough] : no cough [Abdominal Pain] : no abdominal pain [Constipation] : no constipation [Dysuria] : no dysuria [Incontinence] : no incontinence [Fainting] : no fainting [Memory Loss] : no memory loss [Muscle Weakness] : no muscle weakness [Suicidal] : not suicidal [Insomnia] : no insomnia [Anxiety] : no anxiety [Easy Bleeding] : no easy bleeding [FreeTextEntry3] : uses glasses and prescribed eye drops  [FreeTextEntry8] : pain and redness to head of penis with skin break

## 2020-07-04 NOTE — PHYSICAL EXAM
[Well Nourished] : well nourished [Well Developed] : well developed [Normal Sclera/Conjunctiva] : normal sclera/conjunctiva [Supple] : the neck was supple [No Respiratory Distress] : no respiratory distress [Clear to Auscultation] : lungs were clear to auscultation bilaterally [No Accessory Muscle Use] : no accessory muscle use [Normal S1, S2] : normal S1 and S2 [No Edema] : there was no peripheral edema [Normal Bowel Sounds] : normal bowel sounds [Soft] : abdomen soft [Normal Gait] : normal gait [No Skin Lesions] : no skin lesions [Cranial Nerves Intact] : cranial nerves 2-12 were intact [No Gross Sensory Deficits] : no gross sensory deficits [Oriented x3] : oriented to person, place, and time [Normal Affect] : the affect was normal [Normal Mood] : the mood was normal [Normal Insight/Judgement] : insight and judgment were intact [Foot Ulcers] : no foot ulcers [de-identified] : pain, skin break and redness to penis [de-identified] : unsteady gait order PT,

## 2020-07-04 NOTE — HISTORY OF PRESENT ILLNESS
[Patient] : patient [FreeTextEntry1] : gait and balance impairment [FreeTextEntry2] : 77 y.o. male lives alone at [facility] residence, now staying at the [facility]. Will be moving back to [facility] on 7/7. Has new hospital bed there. Pt is alert and oriented 3, he has dx of DM T2, HTN, glaucoma, knee pain, osteoarthritis. \par \par /89 denies any s/s HTN, reviewed meds states he has been taking them. \par \par  Denies s/s hypo/hyperglycemia. \par \par

## 2020-07-04 NOTE — HEALTH RISK ASSESSMENT
[HRA Reviewed] : Health risk assessment reviewed [Independent] : feeding [Some assistance needed] : managing finances [Full assistance needed] : using transportation [Any fall with injury in past year] : Patient reported fall with injury in the past year [Yes] : The patient does have visual impairment [TimeGetUpGo] : 10

## 2020-07-21 ENCOUNTER — APPOINTMENT (OUTPATIENT)
Dept: HOME HEALTH SERVICES | Facility: HOME HEALTH | Age: 80
End: 2020-07-21

## 2020-08-06 ENCOUNTER — APPOINTMENT (OUTPATIENT)
Dept: HOME HEALTH SERVICES | Facility: HOME HEALTH | Age: 80
End: 2020-08-06

## 2020-08-24 ENCOUNTER — APPOINTMENT (OUTPATIENT)
Dept: HOME HEALTH SERVICES | Facility: HOME HEALTH | Age: 80
End: 2020-08-24
Payer: MEDICARE

## 2020-08-24 VITALS
TEMPERATURE: 97.5 F | HEART RATE: 87 BPM | DIASTOLIC BLOOD PRESSURE: 80 MMHG | RESPIRATION RATE: 20 BRPM | OXYGEN SATURATION: 98 % | SYSTOLIC BLOOD PRESSURE: 130 MMHG

## 2020-08-24 PROCEDURE — 99350 HOME/RES VST EST HIGH MDM 60: CPT

## 2020-08-26 NOTE — REVIEW OF SYSTEMS
[Vision Problems] : vision problems [Fever] : no fever [Chills] : no chills [Fatigue] : no fatigue [Earache] : no earache [Nasal Discharge] : no nasal discharge [Recent Change In Weight] : ~T no recent weight change [Sore Throat] : no sore throat [Chest Pain] : no chest pain [Lower Ext Edema] : no lower extremity edema [Shortness Of Breath] : no shortness of breath [Wheezing] : no wheezing [Abdominal Pain] : no abdominal pain [Cough] : no cough [Constipation] : no constipation [Dysuria] : no dysuria [Incontinence] : no incontinence [Muscle Weakness] : no muscle weakness [Fainting] : no fainting [Memory Loss] : no memory loss [Suicidal] : not suicidal [Insomnia] : no insomnia [Anxiety] : no anxiety [FreeTextEntry3] : uses glasses and prescribed eye drops  [Easy Bleeding] : no easy bleeding [FreeTextEntry8] : pain and redness to head of penis with skin break

## 2020-08-26 NOTE — COUNSELING
[Mediterranean diet recommended] : Mediterranean diet recommended [Non - Smoker] : non-smoker [Smoke/CO Detectors] : smoke/CO detectors [] : foot exam [Completed] : Aspirin use discussion completed [Improve mobility] : improve mobility [Improve weight] : improve weight [Improve pain control] : improve pain control [Decrease stress] : decrease stress [Decrease hospital use] : decrease hospital use [Minimize unnecessary interventions] : minimize unnecessary interventions [Discussed disease trajectory with patient/caregiver] : discussed disease trajectory with patient/caregiver [Full Code] : Code Status: Full Code [No Limitations] : Treatment Guidelines: No limitations [Long Term Intubation] : Intubation: Long term intubation [[facility] Ver[facility] Date: _____] [facility]/last verification date: [unfilled]

## 2020-08-26 NOTE — PHYSICAL EXAM
[Well Developed] : well developed [Well Nourished] : well nourished [Normal Sclera/Conjunctiva] : normal sclera/conjunctiva [No Respiratory Distress] : no respiratory distress [Supple] : the neck was supple [Normal S1, S2] : normal S1 and S2 [Clear to Auscultation] : lungs were clear to auscultation bilaterally [No Accessory Muscle Use] : no accessory muscle use [No Edema] : there was no peripheral edema [Normal Bowel Sounds] : normal bowel sounds [Soft] : abdomen soft [Normal Gait] : normal gait [Cranial Nerves Intact] : cranial nerves 2-12 were intact [No Skin Lesions] : no skin lesions [No Gross Sensory Deficits] : no gross sensory deficits [Oriented x3] : oriented to person, place, and time [Normal Mood] : the mood was normal [Normal Affect] : the affect was normal [Normal Insight/Judgement] : insight and judgment were intact [de-identified] : pain, skin break and redness to penis [Foot Ulcers] : no foot ulcers [de-identified] : unsteady gait order PT,

## 2020-08-31 ENCOUNTER — RX RENEWAL (OUTPATIENT)
Age: 80
End: 2020-08-31

## 2020-10-05 ENCOUNTER — APPOINTMENT (OUTPATIENT)
Dept: HOME HEALTH SERVICES | Facility: HOME HEALTH | Age: 80
End: 2020-10-05
Payer: MEDICARE

## 2020-10-05 VITALS
TEMPERATURE: 97.5 F | SYSTOLIC BLOOD PRESSURE: 120 MMHG | DIASTOLIC BLOOD PRESSURE: 60 MMHG | RESPIRATION RATE: 20 BRPM | OXYGEN SATURATION: 96 % | HEART RATE: 74 BPM

## 2020-10-05 PROCEDURE — G0439: CPT

## 2020-10-05 PROCEDURE — G0008: CPT

## 2020-10-05 PROCEDURE — 90662 IIV NO PRSV INCREASED AG IM: CPT

## 2020-10-05 NOTE — REASON FOR VISIT
[Subsequent Annual Medicare Wellness Visit] : a subsequent annual Medicare wellness visit [Pre-Visit Preparation] : pre-visit preparation was done [FreeTextEntry2] : notes, meds, labs review

## 2020-10-05 NOTE — COUNSELING
[Obese -  ( BMI  >29.9 )] : obese - ( BMI  >29.9 ) [TLC diet recommended] : TLC diet recommended [Non - Smoker] : non-smoker [Smoke/CO Detectors] : smoke/CO detectors [___] : [unfilled] [Date: ___] : mammogram completed on [unfilled] [] : foot exam [Completed] : Aspirin use discussion completed [Improve exercise tolerance] : improve exercise tolerance [Improve mobility] : improve mobility [Decrease hospital use] : decrease hospital use [Minimize unnecessary interventions] : minimize unnecessary interventions [Patient/Caregiver not ready to engage in discussion] : patient/caregiver not ready to engage in discussion [Established patient, extensive review of history, medical and functional status, risk factors and patient education] : Established patient, extensive review of history, medical and functional status, risk factors and patient education and counseling provided for subsequent annual wellness visit

## 2020-10-05 NOTE — PHYSICAL EXAM
[Well Nourished] : well nourished [Well Developed] : well developed [Normal Sclera/Conjunctiva] : normal sclera/conjunctiva [Supple] : the neck was supple [No Respiratory Distress] : no respiratory distress [Clear to Auscultation] : lungs were clear to auscultation bilaterally [No Accessory Muscle Use] : no accessory muscle use [Normal S1, S2] : normal S1 and S2 [No Edema] : there was no peripheral edema [Normal Bowel Sounds] : normal bowel sounds [Soft] : abdomen soft [Normal Gait] : normal gait [No Skin Lesions] : no skin lesions [Cranial Nerves Intact] : cranial nerves 2-12 were intact [No Gross Sensory Deficits] : no gross sensory deficits [Oriented x3] : oriented to person, place, and time [Normal Affect] : the affect was normal [Normal Mood] : the mood was normal [Normal Insight/Judgement] : insight and judgment were intact [Right Foot Was Examined] : right foot ~C was examined [Left Foot Was Examined] : left foot ~C was examined [Normal] : normal [Foot Ulcers] : no foot ulcers [Full ROM] : with limited range of motion [Swelling] : not swollen [Tenderness] : not tender [Erythema] : not erythematous [de-identified] : unsteady gait

## 2020-10-05 NOTE — REVIEW OF SYSTEMS
[Vision Problems] : vision problems [Fever] : no fever [Chills] : no chills [Fatigue] : no fatigue [Recent Change In Weight] : ~T no recent weight change [Earache] : no earache [Nasal Discharge] : no nasal discharge [Sore Throat] : no sore throat [Chest Pain] : no chest pain [Lower Ext Edema] : no lower extremity edema [Shortness Of Breath] : no shortness of breath [Wheezing] : no wheezing [Cough] : no cough [Abdominal Pain] : no abdominal pain [Constipation] : no constipation [Dysuria] : no dysuria [Incontinence] : no incontinence [Muscle Weakness] : no muscle weakness [Itching] : no itching [Fainting] : no fainting [Memory Loss] : no memory loss [Suicidal] : not suicidal [Insomnia] : no insomnia [Anxiety] : no anxiety [Easy Bleeding] : no easy bleeding [FreeTextEntry3] : uses glasses and prescribed eye drops  [FreeTextEntry8] : pain and redness to head of penis with skin break

## 2020-10-05 NOTE — HEALTH RISK ASSESSMENT
[HRA Reviewed] : Health risk assessment reviewed [Independent] : managing finances [Two or more falls in past year] : Patient reported two or more falls in the past year [Yes] : The patient does have visual impairment

## 2020-10-05 NOTE — CHRONIC CARE ASSESSMENT
[Less than 3 Times Per Week] : exercises less than 3 times per week [None] : The patient does not exercise [General Adherence] : and is generally adherent

## 2020-10-06 ENCOUNTER — APPOINTMENT (OUTPATIENT)
Dept: HOME HEALTH SERVICES | Facility: HOME HEALTH | Age: 80
End: 2020-10-06

## 2020-10-15 ENCOUNTER — APPOINTMENT (OUTPATIENT)
Dept: OPHTHALMOLOGY | Facility: CLINIC | Age: 80
End: 2020-10-15
Payer: MEDICARE

## 2020-10-15 ENCOUNTER — NON-APPOINTMENT (OUTPATIENT)
Age: 80
End: 2020-10-15

## 2020-10-15 PROCEDURE — 92083 EXTENDED VISUAL FIELD XM: CPT

## 2020-10-15 PROCEDURE — 92012 INTRM OPH EXAM EST PATIENT: CPT

## 2020-11-23 ENCOUNTER — APPOINTMENT (OUTPATIENT)
Dept: OPHTHALMOLOGY | Facility: CLINIC | Age: 80
End: 2020-11-23
Payer: MEDICARE

## 2020-11-23 ENCOUNTER — NON-APPOINTMENT (OUTPATIENT)
Age: 80
End: 2020-11-23

## 2020-11-23 PROCEDURE — 92012 INTRM OPH EXAM EST PATIENT: CPT

## 2020-11-30 ENCOUNTER — APPOINTMENT (OUTPATIENT)
Dept: ENDOCRINOLOGY | Facility: CLINIC | Age: 80
End: 2020-11-30

## 2020-12-14 ENCOUNTER — APPOINTMENT (OUTPATIENT)
Dept: HOME HEALTH SERVICES | Facility: HOME HEALTH | Age: 80
End: 2020-12-14
Payer: MEDICARE

## 2020-12-14 VITALS
SYSTOLIC BLOOD PRESSURE: 130 MMHG | HEART RATE: 84 BPM | OXYGEN SATURATION: 96 % | RESPIRATION RATE: 20 BRPM | DIASTOLIC BLOOD PRESSURE: 70 MMHG

## 2020-12-14 DIAGNOSIS — Z00.00 ENCOUNTER FOR GENERAL ADULT MEDICAL EXAMINATION W/OUT ABNORMAL FINDINGS: ICD-10-CM

## 2020-12-14 DIAGNOSIS — Z87.828 PERSONAL HISTORY OF OTHER (HEALED) PHYSICAL INJURY AND TRAUMA: ICD-10-CM

## 2020-12-14 DIAGNOSIS — J30.1 ALLERGIC RHINITIS DUE TO POLLEN: ICD-10-CM

## 2020-12-14 DIAGNOSIS — R14.0 ABDOMINAL DISTENSION (GASEOUS): ICD-10-CM

## 2020-12-14 DIAGNOSIS — Z28.21 IMMUNIZATION NOT CARRIED OUT BECAUSE OF PATIENT REFUSAL: ICD-10-CM

## 2020-12-14 DIAGNOSIS — Z92.29 PERSONAL HISTORY OF OTHER DRUG THERAPY: ICD-10-CM

## 2020-12-14 DIAGNOSIS — Z86.39 PERSONAL HISTORY OF OTHER ENDOCRINE, NUTRITIONAL AND METABOLIC DISEASE: ICD-10-CM

## 2020-12-14 DIAGNOSIS — T20.02XA: ICD-10-CM

## 2020-12-14 DIAGNOSIS — Z47.1 AFTERCARE FOLLOWING JOINT REPLACEMENT SURGERY: ICD-10-CM

## 2020-12-14 DIAGNOSIS — Z01.818 ENCOUNTER FOR OTHER PREPROCEDURAL EXAMINATION: ICD-10-CM

## 2020-12-14 DIAGNOSIS — Z87.448 PERSONAL HISTORY OF OTHER DISEASES OF URINARY SYSTEM: ICD-10-CM

## 2020-12-14 DIAGNOSIS — R22.0 LOCALIZED SWELLING, MASS AND LUMP, HEAD: ICD-10-CM

## 2020-12-14 DIAGNOSIS — R07.82 INTERCOSTAL PAIN: ICD-10-CM

## 2020-12-14 DIAGNOSIS — M25.551 PAIN IN RIGHT HIP: ICD-10-CM

## 2020-12-14 DIAGNOSIS — Z86.018 PERSONAL HISTORY OF OTHER BENIGN NEOPLASM: ICD-10-CM

## 2020-12-14 PROCEDURE — 99350 HOME/RES VST EST HIGH MDM 60: CPT

## 2020-12-14 RX ORDER — BLOOD-GLUCOSE METER
W/DEVICE KIT MISCELLANEOUS
Qty: 1 | Refills: 0 | Status: DISCONTINUED | COMMUNITY
Start: 2020-05-21 | End: 2020-12-14

## 2020-12-14 RX ORDER — BACITRACIN ZINC, NEOMYCIN SULFATE, AND POLYMYXIN B SULFATE 400; 3.5; 5 [IU]/G; MG/G; [IU]/G
3.5-400-5 OINTMENT TOPICAL
Qty: 1 | Refills: 0 | Status: DISCONTINUED | COMMUNITY
Start: 2019-06-06 | End: 2020-12-14

## 2020-12-14 NOTE — PHYSICAL EXAM
[Well Nourished] : well nourished [Well Developed] : well developed [Normal Sclera/Conjunctiva] : normal sclera/conjunctiva [Supple] : the neck was supple [No Respiratory Distress] : no respiratory distress [Clear to Auscultation] : lungs were clear to auscultation bilaterally [No Accessory Muscle Use] : no accessory muscle use [Normal S1, S2] : normal S1 and S2 [No Edema] : there was no peripheral edema [Normal Bowel Sounds] : normal bowel sounds [Soft] : abdomen soft [Normal Gait] : normal gait [No Skin Lesions] : no skin lesions [Cranial Nerves Intact] : cranial nerves 2-12 were intact [No Gross Sensory Deficits] : no gross sensory deficits [Oriented x3] : oriented to person, place, and time [Normal Affect] : the affect was normal [Normal Mood] : the mood was normal [Normal Insight/Judgement] : insight and judgment were intact [Right Foot Was Examined] : right foot ~C was examined [Left Foot Was Examined] : left foot ~C was examined [Normal] : normal [Foot Ulcers] : no foot ulcers [Full ROM] : with limited range of motion [Swelling] : not swollen [Tenderness] : not tender [Erythema] : not erythematous [de-identified] : unsteady gait

## 2020-12-14 NOTE — HISTORY OF PRESENT ILLNESS
[Patient] : patient [FreeTextEntry1] : gait and balance abnormality [FreeTextEntry2] : 79 yo male alert and oriented x3. Dx of CHF, CAD, DM T2, Adrenal mass, hypothyroid, abnormality of gait. Pt has lost weight and states "he is doing well".  Pt with history of CVA, no residual physical or mental deficits noted.

## 2020-12-14 NOTE — HEALTH RISK ASSESSMENT
[Independent] : managing finances [No falls in past year] : Patient reported no falls in the past year [Yes] : The patient does have visual impairment [TimeGetUpGo] : 25

## 2020-12-16 ENCOUNTER — RX RENEWAL (OUTPATIENT)
Age: 80
End: 2020-12-16

## 2020-12-21 LAB
25(OH)D3 SERPL-MCNC: 49.2 NG/ML
ALBUMIN SERPL ELPH-MCNC: 4.4 G/DL
ALP BLD-CCNC: 54 U/L
ALT SERPL-CCNC: 18 U/L
ANION GAP SERPL CALC-SCNC: 11 MMOL/L
AST SERPL-CCNC: 16 U/L
BASOPHILS # BLD AUTO: 0.03 K/UL
BASOPHILS NFR BLD AUTO: 0.4 %
BILIRUB SERPL-MCNC: 0.4 MG/DL
BUN SERPL-MCNC: 11 MG/DL
CALCIUM SERPL-MCNC: 9.7 MG/DL
CHLORIDE SERPL-SCNC: 107 MMOL/L
CHOLEST SERPL-MCNC: 129 MG/DL
CO2 SERPL-SCNC: 22 MMOL/L
CREAT SERPL-MCNC: 1.09 MG/DL
EOSINOPHIL # BLD AUTO: 0.56 K/UL
EOSINOPHIL NFR BLD AUTO: 8.2 %
ESTIMATED AVERAGE GLUCOSE: 226 MG/DL
FOLATE SERPL-MCNC: 14.7 NG/ML
HBA1C MFR BLD HPLC: 9.5 %
HCT VFR BLD CALC: 43.3 %
HDLC SERPL-MCNC: 56 MG/DL
HGB BLD-MCNC: 14 G/DL
IMM GRANULOCYTES NFR BLD AUTO: 0.1 %
LDLC SERPL CALC-MCNC: 50 MG/DL
LYMPHOCYTES # BLD AUTO: 3.03 K/UL
LYMPHOCYTES NFR BLD AUTO: 44.1 %
MAN DIFF?: NORMAL
MCHC RBC-ENTMCNC: 27.8 PG
MCHC RBC-ENTMCNC: 32.3 GM/DL
MCV RBC AUTO: 85.9 FL
MONOCYTES # BLD AUTO: 0.48 K/UL
MONOCYTES NFR BLD AUTO: 7 %
NEUTROPHILS # BLD AUTO: 2.76 K/UL
NEUTROPHILS NFR BLD AUTO: 40.2 %
NONHDLC SERPL-MCNC: 72 MG/DL
PLATELET # BLD AUTO: 326 K/UL
POTASSIUM SERPL-SCNC: 4.2 MMOL/L
PROT SERPL-MCNC: 7 G/DL
RBC # BLD: 5.04 M/UL
RBC # FLD: 14.7 %
SODIUM SERPL-SCNC: 141 MMOL/L
TRIGL SERPL-MCNC: 112 MG/DL
TSH SERPL-ACNC: 1.31 UIU/ML
VIT B12 SERPL-MCNC: 1194 PG/ML
WBC # FLD AUTO: 6.87 K/UL

## 2021-01-21 ENCOUNTER — APPOINTMENT (OUTPATIENT)
Dept: ENDOCRINOLOGY | Facility: CLINIC | Age: 81
End: 2021-01-21

## 2021-02-04 ENCOUNTER — APPOINTMENT (OUTPATIENT)
Dept: HOME HEALTH SERVICES | Facility: HOME HEALTH | Age: 81
End: 2021-02-04

## 2021-03-04 ENCOUNTER — APPOINTMENT (OUTPATIENT)
Dept: HOME HEALTH SERVICES | Facility: HOME HEALTH | Age: 81
End: 2021-03-04

## 2021-03-04 DIAGNOSIS — M17.10 UNILATERAL PRIMARY OSTEOARTHRITIS, UNSPECIFIED KNEE: ICD-10-CM

## 2021-03-04 DIAGNOSIS — M79.674 PAIN IN RIGHT TOE(S): ICD-10-CM

## 2021-03-04 DIAGNOSIS — Z87.39 PERSONAL HISTORY OF OTHER DISEASES OF THE MUSCULOSKELETAL SYSTEM AND CONNECTIVE TISSUE: ICD-10-CM

## 2021-03-04 DIAGNOSIS — Z86.39 PERSONAL HISTORY OF OTHER ENDOCRINE, NUTRITIONAL AND METABOLIC DISEASE: ICD-10-CM

## 2021-03-04 DIAGNOSIS — Z87.19 PERSONAL HISTORY OF OTHER DISEASES OF THE DIGESTIVE SYSTEM: ICD-10-CM

## 2021-03-19 ENCOUNTER — APPOINTMENT (OUTPATIENT)
Dept: ENDOCRINOLOGY | Facility: CLINIC | Age: 81
End: 2021-03-19
Payer: MEDICARE

## 2021-03-19 VITALS
SYSTOLIC BLOOD PRESSURE: 131 MMHG | DIASTOLIC BLOOD PRESSURE: 89 MMHG | BODY MASS INDEX: 28.14 KG/M2 | HEART RATE: 80 BPM | WEIGHT: 190 LBS | HEIGHT: 69 IN

## 2021-03-19 LAB — HBA1C MFR BLD HPLC: 7.7

## 2021-03-19 PROCEDURE — 83036 HEMOGLOBIN GLYCOSYLATED A1C: CPT | Mod: QW

## 2021-03-19 PROCEDURE — 99214 OFFICE O/P EST MOD 30 MIN: CPT | Mod: 25

## 2021-03-19 NOTE — HISTORY OF PRESENT ILLNESS
[FreeTextEntry1] : Drinking soda, every day, at times\par has glazed donut for breakfast\par where he lives, gets served rice and spaghetti, which he doesn't like\par will see ophtho next week\par some polyuria and nocturia (1-2x/night).\par no SOB, chest pain, or neuropathy symptoms \par \par Meds: amlodipine 5mg, metoprolol ER 100mg/day\par metformin 850mg,BID (not at pharmacy);  glimepiride 2mg/day\par fenofibrate 48mg, atorvastatin 20mg\par Plavix 75mg, Eliquis 5mg bid\par levothyroxine 75mcg/day\par gabapentin 400mg /day\par Seroquel 400mg (waiting to be picked up)\par montelukast 10mg\par eye drops.\par

## 2021-03-19 NOTE — PHYSICAL EXAM
[Alert] : alert [Healthy Appearance] : healthy appearance [No Proptosis] : no proptosis [No Lid Lag] : no lid lag [Normal Hearing] : hearing was normal [No LAD] : no lymphadenopathy [Thyroid Not Enlarged] : the thyroid was not enlarged [Clear to Auscultation] : lungs were clear to auscultation bilaterally [Normal S1, S2] : normal S1 and S2 [Regular Rhythm] : with a regular rhythm [No Edema] : no peripheral edema [Normal Affect] : the affect was normal [Normal Mood] : the mood was normal [Foot Ulcers] : no foot ulcers [de-identified] : fingerstick 140, fasting [de-identified] : pt declined foot exam [de-identified] : limited  insight to health conditions

## 2021-03-19 NOTE — DATA REVIEWED
[FreeTextEntry1] : 3/21: A1c 7.7%\par 12/20: A1c 9.5%, tot chol 129, trig 112, HDL 56, LDL 50, B12 1194, TH 1.31, 25D 49.2\par 6/20: B12 299, , A1c 8.0% (point of care)\par 5/20: A1c 8.3%, tot cho 111, trig 80, HDL 55, LDL 40, TSH 0.83\par 10/19: A1c 6.7%, point of care\par 7/19: Cr 0.87, eGFR 83\par 3/19: A1c 7.1% (point of care).  TSH 0.75, tot chol 196, trig 353, HDL 55, LDL 70, B12 388\par 12/18: A1c 6.8%, point of care\par 10/18: tot chol 174, trig 159, HDL 68, LDL 74\par 9/18: A1c 6.5% (point of care)\par 4/18: A1c 7.2%, tot chol 223, trig 157, HDL 77, \par 1/18: A1c 6.8%, point of care\par 10/17: A1c 7.7%\par 8/17: A1c 9.2%, tot chol 219, trig 171, HDL 66, , TSH 1.49\par 4/17: A1c 8.0% (point of care)\par 1/17: A1c 9.0% (point of care)\par 11/16: A1c 10.2%, Cr 0.89, eGFR 97\par 8/16: A1c 7.7% (point of care)\par 6/16: A1c 8.9%, fructosamine 321\par 5/16: A1c 10.5%, tot chol 196, trig 184, HDL 60, LDL 99, urine microalb/cr 32, TSH 1.48,  Cr 0.93\par \par CT thoracic spine, 10/18:\par stable L adrenal adenoma 3.5cm (compared to 2015 and 2012)

## 2021-03-19 NOTE — ASSESSMENT
[FreeTextEntry1] : Diabetes, suboptimal, improved.  goal A1c  near or under 7.5%.  \par Diabetes control is largely dependent on how compliant he is with diet.\par Advised not to drink any soda or juice but he can keep the glazed donut\par Recommended to walk 15 min every day.\par continue statin therapy.\par \par Hypothyroid, euthyroid.  continue current thyroxine dose.\par RTO 6  months

## 2021-03-25 ENCOUNTER — APPOINTMENT (OUTPATIENT)
Dept: OPHTHALMOLOGY | Facility: CLINIC | Age: 81
End: 2021-03-25
Payer: MEDICARE

## 2021-03-25 ENCOUNTER — NON-APPOINTMENT (OUTPATIENT)
Age: 81
End: 2021-03-25

## 2021-03-25 PROCEDURE — 92012 INTRM OPH EXAM EST PATIENT: CPT

## 2021-03-25 PROCEDURE — 92133 CPTRZD OPH DX IMG PST SGM ON: CPT

## 2021-03-25 PROCEDURE — 92083 EXTENDED VISUAL FIELD XM: CPT

## 2021-04-01 ENCOUNTER — APPOINTMENT (OUTPATIENT)
Dept: HOME HEALTH SERVICES | Facility: HOME HEALTH | Age: 81
End: 2021-04-01
Payer: MEDICARE

## 2021-04-01 VITALS
OXYGEN SATURATION: 98 % | RESPIRATION RATE: 20 BRPM | TEMPERATURE: 98 F | DIASTOLIC BLOOD PRESSURE: 88 MMHG | HEART RATE: 65 BPM | SYSTOLIC BLOOD PRESSURE: 120 MMHG

## 2021-04-01 PROCEDURE — 99349 HOME/RES VST EST MOD MDM 40: CPT

## 2021-04-02 NOTE — REASON FOR VISIT
[Follow-Up] : a follow-up visit [Pre-Visit Preparation] : pre-visit preparation was done [FreeTextEntry2] : notes, meds, labs reviewed, spoke with CHUCK

## 2021-04-02 NOTE — COUNSELING
[Overweight - ( BMI 25.1 - 29.9 )] : overweight -  ( BMI 25.1 - 29.9 ) [TLC diet recommended] : TLC diet recommended [Non - Smoker] : non-smoker [Smoke/CO Detectors] : smoke/CO detectors [] : cervical cancer screening [Improve mobility] : improve mobility

## 2021-04-02 NOTE — HEALTH RISK ASSESSMENT
[HRA Reviewed] : Health risk assessment reviewed [Independent] : managing finances [One fall no injury in past year] : Patient reported one fall in the past year without injury [Yes] : The patient does have visual impairment [TimeGetUpGo] : 15 [de-identified] : ambulates independently

## 2021-04-02 NOTE — HISTORY OF PRESENT ILLNESS
[Patient] : patient [FreeTextEntry1] : gait and balance abnormality [FreeTextEntry2] : 79 yo male alert and oriented x3. Dx of CHF, CAD, DM T2, Adrenal mass, hypothyroid, abnormality of gait. Pt has lost weight and states "he is doing well".  Pt with history of CVA, no residual physical or mental deficits noted. \par \par Presents today for penile abrasion, still wearing tight underwear because "they hold me in place"\par Denies any bleeding, foul odor,  itching, pain or burning with urination. Mupirocin sent to the pharmacy\par \par Saw ENDO, no changes in treatment plan. A1c 7.7. will remind pt to avoid soda, walk 15 mins daily. Add salads  from the local supermarket to his meals. \par \par Reports [employer] contacted him about the new shoes and will be visiting for fitting. \par Reports compliance with medications. \par \par Lipoma mid back no changes in size, shape denies pain or irritation to the area. Advise him to call the office with any changes will send him to dermatology for further evaluation. Wants to wait and see.

## 2021-04-02 NOTE — PHYSICAL EXAM
[Well Nourished] : well nourished [Well Developed] : well developed [Normal Sclera/Conjunctiva] : normal sclera/conjunctiva [No JVD] : no jugular venous distention [Supple] : the neck was supple [No Respiratory Distress] : no respiratory distress [Clear to Auscultation] : lungs were clear to auscultation bilaterally [No Accessory Muscle Use] : no accessory muscle use [Normal S1, S2] : normal S1 and S2 [No Edema] : there was no peripheral edema [Normal Bowel Sounds] : normal bowel sounds [Soft] : abdomen soft [No CVA Tenderness] : no ~M costovertebral angle tenderness [No Spinal Tenderness] : no spinal tenderness [Normal Gait] : normal gait [No Skin Lesions] : no skin lesions [Cranial Nerves Intact] : cranial nerves 2-12 were intact [No Gross Sensory Deficits] : no gross sensory deficits [Oriented x3] : oriented to person, place, and time [Normal Affect] : the affect was normal [Normal Insight/Judgement] : insight and judgment were intact [Normal Mood] : the mood was normal [Right Foot Was Examined] : right foot ~C was examined [Left Foot Was Examined] : left foot ~C was examined [Normal] : normal [Foot Ulcers] : no foot ulcers [Full ROM] : with limited range of motion [Swelling] : not swollen [Tenderness] : not tender [Erythema] : not erythematous [de-identified] : Lipoma mid thoracic area, no changes noted, instructed pt to report any pain discomfort or changes in size. [de-identified] : unsteady gait

## 2021-04-13 ENCOUNTER — NON-APPOINTMENT (OUTPATIENT)
Age: 81
End: 2021-04-13

## 2021-04-13 ENCOUNTER — OUTPATIENT (OUTPATIENT)
Dept: OUTPATIENT SERVICES | Facility: HOSPITAL | Age: 81
LOS: 1 days | End: 2021-04-13

## 2021-04-13 ENCOUNTER — APPOINTMENT (OUTPATIENT)
Dept: OPHTHALMOLOGY | Facility: CLINIC | Age: 81
End: 2021-04-13

## 2021-04-13 ENCOUNTER — APPOINTMENT (OUTPATIENT)
Dept: OPHTHALMOLOGY | Facility: CLINIC | Age: 81
End: 2021-04-13
Payer: MEDICARE

## 2021-04-13 DIAGNOSIS — E89.0 POSTPROCEDURAL HYPOTHYROIDISM: Chronic | ICD-10-CM

## 2021-04-13 PROCEDURE — 65855 TRABECULOPLASTY LASER SURG: CPT

## 2021-04-15 DIAGNOSIS — H40.2230 CHRONIC ANGLE-CLOSURE GLAUCOMA, BILATERAL, STAGE UNSPECIFIED: ICD-10-CM

## 2021-04-27 ENCOUNTER — APPOINTMENT (OUTPATIENT)
Dept: OPHTHALMOLOGY | Facility: CLINIC | Age: 81
End: 2021-04-27
Payer: MEDICARE

## 2021-04-27 ENCOUNTER — OUTPATIENT (OUTPATIENT)
Dept: OUTPATIENT SERVICES | Facility: HOSPITAL | Age: 81
LOS: 1 days | End: 2021-04-27

## 2021-04-27 ENCOUNTER — NON-APPOINTMENT (OUTPATIENT)
Age: 81
End: 2021-04-27

## 2021-04-27 ENCOUNTER — APPOINTMENT (OUTPATIENT)
Dept: OPHTHALMOLOGY | Facility: CLINIC | Age: 81
End: 2021-04-27

## 2021-04-27 DIAGNOSIS — E89.0 POSTPROCEDURAL HYPOTHYROIDISM: Chronic | ICD-10-CM

## 2021-04-27 PROCEDURE — 65855 TRABECULOPLASTY LASER SURG: CPT | Mod: RT,79

## 2021-04-28 DIAGNOSIS — H40.053 OCULAR HYPERTENSION, BILATERAL: ICD-10-CM

## 2021-05-05 ENCOUNTER — RX RENEWAL (OUTPATIENT)
Age: 81
End: 2021-05-05

## 2021-05-06 ENCOUNTER — APPOINTMENT (OUTPATIENT)
Dept: HOME HEALTH SERVICES | Facility: HOME HEALTH | Age: 81
End: 2021-05-06
Payer: MEDICARE

## 2021-05-06 VITALS
OXYGEN SATURATION: 97 % | RESPIRATION RATE: 18 BRPM | DIASTOLIC BLOOD PRESSURE: 90 MMHG | HEART RATE: 75 BPM | TEMPERATURE: 97.2 F | SYSTOLIC BLOOD PRESSURE: 140 MMHG

## 2021-05-06 DIAGNOSIS — Z71.89 OTHER SPECIFIED COUNSELING: ICD-10-CM

## 2021-05-06 PROCEDURE — 99350 HOME/RES VST EST HIGH MDM 60: CPT

## 2021-05-06 PROCEDURE — 99497 ADVNCD CARE PLAN 30 MIN: CPT

## 2021-05-07 PROBLEM — Z71.89 ADVANCED DIRECTIVES, COUNSELING/DISCUSSION: Status: ACTIVE | Noted: 2017-11-03

## 2021-05-07 NOTE — PHYSICAL EXAM
[Well Nourished] : well nourished [Well Developed] : well developed [Normal Sclera/Conjunctiva] : normal sclera/conjunctiva [No JVD] : no jugular venous distention [Supple] : the neck was supple [No Respiratory Distress] : no respiratory distress [Clear to Auscultation] : lungs were clear to auscultation bilaterally [No Accessory Muscle Use] : no accessory muscle use [Normal S1, S2] : normal S1 and S2 [No Edema] : there was no peripheral edema [Normal Bowel Sounds] : normal bowel sounds [Soft] : abdomen soft [No CVA Tenderness] : no ~M costovertebral angle tenderness [No Spinal Tenderness] : no spinal tenderness [Normal Gait] : normal gait [No Skin Lesions] : no skin lesions [Cranial Nerves Intact] : cranial nerves 2-12 were intact [No Gross Sensory Deficits] : no gross sensory deficits [Oriented x3] : oriented to person, place, and time [Normal Affect] : the affect was normal [Normal Mood] : the mood was normal [Normal Insight/Judgement] : insight and judgment were intact [Right Foot Was Examined] : right foot ~C was examined [Left Foot Was Examined] : left foot ~C was examined [Normal] : normal [Foot Ulcers] : no foot ulcers [Full ROM] : with limited range of motion [Swelling] : not swollen [Tenderness] : not tender [Erythema] : not erythematous [de-identified] : Lipoma mid thoracic area, no changes noted, instructed pt to report any pain discomfort or changes in size. [de-identified] : unsteady gait

## 2021-05-07 NOTE — HISTORY OF PRESENT ILLNESS
[Patient] : patient [FreeTextEntry1] : gait and balance abnormality [FreeTextEntry2] : 81 yo male alert and oriented x3. Dx of CHF, CAD, DM T2, Adrenal mass, hypothyroid, abnormality of gait. Pt has lost weight and states "he is doing well".  Pt with history of CVA, no residual physical or mental deficits noted. \par  systolic today states he just had a coffee. \par Presents today for penile abrasion, still wearing tight underwear because "they hold me in place"\par Denies any bleeding, foul odor,  itching, pain or burning with urination, not sexually active. Discussed hygiene and importance of showering daily and washing all soap off his penis. That he should change to boxer briefs as moisture is sometimes trapped in the head of the penis with wearing tight fitting clothing. which contributes to the rash. Discussed with pt since this is recurring if not resolved with this course of treatment will re refer to urology for possible biopsy.\par \par Saw ENDO, no changes in treatment plan. A1c 7.7. discussed with  pt to avoid soda, walk 15 mins daily. Add salads  from the local supermarket to his meals. Pt states he is trying to maintain his diet, walks daily to get breakfast, but not very active, offered PT/OT, refused stating "I'm an old man I don't need that". \par  \par Reports compliance with medications. Goals of care reviewed with pt no changes, remains FULL CODE. \par \par Lipoma mid back no changes in size, shape denies pain or irritation to the area. Advise him to call the office with any changes will send him to dermatology for further evaluation. Wants to wait and see.

## 2021-05-07 NOTE — REASON FOR VISIT
[Follow-Up] : a follow-up visit [Pre-Visit Preparation] : pre-visit preparation was done [Intercurrent Specialty/Sub-specialty Visits] : the patient has intercurrent specialty/sub-specialty visits [FreeTextEntry2] : notes, meds, labs reviewed [FreeTextEntry3] : oph, endo

## 2021-05-07 NOTE — HEALTH RISK ASSESSMENT
[HRA Reviewed] : Health risk assessment reviewed [Independent] : managing finances [No falls in past year] : Patient reported no falls in the past year [Yes] : The patient does have visual impairment [TimeGetUpGo] : 20

## 2021-05-21 ENCOUNTER — NON-APPOINTMENT (OUTPATIENT)
Age: 81
End: 2021-05-21

## 2021-05-21 ENCOUNTER — APPOINTMENT (OUTPATIENT)
Dept: OPHTHALMOLOGY | Facility: CLINIC | Age: 81
End: 2021-05-21
Payer: MEDICARE

## 2021-05-21 PROCEDURE — 92012 INTRM OPH EXAM EST PATIENT: CPT

## 2021-06-03 ENCOUNTER — APPOINTMENT (OUTPATIENT)
Dept: HOME HEALTH SERVICES | Facility: HOME HEALTH | Age: 81
End: 2021-06-03
Payer: MEDICARE

## 2021-06-03 VITALS
OXYGEN SATURATION: 98 % | RESPIRATION RATE: 18 BRPM | DIASTOLIC BLOOD PRESSURE: 70 MMHG | HEART RATE: 86 BPM | TEMPERATURE: 97.1 F | SYSTOLIC BLOOD PRESSURE: 120 MMHG

## 2021-06-03 DIAGNOSIS — R79.89 OTHER SPECIFIED ABNORMAL FINDINGS OF BLOOD CHEMISTRY: ICD-10-CM

## 2021-06-03 PROCEDURE — 99349 HOME/RES VST EST MOD MDM 40: CPT

## 2021-06-04 PROBLEM — R79.89 ELEVATED TSH: Status: ACTIVE | Noted: 2019-03-21

## 2021-06-04 NOTE — HISTORY OF PRESENT ILLNESS
[Patient] : patient [FreeTextEntry1] : gait and balance abnormality [FreeTextEntry2] : 79 yo male alert and oriented x3. Dx of CHF, CAD, DM T2, Adrenal mass, hypothyroid, abnormality of gait. Pt has lost weight and states "he is doing well".  Pt with history of CVA, no residual physical or mental deficits noted. \par  systolic today states he just had a coffee. Much improved today at 120.\par Presents today for toothache. Lt lower incisor with a cavity. Pt reports difficulty eating, requesting referral to dentist. Recommended pt walk in to the [facility] for emergency dental care.\par  \par Reports compliance with medications. \par \par Lipoma mid back no changes in size, shape denies pain or irritation to the area. Advise him to call the office with any changes will send him to dermatology for further evaluation. Wants to wait and see.

## 2021-06-04 NOTE — HEALTH RISK ASSESSMENT
[Independent] : feeding [Some assistance needed] : using telephone [Full assistance needed] : preparing meals [No falls in past year] : Patient reported no falls in the past year

## 2021-06-04 NOTE — PHYSICAL EXAM
[Well Nourished] : well nourished [Well Developed] : well developed [Normal Sclera/Conjunctiva] : normal sclera/conjunctiva [No JVD] : no jugular venous distention [Supple] : the neck was supple [No Respiratory Distress] : no respiratory distress [Clear to Auscultation] : lungs were clear to auscultation bilaterally [No Accessory Muscle Use] : no accessory muscle use [Normal S1, S2] : normal S1 and S2 [Normal Bowel Sounds] : normal bowel sounds [No Edema] : there was no peripheral edema [Soft] : abdomen soft [No CVA Tenderness] : no ~M costovertebral angle tenderness [No Spinal Tenderness] : no spinal tenderness [Normal Gait] : normal gait [No Skin Lesions] : no skin lesions [Cranial Nerves Intact] : cranial nerves 2-12 were intact [No Gross Sensory Deficits] : no gross sensory deficits [Oriented x3] : oriented to person, place, and time [Normal Mood] : the mood was normal [Normal Affect] : the affect was normal [Normal Insight/Judgement] : insight and judgment were intact [Right Foot Was Examined] : right foot ~C was examined [Left Foot Was Examined] : left foot ~C was examined [Normal] : normal [Foot Ulcers] : no foot ulcers [Full ROM] : with limited range of motion [Swelling] : not swollen [Tenderness] : not tender [Erythema] : not erythematous [de-identified] : Lipoma mid thoracic area, no changes noted, instructed pt to report any pain discomfort or changes in size. [de-identified] : unsteady gait

## 2021-07-22 ENCOUNTER — APPOINTMENT (OUTPATIENT)
Dept: HOME HEALTH SERVICES | Facility: HOME HEALTH | Age: 81
End: 2021-07-22
Payer: MEDICARE

## 2021-07-22 ENCOUNTER — NON-APPOINTMENT (OUTPATIENT)
Age: 81
End: 2021-07-22

## 2021-07-22 VITALS
SYSTOLIC BLOOD PRESSURE: 120 MMHG | RESPIRATION RATE: 18 BRPM | OXYGEN SATURATION: 97 % | HEART RATE: 84 BPM | TEMPERATURE: 97 F | DIASTOLIC BLOOD PRESSURE: 70 MMHG

## 2021-07-22 PROCEDURE — 99349 HOME/RES VST EST MOD MDM 40: CPT

## 2021-07-22 NOTE — PHYSICAL EXAM
[Foot Ulcers] : no foot ulcers [Full ROM] : with limited range of motion [Swelling] : not swollen [Tenderness] : not tender [Erythema] : not erythematous [de-identified] : Lipoma mid thoracic area, no changes noted, instructed pt to report any pain discomfort or changes in size. [de-identified] : unsteady gait

## 2021-07-22 NOTE — HISTORY OF PRESENT ILLNESS
[FreeTextEntry1] : gait and balance abnormality [FreeTextEntry2] : 79 yo male alert and oriented x3. Dx of CHF, CAD, DM T2, Adrenal mass, hypothyroid, abnormality of gait. Pt has lost weight and states "he is doing well".  Pt with history of CVA, no residual physical or mental deficits noted. \par  systolic today states he just had a coffee. Much improved today at 120.\par Presents today for toothache. Lt lower incisor with a cavity. Pt reports difficulty eating, requesting referral to dentist. Recommended pt walk in to the [facility] for emergency dental care.\par  \par Reports compliance with medications. \par \par Lipoma mid back no changes in size, shape denies pain or irritation to the area. Advise him to call the office with any changes will send him to dermatology for further evaluation. Wants to wait and see.

## 2021-08-10 ENCOUNTER — APPOINTMENT (OUTPATIENT)
Dept: ENDOCRINOLOGY | Facility: CLINIC | Age: 81
End: 2021-08-10

## 2021-08-12 ENCOUNTER — NON-APPOINTMENT (OUTPATIENT)
Age: 81
End: 2021-08-12

## 2021-08-12 ENCOUNTER — APPOINTMENT (OUTPATIENT)
Dept: OPHTHALMOLOGY | Facility: CLINIC | Age: 81
End: 2021-08-12
Payer: MEDICARE

## 2021-08-12 PROCEDURE — 92012 INTRM OPH EXAM EST PATIENT: CPT

## 2021-08-12 PROCEDURE — 92083 EXTENDED VISUAL FIELD XM: CPT

## 2021-08-12 PROCEDURE — 92133 CPTRZD OPH DX IMG PST SGM ON: CPT

## 2021-08-24 ENCOUNTER — RX RENEWAL (OUTPATIENT)
Age: 81
End: 2021-08-24

## 2021-08-25 ENCOUNTER — RX RENEWAL (OUTPATIENT)
Age: 81
End: 2021-08-25

## 2021-08-26 ENCOUNTER — NON-APPOINTMENT (OUTPATIENT)
Age: 81
End: 2021-08-26

## 2021-08-27 ENCOUNTER — NON-APPOINTMENT (OUTPATIENT)
Age: 81
End: 2021-08-27

## 2021-08-27 ENCOUNTER — APPOINTMENT (OUTPATIENT)
Dept: ENDOCRINOLOGY | Facility: CLINIC | Age: 81
End: 2021-08-27
Payer: MEDICARE

## 2021-08-27 VITALS
HEIGHT: 69 IN | WEIGHT: 184 LBS | HEART RATE: 76 BPM | DIASTOLIC BLOOD PRESSURE: 93 MMHG | SYSTOLIC BLOOD PRESSURE: 159 MMHG | BODY MASS INDEX: 27.25 KG/M2

## 2021-08-27 LAB — HBA1C MFR BLD HPLC: 6.8

## 2021-08-27 PROCEDURE — 83036 HEMOGLOBIN GLYCOSYLATED A1C: CPT | Mod: QW

## 2021-08-27 PROCEDURE — 99213 OFFICE O/P EST LOW 20 MIN: CPT | Mod: 25

## 2021-08-27 NOTE — ASSESSMENT
[FreeTextEntry1] : Diabetes, improved.   \par Diabetes control is largely dependent on how compliant he is with diet.\par commended him on stopping soda and donuts.  Coconut water is better than soda but still has sugar, so he should not drink too much of these.\par continue statin therapy.\par check urine microalbumin today.\par \par Hypothyroid, euthyroid.  continue current thyroxine dose.\par RTO 6  months

## 2021-08-27 NOTE — PHYSICAL EXAM
[Alert] : alert [Healthy Appearance] : healthy appearance [No Proptosis] : no proptosis [No Lid Lag] : no lid lag [Normal Hearing] : hearing was normal [No LAD] : no lymphadenopathy [Thyroid Not Enlarged] : the thyroid was not enlarged [Clear to Auscultation] : lungs were clear to auscultation bilaterally [Normal S1, S2] : normal S1 and S2 [Regular Rhythm] : with a regular rhythm [No Edema] : no peripheral edema [Normal Affect] : the affect was normal [Normal Mood] : the mood was normal [Foot Ulcers] : no foot ulcers [de-identified] : fingerstick 147, only coffee [de-identified] : shuffling/waddling  gait [de-identified] : pt declined foot exam [de-identified] : limited  insight to health conditions

## 2021-08-27 NOTE — HISTORY OF PRESENT ILLNESS
[FreeTextEntry1] : Changed soda to coconut water and cut down on donuts\par vaccinated for Covid\par no falls or fractures but balance isn't good\par no SOB, chest pain, or neuropathy symptoms \par feels he needs to urinate but then very little comes out.  During visit, he used bathroom at least 3 times.\par so far only had coffee today.  last night ate sausages, didn't like them.  says food in his neighborhood is no good and he doesn't like meals on wheels.\par \par Meds: amlodipine 5mg, metoprolol ER 100mg/day\par metformin 850mg,BID (not at pharmacy);  glimepiride 2mg/day\par fenofibrate 48mg, atorvastatin 20mg\par Plavix 75mg, Eliquis 5mg bid\par levothyroxine 75mcg/day\par gabapentin 400mg /day\par Seroquel 400mg (waiting to be picked up)\par montelukast 10mg\par eye drops.\par

## 2021-08-27 NOTE — DATA REVIEWED
[FreeTextEntry1] : 8/21: A1c 6.8%\par 3/21: A1c 7.7%\par 12/20: A1c 9.5%, tot chol 129, trig 112, HDL 56, LDL 50, B12 1194, TH 1.31, 25D 49.2\par 6/20: B12 299, , A1c 8.0% (point of care)\par 5/20: A1c 8.3%, tot cho 111, trig 80, HDL 55, LDL 40, TSH 0.83\par 10/19: A1c 6.7%, point of care\par 7/19: Cr 0.87, eGFR 83\par 3/19: A1c 7.1% (point of care).  TSH 0.75, tot chol 196, trig 353, HDL 55, LDL 70, B12 388\par 12/18: A1c 6.8%, point of care\par 10/18: tot chol 174, trig 159, HDL 68, LDL 74\par 9/18: A1c 6.5% (point of care)\par 4/18: A1c 7.2%, tot chol 223, trig 157, HDL 77, \par 1/18: A1c 6.8%, point of care\par 10/17: A1c 7.7%\par 8/17: A1c 9.2%, tot chol 219, trig 171, HDL 66, , TSH 1.49\par 4/17: A1c 8.0% (point of care)\par 1/17: A1c 9.0% (point of care)\par 11/16: A1c 10.2%, Cr 0.89, eGFR 97\par 8/16: A1c 7.7% (point of care)\par 6/16: A1c 8.9%, fructosamine 321\par 5/16: A1c 10.5%, tot chol 196, trig 184, HDL 60, LDL 99, urine microalb/cr 32, TSH 1.48,  Cr 0.93\par \par CT thoracic spine, 10/18:\par stable L adrenal adenoma 3.5cm (compared to 2015 and 2012)

## 2021-08-30 LAB
CREAT SPEC-SCNC: 78 MG/DL
MICROALBUMIN 24H UR DL<=1MG/L-MCNC: 1.4 MG/DL
MICROALBUMIN/CREAT 24H UR-RTO: 18 MG/G

## 2021-09-02 ENCOUNTER — NON-APPOINTMENT (OUTPATIENT)
Age: 81
End: 2021-09-02

## 2021-09-02 ENCOUNTER — APPOINTMENT (OUTPATIENT)
Dept: OPHTHALMOLOGY | Facility: CLINIC | Age: 81
End: 2021-09-02
Payer: MEDICARE

## 2021-09-02 PROCEDURE — 92012 INTRM OPH EXAM EST PATIENT: CPT

## 2021-10-04 ENCOUNTER — APPOINTMENT (OUTPATIENT)
Dept: HOME HEALTH SERVICES | Facility: HOME HEALTH | Age: 81
End: 2021-10-04

## 2021-10-14 ENCOUNTER — APPOINTMENT (OUTPATIENT)
Dept: HOME HEALTH SERVICES | Facility: HOME HEALTH | Age: 81
End: 2021-10-14
Payer: MEDICARE

## 2021-10-14 VITALS
HEART RATE: 74 BPM | SYSTOLIC BLOOD PRESSURE: 140 MMHG | OXYGEN SATURATION: 97 % | RESPIRATION RATE: 18 BRPM | TEMPERATURE: 97 F | DIASTOLIC BLOOD PRESSURE: 80 MMHG

## 2021-10-14 PROCEDURE — 99349 HOME/RES VST EST MOD MDM 40: CPT

## 2021-10-14 RX ORDER — NYSTATIN AND TRIAMCINOLONE ACETONIDE 100000; 1 MG/G; MG/G
100000-0.1 CREAM TOPICAL 3 TIMES DAILY
Qty: 1 | Refills: 4 | Status: DISCONTINUED | COMMUNITY
Start: 2020-01-28 | End: 2021-10-14

## 2021-10-14 NOTE — COUNSELING
[Non - Smoker] : non-smoker [Smoke/CO Detectors] : smoke/CO detectors [Improve mobility] : improve mobility [Decrease hospital use] : decrease hospital use [Discussed disease trajectory with patient/caregiver] : discussed disease trajectory with patient/caregiver [Full Code] : Code Status: Full Code [No Limitations] : Treatment Guidelines: No limitations [Long Term Intubation] : Intubation: Long term intubation [[facility] Date: _____] [facility]/last verification date: [unfilled] [FreeTextEntry4] : Educated patient/legal representative about CCM services and consent.\par Educated patient/legal representative that this service is available because they have 2 or more chronic conditions, that only one Provider can furnish CCM Services per calendar month and that CCM services are subject to the usual Medicare deductible and coinsurance. \par Patient/legal representative understands the right to stop the CCM services at any time by notifying [facility] office.\par Patient/legal representative has verbally consented 10/21\par

## 2021-10-14 NOTE — HEALTH RISK ASSESSMENT
[HRA Reviewed] : Health risk assessment reviewed [Independent] : feeding [Some assistance needed] : using telephone [Full assistance needed] : preparing meals [No falls in past year] : Patient reported no falls in the past year

## 2021-10-14 NOTE — HISTORY OF PRESENT ILLNESS
[Patient] : patient [FreeTextEntry1] : gait and balance abnormality [FreeTextEntry2] : 81 yo male alert and oriented x3. Dx of CHF, CAD, DM T2, Adrenal mass, hypothyroid, abnormality of gait. Pt has lost weight and states "he is doing well".  Pt with history of CVA, no residual physical or mental deficits noted. \par  systolic today states he just had a coffee. Much improved today at 120.\par Presents today for toothache. Lt lower incisor with a cavity. Pt reports difficulty eating, requesting referral to dentist. Recommended pt walk in to the [facility] for emergency dental care.\par  \par Reports compliance with medications. \par \par Lipoma mid back no changes in size, shape denies pain or irritation to the area. Advise him to call the office with any changes will send him to dermatology for further evaluation. Wants to wait and see.

## 2021-10-14 NOTE — PHYSICAL EXAM
[Well Nourished] : well nourished [Well Developed] : well developed [Normal Sclera/Conjunctiva] : normal sclera/conjunctiva [No JVD] : no jugular venous distention [Supple] : the neck was supple [No Respiratory Distress] : no respiratory distress [Clear to Auscultation] : lungs were clear to auscultation bilaterally [No Accessory Muscle Use] : no accessory muscle use [Normal S1, S2] : normal S1 and S2 [No Edema] : there was no peripheral edema [Normal Bowel Sounds] : normal bowel sounds [Soft] : abdomen soft [No CVA Tenderness] : no ~M costovertebral angle tenderness [No Spinal Tenderness] : no spinal tenderness [Normal Gait] : normal gait [No Skin Lesions] : no skin lesions [Cranial Nerves Intact] : cranial nerves 2-12 were intact [No Gross Sensory Deficits] : no gross sensory deficits [Oriented x3] : oriented to person, place, and time [Normal Affect] : the affect was normal [Normal Mood] : the mood was normal [Normal Insight/Judgement] : insight and judgment were intact [Normal] : normal [Foot Ulcers] : no foot ulcers [Full ROM] : with limited range of motion [Swelling] : not swollen [Tenderness] : not tender [Erythema] : not erythematous [de-identified] : Lipoma mid thoracic area, no changes noted, instructed pt to report any pain discomfort or changes in size. [de-identified] : unsteady gait

## 2021-11-15 ENCOUNTER — NON-APPOINTMENT (OUTPATIENT)
Age: 81
End: 2021-11-15

## 2021-11-15 ENCOUNTER — APPOINTMENT (OUTPATIENT)
Dept: OPHTHALMOLOGY | Facility: CLINIC | Age: 81
End: 2021-11-15
Payer: MEDICARE

## 2021-11-15 PROCEDURE — 92012 INTRM OPH EXAM EST PATIENT: CPT

## 2021-11-17 ENCOUNTER — RX RENEWAL (OUTPATIENT)
Age: 81
End: 2021-11-17

## 2021-11-17 ENCOUNTER — NON-APPOINTMENT (OUTPATIENT)
Age: 81
End: 2021-11-17

## 2021-11-18 ENCOUNTER — RX RENEWAL (OUTPATIENT)
Age: 81
End: 2021-11-18

## 2021-11-18 ENCOUNTER — APPOINTMENT (OUTPATIENT)
Dept: HOME HEALTH SERVICES | Facility: HOME HEALTH | Age: 81
End: 2021-11-18
Payer: MEDICARE

## 2021-11-18 VITALS
OXYGEN SATURATION: 96 % | DIASTOLIC BLOOD PRESSURE: 70 MMHG | HEART RATE: 76 BPM | SYSTOLIC BLOOD PRESSURE: 144 MMHG | TEMPERATURE: 97 F | RESPIRATION RATE: 18 BRPM

## 2021-11-18 DIAGNOSIS — R26.89 OTHER ABNORMALITIES OF GAIT AND MOBILITY: ICD-10-CM

## 2021-11-18 PROCEDURE — 99349 HOME/RES VST EST MOD MDM 40: CPT

## 2021-11-18 NOTE — COUNSELING
[Non - Smoker] : non-smoker [Smoke/CO Detectors] : smoke/CO detectors [Improve mobility] : improve mobility [Decrease hospital use] : decrease hospital use [Discussed disease trajectory with patient/caregiver] : discussed disease trajectory with patient/caregiver [Full Code] : Code Status: Full Code [No Limitations] : Treatment Guidelines: No limitations [Long Term Intubation] : Intubation: Long term intubation [Last Verification Date: _____] : [facility] Completion/last verification date: [unfilled] [FreeTextEntry4] : Educated patient/legal representative about CCM services and consent.\par Educated patient/legal representative that this service is available because they have 2 or more chronic conditions, that only one Provider can furnish CCM Services per calendar month and that CCM services are subject to the usual Medicare deductible and coinsurance. \par Patient/legal representative understands the right to stop the CCM services at any time by notifying House Calls office.\par Patient/legal representative has verbally consented 10/21\par

## 2021-11-18 NOTE — HEALTH RISK ASSESSMENT
[HRA Reviewed] : Health risk assessment reviewed [Independent] : feeding [Some assistance needed] : using telephone [Full assistance needed] : preparing meals [No falls in past year] : Patient reported no falls in the past year [Yes] : The patient does have visual impairment

## 2021-11-18 NOTE — HISTORY OF PRESENT ILLNESS
[Patient] : patient [FreeTextEntry1] : gait and balance abnormality [FreeTextEntry2] : 81 yo male alert and oriented x3. Dx of CHF, CAD, DM T2, Adrenal mass, hypothyroid, abnormality of gait. Pt has lost weight and states "he is doing well".  Pt with history of CVA, no residual physical or mental deficits noted. \par  systolic today states he just had a coffee. Much improved today at 120.\par Presents today for toothache. Lt lower incisor with a cavity. Pt reports difficulty eating, requesting referral to dentist. Recommended pt [facility] in to the [facility] for emergency dental care.\par  \par Reports compliance with medications. \par \par Lipoma mid back no changes in size, shape denies pain or irritation to the area. Advise him to call the office with any changes will send him to dermatology for further evaluation. Wants to wait and see.

## 2021-11-18 NOTE — PHYSICAL EXAM
[Well Nourished] : well nourished [Well Developed] : well developed [Normal Sclera/Conjunctiva] : normal sclera/conjunctiva [No JVD] : no jugular venous distention [Supple] : the neck was supple [No Respiratory Distress] : no respiratory distress [Clear to Auscultation] : lungs were clear to auscultation bilaterally [No Accessory Muscle Use] : no accessory muscle use [Normal S1, S2] : normal S1 and S2 [No Edema] : there was no peripheral edema [Normal Bowel Sounds] : normal bowel sounds [Soft] : abdomen soft [No CVA Tenderness] : no ~M costovertebral angle tenderness [No Spinal Tenderness] : no spinal tenderness [Normal Gait] : normal gait [No Skin Lesions] : no skin lesions [Cranial Nerves Intact] : cranial nerves 2-12 were intact [No Gross Sensory Deficits] : no gross sensory deficits [Oriented x3] : oriented to person, place, and time [Normal Affect] : the affect was normal [Normal Mood] : the mood was normal [Normal Insight/Judgement] : insight and judgment were intact [Normal] : normal [Foot Ulcers] : no foot ulcers [Full ROM] : with limited range of motion [Swelling] : not swollen [Tenderness] : not tender [Erythema] : not erythematous [de-identified] : Lipoma mid thoracic area, no changes noted, instructed pt to report any pain discomfort or changes in size. [de-identified] : unsteady gait

## 2021-12-07 ENCOUNTER — RX RENEWAL (OUTPATIENT)
Age: 81
End: 2021-12-07

## 2022-01-05 NOTE — PHYSICAL THERAPY INITIAL EVALUATION ADULT - TRANSFER TRAINING, PT EVAL
Detail Level: Detailed Add 53713 Cpt? (Important Note: In 2017 The Use Of 24936 Is Being Tracked By Cms To Determine Future Global Period Reimbursement For Global Periods): yes Pt will be able to perform sit <> stand transfers with Lequire x 1 week.

## 2022-01-11 ENCOUNTER — RX RENEWAL (OUTPATIENT)
Age: 82
End: 2022-01-11

## 2022-01-12 ENCOUNTER — NON-APPOINTMENT (OUTPATIENT)
Age: 82
End: 2022-01-12

## 2022-01-12 ENCOUNTER — RX RENEWAL (OUTPATIENT)
Age: 82
End: 2022-01-12

## 2022-01-13 ENCOUNTER — APPOINTMENT (OUTPATIENT)
Dept: HOME HEALTH SERVICES | Facility: HOME HEALTH | Age: 82
End: 2022-01-13
Payer: MEDICARE

## 2022-01-13 VITALS
HEART RATE: 70 BPM | TEMPERATURE: 97 F | RESPIRATION RATE: 18 BRPM | DIASTOLIC BLOOD PRESSURE: 70 MMHG | OXYGEN SATURATION: 97 % | SYSTOLIC BLOOD PRESSURE: 110 MMHG

## 2022-01-13 DIAGNOSIS — N48.1 BALANITIS: ICD-10-CM

## 2022-01-13 PROCEDURE — 99349 HOME/RES VST EST MOD MDM 40: CPT

## 2022-01-13 NOTE — COUNSELING
[Non - Smoker] : non-smoker [Smoke/CO Detectors] : smoke/CO detectors [Improve mobility] : improve mobility [Decrease hospital use] : decrease hospital use [Discussed disease trajectory with patient/caregiver] : discussed disease trajectory with patient/caregiver [Full Code] : Code Status: Full Code [No Limitations] : Treatment Guidelines: No limitations [Long Term Intubation] : Intubation: Long term intubation [Last [facility] Date: _____] : [facility] Completion/last verification date: [unfilled] [FreeTextEntry4] : Educated patient/legal representative about CCM services and consent.\par Educated patient/legal representative that this service is available because they have 2 or more chronic conditions, that only one Provider can furnish CCM Services per calendar month and that CCM services are subject to the usual Medicare deductible and coinsurance. \par Patient/legal representative understands the right to stop the CCM services at any time by notifying House Calls office.\par Patient/legal representative has verbally consented 10/21\par

## 2022-01-13 NOTE — PHYSICAL EXAM
[Well Nourished] : well nourished [Well Developed] : well developed [Normal Sclera/Conjunctiva] : normal sclera/conjunctiva [No JVD] : no jugular venous distention [Supple] : the neck was supple [No Respiratory Distress] : no respiratory distress [Clear to Auscultation] : lungs were clear to auscultation bilaterally [No Accessory Muscle Use] : no accessory muscle use [Normal S1, S2] : normal S1 and S2 [No Edema] : there was no peripheral edema [Normal Bowel Sounds] : normal bowel sounds [Soft] : abdomen soft [No CVA Tenderness] : no ~M costovertebral angle tenderness [No Spinal Tenderness] : no spinal tenderness [Normal Gait] : normal gait [No Skin Lesions] : no skin lesions [Cranial Nerves Intact] : cranial nerves 2-12 were intact [No Gross Sensory Deficits] : no gross sensory deficits [Oriented x3] : oriented to person, place, and time [Normal Affect] : the affect was normal [Normal Mood] : the mood was normal [Normal Insight/Judgement] : insight and judgment were intact [Normal] : normal [Foot Ulcers] : no foot ulcers [Full ROM] : with limited range of motion [Swelling] : not swollen [Tenderness] : not tender [Erythema] : not erythematous [de-identified] : Lipoma mid thoracic area, no changes noted, instructed pt to report any pain discomfort or changes in size. [de-identified] : unsteady gait

## 2022-01-13 NOTE — HISTORY OF PRESENT ILLNESS
[Patient] : patient [FreeTextEntry1] : gait and balance abnormality [FreeTextEntry2] : 79 yo male alert and oriented x3. Dx of CHF, CAD, DM T2, Adrenal mass, hypothyroid, abnormality of gait. Pt has lost weight and states "he is doing well".  Pt with history of CVA, no residual physical or mental deficits noted. \par Sparkle  reports patient needs a HHA.  Patient states his eyes are failing.  Left eye cleared then right.  Has a eye specialist.  Patient states he has trouble seeing the small white pills.  \par  \par Reports compliance with medications. \par \par Lipoma mid back no changes in size, shape denies pain or irritation to the area. Advise him to call the office with any changes will send him to dermatology for further evaluation. Wants to wait and see.

## 2022-01-13 NOTE — CHRONIC CARE ASSESSMENT
[PPS Score: ____] : Palliative Performance Scale (PPS) Score: [unfilled] [de-identified] : [facility] barrientos [de-identified] : As tolerated [de-identified] : Diabetic diet

## 2022-01-14 ENCOUNTER — NON-APPOINTMENT (OUTPATIENT)
Age: 82
End: 2022-01-14

## 2022-01-14 ENCOUNTER — APPOINTMENT (OUTPATIENT)
Dept: OPHTHALMOLOGY | Facility: CLINIC | Age: 82
End: 2022-01-14
Payer: MEDICARE

## 2022-01-14 PROCEDURE — 92012 INTRM OPH EXAM EST PATIENT: CPT

## 2022-01-26 ENCOUNTER — NON-APPOINTMENT (OUTPATIENT)
Age: 82
End: 2022-01-26

## 2022-01-26 ENCOUNTER — APPOINTMENT (OUTPATIENT)
Dept: ENDOCRINOLOGY | Facility: CLINIC | Age: 82
End: 2022-01-26
Payer: MEDICARE

## 2022-01-26 VITALS
BODY MASS INDEX: 25.62 KG/M2 | DIASTOLIC BLOOD PRESSURE: 84 MMHG | HEART RATE: 71 BPM | SYSTOLIC BLOOD PRESSURE: 149 MMHG | WEIGHT: 173 LBS | HEIGHT: 69 IN

## 2022-01-26 PROCEDURE — 99214 OFFICE O/P EST MOD 30 MIN: CPT

## 2022-01-26 NOTE — PHYSICAL EXAM
[Alert] : alert [Healthy Appearance] : healthy appearance [No Proptosis] : no proptosis [No Lid Lag] : no lid lag [Normal Hearing] : hearing was normal [No LAD] : no lymphadenopathy [Thyroid Not Enlarged] : the thyroid was not enlarged [Clear to Auscultation] : lungs were clear to auscultation bilaterally [Normal S1, S2] : normal S1 and S2 [Regular Rhythm] : with a regular rhythm [No Edema] : no peripheral edema [Normal Sensation on Monofilament Testing] : normal sensation on monofilament testing of lower extremities [Normal Affect] : the affect was normal [Normal Mood] : the mood was normal [Acanthosis Nigricans] : no acanthosis nigricans [Foot Ulcers] : no foot ulcers [de-identified] : fingerstick 135, coffee [de-identified] : decreased DP pulse [de-identified] : shuffling/waddling  gait [de-identified] : limited  insight to health conditions

## 2022-01-26 NOTE — DATA REVIEWED
[FreeTextEntry1] : 8/21: A1c 6.8%, urine microalbumin/cr 18\par 3/21: A1c 7.7%\par 12/20: A1c 9.5%, tot chol 129, trig 112, HDL 56, LDL 50, B12 1194, TH 1.31, 25D 49.2\par 6/20: B12 299, , A1c 8.0% (point of care)\par 5/20: A1c 8.3%, tot cho 111, trig 80, HDL 55, LDL 40, TSH 0.83\par 10/19: A1c 6.7%, point of care\par 7/19: Cr 0.87, eGFR 83\par 3/19: A1c 7.1% (point of care).  TSH 0.75, tot chol 196, trig 353, HDL 55, LDL 70, B12 388\par 12/18: A1c 6.8%, point of care\par 10/18: tot chol 174, trig 159, HDL 68, LDL 74\par 9/18: A1c 6.5% (point of care)\par 4/18: A1c 7.2%, tot chol 223, trig 157, HDL 77, \par 1/18: A1c 6.8%, point of care\par 10/17: A1c 7.7%\par 8/17: A1c 9.2%, tot chol 219, trig 171, HDL 66, , TSH 1.49\par 4/17: A1c 8.0% (point of care)\par 1/17: A1c 9.0% (point of care)\par 11/16: A1c 10.2%, Cr 0.89, eGFR 97\par 8/16: A1c 7.7% (point of care)\par 6/16: A1c 8.9%, fructosamine 321\par 5/16: A1c 10.5%, tot chol 196, trig 184, HDL 60, LDL 99, urine microalb/cr 32, TSH 1.48,  Cr 0.93\par \par CT thoracic spine, 10/18:\par stable L adrenal adenoma 3.5cm (compared to 2015 and 2012)

## 2022-01-26 NOTE — HISTORY OF PRESENT ILLNESS
[FreeTextEntry1] : Not eating like he used to.  Weight is down 11 lb since last visit\par not drinking soda, has coconut water instead\par usually has buttered bagel every day for breakfast.   has only had coffee so far today\par nocturia 2x/night\par denies hypoglycemia symptoms\par no chest pain, SOB or neuropathy symptoms\par saw ophtho earlier this month but he refused dilation of eyes.\par \par Meds: amlodipine 5mg, metoprolol ER 100mg/day\par metformin 850mg,BID (not at pharmacy);  glimepiride 2mg/day\par fenofibrate 48mg, atorvastatin 20mg\par Plavix 75mg, Eliquis 5mg bid\par levothyroxine 75mcg/day\par gabapentin 400mg /day\par Seroquel 400mg (waiting to be picked up)\par montelukast 10mg\par eye drops.\par

## 2022-01-26 NOTE — ASSESSMENT
[FreeTextEntry1] : Diabetes, goal A1c < 7.5%\par Diabetes control is largely dependent on how compliant he is with diet.\par Weight is down 11 lb so I am concerned his intake is significantly less than before.   Will reduce glimepiride dose to 1mg/day if A1c today < 6.5%.\par continue statin therapy.\par \par Hypothyroid, euthyroid.  goal TSH 0.5-5.0 range, will send refill once TSH is back.\par RTO 6  months

## 2022-01-27 LAB
ALBUMIN SERPL ELPH-MCNC: 4.9 G/DL
ALP BLD-CCNC: 61 U/L
ALT SERPL-CCNC: 13 U/L
ANION GAP SERPL CALC-SCNC: 14 MMOL/L
AST SERPL-CCNC: 14 U/L
BASOPHILS # BLD AUTO: 0.04 K/UL
BASOPHILS NFR BLD AUTO: 0.4 %
BILIRUB SERPL-MCNC: 0.2 MG/DL
BUN SERPL-MCNC: 23 MG/DL
CALCIUM SERPL-MCNC: 10.3 MG/DL
CHLORIDE SERPL-SCNC: 111 MMOL/L
CHOLEST SERPL-MCNC: 151 MG/DL
CO2 SERPL-SCNC: 19 MMOL/L
CREAT SERPL-MCNC: 1.08 MG/DL
EOSINOPHIL # BLD AUTO: 1.21 K/UL
EOSINOPHIL NFR BLD AUTO: 12.7 %
ESTIMATED AVERAGE GLUCOSE: 148 MG/DL
GLUCOSE SERPL-MCNC: 120 MG/DL
HBA1C MFR BLD HPLC: 6.8 %
HCT VFR BLD CALC: 44.1 %
HDLC SERPL-MCNC: 75 MG/DL
HGB BLD-MCNC: 13.8 G/DL
IMM GRANULOCYTES NFR BLD AUTO: 0.3 %
LDLC SERPL CALC-MCNC: 61 MG/DL
LYMPHOCYTES # BLD AUTO: 3.25 K/UL
LYMPHOCYTES NFR BLD AUTO: 34.2 %
MAN DIFF?: NORMAL
MCHC RBC-ENTMCNC: 27.3 PG
MCHC RBC-ENTMCNC: 31.3 GM/DL
MCV RBC AUTO: 87.2 FL
MONOCYTES # BLD AUTO: 0.68 K/UL
MONOCYTES NFR BLD AUTO: 7.2 %
NEUTROPHILS # BLD AUTO: 4.29 K/UL
NEUTROPHILS NFR BLD AUTO: 45.2 %
NONHDLC SERPL-MCNC: 76 MG/DL
PLATELET # BLD AUTO: 393 K/UL
POTASSIUM SERPL-SCNC: 4.5 MMOL/L
PROT SERPL-MCNC: 7.7 G/DL
RBC # BLD: 5.06 M/UL
RBC # FLD: 15.3 %
SODIUM SERPL-SCNC: 143 MMOL/L
TRIGL SERPL-MCNC: 76 MG/DL
TSH SERPL-ACNC: 1.08 UIU/ML
VIT B12 SERPL-MCNC: 1552 PG/ML
WBC # FLD AUTO: 9.5 K/UL

## 2022-02-07 DIAGNOSIS — Z23 ENCOUNTER FOR IMMUNIZATION: ICD-10-CM

## 2022-03-09 ENCOUNTER — NON-APPOINTMENT (OUTPATIENT)
Age: 82
End: 2022-03-09

## 2022-03-10 ENCOUNTER — RX RENEWAL (OUTPATIENT)
Age: 82
End: 2022-03-10

## 2022-03-24 ENCOUNTER — APPOINTMENT (OUTPATIENT)
Dept: HOME HEALTH SERVICES | Facility: HOME HEALTH | Age: 82
End: 2022-03-24
Payer: MEDICARE

## 2022-03-24 VITALS
RESPIRATION RATE: 18 BRPM | HEART RATE: 70 BPM | TEMPERATURE: 97 F | OXYGEN SATURATION: 98 % | SYSTOLIC BLOOD PRESSURE: 136 MMHG | DIASTOLIC BLOOD PRESSURE: 70 MMHG

## 2022-03-24 DIAGNOSIS — K02.9 DENTAL CARIES, UNSPECIFIED: ICD-10-CM

## 2022-03-24 PROCEDURE — 99349 HOME/RES VST EST MOD MDM 40: CPT

## 2022-03-24 NOTE — COUNSELING
[Non - Smoker] : non-smoker [Smoke/CO Detectors] : smoke/CO detectors [Improve mobility] : improve mobility [Decrease hospital use] : decrease hospital use [Discussed disease trajectory with patient/caregiver] : discussed disease trajectory with patient/caregiver [Full Code] : Code Status: Full Code [No Limitations] : Treatment Guidelines: No limitations [Long Term Intubation] : Intubation: Long term intubation [Last [facility] Date: _____] : [facility] Completion/last verification date: [unfilled] [FreeTextEntry4] : Educated patient/legal representative about CCM services and consent.\par Educated patient/legal representative that this service is available because they have 2 or more chronic conditions, that only one Provider can furnish CCM Services per calendar month and that CCM services are subject to the usual Medicare deductible and coinsurance. \par Patient/legal representative understands the right to stop the CCM services at any time by notifying House Calls office.\par Patient/legal representative has verbally consented 10/21\par

## 2022-03-24 NOTE — PHYSICAL EXAM
[Well Nourished] : well nourished [Well Developed] : well developed [Normal Sclera/Conjunctiva] : normal sclera/conjunctiva [No JVD] : no jugular venous distention [Supple] : the neck was supple [No Respiratory Distress] : no respiratory distress [Clear to Auscultation] : lungs were clear to auscultation bilaterally [No Accessory Muscle Use] : no accessory muscle use [Normal S1, S2] : normal S1 and S2 [No Edema] : there was no peripheral edema [Normal Bowel Sounds] : normal bowel sounds [Soft] : abdomen soft [No CVA Tenderness] : no ~M costovertebral angle tenderness [No Spinal Tenderness] : no spinal tenderness [Normal Gait] : normal gait [No Skin Lesions] : no skin lesions [Cranial Nerves Intact] : cranial nerves 2-12 were intact [No Gross Sensory Deficits] : no gross sensory deficits [Oriented x3] : oriented to person, place, and time [Normal Affect] : the affect was normal [Normal Mood] : the mood was normal [Normal Insight/Judgement] : insight and judgment were intact [Normal] : normal [Foot Ulcers] : no foot ulcers [Full ROM] : with limited range of motion [Swelling] : not swollen [Tenderness] : not tender [Erythema] : not erythematous [de-identified] : Lipoma mid thoracic area, no changes noted, instructed pt to report any pain discomfort or changes in size. [de-identified] : unsteady gait

## 2022-03-24 NOTE — CHRONIC CARE ASSESSMENT
[PPS Score: ____] : Palliative Performance Scale (PPS) Score: [unfilled] [de-identified] : [facility] barrientos [de-identified] : As tolerated [de-identified] : Diabetic diet

## 2022-04-08 ENCOUNTER — NON-APPOINTMENT (OUTPATIENT)
Age: 82
End: 2022-04-08

## 2022-04-08 ENCOUNTER — APPOINTMENT (OUTPATIENT)
Dept: OPHTHALMOLOGY | Facility: CLINIC | Age: 82
End: 2022-04-08
Payer: MEDICARE

## 2022-04-08 PROCEDURE — 92012 INTRM OPH EXAM EST PATIENT: CPT

## 2022-04-08 PROCEDURE — 92134 CPTRZ OPH DX IMG PST SGM RTA: CPT

## 2022-05-05 ENCOUNTER — APPOINTMENT (OUTPATIENT)
Dept: HOME HEALTH SERVICES | Facility: HOME HEALTH | Age: 82
End: 2022-05-05
Payer: MEDICARE

## 2022-05-05 VITALS
TEMPERATURE: 97.6 F | DIASTOLIC BLOOD PRESSURE: 70 MMHG | SYSTOLIC BLOOD PRESSURE: 128 MMHG | OXYGEN SATURATION: 97 % | HEART RATE: 72 BPM | RESPIRATION RATE: 18 BRPM

## 2022-05-05 DIAGNOSIS — E89.0 POSTPROCEDURAL HYPOTHYROIDISM: ICD-10-CM

## 2022-05-05 PROCEDURE — 99349 HOME/RES VST EST MOD MDM 40: CPT

## 2022-05-05 NOTE — CHRONIC CARE ASSESSMENT
[PPS Score: ____] : Palliative Performance Scale (PPS) Score: [unfilled] [de-identified] : fall risk [de-identified] : As tolerated [de-identified] : Diabetic diet

## 2022-05-05 NOTE — HISTORY OF PRESENT ILLNESS
[Patient] : patient [FreeTextEntry1] : gait and balance abnormality [FreeTextEntry2] : 79 yo male alert and oriented x3. Dx of CHF, CAD, DM T2, Adrenal mass, hypothyroid, abnormality of gait. Pt has lost weight and states "he is doing well".  Pt with history of CVA, no residual physical or mental deficits noted. \par Sparkle  reports patient needs a HHA.  Patient states his eyes are failing.  Left eye cleared then right.  Has a eye specialist.  Patient states he has trouble seeing the small white pills.  \par  \par Reports compliance with medications. \par \par Lipoma mid back no changes in size, shape denies pain or irritation to the area. Advise him to call the office with any changes will send him to dermatology for further evaluation. Wants to wait and see.\par \par Patient denies fever, cough, trouble breathing, rash, vomiting and diarrhea. Patient has not been in close contact with someone covid positive.\par \par N95 mask, gloves, eye wear and gown used during visit: [Y]. Total face to face time with patient is40 min.\par \par Patient/ patient's caregiver reports no weight loss >10lbs in the past 6 months. No changes in dentition or swallowing reported, No changes in hearing or vision reported. No changes in Cognition reported. Patient denies any symptoms of depression or anxiety. Patient is ADL and IADL independent. No changes in gait or falls reported. \par Patient's home environment is safe.\par \par

## 2022-05-05 NOTE — PHYSICAL EXAM
[Well Nourished] : well nourished [Well Developed] : well developed [Normal Sclera/Conjunctiva] : normal sclera/conjunctiva [No JVD] : no jugular venous distention [Supple] : the neck was supple [No Respiratory Distress] : no respiratory distress [Clear to Auscultation] : lungs were clear to auscultation bilaterally [No Accessory Muscle Use] : no accessory muscle use [Normal S1, S2] : normal S1 and S2 [No Edema] : there was no peripheral edema [Normal Bowel Sounds] : normal bowel sounds [Soft] : abdomen soft [No CVA Tenderness] : no ~M costovertebral angle tenderness [No Spinal Tenderness] : no spinal tenderness [Normal Gait] : normal gait [No Skin Lesions] : no skin lesions [Cranial Nerves Intact] : cranial nerves 2-12 were intact [No Gross Sensory Deficits] : no gross sensory deficits [Oriented x3] : oriented to person, place, and time [Normal Affect] : the affect was normal [Normal Mood] : the mood was normal [Normal Insight/Judgement] : insight and judgment were intact [Normal] : normal [Foot Ulcers] : no foot ulcers [Full ROM] : with limited range of motion [Swelling] : not swollen [Tenderness] : not tender [Erythema] : not erythematous [de-identified] : Lipoma mid thoracic area, no changes noted, instructed pt to report any pain discomfort or changes in size. [de-identified] : unsteady gait

## 2022-05-23 ENCOUNTER — NON-APPOINTMENT (OUTPATIENT)
Age: 82
End: 2022-05-23

## 2022-05-24 ENCOUNTER — NON-APPOINTMENT (OUTPATIENT)
Age: 82
End: 2022-05-24

## 2022-06-02 ENCOUNTER — APPOINTMENT (OUTPATIENT)
Dept: ENDOCRINOLOGY | Facility: CLINIC | Age: 82
End: 2022-06-02
Payer: MEDICARE

## 2022-06-02 VITALS
WEIGHT: 181 LBS | HEIGHT: 69 IN | SYSTOLIC BLOOD PRESSURE: 124 MMHG | DIASTOLIC BLOOD PRESSURE: 78 MMHG | BODY MASS INDEX: 26.81 KG/M2 | HEART RATE: 99 BPM

## 2022-06-02 LAB
GLUCOSE BLDC GLUCOMTR-MCNC: 225
HBA1C MFR BLD HPLC: 6.7

## 2022-06-02 PROCEDURE — 83036 HEMOGLOBIN GLYCOSYLATED A1C: CPT | Mod: QW

## 2022-06-02 PROCEDURE — 82962 GLUCOSE BLOOD TEST: CPT

## 2022-06-02 PROCEDURE — 99214 OFFICE O/P EST MOD 30 MIN: CPT | Mod: 25

## 2022-06-02 NOTE — ASSESSMENT
[FreeTextEntry1] : Diabetes, goal A1c < 7.5%\par Diabetes control is largely dependent on how compliant he is with diet.\par Continue current regimen.  Advised to avoid juice, or if he will have juice, he should at least dilute it with water.\par Recommended he ask to walk with home aide\par continue statin therapy.\par rx's sent to [facility] on [address].  He has 4 pharmacies in [place] and he's not sure which one he uses.\par \par Hypothyroid, euthyroid. \par RTO 6  months

## 2022-06-02 NOTE — DATA REVIEWED
[FreeTextEntry1] : 6/22: A1c 6.7% POC\par 1/22: A1c 6.8%, tot chol 151, trig 76, HDL 75, LDL 61, TSH 1.08, B12 1552, GFR 75\par 8/21: A1c 6.8%, urine microalbumin/cr 18\par 3/21: A1c 7.7%\par 12/20: A1c 9.5%, tot chol 129, trig 112, HDL 56, LDL 50, B12 1194, TH 1.31, 25D 49.2\par 6/20: B12 299, , A1c 8.0% (point of care)\par 5/20: A1c 8.3%, tot cho 111, trig 80, HDL 55, LDL 40, TSH 0.83\par 10/19: A1c 6.7%, point of care\par 7/19: Cr 0.87, eGFR 83\par 3/19: A1c 7.1% (point of care).  TSH 0.75, tot chol 196, trig 353, HDL 55, LDL 70, B12 388\par 12/18: A1c 6.8%, point of care\par 10/18: tot chol 174, trig 159, HDL 68, LDL 74\par 9/18: A1c 6.5% (point of care)\par 4/18: A1c 7.2%, tot chol 223, trig 157, HDL 77, \par 1/18: A1c 6.8%, point of care\par 10/17: A1c 7.7%\par 8/17: A1c 9.2%, tot chol 219, trig 171, HDL 66, , TSH 1.49\par 4/17: A1c 8.0% (point of care)\par 1/17: A1c 9.0% (point of care)\par 11/16: A1c 10.2%, Cr 0.89, eGFR 97\par 8/16: A1c 7.7% (point of care)\par 6/16: A1c 8.9%, fructosamine 321\par 5/16: A1c 10.5%, tot chol 196, trig 184, HDL 60, LDL 99, urine microalb/cr 32, TSH 1.48,  Cr 0.93\par \par CT thoracic spine, 10/18:\par stable L adrenal adenoma 3.5cm (compared to 2015 and 2012)

## 2022-06-02 NOTE — PHYSICAL EXAM
[Alert] : alert [Healthy Appearance] : healthy appearance [No Proptosis] : no proptosis [No Lid Lag] : no lid lag [Normal Hearing] : hearing was normal [No LAD] : no lymphadenopathy [Thyroid Not Enlarged] : the thyroid was not enlarged [Clear to Auscultation] : lungs were clear to auscultation bilaterally [Normal S1, S2] : normal S1 and S2 [Regular Rhythm] : with a regular rhythm [No Edema] : no peripheral edema [Normal Sensation on Monofilament Testing] : normal sensation on monofilament testing of lower extremities [Normal Affect] : the affect was normal [Normal Mood] : the mood was normal [Acanthosis Nigricans] : no acanthosis nigricans [Foot Ulcers] : no foot ulcers [de-identified] : fingerstick 225, pancakes for breakfast  [de-identified] : reduced pedal pulse [de-identified] : shuffling/waddling  gait [de-identified] : limited  insight to health conditions

## 2022-06-02 NOTE — HISTORY OF PRESENT ILLNESS
[FreeTextEntry1] : Mostly just eating and sleeping.  Where he lives, he can't get good food, just a lot of pizza, which he doesn't like.\par Drinking grapefruit juice.\par no chest pain, SOB or neuropathy symptoms.  Nocturia 2-3x/night\par main complaint is poor vision.  He is seeing ophtho, last visit 4/22\par no falls recently\par \par PMH:  diabetes \par adrenal adenoma 3.5cm\par hypothyroid \par \par Meds: amlodipine 5mg, metoprolol ER 100mg/day\par metformin 850mg,BID;  glimepiride 2mg/day\par fenofibrate 48mg, atorvastatin 20mg\par Plavix 75mg, Eliquis 5mg bid\par levothyroxine 75mcg/day\par gabapentin 400mg /day\par Seroquel 400mg (waiting to be picked up)\par montelukast 10mg\par eye drops.\par

## 2022-06-16 ENCOUNTER — APPOINTMENT (OUTPATIENT)
Dept: HOME HEALTH SERVICES | Facility: HOME HEALTH | Age: 82
End: 2022-06-16
Payer: MEDICARE

## 2022-06-16 VITALS
TEMPERATURE: 97.8 F | HEART RATE: 84 BPM | RESPIRATION RATE: 18 BRPM | DIASTOLIC BLOOD PRESSURE: 80 MMHG | OXYGEN SATURATION: 96 % | SYSTOLIC BLOOD PRESSURE: 120 MMHG

## 2022-06-16 PROCEDURE — 99349 HOME/RES VST EST MOD MDM 40: CPT

## 2022-06-16 NOTE — COUNSELING
[Non - Smoker] : non-smoker [Smoke/CO Detectors] : smoke/CO detectors [Improve mobility] : improve mobility [Decrease hospital use] : decrease hospital use [Discussed disease trajectory with patient/caregiver] : discussed disease trajectory with patient/caregiver [Full Code] : Code Status: Full Code [No Limitations] : Treatment Guidelines: No limitations [Long Term Intubation] : Intubation: Long term intubation [Last Verification Date: _____] : [facility] Completion/last verification date: [unfilled] [FreeTextEntry4] : Educated patient/legal representative about CCM services and consent.\par Educated patient/legal representative that this service is available because they have 2 or more chronic conditions, that only one Provider can furnish CCM Services per calendar month and that CCM services are subject to the usual Medicare deductible and coinsurance. \par Patient/legal representative understands the right to stop the CCM services at any time by notifying House Calls office.\par Patient/legal representative has verbally consented 10/21\par

## 2022-06-16 NOTE — PHYSICAL EXAM
[Well Nourished] : well nourished [Well Developed] : well developed [Normal Sclera/Conjunctiva] : normal sclera/conjunctiva [No JVD] : no jugular venous distention [Supple] : the neck was supple [No Respiratory Distress] : no respiratory distress [Clear to Auscultation] : lungs were clear to auscultation bilaterally [No Accessory Muscle Use] : no accessory muscle use [Normal S1, S2] : normal S1 and S2 [No Edema] : there was no peripheral edema [Normal Bowel Sounds] : normal bowel sounds [Soft] : abdomen soft [No CVA Tenderness] : no ~M costovertebral angle tenderness [No Spinal Tenderness] : no spinal tenderness [Normal Gait] : normal gait [No Skin Lesions] : no skin lesions [Cranial Nerves Intact] : cranial nerves 2-12 were intact [No Gross Sensory Deficits] : no gross sensory deficits [Oriented x3] : oriented to person, place, and time [Normal Affect] : the affect was normal [Normal Mood] : the mood was normal [Normal Insight/Judgement] : insight and judgment were intact [Normal] : normal [Foot Ulcers] : no foot ulcers [Full ROM] : with limited range of motion [Swelling] : not swollen [Tenderness] : not tender [Erythema] : not erythematous [de-identified] : Lipoma mid thoracic area, no changes noted, instructed pt to report any pain discomfort or changes in size. [de-identified] : unsteady gait

## 2022-06-16 NOTE — CHRONIC CARE ASSESSMENT
[PPS Score: ____] : Palliative Performance Scale (PPS) Score: [unfilled] [de-identified] : fall risk [de-identified] : As tolerated [de-identified] : Diabetic diet

## 2022-06-16 NOTE — HISTORY OF PRESENT ILLNESS
[Patient] : patient [FreeTextEntry1] : gait and balance abnormality [FreeTextEntry2] : 79 yo male alert and oriented x3. Dx of CHF, CAD, DM T2, Adrenal mass, hypothyroid, abnormality of gait. Pt has lost weight and states "he is doing well".  Pt with history of CVA, no residual physical or mental deficits noted. \par Sparkle  reports patient needs a HHA.  Constipation only taking Miralax.  Trial of Senna and Colace.  \par  \par Reports compliance with medications. \par \par Lipoma mid back no changes in size, shape denies pain or irritation to the area. Advise him to call the office with any changes will send him to dermatology for further evaluation. Wants to wait and see.\par \par Patient denies fever, cough, trouble breathing, rash, vomiting and diarrhea. Patient has not been in close contact with someone covid positive.\par \par N95 mask, gloves, eye wear and gown used during visit: [Y]. Total face to face time with patient is40 min.\par \par Patient/ patient's caregiver reports no weight loss >10lbs in the past 6 months. No changes in dentition or swallowing reported, No changes in hearing or vision reported. No changes in Cognition reported. Patient denies any symptoms of depression or anxiety. Patient is ADL and IADL independent. No changes in gait or falls reported. \par Patient's home environment is safe.\par \par

## 2022-06-20 ENCOUNTER — NON-APPOINTMENT (OUTPATIENT)
Age: 82
End: 2022-06-20

## 2022-07-17 ENCOUNTER — NON-APPOINTMENT (OUTPATIENT)
Age: 82
End: 2022-07-17

## 2022-07-18 ENCOUNTER — NON-APPOINTMENT (OUTPATIENT)
Age: 82
End: 2022-07-18

## 2022-07-19 ENCOUNTER — NON-APPOINTMENT (OUTPATIENT)
Age: 82
End: 2022-07-19

## 2022-07-21 ENCOUNTER — APPOINTMENT (OUTPATIENT)
Dept: OPHTHALMOLOGY | Facility: CLINIC | Age: 82
End: 2022-07-21

## 2022-07-21 ENCOUNTER — NON-APPOINTMENT (OUTPATIENT)
Age: 82
End: 2022-07-21

## 2022-07-21 PROCEDURE — 92083 EXTENDED VISUAL FIELD XM: CPT

## 2022-07-21 PROCEDURE — 92012 INTRM OPH EXAM EST PATIENT: CPT

## 2022-07-28 ENCOUNTER — APPOINTMENT (OUTPATIENT)
Dept: HOME HEALTH SERVICES | Facility: HOME HEALTH | Age: 82
End: 2022-07-28

## 2022-07-28 VITALS
DIASTOLIC BLOOD PRESSURE: 70 MMHG | TEMPERATURE: 97 F | HEART RATE: 82 BPM | SYSTOLIC BLOOD PRESSURE: 122 MMHG | OXYGEN SATURATION: 97 % | RESPIRATION RATE: 18 BRPM

## 2022-07-28 PROCEDURE — 99349 HOME/RES VST EST MOD MDM 40: CPT

## 2022-07-28 NOTE — COUNSELING
[Non - Smoker] : non-smoker [Smoke/CO Detectors] : smoke/CO detectors [Improve mobility] : improve mobility [Decrease hospital use] : decrease hospital use [Discussed disease trajectory with patient/caregiver] : discussed disease trajectory with patient/caregiver [Full Code] : Code Status: Full Code [No Limitations] : Treatment Guidelines: No limitations [Long Term Intubation] : Intubation: Long term intubation [[facility] Date: _____] : [facility] Completion/last verification date: [unfilled] [FreeTextEntry4] : Educated patient/legal representative about CCM services and consent.\par Educated patient/legal representative that this service is available because they have 2 or more chronic conditions, that only one Provider can furnish CCM Services per calendar month and that CCM services are subject to the usual Medicare deductible and coinsurance. \par Patient/legal representative understands the right to stop the CCM services at any time by notifying House Calls office.\par Patient/legal representative has verbally consented 10/21\par

## 2022-07-28 NOTE — CHRONIC CARE ASSESSMENT
[PPS Score: ____] : Palliative Performance Scale (PPS) Score: [unfilled] [de-identified] : fall risk [de-identified] : As tolerated [de-identified] : Diabetic diet

## 2022-07-28 NOTE — PHYSICAL EXAM
[Well Nourished] : well nourished [Well Developed] : well developed [Normal Sclera/Conjunctiva] : normal sclera/conjunctiva [No JVD] : no jugular venous distention [Supple] : the neck was supple [No Respiratory Distress] : no respiratory distress [Clear to Auscultation] : lungs were clear to auscultation bilaterally [No Accessory Muscle Use] : no accessory muscle use [Normal S1, S2] : normal S1 and S2 [No Edema] : there was no peripheral edema [Normal Bowel Sounds] : normal bowel sounds [Soft] : abdomen soft [No CVA Tenderness] : no ~M costovertebral angle tenderness [No Spinal Tenderness] : no spinal tenderness [Normal Gait] : normal gait [No Skin Lesions] : no skin lesions [Cranial Nerves Intact] : cranial nerves 2-12 were intact [No Gross Sensory Deficits] : no gross sensory deficits [Oriented x3] : oriented to person, place, and time [Normal Affect] : the affect was normal [Normal Mood] : the mood was normal [Normal Insight/Judgement] : insight and judgment were intact [Normal] : normal [Foot Ulcers] : no foot ulcers [Full ROM] : with limited range of motion [Swelling] : not swollen [Tenderness] : not tender [Erythema] : not erythematous [de-identified] : Lipoma mid thoracic area, no changes noted, instructed pt to report any pain discomfort or changes in size. [de-identified] : unsteady gait

## 2022-08-16 ENCOUNTER — RX RENEWAL (OUTPATIENT)
Age: 82
End: 2022-08-16

## 2022-09-07 RX ORDER — ISOPROPYL ALCOHOL 70 %
TOWELETTE (EA) MISCELLANEOUS
Qty: 1 | Refills: 0 | Status: ACTIVE | COMMUNITY
Start: 2020-12-16

## 2022-09-07 NOTE — PHYSICAL EXAM
[Well Nourished] : well nourished [Well Developed] : well developed [Normal Sclera/Conjunctiva] : normal sclera/conjunctiva [No JVD] : no jugular venous distention [Supple] : the neck was supple [No Respiratory Distress] : no respiratory distress [Clear to Auscultation] : lungs were clear to auscultation bilaterally [No Accessory Muscle Use] : no accessory muscle use [Normal S1, S2] : normal S1 and S2 [No Edema] : there was no peripheral edema [Normal Bowel Sounds] : normal bowel sounds [Soft] : abdomen soft [No CVA Tenderness] : no ~M costovertebral angle tenderness [No Spinal Tenderness] : no spinal tenderness [Normal Gait] : normal gait [No Skin Lesions] : no skin lesions [Cranial Nerves Intact] : cranial nerves 2-12 were intact [No Gross Sensory Deficits] : no gross sensory deficits [Oriented x3] : oriented to person, place, and time [Normal Affect] : the affect was normal [Normal Mood] : the mood was normal [Normal Insight/Judgement] : insight and judgment were intact [Foot Ulcers] : no foot ulcers [Normal] : normal [Full ROM] : with limited range of motion [Swelling] : not swollen [Tenderness] : not tender [Erythema] : not erythematous [de-identified] : Lipoma mid thoracic area, no changes noted, instructed pt to report any pain discomfort or changes in size. [de-identified] : unsteady gait

## 2022-09-07 NOTE — COUNSELING
[Non - Smoker] : non-smoker [Smoke/CO Detectors] : smoke/CO detectors [Improve mobility] : improve mobility [Decrease hospital use] : decrease hospital use [Discussed disease trajectory with patient/caregiver] : discussed disease trajectory with patient/caregiver [Full Code] : Code Status: Full Code [No Limitations] : Treatment Guidelines: No limitations [Long Term Intubation] : Intubation: Long term intubation [Last Verification Date: _____] : [facility] Completion/last verification date: [unfilled] [FreeTextEntry4] : Educated patient/legal representative about CCM services and consent.\par Educated patient/legal representative that this service is available because they have 2 or more chronic conditions, that only one Provider can furnish CCM Services per calendar month and that CCM services are subject to the usual Medicare deductible and coinsurance. \par Patient/legal representative understands the right to stop the CCM services at any time by notifying House Calls office.\par Patient/legal representative has verbally consented 10/21\par

## 2022-09-07 NOTE — CHRONIC CARE ASSESSMENT
[PPS Score: ____] : Palliative Performance Scale (PPS) Score: [unfilled] [de-identified] : fall risk [de-identified] : As tolerated [de-identified] : Diabetic diet

## 2022-09-08 ENCOUNTER — APPOINTMENT (OUTPATIENT)
Dept: HOME HEALTH SERVICES | Facility: HOME HEALTH | Age: 82
End: 2022-09-08

## 2022-09-13 ENCOUNTER — NON-APPOINTMENT (OUTPATIENT)
Age: 82
End: 2022-09-13

## 2022-09-14 ENCOUNTER — RX RENEWAL (OUTPATIENT)
Age: 82
End: 2022-09-14

## 2022-10-05 ENCOUNTER — NON-APPOINTMENT (OUTPATIENT)
Age: 82
End: 2022-10-05

## 2022-10-06 ENCOUNTER — APPOINTMENT (OUTPATIENT)
Dept: OPHTHALMOLOGY | Facility: CLINIC | Age: 82
End: 2022-10-06

## 2022-10-11 ENCOUNTER — RX RENEWAL (OUTPATIENT)
Age: 82
End: 2022-10-11

## 2022-10-20 ENCOUNTER — NON-APPOINTMENT (OUTPATIENT)
Age: 82
End: 2022-10-20

## 2022-10-20 ENCOUNTER — APPOINTMENT (OUTPATIENT)
Dept: HOME HEALTH SERVICES | Facility: HOME HEALTH | Age: 82
End: 2022-10-20

## 2022-10-20 VITALS
TEMPERATURE: 97 F | DIASTOLIC BLOOD PRESSURE: 70 MMHG | HEART RATE: 80 BPM | RESPIRATION RATE: 18 BRPM | SYSTOLIC BLOOD PRESSURE: 122 MMHG | OXYGEN SATURATION: 96 %

## 2022-10-20 DIAGNOSIS — R60.0 LOCALIZED EDEMA: ICD-10-CM

## 2022-10-20 PROCEDURE — 99349 HOME/RES VST EST MOD MDM 40: CPT

## 2022-10-20 NOTE — HISTORY OF PRESENT ILLNESS
[Patient] : patient [FreeTextEntry1] : gait and balance abnormality [FreeTextEntry2] : 83 yo male alert and oriented x3. Dx of CHF, CAD, DM T2, Adrenal mass, hypothyroid, abnormality of gait. Pt has lost weight and states "he is doing well".  Pt with history of CVA, no residual physical or mental deficits noted. \par Reports lip edema for 2-3 days.  Right side of lip swollen with raised area that resembles an abscess.  Possibly folliculitis.  Denies trying new foods or throat swelling.  Trial of Prednisone and Keflex.  Call if symptoms do not improve.  \par  \par Reports compliance with medications. \par \par Lipoma mid back no changes in size, shape denies pain or irritation to the area. Advise him to call the office with any changes will send him to dermatology for further evaluation. Wants to wait and see.\par \par Patient denies fever, cough, trouble breathing, rash, vomiting and diarrhea. Patient has not been in close contact with someone covid positive.\par \par N95 mask, gloves, eye wear and gown used during visit: [Y]. Total face to face time with patient is40 min.\par \par Patient/ patient's caregiver reports no weight loss >10lbs in the past 6 months. No changes in dentition or swallowing reported, No changes in hearing or vision reported. No changes in Cognition reported. Patient denies any symptoms of depression or anxiety. Patient is ADL and IADL independent. No changes in gait or falls reported. \par Patient's home environment is safe.\par \par

## 2022-10-20 NOTE — COUNSELING
[Non - Smoker] : non-smoker [Smoke/CO Detectors] : smoke/CO detectors [Improve mobility] : improve mobility [Decrease hospital use] : decrease hospital use [Discussed disease trajectory with patient/caregiver] : discussed disease trajectory with patient/caregiver [Full Code] : Code Status: Full Code [No Limitations] : Treatment Guidelines: No limitations [Long Term Intubation] : Intubation: Long term intubation [[facility]: _____[facility]last verification date: [unfilled] [FreeTextEntry4] : Educated patient/legal representative about CCM services and consent.\par Educated patient/legal representative that this service is available because they have 2 or more chronic conditions, that only one Provider can furnish CCM Services per calendar month and that CCM services are subject to the usual Medicare deductible and coinsurance. \par Patient/legal representative understands the right to stop the CCM services at any time by notifying [facility] office.\par Patient/legal representative has verbally consented 10/21\par

## 2022-10-20 NOTE — PHYSICAL EXAM
[Well Nourished] : well nourished [Well Developed] : well developed [Normal Sclera/Conjunctiva] : normal sclera/conjunctiva [No JVD] : no jugular venous distention [Supple] : the neck was supple [No Respiratory Distress] : no respiratory distress [Clear to Auscultation] : lungs were clear to auscultation bilaterally [No Accessory Muscle Use] : no accessory muscle use [Normal S1, S2] : normal S1 and S2 [No Edema] : there was no peripheral edema [Normal Bowel Sounds] : normal bowel sounds [Soft] : abdomen soft [No CVA Tenderness] : no ~M costovertebral angle tenderness [No Spinal Tenderness] : no spinal tenderness [Normal Gait] : normal gait [No Skin Lesions] : no skin lesions [Cranial Nerves Intact] : cranial nerves 2-12 were intact [No Gross Sensory Deficits] : no gross sensory deficits [Oriented x3] : oriented to person, place, and time [Normal Affect] : the affect was normal [Normal Mood] : the mood was normal [Normal Insight/Judgement] : insight and judgment were intact [Normal] : normal [Foot Ulcers] : no foot ulcers [Full ROM] : with limited range of motion [Swelling] : not swollen [Tenderness] : not tender [Erythema] : not erythematous [de-identified] : Lipoma mid thoracic area, no changes noted, instructed pt to report any pain discomfort or changes in size. [de-identified] : unsteady gait

## 2022-10-20 NOTE — CHRONIC CARE ASSESSMENT
[PPS Score: ____] : Palliative Performance Scale (PPS) Score: [unfilled] [FAST Score: ____] : Functional Assessment Scale (FAST) Score: [unfilled] [de-identified] : fall risk [de-identified] : As tolerated [de-identified] : Diabetic diet

## 2022-10-31 ENCOUNTER — APPOINTMENT (OUTPATIENT)
Dept: OPHTHALMOLOGY | Facility: CLINIC | Age: 82
End: 2022-10-31

## 2022-10-31 ENCOUNTER — NON-APPOINTMENT (OUTPATIENT)
Age: 82
End: 2022-10-31

## 2022-10-31 PROCEDURE — 92014 COMPRE OPH EXAM EST PT 1/>: CPT

## 2022-10-31 PROCEDURE — 92133 CPTRZD OPH DX IMG PST SGM ON: CPT

## 2022-11-09 ENCOUNTER — RX RENEWAL (OUTPATIENT)
Age: 82
End: 2022-11-09

## 2022-12-01 ENCOUNTER — RX RENEWAL (OUTPATIENT)
Age: 82
End: 2022-12-01

## 2022-12-15 ENCOUNTER — APPOINTMENT (OUTPATIENT)
Dept: HOME HEALTH SERVICES | Facility: HOME HEALTH | Age: 82
End: 2022-12-15

## 2022-12-15 VITALS
DIASTOLIC BLOOD PRESSURE: 70 MMHG | TEMPERATURE: 97 F | OXYGEN SATURATION: 96 % | HEART RATE: 72 BPM | RESPIRATION RATE: 18 BRPM | SYSTOLIC BLOOD PRESSURE: 122 MMHG

## 2022-12-15 PROCEDURE — 99349 HOME/RES VST EST MOD MDM 40: CPT

## 2022-12-15 NOTE — COUNSELING
[Non - Smoker] : non-smoker [Smoke/CO Detectors] : smoke/CO detectors [Improve mobility] : improve mobility [Decrease hospital use] : decrease hospital use [Discussed disease trajectory with patient/caregiver] : discussed disease trajectory with patient/caregiver [Full Code] : Code Status: Full Code [No Limitations] : Treatment Guidelines: No limitations [Long Term Intubation] : Intubation: Long term intubation [[facility]: _____[facility] Com[facility]last verification date: [unfilled] [FreeTextEntry4] : Educated patient/legal representative about CCM services and consent.\par Educated patient/legal representative that this service is available because they have 2 or more chronic conditions, that only one Provider can furnish CCM Services per calendar month and that CCM services are subject to the usual Medicare deductible and coinsurance. \par Patient/legal representative understands the right to stop the CCM services at any time by notifying [facility] office.\par Patient/legal representative has verbally consented 10/21\par

## 2022-12-15 NOTE — HISTORY OF PRESENT ILLNESS
[Patient] : patient [FreeTextEntry1] : gait and balance abnormality [FreeTextEntry2] : 83 yo male alert and oriented x3. Dx of CHF, CAD, DM T2, Adrenal mass, hypothyroid, abnormality of gait. Pt has lost weight and states "he is doing well".  Pt with history of CVA, no residual physical or mental deficits noted. \par no issues or concerns\par  \par Reports compliance with medications. \par \par Lipoma mid back no changes in size, shape denies pain or irritation to the area. Advise him to call the office with any changes will send him to dermatology for further evaluation. Wants to wait and see.\par \par Patient denies fever, cough, trouble breathing, rash, vomiting and diarrhea. Patient has not been in close contact with someone covid positive.\par \par N95 mask, gloves, eye wear and gown used during visit: [Y]. Total face to face time with patient is40 min.\par \par Patient/ patient's caregiver reports no weight loss >10lbs in the past 6 months. No changes in dentition or swallowing reported, No changes in hearing or vision reported. No changes in Cognition reported. Patient denies any symptoms of depression or anxiety. Patient is ADL and IADL independent. No changes in gait or falls reported. \par Patient's home environment is safe.\par \par

## 2022-12-15 NOTE — PHYSICAL EXAM
[Well Nourished] : well nourished [Well Developed] : well developed [Normal Sclera/Conjunctiva] : normal sclera/conjunctiva [No JVD] : no jugular venous distention [Supple] : the neck was supple [No Respiratory Distress] : no respiratory distress [Clear to Auscultation] : lungs were clear to auscultation bilaterally [No Accessory Muscle Use] : no accessory muscle use [Normal S1, S2] : normal S1 and S2 [No Edema] : there was no peripheral edema [Normal Bowel Sounds] : normal bowel sounds [Soft] : abdomen soft [No CVA Tenderness] : no ~M costovertebral angle tenderness [No Spinal Tenderness] : no spinal tenderness [Normal Gait] : normal gait [No Skin Lesions] : no skin lesions [Cranial Nerves Intact] : cranial nerves 2-12 were intact [No Gross Sensory Deficits] : no gross sensory deficits [Oriented x3] : oriented to person, place, and time [Normal Affect] : the affect was normal [Normal Mood] : the mood was normal [Normal Insight/Judgement] : insight and judgment were intact [Normal] : normal [Foot Ulcers] : no foot ulcers [Full ROM] : with limited range of motion [Swelling] : not swollen [Tenderness] : not tender [Erythema] : not erythematous [de-identified] : Lipoma mid thoracic area, no changes noted, instructed pt to report any pain discomfort or changes in size. [de-identified] : unsteady gait

## 2022-12-15 NOTE — PHYSICAL EXAM
Normal rate, regular rhythm, normal S1, S2 heart sounds heard. [Well Nourished] : well nourished [Well Developed] : well developed [Normal Sclera/Conjunctiva] : normal sclera/conjunctiva [No JVD] : no jugular venous distention [Supple] : the neck was supple [No Respiratory Distress] : no respiratory distress [Clear to Auscultation] : lungs were clear to auscultation bilaterally [No Accessory Muscle Use] : no accessory muscle use [Normal S1, S2] : normal S1 and S2 [No Edema] : there was no peripheral edema [Normal Bowel Sounds] : normal bowel sounds [Soft] : abdomen soft [No CVA Tenderness] : no ~M costovertebral angle tenderness [No Spinal Tenderness] : no spinal tenderness [Normal Gait] : normal gait [No Skin Lesions] : no skin lesions [Cranial Nerves Intact] : cranial nerves 2-12 were intact [No Gross Sensory Deficits] : no gross sensory deficits [Oriented x3] : oriented to person, place, and time [Normal Affect] : the affect was normal [Normal Mood] : the mood was normal [Normal Insight/Judgement] : insight and judgment were intact [Normal] : normal [Foot Ulcers] : no foot ulcers [Full ROM] : with limited range of motion [Swelling] : not swollen [Tenderness] : not tender [Erythema] : not erythematous [de-identified] : Lipoma mid thoracic area, no changes noted, instructed pt to report any pain discomfort or changes in size. [de-identified] : unsteady gait

## 2022-12-15 NOTE — CHRONIC CARE ASSESSMENT
[PPS Score: ____] : Palliative Performance Scale (PPS) Score: [unfilled] [FAST Score: ____] : Functional Assessment Scale (FAST) Score: [unfilled] [de-identified] : fall risk [de-identified] : As tolerated [de-identified] : Diabetic diet

## 2022-12-15 NOTE — CHRONIC CARE ASSESSMENT
[PPS Score: ____] : Palliative Performance Scale (PPS) Score: [unfilled] [FAST Score: ____] : Functional Assessment Scale (FAST) Score: [unfilled] [de-identified] : fall risk [de-identified] : As tolerated [de-identified] : Diabetic diet

## 2022-12-15 NOTE — HISTORY OF PRESENT ILLNESS
[Patient] : patient [FreeTextEntry1] : gait and balance abnormality [FreeTextEntry2] : 81 yo male alert and oriented x3. Dx of CHF, CAD, DM T2, Adrenal mass, hypothyroid, abnormality of gait. Pt has lost weight and states "he is doing well".  Pt with history of CVA, no residual physical or mental deficits noted. \par no issues or concerns\par  \par Reports compliance with medications. \par \par Lipoma mid back no changes in size, shape denies pain or irritation to the area. Advise him to call the office with any changes will send him to dermatology for further evaluation. Wants to wait and see.\par \par Patient denies fever, cough, trouble breathing, rash, vomiting and diarrhea. Patient has not been in close contact with someone covid positive.\par \par N95 mask, gloves, eye wear and gown used during visit: [Y]. Total face to face time with patient is40 min.\par \par Patient/ patient's caregiver reports no weight loss >10lbs in the past 6 months. No changes in dentition or swallowing reported, No changes in hearing or vision reported. No changes in Cognition reported. Patient denies any symptoms of depression or anxiety. Patient is ADL and IADL independent. No changes in gait or falls reported. \par Patient's home environment is safe.\par \par

## 2022-12-16 ENCOUNTER — LABORATORY RESULT (OUTPATIENT)
Age: 82
End: 2022-12-16

## 2022-12-16 ENCOUNTER — NON-APPOINTMENT (OUTPATIENT)
Age: 82
End: 2022-12-16

## 2023-01-01 ENCOUNTER — APPOINTMENT (OUTPATIENT)
Dept: NUCLEAR MEDICINE | Facility: HOSPITAL | Age: 83
End: 2023-01-01

## 2023-01-01 ENCOUNTER — INPATIENT (INPATIENT)
Facility: HOSPITAL | Age: 83
LOS: 2 days | Discharge: HOME HEALTH | DRG: 374 | End: 2023-12-01
Attending: STUDENT IN AN ORGANIZED HEALTH CARE EDUCATION/TRAINING PROGRAM | Admitting: STUDENT IN AN ORGANIZED HEALTH CARE EDUCATION/TRAINING PROGRAM
Payer: MEDICARE

## 2023-01-01 ENCOUNTER — APPOINTMENT (OUTPATIENT)
Dept: RADIATION ONCOLOGY | Facility: CLINIC | Age: 83
End: 2023-01-01
Payer: MEDICARE

## 2023-01-01 ENCOUNTER — NON-APPOINTMENT (OUTPATIENT)
Age: 83
End: 2023-01-01

## 2023-01-01 ENCOUNTER — APPOINTMENT (OUTPATIENT)
Dept: HOME HEALTH SERVICES | Facility: HOME HEALTH | Age: 83
End: 2023-01-01

## 2023-01-01 ENCOUNTER — APPOINTMENT (OUTPATIENT)
Dept: ENDOCRINOLOGY | Facility: CLINIC | Age: 83
End: 2023-01-01
Payer: MEDICARE

## 2023-01-01 ENCOUNTER — RX RENEWAL (OUTPATIENT)
Age: 83
End: 2023-01-01

## 2023-01-01 ENCOUNTER — APPOINTMENT (OUTPATIENT)
Dept: OPHTHALMOLOGY | Facility: CLINIC | Age: 83
End: 2023-01-01
Payer: MEDICARE

## 2023-01-01 ENCOUNTER — TRANSCRIPTION ENCOUNTER (OUTPATIENT)
Age: 83
End: 2023-01-01

## 2023-01-01 ENCOUNTER — APPOINTMENT (OUTPATIENT)
Dept: HOME HEALTH SERVICES | Facility: HOME HEALTH | Age: 83
End: 2023-01-01
Payer: MEDICARE

## 2023-01-01 ENCOUNTER — OUTPATIENT (OUTPATIENT)
Dept: OUTPATIENT SERVICES | Facility: HOSPITAL | Age: 83
LOS: 1 days | End: 2023-01-01
Payer: MEDICARE

## 2023-01-01 ENCOUNTER — APPOINTMENT (OUTPATIENT)
Dept: OPHTHALMOLOGY | Facility: CLINIC | Age: 83
End: 2023-01-01

## 2023-01-01 ENCOUNTER — APPOINTMENT (OUTPATIENT)
Dept: HEMATOLOGY ONCOLOGY | Facility: CLINIC | Age: 83
End: 2023-01-01
Payer: MEDICARE

## 2023-01-01 ENCOUNTER — EMERGENCY (EMERGENCY)
Facility: HOSPITAL | Age: 83
LOS: 1 days | Discharge: HOME | End: 2023-01-01
Attending: EMERGENCY MEDICINE | Admitting: EMERGENCY MEDICINE
Payer: MEDICARE

## 2023-01-01 ENCOUNTER — APPOINTMENT (OUTPATIENT)
Dept: GASTROENTEROLOGY | Facility: CLINIC | Age: 83
End: 2023-01-01
Payer: MEDICARE

## 2023-01-01 ENCOUNTER — LABORATORY RESULT (OUTPATIENT)
Age: 83
End: 2023-01-01

## 2023-01-01 VITALS
HEART RATE: 98 BPM | SYSTOLIC BLOOD PRESSURE: 133 MMHG | BODY MASS INDEX: 25.7 KG/M2 | WEIGHT: 174 LBS | DIASTOLIC BLOOD PRESSURE: 84 MMHG

## 2023-01-01 VITALS
SYSTOLIC BLOOD PRESSURE: 159 MMHG | WEIGHT: 188 LBS | HEART RATE: 102 BPM | DIASTOLIC BLOOD PRESSURE: 104 MMHG | BODY MASS INDEX: 27.76 KG/M2

## 2023-01-01 VITALS
SYSTOLIC BLOOD PRESSURE: 130 MMHG | RESPIRATION RATE: 18 BRPM | OXYGEN SATURATION: 97 % | HEART RATE: 78 BPM | DIASTOLIC BLOOD PRESSURE: 70 MMHG | TEMPERATURE: 97 F

## 2023-01-01 VITALS
SYSTOLIC BLOOD PRESSURE: 130 MMHG | HEART RATE: 86 BPM | OXYGEN SATURATION: 98 % | RESPIRATION RATE: 19 BRPM | TEMPERATURE: 97.3 F | DIASTOLIC BLOOD PRESSURE: 90 MMHG

## 2023-01-01 VITALS
HEART RATE: 103 BPM | TEMPERATURE: 97 F | SYSTOLIC BLOOD PRESSURE: 107 MMHG | RESPIRATION RATE: 17 BRPM | WEIGHT: 179.9 LBS | HEIGHT: 69 IN | OXYGEN SATURATION: 99 % | DIASTOLIC BLOOD PRESSURE: 79 MMHG

## 2023-01-01 VITALS
OXYGEN SATURATION: 98 % | SYSTOLIC BLOOD PRESSURE: 160 MMHG | DIASTOLIC BLOOD PRESSURE: 98 MMHG | TEMPERATURE: 97.8 F | HEART RATE: 88 BPM | BODY MASS INDEX: 22.66 KG/M2 | HEIGHT: 69 IN | RESPIRATION RATE: 18 BRPM | WEIGHT: 153 LBS

## 2023-01-01 VITALS
TEMPERATURE: 96.9 F | RESPIRATION RATE: 18 BRPM | BODY MASS INDEX: 23.55 KG/M2 | DIASTOLIC BLOOD PRESSURE: 82 MMHG | HEART RATE: 103 BPM | WEIGHT: 159 LBS | SYSTOLIC BLOOD PRESSURE: 130 MMHG | HEIGHT: 69 IN | OXYGEN SATURATION: 99 %

## 2023-01-01 VITALS
OXYGEN SATURATION: 95 % | HEART RATE: 96 BPM | HEIGHT: 69 IN | DIASTOLIC BLOOD PRESSURE: 94 MMHG | SYSTOLIC BLOOD PRESSURE: 127 MMHG | TEMPERATURE: 96.7 F

## 2023-01-01 VITALS
RESPIRATION RATE: 16 BRPM | HEIGHT: 69 IN | OXYGEN SATURATION: 98 % | WEIGHT: 160.06 LBS | HEART RATE: 82 BPM | DIASTOLIC BLOOD PRESSURE: 87 MMHG | SYSTOLIC BLOOD PRESSURE: 137 MMHG | TEMPERATURE: 98 F

## 2023-01-01 VITALS
DIASTOLIC BLOOD PRESSURE: 95 MMHG | OXYGEN SATURATION: 98 % | RESPIRATION RATE: 18 BRPM | TEMPERATURE: 98 F | HEART RATE: 78 BPM | SYSTOLIC BLOOD PRESSURE: 151 MMHG

## 2023-01-01 VITALS
SYSTOLIC BLOOD PRESSURE: 124 MMHG | TEMPERATURE: 97 F | OXYGEN SATURATION: 97 % | DIASTOLIC BLOOD PRESSURE: 70 MMHG | RESPIRATION RATE: 18 BRPM | HEART RATE: 88 BPM

## 2023-01-01 VITALS
DIASTOLIC BLOOD PRESSURE: 83 MMHG | TEMPERATURE: 98 F | OXYGEN SATURATION: 97 % | SYSTOLIC BLOOD PRESSURE: 138 MMHG | RESPIRATION RATE: 18 BRPM | HEART RATE: 90 BPM

## 2023-01-01 DIAGNOSIS — I10 ESSENTIAL (PRIMARY) HYPERTENSION: ICD-10-CM

## 2023-01-01 DIAGNOSIS — E83.39 OTHER DISORDERS OF PHOSPHORUS METABOLISM: ICD-10-CM

## 2023-01-01 DIAGNOSIS — I63.9 CEREBRAL INFARCTION, UNSPECIFIED: ICD-10-CM

## 2023-01-01 DIAGNOSIS — I50.32 CHRONIC DIASTOLIC (CONGESTIVE) HEART FAILURE: ICD-10-CM

## 2023-01-01 DIAGNOSIS — E03.9 HYPOTHYROIDISM, UNSPECIFIED: ICD-10-CM

## 2023-01-01 DIAGNOSIS — D64.9 ANEMIA, UNSPECIFIED: ICD-10-CM

## 2023-01-01 DIAGNOSIS — I25.10 ATHEROSCLEROTIC HEART DISEASE OF NATIVE CORONARY ARTERY WITHOUT ANGINA PECTORIS: ICD-10-CM

## 2023-01-01 DIAGNOSIS — E11.9 TYPE 2 DIABETES MELLITUS WITHOUT COMPLICATIONS: ICD-10-CM

## 2023-01-01 DIAGNOSIS — E89.0 POSTPROCEDURAL HYPOTHYROIDISM: Chronic | ICD-10-CM

## 2023-01-01 DIAGNOSIS — K59.00 CONSTIPATION, UNSPECIFIED: ICD-10-CM

## 2023-01-01 DIAGNOSIS — Z29.9 ENCOUNTER FOR PROPHYLACTIC MEASURES, UNSPECIFIED: ICD-10-CM

## 2023-01-01 DIAGNOSIS — I50.9 HEART FAILURE, UNSPECIFIED: ICD-10-CM

## 2023-01-01 DIAGNOSIS — C15.9 MALIGNANT NEOPLASM OF ESOPHAGUS, UNSPECIFIED: ICD-10-CM

## 2023-01-01 DIAGNOSIS — C15.5 MALIGNANT NEOPLASM OF LOWER THIRD OF ESOPHAGUS: ICD-10-CM

## 2023-01-01 DIAGNOSIS — H91.90 UNSPECIFIED HEARING LOSS, UNSPECIFIED EAR: ICD-10-CM

## 2023-01-01 DIAGNOSIS — H54.1131: ICD-10-CM

## 2023-01-01 DIAGNOSIS — E86.0 DEHYDRATION: ICD-10-CM

## 2023-01-01 DIAGNOSIS — E43 UNSPECIFIED SEVERE PROTEIN-CALORIE MALNUTRITION: ICD-10-CM

## 2023-01-01 DIAGNOSIS — K29.80 DUODENITIS WITHOUT BLEEDING: ICD-10-CM

## 2023-01-01 DIAGNOSIS — R13.10 DYSPHAGIA, UNSPECIFIED: ICD-10-CM

## 2023-01-01 DIAGNOSIS — E83.52 HYPERCALCEMIA: ICD-10-CM

## 2023-01-01 DIAGNOSIS — H34.8312 TRIBUTARY (BRANCH) RETINAL VEIN OCCLUSION, RIGHT EYE, STABLE: ICD-10-CM

## 2023-01-01 DIAGNOSIS — R35.0 FREQUENCY OF MICTURITION: ICD-10-CM

## 2023-01-01 DIAGNOSIS — K29.50 UNSPECIFIED CHRONIC GASTRITIS WITHOUT BLEEDING: ICD-10-CM

## 2023-01-01 DIAGNOSIS — E55.9 VITAMIN D DEFICIENCY, UNSPECIFIED: ICD-10-CM

## 2023-01-01 DIAGNOSIS — E87.6 HYPOKALEMIA: ICD-10-CM

## 2023-01-01 DIAGNOSIS — H54.7 UNSPECIFIED VISUAL LOSS: ICD-10-CM

## 2023-01-01 DIAGNOSIS — I11.0 HYPERTENSIVE HEART DISEASE WITH HEART FAILURE: ICD-10-CM

## 2023-01-01 LAB
A1C WITH ESTIMATED AVERAGE GLUCOSE RESULT: 7 % — HIGH (ref 4–5.6)
A1C WITH ESTIMATED AVERAGE GLUCOSE RESULT: 7 % — HIGH (ref 4–5.6)
ALBUMIN SERPL ELPH-MCNC: 3.6 G/DL — SIGNIFICANT CHANGE UP (ref 3.3–5)
ALBUMIN SERPL ELPH-MCNC: 3.6 G/DL — SIGNIFICANT CHANGE UP (ref 3.3–5)
ALBUMIN SERPL ELPH-MCNC: 3.7 G/DL
ALBUMIN SERPL ELPH-MCNC: 3.7 G/DL — SIGNIFICANT CHANGE UP (ref 3.3–5)
ALBUMIN SERPL ELPH-MCNC: 3.7 G/DL — SIGNIFICANT CHANGE UP (ref 3.3–5)
ALBUMIN SERPL ELPH-MCNC: 3.9 G/DL — SIGNIFICANT CHANGE UP (ref 3.3–5)
ALBUMIN SERPL ELPH-MCNC: 3.9 G/DL — SIGNIFICANT CHANGE UP (ref 3.3–5)
ALBUMIN SERPL ELPH-MCNC: 4 G/DL — SIGNIFICANT CHANGE UP (ref 3.3–5)
ALBUMIN SERPL ELPH-MCNC: 4 G/DL — SIGNIFICANT CHANGE UP (ref 3.3–5)
ALBUMIN SERPL ELPH-MCNC: 4.7 G/DL
ALP BLD-CCNC: 47 U/L
ALP BLD-CCNC: 50 U/L
ALP SERPL-CCNC: 50 U/L — SIGNIFICANT CHANGE UP (ref 40–120)
ALP SERPL-CCNC: 51 U/L — SIGNIFICANT CHANGE UP (ref 40–120)
ALP SERPL-CCNC: 51 U/L — SIGNIFICANT CHANGE UP (ref 40–120)
ALP SERPL-CCNC: 52 U/L — SIGNIFICANT CHANGE UP (ref 40–120)
ALP SERPL-CCNC: 52 U/L — SIGNIFICANT CHANGE UP (ref 40–120)
ALT FLD-CCNC: 5 U/L — LOW (ref 10–45)
ALT FLD-CCNC: 5 U/L — LOW (ref 10–45)
ALT FLD-CCNC: 6 U/L — LOW (ref 10–45)
ALT FLD-CCNC: 6 U/L — LOW (ref 10–45)
ALT FLD-CCNC: 7 U/L — LOW (ref 10–45)
ALT FLD-CCNC: 7 U/L — LOW (ref 10–45)
ALT FLD-CCNC: 8 U/L — LOW (ref 10–45)
ALT FLD-CCNC: 8 U/L — LOW (ref 10–45)
ALT SERPL-CCNC: 6 U/L
ALT SERPL-CCNC: 7 U/L
ANION GAP SERPL CALC-SCNC: 10 MMOL/L — SIGNIFICANT CHANGE UP (ref 5–17)
ANION GAP SERPL CALC-SCNC: 11 MMOL/L — SIGNIFICANT CHANGE UP (ref 5–17)
ANION GAP SERPL CALC-SCNC: 12 MMOL/L — SIGNIFICANT CHANGE UP (ref 5–17)
ANION GAP SERPL CALC-SCNC: 13 MMOL/L — SIGNIFICANT CHANGE UP (ref 5–17)
ANION GAP SERPL CALC-SCNC: 13 MMOL/L — SIGNIFICANT CHANGE UP (ref 5–17)
ANION GAP SERPL CALC-SCNC: 14 MMOL/L
ANION GAP SERPL CALC-SCNC: 6 MMOL/L
APPEARANCE UR: CLEAR — SIGNIFICANT CHANGE UP
APPEARANCE UR: CLEAR — SIGNIFICANT CHANGE UP
APTT BLD: 30.6 SEC — SIGNIFICANT CHANGE UP (ref 24.5–35.6)
APTT BLD: 30.6 SEC — SIGNIFICANT CHANGE UP (ref 24.5–35.6)
APTT BLD: 31.5 SEC — SIGNIFICANT CHANGE UP (ref 24.5–35.6)
APTT BLD: 31.5 SEC — SIGNIFICANT CHANGE UP (ref 24.5–35.6)
APTT BLD: 31.9 SEC — SIGNIFICANT CHANGE UP (ref 24.5–35.6)
APTT BLD: 31.9 SEC — SIGNIFICANT CHANGE UP (ref 24.5–35.6)
AST SERPL-CCNC: 14 U/L
AST SERPL-CCNC: 14 U/L — SIGNIFICANT CHANGE UP (ref 10–40)
AST SERPL-CCNC: 14 U/L — SIGNIFICANT CHANGE UP (ref 10–40)
AST SERPL-CCNC: 15 U/L — SIGNIFICANT CHANGE UP (ref 10–40)
AST SERPL-CCNC: 15 U/L — SIGNIFICANT CHANGE UP (ref 10–40)
AST SERPL-CCNC: 16 U/L — SIGNIFICANT CHANGE UP (ref 10–40)
AST SERPL-CCNC: 16 U/L — SIGNIFICANT CHANGE UP (ref 10–40)
AST SERPL-CCNC: 18 U/L — SIGNIFICANT CHANGE UP (ref 10–40)
AST SERPL-CCNC: 18 U/L — SIGNIFICANT CHANGE UP (ref 10–40)
AST SERPL-CCNC: 19 U/L
BASOPHILS # BLD AUTO: 0.04 K/UL — SIGNIFICANT CHANGE UP (ref 0–0.2)
BASOPHILS # BLD AUTO: 0.05 K/UL — SIGNIFICANT CHANGE UP (ref 0–0.2)
BASOPHILS NFR BLD AUTO: 0.4 % — SIGNIFICANT CHANGE UP (ref 0–2)
BASOPHILS NFR BLD AUTO: 0.4 % — SIGNIFICANT CHANGE UP (ref 0–2)
BASOPHILS NFR BLD AUTO: 0.5 % — SIGNIFICANT CHANGE UP (ref 0–2)
BASOPHILS NFR BLD AUTO: 0.6 % — SIGNIFICANT CHANGE UP (ref 0–2)
BILIRUB SERPL-MCNC: 0.3 MG/DL
BILIRUB SERPL-MCNC: 0.3 MG/DL — SIGNIFICANT CHANGE UP (ref 0.2–1.2)
BILIRUB SERPL-MCNC: 0.4 MG/DL — SIGNIFICANT CHANGE UP (ref 0.2–1.2)
BILIRUB SERPL-MCNC: 0.5 MG/DL
BILIRUB UR-MCNC: NEGATIVE — SIGNIFICANT CHANGE UP
BILIRUB UR-MCNC: NEGATIVE — SIGNIFICANT CHANGE UP
BLD GP AB SCN SERPL QL: NEGATIVE — SIGNIFICANT CHANGE UP
BUN SERPL-MCNC: 10 MG/DL — SIGNIFICANT CHANGE UP (ref 7–23)
BUN SERPL-MCNC: 10 MG/DL — SIGNIFICANT CHANGE UP (ref 7–23)
BUN SERPL-MCNC: 12 MG/DL
BUN SERPL-MCNC: 12 MG/DL — SIGNIFICANT CHANGE UP (ref 7–23)
BUN SERPL-MCNC: 12 MG/DL — SIGNIFICANT CHANGE UP (ref 7–23)
BUN SERPL-MCNC: 15 MG/DL — SIGNIFICANT CHANGE UP (ref 7–23)
BUN SERPL-MCNC: 15 MG/DL — SIGNIFICANT CHANGE UP (ref 7–23)
BUN SERPL-MCNC: 4 MG/DL — LOW (ref 7–23)
BUN SERPL-MCNC: 6 MG/DL — LOW (ref 7–23)
BUN SERPL-MCNC: 6 MG/DL — LOW (ref 7–23)
BUN SERPL-MCNC: 7 MG/DL — SIGNIFICANT CHANGE UP (ref 7–23)
BUN SERPL-MCNC: 7 MG/DL — SIGNIFICANT CHANGE UP (ref 7–23)
BUN SERPL-MCNC: 9 MG/DL
CA-I SERPL-SCNC: 5.6 MG/DL
CALCIUM SERPL-MCNC: 10 MG/DL — SIGNIFICANT CHANGE UP (ref 8.4–10.5)
CALCIUM SERPL-MCNC: 10 MG/DL — SIGNIFICANT CHANGE UP (ref 8.4–10.5)
CALCIUM SERPL-MCNC: 10.2 MG/DL — SIGNIFICANT CHANGE UP (ref 8.4–10.5)
CALCIUM SERPL-MCNC: 10.2 MG/DL — SIGNIFICANT CHANGE UP (ref 8.4–10.5)
CALCIUM SERPL-MCNC: 10.4 MG/DL — SIGNIFICANT CHANGE UP (ref 8.4–10.5)
CALCIUM SERPL-MCNC: 10.8 MG/DL
CALCIUM SERPL-MCNC: 10.8 MG/DL — HIGH (ref 8.4–10.5)
CALCIUM SERPL-MCNC: 10.9 MG/DL — HIGH (ref 8.4–10.5)
CALCIUM SERPL-MCNC: 10.9 MG/DL — HIGH (ref 8.4–10.5)
CALCIUM SERPL-MCNC: 11.2 MG/DL
CALCIUM SERPL-MCNC: 11.4 MG/DL
CHLORIDE SERPL-SCNC: 107 MMOL/L
CHLORIDE SERPL-SCNC: 108 MMOL/L — SIGNIFICANT CHANGE UP (ref 96–108)
CHLORIDE SERPL-SCNC: 108 MMOL/L — SIGNIFICANT CHANGE UP (ref 96–108)
CHLORIDE SERPL-SCNC: 109 MMOL/L — HIGH (ref 96–108)
CHLORIDE SERPL-SCNC: 109 MMOL/L — HIGH (ref 96–108)
CHLORIDE SERPL-SCNC: 110 MMOL/L — HIGH (ref 96–108)
CHLORIDE SERPL-SCNC: 111 MMOL/L — HIGH (ref 96–108)
CHLORIDE SERPL-SCNC: 114 MMOL/L
CK MB CFR SERPL CALC: <1 NG/ML — SIGNIFICANT CHANGE UP (ref 0–6.7)
CK MB CFR SERPL CALC: <1 NG/ML — SIGNIFICANT CHANGE UP (ref 0–6.7)
CK SERPL-CCNC: 33 U/L — SIGNIFICANT CHANGE UP (ref 30–200)
CK SERPL-CCNC: 33 U/L — SIGNIFICANT CHANGE UP (ref 30–200)
CO2 SERPL-SCNC: 18 MMOL/L — LOW (ref 22–31)
CO2 SERPL-SCNC: 18 MMOL/L — LOW (ref 22–31)
CO2 SERPL-SCNC: 19 MMOL/L — LOW (ref 22–31)
CO2 SERPL-SCNC: 20 MMOL/L — LOW (ref 22–31)
CO2 SERPL-SCNC: 21 MMOL/L
CO2 SERPL-SCNC: 21 MMOL/L — LOW (ref 22–31)
CO2 SERPL-SCNC: 21 MMOL/L — LOW (ref 22–31)
CO2 SERPL-SCNC: 23 MMOL/L — SIGNIFICANT CHANGE UP (ref 22–31)
CO2 SERPL-SCNC: 23 MMOL/L — SIGNIFICANT CHANGE UP (ref 22–31)
CO2 SERPL-SCNC: 24 MMOL/L
COLOR SPEC: YELLOW — SIGNIFICANT CHANGE UP
COLOR SPEC: YELLOW — SIGNIFICANT CHANGE UP
CREAT SERPL-MCNC: 0.58 MG/DL — SIGNIFICANT CHANGE UP (ref 0.5–1.3)
CREAT SERPL-MCNC: 0.58 MG/DL — SIGNIFICANT CHANGE UP (ref 0.5–1.3)
CREAT SERPL-MCNC: 0.59 MG/DL — SIGNIFICANT CHANGE UP (ref 0.5–1.3)
CREAT SERPL-MCNC: 0.59 MG/DL — SIGNIFICANT CHANGE UP (ref 0.5–1.3)
CREAT SERPL-MCNC: 0.65 MG/DL — SIGNIFICANT CHANGE UP (ref 0.5–1.3)
CREAT SERPL-MCNC: 0.65 MG/DL — SIGNIFICANT CHANGE UP (ref 0.5–1.3)
CREAT SERPL-MCNC: 0.68 MG/DL — SIGNIFICANT CHANGE UP (ref 0.5–1.3)
CREAT SERPL-MCNC: 0.68 MG/DL — SIGNIFICANT CHANGE UP (ref 0.5–1.3)
CREAT SERPL-MCNC: 0.75 MG/DL — SIGNIFICANT CHANGE UP (ref 0.5–1.3)
CREAT SERPL-MCNC: 0.75 MG/DL — SIGNIFICANT CHANGE UP (ref 0.5–1.3)
CREAT SERPL-MCNC: 0.84 MG/DL — SIGNIFICANT CHANGE UP (ref 0.5–1.3)
CREAT SERPL-MCNC: 0.84 MG/DL — SIGNIFICANT CHANGE UP (ref 0.5–1.3)
CREAT SERPL-MCNC: 0.99 MG/DL
CREAT SERPL-MCNC: 1.08 MG/DL — SIGNIFICANT CHANGE UP (ref 0.5–1.3)
CREAT SERPL-MCNC: 1.08 MG/DL — SIGNIFICANT CHANGE UP (ref 0.5–1.3)
CREAT SERPL-MCNC: 1.1 MG/DL
CULTURE RESULTS: NO GROWTH — SIGNIFICANT CHANGE UP
CULTURE RESULTS: NO GROWTH — SIGNIFICANT CHANGE UP
DIFF PNL FLD: NEGATIVE — SIGNIFICANT CHANGE UP
DIFF PNL FLD: NEGATIVE — SIGNIFICANT CHANGE UP
EGFR: 67 ML/MIN/1.73M2
EGFR: 69 ML/MIN/1.73M2 — SIGNIFICANT CHANGE UP
EGFR: 69 ML/MIN/1.73M2 — SIGNIFICANT CHANGE UP
EGFR: 76 ML/MIN/1.73M2
EGFR: 87 ML/MIN/1.73M2 — SIGNIFICANT CHANGE UP
EGFR: 87 ML/MIN/1.73M2 — SIGNIFICANT CHANGE UP
EGFR: 90 ML/MIN/1.73M2 — SIGNIFICANT CHANGE UP
EGFR: 90 ML/MIN/1.73M2 — SIGNIFICANT CHANGE UP
EGFR: 93 ML/MIN/1.73M2 — SIGNIFICANT CHANGE UP
EGFR: 93 ML/MIN/1.73M2 — SIGNIFICANT CHANGE UP
EGFR: 94 ML/MIN/1.73M2 — SIGNIFICANT CHANGE UP
EGFR: 94 ML/MIN/1.73M2 — SIGNIFICANT CHANGE UP
EGFR: 97 ML/MIN/1.73M2 — SIGNIFICANT CHANGE UP
EOSINOPHIL # BLD AUTO: 0.48 K/UL — SIGNIFICANT CHANGE UP (ref 0–0.5)
EOSINOPHIL # BLD AUTO: 0.48 K/UL — SIGNIFICANT CHANGE UP (ref 0–0.5)
EOSINOPHIL # BLD AUTO: 0.55 K/UL — HIGH (ref 0–0.5)
EOSINOPHIL # BLD AUTO: 0.55 K/UL — HIGH (ref 0–0.5)
EOSINOPHIL # BLD AUTO: 0.57 K/UL — HIGH (ref 0–0.5)
EOSINOPHIL # BLD AUTO: 0.57 K/UL — HIGH (ref 0–0.5)
EOSINOPHIL # BLD AUTO: 0.69 K/UL — HIGH (ref 0–0.5)
EOSINOPHIL # BLD AUTO: 0.69 K/UL — HIGH (ref 0–0.5)
EOSINOPHIL # BLD AUTO: 0.92 K/UL — HIGH (ref 0–0.5)
EOSINOPHIL # BLD AUTO: 0.92 K/UL — HIGH (ref 0–0.5)
EOSINOPHIL # BLD AUTO: 1.08 K/UL — HIGH (ref 0–0.5)
EOSINOPHIL # BLD AUTO: 1.08 K/UL — HIGH (ref 0–0.5)
EOSINOPHIL NFR BLD AUTO: 10.9 % — HIGH (ref 0–6)
EOSINOPHIL NFR BLD AUTO: 10.9 % — HIGH (ref 0–6)
EOSINOPHIL NFR BLD AUTO: 12.7 % — HIGH (ref 0–6)
EOSINOPHIL NFR BLD AUTO: 12.7 % — HIGH (ref 0–6)
EOSINOPHIL NFR BLD AUTO: 5.4 % — SIGNIFICANT CHANGE UP (ref 0–6)
EOSINOPHIL NFR BLD AUTO: 5.4 % — SIGNIFICANT CHANGE UP (ref 0–6)
EOSINOPHIL NFR BLD AUTO: 5.6 % — SIGNIFICANT CHANGE UP (ref 0–6)
EOSINOPHIL NFR BLD AUTO: 5.6 % — SIGNIFICANT CHANGE UP (ref 0–6)
EOSINOPHIL NFR BLD AUTO: 7.4 % — HIGH (ref 0–6)
EOSINOPHIL NFR BLD AUTO: 7.4 % — HIGH (ref 0–6)
EOSINOPHIL NFR BLD AUTO: 8.2 % — HIGH (ref 0–6)
EOSINOPHIL NFR BLD AUTO: 8.2 % — HIGH (ref 0–6)
ESTIMATED AVERAGE GLUCOSE: 154 MG/DL — HIGH (ref 68–114)
ESTIMATED AVERAGE GLUCOSE: 154 MG/DL — HIGH (ref 68–114)
ESTIMATED AVERAGE GLUCOSE: 160 MG/DL
FERRITIN SERPL-MCNC: 131 NG/ML — SIGNIFICANT CHANGE UP (ref 30–400)
FERRITIN SERPL-MCNC: 131 NG/ML — SIGNIFICANT CHANGE UP (ref 30–400)
GLUCOSE BLDC GLUCOMTR-MCNC: 101 MG/DL — HIGH (ref 70–99)
GLUCOSE BLDC GLUCOMTR-MCNC: 101 MG/DL — HIGH (ref 70–99)
GLUCOSE BLDC GLUCOMTR-MCNC: 107
GLUCOSE BLDC GLUCOMTR-MCNC: 107 MG/DL — HIGH (ref 70–99)
GLUCOSE BLDC GLUCOMTR-MCNC: 107 MG/DL — HIGH (ref 70–99)
GLUCOSE BLDC GLUCOMTR-MCNC: 110 MG/DL — HIGH (ref 70–99)
GLUCOSE BLDC GLUCOMTR-MCNC: 110 MG/DL — HIGH (ref 70–99)
GLUCOSE BLDC GLUCOMTR-MCNC: 116 MG/DL — HIGH (ref 70–99)
GLUCOSE BLDC GLUCOMTR-MCNC: 116 MG/DL — HIGH (ref 70–99)
GLUCOSE BLDC GLUCOMTR-MCNC: 135 MG/DL — HIGH (ref 70–99)
GLUCOSE BLDC GLUCOMTR-MCNC: 135 MG/DL — HIGH (ref 70–99)
GLUCOSE BLDC GLUCOMTR-MCNC: 137 MG/DL — HIGH (ref 70–99)
GLUCOSE BLDC GLUCOMTR-MCNC: 137 MG/DL — HIGH (ref 70–99)
GLUCOSE BLDC GLUCOMTR-MCNC: 142 MG/DL — HIGH (ref 70–99)
GLUCOSE BLDC GLUCOMTR-MCNC: 142 MG/DL — HIGH (ref 70–99)
GLUCOSE BLDC GLUCOMTR-MCNC: 149 MG/DL — HIGH (ref 70–99)
GLUCOSE BLDC GLUCOMTR-MCNC: 149 MG/DL — HIGH (ref 70–99)
GLUCOSE BLDC GLUCOMTR-MCNC: 155 MG/DL — HIGH (ref 70–99)
GLUCOSE BLDC GLUCOMTR-MCNC: 155 MG/DL — HIGH (ref 70–99)
GLUCOSE BLDC GLUCOMTR-MCNC: 158 MG/DL — HIGH (ref 70–99)
GLUCOSE BLDC GLUCOMTR-MCNC: 158 MG/DL — HIGH (ref 70–99)
GLUCOSE BLDC GLUCOMTR-MCNC: 165 MG/DL — HIGH (ref 70–99)
GLUCOSE BLDC GLUCOMTR-MCNC: 165 MG/DL — HIGH (ref 70–99)
GLUCOSE BLDC GLUCOMTR-MCNC: 166 MG/DL — HIGH (ref 70–99)
GLUCOSE BLDC GLUCOMTR-MCNC: 181
GLUCOSE BLDC GLUCOMTR-MCNC: 195 MG/DL — HIGH (ref 70–99)
GLUCOSE BLDC GLUCOMTR-MCNC: 195 MG/DL — HIGH (ref 70–99)
GLUCOSE BLDC GLUCOMTR-MCNC: 221 MG/DL — HIGH (ref 70–99)
GLUCOSE BLDC GLUCOMTR-MCNC: 221 MG/DL — HIGH (ref 70–99)
GLUCOSE SERPL-MCNC: 116 MG/DL — HIGH (ref 70–99)
GLUCOSE SERPL-MCNC: 116 MG/DL — HIGH (ref 70–99)
GLUCOSE SERPL-MCNC: 130 MG/DL — HIGH (ref 70–99)
GLUCOSE SERPL-MCNC: 134 MG/DL — HIGH (ref 70–99)
GLUCOSE SERPL-MCNC: 134 MG/DL — HIGH (ref 70–99)
GLUCOSE SERPL-MCNC: 136 MG/DL — HIGH (ref 70–99)
GLUCOSE SERPL-MCNC: 136 MG/DL — HIGH (ref 70–99)
GLUCOSE SERPL-MCNC: 139 MG/DL — HIGH (ref 70–99)
GLUCOSE SERPL-MCNC: 139 MG/DL — HIGH (ref 70–99)
GLUCOSE SERPL-MCNC: 158 MG/DL — HIGH (ref 70–99)
GLUCOSE SERPL-MCNC: 158 MG/DL — HIGH (ref 70–99)
GLUCOSE SERPL-MCNC: 163 MG/DL
GLUCOSE SERPL-MCNC: 98 MG/DL
GLUCOSE UR QL: NEGATIVE MG/DL — SIGNIFICANT CHANGE UP
GLUCOSE UR QL: NEGATIVE MG/DL — SIGNIFICANT CHANGE UP
HBA1C MFR BLD HPLC: 6.6
HBA1C MFR BLD HPLC: 7.2 %
HBA1C MFR BLD HPLC: 7.4
HBV CORE IGG+IGM SER QL: NONREACTIVE
HBV SURFACE AB SER QL: REACTIVE
HBV SURFACE AG SER QL: NONREACTIVE
HCT VFR BLD CALC: 35.7 % — LOW (ref 39–50)
HCT VFR BLD CALC: 35.7 % — LOW (ref 39–50)
HCT VFR BLD CALC: 36.3 % — LOW (ref 39–50)
HCT VFR BLD CALC: 36.3 % — LOW (ref 39–50)
HCT VFR BLD CALC: 37.9 % — LOW (ref 39–50)
HCT VFR BLD CALC: 37.9 % — LOW (ref 39–50)
HCT VFR BLD CALC: 38.7 % — LOW (ref 39–50)
HCT VFR BLD CALC: 38.7 % — LOW (ref 39–50)
HCT VFR BLD CALC: 40.8 % — SIGNIFICANT CHANGE UP (ref 39–50)
HCT VFR BLD CALC: 40.8 % — SIGNIFICANT CHANGE UP (ref 39–50)
HCT VFR BLD CALC: 41.3 %
HCT VFR BLD CALC: 41.9 % — SIGNIFICANT CHANGE UP (ref 39–50)
HCT VFR BLD CALC: 41.9 % — SIGNIFICANT CHANGE UP (ref 39–50)
HCV AB SER QL: NONREACTIVE
HCV S/CO RATIO: 0.04 S/CO
HGB BLD-MCNC: 11.5 G/DL — LOW (ref 13–17)
HGB BLD-MCNC: 11.5 G/DL — LOW (ref 13–17)
HGB BLD-MCNC: 11.9 G/DL — LOW (ref 13–17)
HGB BLD-MCNC: 11.9 G/DL — LOW (ref 13–17)
HGB BLD-MCNC: 12.2 G/DL — LOW (ref 13–17)
HGB BLD-MCNC: 12.2 G/DL — LOW (ref 13–17)
HGB BLD-MCNC: 12.4 G/DL — LOW (ref 13–17)
HGB BLD-MCNC: 12.4 G/DL — LOW (ref 13–17)
HGB BLD-MCNC: 12.8 G/DL — LOW (ref 13–17)
HGB BLD-MCNC: 12.8 G/DL — LOW (ref 13–17)
HGB BLD-MCNC: 13.1 G/DL
HGB BLD-MCNC: 13.6 G/DL — SIGNIFICANT CHANGE UP (ref 13–17)
HGB BLD-MCNC: 13.6 G/DL — SIGNIFICANT CHANGE UP (ref 13–17)
IMM GRANULOCYTES NFR BLD AUTO: 0.2 % — SIGNIFICANT CHANGE UP (ref 0–0.9)
IMM GRANULOCYTES NFR BLD AUTO: 0.2 % — SIGNIFICANT CHANGE UP (ref 0–0.9)
IMM GRANULOCYTES NFR BLD AUTO: 0.3 % — SIGNIFICANT CHANGE UP (ref 0–0.9)
IMM GRANULOCYTES NFR BLD AUTO: 0.4 % — SIGNIFICANT CHANGE UP (ref 0–0.9)
IMM GRANULOCYTES NFR BLD AUTO: 0.4 % — SIGNIFICANT CHANGE UP (ref 0–0.9)
IMM GRANULOCYTES NFR BLD AUTO: 0.5 % — SIGNIFICANT CHANGE UP (ref 0–0.9)
IMM GRANULOCYTES NFR BLD AUTO: 0.5 % — SIGNIFICANT CHANGE UP (ref 0–0.9)
INR BLD: 1.09 — SIGNIFICANT CHANGE UP (ref 0.85–1.18)
INR BLD: 1.23 — HIGH (ref 0.85–1.18)
INR BLD: 1.23 — HIGH (ref 0.85–1.18)
IRON SATN MFR SERPL: 17 % — SIGNIFICANT CHANGE UP (ref 16–55)
IRON SATN MFR SERPL: 17 % — SIGNIFICANT CHANGE UP (ref 16–55)
IRON SATN MFR SERPL: 18 %
IRON SATN MFR SERPL: 43 UG/DL — LOW (ref 45–165)
IRON SATN MFR SERPL: 43 UG/DL — LOW (ref 45–165)
IRON SERPL-MCNC: 57 UG/DL
KETONES UR-MCNC: NEGATIVE MG/DL — SIGNIFICANT CHANGE UP
KETONES UR-MCNC: NEGATIVE MG/DL — SIGNIFICANT CHANGE UP
LEUKOCYTE ESTERASE UR-ACNC: NEGATIVE — SIGNIFICANT CHANGE UP
LEUKOCYTE ESTERASE UR-ACNC: NEGATIVE — SIGNIFICANT CHANGE UP
LIDOCAIN IGE QN: 45 U/L — SIGNIFICANT CHANGE UP (ref 7–60)
LIDOCAIN IGE QN: 45 U/L — SIGNIFICANT CHANGE UP (ref 7–60)
LYMPHOCYTES # BLD AUTO: 1.77 K/UL — SIGNIFICANT CHANGE UP (ref 1–3.3)
LYMPHOCYTES # BLD AUTO: 1.77 K/UL — SIGNIFICANT CHANGE UP (ref 1–3.3)
LYMPHOCYTES # BLD AUTO: 2.03 K/UL — SIGNIFICANT CHANGE UP (ref 1–3.3)
LYMPHOCYTES # BLD AUTO: 2.03 K/UL — SIGNIFICANT CHANGE UP (ref 1–3.3)
LYMPHOCYTES # BLD AUTO: 2.06 K/UL — SIGNIFICANT CHANGE UP (ref 1–3.3)
LYMPHOCYTES # BLD AUTO: 2.06 K/UL — SIGNIFICANT CHANGE UP (ref 1–3.3)
LYMPHOCYTES # BLD AUTO: 2.12 K/UL — SIGNIFICANT CHANGE UP (ref 1–3.3)
LYMPHOCYTES # BLD AUTO: 2.12 K/UL — SIGNIFICANT CHANGE UP (ref 1–3.3)
LYMPHOCYTES # BLD AUTO: 2.15 K/UL — SIGNIFICANT CHANGE UP (ref 1–3.3)
LYMPHOCYTES # BLD AUTO: 2.15 K/UL — SIGNIFICANT CHANGE UP (ref 1–3.3)
LYMPHOCYTES # BLD AUTO: 2.41 K/UL — SIGNIFICANT CHANGE UP (ref 1–3.3)
LYMPHOCYTES # BLD AUTO: 2.41 K/UL — SIGNIFICANT CHANGE UP (ref 1–3.3)
LYMPHOCYTES # BLD AUTO: 20.5 % — SIGNIFICANT CHANGE UP (ref 13–44)
LYMPHOCYTES # BLD AUTO: 20.5 % — SIGNIFICANT CHANGE UP (ref 13–44)
LYMPHOCYTES # BLD AUTO: 20.8 % — SIGNIFICANT CHANGE UP (ref 13–44)
LYMPHOCYTES # BLD AUTO: 20.8 % — SIGNIFICANT CHANGE UP (ref 13–44)
LYMPHOCYTES # BLD AUTO: 24.1 % — SIGNIFICANT CHANGE UP (ref 13–44)
LYMPHOCYTES # BLD AUTO: 24.1 % — SIGNIFICANT CHANGE UP (ref 13–44)
LYMPHOCYTES # BLD AUTO: 24.4 % — SIGNIFICANT CHANGE UP (ref 13–44)
LYMPHOCYTES # BLD AUTO: 24.4 % — SIGNIFICANT CHANGE UP (ref 13–44)
LYMPHOCYTES # BLD AUTO: 28 % — SIGNIFICANT CHANGE UP (ref 13–44)
LYMPHOCYTES # BLD AUTO: 28 % — SIGNIFICANT CHANGE UP (ref 13–44)
LYMPHOCYTES # BLD AUTO: 28.4 % — SIGNIFICANT CHANGE UP (ref 13–44)
LYMPHOCYTES # BLD AUTO: 28.4 % — SIGNIFICANT CHANGE UP (ref 13–44)
MAGNESIUM SERPL-MCNC: 1.6 MG/DL — SIGNIFICANT CHANGE UP (ref 1.6–2.6)
MAGNESIUM SERPL-MCNC: 1.6 MG/DL — SIGNIFICANT CHANGE UP (ref 1.6–2.6)
MAGNESIUM SERPL-MCNC: 1.8 MG/DL — SIGNIFICANT CHANGE UP (ref 1.6–2.6)
MAGNESIUM SERPL-MCNC: 1.9 MG/DL — SIGNIFICANT CHANGE UP (ref 1.6–2.6)
MCHC RBC-ENTMCNC: 26.5 PG — LOW (ref 27–34)
MCHC RBC-ENTMCNC: 26.6 PG — LOW (ref 27–34)
MCHC RBC-ENTMCNC: 26.9 PG — LOW (ref 27–34)
MCHC RBC-ENTMCNC: 26.9 PG — LOW (ref 27–34)
MCHC RBC-ENTMCNC: 27.2 PG
MCHC RBC-ENTMCNC: 27.2 PG — SIGNIFICANT CHANGE UP (ref 27–34)
MCHC RBC-ENTMCNC: 27.2 PG — SIGNIFICANT CHANGE UP (ref 27–34)
MCHC RBC-ENTMCNC: 31.4 GM/DL — LOW (ref 32–36)
MCHC RBC-ENTMCNC: 31.4 GM/DL — LOW (ref 32–36)
MCHC RBC-ENTMCNC: 31.7 GM/DL
MCHC RBC-ENTMCNC: 31.7 GM/DL — LOW (ref 32–36)
MCHC RBC-ENTMCNC: 31.7 GM/DL — LOW (ref 32–36)
MCHC RBC-ENTMCNC: 32 GM/DL — SIGNIFICANT CHANGE UP (ref 32–36)
MCHC RBC-ENTMCNC: 32 GM/DL — SIGNIFICANT CHANGE UP (ref 32–36)
MCHC RBC-ENTMCNC: 32.2 GM/DL — SIGNIFICANT CHANGE UP (ref 32–36)
MCHC RBC-ENTMCNC: 32.2 GM/DL — SIGNIFICANT CHANGE UP (ref 32–36)
MCHC RBC-ENTMCNC: 32.5 GM/DL — SIGNIFICANT CHANGE UP (ref 32–36)
MCHC RBC-ENTMCNC: 32.5 GM/DL — SIGNIFICANT CHANGE UP (ref 32–36)
MCHC RBC-ENTMCNC: 33.3 GM/DL — SIGNIFICANT CHANGE UP (ref 32–36)
MCHC RBC-ENTMCNC: 33.3 GM/DL — SIGNIFICANT CHANGE UP (ref 32–36)
MCV RBC AUTO: 81.7 FL — SIGNIFICANT CHANGE UP (ref 80–100)
MCV RBC AUTO: 81.7 FL — SIGNIFICANT CHANGE UP (ref 80–100)
MCV RBC AUTO: 82 FL — SIGNIFICANT CHANGE UP (ref 80–100)
MCV RBC AUTO: 82 FL — SIGNIFICANT CHANGE UP (ref 80–100)
MCV RBC AUTO: 82.4 FL — SIGNIFICANT CHANGE UP (ref 80–100)
MCV RBC AUTO: 82.4 FL — SIGNIFICANT CHANGE UP (ref 80–100)
MCV RBC AUTO: 82.9 FL — SIGNIFICANT CHANGE UP (ref 80–100)
MCV RBC AUTO: 82.9 FL — SIGNIFICANT CHANGE UP (ref 80–100)
MCV RBC AUTO: 83.6 FL — SIGNIFICANT CHANGE UP (ref 80–100)
MCV RBC AUTO: 83.6 FL — SIGNIFICANT CHANGE UP (ref 80–100)
MCV RBC AUTO: 85.9 FL
MCV RBC AUTO: 85.9 FL — SIGNIFICANT CHANGE UP (ref 80–100)
MCV RBC AUTO: 85.9 FL — SIGNIFICANT CHANGE UP (ref 80–100)
MONOCYTES # BLD AUTO: 0.54 K/UL — SIGNIFICANT CHANGE UP (ref 0–0.9)
MONOCYTES # BLD AUTO: 0.54 K/UL — SIGNIFICANT CHANGE UP (ref 0–0.9)
MONOCYTES # BLD AUTO: 0.61 K/UL — SIGNIFICANT CHANGE UP (ref 0–0.9)
MONOCYTES # BLD AUTO: 0.61 K/UL — SIGNIFICANT CHANGE UP (ref 0–0.9)
MONOCYTES # BLD AUTO: 0.64 K/UL — SIGNIFICANT CHANGE UP (ref 0–0.9)
MONOCYTES # BLD AUTO: 0.64 K/UL — SIGNIFICANT CHANGE UP (ref 0–0.9)
MONOCYTES # BLD AUTO: 0.65 K/UL — SIGNIFICANT CHANGE UP (ref 0–0.9)
MONOCYTES # BLD AUTO: 0.65 K/UL — SIGNIFICANT CHANGE UP (ref 0–0.9)
MONOCYTES # BLD AUTO: 0.67 K/UL — SIGNIFICANT CHANGE UP (ref 0–0.9)
MONOCYTES # BLD AUTO: 0.67 K/UL — SIGNIFICANT CHANGE UP (ref 0–0.9)
MONOCYTES # BLD AUTO: 0.69 K/UL — SIGNIFICANT CHANGE UP (ref 0–0.9)
MONOCYTES # BLD AUTO: 0.69 K/UL — SIGNIFICANT CHANGE UP (ref 0–0.9)
MONOCYTES NFR BLD AUTO: 6 % — SIGNIFICANT CHANGE UP (ref 2–14)
MONOCYTES NFR BLD AUTO: 6 % — SIGNIFICANT CHANGE UP (ref 2–14)
MONOCYTES NFR BLD AUTO: 6.4 % — SIGNIFICANT CHANGE UP (ref 2–14)
MONOCYTES NFR BLD AUTO: 6.4 % — SIGNIFICANT CHANGE UP (ref 2–14)
MONOCYTES NFR BLD AUTO: 7.5 % — SIGNIFICANT CHANGE UP (ref 2–14)
MONOCYTES NFR BLD AUTO: 7.5 % — SIGNIFICANT CHANGE UP (ref 2–14)
MONOCYTES NFR BLD AUTO: 7.7 % — SIGNIFICANT CHANGE UP (ref 2–14)
MONOCYTES NFR BLD AUTO: 7.7 % — SIGNIFICANT CHANGE UP (ref 2–14)
MONOCYTES NFR BLD AUTO: 8 % — SIGNIFICANT CHANGE UP (ref 2–14)
MONOCYTES NFR BLD AUTO: 8 % — SIGNIFICANT CHANGE UP (ref 2–14)
MONOCYTES NFR BLD AUTO: 8.7 % — SIGNIFICANT CHANGE UP (ref 2–14)
MONOCYTES NFR BLD AUTO: 8.7 % — SIGNIFICANT CHANGE UP (ref 2–14)
NEUTROPHILS # BLD AUTO: 4.22 K/UL — SIGNIFICANT CHANGE UP (ref 1.8–7.4)
NEUTROPHILS # BLD AUTO: 4.22 K/UL — SIGNIFICANT CHANGE UP (ref 1.8–7.4)
NEUTROPHILS # BLD AUTO: 4.28 K/UL — SIGNIFICANT CHANGE UP (ref 1.8–7.4)
NEUTROPHILS # BLD AUTO: 4.28 K/UL — SIGNIFICANT CHANGE UP (ref 1.8–7.4)
NEUTROPHILS # BLD AUTO: 4.83 K/UL — SIGNIFICANT CHANGE UP (ref 1.8–7.4)
NEUTROPHILS # BLD AUTO: 4.83 K/UL — SIGNIFICANT CHANGE UP (ref 1.8–7.4)
NEUTROPHILS # BLD AUTO: 4.96 K/UL — SIGNIFICANT CHANGE UP (ref 1.8–7.4)
NEUTROPHILS # BLD AUTO: 4.96 K/UL — SIGNIFICANT CHANGE UP (ref 1.8–7.4)
NEUTROPHILS # BLD AUTO: 5.62 K/UL — SIGNIFICANT CHANGE UP (ref 1.8–7.4)
NEUTROPHILS # BLD AUTO: 5.62 K/UL — SIGNIFICANT CHANGE UP (ref 1.8–7.4)
NEUTROPHILS # BLD AUTO: 6.84 K/UL — SIGNIFICANT CHANGE UP (ref 1.8–7.4)
NEUTROPHILS # BLD AUTO: 6.84 K/UL — SIGNIFICANT CHANGE UP (ref 1.8–7.4)
NEUTROPHILS NFR BLD AUTO: 50.6 % — SIGNIFICANT CHANGE UP (ref 43–77)
NEUTROPHILS NFR BLD AUTO: 50.6 % — SIGNIFICANT CHANGE UP (ref 43–77)
NEUTROPHILS NFR BLD AUTO: 55.1 % — SIGNIFICANT CHANGE UP (ref 43–77)
NEUTROPHILS NFR BLD AUTO: 55.1 % — SIGNIFICANT CHANGE UP (ref 43–77)
NEUTROPHILS NFR BLD AUTO: 57.3 % — SIGNIFICANT CHANGE UP (ref 43–77)
NEUTROPHILS NFR BLD AUTO: 57.3 % — SIGNIFICANT CHANGE UP (ref 43–77)
NEUTROPHILS NFR BLD AUTO: 59 % — SIGNIFICANT CHANGE UP (ref 43–77)
NEUTROPHILS NFR BLD AUTO: 59 % — SIGNIFICANT CHANGE UP (ref 43–77)
NEUTROPHILS NFR BLD AUTO: 65 % — SIGNIFICANT CHANGE UP (ref 43–77)
NEUTROPHILS NFR BLD AUTO: 65 % — SIGNIFICANT CHANGE UP (ref 43–77)
NEUTROPHILS NFR BLD AUTO: 67.1 % — SIGNIFICANT CHANGE UP (ref 43–77)
NEUTROPHILS NFR BLD AUTO: 67.1 % — SIGNIFICANT CHANGE UP (ref 43–77)
NITRITE UR-MCNC: NEGATIVE — SIGNIFICANT CHANGE UP
NITRITE UR-MCNC: NEGATIVE — SIGNIFICANT CHANGE UP
NRBC # BLD: 0 /100 WBCS — SIGNIFICANT CHANGE UP (ref 0–0)
PARATHYROID HORMONE INTACT: 17 PG/ML
PH UR: 5 — SIGNIFICANT CHANGE UP (ref 5–8)
PH UR: 5 — SIGNIFICANT CHANGE UP (ref 5–8)
PHOSPHATE SERPL-MCNC: 1.8 MG/DL — LOW (ref 2.5–4.5)
PHOSPHATE SERPL-MCNC: 1.8 MG/DL — LOW (ref 2.5–4.5)
PHOSPHATE SERPL-MCNC: 2 MG/DL — LOW (ref 2.5–4.5)
PHOSPHATE SERPL-MCNC: 2 MG/DL — LOW (ref 2.5–4.5)
PHOSPHATE SERPL-MCNC: 2.2 MG/DL — LOW (ref 2.5–4.5)
PHOSPHATE SERPL-MCNC: 2.2 MG/DL — LOW (ref 2.5–4.5)
PHOSPHATE SERPL-MCNC: 2.3 MG/DL — LOW (ref 2.5–4.5)
PHOSPHATE SERPL-MCNC: 2.3 MG/DL — LOW (ref 2.5–4.5)
PHOSPHATE SERPL-MCNC: 2.8 MG/DL — SIGNIFICANT CHANGE UP (ref 2.5–4.5)
PHOSPHATE SERPL-MCNC: 2.8 MG/DL — SIGNIFICANT CHANGE UP (ref 2.5–4.5)
PHOSPHATE SERPL-MCNC: 3.3 MG/DL — SIGNIFICANT CHANGE UP (ref 2.5–4.5)
PHOSPHATE SERPL-MCNC: 3.3 MG/DL — SIGNIFICANT CHANGE UP (ref 2.5–4.5)
PLATELET # BLD AUTO: 351 K/UL — SIGNIFICANT CHANGE UP (ref 150–400)
PLATELET # BLD AUTO: 351 K/UL — SIGNIFICANT CHANGE UP (ref 150–400)
PLATELET # BLD AUTO: 369 K/UL — SIGNIFICANT CHANGE UP (ref 150–400)
PLATELET # BLD AUTO: 369 K/UL — SIGNIFICANT CHANGE UP (ref 150–400)
PLATELET # BLD AUTO: 374 K/UL — SIGNIFICANT CHANGE UP (ref 150–400)
PLATELET # BLD AUTO: 374 K/UL — SIGNIFICANT CHANGE UP (ref 150–400)
PLATELET # BLD AUTO: 387 K/UL — SIGNIFICANT CHANGE UP (ref 150–400)
PLATELET # BLD AUTO: 387 K/UL — SIGNIFICANT CHANGE UP (ref 150–400)
PLATELET # BLD AUTO: 398 K/UL — SIGNIFICANT CHANGE UP (ref 150–400)
PLATELET # BLD AUTO: 398 K/UL — SIGNIFICANT CHANGE UP (ref 150–400)
PLATELET # BLD AUTO: 420 K/UL — HIGH (ref 150–400)
PLATELET # BLD AUTO: 420 K/UL — HIGH (ref 150–400)
PLATELET # BLD AUTO: 428 K/UL
POTASSIUM SERPL-MCNC: 3.2 MMOL/L — LOW (ref 3.5–5.3)
POTASSIUM SERPL-MCNC: 3.6 MMOL/L — SIGNIFICANT CHANGE UP (ref 3.5–5.3)
POTASSIUM SERPL-MCNC: 3.6 MMOL/L — SIGNIFICANT CHANGE UP (ref 3.5–5.3)
POTASSIUM SERPL-MCNC: 3.7 MMOL/L — SIGNIFICANT CHANGE UP (ref 3.5–5.3)
POTASSIUM SERPL-MCNC: 3.8 MMOL/L — SIGNIFICANT CHANGE UP (ref 3.5–5.3)
POTASSIUM SERPL-MCNC: 3.8 MMOL/L — SIGNIFICANT CHANGE UP (ref 3.5–5.3)
POTASSIUM SERPL-MCNC: 4.3 MMOL/L — SIGNIFICANT CHANGE UP (ref 3.5–5.3)
POTASSIUM SERPL-MCNC: 4.3 MMOL/L — SIGNIFICANT CHANGE UP (ref 3.5–5.3)
POTASSIUM SERPL-SCNC: 3.2 MMOL/L — LOW (ref 3.5–5.3)
POTASSIUM SERPL-SCNC: 3.6 MMOL/L — SIGNIFICANT CHANGE UP (ref 3.5–5.3)
POTASSIUM SERPL-SCNC: 3.6 MMOL/L — SIGNIFICANT CHANGE UP (ref 3.5–5.3)
POTASSIUM SERPL-SCNC: 3.7 MMOL/L — SIGNIFICANT CHANGE UP (ref 3.5–5.3)
POTASSIUM SERPL-SCNC: 3.8 MMOL/L — SIGNIFICANT CHANGE UP (ref 3.5–5.3)
POTASSIUM SERPL-SCNC: 3.8 MMOL/L — SIGNIFICANT CHANGE UP (ref 3.5–5.3)
POTASSIUM SERPL-SCNC: 4.2 MMOL/L
POTASSIUM SERPL-SCNC: 4.3 MMOL/L — SIGNIFICANT CHANGE UP (ref 3.5–5.3)
POTASSIUM SERPL-SCNC: 4.3 MMOL/L — SIGNIFICANT CHANGE UP (ref 3.5–5.3)
POTASSIUM SERPL-SCNC: 4.6 MMOL/L
PROT SERPL-MCNC: 7 G/DL — SIGNIFICANT CHANGE UP (ref 6–8.3)
PROT SERPL-MCNC: 7 G/DL — SIGNIFICANT CHANGE UP (ref 6–8.3)
PROT SERPL-MCNC: 7.5 G/DL — SIGNIFICANT CHANGE UP (ref 6–8.3)
PROT SERPL-MCNC: 7.5 G/DL — SIGNIFICANT CHANGE UP (ref 6–8.3)
PROT SERPL-MCNC: 7.6 G/DL — SIGNIFICANT CHANGE UP (ref 6–8.3)
PROT SERPL-MCNC: 7.7 G/DL
PROT SERPL-MCNC: 7.8 G/DL
PROT UR-MCNC: NEGATIVE MG/DL — SIGNIFICANT CHANGE UP
PROT UR-MCNC: NEGATIVE MG/DL — SIGNIFICANT CHANGE UP
PROTHROM AB SERPL-ACNC: 12.4 SEC — SIGNIFICANT CHANGE UP (ref 9.5–13)
PROTHROM AB SERPL-ACNC: 13.9 SEC — HIGH (ref 9.5–13)
PROTHROM AB SERPL-ACNC: 13.9 SEC — HIGH (ref 9.5–13)
PTH-INTACT FLD-MCNC: 18 PG/ML — SIGNIFICANT CHANGE UP (ref 15–65)
PTH-INTACT FLD-MCNC: 18 PG/ML — SIGNIFICANT CHANGE UP (ref 15–65)
RBC # BLD: 4.34 M/UL — SIGNIFICANT CHANGE UP (ref 4.2–5.8)
RBC # BLD: 4.34 M/UL — SIGNIFICANT CHANGE UP (ref 4.2–5.8)
RBC # BLD: 4.37 M/UL — SIGNIFICANT CHANGE UP (ref 4.2–5.8)
RBC # BLD: 4.37 M/UL — SIGNIFICANT CHANGE UP (ref 4.2–5.8)
RBC # BLD: 4.6 M/UL — SIGNIFICANT CHANGE UP (ref 4.2–5.8)
RBC # BLD: 4.6 M/UL — SIGNIFICANT CHANGE UP (ref 4.2–5.8)
RBC # BLD: 4.67 M/UL — SIGNIFICANT CHANGE UP (ref 4.2–5.8)
RBC # BLD: 4.67 M/UL — SIGNIFICANT CHANGE UP (ref 4.2–5.8)
RBC # BLD: 4.75 M/UL — SIGNIFICANT CHANGE UP (ref 4.2–5.8)
RBC # BLD: 4.75 M/UL — SIGNIFICANT CHANGE UP (ref 4.2–5.8)
RBC # BLD: 4.81 M/UL
RBC # BLD: 5.11 M/UL — SIGNIFICANT CHANGE UP (ref 4.2–5.8)
RBC # BLD: 5.11 M/UL — SIGNIFICANT CHANGE UP (ref 4.2–5.8)
RBC # FLD: 15 % — HIGH (ref 10.3–14.5)
RBC # FLD: 15.2 % — HIGH (ref 10.3–14.5)
RBC # FLD: 15.3 %
RBC # FLD: 15.3 % — HIGH (ref 10.3–14.5)
RBC # FLD: 15.3 % — HIGH (ref 10.3–14.5)
RBC # FLD: 15.4 % — HIGH (ref 10.3–14.5)
RBC # FLD: 15.4 % — HIGH (ref 10.3–14.5)
RH IG SCN BLD-IMP: POSITIVE — SIGNIFICANT CHANGE UP
SODIUM SERPL-SCNC: 138 MMOL/L — SIGNIFICANT CHANGE UP (ref 135–145)
SODIUM SERPL-SCNC: 138 MMOL/L — SIGNIFICANT CHANGE UP (ref 135–145)
SODIUM SERPL-SCNC: 140 MMOL/L — SIGNIFICANT CHANGE UP (ref 135–145)
SODIUM SERPL-SCNC: 141 MMOL/L — SIGNIFICANT CHANGE UP (ref 135–145)
SODIUM SERPL-SCNC: 141 MMOL/L — SIGNIFICANT CHANGE UP (ref 135–145)
SODIUM SERPL-SCNC: 142 MMOL/L
SODIUM SERPL-SCNC: 142 MMOL/L — SIGNIFICANT CHANGE UP (ref 135–145)
SODIUM SERPL-SCNC: 142 MMOL/L — SIGNIFICANT CHANGE UP (ref 135–145)
SODIUM SERPL-SCNC: 143 MMOL/L — SIGNIFICANT CHANGE UP (ref 135–145)
SODIUM SERPL-SCNC: 143 MMOL/L — SIGNIFICANT CHANGE UP (ref 135–145)
SODIUM SERPL-SCNC: 144 MMOL/L
SODIUM SERPL-SCNC: 144 MMOL/L — SIGNIFICANT CHANGE UP (ref 135–145)
SODIUM SERPL-SCNC: 144 MMOL/L — SIGNIFICANT CHANGE UP (ref 135–145)
SP GR SPEC: >1.03 — HIGH (ref 1–1.03)
SP GR SPEC: >1.03 — HIGH (ref 1–1.03)
SPECIMEN SOURCE: SIGNIFICANT CHANGE UP
SPECIMEN SOURCE: SIGNIFICANT CHANGE UP
SURGICAL PATHOLOGY STUDY: SIGNIFICANT CHANGE UP
TIBC SERPL-MCNC: 247 UG/DL — SIGNIFICANT CHANGE UP (ref 220–430)
TIBC SERPL-MCNC: 247 UG/DL — SIGNIFICANT CHANGE UP (ref 220–430)
TIBC SERPL-MCNC: 311 UG/DL
TROPONIN T, HIGH SENSITIVITY RESULT: 13 NG/L — SIGNIFICANT CHANGE UP (ref 0–51)
TROPONIN T, HIGH SENSITIVITY RESULT: 13 NG/L — SIGNIFICANT CHANGE UP (ref 0–51)
UIBC SERPL-MCNC: 204 UG/DL — SIGNIFICANT CHANGE UP (ref 110–370)
UIBC SERPL-MCNC: 204 UG/DL — SIGNIFICANT CHANGE UP (ref 110–370)
UIBC SERPL-MCNC: 254 UG/DL
UROBILINOGEN FLD QL: 0.2 MG/DL — SIGNIFICANT CHANGE UP (ref 0.2–1)
UROBILINOGEN FLD QL: 0.2 MG/DL — SIGNIFICANT CHANGE UP (ref 0.2–1)
WBC # BLD: 10.19 K/UL — SIGNIFICANT CHANGE UP (ref 3.8–10.5)
WBC # BLD: 10.19 K/UL — SIGNIFICANT CHANGE UP (ref 3.8–10.5)
WBC # BLD: 7.67 K/UL — SIGNIFICANT CHANGE UP (ref 3.8–10.5)
WBC # BLD: 7.67 K/UL — SIGNIFICANT CHANGE UP (ref 3.8–10.5)
WBC # BLD: 8.41 K/UL — SIGNIFICANT CHANGE UP (ref 3.8–10.5)
WBC # BLD: 8.41 K/UL — SIGNIFICANT CHANGE UP (ref 3.8–10.5)
WBC # BLD: 8.43 K/UL — SIGNIFICANT CHANGE UP (ref 3.8–10.5)
WBC # BLD: 8.43 K/UL — SIGNIFICANT CHANGE UP (ref 3.8–10.5)
WBC # BLD: 8.48 K/UL — SIGNIFICANT CHANGE UP (ref 3.8–10.5)
WBC # BLD: 8.48 K/UL — SIGNIFICANT CHANGE UP (ref 3.8–10.5)
WBC # BLD: 8.64 K/UL — SIGNIFICANT CHANGE UP (ref 3.8–10.5)
WBC # BLD: 8.64 K/UL — SIGNIFICANT CHANGE UP (ref 3.8–10.5)
WBC # FLD AUTO: 10.19 K/UL — SIGNIFICANT CHANGE UP (ref 3.8–10.5)
WBC # FLD AUTO: 10.19 K/UL — SIGNIFICANT CHANGE UP (ref 3.8–10.5)
WBC # FLD AUTO: 7.67 K/UL — SIGNIFICANT CHANGE UP (ref 3.8–10.5)
WBC # FLD AUTO: 7.67 K/UL — SIGNIFICANT CHANGE UP (ref 3.8–10.5)
WBC # FLD AUTO: 8.41 K/UL — SIGNIFICANT CHANGE UP (ref 3.8–10.5)
WBC # FLD AUTO: 8.41 K/UL — SIGNIFICANT CHANGE UP (ref 3.8–10.5)
WBC # FLD AUTO: 8.43 K/UL — SIGNIFICANT CHANGE UP (ref 3.8–10.5)
WBC # FLD AUTO: 8.43 K/UL — SIGNIFICANT CHANGE UP (ref 3.8–10.5)
WBC # FLD AUTO: 8.48 K/UL — SIGNIFICANT CHANGE UP (ref 3.8–10.5)
WBC # FLD AUTO: 8.48 K/UL — SIGNIFICANT CHANGE UP (ref 3.8–10.5)
WBC # FLD AUTO: 8.64 K/UL — SIGNIFICANT CHANGE UP (ref 3.8–10.5)
WBC # FLD AUTO: 8.64 K/UL — SIGNIFICANT CHANGE UP (ref 3.8–10.5)
WBC # FLD AUTO: 8.99 K/UL

## 2023-01-01 PROCEDURE — 70491 CT SOFT TISSUE NECK W/DYE: CPT | Mod: MA

## 2023-01-01 PROCEDURE — 83690 ASSAY OF LIPASE: CPT

## 2023-01-01 PROCEDURE — 93306 TTE W/DOPPLER COMPLETE: CPT | Mod: 26

## 2023-01-01 PROCEDURE — 74220 X-RAY XM ESOPHAGUS 1CNTRST: CPT

## 2023-01-01 PROCEDURE — 97161 PT EVAL LOW COMPLEX 20 MIN: CPT

## 2023-01-01 PROCEDURE — 90662 IIV NO PRSV INCREASED AG IM: CPT

## 2023-01-01 PROCEDURE — 99223 1ST HOSP IP/OBS HIGH 75: CPT | Mod: GC

## 2023-01-01 PROCEDURE — 92012 INTRM OPH EXAM EST PATIENT: CPT

## 2023-01-01 PROCEDURE — 78815 PET IMAGE W/CT SKULL-THIGH: CPT | Mod: 26,PI

## 2023-01-01 PROCEDURE — 70491 CT SOFT TISSUE NECK W/DYE: CPT | Mod: 26,MA

## 2023-01-01 PROCEDURE — 81003 URINALYSIS AUTO W/O SCOPE: CPT

## 2023-01-01 PROCEDURE — 71275 CT ANGIOGRAPHY CHEST: CPT | Mod: MA

## 2023-01-01 PROCEDURE — 85025 COMPLETE CBC W/AUTO DIFF WBC: CPT

## 2023-01-01 PROCEDURE — G0008: CPT

## 2023-01-01 PROCEDURE — 99350 HOME/RES VST EST HIGH MDM 60: CPT

## 2023-01-01 PROCEDURE — 83540 ASSAY OF IRON: CPT

## 2023-01-01 PROCEDURE — 36415 COLL VENOUS BLD VENIPUNCTURE: CPT

## 2023-01-01 PROCEDURE — 74177 CT ABD & PELVIS W/CONTRAST: CPT | Mod: 26,MA

## 2023-01-01 PROCEDURE — 86900 BLOOD TYPING SEROLOGIC ABO: CPT

## 2023-01-01 PROCEDURE — 82550 ASSAY OF CK (CPK): CPT

## 2023-01-01 PROCEDURE — 74019 RADEX ABDOMEN 2 VIEWS: CPT

## 2023-01-01 PROCEDURE — 99285 EMERGENCY DEPT VISIT HI MDM: CPT | Mod: FS

## 2023-01-01 PROCEDURE — A9552: CPT

## 2023-01-01 PROCEDURE — 99239 HOSP IP/OBS DSCHRG MGMT >30: CPT

## 2023-01-01 PROCEDURE — 99214 OFFICE O/P EST MOD 30 MIN: CPT | Mod: 25

## 2023-01-01 PROCEDURE — 84484 ASSAY OF TROPONIN QUANT: CPT

## 2023-01-01 PROCEDURE — 82553 CREATINE MB FRACTION: CPT

## 2023-01-01 PROCEDURE — 83036 HEMOGLOBIN GLYCOSYLATED A1C: CPT | Mod: QW

## 2023-01-01 PROCEDURE — 99285 EMERGENCY DEPT VISIT HI MDM: CPT

## 2023-01-01 PROCEDURE — 83735 ASSAY OF MAGNESIUM: CPT

## 2023-01-01 PROCEDURE — 99232 SBSQ HOSP IP/OBS MODERATE 35: CPT | Mod: GC

## 2023-01-01 PROCEDURE — 88305 TISSUE EXAM BY PATHOLOGIST: CPT

## 2023-01-01 PROCEDURE — 74019 RADEX ABDOMEN 2 VIEWS: CPT | Mod: 26

## 2023-01-01 PROCEDURE — 87086 URINE CULTURE/COLONY COUNT: CPT

## 2023-01-01 PROCEDURE — 80053 COMPREHEN METABOLIC PANEL: CPT

## 2023-01-01 PROCEDURE — 88305 TISSUE EXAM BY PATHOLOGIST: CPT | Mod: 26

## 2023-01-01 PROCEDURE — 82728 ASSAY OF FERRITIN: CPT

## 2023-01-01 PROCEDURE — 86901 BLOOD TYPING SEROLOGIC RH(D): CPT

## 2023-01-01 PROCEDURE — 93010 ELECTROCARDIOGRAM REPORT: CPT

## 2023-01-01 PROCEDURE — 99205 OFFICE O/P NEW HI 60 MIN: CPT | Mod: 25

## 2023-01-01 PROCEDURE — 85730 THROMBOPLASTIN TIME PARTIAL: CPT

## 2023-01-01 PROCEDURE — 82962 GLUCOSE BLOOD TEST: CPT

## 2023-01-01 PROCEDURE — 74220 X-RAY XM ESOPHAGUS 1CNTRST: CPT | Mod: 26

## 2023-01-01 PROCEDURE — 85610 PROTHROMBIN TIME: CPT

## 2023-01-01 PROCEDURE — 43239 EGD BIOPSY SINGLE/MULTIPLE: CPT

## 2023-01-01 PROCEDURE — 84100 ASSAY OF PHOSPHORUS: CPT

## 2023-01-01 PROCEDURE — 99284 EMERGENCY DEPT VISIT MOD MDM: CPT

## 2023-01-01 PROCEDURE — 99205 OFFICE O/P NEW HI 60 MIN: CPT

## 2023-01-01 PROCEDURE — 93306 TTE W/DOPPLER COMPLETE: CPT

## 2023-01-01 PROCEDURE — 83550 IRON BINDING TEST: CPT

## 2023-01-01 PROCEDURE — 74177 CT ABD & PELVIS W/CONTRAST: CPT | Mod: MA

## 2023-01-01 PROCEDURE — 99222 1ST HOSP IP/OBS MODERATE 55: CPT

## 2023-01-01 PROCEDURE — 99441: CPT | Mod: 95

## 2023-01-01 PROCEDURE — 78815 PET IMAGE W/CT SKULL-THIGH: CPT

## 2023-01-01 PROCEDURE — 92014 COMPRE OPH EXAM EST PT 1/>: CPT

## 2023-01-01 PROCEDURE — 99284 EMERGENCY DEPT VISIT MOD MDM: CPT | Mod: FS

## 2023-01-01 PROCEDURE — 93005 ELECTROCARDIOGRAM TRACING: CPT

## 2023-01-01 PROCEDURE — 71275 CT ANGIOGRAPHY CHEST: CPT | Mod: 26,MA

## 2023-01-01 PROCEDURE — 83036 HEMOGLOBIN GLYCOSYLATED A1C: CPT

## 2023-01-01 PROCEDURE — 99214 OFFICE O/P EST MOD 30 MIN: CPT

## 2023-01-01 PROCEDURE — 83970 ASSAY OF PARATHORMONE: CPT

## 2023-01-01 PROCEDURE — 82310 ASSAY OF CALCIUM: CPT

## 2023-01-01 PROCEDURE — 86850 RBC ANTIBODY SCREEN: CPT

## 2023-01-01 PROCEDURE — 92250 FUNDUS PHOTOGRAPHY W/I&R: CPT

## 2023-01-01 PROCEDURE — 80048 BASIC METABOLIC PNL TOTAL CA: CPT

## 2023-01-01 PROCEDURE — 99349 HOME/RES VST EST MOD MDM 40: CPT

## 2023-01-01 RX ORDER — MUPIROCIN 20 MG/G
2 OINTMENT TOPICAL 3 TIMES DAILY
Qty: 1 | Refills: 2 | Status: DISCONTINUED | COMMUNITY
Start: 2020-01-23 | End: 2023-01-01

## 2023-01-01 RX ORDER — POTASSIUM CHLORIDE 20 MEQ
40 PACKET (EA) ORAL ONCE
Refills: 0 | Status: COMPLETED | OUTPATIENT
Start: 2023-01-01 | End: 2023-01-01

## 2023-01-01 RX ORDER — LISINOPRIL 2.5 MG/1
10 TABLET ORAL EVERY 24 HOURS
Refills: 0 | Status: DISCONTINUED | OUTPATIENT
Start: 2023-01-01 | End: 2023-01-01

## 2023-01-01 RX ORDER — BRIMONIDINE TARTRATE 1 MG/ML
0.1 SOLUTION/ DROPS OPHTHALMIC
Qty: 5 | Refills: 0 | Status: DISCONTINUED | COMMUNITY
Start: 2019-11-08 | End: 2023-01-01

## 2023-01-01 RX ORDER — METOPROLOL TARTRATE 50 MG
1 TABLET ORAL
Qty: 0 | Refills: 0 | DISCHARGE

## 2023-01-01 RX ORDER — GABAPENTIN 400 MG/1
1 CAPSULE ORAL
Qty: 0 | Refills: 0 | DISCHARGE

## 2023-01-01 RX ORDER — CHOLECALCIFEROL (VITAMIN D3) 50 MCG
50 MCG TABLET ORAL
Qty: 90 | Refills: 3 | Status: DISCONTINUED | COMMUNITY
Start: 2022-09-14 | End: 2023-01-01

## 2023-01-01 RX ORDER — POTASSIUM CHLORIDE 20 MEQ
20 PACKET (EA) ORAL ONCE
Refills: 0 | Status: COMPLETED | OUTPATIENT
Start: 2023-01-01 | End: 2023-01-01

## 2023-01-01 RX ORDER — LACTULOSE 10 G/15ML
15 SOLUTION ORAL DAILY
Refills: 0 | Status: DISCONTINUED | OUTPATIENT
Start: 2023-01-01 | End: 2023-01-01

## 2023-01-01 RX ORDER — AMLODIPINE BESYLATE 2.5 MG/1
5 TABLET ORAL EVERY 24 HOURS
Refills: 0 | Status: DISCONTINUED | OUTPATIENT
Start: 2023-01-01 | End: 2023-01-01

## 2023-01-01 RX ORDER — GLUCAGON INJECTION, SOLUTION 0.5 MG/.1ML
1 INJECTION, SOLUTION SUBCUTANEOUS ONCE
Refills: 0 | Status: DISCONTINUED | OUTPATIENT
Start: 2023-01-01 | End: 2023-01-01

## 2023-01-01 RX ORDER — DEXTROSE 50 % IN WATER 50 %
15 SYRINGE (ML) INTRAVENOUS ONCE
Refills: 0 | Status: DISCONTINUED | OUTPATIENT
Start: 2023-01-01 | End: 2023-01-01

## 2023-01-01 RX ORDER — ACETAMINOPHEN 500 MG
650 TABLET ORAL ONCE
Refills: 0 | Status: DISCONTINUED | OUTPATIENT
Start: 2023-01-01 | End: 2023-01-01

## 2023-01-01 RX ORDER — CLOPIDOGREL BISULFATE 75 MG/1
1 TABLET, FILM COATED ORAL
Qty: 0 | Refills: 0 | DISCHARGE

## 2023-01-01 RX ORDER — ATORVASTATIN CALCIUM 80 MG/1
20 TABLET, FILM COATED ORAL AT BEDTIME
Refills: 0 | Status: DISCONTINUED | OUTPATIENT
Start: 2023-01-01 | End: 2023-01-01

## 2023-01-01 RX ORDER — NYSTATIN 100000 [USP'U]/G
100000 CREAM TOPICAL TWICE DAILY
Qty: 1 | Refills: 3 | Status: COMPLETED | COMMUNITY
Start: 2021-10-14 | End: 2023-01-01

## 2023-01-01 RX ORDER — QUETIAPINE FUMARATE 200 MG/1
1 TABLET, FILM COATED ORAL
Qty: 0 | Refills: 0 | DISCHARGE

## 2023-01-01 RX ORDER — LACTULOSE 10 G/15ML
20 SOLUTION ORAL ONCE
Refills: 0 | Status: COMPLETED | OUTPATIENT
Start: 2023-01-01 | End: 2023-01-01

## 2023-01-01 RX ORDER — SENNA PLUS 8.6 MG/1
2 TABLET ORAL AT BEDTIME
Refills: 0 | Status: DISCONTINUED | OUTPATIENT
Start: 2023-01-01 | End: 2023-01-01

## 2023-01-01 RX ORDER — SODIUM,POTASSIUM PHOSPHATES 278-250MG
1 POWDER IN PACKET (EA) ORAL
Refills: 0 | Status: COMPLETED | OUTPATIENT
Start: 2023-01-01 | End: 2023-01-01

## 2023-01-01 RX ORDER — METFORMIN HYDROCHLORIDE 850 MG/1
1 TABLET ORAL
Qty: 0 | Refills: 0 | DISCHARGE

## 2023-01-01 RX ORDER — FENOFIBRATE,MICRONIZED 130 MG
48 CAPSULE ORAL EVERY 24 HOURS
Refills: 0 | Status: DISCONTINUED | OUTPATIENT
Start: 2023-01-01 | End: 2023-01-01

## 2023-01-01 RX ORDER — SODIUM CHLORIDE 9 MG/ML
1000 INJECTION, SOLUTION INTRAVENOUS
Refills: 0 | Status: DISCONTINUED | OUTPATIENT
Start: 2023-01-01 | End: 2023-01-01

## 2023-01-01 RX ORDER — POLYETHYLENE GLYCOL 3350 17 G/17G
17 POWDER, FOR SOLUTION ORAL
Qty: 1 | Refills: 0
Start: 2023-01-01 | End: 2023-01-01

## 2023-01-01 RX ORDER — POLYETHYLENE GLYCOL 3350 17 G/17G
17 POWDER, FOR SOLUTION ORAL
Qty: 0 | Refills: 0 | DISCHARGE
Start: 2023-01-01

## 2023-01-01 RX ORDER — DEXTROSE 50 % IN WATER 50 %
25 SYRINGE (ML) INTRAVENOUS ONCE
Refills: 0 | Status: DISCONTINUED | OUTPATIENT
Start: 2023-01-01 | End: 2023-01-01

## 2023-01-01 RX ORDER — GABAPENTIN 400 MG/1
400 CAPSULE ORAL
Qty: 90 | Refills: 1 | Status: DISCONTINUED | COMMUNITY
Start: 2020-05-22 | End: 2023-01-01

## 2023-01-01 RX ORDER — HYDRALAZINE HCL 50 MG
5 TABLET ORAL ONCE
Refills: 0 | Status: COMPLETED | OUTPATIENT
Start: 2023-01-01 | End: 2023-01-01

## 2023-01-01 RX ORDER — ASPIRIN/CALCIUM CARB/MAGNESIUM 324 MG
1 TABLET ORAL
Qty: 30 | Refills: 0
Start: 2023-01-01 | End: 2023-01-01

## 2023-01-01 RX ORDER — SODIUM,POTASSIUM PHOSPHATES 278-250MG
1 POWDER IN PACKET (EA) ORAL ONCE
Refills: 0 | Status: COMPLETED | OUTPATIENT
Start: 2023-01-01 | End: 2023-01-01

## 2023-01-01 RX ORDER — DORZOLAMIDE HYDROCHLORIDE TIMOLOL MALEATE 20; 5 MG/ML; MG/ML
1 SOLUTION/ DROPS OPHTHALMIC
Qty: 0 | Refills: 0 | DISCHARGE

## 2023-01-01 RX ORDER — POLYETHYLENE GLYCOL 3350 17 G/17G
17 POWDER, FOR SOLUTION ORAL TWICE DAILY
Qty: 60 | Refills: 3 | Status: ACTIVE | COMMUNITY
Start: 2023-01-01 | End: 1900-01-01

## 2023-01-01 RX ORDER — LATANOPROST 0.05 MG/ML
1 SOLUTION/ DROPS OPHTHALMIC; TOPICAL
Qty: 0 | Refills: 0 | DISCHARGE

## 2023-01-01 RX ORDER — BRIMONIDINE TARTRATE 1.5 MG/ML
0.15 SOLUTION/ DROPS OPHTHALMIC DAILY
Qty: 5 | Refills: 6 | Status: ACTIVE | COMMUNITY
Start: 2018-03-15

## 2023-01-01 RX ORDER — POTASSIUM PHOSPHATE, MONOBASIC POTASSIUM PHOSPHATE, DIBASIC 236; 224 MG/ML; MG/ML
15 INJECTION, SOLUTION INTRAVENOUS ONCE
Refills: 0 | Status: COMPLETED | OUTPATIENT
Start: 2023-01-01 | End: 2023-01-01

## 2023-01-01 RX ORDER — MULTIPLE VITAMINS W/ MINERALS TAB 9MG-400MCG
TAB ORAL
Qty: 30 | Refills: 1 | Status: ACTIVE | COMMUNITY
Start: 2023-01-01 | End: 1900-01-01

## 2023-01-01 RX ORDER — LEVOTHYROXINE SODIUM 125 MCG
75 TABLET ORAL EVERY 24 HOURS
Refills: 0 | Status: DISCONTINUED | OUTPATIENT
Start: 2023-01-01 | End: 2023-01-01

## 2023-01-01 RX ORDER — SENNA PLUS 8.6 MG/1
2 TABLET ORAL
Qty: 0 | Refills: 0 | DISCHARGE
Start: 2023-01-01

## 2023-01-01 RX ORDER — DORZOLAMIDE HYDROCHLORIDE TIMOLOL MALEATE 20; 5 MG/ML; MG/ML
2-0.5 SOLUTION/ DROPS OPHTHALMIC 3 TIMES DAILY
Qty: 10 | Refills: 0 | Status: ACTIVE | COMMUNITY
Start: 2018-03-15

## 2023-01-01 RX ORDER — INSULIN LISPRO 100/ML
VIAL (ML) SUBCUTANEOUS
Refills: 0 | Status: DISCONTINUED | OUTPATIENT
Start: 2023-01-01 | End: 2023-01-01

## 2023-01-01 RX ORDER — POTASSIUM CHLORIDE 20 MEQ
10 PACKET (EA) ORAL
Refills: 0 | Status: DISCONTINUED | OUTPATIENT
Start: 2023-01-01 | End: 2023-01-01

## 2023-01-01 RX ORDER — GABAPENTIN 400 MG/1
2 CAPSULE ORAL
Qty: 0 | Refills: 0 | DISCHARGE

## 2023-01-01 RX ORDER — LATANOPROST/PF 0.005 %
0.01 DROPS OPHTHALMIC (EYE)
Qty: 1 | Refills: 0 | Status: ACTIVE | COMMUNITY
Start: 2017-04-04

## 2023-01-01 RX ORDER — SODIUM CHLORIDE 9 MG/ML
1000 INJECTION INTRAMUSCULAR; INTRAVENOUS; SUBCUTANEOUS ONCE
Refills: 0 | Status: COMPLETED | OUTPATIENT
Start: 2023-01-01 | End: 2023-01-01

## 2023-01-01 RX ORDER — SOD SULF/SODIUM/NAHCO3/KCL/PEG
4000 SOLUTION, RECONSTITUTED, ORAL ORAL ONCE
Refills: 0 | Status: DISCONTINUED | OUTPATIENT
Start: 2023-01-01 | End: 2023-01-01

## 2023-01-01 RX ORDER — SODIUM,POTASSIUM PHOSPHATES 278-250MG
2 POWDER IN PACKET (EA) ORAL ONCE
Refills: 0 | Status: DISCONTINUED | OUTPATIENT
Start: 2023-01-01 | End: 2023-01-01

## 2023-01-01 RX ORDER — LISINOPRIL 2.5 MG/1
1 TABLET ORAL
Qty: 30 | Refills: 0
Start: 2023-01-01 | End: 2023-01-01

## 2023-01-01 RX ORDER — HYDRALAZINE HCL 50 MG
5 TABLET ORAL ONCE
Refills: 0 | Status: DISCONTINUED | OUTPATIENT
Start: 2023-01-01 | End: 2023-01-01

## 2023-01-01 RX ORDER — DEXTROSE 50 % IN WATER 50 %
12.5 SYRINGE (ML) INTRAVENOUS ONCE
Refills: 0 | Status: DISCONTINUED | OUTPATIENT
Start: 2023-01-01 | End: 2023-01-01

## 2023-01-01 RX ORDER — METOPROLOL SUCCINATE 100 MG/1
100 TABLET, EXTENDED RELEASE ORAL
Qty: 30 | Refills: 5 | Status: DISCONTINUED | COMMUNITY
Start: 2017-10-24 | End: 2023-01-01

## 2023-01-01 RX ORDER — NUT.TX.GLUC.INTOLER,LAC-FR,SOY
LIQUID (ML) ORAL
Qty: 1 | Refills: 5 | Status: ACTIVE | COMMUNITY
Start: 2023-01-01

## 2023-01-01 RX ORDER — LACTULOSE 10 G/15ML
10 SOLUTION ORAL EVERY 12 HOURS
Refills: 0 | Status: DISCONTINUED | OUTPATIENT
Start: 2023-01-01 | End: 2023-01-01

## 2023-01-01 RX ORDER — APIXABAN 2.5 MG/1
1 TABLET, FILM COATED ORAL
Refills: 0 | DISCHARGE

## 2023-01-01 RX ORDER — POTASSIUM CHLORIDE 20 MEQ
40 PACKET (EA) ORAL ONCE
Refills: 0 | Status: DISCONTINUED | OUTPATIENT
Start: 2023-01-01 | End: 2023-01-01

## 2023-01-01 RX ORDER — POLYETHYLENE GLYCOL 3350 17 G/17G
17 POWDER, FOR SOLUTION ORAL EVERY 12 HOURS
Refills: 0 | Status: DISCONTINUED | OUTPATIENT
Start: 2023-01-01 | End: 2023-01-01

## 2023-01-01 RX ORDER — SENNA PLUS 8.6 MG/1
2 TABLET ORAL
Qty: 60 | Refills: 0
Start: 2023-01-01 | End: 2023-01-01

## 2023-01-01 RX ORDER — QUETIAPINE FUMARATE 100 MG/1
100 TABLET ORAL
Qty: 30 | Refills: 0 | Status: DISCONTINUED | COMMUNITY
Start: 2020-05-22 | End: 2023-01-01

## 2023-01-01 RX ORDER — SENNOSIDES 8.6 MG TABLETS 8.6 MG/1
8.6 TABLET ORAL
Qty: 120 | Refills: 0 | Status: ACTIVE | COMMUNITY
Start: 2023-01-11

## 2023-01-01 RX ORDER — POTASSIUM CHLORIDE 20 MEQ
10 PACKET (EA) ORAL
Refills: 0 | Status: COMPLETED | OUTPATIENT
Start: 2023-01-01 | End: 2023-01-01

## 2023-01-01 RX ORDER — ONDANSETRON 8 MG/1
4 TABLET, FILM COATED ORAL EVERY 8 HOURS
Refills: 0 | Status: DISCONTINUED | OUTPATIENT
Start: 2023-01-01 | End: 2023-01-01

## 2023-01-01 RX ORDER — POLYETHYLENE GLYCOL 3350 17 G/17G
17 POWDER, FOR SOLUTION ORAL EVERY 24 HOURS
Refills: 0 | Status: DISCONTINUED | OUTPATIENT
Start: 2023-01-01 | End: 2023-01-01

## 2023-01-01 RX ORDER — MAGNESIUM SULFATE 500 MG/ML
2 VIAL (ML) INJECTION ONCE
Refills: 0 | Status: COMPLETED | OUTPATIENT
Start: 2023-01-01 | End: 2023-01-01

## 2023-01-01 RX ORDER — SODIUM,POTASSIUM PHOSPHATES 278-250MG
1 POWDER IN PACKET (EA) ORAL ONCE
Refills: 0 | Status: DISCONTINUED | OUTPATIENT
Start: 2023-01-01 | End: 2023-01-01

## 2023-01-01 RX ADMIN — POLYETHYLENE GLYCOL 3350 17 GRAM(S): 17 POWDER, FOR SOLUTION ORAL at 18:46

## 2023-01-01 RX ADMIN — Medication 5 MILLIGRAM(S): at 11:21

## 2023-01-01 RX ADMIN — LISINOPRIL 10 MILLIGRAM(S): 2.5 TABLET ORAL at 09:24

## 2023-01-01 RX ADMIN — Medication 48 MILLIGRAM(S): at 22:15

## 2023-01-01 RX ADMIN — POLYETHYLENE GLYCOL 3350 17 GRAM(S): 17 POWDER, FOR SOLUTION ORAL at 22:15

## 2023-01-01 RX ADMIN — Medication 75 MICROGRAM(S): at 08:02

## 2023-01-01 RX ADMIN — AMLODIPINE BESYLATE 5 MILLIGRAM(S): 2.5 TABLET ORAL at 10:35

## 2023-01-01 RX ADMIN — Medication 20 MILLIEQUIVALENT(S): at 10:32

## 2023-01-01 RX ADMIN — Medication 75 MICROGRAM(S): at 08:57

## 2023-01-01 RX ADMIN — AMLODIPINE BESYLATE 5 MILLIGRAM(S): 2.5 TABLET ORAL at 09:35

## 2023-01-01 RX ADMIN — Medication 1: at 22:52

## 2023-01-01 RX ADMIN — Medication 100 MILLIEQUIVALENT(S): at 15:19

## 2023-01-01 RX ADMIN — Medication 1 PACKET(S): at 09:35

## 2023-01-01 RX ADMIN — Medication 1: at 09:23

## 2023-01-01 RX ADMIN — ATORVASTATIN CALCIUM 20 MILLIGRAM(S): 80 TABLET, FILM COATED ORAL at 21:54

## 2023-01-01 RX ADMIN — SODIUM CHLORIDE 75 MILLILITER(S): 9 INJECTION, SOLUTION INTRAVENOUS at 09:34

## 2023-01-01 RX ADMIN — Medication 75 MICROGRAM(S): at 06:51

## 2023-01-01 RX ADMIN — Medication 1: at 12:38

## 2023-01-01 RX ADMIN — LACTULOSE 20 GRAM(S): 10 SOLUTION ORAL at 15:30

## 2023-01-01 RX ADMIN — POTASSIUM PHOSPHATE, MONOBASIC POTASSIUM PHOSPHATE, DIBASIC 62.5 MILLIMOLE(S): 236; 224 INJECTION, SOLUTION INTRAVENOUS at 10:17

## 2023-01-01 RX ADMIN — Medication 25 GRAM(S): at 10:17

## 2023-01-01 RX ADMIN — Medication 1 PACKET(S): at 15:20

## 2023-01-01 RX ADMIN — Medication 40 MILLIEQUIVALENT(S): at 15:21

## 2023-01-01 RX ADMIN — Medication 1 PACKET(S): at 11:03

## 2023-01-01 RX ADMIN — ATORVASTATIN CALCIUM 20 MILLIGRAM(S): 80 TABLET, FILM COATED ORAL at 22:26

## 2023-01-01 RX ADMIN — Medication 1: at 18:46

## 2023-01-01 RX ADMIN — AMLODIPINE BESYLATE 5 MILLIGRAM(S): 2.5 TABLET ORAL at 09:23

## 2023-01-01 RX ADMIN — Medication 48 MILLIGRAM(S): at 21:54

## 2023-01-01 RX ADMIN — POLYETHYLENE GLYCOL 3350 17 GRAM(S): 17 POWDER, FOR SOLUTION ORAL at 06:38

## 2023-01-01 RX ADMIN — Medication 1: at 22:25

## 2023-01-01 RX ADMIN — SODIUM CHLORIDE 1000 MILLILITER(S): 9 INJECTION INTRAMUSCULAR; INTRAVENOUS; SUBCUTANEOUS at 20:39

## 2023-01-01 RX ADMIN — Medication 2: at 12:51

## 2023-01-01 RX ADMIN — SENNA PLUS 2 TABLET(S): 8.6 TABLET ORAL at 21:54

## 2023-01-01 RX ADMIN — Medication 100 MILLIEQUIVALENT(S): at 18:45

## 2023-01-01 RX ADMIN — Medication 1: at 18:03

## 2023-01-01 RX ADMIN — ATORVASTATIN CALCIUM 20 MILLIGRAM(S): 80 TABLET, FILM COATED ORAL at 22:18

## 2023-01-01 RX ADMIN — AMLODIPINE BESYLATE 5 MILLIGRAM(S): 2.5 TABLET ORAL at 08:57

## 2023-01-01 RX ADMIN — LACTULOSE 15 GRAM(S): 10 SOLUTION ORAL at 10:30

## 2023-01-01 RX ADMIN — Medication 48 MILLIGRAM(S): at 22:26

## 2023-01-01 RX ADMIN — Medication 1 ENEMA: at 15:34

## 2023-01-01 RX ADMIN — Medication 40 MILLIEQUIVALENT(S): at 10:17

## 2023-01-05 ENCOUNTER — APPOINTMENT (OUTPATIENT)
Dept: ENDOCRINOLOGY | Facility: CLINIC | Age: 83
End: 2023-01-05
Payer: MEDICARE

## 2023-01-05 VITALS
HEIGHT: 69 IN | BODY MASS INDEX: 27.99 KG/M2 | HEART RATE: 79 BPM | SYSTOLIC BLOOD PRESSURE: 143 MMHG | WEIGHT: 189 LBS | DIASTOLIC BLOOD PRESSURE: 94 MMHG

## 2023-01-05 PROCEDURE — 99214 OFFICE O/P EST MOD 30 MIN: CPT

## 2023-01-05 RX ORDER — POLYETHYLENE GLYCOL 3350 17 G/17G
17 POWDER, FOR SOLUTION ORAL DAILY
Qty: 1 | Refills: 1 | Status: DISCONTINUED | COMMUNITY
Start: 2020-05-21 | End: 2023-01-05

## 2023-01-05 RX ORDER — PREDNISONE 50 MG/1
50 TABLET ORAL DAILY
Qty: 3 | Refills: 0 | Status: DISCONTINUED | COMMUNITY
Start: 2022-10-20 | End: 2023-01-05

## 2023-01-05 RX ORDER — ALCOHOL ANTISEPTIC PADS
PADS, MEDICATED (EA) TOPICAL
Qty: 1 | Refills: 5 | Status: DISCONTINUED | COMMUNITY
Start: 2021-07-27 | End: 2023-01-05

## 2023-01-05 RX ORDER — ACETAMINOPHEN 500 MG/1
500 TABLET ORAL
Qty: 2 | Refills: 5 | Status: DISCONTINUED | COMMUNITY
Start: 2019-04-01 | End: 2023-01-05

## 2023-01-05 RX ORDER — ISOPROPYL ALCOHOL 70 ML/100ML
SWAB TOPICAL
Qty: 1 | Refills: 2 | Status: DISCONTINUED | COMMUNITY
Start: 2020-05-21 | End: 2023-01-05

## 2023-01-05 RX ORDER — ACETAMINOPHEN 500 MG/1
500 CAPSULE, LIQUID FILLED ORAL 4 TIMES DAILY
Qty: 120 | Refills: 3 | Status: DISCONTINUED | COMMUNITY
Start: 2022-07-29 | End: 2023-01-05

## 2023-01-05 RX ORDER — ACETAMINOPHEN 500 MG/1
500 TABLET, COATED ORAL
Qty: 60 | Refills: 2 | Status: DISCONTINUED | COMMUNITY
Start: 2018-08-16 | End: 2023-01-05

## 2023-01-05 RX ORDER — CEPHALEXIN 500 MG/1
500 CAPSULE ORAL
Qty: 14 | Refills: 0 | Status: DISCONTINUED | COMMUNITY
Start: 2022-10-20 | End: 2023-01-05

## 2023-01-05 NOTE — HISTORY OF PRESENT ILLNESS
[FreeTextEntry1] : no health issues since last visit\par sneaking is soda and OJ.   weight is 8 lb more than last visit (in summer) and A1c is up to 7.7%\par Says there is no good food near him.\par no chest pain, SOB or neuropathy symptoms.  \par main complaint is poor vision.  He is seeing ophtho, last visit 10/22.  using eye drops but he says they're not helping\par no falls recently.  not walking much\par no easy bruising\par \par PMH:  diabetes \par adrenal adenoma 3.5cm\par hypothyroid \par \par Meds: amlodipine 5mg, metoprolol ER 100mg/day\par metformin 850mg,BID;  glimepiride 2mg/day\par fenofibrate 48mg, atorvastatin 20mg\par Plavix 75mg, Eliquis 5mg bid\par levothyroxine 75mcg/day\par gabapentin 400mg /day\par Seroquel 400mg (waiting to be picked up)\par montelukast 10mg\par eye drops.\par

## 2023-01-05 NOTE — ASSESSMENT
[FreeTextEntry1] : Diabetes, goal A1c < 7.5%\par Diabetes control is largely dependent on how compliant he is with diet.\par Continue current regimen.  Advised to avoid juice, or if he will have juice, he should at least dilute it with water.\par Suggested buying frozen vegetables from [employer] and microwaving them, to increase vegetable intake.\par continue statin therapy.\par \par Hypothyroid, euthyroid. \par Adrenal adenoma, 3.5cm.  BP generally controlled.  no signs/symptoms of [place]ushings.\par RTO 6  months

## 2023-01-05 NOTE — PHYSICAL EXAM
[Alert] : alert [Healthy Appearance] : healthy appearance [No Proptosis] : no proptosis [No Lid Lag] : no lid lag [Normal Hearing] : hearing was normal [No LAD] : no lymphadenopathy [Thyroid Not Enlarged] : the thyroid was not enlarged [Clear to Auscultation] : lungs were clear to auscultation bilaterally [Normal S1, S2] : normal S1 and S2 [Regular Rhythm] : with a regular rhythm [No Edema] : no peripheral edema [No Stigmata of Cushings Syndrome] : no stigmata of Cushings Syndrome [Abdominal Striae] : no abdominal striae [Acanthosis Nigricans] : no acanthosis nigricans [Normal Affect] : the affect was normal [Normal Mood] : the mood was normal [de-identified] : shuffling/waddling  gait [de-identified] : limited  insight to health conditions

## 2023-01-05 NOTE — DATA REVIEWED
[FreeTextEntry1] : 12/22  A1c  7.7%, tot chol 152, trig 100, HDL 73, LDL 59, GFR 78\par 6/22: A1c 6.7% POC\par 1/22: A1c 6.8%, tot chol 151, trig 76, HDL 75, LDL 61, TSH 1.08, B12 1552, GFR 75\par 8/21: A1c 6.8%, urine microalbumin/cr 18\par 3/21: A1c 7.7%\par 12/20: A1c 9.5%, tot chol 129, trig 112, HDL 56, LDL 50, B12 1194, TH 1.31, 25D 49.2\par 6/20: B12 299, , A1c 8.0% (point of care)\par 5/20: A1c 8.3%, tot cho 111, trig 80, HDL 55, LDL 40, TSH 0.83\par 10/19: A1c 6.7%, point of care\par 7/19: Cr 0.87, eGFR 83\par 3/19: A1c 7.1% (point of care).  TSH 0.75, tot chol 196, trig 353, HDL 55, LDL 70, B12 388\par 12/18: A1c 6.8%, point of care\par 10/18: tot chol 174, trig 159, HDL 68, LDL 74\par 9/18: A1c 6.5% (point of care)\par 4/18: A1c 7.2%, tot chol 223, trig 157, HDL 77, \par 1/18: A1c 6.8%, point of care\par 10/17: A1c 7.7%\par 8/17: A1c 9.2%, tot chol 219, trig 171, HDL 66, , TSH 1.49\par 4/17: A1c 8.0% (point of care)\par 1/17: A1c 9.0% (point of care)\par 11/16: A1c 10.2%, Cr 0.89, eGFR 97\par 8/16: A1c 7.7% (point of care)\par 6/16: A1c 8.9%, fructosamine 321\par 5/16: A1c 10.5%, tot chol 196, trig 184, HDL 60, LDL 99, urine microalb/cr 32, TSH 1.48,  Cr 0.93\par \par CT thoracic spine, 10/18:\par stable L adrenal adenoma 3.5cm (compared to 2015 and 2012)

## 2023-01-10 ENCOUNTER — NON-APPOINTMENT (OUTPATIENT)
Age: 83
End: 2023-01-10

## 2023-01-11 ENCOUNTER — NON-APPOINTMENT (OUTPATIENT)
Age: 83
End: 2023-01-11

## 2023-01-11 ENCOUNTER — RX RENEWAL (OUTPATIENT)
Age: 83
End: 2023-01-11

## 2023-01-18 NOTE — REVIEW OF SYSTEMS
n/a [Vision Problems] : vision problems [Joint Pain] : joint pain [Muscle Pain] : muscle pain [Muscle Weakness] : muscle weakness [Negative] : Heme/Lymph [Chills] : no chills [Fever] : no fever [Discharge] : no discharge [Pain] : no pain [Earache] : no earache [Postnasal Drip] : no postnasal drip [Nasal Discharge] : no nasal discharge [Hoarseness] : no hoarseness [Sore Throat] : no sore throat [Chest Pain] : no chest pain [Palpitations] : no palpitations [Lower Ext Edema] : no lower extremity edema [Shortness Of Breath] : no shortness of breath [Wheezing] : no wheezing [Cough] : no cough [Abdominal Pain] : no abdominal pain [Incontinence] : no incontinence [Constipation] : no constipation [Impotence] : no impotency [Insomnia] : no insomnia [FreeTextEntry3] : new glasses, eye drops, f/u [facility] appointments [FreeTextEntry9] : pain 5/10, balance impaired

## 2023-02-09 ENCOUNTER — APPOINTMENT (OUTPATIENT)
Dept: HOME HEALTH SERVICES | Facility: HOME HEALTH | Age: 83
End: 2023-02-09
Payer: MEDICARE

## 2023-02-09 ENCOUNTER — RX RENEWAL (OUTPATIENT)
Age: 83
End: 2023-02-09

## 2023-02-09 VITALS
DIASTOLIC BLOOD PRESSURE: 70 MMHG | OXYGEN SATURATION: 95 % | RESPIRATION RATE: 18 BRPM | HEART RATE: 74 BPM | SYSTOLIC BLOOD PRESSURE: 134 MMHG | TEMPERATURE: 97 F

## 2023-02-09 PROCEDURE — 99349 HOME/RES VST EST MOD MDM 40: CPT

## 2023-02-09 RX ORDER — ACETAZOLAMIDE 250 MG/1
250 TABLET ORAL DAILY
Qty: 30 | Refills: 0 | Status: DISCONTINUED | COMMUNITY
Start: 2020-11-23 | End: 2023-02-09

## 2023-02-09 NOTE — PHYSICAL EXAM
[Well Nourished] : well nourished [Well Developed] : well developed [Normal Sclera/Conjunctiva] : normal sclera/conjunctiva [No JVD] : no jugular venous distention [Supple] : the neck was supple [No Respiratory Distress] : no respiratory distress [Clear to Auscultation] : lungs were clear to auscultation bilaterally [No Accessory Muscle Use] : no accessory muscle use [Normal S1, S2] : normal S1 and S2 [No Edema] : there was no peripheral edema [Normal Bowel Sounds] : normal bowel sounds [Soft] : abdomen soft [No CVA Tenderness] : no ~M costovertebral angle tenderness [No Spinal Tenderness] : no spinal tenderness [Normal Gait] : normal gait [No Skin Lesions] : no skin lesions [Cranial Nerves Intact] : cranial nerves 2-12 were intact [No Gross Sensory Deficits] : no gross sensory deficits [Oriented x3] : oriented to person, place, and time [Normal Affect] : the affect was normal [Normal Mood] : the mood was normal [Normal Insight/Judgement] : insight and judgment were intact [Normal] : normal [Foot Ulcers] : no foot ulcers [Full ROM] : with limited range of motion [Swelling] : not swollen [Tenderness] : not tender [Erythema] : not erythematous [de-identified] : Lipoma mid thoracic area, no changes noted, instructed pt to report any pain discomfort or changes in size. [de-identified] : unsteady gait

## 2023-02-09 NOTE — CHRONIC CARE ASSESSMENT
[PPS Score: ____] : Palliative Performance Scale (PPS) Score: [unfilled] [FAST Score: ____] : Functional Assessment Scale (FAST) Score: [unfilled] [de-identified] : fall risk [de-identified] : As tolerated [de-identified] : Diabetic diet

## 2023-02-09 NOTE — HEALTH RISK ASSESSMENT
[HRA Reviewed] : Health risk assessment reviewed [Independent] : feeding [Some assistance needed] : using telephone [Full assistance needed] : preparing meals [Yes] : The patient does have visual impairment [No falls in past year] : Patient reported no falls in the past year

## 2023-03-09 ENCOUNTER — APPOINTMENT (OUTPATIENT)
Dept: HOME HEALTH SERVICES | Facility: HOME HEALTH | Age: 83
End: 2023-03-09

## 2023-03-09 DIAGNOSIS — Z91.14 PATIENT'S OTHER NONCOMPLIANCE WITH MEDICATION REGIMEN: ICD-10-CM

## 2023-03-09 NOTE — CHRONIC CARE ASSESSMENT
[PPS Score: ____] : Palliative Performance Scale (PPS) Score: [unfilled] [FAST Score: ____] : Functional Assessment Scale (FAST) Score: [unfilled] [de-identified] : fall risk [de-identified] : As tolerated [de-identified] : Diabetic diet

## 2023-03-09 NOTE — PHYSICAL EXAM
[Well Nourished] : well nourished [Well Developed] : well developed [Normal Sclera/Conjunctiva] : normal sclera/conjunctiva [No JVD] : no jugular venous distention [Supple] : the neck was supple [No Respiratory Distress] : no respiratory distress [Clear to Auscultation] : lungs were clear to auscultation bilaterally [No Accessory Muscle Use] : no accessory muscle use [Normal S1, S2] : normal S1 and S2 [No Edema] : there was no peripheral edema [Normal Bowel Sounds] : normal bowel sounds [Soft] : abdomen soft [No CVA Tenderness] : no ~M costovertebral angle tenderness [No Spinal Tenderness] : no spinal tenderness [Normal Gait] : normal gait [No Skin Lesions] : no skin lesions [Cranial Nerves Intact] : cranial nerves 2-12 were intact [No Gross Sensory Deficits] : no gross sensory deficits [Oriented x3] : oriented to person, place, and time [Normal Affect] : the affect was normal [Normal Mood] : the mood was normal [Normal Insight/Judgement] : insight and judgment were intact [Normal] : normal [Foot Ulcers] : no foot ulcers [Full ROM] : with limited range of motion [Swelling] : not swollen [Tenderness] : not tender [Erythema] : not erythematous [de-identified] : Lipoma mid thoracic area, no changes noted, instructed pt to report any pain discomfort or changes in size. [de-identified] : unsteady gait

## 2023-03-09 NOTE — HISTORY OF PRESENT ILLNESS
[Patient] : patient [FreeTextEntry1] : gait and balance abnormality [FreeTextEntry2] : 81 yo male alert and oriented x3. Dx of CHF, CAD, DM T2, Adrenal mass, hypothyroid, abnormality of gait. Pt has lost weight and states "he is doing well".  Pt with history of CVA, no residual physical or mental deficits noted. \par Reports lip edema for 2-3 days.  Right side of lip swollen with raised area that resembles an abscess.  Possibly folliculitis.  Denies trying new foods or throat swelling.  Trial of Prednisone and Keflex.  Call if symptoms do not improve.  \par  \par Reports compliance with medications. \par \par Lipoma mid back no changes in size, shape denies pain or irritation to the area. Advise him to call the office with any changes will send him to dermatology for further evaluation. Wants to wait and see.\par \par Patient denies fever, cough, trouble breathing, rash, vomiting and diarrhea. Patient has not been in close contact with someone covid positive.\par \par N95 mask, gloves, eye wear and gown used during visit: [Y]. Total face to face time with patient is40 min.\par \par Patient/ patient's caregiver reports no weight loss >10lbs in the past 6 months. No changes in dentition or swallowing reported, No changes in hearing or vision reported. No changes in Cognition reported. Patient denies any symptoms of depression or anxiety. Patient is ADL and IADL independent. No changes in gait or falls reported. \par Patient's home environment is safe.\par \par

## 2023-03-09 NOTE — COUNSELING
[Non - Smoker] : non-smoker [Smoke/CO Detectors] : smoke/CO detectors [Improve mobility] : improve mobility [Decrease hospital use] : decrease hospital use [Discussed disease trajectory with patient/caregiver] : discussed disease trajectory with patient/caregiver [Full Code] : Code Status: Full Code [No Limitations] : Treatment Guidelines: No limitations [Long Term Intubation] : Intubation: Long term intubation [[facility] Date: _____] [facility] Completion/last verification date: [unfilled] [FreeTextEntry4] : Educated patient/legal representative about CCM services and consent.\par Educated patient/legal representative that this service is available because they have 2 or more chronic conditions, that only one Provider can furnish CCM Services per calendar month and that CCM services are subject to the usual Medicare deductible and coinsurance. \par Patient/legal representative understands the right to stop the CCM services at any time by notifying [facility] office.\par Patient/legal representative has verbally consented 10/21\par

## 2023-03-23 ENCOUNTER — NON-APPOINTMENT (OUTPATIENT)
Age: 83
End: 2023-03-23

## 2023-03-23 ENCOUNTER — APPOINTMENT (OUTPATIENT)
Dept: OPHTHALMOLOGY | Facility: CLINIC | Age: 83
End: 2023-03-23
Payer: MEDICARE

## 2023-03-23 ENCOUNTER — APPOINTMENT (OUTPATIENT)
Dept: HOME HEALTH SERVICES | Facility: HOME HEALTH | Age: 83
End: 2023-03-23
Payer: MEDICARE

## 2023-03-23 VITALS
TEMPERATURE: 97 F | SYSTOLIC BLOOD PRESSURE: 134 MMHG | HEART RATE: 72 BPM | RESPIRATION RATE: 18 BRPM | OXYGEN SATURATION: 96 % | DIASTOLIC BLOOD PRESSURE: 70 MMHG

## 2023-03-23 PROCEDURE — 99349 HOME/RES VST EST MOD MDM 40: CPT

## 2023-03-23 PROCEDURE — 92012 INTRM OPH EXAM EST PATIENT: CPT

## 2023-03-23 NOTE — HISTORY OF PRESENT ILLNESS
[[facility] Co-Management Patient] : [unfilled] is a [facility] co-management patient [Patient] : patient [FreeTextEntry1] : gait and balance abnormality [FreeTextEntry2] : 83 yo male alert and oriented x3. Dx of CHF, CAD, DM T2, Adrenal mass, hypothyroid, abnormality of gait. Pt has lost weight and states "he is doing well".  Pt with history of CVA, no residual physical or mental deficits noted. \par Reports lip edema for 2-3 days.  Went to eye doctor feeling good.  \par  \par Reports compliance with medications. \par \par Lipoma mid back no changes in size, shape denies pain or irritation to the area. Advise him to call the office with any changes will send him to dermatology for further evaluation. Wants to wait and see.\par \par Patient denies fever, cough, trouble breathing, rash, vomiting and diarrhea. Patient has not been in close contact with someone covid positive.\par \par N95 mask, gloves, eye wear and gown used during visit: [Y]. Total face to face time with patient is40 min.\par \par Patient/ patient's caregiver reports no weight loss >10lbs in the past 6 months. No changes in dentition or swallowing reported, No changes in hearing or vision reported. No changes in Cognition reported. Patient denies any symptoms of depression or anxiety. Patient is ADL and IADL independent. No changes in gait or falls reported. \par Patient's home environment is safe.\par \par

## 2023-03-23 NOTE — CHRONIC CARE ASSESSMENT
[PPS Score: ____] : Palliative Performance Scale (PPS) Score: [unfilled] [FAST Score: ____] : Functional Assessment Scale (FAST) Score: [unfilled] [de-identified] : fall risk [de-identified] : As tolerated [de-identified] : Diabetic diet

## 2023-03-23 NOTE — COUNSELING
[Non - Smoker] : non-smoker [Smoke/CO Detectors] : smoke/CO detectors [Improve mobility] : improve mobility [Decrease hospital use] : decrease hospital use [Discussed disease trajectory with patient/caregiver] : discussed disease trajectory with patient/caregiver [Full Code] : Code Status: Full Code [No Limitations] : Treatment Guidelines: No limitations [Long Term Intubation] : Intubation: Long term intubation [[facility]: _____[facility]last verification date: [unfilled] [FreeTextEntry4] : Educated patient/legal representative about CCM services and consent.\par Educated patient/legal representative that this service is available because they have 2 or more chronic conditions, that only one Provider can furnish CCM Services per calendar month and that CCM services are subject to the usual Medicare deductible and coinsurance. \par Patient/legal representative understands the right to stop the CCM services at any time by notifying [facility] office.\par Patient/legal representative has verbally consented 10/21\par

## 2023-03-23 NOTE — PHYSICAL EXAM
[Well Nourished] : well nourished [Well Developed] : well developed [Normal Sclera/Conjunctiva] : normal sclera/conjunctiva [No JVD] : no jugular venous distention [Supple] : the neck was supple [No Respiratory Distress] : no respiratory distress [Clear to Auscultation] : lungs were clear to auscultation bilaterally [No Accessory Muscle Use] : no accessory muscle use [Normal S1, S2] : normal S1 and S2 [No Edema] : there was no peripheral edema [Normal Bowel Sounds] : normal bowel sounds [Soft] : abdomen soft [No CVA Tenderness] : no ~M costovertebral angle tenderness [No Spinal Tenderness] : no spinal tenderness [Normal Gait] : normal gait [No Skin Lesions] : no skin lesions [Cranial Nerves Intact] : cranial nerves 2-12 were intact [No Gross Sensory Deficits] : no gross sensory deficits [Oriented x3] : oriented to person, place, and time [Normal Affect] : the affect was normal [Normal Mood] : the mood was normal [Normal Insight/Judgement] : insight and judgment were intact [Normal] : normal [Foot Ulcers] : no foot ulcers [Full ROM] : with limited range of motion [Swelling] : not swollen [Tenderness] : not tender [Erythema] : not erythematous [de-identified] : Lipoma mid thoracic area, no changes noted, instructed pt to report any pain discomfort or changes in size. [de-identified] : unsteady gait

## 2023-03-24 ENCOUNTER — LABORATORY RESULT (OUTPATIENT)
Age: 83
End: 2023-03-24

## 2023-04-20 ENCOUNTER — NON-APPOINTMENT (OUTPATIENT)
Age: 83
End: 2023-04-20

## 2023-05-04 ENCOUNTER — APPOINTMENT (OUTPATIENT)
Dept: HOME HEALTH SERVICES | Facility: HOME HEALTH | Age: 83
End: 2023-05-04
Payer: MEDICARE

## 2023-05-04 VITALS
DIASTOLIC BLOOD PRESSURE: 70 MMHG | HEART RATE: 70 BPM | TEMPERATURE: 97 F | SYSTOLIC BLOOD PRESSURE: 130 MMHG | OXYGEN SATURATION: 97 % | RESPIRATION RATE: 18 BRPM

## 2023-05-04 DIAGNOSIS — E53.8 DEFICIENCY OF OTHER SPECIFIED B GROUP VITAMINS: ICD-10-CM

## 2023-05-04 PROCEDURE — 99349 HOME/RES VST EST MOD MDM 40: CPT

## 2023-05-04 NOTE — CHRONIC CARE ASSESSMENT
[PPS Score: ____] : Palliative Performance Scale (PPS) Score: [unfilled] [FAST Score: ____] : Functional Assessment Scale (FAST) Score: [unfilled] [de-identified] : fall risk [de-identified] : As tolerated [de-identified] : Diabetic diet

## 2023-05-04 NOTE — HISTORY OF PRESENT ILLNESS
[[facility] Co-Management Patient] : [unfilled] is a [facility] co-management patient [Patient] : patient [FreeTextEntry1] : gait and balance abnormality [FreeTextEntry2] : 83 yo male alert and oriented x3. Dx of CHF, CAD, DM T2, Adrenal mass, hypothyroid, abnormality of gait. Pt has lost weight and states "he is doing well".  Pt with history of CVA, no residual physical or mental deficits noted. \par Reports lip edema for 2-3 days.  Concerned about eyes- has been seen by specialists.  Frustrated because he feels nothing can be done and medications not helping.  \par  \par Reports compliance with medications. \par \par Lipoma mid back no changes in size, shape denies pain or irritation to the area. Advise him to call the office with any changes will send him to dermatology for further evaluation. Wants to wait and see.\par \par Patient denies fever, cough, trouble breathing, rash, vomiting and diarrhea. Patient has not been in close contact with someone covid positive.\par \par N95 mask, gloves, eye wear and gown used during visit: [Y]. Total face to face time with patient is40 min.\par \par Patient/ patient's caregiver reports no weight loss >10lbs in the past 6 months. No changes in dentition or swallowing reported, No changes in hearing or vision reported. No changes in Cognition reported. Patient denies any symptoms of depression or anxiety. Patient is ADL and IADL independent. No changes in gait or falls reported. \par Patient's home environment is safe.\par \par

## 2023-05-04 NOTE — PHYSICAL EXAM
[Well Nourished] : well nourished [Well Developed] : well developed [Normal Sclera/Conjunctiva] : normal sclera/conjunctiva [No JVD] : no jugular venous distention [Supple] : the neck was supple [No Respiratory Distress] : no respiratory distress [Clear to Auscultation] : lungs were clear to auscultation bilaterally [No Accessory Muscle Use] : no accessory muscle use [Normal S1, S2] : normal S1 and S2 [No Edema] : there was no peripheral edema [Normal Bowel Sounds] : normal bowel sounds [Soft] : abdomen soft [No CVA Tenderness] : no ~M costovertebral angle tenderness [No Spinal Tenderness] : no spinal tenderness [Normal Gait] : normal gait [No Skin Lesions] : no skin lesions [Cranial Nerves Intact] : cranial nerves 2-12 were intact [No Gross Sensory Deficits] : no gross sensory deficits [Oriented x3] : oriented to person, place, and time [Normal Affect] : the affect was normal [Normal Mood] : the mood was normal [Normal Insight/Judgement] : insight and judgment were intact [Normal] : normal [Foot Ulcers] : no foot ulcers [Full ROM] : with limited range of motion [Swelling] : not swollen [Tenderness] : not tender [Erythema] : not erythematous [de-identified] : Lipoma mid thoracic area, no changes noted, instructed pt to report any pain discomfort or changes in size. [de-identified] : unsteady gait

## 2023-05-04 NOTE — COUNSELING
[Non - Smoker] : non-smoker [Smoke/CO Detectors] : smoke/CO detectors [Improve mobility] : improve mobility [Decrease hospital use] : decrease hospital use [Discussed disease trajectory with patient/caregiver] : discussed disease trajectory with patient/caregiver [Full Code] : Code Status: Full Code [No Limitations] : Treatment Guidelines: No limitations [Long Term Intubation] : Intubation: Long term intubation [[facility]: _____[facility]/last verification date: [unfilled] [FreeTextEntry4] : Educated patient/legal representative about CCM services and consent.\par Educated patient/legal representative that this service is available because they have 2 or more chronic conditions, that only one Provider can furnish CCM Services per calendar month and that CCM services are subject to the usual Medicare deductible and coinsurance. \par Patient/legal representative understands the right to stop the CCM services at any time by notifying [facility] office.\par Patient/legal representative has verbally consented 10/21\par

## 2023-06-09 NOTE — PROGRESS NOTE ADULT - SUBJECTIVE AND OBJECTIVE BOX
**Incomplete Note**    INTERVAL HPI/OVERNIGHT EVENTS:  Patient was seen and examined at bedside. As per nurse and patient, no o/n events, patient resting comfortably. No complaints at this time. Patient denies: fever, chills, dizziness, weakness, HA, Changes in vision, CP, palpitations, SOB, cough, N/V/D/C, dysuria, changes in bowel movements, LE edema. ROS otherwise negative.    VITAL SIGNS:  T(F): 97.8 (03-26-20 @ 05:38)  HR: 78 (03-26-20 @ 05:38)  BP: 164/90 (03-26-20 @ 05:38)  RR: 19 (03-26-20 @ 05:38)  SpO2: 94% (03-26-20 @ 05:38)  Wt(kg): --    PHYSICAL EXAM:    Constitutional: obese male, NAD, answering questions in full sentences  Head: NC/AT  Eyes: PERRL, EOMI, anicteric sclera  ENT: no nasal discharge; uvula midline, no oropharyngeal erythema or exudates; dry mucious membranes  Neck: supple; no JVD or thyromegaly  Respiratory: bilateral posterior crackles in middle and lower lung fields, R>L. No wheezing appreciated.  Cardiac: +S1/S2; RRR; no M/R/G  Gastrointestinal: soft, moderately distended, non tender; no rebound or guarding; +BSx4  Extremities: WWP, no clubbing or cyanosis; no peripheral edema  Musculoskeletal: NROM x4; no joint swelling, tenderness or erythema  Vascular: 2+ radial, femoral, DP/PT pulses B/L  MSK: NROM, 3/5 strength upper and lower extremities  Dermatologic: skin warm, dry and intact; no rashes, wounds, or scars. Poor skin turgor.  Neurologic: AAOx3; CNII-XII grossly intact; no focal deficits  Psychiatric: poor insight and judgment, sometimes aloof, AAOx3, but poor memory    MEDICATIONS  (STANDING):  acetaminophen   Tablet .. 650 milliGRAM(s) Oral every 6 hours  ascorbic acid IVPB 1500 milliGRAM(s) IV Intermittent every 6 hours  atorvastatin 80 milliGRAM(s) Oral at bedtime  azithromycin   Tablet 250 milliGRAM(s) Oral every 24 hours  cefTRIAXone   IVPB 1000 milliGRAM(s) IV Intermittent every 24 hours  dextrose 5%. 1000 milliLiter(s) (50 mL/Hr) IV Continuous <Continuous>  dextrose 50% Injectable 12.5 Gram(s) IV Push once  dextrose 50% Injectable 25 Gram(s) IV Push once  dextrose 50% Injectable 25 Gram(s) IV Push once  enoxaparin Injectable 40 milliGRAM(s) SubCutaneous every 24 hours  famotidine    Tablet 20 milliGRAM(s) Oral daily  hydroxychloroquine 200 milliGRAM(s) Oral every 12 hours  insulin glargine Injectable (LANTUS) 15 Unit(s) SubCutaneous at bedtime  insulin lispro (HumaLOG) corrective regimen sliding scale   SubCutaneous Before meals and at bedtime  insulin lispro Injectable (HumaLOG) 5 Unit(s) SubCutaneous three times a day before meals  levothyroxine 75 MICROGram(s) Oral daily  thiamine IVPB 200 milliGRAM(s) IV Intermittent <User Schedule>    MEDICATIONS  (PRN):  dextrose 40% Gel 15 Gram(s) Oral once PRN Blood Glucose LESS THAN 70 milliGRAM(s)/deciliter  glucagon  Injectable 1 milliGRAM(s) IntraMuscular once PRN Glucose LESS THAN 70 milligrams/deciliter      Allergies    No Known Allergies    Intolerances        LABS:                        12.7   7.52  )-----------( 206      ( 25 Mar 2020 07:58 )             38.0     03-25    141  |  104  |  7   ----------------------------<  89  3.8   |  23  |  0.75    Ca    8.4      25 Mar 2020 07:58  Phos  3.2     03-25  Mg     2.1     03-25    TPro  6.4  /  Alb  2.8<L>  /  TBili  0.4  /  DBili  x   /  AST  50<H>  /  ALT  27  /  AlkPhos  34<L>  03-24    PT/INR - ( 24 Mar 2020 08:22 )   PT: 11.3 sec;   INR: 0.99          PTT - ( 24 Mar 2020 08:22 )  PTT:29.2 sec      RADIOLOGY & ADDITIONAL TESTS:  Reviewed has declined some. She had bone marrow per Onc and it has resulted. F-actin IgG elevated at 46 total IgG wnl. Solumedrol 60 times on given. Bed has become available. Pt will be transferred to [address] [place] [address]. Review of Systems   Constitutional:  Positive for activity change, fatigue and unexpected weight change. HENT: Negative. Eyes: Negative. Respiratory: Negative. Cardiovascular: Negative. Gastrointestinal:  Positive for nausea and vomiting. Negative for diarrhea. Endocrine: Negative. Musculoskeletal:  Negative for gait problem. Allergic/Immunologic: Negative. Neurological:  Positive for weakness. Hematological: Negative. Psychiatric/Behavioral: Negative. Objective:   VITALS:  /60   Pulse 67   Temp 97.3 °F (36.3 °C) (Temporal)   Resp 18   Ht 5' 6\" (1.676 m)   Wt 161 lb (73 kg)   SpO2 96%   BMI 25.99 kg/m²   24HR INTAKE/OUTPUT:    Intake/Output Summary (Last 24 hours) at 6/8/2023 1513  Last data filed at 6/8/2023 1313      Gross per 24 hour   Intake 1917 ml   Output 250 ml   Net 1667 ml                  Physical Exam  Vitals and nursing note reviewed. Constitutional:       Appearance: She is obese. She is ill-appearing. HENT:      Head: Normocephalic and atraumatic. Nose: Nose normal.      Mouth/Throat:      Mouth: Mucous membranes are dry. Eyes:      General: Scleral icterus present. Extraocular Movements: Extraocular movements intact. Cardiovascular:      Rate and Rhythm: Normal rate and regular rhythm. Pulmonary:      Effort: Pulmonary effort is normal.      Breath sounds: Normal breath sounds. Abdominal:      Palpations: Abdomen is soft. Musculoskeletal:         General: Normal range of motion. Cervical back: Normal range of motion and neck supple. Skin:     Coloration: Skin is jaundiced. Neurological:      General: No focal deficit present. Mental Status: She is alert.    Psychiatric:         Mood and Affect: Mood INTERVAL HPI/OVERNIGHT EVENTS:  Patient was seen and examined at bedside. As per nurse and patient, no o/n events, patient resting comfortably. No complaints at this time. Patient denies: fever, chills, dizziness, weakness, HA, Changes in vision, CP, palpitations, SOB, cough, N/V/D/C, dysuria, changes in bowel movements, LE edema. ROS otherwise negative.    VITAL SIGNS:  T(F): 97.8 (03-26-20 @ 05:38)  HR: 78 (03-26-20 @ 05:38)  BP: 164/90 (03-26-20 @ 05:38)  RR: 19 (03-26-20 @ 05:38)  SpO2: 94% (03-26-20 @ 05:38)  Wt(kg): --    PHYSICAL EXAM:    Constitutional: obese male, NAD, answering questions in full sentences  Head: NC/AT  Eyes: PERRL, EOMI, anicteric sclera  ENT: no nasal discharge; uvula midline, no oropharyngeal erythema or exudates; dry mucious membranes  Neck: supple; no JVD or thyromegaly  Respiratory: bilateral posterior crackles in middle and lower lung fields, R>L. No wheezing appreciated.  Cardiac: +S1/S2; RRR; no M/R/G  Gastrointestinal: soft, moderately distended, non tender; no rebound or guarding; +BSx4  Extremities: WWP, no clubbing or cyanosis; no peripheral edema  Musculoskeletal: NROM x4; no joint swelling, tenderness or erythema  Vascular: 2+ radial, femoral, DP/PT pulses B/L  MSK: NROM, 3/5 strength upper and lower extremities  Dermatologic: skin warm, dry and intact; no rashes, wounds, or scars. Poor skin turgor.  Neurologic: AAOx3; CNII-XII grossly intact; no focal deficits  Psychiatric: poor insight and judgment, sometimes aloof, AAOx3, but poor memory    MEDICATIONS  (STANDING):  acetaminophen   Tablet .. 650 milliGRAM(s) Oral every 6 hours  ascorbic acid IVPB 1500 milliGRAM(s) IV Intermittent every 6 hours  atorvastatin 80 milliGRAM(s) Oral at bedtime  azithromycin   Tablet 250 milliGRAM(s) Oral every 24 hours  cefTRIAXone   IVPB 1000 milliGRAM(s) IV Intermittent every 24 hours  dextrose 5%. 1000 milliLiter(s) (50 mL/Hr) IV Continuous <Continuous>  dextrose 50% Injectable 12.5 Gram(s) IV Push once  dextrose 50% Injectable 25 Gram(s) IV Push once  dextrose 50% Injectable 25 Gram(s) IV Push once  enoxaparin Injectable 40 milliGRAM(s) SubCutaneous every 24 hours  famotidine    Tablet 20 milliGRAM(s) Oral daily  hydroxychloroquine 200 milliGRAM(s) Oral every 12 hours  insulin glargine Injectable (LANTUS) 15 Unit(s) SubCutaneous at bedtime  insulin lispro (HumaLOG) corrective regimen sliding scale   SubCutaneous Before meals and at bedtime  insulin lispro Injectable (HumaLOG) 5 Unit(s) SubCutaneous three times a day before meals  levothyroxine 75 MICROGram(s) Oral daily  thiamine IVPB 200 milliGRAM(s) IV Intermittent <User Schedule>    MEDICATIONS  (PRN):  dextrose 40% Gel 15 Gram(s) Oral once PRN Blood Glucose LESS THAN 70 milliGRAM(s)/deciliter  glucagon  Injectable 1 milliGRAM(s) IntraMuscular once PRN Glucose LESS THAN 70 milligrams/deciliter      Allergies    No Known Allergies    Intolerances        LABS:                        12.7   7.52  )-----------( 206      ( 25 Mar 2020 07:58 )             38.0     03-25    141  |  104  |  7   ----------------------------<  89  3.8   |  23  |  0.75    Ca    8.4      25 Mar 2020 07:58  Phos  3.2     03-25  Mg     2.1     03-25    TPro  6.4  /  Alb  2.8<L>  /  TBili  0.4  /  DBili  x   /  AST  50<H>  /  ALT  27  /  AlkPhos  34<L>  03-24    PT/INR - ( 24 Mar 2020 08:22 )   PT: 11.3 sec;   INR: 0.99          PTT - ( 24 Mar 2020 08:22 )  PTT:29.2 sec      RADIOLOGY & ADDITIONAL TESTS:  [facility]  HOSPITAL COURSE:   79 M, poor historian, with past medical history of HTN, DM2, and hypothyroidism who presents to ED BIBEMS after being found down for a fall. Found to have elevated beta hydroxy buturate likely 2/2 dehydration vs starvation ketosis. While being worked up for fall in ED, found to be febrile 101.1 and desaturated to 89% on room air. Patient admitted for acute hypoxic respiratory failure and severe sepsis 2/2 to COVID 19 and PNA. Started on Ceftriaxone/Azithromycin,  plaquenil, ascorbic acid, thiamine. Initially placed on NC 3L O2 saturating at 91-93%, however overnight patient became more hypoxic. He was found to be tachypneic, in the tripod position. He was transitioned to Venturi mask then NRB 15Lmin saturating at 95%.  Seroquel 400mg x1 and Haldol 1mg given as patient noncompliant with supplemental O2. ABG: pH 7.52, pCO2 28, pO2 63, HCO3 22, O2 sat 92. CXR with worsening b/l opacities. ICU consulted for increased work of breathing and concern for ARDS. Patient will be transferred to MICU for further management.     INTERVAL HPI/OVERNIGHT EVENTS:  Patient was seen and examined at bedside. As per nurse and patient, no o/n events, patient resting comfortably. No complaints at this time. Patient denies: fever, chills, dizziness, weakness, HA, Changes in vision, CP, palpitations, SOB, cough, N/V/D/C, dysuria, changes in bowel movements, LE edema. ROS otherwise negative.    VITAL SIGNS:  T(F): 97.8 (03-26-20 @ 05:38)  HR: 78 (03-26-20 @ 05:38)  BP: 164/90 (03-26-20 @ 05:38)  RR: 19 (03-26-20 @ 05:38)  SpO2: 94% (03-26-20 @ 05:38)  Wt(kg): --    PHYSICAL EXAM:    Constitutional: obese male, NAD, answering questions in full sentences  Head: NC/AT  Eyes: PERRL, EOMI, anicteric sclera  ENT: no nasal discharge; uvula midline, no oropharyngeal erythema or exudates; dry mucious membranes  Neck: supple; no JVD or thyromegaly  Respiratory: bilateral posterior crackles in middle and lower lung fields, R>L. No wheezing appreciated.  Cardiac: +S1/S2; RRR; no M/R/G  Gastrointestinal: soft, moderately distended, non tender; no rebound or guarding; +BSx4  Extremities: WWP, no clubbing or cyanosis; no peripheral edema  Musculoskeletal: NROM x4; no joint swelling, tenderness or erythema  Vascular: 2+ radial, femoral, DP/PT pulses B/L  MSK: NROM, 3/5 strength upper and lower extremities  Dermatologic: skin warm, dry and intact; no rashes, wounds, or scars. Poor skin turgor.  Neurologic: AAOx3; CNII-XII grossly intact; no focal deficits  Psychiatric: poor insight and judgment, sometimes aloof, AAOx3, but poor memory    MEDICATIONS  (STANDING):  acetaminophen   Tablet .. 650 milliGRAM(s) Oral every 6 hours  ascorbic acid IVPB 1500 milliGRAM(s) IV Intermittent every 6 hours  atorvastatin 80 milliGRAM(s) Oral at bedtime  azithromycin   Tablet 250 milliGRAM(s) Oral every 24 hours  cefTRIAXone   IVPB 1000 milliGRAM(s) IV Intermittent every 24 hours  dextrose 5%. 1000 milliLiter(s) (50 mL/Hr) IV Continuous <Continuous>  dextrose 50% Injectable 12.5 Gram(s) IV Push once  dextrose 50% Injectable 25 Gram(s) IV Push once  dextrose 50% Injectable 25 Gram(s) IV Push once  enoxaparin Injectable 40 milliGRAM(s) SubCutaneous every 24 hours  famotidine    Tablet 20 milliGRAM(s) Oral daily  hydroxychloroquine 200 milliGRAM(s) Oral every 12 hours  insulin glargine Injectable (LANTUS) 15 Unit(s) SubCutaneous at bedtime  insulin lispro (HumaLOG) corrective regimen sliding scale   SubCutaneous Before meals and at bedtime  insulin lispro Injectable (HumaLOG) 5 Unit(s) SubCutaneous three times a day before meals  levothyroxine 75 MICROGram(s) Oral daily  thiamine IVPB 200 milliGRAM(s) IV Intermittent <User Schedule>    MEDICATIONS  (PRN):  dextrose 40% Gel 15 Gram(s) Oral once PRN Blood Glucose LESS THAN 70 milliGRAM(s)/deciliter  glucagon  Injectable 1 milliGRAM(s) IntraMuscular once PRN Glucose LESS THAN 70 milligrams/deciliter      Allergies    No Known Allergies    Intolerances        LABS:                        12.7   7.52  )-----------( 206      ( 25 Mar 2020 07:58 )             38.0     03-25    141  |  104  |  7   ----------------------------<  89  3.8   |  23  |  0.75    Ca    8.4      25 Mar 2020 07:58  Phos  3.2     03-25  Mg     2.1     03-25    TPro  6.4  /  Alb  2.8<L>  /  TBili  0.4  /  DBili  x   /  AST  50<H>  /  ALT  27  /  AlkPhos  34<L>  03-24    PT/INR - ( 24 Mar 2020 08:22 )   PT: 11.3 sec;   INR: 0.99          PTT - ( 24 Mar 2020 08:22 )  PTT:29.2 sec      RADIOLOGY & ADDITIONAL TESTS:  Reviewed

## 2023-06-23 NOTE — DATA REVIEWED
[FreeTextEntry1] : 6/23  A1c 7.4%\par 3/23  A1c 7.6%, tot chol 144, trig 166, HDL 55, LDL 55\par 12/22  A1c  7.7%, tot chol 152, trig 100, HDL 73, LDL 59, GFR 78\par 6/22: A1c 6.7% POC\par 1/22: A1c 6.8%, tot chol 151, trig 76, HDL 75, LDL 61, TSH 1.08, B12 1552, GFR 75\par 8/21: A1c 6.8%, urine microalbumin/cr 18\par 3/21: A1c 7.7%\par 12/20: A1c 9.5%, tot chol 129, trig 112, HDL 56, LDL 50, B12 1194, TH 1.31, 25D 49.2\par 6/20: B12 299, , A1c 8.0% (point of care)\par 5/20: A1c 8.3%, tot cho 111, trig 80, HDL 55, LDL 40, TSH 0.83\par 10/19: A1c 6.7%, point of care\par 7/19: Cr 0.87, eGFR 83\par 3/19: A1c 7.1% (point of care).  TSH 0.75, tot chol 196, trig 353, HDL 55, LDL 70, B12 388\par 12/18: A1c 6.8%, point of care\par 10/18: tot chol 174, trig 159, HDL 68, LDL 74\par 9/18: A1c 6.5% (point of care)\par 4/18: A1c 7.2%, tot chol 223, trig 157, HDL 77, \par 1/18: A1c 6.8%, point of care\par 10/17: A1c 7.7%\par 8/17: A1c 9.2%, tot chol 219, trig 171, HDL 66, , TSH 1.49\par 4/17: A1c 8.0% (point of care)\par 1/17: A1c 9.0% (point of care)\par 11/16: A1c 10.2%, Cr 0.89, eGFR 97\par 8/16: A1c 7.7% (point of care)\par 6/16: A1c 8.9%, fructosamine 321\par 5/16: A1c 10.5%, tot chol 196, trig 184, HDL 60, LDL 99, urine microalb/cr 32, TSH 1.48,  Cr 0.93\par \par CT thoracic spine, 10/18:\par stable L adrenal adenoma 3.5cm (compared to 2015 and 2012)

## 2023-06-23 NOTE — ASSESSMENT
[FreeTextEntry1] : Diabetes, goal A1c < 7.5%\par Diabetes control is largely dependent on  diet.\par Continue current regimen: metformin and glimepiride 2mg.\par Advised to avoid juice.   OK to have protein shake as meal replacement, this will probably be a better food choice for him than juice or high carb foods.\par continue statin therapy.\par \par Hypothyroid, euthyroid. \par RTO 6-12  months

## 2023-06-23 NOTE — HISTORY OF PRESENT ILLNESS
[FreeTextEntry1] : here with home aide, who is with him 4 days/week for at least 3 hours\par His eyes/vision are worsening.  He is unable to see food in front him, cannot read, cannot see money\par Food intake is variable.  He has some food delivered and there are no good food choices near his home\par He also has no teeth, so has to eat soft foods.\par He eats a lot bread, muffins, and pasta.\par He doesn't have hypoglycemia symptoms\par labs reviewed from 3/23:  A1c 7.6%,  LDL 55; today A1c is 7.4%\par \par PMH:  diabetes \par adrenal adenoma 3.5cm\par hypothyroid \par \par Meds: amlodipine 5mg, metoprolol ER 100mg/day\par metformin 850mg,BID;  glimepiride 2mg/day\par fenofibrate 48mg, atorvastatin 20mg\par Plavix 75mg, Eliquis 5mg bid\par levothyroxine 75mcg/day\par gabapentin 400mg /day\par Seroquel 400mg (waiting to be picked up)\par montelukast 10mg\par eye drops.\par

## 2023-06-23 NOTE — PHYSICAL EXAM
[Alert] : alert [Healthy Appearance] : healthy appearance [No Proptosis] : no proptosis [No Lid Lag] : no lid lag [Normal Hearing] : hearing was normal [No LAD] : no lymphadenopathy [Thyroid Not Enlarged] : the thyroid was not enlarged [Clear to Auscultation] : lungs were clear to auscultation bilaterally [Normal S1, S2] : normal S1 and S2 [Regular Rhythm] : with a regular rhythm [No Edema] : no peripheral edema [No Stigmata of Cushings Syndrome] : no stigmata of Cushings Syndrome [Abdominal Striae] : no abdominal striae [Acanthosis Nigricans] : no acanthosis nigricans [Normal Sensation on Monofilament Testing] : normal sensation on monofilament testing of lower extremities [Normal Affect] : the affect was normal [Normal Mood] : the mood was normal [de-identified] : decreased pedal pulse [de-identified] : shuffling/waddling  gait [de-identified] : dry skin [de-identified] : limited  insight to health conditions

## 2023-07-12 NOTE — PROGRESS NOTE ADULT - ASSESSMENT
Call placed with Italian  to advise CD copy of iinages are ready to be picked up at [facility] in the medical record department. Entrance 1 /[facility].    79M w/ PMHx of HTN, DM2, former smoker, hypothyroidism who presented to ED BIBEMS on 3/23 after being found down for a fall, found to have starvation ketosis now resolved, was desatting with imaging findings on CXR and found to be COVID positive, now on nonrebreather for 02 support. SC 4/4 for new left sided weakness - NIHSS 7.  HCT negative, CTA showed moderate to severe left carotid stenosis. 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 Stroke etiology likely 2/2 to hypercoagulable status in the setting of COVID; however, cannot rule out cardioembolic case of stroke less likely large vessel atherosclerosis 2/2 to left carotid stenosis. Neuro exam 4/9 unchanged. Will continue to follow. TTE within normal limits, EF 61%.

## 2023-07-27 PROBLEM — H34.8312: Status: ACTIVE | Noted: 2018-04-19

## 2023-07-27 PROBLEM — R35.0 URINARY FREQUENCY: Status: ACTIVE | Noted: 2018-10-04

## 2023-07-27 NOTE — CHRONIC CARE ASSESSMENT
[PPS Score: ____] : Palliative Performance Scale (PPS) Score: [unfilled] [FAST Score: ____] : Functional Assessment Scale (FAST) Score: [unfilled] [de-identified] : fall risk [de-identified] : As tolerated [de-identified] : Diabetic diet

## 2023-07-27 NOTE — PHYSICAL EXAM
[Well Nourished] : well nourished [Well Developed] : well developed [Normal Sclera/Conjunctiva] : normal sclera/conjunctiva [No JVD] : no jugular venous distention [Supple] : the neck was supple [No Respiratory Distress] : no respiratory distress [Clear to Auscultation] : lungs were clear to auscultation bilaterally [No Accessory Muscle Use] : no accessory muscle use [Normal S1, S2] : normal S1 and S2 [No Edema] : there was no peripheral edema [Normal Bowel Sounds] : normal bowel sounds [Soft] : abdomen soft [No CVA Tenderness] : no ~M costovertebral angle tenderness [No Spinal Tenderness] : no spinal tenderness [Normal Gait] : normal gait [No Skin Lesions] : no skin lesions [No Gross Sensory Deficits] : no gross sensory deficits [Cranial Nerves Intact] : cranial nerves 2-12 were intact [Oriented x3] : oriented to person, place, and time [Normal Affect] : the affect was normal [Normal Mood] : the mood was normal [Normal Insight/Judgement] : insight and judgment were intact [Normal] : normal [Foot Ulcers] : no foot ulcers [Full ROM] : with limited range of motion [Swelling] : not swollen [Tenderness] : not tender [Erythema] : not erythematous [de-identified] : Lipoma mid thoracic area, no changes noted, instructed pt to report any pain discomfort or changes in size. [de-identified] : unsteady gait

## 2023-07-27 NOTE — COUNSELING
[Non - Smoker] : non-smoker [Smoke/CO Detectors] : smoke/CO detectors [Improve mobility] : improve mobility [Decrease hospital use] : decrease hospital use [Discussed disease trajectory with patient/caregiver] : discussed disease trajectory with patient/caregiver [Full Code] : Code Status: Full Code [No Limitations] : Treatment Guidelines: No limitations [Long Term Intubation] : Intubation: Long term intubation [[facility] Date: _____[facility]pletion/last verification date: [unfilled] [FreeTextEntry4] : Educated patient/legal representative about CCM services and consent.\par Educated patient/legal representative that this service is available because they have 2 or more chronic conditions, that only one Provider can furnish CCM Services per calendar month and that CCM services are subject to the usual Medicare deductible and coinsurance. \par Patient/legal representative understands the right to stop the CCM services at any time by notifying [facility] office.\par Patient/legal representative has verbally consented 10/21\par

## 2023-07-27 NOTE — HISTORY OF PRESENT ILLNESS
[[facility] Co-Management Patient] : [unfilled] is a [facility] co-management patient [Patient] : patient [FreeTextEntry1] : gait and balance abnormality [FreeTextEntry2] : 82 yo male alert and oriented x3. Dx of CHF, CAD, DM T2, Adrenal mass, hypothyroid, abnormality of gait. Pt has lost weight and states "he is doing well".  Pt with history of CVA, no residual physical or mental deficits noted. \par Reports lip edema for 2-3 days.  Concerned about eyes- has been seen by specialists.  right eye worse then left.  Frustrated because he feels nothing can be done and medications not helping.  Pending appointment 8/6/23 with eye specialist\par  \par Reports compliance with medications. \par \par Lipoma mid back no changes in size, shape denies pain or irritation to the area. Advise him to call the office with any changes will send him to dermatology for further evaluation. Wants to wait and see.\par \par Patient denies fever, cough, trouble breathing, rash, vomiting and diarrhea. Patient has not been in close contact with someone covid positive.\par \par N95 mask, gloves, eye wear and gown used during visit: [Y]. Total face to face time with patient is40 min.\par \par Patient/ patient's caregiver reports no weight loss >10lbs in the past 6 months. No changes in dentition or swallowing reported, No changes in hearing or vision reported. No changes in Cognition reported. Patient denies any symptoms of depression or anxiety. Patient is ADL and IADL independent. No changes in gait or falls reported. \par Patient's home environment is safe.\par \par

## 2023-11-27 NOTE — ED ADULT TRIAGE NOTE - CHIEF COMPLAINT QUOTE
Pt BIBA from assisting living. as per report "pt c/o sore throat and decreased oral intake, went to UC and advised to come to ED for dehydration evaluation, pt went back to assisting living and sent to ED " pt also reported constipation for a few days.

## 2023-11-27 NOTE — ED PROVIDER NOTE - PROGRESS NOTE DETAILS
carmelina - received on sign out pt w dysphagia. pending CT neck, chest /abd. anticipate admission for gi / scope in setting of dysphagia and dec po intake.     CT " Marked circumferential wall thickening of the midesophagus.   Malignancy cannot be excluded. Suggest endoscopic correlation."    pt admitted to medicine for further eval w gi / scope.

## 2023-11-27 NOTE — ED PROVIDER NOTE - NS ED MD DISPO ADMIT [facility] PALLIATIVE CARE
NONE Mucosal Advancement Flap Text: Given the location of the defect, shape of the defect and the proximity to free margins a mucosal advancement flap was deemed most appropriate. Incisions were made with a 15 blade scalpel in the appropriate fashion along the cutaneous vermilion border and the mucosal lip. The remaining actinically damaged mucosal tissue was excised.  The mucosal advancement flap was then elevated to the gingival sulcus with care taken to preserve the neurovascular structures and advanced into the primary defect. Care was taken to ensure that precise realignment of the vermilion border was achieved.

## 2023-11-27 NOTE — ED ADULT NURSE NOTE - OBJECTIVE STATEMENT
83M, presents withE 83m, HX HTN, HLD, DM, HLD, referred from [facility]. As per patient he has been having difficulty swallowing x 1 week. Able to tolerate some soft foods. Reports being dehydrated and feeling weaker 2/2 lack of oral intake. Denies any recent trauma, CP, SOB, fevers, chills nausea vomiting. Of note, reports decreased bowel movements but able to continue to pass gas.

## 2023-11-27 NOTE — ED ADULT NURSE REASSESSMENT NOTE - NS ED NURSE REASSESS COMMENT FT1
Pt is legally blind. Alert and oriented, A&O4, steady gait noted.  Denies any discomfort at this time.

## 2023-11-27 NOTE — ED PROVIDER NOTE - CLINICAL SUMMARY MEDICAL DECISION MAKING FREE TEXT BOX
patient with new dysphagia and patient is clinically dehydrated on exam, I suspect patient likely with a malignancy, less likely esophageal obstruction as patient is able to tolerate certain texture foods, also consider achalasia,  Thought about but suspect less likely CVA as dysphagia is uncomfortable and no other neuro deficits apparent.  Plan to CT to evaluate further, check labs, hydrate, likely admission.

## 2023-11-27 NOTE — ED PROVIDER NOTE - OBJECTIVE STATEMENT
83-year-old with history of hypertension, hypothyroidism, diabetes, hyperlipidemia, [facility] was [facility], he went there today after 1 week of new difficulty swallowing, patient reports when he swallows he feels as if food becomes stuck in his neck and he regurgitates, he has not regurgitated everything, has tolerated some fluids and soft foods such as scrambled eggs, patient reports bread and meats and any thicker foods have immediately come back up, this has never happened prior to this week, no current pain but feels neck discomfort when food becomes stuck, significantly decreased p.o. intake this week, patient reports a few days ago he went to a emergency department at [address], unsure the name, had an abdominal CAT scan at that time and given laxatives for home, patient reports he has had new constipation this week as well, no abdominal pain, reports when he was at that emergency department he was discussing his constipation more than difficulty swallowing and his difficulty swallowing was not evaluated.  No recent endoscopies or colonoscopies.  No fevers, no chest pain, no shortness of breath, no headache, no vomiting, no black or bloody stools, no numbness or focal weakness

## 2023-11-27 NOTE — ED ADULT NURSE NOTE - NSFALLRISKINTERV_ED_ALL_ED

## 2023-11-28 NOTE — H&P ADULT - NSHPSOCIALHISTORY_GEN_ALL_CORE
Occupation: Unemploted   Tobacco use: History of smoking 1 ppd for atleast 10yrs. Doesn't know when he quit.   EtOH use: Ex heavy drinker   Drug use: No   Living situation: Lives at home byself w/ HHA.

## 2023-11-28 NOTE — H&P ADULT - PROBLEM SELECTOR PLAN 7
Patient w/ history of hypothyroidism, s/p thyroidectomy. Takes home synthroid 75mcg.  CT neck soft tissue w/ IV cont: 1.0 cm left thyroid nodule. Consider follow-up and correlation with ultrasound.    PLAN:  - c/w home synthroid

## 2023-11-28 NOTE — H&P ADULT - ATTENDING COMMENTS
Pt. seen and examined earlier today; I have read Dr. Montero's H&P, I agree w/ his findings and plan of care as documented; Pt. c/o dysphagia, poor PO intake, subsequent weight loss; CT imaging reviewed, c/f malignancy; GI consulted, plan for EGD; CLD today, NPO after midnight; will hold DOAC for procedure; per collateral info obtained by housestaff, Pt. is no longer on DAPT

## 2023-11-28 NOTE — H&P ADULT - PROBLEM SELECTOR PLAN 1
84 y/o M present w/ dysphagia and unexpected weight loss in the last few weeks. Patient 84 y/o M present w/ dysphagia and unexpected weight loss in the last few weeks. Only able to tolerate liquids and soft foods and would experience regurgitation w/ hard solid foods. Reports 40lbs unexplained weight loss and a decrease in pant size from 38 to 28.   CTAP IV cont: Marked circumferential wall thickening of the midesophagus. Malignancy cannot be excluded. Suggest endoscopic correlation.    PLAN:  - GI consult, f/u recs  - Keep NPO for now. 82 y/o M present w/ dysphagia and unexpected weight loss in the last few weeks. Only able to tolerate liquids and soft foods and would experience regurgitation w/ hard solid foods. Reports 40lbs unexplained weight loss and a decrease in pant size from 38 to 28.   CTAP IV cont: Marked circumferential wall thickening of the midesophagus. Malignancy cannot be excluded. Suggest endoscopic correlation.    PLAN:  - f/u GI recs,  - f/u barium swallow  - start clear liquid diet  - NPO after midnight.

## 2023-11-28 NOTE — H&P ADULT - PROBLEM SELECTOR PLAN 10
F: s/p 1L   E: replete K < 4, Mg <2  DVT ppx- SCD  Diet- NPO  Activity- ambulate as tolerated  Code- Full. F: s/p 1L   E: replete K < 4, Mg <2  DVT ppx- SCD  Diet- Cleat diet.   Activity- ambulate as tolerated  Code- Full.

## 2023-11-28 NOTE — CONSULT NOTE ADULT - SUBJECTIVE AND OBJECTIVE BOX
Initial GI Consult Note:     HPI:  83M with PMG of CHF, CAD, CVA 2020, DM, adrenal mass, hypothyroidism, and gait abnormality presents w/ difficulty swallowing and 40 pound weight loss in the last 2-3 weeks. Patient states he has only been able to drink milk and eat soft bite sized foods and unable to tolerate hard foods. Reports he would often regurgitate food back up. Patient states he went from a size 38 pants size to a 28 in the last few weeks. Sypmtoms are also accompanied by constipation x2 weekswhich he was treated at [facility] for w/ laxatives, last was 5-6 days ago.  States he has never had an EGD or c-scope. Denies fever, nausea, CP, SOB, HA, dizziness     In the ED:  Initial vital signs: T: 97.3F, HR: 103, BP: 107/79, R: 17, SpO2: 99% on RA  ED course: 1L NS   Labs: Hgb 12.2, PT/INR 13.9/1.23, Ca 10.8,  Imaging: CTAP w/ IV cont thickening of midesophageal wall, Indeterminant left adrenal mass, not significantly changed since 3/29/2015. No significant interval change in fat density lesion in the head of the   pancreas, probably a lipoma. Indeterminant hypodense lesion in the right kidney, possibly a hyperdense cyst. Suggest follow-up and correlation with ultrasound.  CTPE: No PE   EK NSR , , QTc 464, no ST elevations/depression    GI consulted for dysphagia. Patient seen and examined at bedside. States that overall he can drink some liquids but has trouble eating solids. Lost 40 lbs over the course of about a month and is concerned about that. No prior EGD or colonoscopy. Describes that he 'drank and smoked' excessively when he was younger but has not for many years. No family history of GI malignancies. No acute complaints at this time. Amenable for EGD.       Allergies  No Known Allergies  Intolerances      Home Medications:  Alphagan 0.2% ophthalmic solution: 1 drop(s) to each affected eye once a day (26 Mar 2020 11:34)  amLODIPine: 5 milligram(s) orally once a day (26 Mar 2020 11:34)  atorvastatin 20 mg oral tablet: 1 tab(s) orally once a day (at bedtime) (2023 08:08)  brimonidine 0.15% ophthalmic solution: 1 drop(s) in each affected eye once a day (2023 08:09)  dorzolamide-timolol 2.23%-0.68% (2%-0.5% base) ophthalmic solution: 1 drop(s) in each eye 3 times a day (2023 08:09)  Eliquis 5 mg oral tablet: 1 tab(s) orally 2 times a day (2023 08:10)  fenofibrate 48 mg oral tablet: 1 tab(s) orally once a day (26 Mar 2020 11:34)  glimepiride 2 mg oral tablet: 1 tab(s) orally once a day (26 Mar 2020 11:34)  latanoprost 0.005% ophthalmic emulsion: 1 drop(s) in each affected eye (2023 08:11)  metFORMIN 850 mg oral tablet: 1 tab(s) orally 2 times a day (2023 08:12)  Synthroid: 75 milligram(s) orally once a day (26 Mar 2020 11:34)    MEDICATIONS:  MEDICATIONS  (STANDING):  amLODIPine   Tablet 5 milliGRAM(s) Oral every 24 hours  atorvastatin 20 milliGRAM(s) Oral at bedtime  dextrose 5%. 1000 milliLiter(s) (100 mL/Hr) IV Continuous <Continuous>  dextrose 5%. 1000 milliLiter(s) (50 mL/Hr) IV Continuous <Continuous>  dextrose 50% Injectable 25 Gram(s) IV Push once  dextrose 50% Injectable 12.5 Gram(s) IV Push once  dextrose 50% Injectable 25 Gram(s) IV Push once  fenofibrate Tablet 48 milliGRAM(s) Oral every 24 hours  glucagon  Injectable 1 milliGRAM(s) IntraMuscular once  insulin lispro (ADMELOG) corrective regimen sliding scale   SubCutaneous three times a day before meals  levothyroxine 75 MICROGram(s) Oral every 24 hours    MEDICATIONS  (PRN):  dextrose Oral Gel 15 Gram(s) Oral once PRN Blood Glucose LESS THAN 70 milliGRAM(s)/deciliter  ondansetron Injectable 4 milliGRAM(s) IV Push every 8 hours PRN Nausea and/or Vomiting    PAST MEDICAL & SURGICAL HISTORY:  Essential hypertension  Hypertension      Hypothyroid      Diabetes mellitus, type 2      Adrenal mass      H/O CHF      CAD (coronary artery disease)      Other postprocedural status  left total knee  replacement one   yr.   ago      H/O thyroidectomy        FAMILY HISTORY:    SOCIAL HISTORY:  [place]acoo: [ ] Current, [ ] Former, [ ] Never; Pack Years:  Alcohol:  Illicit Drugs:      Vital Signs Last 24 Hrs  T(C): 36.7 (2023 10:25), Max: 36.7 (2023 08:49)  T(F): 98.1 (2023 10:25), Max: 98.1 (2023 08:49)  HR: 87 (2023 10:25) (74 - 103)  BP: 168/72 (2023 10:25) (107/79 - 168/72)  BP(mean): --  RR: 19 (2023 10:25) (17 - 19)  SpO2: 95% (2023 10:25) (95% - 99%)    Parameters below as of 2023 10:25  Patient On (Oxygen Delivery Method): room air      PHYSICAL EXAM:  General: No acute distress, thin elderly male, poor dentition   Lungs: Normal respiratory effort and no intercostal retractions  Cardiovascular: RRR  Abdomen: Soft, non-tender, non-distended  Neurological: Alert and oriented x3  Skin: Warm and dry. No obvious rash        LABS:                        12.8   7.67  )-----------( 351      ( 2023 07:47 )             40.8     -    138  |  109<H>  |  10  ----------------------------<  116<H>  3.8   |  19<L>  |  0.75    Ca    10.4      2023 07:47  Phos  2.0     -  Mg     1.8         TPro  7.6  /  Alb  4.0  /  TBili  0.3  /  DBili  x   /  AST  15  /  ALT  5<L>  /  AlkPhos  50  11-28        PT/INR - ( 2023 06:40 )   PT: 12.4 sec;   INR: 1.09          PTT - ( 2023 06:40 )  PTT:30.6 sec    RADIOLOGY & ADDITIONAL STUDIES:     Reviewed

## 2023-11-28 NOTE — H&P ADULT - PROBLEM SELECTOR PLAN 8
Patient w/ History of CHF. Per outpatient chart review, patient not on any GDMT. Takes home amlodipine 5mg.  - ECHO 04/07/2020: There is moderate concentric left ventricular hypertrophy. The left ventricle is normal in size and systolic function with a calculated ejection fraction of 61%. There are no regional wall motion abnormalities seen    PLAN:  - NTD Patient w/ History of CHF. Per outpatient chart review, patient not on any GDMT. Appears dry on exam.   - ECHO 04/07/2020: There is moderate concentric left ventricular hypertrophy. The left ventricle is normal in size and systolic function with a calculated ejection fraction of 61%. There are no regional wall motion abnormalities seen.    PLAN:  - CTM fluid status.

## 2023-11-28 NOTE — H&P ADULT - PROBLEM SELECTOR PLAN 2
Patient presents w/ hypercalcemia this admission of 10.8. Per outpatient labs patient was 10.8 on 10/12 and wnl before then.   Patient is s/p 1L    PLAN:  - f/u PTH   - f/u AM labs Patient presents w/ hypercalcemia this admission of 10.8. Per outpatient labs patient was 10.8 on 10/12 and wnl before then.   Patient is s/p 1L  Etiology: Hyperparathyroidism vs malignancy.     PLAN:  - f/u PTH   - f/u AM labs

## 2023-11-28 NOTE — H&P ADULT - ASSESSMENT
82 y/o M w/ PMHx CHF, CAD, DM, adrenal mass, hypothyroidism, and gait abnormality presents to  ED w/ dysphagia and weight loss in the last 2-3 weeks. CT imaging shows concern for malignancy. Patient admitted to [facility] for further work up dysphagia and weight loss.

## 2023-11-28 NOTE — H&P ADULT - PROBLEM SELECTOR PLAN 3
Patient w/ History of DM. Takes home glimpeiride 2mg.    PLAN:  - Start low dose sliding scale   - Start basal bolus base of requirements. Patient w/ History of DM. Takes home glimiperide 2mg.    PLAN:  - Start low dose sliding scale   - Start basal bolus base of requirements.  - hold home glimiperide Patient w/ History of DM. Takes home Glimepiride 2mg.    PLAN:  - Start low dose sliding scale   - Start basal bolus base of requirements.  - hold home Glimepiride

## 2023-11-28 NOTE — H&P ADULT - PROBLEM SELECTOR PLAN 9
F: s/p 1L   E: replete K < 4, Mg <2  DVT ppx- Lovenox 40 SQ  Diet- NPO  Activity- ambulate as tolerated  Code- Full. F: s/p 1L   E: replete K < 4, Mg <2  DVT ppx- SCD  Diet- NPO  Activity- ambulate as tolerated  Code- Full. 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 Takes eliquis 5mg q12 at home.    PLAN:  - Hold eliquis 5mg iso pending EGD. 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 Takes eliquis 5mg q12 at home.    PLAN:  - Hold eliquis 5mg q12 iso pending EGD.

## 2023-11-28 NOTE — H&P ADULT - PROBLEM SELECTOR PLAN 6
Patient w/ history of CAD. Was on ASA and plavix. Takes home eliquis 5mg q12.     PLAN:  - c/w eliquis 5mg q12. Patient w/ history of CAD. Was on ASA and plavix. Takes home eliquis 5mg q12.     PLAN:  - hold eliquis 5mg q12 iso pending EGD. Patient w/ history of CAD. Was on ASA and plavix but per outpatient chart review, patient is no longer taking. Takes home eliquis 5mg q12.     PLAN:  - hold eliquis 5mg q12 iso pending EGD. Patient w/ history of CAD. Was on ASA and plavix but per outpatient chart review, patient is no longer taking.     PLAN:  - NTD Patient w/ history of CAD. Was on ASA and plavix but per outpatient chart review, patient is no longer taking.    PLAN:  - CTM for ischemic symptoms.

## 2023-11-28 NOTE — H&P ADULT - HISTORY OF PRESENT ILLNESS
84 y/o M w/ Pmhx of CHF, CAD, DM, adrenal mass, hypothyroidism, and gait abnormality presents w/ difficulty swallowing and 40 pound weight loss in the last 2-3 weeks. Patient states he has only been able to drink milk and eat soft bite sized foods and unable to tolerate hard foods. Reports he would often regurgitate food back up. Patient states he went from a size 38 pants size to a 28 in the last few weeks. Sypmtoms are also accompanied by constipation x2 weekswhich he was treated at [facility] for w/ laxatives, last was 5-6 days ago.  States he has never had an EGD or c-scope. Denies fever, nausea, CP, SOB, HA, dizziness     In the ED:  Initial vital signs: T: 97.3F, HR: 103, BP: 107/79, R: 17, SpO2: 99% on RA  ED course: 1L NS   Labs: Hgb 12.2, PT/INR 13.9/1.23, Ca 10.8,  Imaging: CTAP w/ IV cont thickening of midesophageal wall, Indeterminant left adrenal mass, not significantly changed since 3/29/2015. No significant interval change in fat density lesion in the head of the   pancreas, probably a lipoma. Indeterminant hypodense lesion in the right kidney, possibly a hyperdense   cyst. Suggest follow-up and correlation with ultrasound.  CTPE: No PE   EK NSR , , QTc 464, no ST elevations/depression   **Incomplete**  84 y/o M w/ Pmhx of CHF, CAD, DM, adrenal mass, hypothyroidism, and gait abnormality presents w/ difficulty swallowing and 40 pound weight loss in the last 2-3 weeks. Patient states he has only been able to drink milk and eat soft bite sized foods and unable to tolerate hard foods. Reports he would often regurgitate food back up. Patient states he went from a size 38 pants size to a 28 in the last few weeks. Sypmtoms are also accompanied by constipation x2 weekswhich he was treated at [facility] for w/ laxatives, last was 5-6 days ago.  States he has never had an EGD or c-scope. Denies fever, nausea, CP, SOB, HA, dizziness     In the ED:  Initial vital signs: T: 97.3F, HR: 103, BP: 107/79, R: 17, SpO2: 99% on RA  ED course: 1L NS   Labs: Hgb 12.2, PT/INR 13.9/1.23, Ca 10.8,  Imaging: CTAP w/ IV cont thickening of midesophageal wall, Indeterminant left adrenal mass, not significantly changed since 3/29/2015. No significant interval change in fat density lesion in the head of the   pancreas, probably a lipoma. Indeterminant hypodense lesion in the right kidney, possibly a hyperdense   cyst. Suggest follow-up and correlation with ultrasound.  CTPE: No PE   EK NSR , , QTc 464, no ST elevations/depression   **Incomplete**  82 y/o M w/ Pmhx of CHF, CAD, DM, adrenal mass, hypothyroidism, and gait abnormality presents w/ difficulty swallowing and 40 pound weight loss in the last 2-3 weeks. Patient states he has only been able to drink milk and eat soft bite sized foods and unable to tolerate hard foods. Reports he would often regurgitate food back up. Patient states he went from a size 38 pants size to a 28 in the last few weeks. Sypmtoms are also accompanied by constipation x2 weekswhich he was treated at [facility] for w/ laxatives, last was 5-6 days ago.  States he has never had an EGD or c-scope. Denies fever, nausea, CP, SOB, HA, dizziness     In the ED:  Initial vital signs: T: 97.3F, HR: 103, BP: 107/79, R: 17, SpO2: 99% on RA  ED course: 1L NS   Labs: Hgb 12.2, PT/INR 13.9/1.23, Ca 10.8,  Imaging: CTAP w/ IV cont thickening of midesophageal wall, Indeterminant left adrenal mass, not significantly changed since 3/29/2015. No significant interval change in fat density lesion in the head of the   pancreas, probably a lipoma. Indeterminant hypodense lesion in the right kidney, possibly a hyperdense cyst. Suggest follow-up and correlation with ultrasound.  CTPE: No PE   EK NSR , , QTc 464, no ST elevations/depression   84 y/o M w/ Pmhx of CHF, CAD, CVA 2020, DM, adrenal mass, hypothyroidism, and gait abnormality presents w/ difficulty swallowing and 40 pound weight loss in the last 2-3 weeks. Patient states he has only been able to drink milk and eat soft bite sized foods and unable to tolerate hard foods. Reports he would often regurgitate food back up. Patient states he went from a size 38 pants size to a 28 in the last few weeks. Sypmtoms are also accompanied by constipation x2 weekswhich he was treated at [facility] for w/ laxatives, last was 5-6 days ago.  States he has never had an EGD or c-scope. Denies fever, nausea, CP, SOB, HA, dizziness     In the ED:  Initial vital signs: T: 97.3F, HR: 103, BP: 107/79, R: 17, SpO2: 99% on RA  ED course: 1L NS   Labs: Hgb 12.2, PT/INR 13.9/1.23, Ca 10.8,  Imaging: CTAP w/ IV cont thickening of midesophageal wall, Indeterminant left adrenal mass, not significantly changed since 3/29/2015. No significant interval change in fat density lesion in the head of the   pancreas, probably a lipoma. Indeterminant hypodense lesion in the right kidney, possibly a hyperdense cyst. Suggest follow-up and correlation with ultrasound.  CTPE: No PE   EK NSR , , QTc 464, no ST elevations/depression

## 2023-11-28 NOTE — CONSULT NOTE ADULT - ASSESSMENT
83M w/ PMH of CHF, CAD, DM, adrenal mass, hypothyroidism, and gait abnormality, who first presented with dysphagia and weight loss of 40 lbs. CT imaging shows concern for possible esophageal mass. GI consulted for dysphagia and endoscopic evaluation of abnormal CT findings.     Recommendations:   - clear liquid diet today   - obtain barium esophagram   - NPO except medications after midnight   - plan for EGD tomorrow   - obtain further collateral as to patients CHF and CAD history   - order echocardiogram     GI Team will continue to follow.     Jessenia Pérez D.O.   Gastroenterology Fellow  Weekday 7am-5pm Pager: [phone]  Weeknights/Weekend/Holiday Coverage: Please call the  for contact info     83M w/ PMH of CHF, CAD, DM, adrenal mass, hypothyroidism, and gait abnormality, who first presented with dysphagia and weight loss of 40 lbs. CT imaging shows concern for possible esophageal mass. GI consulted for dysphagia and endoscopic evaluation of abnormal CT findings.     Recommendations:   - clear liquid diet today   - obtain barium esophagram   - NPO except medications after midnight   - plan for EGD tomorrow   - obtain further collateral as to patient's CHF and CAD history   - order echocardiogram     Case discussed with Dr. Bowers. GI Team will continue to follow.     Jessenia Pérez D.O.   Gastroenterology Fellow  Weekday 7am-5pm Pager: [phone]  Weeknights/Weekend/Holiday Coverage: Please call the  for contact info

## 2023-11-28 NOTE — PATIENT PROFILE ADULT - FALL HARM RISK - HARM RISK INTERVENTIONS
Assistance with ambulation/Assistance OOB with selected safe patient handling equipment/Communicate Risk of Fall with Harm to all staff/Discuss with provider need for PT consult/Monitor gait and stability/Reinforce activity limits and safety measures with patient and family/Tailored Fall Risk Interventions/Visual Cue: Yellow wristband and red socks/Bed in lowest position, wheels locked, appropriate side rails in place/Call bell, personal items and telephone in reach/Instruct patient to call for assistance before getting out of bed or chair/Non-slip footwear when patient is out of bed/[place] to call system/Physically safe environment - no spills, clutter or unnecessary equipment/Purposeful Proactive Rounding/Room/bathroom lighting operational, light cord in reach

## 2023-11-28 NOTE — H&P ADULT - NSHPLABSRESULTS_GEN_ALL_CORE
.  LABS:                         12.2   8.64  )-----------( 369      ( 27 Nov 2023 18:15 )             37.9     11-27    140  |  110<H>  |  15  ----------------------------<  130<H>  3.7   |  18<L>  |  1.08    Ca    10.8<H>      27 Nov 2023 18:15  Phos  3.3     11-27  Mg     1.9     11-27    TPro  7.6  /  Alb  3.7  /  TBili  0.3  /  DBili  x   /  AST  16  /  ALT  8<L>  /  AlkPhos  51  11-27    PT/INR - ( 27 Nov 2023 20:33 )   PT: 13.9 sec;   INR: 1.23          PTT - ( 27 Nov 2023 20:33 )  PTT:31.5 sec  Urinalysis Basic - ( 27 Nov 2023 21:28 )    Color: Yellow / Appearance: Clear / SG: >1.030 / pH: x  Gluc: x / Ketone: Negative mg/dL  / Bili: Negative / Urobili: 0.2 mg/dL   Blood: x / Protein: Negative mg/dL / Nitrite: Negative   Leuk Esterase: Negative / RBC: x / WBC x   Sq Epi: x / Non Sq Epi: x / Bacteria: x      CARDIAC MARKERS ( 27 Nov 2023 20:07 )  x     / x     / 33 U/L / x     / <1.0 ng/mL            RADIOLOGY, EKG & ADDITIONAL TESTS: Reviewed.

## 2023-11-28 NOTE — H&P ADULT - NSHPPHYSICALEXAM_GEN_ALL_CORE
T(C): 36.3 (11-28-23 @ 05:55), Max: 36.4 (11-28-23 @ 03:23)  HR: 74 (11-28-23 @ 05:55) (74 - 103)  BP: 159/94 (11-28-23 @ 05:55) (107/79 - 159/94)  RR: 17 (11-28-23 @ 05:55) (17 - 18)  SpO2: 95% (11-28-23 @ 05:55) (95% - 99%)    General: NAD, laying in bed, speaking in full sentences  HEENT: Dry oral mucous membranes. head NC/AT, no conjunctival injection, EOMI  Neck: supple, no JVD  Cardio: RRR, +S1/S2, no M/R/G  Resp: lungs CTAB, no cough/wheezes/rales/rhonchi  Abdo: soft, NT, ND, +bowel sounds x4, no organomegaly or palpable mass    Extremities: WWP, no edema/cyanosis/clubbing   Vasc: 2+ radial and DP pulses b/l  Neuro: A&Ox3  Psych: speech non-pressured, thoughts goal-oriented  Skin: dry, intact, no visible jaundice   MSK: no joint swelling

## 2023-11-28 NOTE — H&P ADULT - PROBLEM SELECTOR PLAN 5
Hgb of 12.2, baseline 12-14 outpatient labs. No signs and symptoms of bleeding. MCV normal.    PLAN:  - f/u iron studies

## 2023-11-29 NOTE — DIETITIAN INITIAL EVALUATION ADULT - PERTINENT MEDS FT
MEDICATIONS  (STANDING):  amLODIPine   Tablet 5 milliGRAM(s) Oral every 24 hours  atorvastatin 20 milliGRAM(s) Oral at bedtime  dextrose 5% + lactated ringers. 1000 milliLiter(s) (75 mL/Hr) IV Continuous <Continuous>  dextrose 5%. 1000 milliLiter(s) (100 mL/Hr) IV Continuous <Continuous>  dextrose 5%. 1000 milliLiter(s) (50 mL/Hr) IV Continuous <Continuous>  dextrose 50% Injectable 25 Gram(s) IV Push once  dextrose 50% Injectable 25 Gram(s) IV Push once  dextrose 50% Injectable 12.5 Gram(s) IV Push once  fenofibrate Tablet 48 milliGRAM(s) Oral every 24 hours  glucagon  Injectable 1 milliGRAM(s) IntraMuscular once  insulin lispro (ADMELOG) corrective regimen sliding scale   SubCutaneous Before meals and at bedtime  levothyroxine 75 MICROGram(s) Oral every 24 hours  polyethylene glycol 3350 17 Gram(s) Oral every 24 hours    MEDICATIONS  (PRN):  dextrose Oral Gel 15 Gram(s) Oral once PRN Blood Glucose LESS THAN 70 milliGRAM(s)/deciliter  ondansetron Injectable 4 milliGRAM(s) IV Push every 8 hours PRN Nausea and/or Vomiting

## 2023-11-29 NOTE — PROGRESS NOTE ADULT - PROBLEM SELECTOR PLAN 8
Patient w/ History of CHF. Per outpatient chart review, patient not on any GDMT. Appears dry on exam.   - ECHO 04/07/2020: There is moderate concentric left ventricular hypertrophy. The left ventricle is normal in size and systolic function with a calculated ejection fraction of 61%. There are no regional wall motion abnormalities seen.    PLAN:  - CTM fluid status. Patient w/ History of CHF. Per outpatient chart review, patient not on any GDMT. Appears dry on exam on admission.   - ECHO 04/07/2020: There is moderate concentric left ventricular hypertrophy. The left ventricle is normal in size and systolic function with a calculated ejection fraction of 61%. There are no regional wall motion abnormalities seen.  - ECHO 11/28/23: There is mild-to-moderate concentric left ventricular hypertrophy. The left ventricle is normal in size and systolic function with a calculated ejection fraction of 60-65%. There are no regional wall motion abnormalities seen. Analysis of mitral annular tissue Doppler, left atrial volume index, and tricuspid regurgitant velocity reveals: indeterminate left ventricular diastolic function and filling pressure.    PLAN:  - CTM fluid status.

## 2023-11-29 NOTE — DISCHARGE NOTE PROVIDER - NSDCFUADDAPPT_GEN_ALL_CORE_FT
(1) Follow up GI     (2) Follow up PCP    (3) Follow Heme/Onc  (1) Follow up GI: Please follow up at our GI clinic. The office will contact you to schedule an appointment. Please make sure you answer your phone.      (2) Follow up PCP: Follow up with your house calls.     (3) Follow Heme/Onc: Please follow up hematologist/oncolgist Dr. Stallworth. We contacted their office and they will reach out to you to schedule an appointment.  (1) Follow up GI: Please follow up at our GI clinic. The office will contact you to schedule an appointment. Please make sure you answer your phone.      (2) Follow up PCP: Follow up with your primary care doctor.     (3) Follow Heme/Onc: Please follow up hematologist/oncolgist Dr. Stallworth. We contacted their office and they will reach out to you to schedule an appointment.  (1) Follow up GI: Please follow up at our GI clinic. The office will contact you to schedule an appointment. Please make sure you answer your phone. Their phone number is [phone]     (2) Follow up PCP: Follow up with your primary care doctor/home visits.     (3) Follow Heme/Onc: Please follow up hematologist/oncolgist Dr. Stallworth. We contacted their office and they will reach out to you to schedule an appointment. Their phone number is [phone] (1) Follow up GI: Please follow up at our GI clinic. The office will contact you to schedule an appointment. Please make sure you answer your phone. Their phone number is [phone]     (2) Follow up PCP: Follow up with your primary care doctor/home visits.     (3) Follow Heme/Onc: Please follow up hematologist/oncolgist Dr. Stallworth. We contacted their office and they will reach out to you to schedule an appointment. Their phone number is [phone] (1) Follow up GI: Please follow up at our GI clinic. The office will contact you to schedule an appointment. Please make sure you answer your phone. Their phone number is [phone]     (2) Follow up PCP: Follow up with your primary care doctor/home visits.     (3) Follow Heme/Onc: Please follow up hematologist/oncolgist Dr. Stallworth. We contacted their office and they will reach out to you to schedule an appointment. Their phone number is [phone]

## 2023-11-29 NOTE — DIETITIAN NUTRITION RISK NOTIFICATION - TREATMENT: THE FOLLOWING DIET HAS BEEN RECOMMENDED
Diet, NPO after Midnight:      NPO Start Date: 29-Nov-2023,   NPO Start Time: 23:59 (11-29-23 @ 10:14) [Active]  Diet, Clear Liquid (11-29-23 @ 10:14) [Active]

## 2023-11-29 NOTE — DIETITIAN INITIAL EVALUATION ADULT - PROBLEM SELECTOR PLAN 6
Patient w/ history of CAD. Was on ASA and plavix but per outpatient chart review, patient is no longer taking.    PLAN:  - CTM for ischemic symptoms.

## 2023-11-29 NOTE — DIETITIAN INITIAL EVALUATION ADULT - OTHER INFO
82 y/o M w/ PMHx CHF, CAD, DM, adrenal mass, hypothyroidism, and gait abnormality presents to  ED w/ dysphagia and weight loss in the last 2-3 weeks. CT imaging shows concern for malignancy. Patient admitted to [facility] for further work up dysphagia and weight loss.    Pt started on clear liquids diet.  Per chart only able to tolerate liquids and soft foods and would experience regurgitation w/ hard solid foods. Awaiting EGD. Barium swallow concerning for malignancy.   Decreased PO, reports 40lb weight loss (UBW ~100kg) indicating 18.2kg (18%) significant weight loss in the last ~1month.  Severe malnutrition.  Pt at refeeding risk  No pain noted. Ian score 18.  RD to follow, nutrition recommendations below.   82 y/o M w/ PMHx CHF, CAD, DM, adrenal mass, hypothyroidism, and gait abnormality presents to  ED w/ dysphagia and weight loss in the last 2-3 weeks. CT imaging shows concern for malignancy. Patient admitted to [facility] for further work up dysphagia and weight loss.    Pt assessed at bedside, resting in bed on room air. Pt started on clear liquids diet pending further workup. Pt with decreased PO PTA, has only been able to tolerate liquids and soft foods and experiences regurgitation with solid foods. Awaiting EGD. Barium swallow was concerning for malignancy. Reports 40lb weight loss (UBW ~100kg) indicating 18.2kg (18%) significant weight loss in the last ~1month. Poor intake and weight loss indicative of severe protein energy malnutrition. Pt is also at refeeding risk and requires close monitoring of electrolytes. No pain noted. Ian score 18. RD to follow, nutrition recommendations below.

## 2023-11-29 NOTE — PROGRESS NOTE ADULT - PROBLEM SELECTOR PLAN 2
Patient presents w/ hypercalcemia this admission of 10.8. Per outpatient labs patient was 10.8 on 10/12 and wnl before then.   Patient is s/p 1L  Etiology: Hyperparathyroidism vs malignancy.     PLAN:  - f/u PTH ---> = 18 (WNL)  - f/u AM labs Patient presents w/ hypercalcemia this admission of 10.8. Per outpatient labs patient was 10.8 on 10/12 and wnl before then.   Etiology: Hyperparathyroidism vs malignancy.   Improved w/ 2L of fluids.   PTH ---> = 18 (WNL)    PLAN:  - CTM  - outpatient PCP f/u

## 2023-11-29 NOTE — DIETITIAN INITIAL EVALUATION ADULT - OTHER CALCULATIONS
Estimated needs based on ABW; calorie/protein needs adjusted for ?malignancy, unexpected weight loss, repletion

## 2023-11-29 NOTE — DIETITIAN INITIAL EVALUATION ADULT - PROBLEM SELECTOR PLAN 1
82 y/o M present w/ dysphagia and unexpected weight loss in the last few weeks. Only able to tolerate liquids and soft foods and would experience regurgitation w/ hard solid foods. Reports 40lbs unexplained weight loss and a decrease in pant size from 38 to 28.   CTAP IV cont: Marked circumferential wall thickening of the midesophagus. Malignancy cannot be excluded. Suggest endoscopic correlation.    PLAN:  - f/u GI recs,  - f/u barium swallow  - start clear liquid diet  - NPO after midnight.

## 2023-11-29 NOTE — DIETITIAN INITIAL EVALUATION ADULT - PROBLEM SELECTOR PLAN 2
Patient presents w/ hypercalcemia this admission of 10.8. Per outpatient labs patient was 10.8 on 10/12 and wnl before then.   Patient is s/p 1L  Etiology: Hyperparathyroidism vs malignancy.     PLAN:  - f/u PTH   - f/u AM labs

## 2023-11-29 NOTE — DIETITIAN INITIAL EVALUATION ADULT - ADD RECOMMEND
1. Continue clear liquids  --Recommend add ensure clear TID  --Goal PO diet DASH/TLC, consistent carbohydrate + Glucerna BID  2. Pending plan, consider supplemental enteral feeds overnight  3. Monitor electrolytes, monitor for refeeding risk (K, Mag, Phos), adjust and replete prn  4. Pain and bowel regimen per team   5. RD diet edu prn

## 2023-11-29 NOTE — PROGRESS NOTE ADULT - PROBLEM SELECTOR PLAN 1
82 y/o M present w/ dysphagia and unexpected weight loss in the last few weeks. Only able to tolerate liquids and soft foods and would experience regurgitation w/ hard solid foods. Reports 40lbs unexplained weight loss and a decrease in pant size from 38 to 28.   CTAP IV cont: Marked circumferential wall thickening of the midesophagus. Malignancy cannot be excluded. Suggest endoscopic correlation.    PLAN:  - f/u GI recs,  - Barium swallow shows "masslike defect extending 5cm along mid-distal esophagus resulting in moderate luminal narrowing highly concerning for malignancy"  - start clear liquid diet  - NPO after midnight. 82 y/o M present w/ dysphagia and unexpected weight loss in the last few weeks. Only able to tolerate liquids and soft foods and would experience regurgitation w/ hard solid foods. Reports 40lbs unexplained weight loss and a decrease in pant size from 38 to 28.   CTAP IV cont: Marked circumferential wall thickening of the midesophagus. Malignancy cannot be excluded. Suggest endoscopic correlation.    PLAN:  - f/u GI recs,  - Barium swallow shows "masslike defect extending 5cm along mid-distal esophagus resulting in moderate luminal narrowing highly concerning for malignancy"  - start soft diet  - NPO after midnight. 82 y/o M present w/ dysphagia and unexpected weight loss in the last few weeks. Only able to tolerate liquids and soft foods and would experience regurgitation w/ hard solid foods. Reports 40lbs unexplained weight loss and a decrease in pant size from 38 to 28.   - CTAP IV cont: Marked circumferential wall thickening of the midesophagus. Malignancy cannot be excluded. Suggest endoscopic correlation.  - Barium swallow shows "masslike defect extending 5cm along mid-distal esophagus resulting in moderate luminal narrowing highly concerning for malignancy"    PLAN:  - f/u GI recs,  - Plan for EGD 11/30/23  - start clear liquid diet  - NPO after midnight.

## 2023-11-29 NOTE — PROGRESS NOTE ADULT - PROBLEM SELECTOR PLAN 6
Patient w/ history of CAD. Was on ASA and plavix but per outpatient chart review, patient is no longer taking.    PLAN:  - CTM for ischemic symptoms. Patient w/ history of CAD. Was on ASA and plavix but per outpatient chart review, patient is no longer taking.  Collateral from PCP office states patient was last prescribed ASA and plavix on 06/2020.   - Patient states he has no cardiac history.   - EKG no signs of ischemia, Trop 13     PLAN:  - CTM for ischemic symptoms.

## 2023-11-29 NOTE — DISCHARGE NOTE PROVIDER - DETAILS OF MALNUTRITION DIAGNOSIS/DIAGNOSES
This patient has been assessed with a concern for Malnutrition and was treated during this hospitalization for the following Nutrition diagnosis/diagnoses:     -  11/29/2023: Severe protein-calorie malnutrition

## 2023-11-29 NOTE — DISCHARGE NOTE PROVIDER - NSDCMRMEDTOKEN_GEN_ALL_CORE_FT
Alphagan 0.2% ophthalmic solution: 1 drop(s) to each affected eye once a day  amLODIPine: 5 milligram(s) orally once a day  atorvastatin 20 mg oral tablet: 1 tab(s) orally once a day (at bedtime)  brimonidine 0.15% ophthalmic solution: 1 drop(s) in each affected eye once a day  dorzolamide-timolol 2.23%-0.68% (2%-0.5% base) ophthalmic solution: 1 drop(s) in each eye 3 times a day  Eliquis 5 mg oral tablet: 1 tab(s) orally 2 times a day  fenofibrate 48 mg oral tablet: 1 tab(s) orally once a day  glimepiride 2 mg oral tablet: 1 tab(s) orally once a day  latanoprost 0.005% ophthalmic emulsion: 1 drop(s) in each affected eye  metFORMIN 850 mg oral tablet: 1 tab(s) orally 2 times a day  Synthroid: 75 milligram(s) orally once a day   Alphagan 0.2% ophthalmic solution: 1 drop(s) to each affected eye once a day  amLODIPine: 5 milligram(s) orally once a day  aspirin 81 mg oral capsule: 1 cap(s) orally once a day  atorvastatin 20 mg oral tablet: 1 tab(s) orally once a day (at bedtime)  brimonidine 0.15% ophthalmic solution: 1 drop(s) in each affected eye once a day  dorzolamide-timolol 2.23%-0.68% (2%-0.5% base) ophthalmic solution: 1 drop(s) in each eye 3 times a day  fenofibrate 48 mg oral tablet: 1 tab(s) orally once a day  glimepiride 2 mg oral tablet: 1 tab(s) orally once a day  latanoprost 0.005% ophthalmic emulsion: 1 drop(s) in each affected eye  lisinopril 10 mg oral tablet: 1 tab(s) orally every 24 hours  metFORMIN 850 mg oral tablet: 1 tab(s) orally 2 times a day  Synthroid: 75 milligram(s) orally once a day   Alphagan 0.2% ophthalmic solution: 1 drop(s) to each affected eye once a day  amLODIPine: 5 milligram(s) orally once a day  aspirin 81 mg oral capsule: 1 cap(s) orally once a day  atorvastatin 20 mg oral tablet: 1 tab(s) orally once a day (at bedtime)  brimonidine 0.15% ophthalmic solution: 1 drop(s) in each affected eye once a day  dorzolamide-timolol 2.23%-0.68% (2%-0.5% base) ophthalmic solution: 1 drop(s) in each eye 3 times a day  fenofibrate 48 mg oral tablet: 1 tab(s) orally once a day  glimepiride 2 mg oral tablet: 1 tab(s) orally once a day  latanoprost 0.005% ophthalmic emulsion: 1 drop(s) in each affected eye  lisinopril 10 mg oral tablet: 1 tab(s) orally every 24 hours  metFORMIN 850 mg oral tablet: 1 tab(s) orally 2 times a day  polyethylene glycol 3350 oral powder for reconstitution: 17 gram(s) orally every 12 hours  senna leaf extract oral tablet: 2 tab(s) orally once a day (at bedtime)  Synthroid: 75 milligram(s) orally once a day

## 2023-11-29 NOTE — DISCHARGE NOTE PROVIDER - NSDCCPCAREPLAN_GEN_ALL_CORE_FT
PRINCIPAL DISCHARGE DIAGNOSIS  Diagnosis: Dysphagia  Assessment and Plan of Treatment:       SECONDARY DISCHARGE DIAGNOSES  Diagnosis: Constipation  Assessment and Plan of Treatment:      PRINCIPAL DISCHARGE DIAGNOSIS  Diagnosis: Dysphagia  Assessment and Plan of Treatment: Dysphagia is a medical term for difficulty swallowing. Dysphagia can be a painful condition. In some cases, swallowing is impossible.  Occasional difficulty swallowing, such as when you eat too fast or don't chew your food well enough, usually isn't cause for concern. But ongoing dysphagia can be a serious medical condition that needs treatment.  Dysphagia can happen at any age, but it's more common in older adults. The causes of swallowing problems vary, and treatment depends on the cause.  Please follow up with outpatient Gastroenterolgy doctor, hemotolgist/oncology, and your primary care doctor.         SECONDARY DISCHARGE DIAGNOSES  Diagnosis: Constipation  Assessment and Plan of Treatment: Please continue to take     PRINCIPAL DISCHARGE DIAGNOSIS  Diagnosis: Dysphagia  Assessment and Plan of Treatment: Dysphagia is a medical term for difficulty swallowing. Dysphagia can be a painful condition. In some cases, swallowing is impossible.  Occasional difficulty swallowing, such as when you eat too fast or don't chew your food well enough, usually isn't cause for concern. But ongoing dysphagia can be a serious medical condition that needs treatment.  Dysphagia can happen at any age, but it's more common in older adults. The causes of swallowing problems vary, and treatment depends on the cause.  Please follow up with outpatient Gastroenterolgy doctor, hemotolgist/oncology, and your primary care doctor.   Patient requires increase hours of home health aid for support.         SECONDARY DISCHARGE DIAGNOSES  Diagnosis: Constipation  Assessment and Plan of Treatment: Please continue to take senna and miralax to ensure daily bowel movement. Continue with high fiber diet and good oral ingestion of water.     PRINCIPAL DISCHARGE DIAGNOSIS  Diagnosis: Dysphagia  Assessment and Plan of Treatment: Dysphagia is a medical term for difficulty swallowing. Dysphagia can be a painful condition. In some cases, swallowing is impossible.  Occasional difficulty swallowing, such as when you eat too fast or don't chew your food well enough, usually isn't cause for concern. But ongoing dysphagia can be a serious medical condition that needs treatment.  Dysphagia can happen at any age, but it's more common in older adults. The causes of swallowing problems vary, and treatment depends on the cause.  Please follow up with outpatient Gastroenterolgy doctor, hemotolgist/oncology, and your primary care doctor.   Patient requires increase hours of home health aid for support.         SECONDARY DISCHARGE DIAGNOSES  Diagnosis: Constipation  Assessment and Plan of Treatment: Please continue to take senna and miralax to ensure daily bowel movement. Continue with high fiber diet and good oral ingestion of water.    Diagnosis: Adult failure to thrive  Assessment and Plan of Treatment: Please start adding ensure once a day to your diet for calorie nutrition supplementation.

## 2023-11-29 NOTE — DISCHARGE NOTE PROVIDER - ATTENDING DISCHARGE PHYSICAL EXAMINATION:
83 YOM with PMH of HTN, stroke, T2DM, hypothyroidism presenting with progressive solid food dysphagia and 40-lb weight loss over the last 2-3 weeks.     Dysphagia - reviewed CT results and barium study which demonstrated circumferential wall thickening and masslike defect along the mid distal esophagus with moderate luminal narrowing c/f malignancy. Evaluated by GI, pending EGD with biopsy.     History of stroke - thought 2/2 hypercoagulable state in setting of COVID-19 infection, was previously on DAPT and then switched to apixaban 5mg BID. Stroke occurred in 2020 and plan was to down-titrate to one antiplatelet. Will start aspirin 81mg on discharge with high intensity statin for secondary prevention.    Hypokalemia, hypophosphatemia - replete to maintain K>4, phos >3    Reported history of CAD and CHF - patient denies. Was on DAPT previously for stroke and not heart disease. TTE here with normal LVEF (diastolic dysfunction indeterminate), no valvular disease. Denies history of HF symptoms.     Dispo: pending EGD and biopsy, plan to return to [facility]

## 2023-11-29 NOTE — PROGRESS NOTE ADULT - PROBLEM SELECTOR PLAN 11
F: s/p 1L   E: replete K < 4, Mg <2  DVT ppx- SCD  Diet- NPO pending EGD.   Activity- ambulate as tolerated  Code- Full. F: s/p 1L   E: replete K < 4, Mg <2  DVT ppx- SCD  Diet- soft diet, NPO after midnight pending EGD on 11/30.   Activity- ambulate as tolerated  Code- Full. F: s/p 1L   E: replete K < 4, Mg <2  DVT ppx- SCD  Diet- clear liquid diet, NPO after midnight pending EGD on 11/30.   Activity- ambulate as tolerated  Code- Full. F: s/p 1L in ED. Started D5w +LR @75cc/hr x12hrs.   E: replete K < 4, Mg <2  DVT ppx- SCD  Diet- clear liquid diet, NPO after midnight pending EGD on 11/30.   Activity- ambulate as tolerated  Code- Full.

## 2023-11-29 NOTE — PROGRESS NOTE ADULT - PROBLEM SELECTOR PLAN 10
F: s/p 1L   E: replete K < 4, Mg <2  DVT ppx- SCD  Diet- Cleat diet.   Activity- ambulate as tolerated  Code- Full. - Recommend Miralax 17g mixed w/ water qd upon d/c Patient w/ recent constipation. Received laxatives outpatient resulting in 1BM which was 6-7days ago. Reports BM on 11/28 PM which was hard kane.    PLAN:  - Started Miralax 17gm.

## 2023-11-29 NOTE — PROGRESS NOTE ADULT - PROBLEM SELECTOR PLAN 5
Hgb of 12.2, baseline 12-14 outpatient labs. No signs and symptoms of bleeding. MCV normal.    PLAN:  - f/u iron studies ---> Iron Total = 43 (Ref Range: ) Hgb of 12.2, baseline 12-14 outpatient labs. No signs and symptoms of bleeding. MCV normal.  Iron total 43, TIBC 247, % saturation iron 17, Ferritin 131    PLAN:  - CTM for signs and symptoms of bleeding.  - f/u outpatient PCP

## 2023-11-29 NOTE — PROGRESS NOTE ADULT - SUBJECTIVE AND OBJECTIVE BOX
GI Consult Progress Note:       OVERNIGHT EVENTS: ARTURO.     SUBJECTIVE / INTERVAL HPI:   Patient seen and examined at bedside. States that overall he feels well. Still complaining         VITAL SIGNS:  Vital Signs Last 24 Hrs  T(C): 36.6 (29 Nov 2023 09:29), Max: 36.7 (28 Nov 2023 21:35)  T(F): 97.8 (29 Nov 2023 09:29), Max: 98.1 (28 Nov 2023 21:35)  HR: 85 (29 Nov 2023 09:29) (79 - 87)  BP: 163/93 (29 Nov 2023 09:29) (129/79 - 165/88)  BP(mean): --  RR: 18 (29 Nov 2023 09:29) (18 - 18)  SpO2: 97% (29 Nov 2023 09:29) (97% - 97%)    Parameters below as of 29 Nov 2023 09:29  Patient On (Oxygen Delivery Method): room air          PHYSICAL EXAM:    General: WDWN  HEENT: NC/AT; PERRL, anicteric sclera; MMM  Neck: supple  Cardiovascular: +S1/S2; RRR  Respiratory: CTA B/L; no W/R/R  Gastrointestinal: soft, NT/ND; +BSx4  Extremities: WWP; no edema, clubbing or cyanosis  Vascular: 2+ radial, DP/PT pulses B/L  Neurological: AAOx3; no focal deficits    MEDICATIONS:  MEDICATIONS  (STANDING):  amLODIPine   Tablet 5 milliGRAM(s) Oral every 24 hours  atorvastatin 20 milliGRAM(s) Oral at bedtime  dextrose 5% + lactated ringers. 1000 milliLiter(s) (75 mL/Hr) IV Continuous <Continuous>  dextrose 5%. 1000 milliLiter(s) (50 mL/Hr) IV Continuous <Continuous>  dextrose 5%. 1000 milliLiter(s) (100 mL/Hr) IV Continuous <Continuous>  dextrose 50% Injectable 25 Gram(s) IV Push once  dextrose 50% Injectable 25 Gram(s) IV Push once  dextrose 50% Injectable 12.5 Gram(s) IV Push once  fenofibrate Tablet 48 milliGRAM(s) Oral every 24 hours  glucagon  Injectable 1 milliGRAM(s) IntraMuscular once  insulin lispro (ADMELOG) corrective regimen sliding scale   SubCutaneous Before meals and at bedtime  levothyroxine 75 MICROGram(s) Oral every 24 hours  polyethylene glycol 3350 17 Gram(s) Oral every 24 hours    MEDICATIONS  (PRN):  dextrose Oral Gel 15 Gram(s) Oral once PRN Blood Glucose LESS THAN 70 milliGRAM(s)/deciliter  ondansetron Injectable 4 milliGRAM(s) IV Push every 8 hours PRN Nausea and/or Vomiting      ALLERGIES:  Allergies    No Known Allergies    Intolerances        LABS:                        12.4   8.43  )-----------( 398      ( 29 Nov 2023 05:30 )             38.7     11-29    142  |  110<H>  |  7   ----------------------------<  136<H>  3.6   |  20<L>  |  0.65    Ca    10.2      29 Nov 2023 05:30  Phos  2.2     11-29  Mg     1.8     11-29    TPro  7.0  /  Alb  3.6  /  TBili  0.4  /  DBili  x   /  AST  14  /  ALT  7<L>  /  AlkPhos  50  11-29    PT/INR - ( 28 Nov 2023 06:40 )   PT: 12.4 sec;   INR: 1.09          PTT - ( 28 Nov 2023 06:40 )  PTT:30.6 sec  Urinalysis Basic - ( 29 Nov 2023 05:30 )    Color: x / Appearance: x / SG: x / pH: x  Gluc: 136 mg/dL / Ketone: x  / Bili: x / Urobili: x   Blood: x / Protein: x / Nitrite: x   Leuk Esterase: x / RBC: x / WBC x   Sq Epi: x / Non Sq Epi: x / Bacteria: x      CAPILLARY BLOOD GLUCOSE      POCT Blood Glucose.: 166 mg/dL (29 Nov 2023 12:16)        RADIOLOGY & ADDITIONAL TESTS: Reviewed.    ASSESSMENT:    PLAN:      GI Consult Progress Note:       OVERNIGHT EVENTS: ARTURO.     SUBJECTIVE / INTERVAL HPI:   Patient seen and examined at bedside. States that overall he feels well. Still complaining of dysphagia. Planned for EGD tomorrow.       VITAL SIGNS:  Vital Signs Last 24 Hrs  T(C): 36.6 (29 Nov 2023 09:29), Max: 36.7 (28 Nov 2023 21:35)  T(F): 97.8 (29 Nov 2023 09:29), Max: 98.1 (28 Nov 2023 21:35)  HR: 85 (29 Nov 2023 09:29) (79 - 87)  BP: 163/93 (29 Nov 2023 09:29) (129/79 - 165/88)  BP(mean): --  RR: 18 (29 Nov 2023 09:29) (18 - 18)  SpO2: 97% (29 Nov 2023 09:29) (97% - 97%)    Parameters below as of 29 Nov 2023 09:29  Patient On (Oxygen Delivery Method): room air    PHYSICAL EXAM:  General: No acute distress, thin, frail   Lungs: Normal respiratory effort and no intercostal retractions  Cardiovascular: RRR  Abdomen: Soft, non-tender, non-distended  Neurological: Alert and oriented x3  Skin: Warm and dry. No obvious rash      MEDICATIONS:  MEDICATIONS  (STANDING):  amLODIPine   Tablet 5 milliGRAM(s) Oral every 24 hours  atorvastatin 20 milliGRAM(s) Oral at bedtime  dextrose 5% + lactated ringers. 1000 milliLiter(s) (75 mL/Hr) IV Continuous <Continuous>  dextrose 5%. 1000 milliLiter(s) (50 mL/Hr) IV Continuous <Continuous>  dextrose 5%. 1000 milliLiter(s) (100 mL/Hr) IV Continuous <Continuous>  dextrose 50% Injectable 25 Gram(s) IV Push once  dextrose 50% Injectable 25 Gram(s) IV Push once  dextrose 50% Injectable 12.5 Gram(s) IV Push once  fenofibrate Tablet 48 milliGRAM(s) Oral every 24 hours  glucagon  Injectable 1 milliGRAM(s) IntraMuscular once  insulin lispro (ADMELOG) corrective regimen sliding scale   SubCutaneous Before meals and at bedtime  levothyroxine 75 MICROGram(s) Oral every 24 hours  polyethylene glycol 3350 17 Gram(s) Oral every 24 hours    MEDICATIONS  (PRN):  dextrose Oral Gel 15 Gram(s) Oral once PRN Blood Glucose LESS THAN 70 milliGRAM(s)/deciliter  ondansetron Injectable 4 milliGRAM(s) IV Push every 8 hours PRN Nausea and/or Vomiting      ALLERGIES:  Allergies    No Known Allergies    Intolerances        LABS:                        12.4   8.43  )-----------( 398      ( 29 Nov 2023 05:30 )             38.7     11-29    142  |  110<H>  |  7   ----------------------------<  136<H>  3.6   |  20<L>  |  0.65    Ca    10.2      29 Nov 2023 05:30  Phos  2.2     11-29  Mg     1.8     11-29    TPro  7.0  /  Alb  3.6  /  TBili  0.4  /  DBili  x   /  AST  14  /  ALT  7<L>  /  AlkPhos  50  11-29    PT/INR - ( 28 Nov 2023 06:40 )   PT: 12.4 sec;   INR: 1.09          PTT - ( 28 Nov 2023 06:40 )  PTT:30.6 sec  Urinalysis Basic - ( 29 Nov 2023 05:30 )    Color: x / Appearance: x / SG: x / pH: x  Gluc: 136 mg/dL / Ketone: x  / Bili: x / Urobili: x   Blood: x / Protein: x / Nitrite: x   Leuk Esterase: x / RBC: x / WBC x   Sq Epi: x / Non Sq Epi: x / Bacteria: x      CAPILLARY BLOOD GLUCOSE      POCT Blood Glucose.: 166 mg/dL (29 Nov 2023 12:16)  RADIOLOGY & ADDITIONAL TESTS: Reviewed

## 2023-11-29 NOTE — DIETITIAN INITIAL EVALUATION ADULT - PROBLEM SELECTOR PLAN 9
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 Takes eliquis 5mg q12 at home.    PLAN:  - Hold eliquis 5mg q12 iso pending EGD.

## 2023-11-29 NOTE — DIETITIAN INITIAL EVALUATION ADULT - NS FNS DIET ORDER
Diet, NPO after Midnight:      NPO Start Date: 29-Nov-2023,   NPO Start Time: 23:59 (11-29-23 @ 10:14)  Diet, Clear Liquid (11-29-23 @ 10:14)

## 2023-11-29 NOTE — DIETITIAN INITIAL EVALUATION ADULT - PROBLEM SELECTOR PLAN 8
Patient w/ History of CHF. Per outpatient chart review, patient not on any GDMT. Appears dry on exam.   - ECHO 04/07/2020: There is moderate concentric left ventricular hypertrophy. The left ventricle is normal in size and systolic function with a calculated ejection fraction of 61%. There are no regional wall motion abnormalities seen.    PLAN:  - CTM fluid status.

## 2023-11-29 NOTE — PROGRESS NOTE ADULT - PROBLEM SELECTOR PLAN 9
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 Takes eliquis 5mg q12 at home.    PLAN:  - Hold eliquis 5mg q12 iso pending EGD. 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 Takes eliquis 5mg q12 at home.    PLAN:  - Hold eliquis 5mg q12 iso pending EGD.  - Upon D/C, switch to ASA instead of eliquis History of CVA in 04/2020. Takes eliquis 5mg q12 at home.    PLAN:  - Hold eliquis 5mg q12 iso pending EGD.  - Upon D/C, switch to ASA instead of eliquis, f/u outpt w/ PCP History of CVA in 04/2020. Takes eliquis 5mg q12 at home.  Per outpatient chart review, CVA was iso hypercoagulable state iso covid-19 infection, was initially on lovenox 80mg BID but transitioned to ASA 81mg and Plavix 75mg, last picked up on 06/2020.    PLAN:  - Hold eliquis 5mg q12 iso pending EGD.  - Upon D/C, switch to ASA instead of eliquis, f/u outpt w/ PCP

## 2023-11-29 NOTE — DIETITIAN INITIAL EVALUATION ADULT - PROBLEM SELECTOR PLAN 10
F: s/p 1L   E: replete K < 4, Mg <2  DVT ppx- SCD  Diet- Cleat diet.   Activity- ambulate as tolerated  Code- Full.

## 2023-11-29 NOTE — DIETITIAN INITIAL EVALUATION ADULT - PERTINENT LABORATORY DATA
11-29    142  |  110<H>  |  7   ----------------------------<  136<H>  3.6   |  20<L>  |  0.65    Ca    10.2      29 Nov 2023 05:30  Phos  2.2     11-29  Mg     1.8     11-29    TPro  7.0  /  Alb  3.6  /  TBili  0.4  /  DBili  x   /  AST  14  /  ALT  7<L>  /  AlkPhos  50  11-29  POCT Blood Glucose.: 166 mg/dL (11-29-23 @ 12:16)  A1C with Estimated Average Glucose Result: 7.0 % (11-29-23 @ 05:30)

## 2023-11-29 NOTE — DIETITIAN INITIAL EVALUATION ADULT - NSICDXPASTMEDICALHX_GEN_ALL_CORE_FT
PAST MEDICAL HISTORY:  Adrenal mass     CAD (coronary artery disease)     Diabetes mellitus, type 2     Essential hypertension Hypertension    H/O CHF     Hypothyroid

## 2023-11-29 NOTE — DISCHARGE NOTE PROVIDER - HOSPITAL COURSE
#Discharge: do not delete  82 y/o M w/ PMHx CHF, CAD, DM, adrenal mass, hypothyroidism, and gait abnormality presents to  ED w/ dysphagia and weight loss in the last 2-3 weeks accompanied w/  constipation w/ minimal improvement on laxatives. CT imaging shows concern for malignancy. CTAP was done in the ED which showed concern for Marked circumferential wall thickening of the midesophagus. Malignancy cannot be excluded. Suggest endoscopic correlation. Esophageal barium swallow showed Masslike defect extending 5 cm along the mid distal esophagus and resulting in moderate luminal narrowing. No delayed passage of liquid contrast material. GI was consulted and patient went for EGD on 11/30/23 which showed ______. Patient's constipation was treated w/ miralax, senna and tap water enema. Patient was medically optimized, stable and ready for discharge. Plan of care and return precautions were discussed with the patient who verbally stated understanding    #dysphagia   Plan: 82 y/o M present w/ dysphagia and unexpected weight loss in the last few weeks. Only able to tolerate liquids and soft foods and would experience regurgitation w/ hard solid foods. Reports 40lbs unexplained weight loss and a decrease in pant size from 38 to 28.   - CTAP IV cont: Marked circumferential wall thickening of the midesophagus. Malignancy cannot be excluded. Suggest endoscopic correlation.  - Barium swallow shows "masslike defect extending 5cm along mid-distal esophagus resulting in moderate luminal narrowing highly concerning for malignancy"  - EGD 11/30    PLAN:  - f/u outpatient GI doctor  - f/u PCP     #Hypercalcemia  Patient presents w/ hypercalcemia this admission of 10.8. Per outpatient labs patient was 10.8 on 10/12 and wnl before then.   Etiology: Hyperparathyroidism vs malignancy.   Improved w/ 2L of fluids.   PTH 18 (WNL)    PLAN:  - f/u outpatient pcp    #DM   Patient w/ hsitory of DM. Tkaes home glimepiride 2mg  Was started on sliding scale in 24hrs needed 4u intotal.    PLAN:  - c/w glimepiride 2mg    #HTN  History of hypertension. takes amlodipine 5mg at home. BP was uncontrolled w/ SBPs in 170s. Was given dose of hydral 5mg x1 and was started on lisinpril 10mg qd    PLAN:  - c/w amlodipine 5mg  - c/w Lisinopril 10mg qd    New medications/therapies: Lisinopril 10mg   New lines/hardware: None   Labs to be followed outpatient: None   Exam to be followed outpatient: PCP and GI doctor.     Discharge plan: discharge to [facility]    Physical Exam Upon Discharge:  T(C): 36.7 (11-30-23 @ 09:06), Max: 36.7 (11-29-23 @ 16:01)  HR: 79 (11-30-23 @ 09:06) (73 - 91)  BP: 159/89 (11-30-23 @ 11:00) (133/81 - 174/98)  RR: 18 (11-30-23 @ 09:06) (17 - 18)  SpO2: 99% (11-30-23 @ 09:06) (93% - 99%)    General: NAD, laying in bed, speaking in full sentences  HEENT: head NC/AT, no conjunctival injection, EOMI, MMM  Neck: supple, no JVD  Cardio: RRR, +S1/S2, no M/R/G  Resp: lungs CTAB, no cough/wheezes/rales/rhonchi  Abdo: soft, NT, ND, +bowel sounds x4, no organomegaly or palpable mass    Extremities: WWP, no edema/cyanosis/clubbing   Vasc: 2+ radial and DP pulses b/l  Neuro: A&Ox3  Psych: speech non-pressured, thoughts goal-oriented  Skin: dry, intact, no visible jaundice   MSK: no joint swelling       #Discharge: do not delete  84 y/o M w/ PMHx CHF, CAD, DM, adrenal mass, hypothyroidism, and gait abnormality presents to  ED w/ dysphagia and weight loss in the last 2-3 weeks accompanied w/  constipation w/ minimal improvement on laxatives. CT imaging shows concern for malignancy. CTAP was done in the ED which showed concern for Marked circumferential wall thickening of the midesophagus. Malignancy cannot be excluded. Suggest endoscopic correlation. Esophageal barium swallow showed Masslike defect extending 5 cm along the mid distal esophagus and resulting in moderate luminal narrowing. No delayed passage of liquid contrast material. GI was consulted and patient went for EGD on 11/30/23 which showed ______. Patient's constipation was treated w/ miralax, senna and tap water enema. Patient was medically optimized, stable and ready for discharge. Plan of care and return precautions were discussed with the patient who verbally stated understanding    #dysphagia   Plan: 84 y/o M present w/ dysphagia and unexpected weight loss in the last few weeks. Only able to tolerate liquids and soft foods and would experience regurgitation w/ hard solid foods. Reports 40lbs unexplained weight loss and a decrease in pant size from 38 to 28.   - CTAP IV cont: Marked circumferential wall thickening of the midesophagus. Malignancy cannot be excluded. Suggest endoscopic correlation.  - Barium swallow shows "masslike defect extending 5cm along mid-distal esophagus resulting in moderate luminal narrowing highly concerning for malignancy"  - EGD 11/30    PLAN:  - f/u outpatient GI doctor  - f/u PCP     #Hypercalcemia  Patient presents w/ hypercalcemia this admission of 10.8. Per outpatient labs patient was 10.8 on 10/12 and wnl before then.   Etiology: Hyperparathyroidism vs malignancy.   Improved w/ 2L of fluids.   PTH 18 (WNL)    PLAN:  - f/u outpatient pcp    #DM   Patient w/ hsitory of DM. Tkaes home glimepiride 2mg  Was started on sliding scale in 24hrs needed 4u intotal.    PLAN:  - c/w glimepiride 2mg    #HTN  History of hypertension. takes amlodipine 5mg at home. BP was uncontrolled w/ SBPs in 170s. Was given dose of hydral 5mg x1 and was started on lisinpril 10mg qd    PLAN:  - c/w amlodipine 5mg  - c/w Lisinopril 10mg qd    New medications/therapies: Lisinopril 10mg   New lines/hardware: None   Labs to be followed outpatient: None   Exam to be followed outpatient: PCP and GI doctor.     Discharge plan: discharge to [facility]    Physical Exam Upon Discharge:  T(C): 36.7 (11-30-23 @ 09:06), Max: 36.7 (11-29-23 @ 16:01)  HR: 79 (11-30-23 @ 09:06) (73 - 91)  BP: 159/89 (11-30-23 @ 11:00) (133/81 - 174/98)  RR: 18 (11-30-23 @ 09:06) (17 - 18)  SpO2: 99% (11-30-23 @ 09:06) (93% - 99%)    General: NAD, laying in bed, speaking in full sentences  HEENT: head NC/AT, no conjunctival injection, EOMI, MMM  Neck: supple, no JVD  Cardio: RRR, +S1/S2, no M/R/G  Resp: lungs CTAB, no cough/wheezes/rales/rhonchi  Abdo: soft, NT, ND, +bowel sounds x4, no organomegaly or palpable mass    Extremities: WWP, no edema/cyanosis/clubbing   Vasc: 2+ radial and DP pulses b/l  Neuro: A&Ox3  Psych: speech non-pressured, thoughts goal-oriented  Skin: dry, intact, no visible jaundice   MSK: no joint swelling       #Discharge: do not delete  84 y/o M w/ PMHx CHF, CAD, DM, adrenal mass, hypothyroidism, and gait abnormality presents to  ED w/ dysphagia and weight loss in the last 2-3 weeks accompanied w/  constipation w/ minimal improvement on laxatives. CT imaging shows concern for malignancy. CTAP was done in the ED which showed concern for Marked circumferential wall thickening of the midesophagus. Malignancy cannot be excluded. Suggest endoscopic correlation. Esophageal barium swallow showed Masslike defect extending 5 cm along the mid distal esophagus and resulting in moderate luminal narrowing. No delayed passage of liquid contrast material. GI was consulted and patient went for EGD on 11/30/23 which showed ______. Patient's constipation was treated w/ miralax, senna and tap water enema. Patient was medically optimized, stable and ready for discharge. Plan of care and return precautions were discussed with the patient who verbally stated understanding    #dysphagia   Plan: 84 y/o M present w/ dysphagia and unexpected weight loss in the last few weeks. Only able to tolerate liquids and soft foods and would experience regurgitation w/ hard solid foods. Reports 40lbs unexplained weight loss and a decrease in pant size from 38 to 28.   - CTAP IV cont: Marked circumferential wall thickening of the midesophagus. Malignancy cannot be excluded. Suggest endoscopic correlation.  - Barium swallow shows "masslike defect extending 5cm along mid-distal esophagus resulting in moderate luminal narrowing highly concerning for malignancy"  - EGD 11/30    PLAN:  - f/u outpatient GI doctor  - f/u PCP     #Hypercalcemia  Patient presents w/ hypercalcemia this admission of 10.8. Per outpatient labs patient was 10.8 on 10/12 and wnl before then.   Etiology: Hyperparathyroidism vs malignancy.   Improved w/ 2L of fluids.   PTH 18 (WNL)    PLAN:  - f/u outpatient pcp    #DM   Patient w/ hsitory of DM. Tkaes home glimepiride 2mg  Was started on sliding scale in 24hrs needed 4u intotal.    PLAN:  - c/w glimepiride 2mg    #HTN  History of hypertension. takes amlodipine 5mg at home. BP was uncontrolled w/ SBPs in 170s. Was given dose of hydral 5mg x1 and was started on lisinpril 10mg qd    PLAN:  - c/w amlodipine 5mg  - c/w Lisinopril 10mg qd    New medications/therapies: Lisinopril 10mg   New lines/hardware: None   Labs to be followed outpatient: None   Exam to be followed outpatient: PCP and GI doctor.     Discharge plan: discharge to [facility]    Physical Exam Upon Discharge:  T(C): 36.7 (11-30-23 @ 09:06), Max: 36.7 (11-29-23 @ 16:01)  HR: 79 (11-30-23 @ 09:06) (73 - 91)  BP: 159/89 (11-30-23 @ 11:00) (133/81 - 174/98)  RR: 18 (11-30-23 @ 09:06) (17 - 18)  SpO2: 99% (11-30-23 @ 09:06) (93% - 99%)    General: NAD, laying in bed, speaking in full sentences  HEENT: head NC/AT, no conjunctival injection, EOMI, MMM  Neck: supple, no JVD  Cardio: RRR, +S1/S2, no M/R/G  Resp: lungs CTAB, no cough/wheezes/rales/rhonchi  Abdo: soft, NT, ND, +bowel sounds x4, no organomegaly or palpable mass    Extremities: WWP, no edema/cyanosis/clubbing   Vasc: 2+ radial and DP pulses b/l  Neuro: A&Ox3  Psych: speech non-pressured, thoughts goal-oriented  Skin: dry, intact, no visible jaundice   MSK: no joint swelling       #Discharge: do not delete  82 y/o M w/ PMHx CHF, CAD, DM, adrenal mass, hypothyroidism, and gait abnormality presents to  ED w/ dysphagia and weight loss in the last 2-3 weeks accompanied w/  constipation w/ minimal improvement on laxatives. CT imaging shows concern for malignancy. Esophageal barium swallow showed Masslike defect extending 5 cm along the mid distal esophagus and resulting in moderate luminal narrowing. No delayed passage of liquid contrast material. GI was consulted and patient went for EGD on 11/30/23 which showed fungating mass. Patient's constipation was treated w/ miralax, senna and tap water enema. Patient was medically optimized, stable and ready for discharge. Plan of care and return precautions were discussed with the patient who verbally stated understanding    #dysphagia   Plan: 82 y/o M present w/ dysphagia and unexpected weight loss in the last few weeks. Only able to tolerate liquids and soft foods and would experience regurgitation w/ hard solid foods. Reports 40lbs unexplained weight loss and a decrease in pant size from 38 to 28.   - CTAP IV cont: Marked circumferential wall thickening of the midesophagus. Malignancy cannot be excluded. Suggest endoscopic correlation. 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 Mass extended from 28 cm to 38 cm. The GE junction was located at 40 cm. Multiple cold forceps biopsies were performed for histology. Diffuse erythema of the mucosa was noted in the stomach. These findings are compatible with non-erosive gastritis. Erythema of the mucosa was noted in the duodenum. These findings are compatible with duodenitis.    PLAN:  - f/u outpatient GI doctor  - f/u biopsy results.  - f/u PCP     #Hypercalcemia  Patient presents w/ hypercalcemia this admission of 10.8. Per outpatient labs patient was 10.8 on 10/12 and wnl before then.   Etiology: Hyperparathyroidism vs malignancy.   Improved w/ 2L of fluids.   PTH 18 (WNL)    PLAN:  - f/u outpatient pcp    #DM   Patient w/ historyof DM. takes home glimepiride 2mg  Was started on sliding scale in 24hrs needed 4u in total.    PLAN:  - c/w glimepiride 2mg    #HTN  History of hypertension. takes amlodipine 5mg at home. BP was uncontrolled w/ SBPs in 170s. Was given dose of hydral 5mg x1 and was started on lisinpril 10mg qd    PLAN:  - c/w amlodipine 5mg  - c/w Lisinopril 10mg qd    New medications/therapies: Lisinopril 10mg, Stop eliquis 5mg q12 and start ASA 81mg.  New lines/hardware: None   Labs to be followed outpatient: None   Exam to be followed outpatient: PCP, heme/onc and GI doctor.     Discharge plan: discharge to [facility]. Patient requires additional hours for home health aid    Physical Exam Upon Discharge:  T(C): 36.7 (11-30-23 @ 09:06), Max: 36.7 (11-29-23 @ 16:01)  HR: 79 (11-30-23 @ 09:06) (73 - 91)  BP: 159/89 (11-30-23 @ 11:00) (133/81 - 174/98)  RR: 18 (11-30-23 @ 09:06) (17 - 18)  SpO2: 99% (11-30-23 @ 09:06) (93% - 99%)    General: NAD, laying in bed, speaking in full sentences  HEENT: head NC/AT, no conjunctival injection, EOMI, MMM  Neck: supple, no JVD  Cardio: RRR, +S1/S2, no M/R/G  Resp: lungs CTAB, no cough/wheezes/rales/rhonchi  Abdo: Mildly distended w/ fluid wave. Soft, NT, +bowel sounds x4, no organomegaly or palpable mass    Extremities: WWP, no edema/cyanosis/clubbing   Vasc: 2+ radial and DP pulses b/l  Neuro: A&Ox3  Psych: speech non-pressured, thoughts goal-oriented  Skin: dry, intact, no visible jaundice   MSK: no joint swelling       #Discharge: do not delete  84 y/o M w/ PMHx CHF, CAD, DM, adrenal mass, hypothyroidism, and gait abnormality presents to  ED w/ dysphagia and weight loss in the last 2-3 weeks accompanied w/  constipation w/ minimal improvement on laxatives. CT imaging shows concern for malignancy. Esophageal barium swallow showed Masslike defect extending 5 cm along the mid distal esophagus and resulting in moderate luminal narrowing. No delayed passage of liquid contrast material. GI was consulted and patient went for EGD on 11/30/23 which showed fungating mass. Patient's constipation was treated w/ miralax, senna and tap water enema. Patient was medically optimized, stable and ready for discharge. Plan of care and return precautions were discussed with the patient who verbally stated understanding    #dysphagia   Plan: 84 y/o M present w/ dysphagia and unexpected weight loss in the last few weeks. Only able to tolerate liquids and soft foods and would experience regurgitation w/ hard solid foods. Reports 40lbs unexplained weight loss and a decrease in pant size from 38 to 28.   - CTAP IV cont: Marked circumferential wall thickening of the midesophagus. Malignancy cannot be excluded. Suggest endoscopic correlation. 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 Mass extended from 28 cm to 38 cm. The GE junction was located at 40 cm. Multiple cold forceps biopsies were performed for histology. Diffuse erythema of the mucosa was noted in the stomach. These findings are compatible with non-erosive gastritis. Erythema of the mucosa was noted in the duodenum. These findings are compatible with duodenitis.    PLAN:  - f/u outpatient GI doctor  - f/u biopsy results.  - f/u PCP     #Hypercalcemia  Patient presents w/ hypercalcemia this admission of 10.8. Per outpatient labs patient was 10.8 on 10/12 and wnl before then.   Etiology: Hyperparathyroidism vs malignancy.   Improved w/ 2L of fluids.   PTH 18 (WNL)    PLAN:  - f/u outpatient pcp    #DM   Patient w/ historyof DM. takes home glimepiride 2mg  Was started on sliding scale in 24hrs needed 4u in total.    PLAN:  - c/w glimepiride 2mg    #HTN  History of hypertension. takes amlodipine 5mg at home. BP was uncontrolled w/ SBPs in 170s. Was given dose of hydral 5mg x1 and was started on lisinpril 10mg qd    PLAN:  - c/w amlodipine 5mg  - c/w Lisinopril 10mg qd    New medications/therapies: Lisinopril 10mg, Stop eliquis 5mg q12 and start ASA 81mg.  New lines/hardware: None   Labs to be followed outpatient: None   Exam to be followed outpatient: PCP, heme/onc and GI doctor.     Discharge plan: discharge to [facility]. Patient requires additional hours for home health aid    Physical Exam Upon Discharge:  T(C): 36.7 (11-30-23 @ 09:06), Max: 36.7 (11-29-23 @ 16:01)  HR: 79 (11-30-23 @ 09:06) (73 - 91)  BP: 159/89 (11-30-23 @ 11:00) (133/81 - 174/98)  RR: 18 (11-30-23 @ 09:06) (17 - 18)  SpO2: 99% (11-30-23 @ 09:06) (93% - 99%)    General: NAD, laying in bed, speaking in full sentences  HEENT: head NC/AT, no conjunctival injection, EOMI, MMM  Neck: supple, no JVD  Cardio: RRR, +S1/S2, no M/R/G  Resp: lungs CTAB, no cough/wheezes/rales/rhonchi  Abdo: Mildly distended w/ fluid wave. Soft, NT, +bowel sounds x4, no organomegaly or palpable mass    Extremities: WWP, no edema/cyanosis/clubbing   Vasc: 2+ radial and DP pulses b/l  Neuro: A&Ox3  Psych: speech non-pressured, thoughts goal-oriented  Skin: dry, intact, no visible jaundice   MSK: no joint swelling       82 y/o M w/ PMHx CHF, CAD, DM, adrenal mass, hypothyroidism, and gait abnormality presents to  ED w/ dysphagia and weight loss in the last 2-3 weeks accompanied w/  constipation w/ minimal improvement on laxatives. CT imaging shows concern for malignancy. Esophageal barium swallow showed Masslike defect extending 5 cm along the mid distal esophagus and resulting in moderate luminal narrowing. No delayed passage of liquid contrast material. GI was consulted and patient went for EGD on 11/30/23 which showed fungating mass. Patient's constipation was treated w/ miralax, senna and tap water enema. Patient was medically optimized, stable and ready for discharge. Plan of care and return precautions were discussed with the patient who verbally stated understanding    #dysphagia   Plan: 82 y/o M present w/ dysphagia and unexpected weight loss in the last few weeks. Only able to tolerate liquids and soft foods and would experience regurgitation w/ hard solid foods. Reports 40lbs unexplained weight loss and a decrease in pant size from 38 to 28.   - CTAP IV cont: Marked circumferential wall thickening of the midesophagus. Malignancy cannot be excluded. Suggest endoscopic correlation. 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 Mass extended from 28 cm to 38 cm. The GE junction was located at 40 cm. Multiple cold forceps biopsies were performed for histology. Diffuse erythema of the mucosa was noted in the stomach. These findings are compatible with non-erosive gastritis. Erythema of the mucosa was noted in the duodenum. These findings are compatible with duodenitis.    PLAN:  - f/u outpatient GI doctor  - f/u biopsy results.  - f/u PCP     #Constipation  Patient presenting w/ constipation this admission. Was started on Miralax, senna and lactulose w/ some relief. Patient declined tap water enema.    PLAN:  - f/u GI doctor  - c/w senna and miralax.     #Hypercalcemia  Patient presents w/ hypercalcemia this admission of 10.8. Per outpatient labs patient was 10.8 on 10/12 and wnl before then.   Etiology: Hyperparathyroidism vs malignancy.   Improved w/ 2L of fluids.   PTH 18 (WNL)    PLAN:  - f/u outpatient pcp    #DM   Patient w/ history of DM. takes home glimepiride 2mg  Was started on sliding scale in 24hrs needed 4u in total.    PLAN:  - c/w glimepiride 2mg    #HTN  History of hypertension. takes amlodipine 5mg at home. BP was uncontrolled w/ SBPs in 170s. Was given dose of hydral 5mg x1 and was started on lisinpril 10mg qd    PLAN:  - c/w amlodipine 5mg  - c/w Lisinopril 10mg qd    New medications/therapies: Lisinopril 10mg, Stop eliquis 5mg q12 and start ASA 81mg.  New lines/hardware: None   Labs to be followed outpatient: None   Exam to be followed outpatient: PCP, heme/onc and GI doctor.     Discharge plan: discharge to [facility]. Patient requires additional hours for home health aid.    Physical Exam Upon Discharge:  T(C): 36.7 (11-30-23 @ 09:06), Max: 36.7 (11-29-23 @ 16:01)  HR: 79 (11-30-23 @ 09:06) (73 - 91)  BP: 159/89 (11-30-23 @ 11:00) (133/81 - 174/98)  RR: 18 (11-30-23 @ 09:06) (17 - 18)  SpO2: 99% (11-30-23 @ 09:06) (93% - 99%)    General: NAD, laying in bed, speaking in full sentences  HEENT: head NC/AT, no conjunctival injection, EOMI, MMM  Neck: supple, no JVD  Cardio: RRR, +S1/S2, no M/R/G  Resp: lungs CTAB, no cough/wheezes/rales/rhonchi  Abdo: Mildly distended w/ fluid wave. Soft, NT, +bowel sounds x4, no organomegaly or palpable mass    Extremities: WWP, no edema/cyanosis/clubbing   Vasc: 2+ radial and DP pulses b/l  Neuro: A&Ox3  Psych: speech non-pressured, thoughts goal-oriented  Skin: dry, intact, no visible jaundice   MSK: no joint swelling       84 y/o M w/ PMHx CHF, CAD, DM, adrenal mass, hypothyroidism, and gait abnormality presents to  ED w/ dysphagia and weight loss in the last 2-3 weeks accompanied w/  constipation w/ minimal improvement on laxatives. CT imaging shows concern for malignancy. Esophageal barium swallow showed Masslike defect extending 5 cm along the mid distal esophagus and resulting in moderate luminal narrowing. No delayed passage of liquid contrast material. GI was consulted and patient went for EGD on 11/30/23 which showed fungating mass. Patient's constipation was treated w/ miralax, senna and tap water enema. Patient was medically optimized, stable and ready for discharge. Plan of care and return precautions were discussed with the patient who verbally stated understanding    #dysphagia   Plan: 84 y/o M present w/ dysphagia and unexpected weight loss in the last few weeks. Only able to tolerate liquids and soft foods and would experience regurgitation w/ hard solid foods. Reports 40lbs unexplained weight loss and a decrease in pant size from 38 to 28.   - CTAP IV cont: Marked circumferential wall thickening of the midesophagus. Malignancy cannot be excluded. Suggest endoscopic correlation. 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 Mass extended from 28 cm to 38 cm. The GE junction was located at 40 cm. Multiple cold forceps biopsies were performed for histology. Diffuse erythema of the mucosa was noted in the stomach. These findings are compatible with non-erosive gastritis. Erythema of the mucosa was noted in the duodenum. These findings are compatible with duodenitis.    PLAN:  - f/u outpatient GI doctor  - f/u biopsy results.  - f/u PCP     #Constipation  Patient presenting w/ constipation this admission. Was started on Miralax, senna and lactulose w/ some relief. Patient declined tap water enema.    PLAN:  - f/u GI doctor  - c/w senna and miralax.     #Hypercalcemia  Patient presents w/ hypercalcemia this admission of 10.8. Per outpatient labs patient was 10.8 on 10/12 and wnl before then.   Etiology: Hyperparathyroidism vs malignancy.   Improved w/ 2L of fluids.   PTH 18 (WNL)    PLAN:  - f/u outpatient pcp    #DM   Patient w/ history of DM. takes home glimepiride 2mg  Was started on sliding scale in 24hrs needed 4u in total.    PLAN:  - c/w glimepiride 2mg    #HTN  History of hypertension. takes amlodipine 5mg at home. BP was uncontrolled w/ SBPs in 170s. Was given dose of hydral 5mg x1 and was started on lisinpril 10mg qd    PLAN:  - c/w amlodipine 5mg  - c/w Lisinopril 10mg qd    New medications/therapies: Lisinopril 10mg, Stop eliquis 5mg q12 and start ASA 81mg.  New lines/hardware: None   Labs to be followed outpatient: None   Exam to be followed outpatient: PCP, heme/onc and GI doctor.     Discharge plan: discharge to [facility]. Patient requires additional hours for home health aid.    Physical Exam Upon Discharge:  T(C): 36.7 (11-30-23 @ 09:06), Max: 36.7 (11-29-23 @ 16:01)  HR: 79 (11-30-23 @ 09:06) (73 - 91)  BP: 159/89 (11-30-23 @ 11:00) (133/81 - 174/98)  RR: 18 (11-30-23 @ 09:06) (17 - 18)  SpO2: 99% (11-30-23 @ 09:06) (93% - 99%)    General: NAD, laying in bed, speaking in full sentences  HEENT: head NC/AT, no conjunctival injection, EOMI, MMM  Neck: supple, no JVD  Cardio: RRR, +S1/S2, no M/R/G  Resp: lungs CTAB, no cough/wheezes/rales/rhonchi  Abdo: Mildly distended w/ fluid wave. Soft, NT, +bowel sounds x4, no organomegaly or palpable mass    Extremities: WWP, no edema/cyanosis/clubbing   Vasc: 2+ radial and DP pulses b/l  Neuro: A&Ox3  Psych: speech non-pressured, thoughts goal-oriented  Skin: dry, intact, no visible jaundice   MSK: no joint swelling       84 y/o M w/ PMHx CHF, CAD, DM, adrenal mass, hypothyroidism, and gait abnormality presents to  ED w/ dysphagia and weight loss in the last 2-3 weeks accompanied w/  constipation w/ minimal improvement on laxatives. CT imaging shows concern for malignancy. Esophageal barium swallow showed Masslike defect extending 5 cm along the mid distal esophagus and resulting in moderate luminal narrowing. No delayed passage of liquid contrast material. GI was consulted and patient went for EGD on 11/30/23 which showed fungating mass. Patient's constipation was treated w/ miralax, senna and tap water enema. Patient was medically optimized, stable and ready for discharge. Plan of care and return precautions were discussed with the patient who verbally stated understanding    #dysphagia   Plan: 84 y/o M present w/ dysphagia and unexpected weight loss in the last few weeks. Only able to tolerate liquids and soft foods and would experience regurgitation w/ hard solid foods. Reports 40lbs unexplained weight loss and a decrease in pant size from 38 to 28.   - CTAP IV cont: Marked circumferential wall thickening of the midesophagus. Malignancy cannot be excluded. Suggest endoscopic correlation. 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 Mass extended from 28 cm to 38 cm. The GE junction was located at 40 cm. Multiple cold forceps biopsies were performed for histology. Diffuse erythema of the mucosa was noted in the stomach. These findings are compatible with non-erosive gastritis. Erythema of the mucosa was noted in the duodenum. These findings are compatible with duodenitis.  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 Was started on Miralax, senna and lactulose w/ some relief. Patient declined tap water enema.    PLAN:  - f/u GI doctor  - c/w senna and miralax.     #Hypercalcemia  Patient presents w/ hypercalcemia this admission of 10.8. Per outpatient labs patient was 10.8 on 10/12 and wnl before then.   Etiology: Hyperparathyroidism vs malignancy.   Improved w/ 2L of fluids.   PTH 18 (WNL)    PLAN:  - f/u outpatient pcp    #DM   Patient w/ history of DM. takes home glimepiride 2mg  Was started on sliding scale in 24hrs needed 4u in total.    PLAN:  - c/w glimepiride 2mg    #HTN  History of hypertension. takes amlodipine 5mg at home. BP was uncontrolled w/ SBPs in 170s. Was given dose of hydral 5mg x1 and was started on lisinpril 10mg qd    PLAN:  - c/w amlodipine 5mg  - c/w Lisinopril 10mg qd    New medications/therapies: Lisinopril 10mg, Stop eliquis 5mg q12 and start ASA 81mg.  New lines/hardware: None   Labs to be followed outpatient: None   Exam to be followed outpatient: PCP, heme/onc and GI doctor.     Discharge plan: discharge to [facility]. Patient requires additional hours for home health aid.    Physical Exam Upon Discharge:  T(C): 36.7 (11-30-23 @ 09:06), Max: 36.7 (11-29-23 @ 16:01)  HR: 79 (11-30-23 @ 09:06) (73 - 91)  BP: 159/89 (11-30-23 @ 11:00) (133/81 - 174/98)  RR: 18 (11-30-23 @ 09:06) (17 - 18)  SpO2: 99% (11-30-23 @ 09:06) (93% - 99%)    General: NAD, laying in bed, speaking in full sentences  HEENT: head NC/AT, no conjunctival injection, EOMI, MMM  Neck: supple, no JVD  Cardio: RRR, +S1/S2, no M/R/G  Resp: lungs CTAB, no cough/wheezes/rales/rhonchi  Abdo: Mildly distended w/ fluid wave. Soft, NT, +bowel sounds x4, no organomegaly or palpable mass    Extremities: WWP, no edema/cyanosis/clubbing   Vasc: 2+ radial and DP pulses b/l  Neuro: A&Ox3  Psych: speech non-pressured, thoughts goal-oriented  Skin: dry, intact, no visible jaundice   MSK: no joint swelling       82 y/o M w/ PMHx CHF, CAD, DM, adrenal mass, hypothyroidism, and gait abnormality presents to  ED w/ dysphagia and weight loss in the last 2-3 weeks accompanied w/  constipation w/ minimal improvement on laxatives. CT imaging shows concern for malignancy. Esophageal barium swallow showed Masslike defect extending 5 cm along the mid distal esophagus and resulting in moderate luminal narrowing. No delayed passage of liquid contrast material. GI was consulted and patient went for EGD on 11/30/23 which showed fungating mass. Patient's constipation was treated w/ miralax, senna and tap water enema. Patient was medically optimized, stable and ready for discharge. Plan of care and return precautions were discussed with the patient who verbally stated understanding    #dysphagia   Plan: 82 y/o M present w/ dysphagia and unexpected weight loss in the last few weeks. Only able to tolerate liquids and soft foods and would experience regurgitation w/ hard solid foods. Reports 40lbs unexplained weight loss and a decrease in pant size from 38 to 28.   - CTAP IV cont: Marked circumferential wall thickening of the midesophagus. Malignancy cannot be excluded. Suggest endoscopic correlation. 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 Mass extended from 28 cm to 38 cm. The GE junction was located at 40 cm. Multiple cold forceps biopsies were performed for histology. Diffuse erythema of the mucosa was noted in the stomach. These findings are compatible with non-erosive gastritis. Erythema of the mucosa was noted in the duodenum. These findings are compatible with duodenitis.  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 Was started on Miralax, senna and lactulose w/ some relief. Patient declined tap water enema.    PLAN:  - f/u GI doctor  - c/w senna and miralax.     #Hypercalcemia  Patient presents w/ hypercalcemia this admission of 10.8. Per outpatient labs patient was 10.8 on 10/12 and wnl before then.   Etiology: Hyperparathyroidism vs malignancy.   Improved w/ 2L of fluids.   PTH 18 (WNL)    PLAN:  - f/u outpatient pcp    #DM   Patient w/ history of DM. takes home glimepiride 2mg  Was started on sliding scale in 24hrs needed 4u in total.    PLAN:  - c/w glimepiride 2mg    #HTN  History of hypertension. takes amlodipine 5mg at home. BP was uncontrolled w/ SBPs in 170s. Was given dose of hydral 5mg x1 and was started on lisinpril 10mg qd    PLAN:  - c/w amlodipine 5mg  - c/w Lisinopril 10mg qd    New medications/therapies: Lisinopril 10mg, Stop eliquis 5mg q12 and start ASA 81mg.  New lines/hardware: None   Labs to be followed outpatient: None   Exam to be followed outpatient: PCP, heme/onc and GI doctor.     Discharge plan: discharge to [facility]. Patient requires additional hours for home health aid.    Physical Exam Upon Discharge:  T(C): 36.7 (11-30-23 @ 09:06), Max: 36.7 (11-29-23 @ 16:01)  HR: 79 (11-30-23 @ 09:06) (73 - 91)  BP: 159/89 (11-30-23 @ 11:00) (133/81 - 174/98)  RR: 18 (11-30-23 @ 09:06) (17 - 18)  SpO2: 99% (11-30-23 @ 09:06) (93% - 99%)    General: NAD, laying in bed, speaking in full sentences  HEENT: head NC/AT, no conjunctival injection, EOMI, MMM  Neck: supple, no JVD  Cardio: RRR, +S1/S2, no M/R/G  Resp: lungs CTAB, no cough/wheezes/rales/rhonchi  Abdo: Mildly distended w/ fluid wave. Soft, NT, +bowel sounds x4, no organomegaly or palpable mass    Extremities: WWP, no edema/cyanosis/clubbing   Vasc: 2+ radial and DP pulses b/l  Neuro: A&Ox3  Psych: speech non-pressured, thoughts goal-oriented  Skin: dry, intact, no visible jaundice   MSK: no joint swelling

## 2023-11-29 NOTE — PROGRESS NOTE ADULT - PROBLEM SELECTOR PLAN 3
Patient w/ History of DM. Takes home Glimepiride 2mg.    PLAN:  - Start low dose sliding scale   - Start basal bolus base of requirements.  - hold home Glimepiride

## 2023-11-29 NOTE — DISCHARGE NOTE PROVIDER - NSDCCAREPROVSEEN_GEN_ALL_CORE_FT
"Chief Complaint   Patient presents with   • Weight Check   • Discuss concerns     Tongue tie, no difficulty feeding, discuss options       HPI    MBM on demand per bottle 3 oz per feed every few hours.  Good urine and stool output.  Mom having pain with latching.       Review of Systems   Constitutional: Negative for fever.   HENT: Negative for trouble swallowing.    Cardiovascular: Negative for cyanosis.   Gastrointestinal: Negative for diarrhea and vomiting.   Skin: Negative for rash.       allergies, current medications and problem list    Weight 3402 g (7 lb 8 oz).  Wt Readings from Last 3 Encounters:   23 3402 g (7 lb 8 oz) (28 %, Z= -0.60)*   23 2960 g (6 lb 8.4 oz) (21 %, Z= -0.81)*   01/15/23 2920 g (6 lb 7 oz) (20 %, Z= -0.84)*     * Growth percentiles are based on [place] (Girls, 22-50 Weeks) data.     Ht Readings from Last 3 Encounters:   23 49.5 cm (19.5\") (41 %, Z= -0.23)*   23 50.2 cm (19.75\") (61 %, Z= 0.27)*     * Growth percentiles are based on [place] (Girls, 22-50 Weeks) data.     There is no height or weight on file to calculate BMI.  No height and weight on file for this encounter.  28 %ile (Z= -0.60) based on [place] (Girls, 22-50 Weeks) weight-for-age data using vitals from 2023.  No height on file for this encounter.    Physical Exam  Vitals and nursing note reviewed.   Constitutional:       General: She is active.   HENT:      Head: Normocephalic. Anterior fontanelle is flat.      Nose: Nose normal.      Mouth/Throat:      Mouth: Mucous membranes are moist.      Comments: Tongue moves to right and left and extrudes beyond gumline without difficulty.   Neurological:      Mental Status: She is alert.     strong suck    Diagnoses and all orders for this visit:    1. [place] weight check, 8-28 days old (Primary)      5% past her birth weight   Excellent weight gain   Discussed that tongue should not inhibit feeding ability   Ensure proper deep latch.   Anticipate some " pain in the first two weeks of nursing.    Return for 1 mo WCC .  Greater than 50% of time spent in direct patient contact   Ivonne Sidhu

## 2023-11-29 NOTE — PROGRESS NOTE ADULT - SUBJECTIVE AND OBJECTIVE BOX
*****INCOMPLETE NOTE*****    INTERVAL HPI/OVERNIGHT EVENTS:    SUBJECTIVE: Pt seen and examined at bedside. ARTURO.   Denies f/c/n/v/d, abd pain, SOB, CP,  sxs,     ANTIBIOTICS/RELEVANT:    MEDICATIONS  (STANDING):  amLODIPine   Tablet 5 milliGRAM(s) Oral every 24 hours  atorvastatin 20 milliGRAM(s) Oral at bedtime  dextrose 5%. 1000 milliLiter(s) (100 mL/Hr) IV Continuous <Continuous>  dextrose 5%. 1000 milliLiter(s) (50 mL/Hr) IV Continuous <Continuous>  dextrose 50% Injectable 25 Gram(s) IV Push once  dextrose 50% Injectable 12.5 Gram(s) IV Push once  dextrose 50% Injectable 25 Gram(s) IV Push once  fenofibrate Tablet 48 milliGRAM(s) Oral every 24 hours  glucagon  Injectable 1 milliGRAM(s) IntraMuscular once  insulin lispro (ADMELOG) corrective regimen sliding scale   SubCutaneous Before meals and at bedtime  levothyroxine 75 MICROGram(s) Oral every 24 hours    MEDICATIONS  (PRN):  dextrose Oral Gel 15 Gram(s) Oral once PRN Blood Glucose LESS THAN 70 milliGRAM(s)/deciliter  ondansetron Injectable 4 milliGRAM(s) IV Push every 8 hours PRN Nausea and/or Vomiting      Vital Signs Last 24 Hrs  T(C): 36.4 (29 Nov 2023 05:43), Max: 36.7 (28 Nov 2023 08:49)  T(F): 97.5 (29 Nov 2023 05:43), Max: 98.1 (28 Nov 2023 08:49)  HR: 82 (29 Nov 2023 05:43) (79 - 88)  BP: 129/79 (29 Nov 2023 05:43) (129/79 - 168/72)  BP(mean): --  RR: 18 (29 Nov 2023 05:43) (18 - 19)  SpO2: 97% (29 Nov 2023 05:43) (95% - 98%)    Parameters below as of 29 Nov 2023 05:43  Patient On (Oxygen Delivery Method): room air        PHYSICAL EXAM:  General: in no acute distress, non toxic appearing, speaking in full sentences  Eyes: EOMI intact bilaterally. Anicteric sclerae, moist conjunctivae  HENT: Moist mucous membranes  Neck: Trachea midline, supple  Lungs: CTA B/L. No wheezes, rales, or rhonchi  Cardiovascular: RRR. No murmurs, rubs, or gallops  Abdomen: +BS Soft, non-tender non-distended; No rebound or guarding  Vasc: Rad and DP intact and equal b/l   Extremities: WWP, No clubbing, cyanosis or edema  Neurological: Alert and oriented  Skin: Warm and dry. No obvious rash     LABS:                        12.4   8.43  )-----------( 398      ( 29 Nov 2023 05:30 )             38.7     11-29    142  |  110<H>  |  7   ----------------------------<  136<H>  3.6   |  20<L>  |  0.65    Ca    10.2      29 Nov 2023 05:30  Phos  2.2     11-29  Mg     1.8     11-29    TPro  7.0  /  Alb  3.6  /  TBili  0.4  /  DBili  x   /  AST  14  /  ALT  7<L>  /  AlkPhos  50  11-29    PT/INR - ( 28 Nov 2023 06:40 )   PT: 12.4 sec;   INR: 1.09          PTT - ( 28 Nov 2023 06:40 )  PTT:30.6 sec  Urinalysis Basic - ( 29 Nov 2023 05:30 )    Color: x / Appearance: x / SG: x / pH: x  Gluc: 136 mg/dL / Ketone: x  / Bili: x / Urobili: x   Blood: x / Protein: x / Nitrite: x   Leuk Esterase: x / RBC: x / WBC x   Sq Epi: x / Non Sq Epi: x / Bacteria: x        MICROBIOLOGY:    RADIOLOGY & ADDITIONAL STUDIES: *****INCOMPLETE NOTE*****    INTERVAL HPI/OVERNIGHT EVENTS: No acute overnight events. Vitals stable. Hemoglobin 12.4, Hematocrit 38.7. Calcium 10.2, PTH WNL.    SUBJECTIVE: Pt seen and examined at bedside. NAD. Endorses having his 1st bowel movement in days yesterday - described as hard [place]. Eagerly awaiting his EGD.  Denies f/c/n/v/d, abd pain, SOB, CP,  sxs, dizziness, leg pain/swelling    ANTIBIOTICS/RELEVANT:    MEDICATIONS  (STANDING):  amLODIPine   Tablet 5 milliGRAM(s) Oral every 24 hours  atorvastatin 20 milliGRAM(s) Oral at bedtime  dextrose 5%. 1000 milliLiter(s) (100 mL/Hr) IV Continuous <Continuous>  dextrose 5%. 1000 milliLiter(s) (50 mL/Hr) IV Continuous <Continuous>  dextrose 50% Injectable 25 Gram(s) IV Push once  dextrose 50% Injectable 12.5 Gram(s) IV Push once  dextrose 50% Injectable 25 Gram(s) IV Push once  fenofibrate Tablet 48 milliGRAM(s) Oral every 24 hours  glucagon  Injectable 1 milliGRAM(s) IntraMuscular once  insulin lispro (ADMELOG) corrective regimen sliding scale   SubCutaneous Before meals and at bedtime  levothyroxine 75 MICROGram(s) Oral every 24 hours    MEDICATIONS  (PRN):  dextrose Oral Gel 15 Gram(s) Oral once PRN Blood Glucose LESS THAN 70 milliGRAM(s)/deciliter  ondansetron Injectable 4 milliGRAM(s) IV Push every 8 hours PRN Nausea and/or Vomiting      Vital Signs Last 24 Hrs  T(C): 36.4 (29 Nov 2023 05:43), Max: 36.7 (28 Nov 2023 08:49)  T(F): 97.5 (29 Nov 2023 05:43), Max: 98.1 (28 Nov 2023 08:49)  HR: 82 (29 Nov 2023 05:43) (79 - 88)  BP: 129/79 (29 Nov 2023 05:43) (129/79 - 168/72)  BP(mean): --  RR: 18 (29 Nov 2023 05:43) (18 - 19)  SpO2: 97% (29 Nov 2023 05:43) (95% - 98%)    Parameters below as of 29 Nov 2023 05:43  Patient On (Oxygen Delivery Method): room air        PHYSICAL EXAM:  General: in no acute distress, non toxic appearing, speaking in full sentences  Eyes: EOMI intact bilaterally. Anicteric sclerae, moist conjunctivae. R pupil nonreactive to light.  HENT: Moist mucous membranes  Neck: Trachea midline, supple  Lungs: CTA B/L. No wheezes, rales, or rhonchi  Cardiovascular: RRR. No murmurs, rubs, or gallops  Abdomen: +BS Soft, non-tender non-distended; No rebound or guarding  Vasc: Rad and DP intact and equal b/l   Extremities: WWP, No clubbing, cyanosis or edema  Neurological: Alert and oriented  Skin: Warm and dry. No obvious rash     LABS:                        12.4   8.43  )-----------( 398      ( 29 Nov 2023 05:30 )             38.7     11-29    142  |  110<H>  |  7   ----------------------------<  136<H>  3.6   |  20<L>  |  0.65    Ca    10.2      29 Nov 2023 05:30  Phos  2.2     11-29  Mg     1.8     11-29    TPro  7.0  /  Alb  3.6  /  TBili  0.4  /  DBili  x   /  AST  14  /  ALT  7<L>  /  AlkPhos  50  11-29    PT/INR - ( 28 Nov 2023 06:40 )   PT: 12.4 sec;   INR: 1.09          PTT - ( 28 Nov 2023 06:40 )  PTT:30.6 sec  Urinalysis Basic - ( 29 Nov 2023 05:30 )    Color: x / Appearance: x / SG: x / pH: x  Gluc: 136 mg/dL / Ketone: x  / Bili: x / Urobili: x   Blood: x / Protein: x / Nitrite: x   Leuk Esterase: x / RBC: x / WBC x   Sq Epi: x / Non Sq Epi: x / Bacteria: x        MICROBIOLOGY:    RADIOLOGY & ADDITIONAL STUDIES: *****INCOMPLETE NOTE*****    INTERVAL HPI/OVERNIGHT EVENTS: No acute overnight events. Vitals stable. Hemoglobin 12.4, Hematocrit 38.7. Calcium 10.2, PTH WNL.    SUBJECTIVE: Pt seen and examined at bedside. NAD. Endorses having his 1st bowel movement in days yesterday - described as hard [place]. 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 L eye blurry, poor vision. Eagerly awaiting his EGD.  Denies f/c/n/v/d, abd pain, SOB, CP,  sxs, dizziness, leg pain/swelling    ANTIBIOTICS/RELEVANT:    MEDICATIONS  (STANDING):  amLODIPine   Tablet 5 milliGRAM(s) Oral every 24 hours  atorvastatin 20 milliGRAM(s) Oral at bedtime  dextrose 5%. 1000 milliLiter(s) (100 mL/Hr) IV Continuous <Continuous>  dextrose 5%. 1000 milliLiter(s) (50 mL/Hr) IV Continuous <Continuous>  dextrose 50% Injectable 25 Gram(s) IV Push once  dextrose 50% Injectable 12.5 Gram(s) IV Push once  dextrose 50% Injectable 25 Gram(s) IV Push once  fenofibrate Tablet 48 milliGRAM(s) Oral every 24 hours  glucagon  Injectable 1 milliGRAM(s) IntraMuscular once  insulin lispro (ADMELOG) corrective regimen sliding scale   SubCutaneous Before meals and at bedtime  levothyroxine 75 MICROGram(s) Oral every 24 hours    MEDICATIONS  (PRN):  dextrose Oral Gel 15 Gram(s) Oral once PRN Blood Glucose LESS THAN 70 milliGRAM(s)/deciliter  ondansetron Injectable 4 milliGRAM(s) IV Push every 8 hours PRN Nausea and/or Vomiting      Vital Signs Last 24 Hrs  T(C): 36.4 (29 Nov 2023 05:43), Max: 36.7 (28 Nov 2023 08:49)  T(F): 97.5 (29 Nov 2023 05:43), Max: 98.1 (28 Nov 2023 08:49)  HR: 82 (29 Nov 2023 05:43) (79 - 88)  BP: 129/79 (29 Nov 2023 05:43) (129/79 - 168/72)  BP(mean): --  RR: 18 (29 Nov 2023 05:43) (18 - 19)  SpO2: 97% (29 Nov 2023 05:43) (95% - 98%)    Parameters below as of 29 Nov 2023 05:43  Patient On (Oxygen Delivery Method): room air        PHYSICAL EXAM:  General: in no acute distress, non toxic appearing, speaking in full sentences  Eyes: EOMI intact bilaterally. Anicteric sclerae, moist conjunctivae. R pupil nonreactive to light.  HENT: Moist mucous membranes  Neck: Trachea midline, supple  Lungs: CTA B/L. No wheezes, rales, or rhonchi  Cardiovascular: RRR. No murmurs, rubs, or gallops  Abdomen: +BS Soft, non-tender non-distended; No rebound or guarding  Vasc: Rad and DP intact and equal b/l   Extremities: WWP, No clubbing, cyanosis or edema  Neurological: Alert and oriented  Skin: Warm and dry. No obvious rash     LABS:                        12.4   8.43  )-----------( 398      ( 29 Nov 2023 05:30 )             38.7     11-29    142  |  110<H>  |  7   ----------------------------<  136<H>  3.6   |  20<L>  |  0.65    Ca    10.2      29 Nov 2023 05:30  Phos  2.2     11-29  Mg     1.8     11-29    TPro  7.0  /  Alb  3.6  /  TBili  0.4  /  DBili  x   /  AST  14  /  ALT  7<L>  /  AlkPhos  50  11-29    PT/INR - ( 28 Nov 2023 06:40 )   PT: 12.4 sec;   INR: 1.09          PTT - ( 28 Nov 2023 06:40 )  PTT:30.6 sec  Urinalysis Basic - ( 29 Nov 2023 05:30 )    Color: x / Appearance: x / SG: x / pH: x  Gluc: 136 mg/dL / Ketone: x  / Bili: x / Urobili: x   Blood: x / Protein: x / Nitrite: x   Leuk Esterase: x / RBC: x / WBC x   Sq Epi: x / Non Sq Epi: x / Bacteria: x        MICROBIOLOGY:    RADIOLOGY & ADDITIONAL STUDIES: *****INCOMPLETE NOTE*****    INTERVAL HPI/OVERNIGHT EVENTS: No acute overnight events. Vitals stable. Hemoglobin 12.4, Hematocrit 38.7. Calcium 10.2, PTH WNL.    SUBJECTIVE: Pt seen and examined at bedside. NAD. Endorses having his 1st bowel movement in days yesterday - described as hard [place]. 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 L eye cloudy, poor vision. Eagerly awaiting his EGD.  Denies f/c/n/v/d, abd pain, SOB, CP,  sxs, dizziness, leg pain/swelling    ANTIBIOTICS/RELEVANT:    MEDICATIONS  (STANDING):  amLODIPine   Tablet 5 milliGRAM(s) Oral every 24 hours  atorvastatin 20 milliGRAM(s) Oral at bedtime  dextrose 5%. 1000 milliLiter(s) (100 mL/Hr) IV Continuous <Continuous>  dextrose 5%. 1000 milliLiter(s) (50 mL/Hr) IV Continuous <Continuous>  dextrose 50% Injectable 25 Gram(s) IV Push once  dextrose 50% Injectable 12.5 Gram(s) IV Push once  dextrose 50% Injectable 25 Gram(s) IV Push once  fenofibrate Tablet 48 milliGRAM(s) Oral every 24 hours  glucagon  Injectable 1 milliGRAM(s) IntraMuscular once  insulin lispro (ADMELOG) corrective regimen sliding scale   SubCutaneous Before meals and at bedtime  levothyroxine 75 MICROGram(s) Oral every 24 hours    MEDICATIONS  (PRN):  dextrose Oral Gel 15 Gram(s) Oral once PRN Blood Glucose LESS THAN 70 milliGRAM(s)/deciliter  ondansetron Injectable 4 milliGRAM(s) IV Push every 8 hours PRN Nausea and/or Vomiting      Vital Signs Last 24 Hrs  T(C): 36.4 (29 Nov 2023 05:43), Max: 36.7 (28 Nov 2023 08:49)  T(F): 97.5 (29 Nov 2023 05:43), Max: 98.1 (28 Nov 2023 08:49)  HR: 82 (29 Nov 2023 05:43) (79 - 88)  BP: 129/79 (29 Nov 2023 05:43) (129/79 - 168/72)  BP(mean): --  RR: 18 (29 Nov 2023 05:43) (18 - 19)  SpO2: 97% (29 Nov 2023 05:43) (95% - 98%)    Parameters below as of 29 Nov 2023 05:43  Patient On (Oxygen Delivery Method): room air        PHYSICAL EXAM:  General: in no acute distress, non toxic appearing, speaking in full sentences  Eyes: EOMI intact bilaterally. Anicteric sclerae, moist conjunctivae. R pupil nonreactive to light, L pupil normally reactive to light & accomodation.  HENT: Moist mucous membranes  Neck: Trachea midline, supple  Lungs: CTA B/L. No wheezes, rales, or rhonchi  Cardiovascular: RRR. No murmurs, rubs, or gallops  Abdomen: +BS Soft, non-tender non-distended; No rebound or guarding  Vasc: Rad and DP intact and equal b/l   Extremities: WWP, No clubbing, cyanosis or edema  Neurological: Alert and oriented  Skin: Warm and dry. No obvious rash     LABS:                        12.4   8.43  )-----------( 398      ( 29 Nov 2023 05:30 )             38.7     11-29    142  |  110<H>  |  7   ----------------------------<  136<H>  3.6   |  20<L>  |  0.65    Ca    10.2      29 Nov 2023 05:30  Phos  2.2     11-29  Mg     1.8     11-29    TPro  7.0  /  Alb  3.6  /  TBili  0.4  /  DBili  x   /  AST  14  /  ALT  7<L>  /  AlkPhos  50  11-29    PT/INR - ( 28 Nov 2023 06:40 )   PT: 12.4 sec;   INR: 1.09          PTT - ( 28 Nov 2023 06:40 )  PTT:30.6 sec  Urinalysis Basic - ( 29 Nov 2023 05:30 )    Color: x / Appearance: x / SG: x / pH: x  Gluc: 136 mg/dL / Ketone: x  / Bili: x / Urobili: x   Blood: x / Protein: x / Nitrite: x   Leuk Esterase: x / RBC: x / WBC x   Sq Epi: x / Non Sq Epi: x / Bacteria: x        MICROBIOLOGY:    RADIOLOGY & ADDITIONAL STUDIES: *****INCOMPLETE NOTE*****    INTERVAL HPI/OVERNIGHT EVENTS: No acute overnight events. Vitals stable. Hemoglobin 12.4, Hematocrit 38.7. Calcium 10.2, PTH WNL.    SUBJECTIVE: Pt seen and examined at bedside. NAD. Endorses having his 1st bowel movement in days yesterday - described as hard [place]. 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 L eye cloudy, poor vision. Eagerly awaiting his EGD which is now scheduled for 11/30.  Denies f/c/n/v/d, abd pain, SOB, CP,  sxs, dizziness, leg pain/swelling    ANTIBIOTICS/RELEVANT:    MEDICATIONS  (STANDING):  amLODIPine   Tablet 5 milliGRAM(s) Oral every 24 hours  atorvastatin 20 milliGRAM(s) Oral at bedtime  dextrose 5%. 1000 milliLiter(s) (100 mL/Hr) IV Continuous <Continuous>  dextrose 5%. 1000 milliLiter(s) (50 mL/Hr) IV Continuous <Continuous>  dextrose 50% Injectable 25 Gram(s) IV Push once  dextrose 50% Injectable 12.5 Gram(s) IV Push once  dextrose 50% Injectable 25 Gram(s) IV Push once  fenofibrate Tablet 48 milliGRAM(s) Oral every 24 hours  glucagon  Injectable 1 milliGRAM(s) IntraMuscular once  insulin lispro (ADMELOG) corrective regimen sliding scale   SubCutaneous Before meals and at bedtime  levothyroxine 75 MICROGram(s) Oral every 24 hours    MEDICATIONS  (PRN):  dextrose Oral Gel 15 Gram(s) Oral once PRN Blood Glucose LESS THAN 70 milliGRAM(s)/deciliter  ondansetron Injectable 4 milliGRAM(s) IV Push every 8 hours PRN Nausea and/or Vomiting      Vital Signs Last 24 Hrs  T(C): 36.4 (29 Nov 2023 05:43), Max: 36.7 (28 Nov 2023 08:49)  T(F): 97.5 (29 Nov 2023 05:43), Max: 98.1 (28 Nov 2023 08:49)  HR: 82 (29 Nov 2023 05:43) (79 - 88)  BP: 129/79 (29 Nov 2023 05:43) (129/79 - 168/72)  BP(mean): --  RR: 18 (29 Nov 2023 05:43) (18 - 19)  SpO2: 97% (29 Nov 2023 05:43) (95% - 98%)    Parameters below as of 29 Nov 2023 05:43  Patient On (Oxygen Delivery Method): room air        PHYSICAL EXAM:  General: in no acute distress, non toxic appearing, speaking in full sentences  Eyes: EOMI intact bilaterally. Anicteric sclerae, moist conjunctivae. R pupil nonreactive to light, L pupil normally reactive to light & accomodation.  HENT: Moist mucous membranes  Neck: Trachea midline, supple  Lungs: CTA B/L. No wheezes, rales, or rhonchi  Cardiovascular: RRR. No murmurs, rubs, or gallops  Abdomen: +BS Soft, non-tender non-distended; No rebound or guarding  Vasc: Rad and DP intact and equal b/l   Extremities: WWP, No clubbing, cyanosis or edema  Neurological: Alert and oriented  Skin: Warm and dry. No obvious rash     LABS:                        12.4   8.43  )-----------( 398      ( 29 Nov 2023 05:30 )             38.7     11-29    142  |  110<H>  |  7   ----------------------------<  136<H>  3.6   |  20<L>  |  0.65    Ca    10.2      29 Nov 2023 05:30  Phos  2.2     11-29  Mg     1.8     11-29    TPro  7.0  /  Alb  3.6  /  TBili  0.4  /  DBili  x   /  AST  14  /  ALT  7<L>  /  AlkPhos  50  11-29    PT/INR - ( 28 Nov 2023 06:40 )   PT: 12.4 sec;   INR: 1.09          PTT - ( 28 Nov 2023 06:40 )  PTT:30.6 sec  Urinalysis Basic - ( 29 Nov 2023 05:30 )    Color: x / Appearance: x / SG: x / pH: x  Gluc: 136 mg/dL / Ketone: x  / Bili: x / Urobili: x   Blood: x / Protein: x / Nitrite: x   Leuk Esterase: x / RBC: x / WBC x   Sq Epi: x / Non Sq Epi: x / Bacteria: x        MICROBIOLOGY:    RADIOLOGY & ADDITIONAL STUDIES: INTERVAL HPI/OVERNIGHT EVENTS: No acute overnight events.     SUBJECTIVE: Pt seen and examined at bedside. NAD. Endorses having his 1st bowel movement in days yesterday - described as hard [place]. 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 L eye cloudy, poor vision. Eagerly awaiting his EGD which is now scheduled for 11/30.  Denies f/c/n/v/d, abd pain, SOB, CP,  sxs, dizziness, leg pain/swelling    MEDICATIONS  (STANDING):  amLODIPine   Tablet 5 milliGRAM(s) Oral every 24 hours  atorvastatin 20 milliGRAM(s) Oral at bedtime  dextrose 5%. 1000 milliLiter(s) (100 mL/Hr) IV Continuous <Continuous>  dextrose 5%. 1000 milliLiter(s) (50 mL/Hr) IV Continuous <Continuous>  dextrose 50% Injectable 25 Gram(s) IV Push once  dextrose 50% Injectable 12.5 Gram(s) IV Push once  dextrose 50% Injectable 25 Gram(s) IV Push once  fenofibrate Tablet 48 milliGRAM(s) Oral every 24 hours  glucagon  Injectable 1 milliGRAM(s) IntraMuscular once  insulin lispro (ADMELOG) corrective regimen sliding scale   SubCutaneous Before meals and at bedtime  levothyroxine 75 MICROGram(s) Oral every 24 hours    MEDICATIONS  (PRN):  dextrose Oral Gel 15 Gram(s) Oral once PRN Blood Glucose LESS THAN 70 milliGRAM(s)/deciliter  ondansetron Injectable 4 milliGRAM(s) IV Push every 8 hours PRN Nausea and/or Vomiting      Vital Signs Last 24 Hrs  T(C): 36.4 (29 Nov 2023 05:43), Max: 36.7 (28 Nov 2023 08:49)  T(F): 97.5 (29 Nov 2023 05:43), Max: 98.1 (28 Nov 2023 08:49)  HR: 82 (29 Nov 2023 05:43) (79 - 88)  BP: 129/79 (29 Nov 2023 05:43) (129/79 - 168/72)  BP(mean): --  RR: 18 (29 Nov 2023 05:43) (18 - 19)  SpO2: 97% (29 Nov 2023 05:43) (95% - 98%)    Parameters below as of 29 Nov 2023 05:43  Patient On (Oxygen Delivery Method): room air        PHYSICAL EXAM:  General: in no acute distress, non toxic appearing, speaking in full sentences  Eyes: EOMI intact bilaterally. Anicteric sclerae, moist conjunctivae. R pupil nonreactive to light, L pupil normally reactive to light & accomodation.  HENT: Moist mucous membranes  Neck: Trachea midline, supple  Lungs: CTA B/L. No wheezes, rales, or rhonchi  Cardiovascular: RRR. No murmurs, rubs, or gallops  Abdomen: +BS Soft, non-tender non-distended; No rebound or guarding  Vasc: Rad and DP intact and equal b/l   Extremities: WWP, No clubbing, cyanosis or edema  Neurological: Alert and oriented x3  Skin: Warm and dry. No obvious rash     LABS:                        12.4   8.43  )-----------( 398      ( 29 Nov 2023 05:30 )             38.7     11-29    142  |  110<H>  |  7   ----------------------------<  136<H>  3.6   |  20<L>  |  0.65    Ca    10.2      29 Nov 2023 05:30  Phos  2.2     11-29  Mg     1.8     11-29    TPro  7.0  /  Alb  3.6  /  TBili  0.4  /  DBili  x   /  AST  14  /  ALT  7<L>  /  AlkPhos  50  11-29    PT/INR - ( 28 Nov 2023 06:40 )   PT: 12.4 sec;   INR: 1.09          PTT - ( 28 Nov 2023 06:40 )  PTT:30.6 sec  Urinalysis Basic - ( 29 Nov 2023 05:30 )    Color: x / Appearance: x / SG: x / pH: x  Gluc: 136 mg/dL / Ketone: x  / Bili: x / Urobili: x   Blood: x / Protein: x / Nitrite: x   Leuk Esterase: x / RBC: x / WBC x   Sq Epi: x / Non Sq Epi: x / Bacteria: x        MICROBIOLOGY:    RADIOLOGY & ADDITIONAL STUDIES:

## 2023-11-30 NOTE — PROGRESS NOTE ADULT - PROBLEM SELECTOR PLAN 11
F: s/p 1L in ED. s/pD5w +LR @75cc/hr x12hrs.   E: replete K < 4, Mg <2  DVT ppx- SCD  Diet- NPO for EGD 11/30/23  Activity- ambulate as tolerated  Code- Full.

## 2023-11-30 NOTE — PROGRESS NOTE ADULT - PROBLEM SELECTOR PLAN 1
84 y/o M present w/ dysphagia and unexpected weight loss in the last few weeks. Only able to tolerate liquids and soft foods and would experience regurgitation w/ hard solid foods. Reports 40lbs unexplained weight loss and a decrease in pant size from 38 to 28.   - CTAP IV cont: Marked circumferential wall thickening of the midesophagus. Malignancy cannot be excluded. Suggest endoscopic correlation.  - Barium swallow shows "masslike defect extending 5cm along mid-distal esophagus resulting in moderate luminal narrowing highly concerning for malignancy"    PLAN:  - f/u GI recs,  - Plan for EGD 11/30/23  - NPO for EGD

## 2023-11-30 NOTE — PROGRESS NOTE ADULT - PROBLEM SELECTOR PLAN 2
Patient presents w/ hypercalcemia this admission of 10.8. Per outpatient labs patient was 10.8 on 10/12 and wnl before then.   Etiology: Hyperparathyroidism vs malignancy.   Improved w/ 2L of fluids.   PTH ---> = 18 (WNL)    PLAN:  - CTM  - outpatient PCP f/u

## 2023-11-30 NOTE — PROGRESS NOTE ADULT - PROBLEM SELECTOR PLAN 5
Hgb of 12.2, baseline 12-14 outpatient labs. No signs and symptoms of bleeding. MCV normal.  Iron total 43, TIBC 247, % saturation iron 17, Ferritin 131    PLAN:  - CTM for signs and symptoms of bleeding.  - f/u outpatient PCP

## 2023-11-30 NOTE — CHART NOTE - NSCHARTNOTEFT_GEN_A_CORE
Patient is s/p EGD in endoscopy unit for dysphagia.     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 Mass extended from 28 cm to 38 cm.   - The GE junction was located at 40 cm. Multiple cold forceps biopsies were performed for histology.  - Diffuse erythema of the mucosa was noted in the stomach. These findings are compatible with non-erosive gastritis.  - Erythema of the mucosa was noted in the duodenum. These findings are compatible with duodenitis.    Recommendations:   - F/u pathology report from esophageal biopsies   - advance to puree diet   - consult heme onc for further malignancy work-up     Case discussed with Dr. Bowers. GI Team will continue to follow.     Jessenia Pérez D.O.   Gastroenterology Fellow  Weekday 7am-5pm Pager: [phone]  Weeknights/Weekend/Holiday Coverage: Please call the  for contact info

## 2023-11-30 NOTE — PROGRESS NOTE ADULT - PROBLEM SELECTOR PLAN 8
Patient w/ History of CHF. Per outpatient chart review, patient not on any GDMT. Appears dry on exam on admission.   - ECHO 04/07/2020: There is moderate concentric left ventricular hypertrophy. The left ventricle is normal in size and systolic function with a calculated ejection fraction of 61%. There are no regional wall motion abnormalities seen.  - ECHO 11/28/23: There is mild-to-moderate concentric left ventricular hypertrophy. The left ventricle is normal in size and systolic function with a calculated ejection fraction of 60-65%. There are no regional wall motion abnormalities seen. Analysis of mitral annular tissue Doppler, left atrial volume index, and tricuspid regurgitant velocity reveals: indeterminate left ventricular diastolic function and filling pressure.    PLAN:  - CTM fluid status.

## 2023-11-30 NOTE — PROGRESS NOTE ADULT - PROBLEM SELECTOR PLAN 6
Patient w/ history of CAD. Was on ASA and plavix but per outpatient chart review, patient is no longer taking.  Collateral from PCP office states patient was last prescribed ASA and plavix on 06/2020.   - Patient states he has no cardiac history.   - EKG no signs of ischemia, Trop 13     PLAN:  - CTM for ischemic symptoms.

## 2023-11-30 NOTE — PRE-ANESTHESIA EVALUATION ADULT - NSRADCARDRESULTSFT_GEN_ALL_CORE
11/28/23: TTE  CONCLUSIONS:     1. Normal left and right ventricular size and systolic function.   2. Mild to moderate symmetric left ventricular hypertrophy.   3. Normal right ventricular systolic function.   4. No significant valvular disease.   5. No pericardial effusion.    CT PE 11/27/23: No PE, adrenal mass stable from 2015 imaging.

## 2023-11-30 NOTE — PRE-ANESTHESIA EVALUATION ADULT - NSANTHPEFT_GEN_ALL_CORE
General: Appearance is consistent with chronological age. No abnormal facies.  Airway:  See Mallampati score  EENT: Anicteric sclera; oropharynx clear, moist mucus membranes  Cardiovascular:  Regular rate and rhythm  Respiratory: Unlabored breathing  Neurological: Awake and alert, moves all extremities  Constitutional: MET=4 General: Appearance is consistent with chronological age. No abnormal facies.  Airway:  See Mallampati score  EENT: Anicteric sclera; oropharynx clear, moist mucus membranes  Cardiovascular:  Regular rate and rhythm  Respiratory: Unlabored breathing  Neurological: Awake and alert, moves all extremities, A&O x 4  Constitutional: MET<4

## 2023-11-30 NOTE — PROGRESS NOTE ADULT - PROBLEM SELECTOR PLAN 4
Patient w/ Hx of HTN. Takes amlodipine 5mg.    PLAN:  - c/w home amlodipine 5mg  - Start lisinopril 10mg qd 12/1/23

## 2023-11-30 NOTE — PROGRESS NOTE ADULT - PROBLEM SELECTOR PLAN 9
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 Takes eliquis 5mg q12 at home.  Per outpatient chart review, CVA was iso hypercoagulable state iso covid-19 infection, was initially on lovenox 80mg BID but transitioned to ASA 81mg and Plavix 75mg, last picked up on 06/2020.    PLAN:  - Hold eliquis 5mg q12 iso pending EGD.  - Upon D/C, switch to ASA instead of eliquis, f/u outpt w/ PCP

## 2023-11-30 NOTE — PROGRESS NOTE ADULT - PROBLEM SELECTOR PLAN 3
Patient w/ History of DM. Takes home Glimepiride 2mg.    PLAN:  - Start low dose sliding scale   - hold home Glimepiride

## 2023-11-30 NOTE — PRE-ANESTHESIA EVALUATION ADULT - NSANTHPMHFT_GEN_ALL_CORE
Per primary team, this is an 82 y/o M w/ a PMHx of CHF, HTN, CAD, CVA, DM Type 2, adrenal mass, hypothyroidism, and gait abnormality who presents to  ED w/ dysphagia and weight loss in the last 2-3 weeks. CT imaging shows concern for malignancy in the distal esophagus. Patient was admitted for further work up for dysphagia and weight loss.    Otherwise, no CP/SOB/N/V/GERD. No numbness or weakness. METS = 4. All chronic conditions stable. Per primary team, this is an 82 y/o M w/ a PMHx of CHF, HTN, CAD, CVA, DM Type 2, adrenal mass, hypothyroidism, and gait abnormality who presents to  ED w/ dysphagia and weight loss in the last 2-3 weeks. CT imaging shows concern for malignancy in the distal esophagus. Patient was admitted for further work up for dysphagia and weight loss.    Otherwise, no CP/SOB/N/V/GERD. No numbness or weakness. METS < 4. All chronic conditions stable.

## 2023-11-30 NOTE — PROGRESS NOTE ADULT - SUBJECTIVE AND OBJECTIVE BOX
*****INCOMPLETE NOTE*****    INTERVAL HPI/OVERNIGHT EVENTS:    SUBJECTIVE: Pt seen and examined at bedside. ARTURO.   Denies f/c/n/v/d, abd pain, SOB, CP,  sxs,     ANTIBIOTICS/RELEVANT:    MEDICATIONS  (STANDING):  amLODIPine   Tablet 5 milliGRAM(s) Oral every 24 hours  atorvastatin 20 milliGRAM(s) Oral at bedtime  dextrose 5% + lactated ringers. 1000 milliLiter(s) (75 mL/Hr) IV Continuous <Continuous>  dextrose 5%. 1000 milliLiter(s) (100 mL/Hr) IV Continuous <Continuous>  dextrose 5%. 1000 milliLiter(s) (50 mL/Hr) IV Continuous <Continuous>  dextrose 50% Injectable 25 Gram(s) IV Push once  dextrose 50% Injectable 12.5 Gram(s) IV Push once  dextrose 50% Injectable 25 Gram(s) IV Push once  fenofibrate Tablet 48 milliGRAM(s) Oral every 24 hours  glucagon  Injectable 1 milliGRAM(s) IntraMuscular once  insulin lispro (ADMELOG) corrective regimen sliding scale   SubCutaneous Before meals and at bedtime  levothyroxine 75 MICROGram(s) Oral every 24 hours  magnesium sulfate  IVPB 2 Gram(s) IV Intermittent once  polyethylene glycol 3350 17 Gram(s) Oral every 24 hours  potassium chloride   Powder 40 milliEquivalent(s) Oral once  potassium phosphate IVPB 15 milliMole(s) IV Intermittent once    MEDICATIONS  (PRN):  dextrose Oral Gel 15 Gram(s) Oral once PRN Blood Glucose LESS THAN 70 milliGRAM(s)/deciliter  ondansetron Injectable 4 milliGRAM(s) IV Push every 8 hours PRN Nausea and/or Vomiting      Vital Signs Last 24 Hrs  T(C): 36.6 (30 Nov 2023 05:44), Max: 36.7 (29 Nov 2023 16:01)  T(F): 97.9 (30 Nov 2023 05:44), Max: 98 (29 Nov 2023 16:01)  HR: 73 (30 Nov 2023 05:44) (73 - 91)  BP: 170/91 (30 Nov 2023 08:45) (133/81 - 174/98)  BP(mean): --  RR: 18 (30 Nov 2023 05:44) (17 - 18)  SpO2: 99% (30 Nov 2023 05:44) (93% - 99%)    Parameters below as of 30 Nov 2023 05:44  Patient On (Oxygen Delivery Method): room air        PHYSICAL EXAM:  General: in no acute distress, non toxic appearing, speaking in full sentences  Eyes: EOMI intact bilaterally. Anicteric sclerae, moist conjunctivae  HENT: Moist mucous membranes  Neck: Trachea midline, supple  Lungs: CTA B/L. No wheezes, rales, or rhonchi  Cardiovascular: RRR. No murmurs, rubs, or gallops  Abdomen: +BS Soft, non-tender non-distended; No rebound or guarding  Vasc: Rad and DP intact and equal b/l   Extremities: WWP, No clubbing, cyanosis or edema  Neurological: Alert and oriented  Skin: Warm and dry. No obvious rash     LABS:                        11.9   8.48  )-----------( 374      ( 30 Nov 2023 05:30 )             35.7     11-30    141  |  111<H>  |  4<L>  ----------------------------<  139<H>  3.2<L>   |  20<L>  |  0.59    Ca    10.0      30 Nov 2023 05:30  Phos  1.8     11-30  Mg     1.6     11-30    TPro  7.0  /  Alb  3.6  /  TBili  0.4  /  DBili  x   /  AST  14  /  ALT  7<L>  /  AlkPhos  50  11-29    PT/INR - ( 30 Nov 2023 05:30 )   PT: 12.4 sec;   INR: 1.09          PTT - ( 30 Nov 2023 05:30 )  PTT:31.9 sec  Urinalysis Basic - ( 30 Nov 2023 05:30 )    Color: x / Appearance: x / SG: x / pH: x  Gluc: 139 mg/dL / Ketone: x  / Bili: x / Urobili: x   Blood: x / Protein: x / Nitrite: x   Leuk Esterase: x / RBC: x / WBC x   Sq Epi: x / Non Sq Epi: x / Bacteria: x        MICROBIOLOGY:    RADIOLOGY & ADDITIONAL STUDIES: *****INCOMPLETE NOTE*****    INTERVAL HPI/OVERNIGHT EVENTS: ARTURO     SUBJECTIVE: Pt seen and examined at bedside. No complaints.   Denies f/c/n/v/d, abd pain, SOB, CP,  sxs,     ANTIBIOTICS/RELEVANT:    MEDICATIONS  (STANDING):  amLODIPine   Tablet 5 milliGRAM(s) Oral every 24 hours  atorvastatin 20 milliGRAM(s) Oral at bedtime  dextrose 5% + lactated ringers. 1000 milliLiter(s) (75 mL/Hr) IV Continuous <Continuous>  dextrose 5%. 1000 milliLiter(s) (100 mL/Hr) IV Continuous <Continuous>  dextrose 5%. 1000 milliLiter(s) (50 mL/Hr) IV Continuous <Continuous>  dextrose 50% Injectable 25 Gram(s) IV Push once  dextrose 50% Injectable 12.5 Gram(s) IV Push once  dextrose 50% Injectable 25 Gram(s) IV Push once  fenofibrate Tablet 48 milliGRAM(s) Oral every 24 hours  glucagon  Injectable 1 milliGRAM(s) IntraMuscular once  insulin lispro (ADMELOG) corrective regimen sliding scale   SubCutaneous Before meals and at bedtime  levothyroxine 75 MICROGram(s) Oral every 24 hours  magnesium sulfate  IVPB 2 Gram(s) IV Intermittent once  polyethylene glycol 3350 17 Gram(s) Oral every 24 hours  potassium chloride   Powder 40 milliEquivalent(s) Oral once  potassium phosphate IVPB 15 milliMole(s) IV Intermittent once    MEDICATIONS  (PRN):  dextrose Oral Gel 15 Gram(s) Oral once PRN Blood Glucose LESS THAN 70 milliGRAM(s)/deciliter  ondansetron Injectable 4 milliGRAM(s) IV Push every 8 hours PRN Nausea and/or Vomiting      Vital Signs Last 24 Hrs  T(C): 36.6 (30 Nov 2023 05:44), Max: 36.7 (29 Nov 2023 16:01)  T(F): 97.9 (30 Nov 2023 05:44), Max: 98 (29 Nov 2023 16:01)  HR: 73 (30 Nov 2023 05:44) (73 - 91)  BP: 170/91 (30 Nov 2023 08:45) (133/81 - 174/98)  BP(mean): --  RR: 18 (30 Nov 2023 05:44) (17 - 18)  SpO2: 99% (30 Nov 2023 05:44) (93% - 99%)    Parameters below as of 30 Nov 2023 05:44  Patient On (Oxygen Delivery Method): room air        PHYSICAL EXAM:  General: in no acute distress, non toxic appearing, speaking in full sentences  Eyes: EOMI intact bilaterally. Anicteric sclerae, moist conjunctivae  HENT: Moist mucous membranes  Neck: Trachea midline, supple  Lungs: CTA B/L. No wheezes, rales, or rhonchi  Cardiovascular: RRR. No murmurs, rubs, or gallops  Abdomen: +BS Soft, non-tender non-distended; No rebound or guarding  Vasc: Rad and DP intact and equal b/l   Extremities: WWP, No clubbing, cyanosis or edema  Neurological: Alert and oriented  Skin: Warm and dry. No obvious rash     LABS:                        11.9   8.48  )-----------( 374      ( 30 Nov 2023 05:30 )             35.7     11-30    141  |  111<H>  |  4<L>  ----------------------------<  139<H>  3.2<L>   |  20<L>  |  0.59    Ca    10.0      30 Nov 2023 05:30  Phos  1.8     11-30  Mg     1.6     11-30    TPro  7.0  /  Alb  3.6  /  TBili  0.4  /  DBili  x   /  AST  14  /  ALT  7<L>  /  AlkPhos  50  11-29    PT/INR - ( 30 Nov 2023 05:30 )   PT: 12.4 sec;   INR: 1.09          PTT - ( 30 Nov 2023 05:30 )  PTT:31.9 sec  Urinalysis Basic - ( 30 Nov 2023 05:30 )    Color: x / Appearance: x / SG: x / pH: x  Gluc: 139 mg/dL / Ketone: x  / Bili: x / Urobili: x   Blood: x / Protein: x / Nitrite: x   Leuk Esterase: x / RBC: x / WBC x   Sq Epi: x / Non Sq Epi: x / Bacteria: x        MICROBIOLOGY:    RADIOLOGY & ADDITIONAL STUDIES: INTERVAL HPI/OVERNIGHT EVENTS: ARTURO     SUBJECTIVE: Pt seen and examined at bedside. States he is still having constipation.   Denies f/c/n/v/d, abd pain, SOB, CP,  sxs.    MEDICATIONS  (STANDING):  amLODIPine   Tablet 5 milliGRAM(s) Oral every 24 hours  atorvastatin 20 milliGRAM(s) Oral at bedtime  dextrose 5% + lactated ringers. 1000 milliLiter(s) (75 mL/Hr) IV Continuous <Continuous>  dextrose 5%. 1000 milliLiter(s) (100 mL/Hr) IV Continuous <Continuous>  dextrose 5%. 1000 milliLiter(s) (50 mL/Hr) IV Continuous <Continuous>  dextrose 50% Injectable 25 Gram(s) IV Push once  dextrose 50% Injectable 12.5 Gram(s) IV Push once  dextrose 50% Injectable 25 Gram(s) IV Push once  fenofibrate Tablet 48 milliGRAM(s) Oral every 24 hours  glucagon  Injectable 1 milliGRAM(s) IntraMuscular once  insulin lispro (ADMELOG) corrective regimen sliding scale   SubCutaneous Before meals and at bedtime  levothyroxine 75 MICROGram(s) Oral every 24 hours  magnesium sulfate  IVPB 2 Gram(s) IV Intermittent once  polyethylene glycol 3350 17 Gram(s) Oral every 24 hours  potassium chloride   Powder 40 milliEquivalent(s) Oral once  potassium phosphate IVPB 15 milliMole(s) IV Intermittent once    MEDICATIONS  (PRN):  dextrose Oral Gel 15 Gram(s) Oral once PRN Blood Glucose LESS THAN 70 milliGRAM(s)/deciliter  ondansetron Injectable 4 milliGRAM(s) IV Push every 8 hours PRN Nausea and/or Vomiting      Vital Signs Last 24 Hrs  T(C): 36.6 (30 Nov 2023 05:44), Max: 36.7 (29 Nov 2023 16:01)  T(F): 97.9 (30 Nov 2023 05:44), Max: 98 (29 Nov 2023 16:01)  HR: 73 (30 Nov 2023 05:44) (73 - 91)  BP: 170/91 (30 Nov 2023 08:45) (133/81 - 174/98)  BP(mean): --  RR: 18 (30 Nov 2023 05:44) (17 - 18)  SpO2: 99% (30 Nov 2023 05:44) (93% - 99%)    Parameters below as of 30 Nov 2023 05:44  Patient On (Oxygen Delivery Method): room air        PHYSICAL EXAM:  General: in no acute distress, non toxic appearing, speaking in full sentences  Eyes: EOMI intact bilaterally. Anicteric sclerae, moist conjunctivae  HENT: Moist mucous membranes  Neck: Trachea midline, supple  Lungs: CTA B/L. No wheezes, rales, or rhonchi  Cardiovascular: RRR. No murmurs, rubs, or gallops  Abdomen: +BS, Abd mildly distended. Soft, non-tender; No rebound or guarding  Vasc: Rad and DP intact and equal b/l   Extremities: WWP, No clubbing, cyanosis or edema  Neurological: Alert and oriented x3  Skin: Warm and dry. No obvious rash     LABS:                        11.9   8.48  )-----------( 374      ( 30 Nov 2023 05:30 )             35.7     11-30    141  |  111<H>  |  4<L>  ----------------------------<  139<H>  3.2<L>   |  20<L>  |  0.59    Ca    10.0      30 Nov 2023 05:30  Phos  1.8     11-30  Mg     1.6     11-30    TPro  7.0  /  Alb  3.6  /  TBili  0.4  /  DBili  x   /  AST  14  /  ALT  7<L>  /  AlkPhos  50  11-29    PT/INR - ( 30 Nov 2023 05:30 )   PT: 12.4 sec;   INR: 1.09          PTT - ( 30 Nov 2023 05:30 )  PTT:31.9 sec  Urinalysis Basic - ( 30 Nov 2023 05:30 )    Color: x / Appearance: x / SG: x / pH: x  Gluc: 139 mg/dL / Ketone: x  / Bili: x / Urobili: x   Blood: x / Protein: x / Nitrite: x   Leuk Esterase: x / RBC: x / WBC x   Sq Epi: x / Non Sq Epi: x / Bacteria: x        MICROBIOLOGY:    RADIOLOGY & ADDITIONAL STUDIES:

## 2023-11-30 NOTE — PRE-ANESTHESIA EVALUATION ADULT - NSATTENDATTESTRD_GEN_ALL_CORE
Telephone Encounter by Orquidea Blas at 10/23/17 02:55 PM     Author:  Orquidea Blas Service:  (none) Author Type:  Patient      Filed:  10/23/17 02:56 PM Encounter Date:  10/23/2017 Status:  Signed     :  Orquidea Blas (Patient )            Patient scheduled appointment on 11/3 with Dr Campbell.[JC1.1M]      Revision History        User Key Date/Time User Provider Type Action    > [email or web address] 10/23/17 02:56 PM Orquidea Blas Patient  Sign    M - Manual             The patient has been re-examined and I agree with the above assessment or I updated with my findings.

## 2023-11-30 NOTE — PROGRESS NOTE ADULT - PROBLEM SELECTOR PLAN 10
Patient w/ recent constipation. Received laxatives outpatient resulting in 1BM which was 6-7days ago. Reports BM on 11/28 PM which was hard kane.    PLAN:  - Started on bowel regimen of miralax and senna  - Enema today.

## 2023-12-01 NOTE — PROGRESS NOTE ADULT - PROBLEM SELECTOR PROBLEM 8
Chronic heart failure with preserved ejection fraction

## 2023-12-01 NOTE — PHYSICAL THERAPY INITIAL EVALUATION ADULT - PERTINENT HX OF CURRENT PROBLEM, REHAB EVAL
83M w/ PMHx CHF, CAD, DM, adrenal mass, hypothyroidism, and gait abnormality presents to  ED w/ dysphagia and weight loss in the last 2-3 weeks. CT imaging shows concern for malignancy. Patient admitted to [facility] for further work up dysphagia and weight loss.

## 2023-12-01 NOTE — DISCHARGE NOTE NURSING/CASE MANAGEMENT/SOCIAL WORK - NSDCFUADDAPPT_GEN_ALL_CORE_FT
(1) Follow up GI: Please follow up at our GI clinic. The office will contact you to schedule an appointment. Please make sure you answer your phone.      (2) Follow up PCP: Follow up with your primary care doctor.     (3) Follow Heme/Onc: Please follow up hematologist/oncolgist Dr. Stallworth. We contacted their office and they will reach out to you to schedule an appointment.

## 2023-12-01 NOTE — PROGRESS NOTE ADULT - SUBJECTIVE AND OBJECTIVE BOX
*****INCOMPLETE NOTE*****    INTERVAL HPI/OVERNIGHT EVENTS:    SUBJECTIVE: Pt seen and examined at bedside. ARTURO.   Denies f/c/n/v/d, abd pain, SOB, CP,  sxs,     ANTIBIOTICS/RELEVANT:    MEDICATIONS  (STANDING):  amLODIPine   Tablet 5 milliGRAM(s) Oral every 24 hours  atorvastatin 20 milliGRAM(s) Oral at bedtime  dextrose 5% + lactated ringers. 1000 milliLiter(s) (75 mL/Hr) IV Continuous <Continuous>  dextrose 5%. 1000 milliLiter(s) (50 mL/Hr) IV Continuous <Continuous>  dextrose 5%. 1000 milliLiter(s) (100 mL/Hr) IV Continuous <Continuous>  dextrose 50% Injectable 25 Gram(s) IV Push once  dextrose 50% Injectable 25 Gram(s) IV Push once  dextrose 50% Injectable 12.5 Gram(s) IV Push once  fenofibrate Tablet 48 milliGRAM(s) Oral every 24 hours  glucagon  Injectable 1 milliGRAM(s) IntraMuscular once  insulin lispro (ADMELOG) corrective regimen sliding scale   SubCutaneous Before meals and at bedtime  levothyroxine 75 MICROGram(s) Oral every 24 hours  lisinopril 10 milliGRAM(s) Oral every 24 hours  polyethylene glycol 3350 17 Gram(s) Oral every 12 hours  senna 2 Tablet(s) Oral at bedtime    MEDICATIONS  (PRN):  dextrose Oral Gel 15 Gram(s) Oral once PRN Blood Glucose LESS THAN 70 milliGRAM(s)/deciliter  lactulose Syrup 10 Gram(s) Oral every 12 hours PRN Constipation  ondansetron Injectable 4 milliGRAM(s) IV Push every 8 hours PRN Nausea and/or Vomiting      Vital Signs Last 24 Hrs  T(C): 36.6 (01 Dec 2023 09:00), Max: 36.6 (30 Nov 2023 15:44)  T(F): 97.9 (01 Dec 2023 09:00), Max: 97.9 (30 Nov 2023 20:46)  HR: 83 (01 Dec 2023 09:00) (70 - 89)  BP: 169/95 (01 Dec 2023 09:00) (149/83 - 169/97)  BP(mean): 120 (30 Nov 2023 16:38) (120 - 120)  RR: 18 (01 Dec 2023 09:00) (17 - 18)  SpO2: 98% (01 Dec 2023 09:00) (95% - 98%)    Parameters below as of 01 Dec 2023 09:00  Patient On (Oxygen Delivery Method): room air        PHYSICAL EXAM:  General: in no acute distress, non toxic appearing, speaking in full sentences  Eyes: EOMI intact bilaterally. Anicteric sclerae, moist conjunctivae  HENT: Moist mucous membranes  Neck: Trachea midline, supple  Lungs: CTA B/L. No wheezes, rales, or rhonchi  Cardiovascular: RRR. No murmurs, rubs, or gallops  Abdomen: +BS Soft, non-tender non-distended; No rebound or guarding  Vasc: Rad and DP intact and equal b/l   Extremities: WWP, No clubbing, cyanosis or edema  Neurological: Alert and oriented  Skin: Warm and dry. No obvious rash     LABS:                        13.6   10.19 )-----------( 420      ( 01 Dec 2023 05:30 )             41.9     12-01    144  |  108  |  6<L>  ----------------------------<  134<H>  3.7   |  23  |  0.68    Ca    10.8<H>      01 Dec 2023 05:30  Phos  2.8     12-01  Mg     1.9     12-01      PT/INR - ( 30 Nov 2023 05:30 )   PT: 12.4 sec;   INR: 1.09          PTT - ( 30 Nov 2023 05:30 )  PTT:31.9 sec  Urinalysis Basic - ( 01 Dec 2023 05:30 )    Color: x / Appearance: x / SG: x / pH: x  Gluc: 134 mg/dL / Ketone: x  / Bili: x / Urobili: x   Blood: x / Protein: x / Nitrite: x   Leuk Esterase: x / RBC: x / WBC x   Sq Epi: x / Non Sq Epi: x / Bacteria: x        MICROBIOLOGY:    RADIOLOGY & ADDITIONAL STUDIES: *****INCOMPLETE NOTE*****    INTERVAL HPI/OVERNIGHT EVENTS: No acute overnight events.    SUBJECTIVE: Pt seen and examined at bedside. NAD. Still complains of constipation despite Lactulose 10mg PO q12hrs, denies abdominal pain.  Denies f/c/n/v/d, abd pain, SOB, CP,  sxs.    ANTIBIOTICS/RELEVANT:    MEDICATIONS  (STANDING):  amLODIPine   Tablet 5 milliGRAM(s) Oral every 24 hours  atorvastatin 20 milliGRAM(s) Oral at bedtime  dextrose 5% + lactated ringers. 1000 milliLiter(s) (75 mL/Hr) IV Continuous <Continuous>  dextrose 5%. 1000 milliLiter(s) (50 mL/Hr) IV Continuous <Continuous>  dextrose 5%. 1000 milliLiter(s) (100 mL/Hr) IV Continuous <Continuous>  dextrose 50% Injectable 25 Gram(s) IV Push once  dextrose 50% Injectable 25 Gram(s) IV Push once  dextrose 50% Injectable 12.5 Gram(s) IV Push once  fenofibrate Tablet 48 milliGRAM(s) Oral every 24 hours  glucagon  Injectable 1 milliGRAM(s) IntraMuscular once  insulin lispro (ADMELOG) corrective regimen sliding scale   SubCutaneous Before meals and at bedtime  levothyroxine 75 MICROGram(s) Oral every 24 hours  lisinopril 10 milliGRAM(s) Oral every 24 hours  polyethylene glycol 3350 17 Gram(s) Oral every 12 hours  senna 2 Tablet(s) Oral at bedtime    MEDICATIONS  (PRN):  dextrose Oral Gel 15 Gram(s) Oral once PRN Blood Glucose LESS THAN 70 milliGRAM(s)/deciliter  lactulose Syrup 10 Gram(s) Oral every 12 hours PRN Constipation  ondansetron Injectable 4 milliGRAM(s) IV Push every 8 hours PRN Nausea and/or Vomiting      Vital Signs Last 24 Hrs  T(C): 36.6 (01 Dec 2023 09:00), Max: 36.6 (30 Nov 2023 15:44)  T(F): 97.9 (01 Dec 2023 09:00), Max: 97.9 (30 Nov 2023 20:46)  HR: 83 (01 Dec 2023 09:00) (70 - 89)  BP: 169/95 (01 Dec 2023 09:00) (149/83 - 169/97)  BP(mean): 120 (30 Nov 2023 16:38) (120 - 120)  RR: 18 (01 Dec 2023 09:00) (17 - 18)  SpO2: 98% (01 Dec 2023 09:00) (95% - 98%)    Parameters below as of 01 Dec 2023 09:00  Patient On (Oxygen Delivery Method): room air        PHYSICAL EXAM:  General: in no acute distress, non toxic appearing, speaking in full sentences  Eyes: EOMI intact bilaterally. Anicteric sclerae, moist conjunctivae. PERRLA.  HENT: Moist mucous membranes  Neck: Trachea midline, supple  Lungs: CTA B/L. No wheezes, rales, or rhonchi  Cardiovascular: RRR. No murmurs, rubs, or gallops  Abdomen: +BS Soft, non-tender non-distended; No rebound or guarding  Vasc: Rad and DP intact and equal b/l   Extremities: WWP, No clubbing, cyanosis or edema  Neurological: Alert and oriented  Skin: Warm and dry. No obvious rash     LABS:                        13.6   10.19 )-----------( 420      ( 01 Dec 2023 05:30 )             41.9     12-01    144  |  108  |  6<L>  ----------------------------<  134<H>  3.7   |  23  |  0.68    Ca    10.8<H>      01 Dec 2023 05:30  Phos  2.8     12-01  Mg     1.9     12-01      PT/INR - ( 30 Nov 2023 05:30 )   PT: 12.4 sec;   INR: 1.09          PTT - ( 30 Nov 2023 05:30 )  PTT:31.9 sec  Urinalysis Basic - ( 01 Dec 2023 05:30 )    Color: x / Appearance: x / SG: x / pH: x  Gluc: 134 mg/dL / Ketone: x  / Bili: x / Urobili: x   Blood: x / Protein: x / Nitrite: x   Leuk Esterase: x / RBC: x / WBC x   Sq Epi: x / Non Sq Epi: x / Bacteria: x        MICROBIOLOGY:    RADIOLOGY & ADDITIONAL STUDIES: INTERVAL HPI/OVERNIGHT EVENTS: No acute overnight events.    SUBJECTIVE: Pt seen and examined at bedside. NAD. Still complains of constipation despite Lactulose 10mg PO q12hrs but able to pass gas.   Denies f/c/n/v/d, abd pain, SOB, CP,  sxs.    MEDICATIONS  (STANDING):  amLODIPine   Tablet 5 milliGRAM(s) Oral every 24 hours  atorvastatin 20 milliGRAM(s) Oral at bedtime  dextrose 5% + lactated ringers. 1000 milliLiter(s) (75 mL/Hr) IV Continuous <Continuous>  dextrose 5%. 1000 milliLiter(s) (50 mL/Hr) IV Continuous <Continuous>  dextrose 5%. 1000 milliLiter(s) (100 mL/Hr) IV Continuous <Continuous>  dextrose 50% Injectable 25 Gram(s) IV Push once  dextrose 50% Injectable 25 Gram(s) IV Push once  dextrose 50% Injectable 12.5 Gram(s) IV Push once  fenofibrate Tablet 48 milliGRAM(s) Oral every 24 hours  glucagon  Injectable 1 milliGRAM(s) IntraMuscular once  insulin lispro (ADMELOG) corrective regimen sliding scale   SubCutaneous Before meals and at bedtime  levothyroxine 75 MICROGram(s) Oral every 24 hours  lisinopril 10 milliGRAM(s) Oral every 24 hours  polyethylene glycol 3350 17 Gram(s) Oral every 12 hours  senna 2 Tablet(s) Oral at bedtime    MEDICATIONS  (PRN):  dextrose Oral Gel 15 Gram(s) Oral once PRN Blood Glucose LESS THAN 70 milliGRAM(s)/deciliter  lactulose Syrup 10 Gram(s) Oral every 12 hours PRN Constipation  ondansetron Injectable 4 milliGRAM(s) IV Push every 8 hours PRN Nausea and/or Vomiting      Vital Signs Last 24 Hrs  T(C): 36.6 (01 Dec 2023 09:00), Max: 36.6 (30 Nov 2023 15:44)  T(F): 97.9 (01 Dec 2023 09:00), Max: 97.9 (30 Nov 2023 20:46)  HR: 83 (01 Dec 2023 09:00) (70 - 89)  BP: 169/95 (01 Dec 2023 09:00) (149/83 - 169/97)  BP(mean): 120 (30 Nov 2023 16:38) (120 - 120)  RR: 18 (01 Dec 2023 09:00) (17 - 18)  SpO2: 98% (01 Dec 2023 09:00) (95% - 98%)    Parameters below as of 01 Dec 2023 09:00  Patient On (Oxygen Delivery Method): room air        PHYSICAL EXAM:  General: in no acute distress, non toxic appearing, speaking in full sentences  Eyes: EOMI intact bilaterally. Anicteric sclerae, moist conjunctivae. PERRLA.  HENT: Moist mucous membranes  Neck: Trachea midline, supple  Lungs: CTA B/L. No wheezes, rales, or rhonchi  Cardiovascular: RRR. No murmurs, rubs, or gallops  Abdomen: +mild abd distension and +fluid wave. Abd NT, soft +BS.   Vasc: Rad and DP intact and equal b/l   Extremities: WWP, No clubbing, cyanosis or edema  Neurological: Alert and oriented x3  Skin: Warm and dry. No obvious rash     LABS:                        13.6   10.19 )-----------( 420      ( 01 Dec 2023 05:30 )             41.9     12-01    144  |  108  |  6<L>  ----------------------------<  134<H>  3.7   |  23  |  0.68    Ca    10.8<H>      01 Dec 2023 05:30  Phos  2.8     12-01  Mg     1.9     12-01      PT/INR - ( 30 Nov 2023 05:30 )   PT: 12.4 sec;   INR: 1.09          PTT - ( 30 Nov 2023 05:30 )  PTT:31.9 sec  Urinalysis Basic - ( 01 Dec 2023 05:30 )    Color: x / Appearance: x / SG: x / pH: x  Gluc: 134 mg/dL / Ketone: x  / Bili: x / Urobili: x   Blood: x / Protein: x / Nitrite: x   Leuk Esterase: x / RBC: x / WBC x   Sq Epi: x / Non Sq Epi: x / Bacteria: x        MICROBIOLOGY:    RADIOLOGY & ADDITIONAL STUDIES:

## 2023-12-01 NOTE — PROGRESS NOTE ADULT - ATTENDING COMMENTS
Pt seen and d/w fellow.  Will plan for an EGD with biopsies tomorrow.
83 YOM with PMH of HTN, stroke, T2DM, hypothyroidism presenting with progressive solid food dysphagia and 40-lb weight loss over the last 2-3 weeks.     Dysphagia - reviewed CT results and barium study which demonstrated circumferential wall thickening and masslike defect along the mid distal esophagus with moderate luminal narrowing c/f malignancy. Evaluated by GI, pending EGD with biopsy.     History of stroke - thought 2/2 hypercoagulable state in setting of COVID-19 infection, was previously on DAPT and then switched to apixaban 5mg BID. Stroke occurred in 2020 and plan was to down-titrate to one antiplatelet. Will start aspirin 81mg on discharge with high intensity statin for secondary prevention.    Hypokalemia, hypophosphatemia - replete to maintain K>4, phos >3    Reported history of CAD and CHF - patient denies. Was on DAPT previously for stroke and not heart disease. TTE here with normal LVEF (diastolic dysfunction indeterminate), no valvular disease. Denies history of HF symptoms.     Dispo: pending EGD and biopsy, plan to return to [facility]
83 YOM with PMH of HTN, stroke, T2DM, hypothyroidism presenting with progressive solid food dysphagia and 40-lb weight loss over the last 2-3 weeks. Underwent EGD 11/30 which was notable for large fungating mass s/p biopsy pending results.     Dysphagia - reviewed CT results and barium study which demonstrated circumferential wall thickening and masslike defect along the mid distal esophagus with moderate luminal narrowing c/f malignancy. Evaluated by GI, EGD 11/20 with 10-cm fungating mass of malignant appearance in lower half of esophagus -biopsies pending. Will need follow up with GI and heme/onc outpatient.     History of stroke - thought 2/2 hypercoagulable state in setting of COVID-19 infection, was previously on DAPT and then switched to apixaban 5mg BID. Stroke occurred in 2020 and plan was to down-titrate to one antiplatelet. Will start aspirin 81mg on discharge with high intensity statin for secondary prevention.    Hypokalemia, hypophosphatemia - resolved    Reported history of CAD and CHF - patient denies. Was on DAPT previously for stroke and not heart disease. TTE here with normal LVEF (diastolic dysfunction indeterminate), no valvular disease. Denies history of HF symptoms.     Dispo: discharge to [facility] with HPT and increased HHA hours
83 YOM with PMH of HTN, stroke, T2DM, hypothyroidism presenting with progressive solid food dysphagia and 40-lb weight loss over the last 2-3 weeks.     Dysphagia - reviewed CT results and barium study which demonstrated circumferential wall thickening and masslike defect along the mid distal esophagus with moderate luminal narrowing c/f malignancy. Evaluated by GI, pending EGD with biopsy tomorrow.     History of stroke - thought 2/2 hypercoagulable state in setting of COVID-19 infection, was previously on DAPT and then switched to apixaban 5mg BID. Stroke occurred in 2020 and plan was to down-trtirate to one antiplatelet. Will start aspirin 81mg on discharge with high intensity statin for secondary prevention.    Reported history of CAD and CHF - patient denies. Was on DAPT previously for stroke and not heart disease. TTE here with normal LVEF (diastolic dysfunction indeterminate), no valvular disease. Denies history of HF symptoms.     Dispo: pending EGD and biopsy, plan to return to [facility]

## 2023-12-01 NOTE — DISCHARGE NOTE NURSING/CASE MANAGEMENT/SOCIAL WORK - NSDCPEFALRISK_GEN_ALL_CORE
For information on Fall & Injury Prevention, visit: [email or web address] OR  [email or web address] OR  https://www.cdc.gov/steadi/patient.html

## 2023-12-01 NOTE — PROGRESS NOTE ADULT - PROBLEM SELECTOR PLAN 9
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 Takes eliquis 5mg q12 at home.    PLAN:  - Hold eliquis 5mg q12 iso pending EGD. 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 Takes eliquis 5mg q12 at home.  Per outpatient chart review, CVA was iso hypercoagulable state iso covid-19 infection, was initially on lovenox 80mg BID but transitioned to ASA 81mg and Plavix 75mg, last picked up on 06/2020.    PLAN:  - Hold eliquis 5mg q12 iso pending EGD.  - Upon D/C, switch to ASA instead of eliquis, f/u outpt w/ PCP.

## 2023-12-01 NOTE — PROGRESS NOTE ADULT - PROBLEM SELECTOR PLAN 1
82 y/o M present w/ dysphagia and unexpected weight loss in the last few weeks. Only able to tolerate liquids and soft foods and would experience regurgitation w/ hard solid foods. Reports 40lbs unexplained weight loss and a decrease in pant size from 38 to 28.   CTAP IV cont: Marked circumferential wall thickening of the midesophagus. Malignancy cannot be excluded. Suggest endoscopic correlation.  EGD 11/30: Fungating mass of malignant appearance was found in lower half of esophagus, extends from 28-38cm. Also diffuse erythema of stomach mucosa consistent w/ non-erosive gastritis. Also erythema of duodenal mucosa consistent w/ duodenitis.    PLAN:  - f/u GI recs,  - f/u pathology report from esophageal biopsies  - consult heme/onc for further malignancy work-up  - start puree diet 82 y/o M present w/ dysphagia and unexpected weight loss in the last few weeks. Only able to tolerate liquids and soft foods and would experience regurgitation w/ hard solid foods. Reports 40lbs unexplained weight loss and a decrease in pant size from 38 to 28.   CTAP IV cont: Marked circumferential wall thickening of the midesophagus. Malignancy cannot be excluded. Suggest endoscopic correlation.  EGD 11/30: Fungating mass of malignant appearance was found in lower half of esophagus, extends from 28-38cm. Also diffuse erythema of stomach mucosa consistent w/ non-erosive gastritis. Also erythema of duodenal mucosa consistent w/ duodenitis.    PLAN:  - f/u w/ GI outpt  - f/u pathology report from esophageal biopsies  - f/u w/ heme/onc outpt for further malignancy work-up  - f/u w/ PCP outpt  - start puree diet 82 y/o M present w/ dysphagia and unexpected weight loss in the last few weeks. Only able to tolerate liquids and soft foods and would experience regurgitation w/ hard solid foods. Reports 40lbs unexplained weight loss and a decrease in pant size from 38 to 28.   - CTAP IV cont: Marked circumferential wall thickening of the midesophagus. Malignancy cannot be excluded. Suggest endoscopic correlation.  - Barium swallow shows "masslike defect extending 5cm along mid-distal esophagus resulting in moderate luminal narrowing highly concerning for malignancy"  EGD 11/30: Fungating mass of malignant appearance was found in lower half of esophagus, extends from 28-38cm. Also diffuse erythema of stomach mucosa consistent w/ non-erosive gastritis. Also erythema of duodenal mucosa consistent w/ duodenitis.  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

## 2023-12-01 NOTE — PROGRESS NOTE ADULT - PROBLEM SELECTOR PLAN 10
F: s/p 1L   E: replete K < 4, Mg <2  DVT ppx- SCD  Diet- puree diet  Activity- ambulate as tolerated  Code- Full. Patient w/ recent constipation. Received laxatives outpatient resulting in 1BM which was 6-7days ago. Reports BM on 11/28 PM which was hard kane.   Patient declined enema on 11/30. Was started on Lactulose 11/30/23.    PLAN:  - c/w Senna and miralax.

## 2023-12-01 NOTE — PHYSICAL THERAPY INITIAL EVALUATION ADULT - ADDITIONAL COMMENTS
Pt lives alone in elevator building few [facility], currently receiving HHA 3hrs/day, ambulating short distances within the home independently. However requires HHA assistance for out of home mobility. Pt reports occasional use of cane for outside ambulation. Pt reports visual impairment limits his out of home mobility.

## 2023-12-01 NOTE — PROGRESS NOTE ADULT - ASSESSMENT
83M w/ PMH of CHF, CAD, DM, adrenal mass, hypothyroidism, and gait abnormality, who first presented with dysphagia and weight loss of 40 lbs. CT imaging shows concern for possible esophageal mass. GI consulted for dysphagia and endoscopic evaluation of abnormal CT findings.     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 Mass extended from 28 cm to 38 cm.   - The GE junction was located at 40 cm. Multiple cold forceps biopsies were performed for histology.  - Diffuse erythema of the mucosa was noted in the stomach. These findings are compatible with non-erosive gastritis.  - Erythema of the mucosa was noted in the duodenum. These findings are compatible with duodenitis.    Recommendations:   - pathology report from esophageal biopsies showing poorly differentiated squamous cell carcinoma   - continue puree diet   - consult heme onc for further malignancy work-up     Case discussed with Dr. Bowers. GI Team will continue to follow.     Jessenia Pérez D.O.   Gastroenterology Fellow  Weekday 7am-5pm Pager: [phone]  Weeknights/Weekend/Holiday Coverage: Please call the  for contact info
84 y/o M w/ PMHx CHF, CAD, DM, adrenal mass, hypothyroidism, and gait abnormality presents to  ED w/ dysphagia and weight loss in the last 2-3 weeks. CT imaging shows concern for malignancy. Patient admitted to [facility] for further work up dysphagia and weight loss.
83M w/ PMH of CHF, CAD, DM, adrenal mass, hypothyroidism, and gait abnormality, who first presented with dysphagia and weight loss of 40 lbs. CT imaging shows concern for possible esophageal mass. GI consulted for dysphagia and endoscopic evaluation of abnormal CT findings.     Recommendations:   - clear liquid diet today   - NPO except medications after midnight   - plan for EGD tomorrow     Case discussed with Dr. Bowers. GI Team will continue to follow.     Jessenia Pérez D.O.   Gastroenterology Fellow  Weekday 7am-5pm Pager: [phone]  Weeknights/Weekend/Holiday Coverage: Please call the  for contact info
82 y/o M w/ PMHx CHF, CAD, DM, adrenal mass, hypothyroidism, and gait abnormality presents to  ED w/ dysphagia and weight loss in the last 2-3 weeks. CT imaging shows concern for malignancy. Patient admitted to [facility] for further work up dysphagia and weight loss. 
84 y/o M w/ PMHx CHF, CAD, DM, adrenal mass, hypothyroidism, and gait abnormality presents to  ED w/ dysphagia and weight loss in the last 2-3 weeks. CT imaging shows concern for malignancy. Patient admitted to [facility] for further work up dysphagia and weight loss.

## 2023-12-01 NOTE — PROGRESS NOTE ADULT - SUBJECTIVE AND OBJECTIVE BOX
GI Consult Progress Note:       OVERNIGHT EVENTS: ARTURO.    SUBJECTIVE / INTERVAL HPI:   Patient seen and examined at bedside. Reports that overall he feels well after EGD yesterday. No acute complaints at this time.     VITAL SIGNS:  Vital Signs Last 24 Hrs  T(C): 36.4 (01 Dec 2023 15:38), Max: 36.6 (01 Dec 2023 09:00)  T(F): 97.6 (01 Dec 2023 15:38), Max: 97.9 (01 Dec 2023 09:00)  HR: 90 (01 Dec 2023 15:38) (83 - 90)  BP: 138/83 (01 Dec 2023 15:38) (138/83 - 169/95)  BP(mean): --  RR: 18 (01 Dec 2023 15:38) (18 - 18)  SpO2: 97% (01 Dec 2023 15:38) (97% - 98%)    Parameters below as of 01 Dec 2023 15:38  Patient On (Oxygen Delivery Method): room air        12-01-23 @ 07:01  -  12-01-23 @ 21:19  --------------------------------------------------------  IN: 0 mL / OUT: 150 mL / NET: -150 mL        PHYSICAL EXAM:  General: No acute distress, thin, frail   Lungs: Normal respiratory effort and no intercostal retractions  Cardiovascular: RRR  Abdomen: Soft, non-tender, non-distended  Neurological: Alert and oriented x3  Skin: Warm and dry. No obvious rash      MEDICATIONS:  MEDICATIONS  (STANDING):  amLODIPine   Tablet 5 milliGRAM(s) Oral every 24 hours  atorvastatin 20 milliGRAM(s) Oral at bedtime  dextrose 5% + lactated ringers. 1000 milliLiter(s) (75 mL/Hr) IV Continuous <Continuous>  dextrose 5%. 1000 milliLiter(s) (50 mL/Hr) IV Continuous <Continuous>  dextrose 5%. 1000 milliLiter(s) (100 mL/Hr) IV Continuous <Continuous>  dextrose 50% Injectable 12.5 Gram(s) IV Push once  dextrose 50% Injectable 25 Gram(s) IV Push once  dextrose 50% Injectable 25 Gram(s) IV Push once  fenofibrate Tablet 48 milliGRAM(s) Oral every 24 hours  glucagon  Injectable 1 milliGRAM(s) IntraMuscular once  insulin lispro (ADMELOG) corrective regimen sliding scale   SubCutaneous Before meals and at bedtime  lactulose Syrup 15 Gram(s) Oral daily  levothyroxine 75 MICROGram(s) Oral every 24 hours  lisinopril 10 milliGRAM(s) Oral every 24 hours  polyethylene glycol 3350 17 Gram(s) Oral every 12 hours  senna 2 Tablet(s) Oral at bedtime    MEDICATIONS  (PRN):  dextrose Oral Gel 15 Gram(s) Oral once PRN Blood Glucose LESS THAN 70 milliGRAM(s)/deciliter  ondansetron Injectable 4 milliGRAM(s) IV Push every 8 hours PRN Nausea and/or Vomiting      ALLERGIES:  Allergies    No Known Allergies    Intolerances        LABS:                        13.6   10.19 )-----------( 420      ( 01 Dec 2023 05:30 )             41.9     12-01    144  |  108  |  6<L>  ----------------------------<  134<H>  3.7   |  23  |  0.68    Ca    10.8<H>      01 Dec 2023 05:30  Phos  2.8     12-01  Mg     1.9     12-01      PT/INR - ( 30 Nov 2023 05:30 )   PT: 12.4 sec;   INR: 1.09          PTT - ( 30 Nov 2023 05:30 )  PTT:31.9 sec  Urinalysis Basic - ( 01 Dec 2023 05:30 )    Color: x / Appearance: x / SG: x / pH: x  Gluc: 134 mg/dL / Ketone: x  / Bili: x / Urobili: x   Blood: x / Protein: x / Nitrite: x   Leuk Esterase: x / RBC: x / WBC x   Sq Epi: x / Non Sq Epi: x / Bacteria: x      CAPILLARY BLOOD GLUCOSE      POCT Blood Glucose.: 221 mg/dL (01 Dec 2023 12:25)        RADIOLOGY & ADDITIONAL TESTS: Reviewed.

## 2023-12-01 NOTE — PROGRESS NOTE ADULT - PROBLEM SELECTOR PLAN 2
Patient presents w/ hypercalcemia this admission of 10.8. Per outpatient labs patient was 10.8 on 10/12 and wnl before then.   Patient is s/p 1L  Etiology: Hyperparathyroidism vs malignancy.     PLAN:  - f/u PTH   - f/u AM labs Patient presents w/ hypercalcemia this admission of 10.8. Per outpatient labs patient was 10.8 on 10/12 and wnl before then.   Etiology: Hyperparathyroidism vs malignancy.   Improved w/ 2L of fluids.   PTH ---> = 18 (WNL)    PLAN:  - CTM  - outpatient PCP f/u.

## 2023-12-01 NOTE — DISCHARGE NOTE NURSING/CASE MANAGEMENT/SOCIAL WORK - PATIENT PORTAL LINK FT
You can access the FollowMyHealth Patient Portal offered by [facility] by registering at the following website: [email or web address]. By joining [employer]’s FollowMyHealth portal, you will also be able to view your health information using other applications (apps) compatible with our system.

## 2023-12-01 NOTE — PROGRESS NOTE ADULT - PROBLEM SELECTOR PLAN 5
Hgb of 12.2, baseline 12-14 outpatient labs. No signs and symptoms of bleeding. MCV normal.    PLAN:  - f/u iron studies Hgb of 12.2, baseline 12-14 outpatient labs. No signs and symptoms of bleeding. MCV normal.  Iron total 43, TIBC 247, % saturation iron 17, Ferritin 131    PLAN:  - CTM for signs and symptoms of bleeding.  - f/u outpatient PCP.

## 2023-12-01 NOTE — PROGRESS NOTE ADULT - NUTRITIONAL ASSESSMENT
This patient has been assessed with a concern for Malnutrition and has been determined to have a diagnosis/diagnoses of Severe protein-calorie malnutrition.    This patient is being managed with:   Diet NPO after Midnight-     NPO Start Date: 29-Nov-2023   NPO Start Time: 23:59  Entered: Nov 29 2023 10:14AM    Diet Clear Liquid-  Entered: Nov 29 2023 10:14AM  
This patient has been assessed with a concern for Malnutrition and has been determined to have a diagnosis/diagnoses of Severe protein-calorie malnutrition.    This patient is being managed with:   Diet Pureed-  Entered: Nov 30 2023 10:25PM

## 2023-12-01 NOTE — PROGRESS NOTE ADULT - PROBLEM SELECTOR PLAN 3
Patient w/ History of DM. Takes home Glimepiride 2mg.    PLAN:  - Start low dose sliding scale   - Start basal bolus base of requirements.  - hold home Glimepiride Patient w/ History of DM. Takes home Glimepiride 2mg.    PLAN:  - Start low dose sliding scale   - hold home Glimepiride.

## 2023-12-01 NOTE — PROGRESS NOTE ADULT - PROBLEM SELECTOR PLAN 7
Patient w/ history of hypothyroidism, s/p thyroidectomy. Takes home synthroid 75mcg.  CT neck soft tissue w/ IV cont: 1.0 cm left thyroid nodule. Consider follow-up and correlation with ultrasound.    PLAN:  - c/w home synthroid Patient w/ history of hypothyroidism, s/p thyroidectomy. Takes home synthroid 75mcg.  CT neck soft tissue w/ IV cont: 1.0 cm left thyroid nodule. Consider follow-up and correlation with ultrasound.    PLAN:  - c/w home synthroid.

## 2023-12-01 NOTE — PROGRESS NOTE ADULT - PROBLEM SELECTOR PLAN 4
Patient w/ Hx of HTN. Takes amlodipine 5mg.    PLAN:  - c/w home amlodipine 5mg Patient w/ Hx of HTN. Takes amlodipine 5mg.    PLAN:  - c/w home amlodipine 5mg  - Start lisinopril 10mg qd 12/1/23.

## 2023-12-01 NOTE — CHART NOTE - NSCHARTNOTEFT_GEN_A_CORE
Patient will require a rolling walker at due to their diagnosis of gait instability and prior stroke  help complete MRADLs.    Diagnosis Code I63. 9, R26.

## 2023-12-05 PROBLEM — I25.10 ATHEROSCLEROTIC HEART DISEASE OF NATIVE CORONARY ARTERY WITHOUT ANGINA PECTORIS: Chronic | Status: ACTIVE | Noted: 2023-01-01

## 2023-12-05 PROBLEM — Z86.79 PERSONAL HISTORY OF OTHER DISEASES OF THE CIRCULATORY SYSTEM: Chronic | Status: ACTIVE | Noted: 2023-01-01

## 2023-12-05 PROBLEM — E27.8 OTHER SPECIFIED DISORDERS OF ADRENAL GLAND: Chronic | Status: ACTIVE | Noted: 2023-01-01

## 2023-12-12 PROBLEM — H91.90 HEARING LOSS: Status: ACTIVE | Noted: 2023-01-01

## 2023-12-13 PROBLEM — E55.9 VITAMIN D DEFICIENCY: Status: ACTIVE | Noted: 2019-03-21

## 2023-12-13 PROBLEM — H54.7 VISION IMPAIRMENT: Status: ACTIVE | Noted: 2023-01-01

## 2023-12-14 PROBLEM — I63.9 CVA (CEREBRAL VASCULAR ACCIDENT): Status: ACTIVE | Noted: 2020-05-21

## 2023-12-15 NOTE — COUNSELING
[FreeTextEntry4] : Educated patient/legal representative about CCM services and consent.\par  Educated patient/legal representative that this service is available because they have 2 or more chronic conditions, that only one Provider can furnish CCM Services per calendar month and that CCM services are subject to the usual Medicare deductible and coinsurance. \par  Patient/legal representative understands the right to stop the CCM services at any time by notifying [facility] office.\par  Patient/legal representative has verbally consented 10/21\par

## 2023-12-15 NOTE — PHYSICAL EXAM
[Foot Ulcers] : no foot ulcers [Full ROM] : with limited range of motion [Swelling] : not swollen [Tenderness] : not tender [Erythema] : not erythematous [de-identified] : Lipoma mid thoracic area, no changes noted, instructed pt to report any pain discomfort or changes in size. [de-identified] : unsteady gait

## 2023-12-15 NOTE — HISTORY OF PRESENT ILLNESS
[FreeTextEntry1] : gait and balance abnormality [FreeTextEntry2] : 84 yo male alert and oriented x3. Dx of CHF, CAD, DM T2, Adrenal mass, hypothyroid, abnormality of gait. Pt has lost weight and states "he is doing well".  Seen lying in bed.  Normally see the patient in the clinic.  Unable to leave his room.  More unsteady and declining.  Seen by oncology on 12/12/23  Presented to [facility] ER with dysphagia, weight loss on 11/30/23. CT scans showed circumferential wall thickening of the mid-esophagus worrisome for malignancy; no adenopathy or distant metastatic disease reported on the scan. EGD performed on 11/30/23 showed a malignant-appearing mass in the lower half of the esophagus extending from 28 to 38 cm, above the GE junction (GE junction at 40 cm). 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   Reports compliance with medications.   Lipoma mid back no changes in size, shape denies pain or irritation to the area. Advise him to call the office with any changes will send him to dermatology for further evaluation. Wants to wait and see.  Patient denies fever, cough, trouble breathing, rash, vomiting and diarrhea. Patient has not been in close contact with someone covid positive.  N95 mask, gloves, eye wear and gown used during visit: [Y]. Total face to face time with patient is40 min.  Patient/ patient's caregiver reports no weight loss >10lbs in the past 6 months. No changes in dentition or swallowing reported, No changes in hearing or vision reported. No changes in Cognition reported. Patient denies any symptoms of depression or anxiety. Patient is ADL and IADL independent. No changes in gait or falls reported.  Patient's home environment is safe.

## 2023-12-18 NOTE — ED ADULT NURSE NOTE - NSFALLRISKASMTTYPE_ED_ALL_ED
Future appt:     Your appointments     Date & Time Appointment Department [facility])    May 19, 2020  9:30 AM CDT Follow Up Visit with Eri Walls MD [address] [facility], [facility]            [address] [place] [address]
Initial (On Arrival)

## 2023-12-18 NOTE — ED PROVIDER NOTE - CLINICAL SUMMARY MEDICAL DECISION MAKING FREE TEXT BOX
82 y/o M w/ PMHx CHF, CAD, DM, adrenal mass, hypothyroidism, and gait abnormality, recently diagnosed with esophageal CA (not on treatment as of yet, first onc appt on thursday) c/o constipation x 2 weeks. Pt states he tried miralax and only small amount of stool comes out. Last real BM 1 w [place]. Reports passing gas. Denies fever, chills, n/v, abd pain, black/bloody stools. Pt was admitted 11/28-12/1 and was discharged on senna but pt is not taking it. abd soft, nt, nd. soft brown stool in vault, no impaction 84 y/o M w/ PMHx CHF, CAD, DM, adrenal mass, hypothyroidism, and gait abnormality, recently diagnosed with esophageal CA (not on treatment as of yet, first onc appt on thursday) c/o constipation x 2 weeks. Pt states he tried miralax and only small amount of stool comes out. Last real BM 1 w [place]. Reports passing gas. Denies fever, chills, n/v, abd pain, black/bloody stools. Pt was admitted 11/28-12/1 and was discharged on senna but pt is not taking it. abd soft, nt, nd. soft brown stool in vault, no impaction 82 y/o M w/ PMHx CHF, CAD, DM, adrenal mass, hypothyroidism, and gait abnormality, recently diagnosed with esophageal CA (not on treatment as of yet, first onc appt on thursday) c/o constipation x 2 weeks. Pt states he tried miralax and only small amount of stool comes out. Last real BM 1 w [place]. Reports passing gas. Denies fever, chills, n/v, abd pain, black/bloody stools. Pt was admitted 11/28-12/1 and was discharged on senna but pt is not taking it. abd soft, nt, nd. soft brown stool in vault, no impaction. XR shows nonspecific bowel gas pattern, no air fluid levels. Labs wnl. will send pt home with yen. return precautions discussed  Pt has f/u with oncologist on thursday 84 y/o M w/ PMHx CHF, CAD, DM, adrenal mass, hypothyroidism, and gait abnormality, recently diagnosed with esophageal CA (not on treatment as of yet, first onc appt on thursday) c/o constipation x 2 weeks. Pt states he tried miralax and only small amount of stool comes out. Last real BM 1 w [place]. Reports passing gas. Denies fever, chills, n/v, abd pain, black/bloody stools. Pt was admitted 11/28-12/1 and was discharged on senna but pt is not taking it. abd soft, nt, nd. soft brown stool in vault, no impaction. XR shows nonspecific bowel gas pattern, no air fluid levels. Labs wnl. will send pt home with yen. return precautions discussed  Pt has f/u with oncologist on thursday

## 2023-12-18 NOTE — ED PROVIDER NOTE - OBJECTIVE STATEMENT
82 y/o M w/ PMHx CHF, CAD, DM, adrenal mass, hypothyroidism, and gait abnormality, recently diagnosed with esophageal CA (not on treatment as of yet, first onc appt on thursday) c/o constipation x 2 weeks. Pt states he tried miralax and only small amount of stool comes out. Last real BM 1 w [place]. Reports passing gas. Denies fever, chills, n/v, abd pain, black/bloody stools. Pt was admitted 11/28-12/1 and was discharged on senna but pt is not taking it. 82 y/o M w/ PMHx CHF, CAD, DM, adrenal mass, hypothyroidism, and gait abnormality, recently diagnosed with esophageal CA (not on treatment as of yet, first onc appt on thursday) c/o constipation x 2 weeks. Pt states he tried miralax and only small amount of stool comes out. Last real BM 1 w [place]. Reports passing gas. Denies fever, chills, n/v, abd pain, black/bloody stools. Pt was admitted 11/28-12/1 and was discharged on senna but pt is not taking it.

## 2023-12-18 NOTE — ED ADULT TRIAGE NOTE - TEMPERATURE IN FAHRENHEIT (DEGREES F)
Subjective:     Yue Gore is a 78 y.o. female seen for follow-up of diabetes. She has had hypoglycemic attacks. .no  Blood sugar control has been stress heart was racing and had tests done says OK no heart issues. Been pretty anxious lately lots of tests. Saw nutritionist reviewed her note    Lab Results   Component Value Date/Time    Hemoglobin A1c 9.4 (H) 06/21/2022 11:03 AM    Hemoglobin A1c 7.9 (H) 02/24/2022 10:17 AM    Hemoglobin A1c 7.5 (H) 09/24/2021 11:41 AM    Glucose 115 (H) 06/21/2022 11:03 AM    Glucose (POC) 146 (H) 02/18/2018 11:22 AM    Microalbumin/Creat ratio (mg/g creat) 153 (H) 10/08/2021 08:48 PM    Microalbumin,urine random 6.39 10/08/2021 08:48 PM    LDL, calculated 10 03/24/2022 11:03 AM    Creatinine 1.21 (H) 06/21/2022 11:03 AM       She has diabetes, hypertension and hyperlipidemia. Yue Gore has the additional concern of sees hand doctor for carpal tunnel, she sent her to card due to tachycardia    Reports taking blood pressure medications without side affects. No complaints of exertional chest pain, excessive shortness of breath or focal weakness. Minimal swelling in lower legs or dizziness with standing. Diet and Lifestyle: follows a diabetic diet regularly, nonsmoker, see dietitian report.     Patient Active Problem List    Diagnosis Date Noted    Chronic renal disease, stage III 06/29/2022    FH: breast cancer in first degree relative when <48years old 05/03/2022    Genetic testing of female 05/03/2022    Mass of lower inner quadrant of left breast 05/03/2022    Cervical radiculopathy due to degenerative joint disease of spine 03/30/2022    Bilateral carpal tunnel syndrome 03/29/2022    Tachycardia 03/29/2022    CRI (chronic renal insufficiency), stage 3 (moderate) ([address] 03/10/2021    Asthma 09/02/2020    Gout 05/01/2019    Severe obesity ([address] 12/17/2018    Diabetic peripheral neuropathy associated with type 2 diabetes mellitus ([address] 03/05/2018  Post-menopausal bleeding 11/01/2017    Essential hypertension 05/04/2017    Diabetes mellitus with proteinuria ([facility] 05/04/2017    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 90 Tablet 3    glucose blood VI test strips (blood glucose test) strip Up to 3 times daily 100 Strip 3    amitriptyline (ELAVIL) 50 mg tablet Take 1 Tablet by mouth nightly. 90 Tablet 1    rosuvastatin (CRESTOR) 40 mg tablet Take 1 Tablet by mouth nightly. 90 Tablet 1    fenofibrate (LOFIBRA) 160 mg tablet Take 1 Tablet by mouth daily. 90 Tablet 1    colchicine 0.6 mg tablet Take 1 Tablet by mouth daily. As needed for gout 30 Tablet 2    insulin aspart protamine/insulin aspart (NOVOLOG MIX 70/30) 100 unit/mL (70-30) inpn 16 Units by SubCUTAneous route ACB/HS. 10 Pen 5    losartan (COZAAR) 25 mg tablet Take 1 Tablet by mouth daily. 90 Tablet 1    gabapentin (NEURONTIN) 100 mg capsule 1 to 2 tablets 3 times daily as needed headache  Indications: neuropathic pain 180 Capsule 2    metFORMIN (GLUCOPHAGE) 500 mg tablet Take 1 Tablet by mouth two (2) times daily (with meals). 180 Tablet 3    empagliflozin (Jardiance) 10 mg tablet Take 1 Tablet by mouth daily. 90 Tablet 3    omeprazole (PRILOSEC) 20 mg capsule Take 1 Capsule by mouth daily. 90 Capsule 3    Blood-Glucose Meter monitoring kit Up to 3 times daily 1 Kit 0    acetaminophen (TYLENOL) 500 mg tablet Take 1 Tab by mouth every four (4) hours (while awake). Indications: Pain 100 Tab 0    cetirizine (ZYRTEC) 10 mg tablet Take 1 Tab by mouth daily.  Indications: inflammation of the nose due to an allergy (Patient not taking: Reported on 2022) 90 Tab 3    ascorbic acid, vitamin C, (Vitamin C) 500 mg tablet Take  by mouth. (Patient not taking: Reported on 2022)       Allergies   Allergen Reactions    Hydrocodone-Acetaminophen Hives, Itching and Nausea Only     Patient states she can take tylenol #3 without problem.  Keflex [Cephalexin] Itching    Lisinopril Cough     Past Medical History:   Diagnosis Date    Asthma     PATIENT DENIES    Bone spur of ankle 3/30/12    right ankle    Chronic pain     [facility]) [facility]     GERD (gastroesophageal reflux disease)     Gout     Hypercholesterolemia     Hypertension     OA (osteoarthritis) of knee 3/30/12    right knee    Sleep apnea     Unilateral AKA ([address]     left     Social History     Tobacco Use    Smoking status: Former Smoker     Packs/day: 0.50     Years: 15.00     Pack years: 7.50     Quit date: 3/30/2009     Years since quittin.2    Smokeless tobacco: Never Used   Substance Use Topics    Alcohol use: No     Alcohol/week: 0.0 standard drinks             Review of Systems  Pertinent items are noted in HPI. Objective:     Significant for the following: swollen eyes which itch  Visit Vitals  /87 (BP 1 Location: Left arm, BP Patient Position: At rest, BP Cuff Size: Adult)   Pulse (!) 105   Temp 98.3 °F (36.8 °C) (Temporal)   Resp 20   Ht 5' 3\" (1.6 m)   Wt 186 lb (84.4 kg)   LMP 2003   SpO2 97%   BMI 32.95 kg/m²     WD WN female NAD  Eyes sl swollen      Lab review: labs reviewed, I note that glycosylated hemoglobin abnormal a little hi. Assessment/Plan:     Follow-up diabetes borderline controlled, needs further observation, needs improvement. Diabetic issues reviewed with her: all medications, side effects and compliance discussed carefully, glycohemoglobin and other lab monitoring discussed and labs immediately prior to next visit. Going to switch her from Flaca to Kerwin Joshi.   Discussed possible side affects, precautions, and drug interactions and possible benefits of the medication(s). Chronic Conditions Addressed Today     1. Type 2 diabetes mellitus with hyperglycemia (HCC) - Primary     Relevant Medications     gabapentin (NEURONTIN) 100 mg capsule     losartan (COZAAR) 25 mg tablet     insulin aspart protamine/insulin aspart (NOVOLOG MIX 70/30) 100 unit/mL (70-30) inpn     fenofibrate (LOFIBRA) 160 mg tablet     rosuvastatin (CRESTOR) 40 mg tablet     semaglutide (OZEMPIC) 0.25 mg or 0.5 mg/dose (2 mg/1.5 ml) subq pen    2. Phantom limb pain (HCC)     Relevant Medications     gabapentin (NEURONTIN) 100 mg capsule     amitriptyline (ELAVIL) 50 mg tablet    3. Major depressive disorder, recurrent, unspecified     Relevant Medications     gabapentin (NEURONTIN) 100 mg capsule     amitriptyline (ELAVIL) 50 mg tablet    4. Major depressive disorder, recurrent, moderate     Relevant Medications     gabapentin (NEURONTIN) 100 mg capsule     amitriptyline (ELAVIL) 50 mg tablet    5. Major depressive disorder, recurrent, mild     Relevant Medications     gabapentin (NEURONTIN) 100 mg capsule     amitriptyline (ELAVIL) 50 mg tablet    6. Gout     Relevant Medications     colchicine 0.6 mg tablet    7. Essential hypertension     Relevant Medications     losartan (COZAAR) 25 mg tablet     fenofibrate (LOFIBRA) 160 mg tablet     rosuvastatin (CRESTOR) 40 mg tablet    8. Diabetes mellitus with proteinuria (HCC)     Relevant Medications     gabapentin (NEURONTIN) 100 mg capsule     losartan (COZAAR) 25 mg tablet     insulin aspart protamine/insulin aspart (NOVOLOG MIX 70/30) 100 unit/mL (70-30) inpn     fenofibrate (LOFIBRA) 160 mg tablet     rosuvastatin (CRESTOR) 40 mg tablet     semaglutide (OZEMPIC) 0.25 mg or 0.5 mg/dose (2 mg/1.5 ml) subq pen    9. Chronic renal disease, stage III     Relevant Medications     losartan (COZAAR) 25 mg tablet    10.  Bilateral carpal tunnel syndrome     Relevant Medications     gabapentin (NEURONTIN) 100 mg capsule     amitriptyline (ELAVIL) 50 mg tablet    11. Asthma     Relevant Medications     cetirizine (ZYRTEC) 10 mg tablet      Acute Diagnoses Addressed Today     Postnasal drip            Relevant Medications        cetirizine (ZYRTEC) 10 mg tablet    Gastroesophageal reflux disease            Relevant Medications        omeprazole (PRILOSEC) 20 mg capsule    Hypertriglyceridemia, familial            Relevant Medications        fenofibrate (LOFIBRA) 160 mg tablet        rosuvastatin (CRESTOR) 40 mg tablet          Orders Placed This Encounter    olopatadine (PATANOL) 0.1 % ophthalmic solution     Sig: Administer 2 Drops to both eyes two (2) times a day. Dispense:  1 Each     Refill:  5    cetirizine (ZYRTEC) 10 mg tablet     Sig: Take 1 Tablet by mouth daily. Indications: inflammation of the nose due to an allergy     Dispense:  90 Tablet     Refill:  3    omeprazole (PRILOSEC) 20 mg capsule     Sig: Take 1 Capsule by mouth daily. Dispense:  90 Capsule     Refill:  3    gabapentin (NEURONTIN) 100 mg capsule     Si to 2 tablets 3 times daily as needed headache  Indications: neuropathic pain     Dispense:  180 Capsule     Refill:  0    losartan (COZAAR) 25 mg tablet     Sig: Take 1 Tablet by mouth daily. Dispense:  90 Tablet     Refill:  1    insulin aspart protamine/insulin aspart (NOVOLOG MIX 70/30) 100 unit/mL (70-30) inpn     Si Units by SubCUTAneous route ACB/HS. Dispense:  10 Pen     Refill:  5    colchicine 0.6 mg tablet     Sig: Take 1 Tablet by mouth daily. As needed for gout     Dispense:  30 Tablet     Refill:  2    fenofibrate (LOFIBRA) 160 mg tablet     Sig: Take 1 Tablet by mouth daily. Dispense:  90 Tablet     Refill:  1    rosuvastatin (CRESTOR) 40 mg tablet     Sig: Take 1 Tablet by mouth nightly. Dispense:  90 Tablet     Refill:  1    amitriptyline (ELAVIL) 50 mg tablet     Sig: Take 1 Tablet by mouth nightly.      Dispense:  90 Tablet     Refill:  1    semaglutide (OZEMPIC) 0.25 mg or 0.5 mg/dose (2 mg/1.5 ml) subq pen     Si.25 mg by SubCUTAneous route every seven (7) days. Dispense:  1 Box     Refill:  3     Current Outpatient Medications   Medication Sig Dispense Refill    olopatadine (PATANOL) 0.1 % ophthalmic solution Administer 2 Drops to both eyes two (2) times a day. 1 Each 5    cetirizine (ZYRTEC) 10 mg tablet Take 1 Tablet by mouth daily. Indications: inflammation of the nose due to an allergy 90 Tablet 3    omeprazole (PRILOSEC) 20 mg capsule Take 1 Capsule by mouth daily. 90 Capsule 3    gabapentin (NEURONTIN) 100 mg capsule 1 to 2 tablets 3 times daily as needed headache  Indications: neuropathic pain 180 Capsule 0    losartan (COZAAR) 25 mg tablet Take 1 Tablet by mouth daily. 90 Tablet 1    insulin aspart protamine/insulin aspart (NOVOLOG MIX 70/30) 100 unit/mL (70-30) inpn 16 Units by SubCUTAneous route ACB/HS. 10 Pen 5    colchicine 0.6 mg tablet Take 1 Tablet by mouth daily. As needed for gout 30 Tablet 2    fenofibrate (LOFIBRA) 160 mg tablet Take 1 Tablet by mouth daily. 90 Tablet 1    rosuvastatin (CRESTOR) 40 mg tablet Take 1 Tablet by mouth nightly. 90 Tablet 1    amitriptyline (ELAVIL) 50 mg tablet Take 1 Tablet by mouth nightly. 90 Tablet 1    semaglutide (OZEMPIC) 0.25 mg or 0.5 mg/dose (2 mg/1.5 ml) subq pen 0.25 mg by SubCUTAneous route every seven (7) days. 1 Box 3    allopurinoL (ZYLOPRIM) 100 mg tablet Take 1 Tablet by mouth daily. 90 Tablet 3    glucose blood VI test strips (blood glucose test) strip Up to 3 times daily 100 Strip 3    metFORMIN (GLUCOPHAGE) 500 mg tablet Take 1 Tablet by mouth two (2) times daily (with meals). 180 Tablet 3    Blood-Glucose Meter monitoring kit Up to 3 times daily 1 Kit 0    acetaminophen (TYLENOL) 500 mg tablet Take 1 Tab by mouth every four (4) hours (while awake). Indications: Pain 100 Tab 0    ascorbic acid, vitamin C, (Vitamin C) 500 mg tablet Take  by mouth.  (Patient not taking: Reported on 6/29/2022)       Discussed possible side affects, precautions, and drug interactions and possible benefits of the medication(s). Follow-up and Dispositions    · Return in about 2 months (around 8/29/2022) for routine follow up. 98.1

## 2023-12-18 NOTE — ED PROVIDER NOTE - PATIENT PORTAL LINK FT
You can access the FollowMyHealth Patient Portal offered by [facility] by registering at the following website: [email or web address]. By joining [employer]’s FollowMyHealth portal, you will also be able to view your health information using other applications (apps) compatible with our system. You can access the FollowMyHealth Patient Portal offered by [facility] by registering at the following website: [email or web address]. By joining [employer]’s FollowMyHealth portal, you will also be able to view your health information using other applications (apps) compatible with our system.

## 2023-12-18 NOTE — ED ADULT NURSE REASSESSMENT NOTE - NS ED NURSE REASSESS COMMENT FT1
Pt had enema instilled as ordered by RN CB. Guided to toilet, left to attempt bm- no bm per pt, "only the water came out." PA team and RN team notified. Pt left on stretcher, prone, side rails up.

## 2023-12-18 NOTE — ED ADULT NURSE NOTE - OBJECTIVE STATEMENT
Pt is an 83 year old male that presents to the ED via EMS with c/o of constipation that started 1 week ago. Pt states intermittent abdominal pain and states he has been experiencing his "rectum swelling" when he tries to have a BM. Pt denies chest pain, SOB, and fevers on arrival. Pt is AxOx4 and speaking in coherent sentences. Pt is legally blind.

## 2023-12-18 NOTE — ED ADULT NURSE NOTE - NSFALLRISKINTERV_ED_ALL_ED
Assistance OOB with selected safe patient handling equipment if applicable/Assistance with ambulation/Communicate fall risk and risk factors to all staff, patient, and family/Provide visual cue: yellow wristband, yellow gown, etc/Reinforce activity limits and safety measures with patient and family/Call bell, personal items and telephone in reach/Instruct patient to call for assistance before getting out of bed/chair/stretcher/Non-slip footwear applied when patient is off stretcher/[place] to call system/Physically safe environment - no spills, clutter or unnecessary equipment/Purposeful Proactive Rounding/Room/bathroom lighting operational, light cord in reach Assistance OOB with selected safe patient handling equipment if applicable/Assistance with ambulation/Communicate fall risk and risk factors to all staff, patient, and family/Provide visual cue: yellow wristband, yellow gown, etc/Reinforce activity limits and safety measures with patient and family/Call bell, personal items and telephone in reach/Instruct patient to call for assistance before getting out of bed/chair/stretcher/Non-slip footwear applied when patient is off stretcher/[place] to call system/Physically safe environment - no spills, clutter or unnecessary equipment/Purposeful Proactive Rounding/Room/bathroom lighting operational, light cord in reach

## 2023-12-18 NOTE — ED PROVIDER NOTE - PHYSICAL EXAMINATION
CONSTITUTIONAL: Well-appearing;  in no apparent distress.   HEAD: Normocephalic; atraumatic.   EYES: PERRL; EOM intact; conjunctiva and sclera clear  ENT: normal nose; no rhinorrhea; normal pharynx with no erythema or lesions.   NECK: Supple; non-tender; no LAD  CARDIOVASCULAR: Normal S1, S2; No audible murmurs. Regular rate and rhythm.   RESPIRATORY: Breathing easily; breath sounds clear and equal bilaterally; no wheezes, rhonchi, or rales.  GI: Soft; non-distended; non-tender; no palpable organomegaly.   rectal: soft brown stool in vault, no impaction   MSK: FROM at all extremities, normal tone   EXT: No cyanosis or edema; N/V intact  SKIN: Normal for age and race; warm; dry; good turgor; no apparent lesions or rash.   NEURO: A & O x 3; face symmetric; grossly unremarkable.   PSYCHOLOGICAL: The patient’s mood and manner are appropriate.

## 2023-12-18 NOTE — ED ADULT TRIAGE NOTE - CHIEF COMPLAINT QUOTE
Pt presents to the ED for constipation x 1 week with abd pain and bloating. Pt denies N/v/CP/SOB. Pt legally blind.

## 2023-12-24 NOTE — CHART NOTE - NSCHARTNOTEFT_GEN_A_CORE
Infectious Diseases Anti-infective Approval Note    Medication: hydroxychloroquine  Dose: 400mg (loading dose X 2) followed by 200mg  Route: PO  Frequency: q12h  Duration: 5 days    Dose may be adjusted as needed for alterations in renal function.    *THIS IS NOT AN INFECTIOUS DISEASES CONSULTATION* Yes - the patient is able to be screened

## 2023-12-26 NOTE — HISTORY OF PRESENT ILLNESS
[FreeTextEntry1] : 83M with a h/o CHF, CAD, DM, adrenal mass, hypothyroidism, and gait abnormality, who first presented with dysphagia and weight loss of 40 lbs to [facility] on 11/28/23. CT imaging showed concern for possible esophageal mass, for which GI was consulted. He underwent EGD revealing an esophageal mass spanning from 28 to 38 cm from the incisors, pathology confirmed poorly differentiated SCC. He is seeing Dr. Otero for plans for further mgmt. Endorses a fear of eating but is tolerating yogurt and full liquids. Also has ensure that supplements his diet. Does endorse some smoothies as well, but hard for him to get help with preparing. Still c/o constipation, thinks it is related to his diet and overall illness.   Family Hx: siblings with cancer, but cannot recall what type   Social Hx: former smoker (unclear # of pack-years), no EtOH or recreational drugs; lives in O housing  Case d/w brother, Pollo @ [phone] extensively. Also left message for Felicitas Rajan to call back to discuss care.

## 2023-12-26 NOTE — ASSESSMENT
[FreeTextEntry1] : 83M with a h/o CHF, CAD, DM, adrenal mass, hypothyroidism, gait abnormality, who first presented with dysphagia and weight loss of 40 lbs to [facility], now presents for post-hospital f/u after new dx of esophageal SCC.   #Esophageal SCC - advised f/u with Dietitian - continue ensure, pureed diet - if dysphagia persists and not meeting nutritional needs, can consider esophageal stent; has also seen Rad-Onc in consideration of radiation therapy  - discussed these different options with Pollo  #Constipation - multifactorial given poor nutritional status overall illness - advised BID miralax for now, and recommended making smoothies/pureed dishes with fiber to help bulk stool along with psyllium   - message left with Felicitas Rajan,  at [facility] to discuss next steps   Luis M Multani MD Gastroenterology

## 2023-12-26 NOTE — PHYSICAL EXAM
[Alert] : alert [No Acute Distress] : no acute distress [Sclera] : the sclera and conjunctiva were normal [No Respiratory Distress] : no respiratory distress [No Acc Muscle Use] : no accessory muscle use [Respiration, Rhythm And Depth] : normal respiratory rhythm and effort [Heart Rate And Rhythm] : heart rate was normal and rhythm regular [Abdomen Tenderness] : non-tender [Abdomen Soft] : soft [] : no rash [No Focal Deficits] : no focal deficits [Oriented To Time, Place, And Person] : oriented to person, place, and time [de-identified] : +thin, cachectic

## 2023-12-28 NOTE — VITALS
[NoTreatment Scheduled] : no treatment scheduled [Patient Refusal] : Patient refused psychosocial distress assessment [Maximal Pain Intensity: 0/10] : 0/10 [Least Pain Intensity: 0/10] : 0/10 [70: Cares for self; unalbe to carry on normal activity or do active work.] : 70: Cares for self; unable to carry on normal activity or do active work. [ECOG Performance Status: 2 - Ambulatory and capable of all self care but unable to carry out any work activities] : Performance Status: 2 - Ambulatory and capable of all self care but unable to carry out any work activities. Up and about more than 50% of waking hours [8 - Distress Level] : Distress Level: 8 [FreeTextEntry7] : Patient states he has a lot of stress but unable to quantify

## 2023-12-28 NOTE — PHYSICAL EXAM
[General Appearance - Alert] : alert [General Appearance - In No Acute Distress] : in no acute distress [Hearing Threshold Finger Rub Not [place]] : hearing was normal [] : no respiratory distress [Exaggerated Use Of Accessory Muscles For Inspiration] : no accessory muscle use [Nondistended] : nondistended [de-identified] : Elderly, non-cachectic but somewhat frail

## 2023-12-28 NOTE — HISTORY OF PRESENT ILLNESS
[FreeTextEntry1] : Raghu Brooke is an 84 y/o man w/ new diagnosis of at least Stage II ([id number]) esophageal SCC of the distal esophagus who was referred to Radiation Oncology by Dr. Otero for discussion of radiation to the esophagus.    12/21/23: Consultation  PET scan 12/21/23- results pending. Patient presents for discussion of radiation to the esophagus accompanied by home health aide. Patient reports that he has been having difficulty swallowing food. The patient is not entirely understanding that he has a cancer diagnosis., Believes he is seeing the oncologists to determine his diagnosis. The patient lives in an assisted living and receives A care 3hrs day/7 days a week. Patient has a sister Makenzie who lives in [place] and is his designated health care agent. Patient denies previous h/o radiation or PPM/ICD. As per chart, his  is Sparkle Rajan.   History of Present Illness  ________________________  84 y/o M w/ PMHx CHF, CAD, DM, adrenal mass, hypothyroidism, and gait abnormality presents to  ED w/ dysphagia and weight loss in the last 2-3 weeks. CT imaging shows concern for malignancy. Patient admitted to [facility] for further work up dysphagia and weight loss.   11/27/23: CT neck  No acute pathology. 1.0 cm left thyroid nodule. Consider follow-up and correlation with ultrasound.   11/27/23: CT chest/abdomen/pelvis  1.  No evidence of pulmonary embolism.  2.  Marked circumferential wall thickening of the midesophagus. Malignancy cannot be excluded. Suggest endoscopic correlation.   11/28/23: Esophagram  Masslike defect extending 5 cm along the mid distal esophagus and resulting in moderate luminal narrowing. No delayed passage of liquid contrast material. Findings are highly concerning for malignancy when correlating with recent CT evaluation and history of weight loss. Endoscopy is again recommended.   11/28/23: procedure/pathology  EGD: A fungating mass of malignant appearance was found in the lower half of the esophagus. Mass extended from 28 cm to 38 cm.  Final Diagnosis  Esophageal mass, biopsy:  Poorly differentiated keratinizing squamous cell carcinoma

## 2023-12-28 NOTE — REVIEW OF SYSTEMS
[Recent Change In Weight] : ~T recent weight change [Dysphagia] : dysphagia [Negative] : Heme/Lymph [Localized Edema: Grade 0] : Localized Edema: Grade 0  [FreeTextEntry3] : glaucoma [FreeTextEntry9] : gait imbalance [de-identified] : forgetful

## 2024-01-01 ENCOUNTER — TRANSCRIPTION ENCOUNTER (OUTPATIENT)
Age: 84
End: 2024-01-01

## 2024-01-01 ENCOUNTER — NON-APPOINTMENT (OUTPATIENT)
Age: 84
End: 2024-01-01

## 2024-01-01 ENCOUNTER — APPOINTMENT (OUTPATIENT)
Dept: HEMATOLOGY ONCOLOGY | Facility: CLINIC | Age: 84
End: 2024-01-01
Payer: MEDICARE

## 2024-01-01 ENCOUNTER — APPOINTMENT (OUTPATIENT)
Dept: INFUSION THERAPY | Facility: CLINIC | Age: 84
End: 2024-01-01

## 2024-01-01 ENCOUNTER — APPOINTMENT (OUTPATIENT)
Dept: ORTHOPEDIC SURGERY | Facility: CLINIC | Age: 84
End: 2024-01-01
Payer: COMMERCIAL

## 2024-01-01 ENCOUNTER — APPOINTMENT (OUTPATIENT)
Dept: ENDOCRINOLOGY | Facility: CLINIC | Age: 84
End: 2024-01-01

## 2024-01-01 ENCOUNTER — LABORATORY RESULT (OUTPATIENT)
Age: 84
End: 2024-01-01

## 2024-01-01 ENCOUNTER — APPOINTMENT (OUTPATIENT)
Dept: HOME HEALTH SERVICES | Facility: HOME HEALTH | Age: 84
End: 2024-01-01

## 2024-01-01 ENCOUNTER — INPATIENT (INPATIENT)
Facility: HOSPITAL | Age: 84
LOS: 3 days | Discharge: NURSING, REHAB OR LONG-TERM CARE | DRG: 551 | End: 2024-01-16
Attending: INTERNAL MEDICINE | Admitting: GENERAL ACUTE CARE HOSPITAL
Payer: MEDICARE

## 2024-01-01 ENCOUNTER — APPOINTMENT (OUTPATIENT)
Dept: RADIATION ONCOLOGY | Facility: CLINIC | Age: 84
End: 2024-01-01
Payer: MEDICARE

## 2024-01-01 ENCOUNTER — APPOINTMENT (OUTPATIENT)
Dept: OPHTHALMOLOGY | Facility: CLINIC | Age: 84
End: 2024-01-01

## 2024-01-01 ENCOUNTER — RX RENEWAL (OUTPATIENT)
Age: 84
End: 2024-01-01

## 2024-01-01 ENCOUNTER — APPOINTMENT (OUTPATIENT)
Dept: HEMATOLOGY ONCOLOGY | Facility: CLINIC | Age: 84
End: 2024-01-01

## 2024-01-01 ENCOUNTER — EMERGENCY (EMERGENCY)
Facility: HOSPITAL | Age: 84
LOS: 1 days | Discharge: HOME | End: 2024-01-01
Attending: STUDENT IN AN ORGANIZED HEALTH CARE EDUCATION/TRAINING PROGRAM | Admitting: STUDENT IN AN ORGANIZED HEALTH CARE EDUCATION/TRAINING PROGRAM
Payer: MEDICARE

## 2024-01-01 ENCOUNTER — OUTPATIENT (OUTPATIENT)
Dept: OUTPATIENT SERVICES | Facility: HOSPITAL | Age: 84
LOS: 1 days | End: 2024-01-01
Payer: MEDICARE

## 2024-01-01 VITALS
SYSTOLIC BLOOD PRESSURE: 122 MMHG | TEMPERATURE: 97 F | DIASTOLIC BLOOD PRESSURE: 82 MMHG | HEART RATE: 108 BPM | WEIGHT: 149.91 LBS | RESPIRATION RATE: 18 BRPM | HEIGHT: 69 IN | OXYGEN SATURATION: 100 %

## 2024-01-01 VITALS
HEART RATE: 86 BPM | TEMPERATURE: 98 F | RESPIRATION RATE: 18 BRPM | WEIGHT: 169.98 LBS | OXYGEN SATURATION: 97 % | DIASTOLIC BLOOD PRESSURE: 74 MMHG | HEIGHT: 69 IN | SYSTOLIC BLOOD PRESSURE: 116 MMHG

## 2024-01-01 VITALS
SYSTOLIC BLOOD PRESSURE: 122 MMHG | HEART RATE: 108 BPM | WEIGHT: 150 LBS | DIASTOLIC BLOOD PRESSURE: 82 MMHG | RESPIRATION RATE: 18 BRPM | TEMPERATURE: 97.1 F | HEIGHT: 69 IN | BODY MASS INDEX: 22.22 KG/M2 | OXYGEN SATURATION: 100 %

## 2024-01-01 VITALS
OXYGEN SATURATION: 98 % | RESPIRATION RATE: 18 BRPM | WEIGHT: 149.91 LBS | HEIGHT: 69 IN | DIASTOLIC BLOOD PRESSURE: 83 MMHG | SYSTOLIC BLOOD PRESSURE: 119 MMHG | HEART RATE: 86 BPM | TEMPERATURE: 98 F

## 2024-01-01 VITALS
TEMPERATURE: 98 F | SYSTOLIC BLOOD PRESSURE: 137 MMHG | RESPIRATION RATE: 17 BRPM | DIASTOLIC BLOOD PRESSURE: 88 MMHG | OXYGEN SATURATION: 97 % | HEART RATE: 93 BPM

## 2024-01-01 VITALS
SYSTOLIC BLOOD PRESSURE: 120 MMHG | DIASTOLIC BLOOD PRESSURE: 77 MMHG | HEART RATE: 90 BPM | RESPIRATION RATE: 18 BRPM | TEMPERATURE: 97.9 F | OXYGEN SATURATION: 100 % | HEIGHT: 69 IN

## 2024-01-01 VITALS
SYSTOLIC BLOOD PRESSURE: 123 MMHG | DIASTOLIC BLOOD PRESSURE: 85 MMHG | WEIGHT: 146.2 LBS | OXYGEN SATURATION: 99 % | BODY MASS INDEX: 21.59 KG/M2 | HEART RATE: 83 BPM | TEMPERATURE: 97.8 F

## 2024-01-01 VITALS
SYSTOLIC BLOOD PRESSURE: 118 MMHG | DIASTOLIC BLOOD PRESSURE: 80 MMHG | HEART RATE: 80 BPM | OXYGEN SATURATION: 99 % | TEMPERATURE: 98 F | RESPIRATION RATE: 18 BRPM | HEIGHT: 69 IN

## 2024-01-01 VITALS — WEIGHT: 169.98 LBS

## 2024-01-01 DIAGNOSIS — E66.01 MORBID (SEVERE) OBESITY DUE TO EXCESS CALORIES: ICD-10-CM

## 2024-01-01 DIAGNOSIS — K59.00 CONSTIPATION, UNSPECIFIED: ICD-10-CM

## 2024-01-01 DIAGNOSIS — I10 ESSENTIAL (PRIMARY) HYPERTENSION: ICD-10-CM

## 2024-01-01 DIAGNOSIS — E11.9 TYPE 2 DIABETES MELLITUS W/OUT COMPLICATIONS: ICD-10-CM

## 2024-01-01 DIAGNOSIS — M54.50 LOW BACK PAIN, UNSPECIFIED: ICD-10-CM

## 2024-01-01 DIAGNOSIS — S32.010A WEDGE COMPRESSION FRACTURE OF FIRST LUMBAR VERTEBRA, INITIAL ENCOUNTER FOR CLOSED FRACTURE: ICD-10-CM

## 2024-01-01 DIAGNOSIS — D64.9 ANEMIA, UNSPECIFIED: ICD-10-CM

## 2024-01-01 DIAGNOSIS — I25.10 ATHEROSCLEROTIC HEART DISEASE OF NATIVE CORONARY ARTERY WITHOUT ANGINA PECTORIS: ICD-10-CM
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
## 2024-01-01 DIAGNOSIS — E89.0 POSTPROCEDURAL HYPOTHYROIDISM: Chronic | ICD-10-CM

## 2024-01-01 DIAGNOSIS — E83.52 HYPERCALCEMIA: ICD-10-CM
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
## 2024-01-01 DIAGNOSIS — Z29.9 ENCOUNTER FOR PROPHYLACTIC MEASURES, UNSPECIFIED: ICD-10-CM

## 2024-01-01 DIAGNOSIS — E03.9 HYPOTHYROIDISM, UNSPECIFIED: ICD-10-CM

## 2024-01-01 DIAGNOSIS — H40.9 UNSPECIFIED GLAUCOMA: ICD-10-CM

## 2024-01-01 DIAGNOSIS — S32.018A OTHER FRACTURE OF FIRST LUMBAR VERTEBRA, INITIAL ENCOUNTER FOR CLOSED FRACTURE: ICD-10-CM

## 2024-01-01 DIAGNOSIS — C15.9 MALIGNANT NEOPLASM OF ESOPHAGUS, UNSPECIFIED: ICD-10-CM

## 2024-01-01 DIAGNOSIS — E11.9 TYPE 2 DIABETES MELLITUS WITHOUT COMPLICATIONS: ICD-10-CM

## 2024-01-01 DIAGNOSIS — I25.10 ATHEROSCLEROTIC HEART DISEASE OF NATIVE CORONARY ARTERY W/OUT ANGINA PECTORIS: ICD-10-CM

## 2024-01-01 DIAGNOSIS — Z79.82 LONG TERM (CURRENT) USE OF ASPIRIN: ICD-10-CM

## 2024-01-01 DIAGNOSIS — E27.8 OTHER SPECIFIED DISORDERS OF ADRENAL GLAND: ICD-10-CM
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
## 2024-01-01 DIAGNOSIS — E43 UNSPECIFIED SEVERE PROTEIN-CALORIE MALNUTRITION: ICD-10-CM

## 2024-01-01 DIAGNOSIS — I50.9 HEART FAILURE, UNSPECIFIED: ICD-10-CM

## 2024-01-01 DIAGNOSIS — H54.7 UNSPECIFIED VISUAL LOSS: ICD-10-CM

## 2024-01-01 DIAGNOSIS — I11.0 HYPERTENSIVE HEART DISEASE WITH HEART FAILURE: ICD-10-CM

## 2024-01-01 DIAGNOSIS — I50.32 CHRONIC DIASTOLIC (CONGESTIVE) HEART FAILURE: ICD-10-CM

## 2024-01-01 DIAGNOSIS — Z87.891 PERSONAL HISTORY OF NICOTINE DEPENDENCE: ICD-10-CM

## 2024-01-01 DIAGNOSIS — R52 PAIN, UNSPECIFIED: ICD-10-CM
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
## 2024-01-01 LAB
ALBUMIN SERPL ELPH-MCNC: 3.2 G/DL — LOW (ref 3.3–5)
ALBUMIN SERPL ELPH-MCNC: 3.2 G/DL — LOW (ref 3.3–5)
ALBUMIN SERPL ELPH-MCNC: 3.6 G/DL — SIGNIFICANT CHANGE UP (ref 3.3–5)
ALBUMIN SERPL ELPH-MCNC: 3.6 G/DL — SIGNIFICANT CHANGE UP (ref 3.3–5)
ALBUMIN SERPL ELPH-MCNC: 3.8 G/DL
ALP BLD-CCNC: 43 U/L
ALP SERPL-CCNC: 51 U/L — SIGNIFICANT CHANGE UP (ref 40–120)
ALP SERPL-CCNC: 51 U/L — SIGNIFICANT CHANGE UP (ref 40–120)
ALP SERPL-CCNC: 58 U/L — SIGNIFICANT CHANGE UP (ref 40–120)
ALP SERPL-CCNC: 58 U/L — SIGNIFICANT CHANGE UP (ref 40–120)
ALT FLD-CCNC: 10 U/L — SIGNIFICANT CHANGE UP (ref 10–45)
ALT FLD-CCNC: 10 U/L — SIGNIFICANT CHANGE UP (ref 10–45)
ALT FLD-CCNC: 7 U/L — LOW (ref 10–45)
ALT FLD-CCNC: 7 U/L — LOW (ref 10–45)
ALT SERPL-CCNC: 10 U/L
ANION GAP SERPL CALC-SCNC: 10 MMOL/L — SIGNIFICANT CHANGE UP (ref 5–17)
ANION GAP SERPL CALC-SCNC: 10 MMOL/L — SIGNIFICANT CHANGE UP (ref 5–17)
ANION GAP SERPL CALC-SCNC: 11 MMOL/L — SIGNIFICANT CHANGE UP (ref 5–17)
ANION GAP SERPL CALC-SCNC: 12 MMOL/L — SIGNIFICANT CHANGE UP (ref 5–17)
ANION GAP SERPL CALC-SCNC: 12 MMOL/L — SIGNIFICANT CHANGE UP (ref 5–17)
ANION GAP SERPL CALC-SCNC: 9 MMOL/L
AST SERPL-CCNC: 15 U/L — SIGNIFICANT CHANGE UP (ref 10–40)
AST SERPL-CCNC: 15 U/L — SIGNIFICANT CHANGE UP (ref 10–40)
AST SERPL-CCNC: 21 U/L
AST SERPL-CCNC: 22 U/L — SIGNIFICANT CHANGE UP (ref 10–40)
AST SERPL-CCNC: 22 U/L — SIGNIFICANT CHANGE UP (ref 10–40)
BASOPHILS # BLD AUTO: 0.04 K/UL — SIGNIFICANT CHANGE UP (ref 0–0.2)
BASOPHILS # BLD AUTO: 0.05 K/UL — SIGNIFICANT CHANGE UP (ref 0–0.2)
BASOPHILS # BLD AUTO: 0.05 K/UL — SIGNIFICANT CHANGE UP (ref 0–0.2)
BASOPHILS NFR BLD AUTO: 0.5 % — SIGNIFICANT CHANGE UP (ref 0–2)
BILIRUB SERPL-MCNC: 0.4 MG/DL — SIGNIFICANT CHANGE UP (ref 0.2–1.2)
BILIRUB SERPL-MCNC: 0.4 MG/DL — SIGNIFICANT CHANGE UP (ref 0.2–1.2)
BILIRUB SERPL-MCNC: 0.5 MG/DL — SIGNIFICANT CHANGE UP (ref 0.2–1.2)
BILIRUB SERPL-MCNC: 0.5 MG/DL — SIGNIFICANT CHANGE UP (ref 0.2–1.2)
BILIRUB SERPL-MCNC: 0.7 MG/DL
BLD GP AB SCN SERPL QL: NEGATIVE — SIGNIFICANT CHANGE UP
BUN SERPL-MCNC: 14 MG/DL — SIGNIFICANT CHANGE UP (ref 7–23)
BUN SERPL-MCNC: 14 MG/DL — SIGNIFICANT CHANGE UP (ref 7–23)
BUN SERPL-MCNC: 15 MG/DL
BUN SERPL-MCNC: 15 MG/DL — SIGNIFICANT CHANGE UP (ref 7–23)
BUN SERPL-MCNC: 15 MG/DL — SIGNIFICANT CHANGE UP (ref 7–23)
BUN SERPL-MCNC: 6 MG/DL — LOW (ref 7–23)
BUN SERPL-MCNC: 6 MG/DL — LOW (ref 7–23)
BUN SERPL-MCNC: 8 MG/DL — SIGNIFICANT CHANGE UP (ref 7–23)
BUN SERPL-MCNC: 8 MG/DL — SIGNIFICANT CHANGE UP (ref 7–23)
CALCIUM SERPL-MCNC: 11.5 MG/DL
CALCIUM SERPL-MCNC: 8.6 MG/DL — SIGNIFICANT CHANGE UP (ref 8.4–10.5)
CALCIUM SERPL-MCNC: 8.6 MG/DL — SIGNIFICANT CHANGE UP (ref 8.4–10.5)
CALCIUM SERPL-MCNC: 9.4 MG/DL — SIGNIFICANT CHANGE UP (ref 8.4–10.5)
CALCIUM SERPL-MCNC: 9.6 MG/DL — SIGNIFICANT CHANGE UP (ref 8.4–10.5)
CALCIUM SERPL-MCNC: 9.6 MG/DL — SIGNIFICANT CHANGE UP (ref 8.4–10.5)
CHLORIDE SERPL-SCNC: 108 MMOL/L — SIGNIFICANT CHANGE UP (ref 96–108)
CHLORIDE SERPL-SCNC: 108 MMOL/L — SIGNIFICANT CHANGE UP (ref 96–108)
CHLORIDE SERPL-SCNC: 110 MMOL/L
CHLORIDE SERPL-SCNC: 110 MMOL/L — HIGH (ref 96–108)
CHLORIDE SERPL-SCNC: 111 MMOL/L — HIGH (ref 96–108)
CHLORIDE SERPL-SCNC: 111 MMOL/L — HIGH (ref 96–108)
CO2 SERPL-SCNC: 17 MMOL/L — LOW (ref 22–31)
CO2 SERPL-SCNC: 19 MMOL/L — LOW (ref 22–31)
CO2 SERPL-SCNC: 21 MMOL/L
CREAT SERPL-MCNC: 0.6 MG/DL — SIGNIFICANT CHANGE UP (ref 0.5–1.3)
CREAT SERPL-MCNC: 0.6 MG/DL — SIGNIFICANT CHANGE UP (ref 0.5–1.3)
CREAT SERPL-MCNC: 0.61 MG/DL — SIGNIFICANT CHANGE UP (ref 0.5–1.3)
CREAT SERPL-MCNC: 0.61 MG/DL — SIGNIFICANT CHANGE UP (ref 0.5–1.3)
CREAT SERPL-MCNC: 0.81 MG/DL — SIGNIFICANT CHANGE UP (ref 0.5–1.3)
CREAT SERPL-MCNC: 0.81 MG/DL — SIGNIFICANT CHANGE UP (ref 0.5–1.3)
CREAT SERPL-MCNC: 0.89 MG/DL — SIGNIFICANT CHANGE UP (ref 0.5–1.3)
CREAT SERPL-MCNC: 0.89 MG/DL — SIGNIFICANT CHANGE UP (ref 0.5–1.3)
CREAT SERPL-MCNC: 1.1 MG/DL
EGFR: 67 ML/MIN/1.73M2
EGFR: 86 ML/MIN/1.73M2 — SIGNIFICANT CHANGE UP
EGFR: 86 ML/MIN/1.73M2 — SIGNIFICANT CHANGE UP
EGFR: 88 ML/MIN/1.73M2 — SIGNIFICANT CHANGE UP
EGFR: 88 ML/MIN/1.73M2 — SIGNIFICANT CHANGE UP
EGFR: 96 ML/MIN/1.73M2 — SIGNIFICANT CHANGE UP
EOSINOPHIL # BLD AUTO: 0.47 K/UL — SIGNIFICANT CHANGE UP (ref 0–0.5)
EOSINOPHIL # BLD AUTO: 0.47 K/UL — SIGNIFICANT CHANGE UP (ref 0–0.5)
EOSINOPHIL # BLD AUTO: 0.59 K/UL — HIGH (ref 0–0.5)
EOSINOPHIL # BLD AUTO: 0.59 K/UL — HIGH (ref 0–0.5)
EOSINOPHIL # BLD AUTO: 0.63 K/UL — HIGH (ref 0–0.5)
EOSINOPHIL # BLD AUTO: 0.63 K/UL — HIGH (ref 0–0.5)
EOSINOPHIL NFR BLD AUTO: 5 % — SIGNIFICANT CHANGE UP (ref 0–6)
EOSINOPHIL NFR BLD AUTO: 5 % — SIGNIFICANT CHANGE UP (ref 0–6)
EOSINOPHIL NFR BLD AUTO: 7.1 % — HIGH (ref 0–6)
EOSINOPHIL NFR BLD AUTO: 7.1 % — HIGH (ref 0–6)
EOSINOPHIL NFR BLD AUTO: 8.1 % — HIGH (ref 0–6)
EOSINOPHIL NFR BLD AUTO: 8.1 % — HIGH (ref 0–6)
GLUCOSE BLDC GLUCOMTR-MCNC: 109 MG/DL — HIGH (ref 70–99)
GLUCOSE BLDC GLUCOMTR-MCNC: 112 MG/DL — HIGH (ref 70–99)
GLUCOSE BLDC GLUCOMTR-MCNC: 114 MG/DL — HIGH (ref 70–99)
GLUCOSE BLDC GLUCOMTR-MCNC: 114 MG/DL — HIGH (ref 70–99)
GLUCOSE BLDC GLUCOMTR-MCNC: 117 MG/DL — HIGH (ref 70–99)
GLUCOSE BLDC GLUCOMTR-MCNC: 117 MG/DL — HIGH (ref 70–99)
GLUCOSE BLDC GLUCOMTR-MCNC: 120 MG/DL — HIGH (ref 70–99)
GLUCOSE BLDC GLUCOMTR-MCNC: 120 MG/DL — HIGH (ref 70–99)
GLUCOSE BLDC GLUCOMTR-MCNC: 132 MG/DL — HIGH (ref 70–99)
GLUCOSE BLDC GLUCOMTR-MCNC: 132 MG/DL — HIGH (ref 70–99)
GLUCOSE BLDC GLUCOMTR-MCNC: 139 MG/DL — HIGH (ref 70–99)
GLUCOSE BLDC GLUCOMTR-MCNC: 139 MG/DL — HIGH (ref 70–99)
GLUCOSE BLDC GLUCOMTR-MCNC: 140 MG/DL — HIGH (ref 70–99)
GLUCOSE BLDC GLUCOMTR-MCNC: 77 MG/DL — SIGNIFICANT CHANGE UP (ref 70–99)
GLUCOSE BLDC GLUCOMTR-MCNC: 77 MG/DL — SIGNIFICANT CHANGE UP (ref 70–99)
GLUCOSE BLDC GLUCOMTR-MCNC: 83 MG/DL — SIGNIFICANT CHANGE UP (ref 70–99)
GLUCOSE BLDC GLUCOMTR-MCNC: 83 MG/DL — SIGNIFICANT CHANGE UP (ref 70–99)
GLUCOSE BLDC GLUCOMTR-MCNC: 92 MG/DL — SIGNIFICANT CHANGE UP (ref 70–99)
GLUCOSE BLDC GLUCOMTR-MCNC: 92 MG/DL — SIGNIFICANT CHANGE UP (ref 70–99)
GLUCOSE BLDC GLUCOMTR-MCNC: 93 MG/DL — SIGNIFICANT CHANGE UP (ref 70–99)
GLUCOSE BLDC GLUCOMTR-MCNC: 98 MG/DL — SIGNIFICANT CHANGE UP (ref 70–99)
GLUCOSE BLDC GLUCOMTR-MCNC: 98 MG/DL — SIGNIFICANT CHANGE UP (ref 70–99)
GLUCOSE SERPL-MCNC: 109 MG/DL — HIGH (ref 70–99)
GLUCOSE SERPL-MCNC: 109 MG/DL — HIGH (ref 70–99)
GLUCOSE SERPL-MCNC: 195 MG/DL
GLUCOSE SERPL-MCNC: 85 MG/DL — SIGNIFICANT CHANGE UP (ref 70–99)
GLUCOSE SERPL-MCNC: 85 MG/DL — SIGNIFICANT CHANGE UP (ref 70–99)
GLUCOSE SERPL-MCNC: 87 MG/DL — SIGNIFICANT CHANGE UP (ref 70–99)
GLUCOSE SERPL-MCNC: 87 MG/DL — SIGNIFICANT CHANGE UP (ref 70–99)
GLUCOSE SERPL-MCNC: 89 MG/DL — SIGNIFICANT CHANGE UP (ref 70–99)
GLUCOSE SERPL-MCNC: 89 MG/DL — SIGNIFICANT CHANGE UP (ref 70–99)
HCT VFR BLD CALC: 34.6 % — LOW (ref 39–50)
HCT VFR BLD CALC: 34.6 % — LOW (ref 39–50)
HCT VFR BLD CALC: 35.9 % — LOW (ref 39–50)
HCT VFR BLD CALC: 35.9 % — LOW (ref 39–50)
HCT VFR BLD CALC: 36.8 % — LOW (ref 39–50)
HCT VFR BLD CALC: 36.8 % — LOW (ref 39–50)
HCT VFR BLD CALC: 38 % — LOW (ref 39–50)
HCT VFR BLD CALC: 38 % — LOW (ref 39–50)
HGB BLD-MCNC: 10.9 G/DL — LOW (ref 13–17)
HGB BLD-MCNC: 10.9 G/DL — LOW (ref 13–17)
HGB BLD-MCNC: 11.6 G/DL — LOW (ref 13–17)
HGB BLD-MCNC: 11.6 G/DL — LOW (ref 13–17)
HGB BLD-MCNC: 11.7 G/DL — LOW (ref 13–17)
HGB BLD-MCNC: 11.7 G/DL — LOW (ref 13–17)
HGB BLD-MCNC: 11.9 G/DL — LOW (ref 13–17)
HGB BLD-MCNC: 11.9 G/DL — LOW (ref 13–17)
IMM GRANULOCYTES NFR BLD AUTO: 0.4 % — SIGNIFICANT CHANGE UP (ref 0–0.9)
IMM GRANULOCYTES NFR BLD AUTO: 0.5 % — SIGNIFICANT CHANGE UP (ref 0–0.9)
IMM GRANULOCYTES NFR BLD AUTO: 0.5 % — SIGNIFICANT CHANGE UP (ref 0–0.9)
LYMPHOCYTES # BLD AUTO: 1.99 K/UL — SIGNIFICANT CHANGE UP (ref 1–3.3)
LYMPHOCYTES # BLD AUTO: 1.99 K/UL — SIGNIFICANT CHANGE UP (ref 1–3.3)
LYMPHOCYTES # BLD AUTO: 2.06 K/UL — SIGNIFICANT CHANGE UP (ref 1–3.3)
LYMPHOCYTES # BLD AUTO: 2.06 K/UL — SIGNIFICANT CHANGE UP (ref 1–3.3)
LYMPHOCYTES # BLD AUTO: 2.18 K/UL — SIGNIFICANT CHANGE UP (ref 1–3.3)
LYMPHOCYTES # BLD AUTO: 2.18 K/UL — SIGNIFICANT CHANGE UP (ref 1–3.3)
LYMPHOCYTES # BLD AUTO: 22.1 % — SIGNIFICANT CHANGE UP (ref 13–44)
LYMPHOCYTES # BLD AUTO: 22.1 % — SIGNIFICANT CHANGE UP (ref 13–44)
LYMPHOCYTES # BLD AUTO: 25.4 % — SIGNIFICANT CHANGE UP (ref 13–44)
LYMPHOCYTES # BLD AUTO: 25.4 % — SIGNIFICANT CHANGE UP (ref 13–44)
LYMPHOCYTES # BLD AUTO: 26.3 % — SIGNIFICANT CHANGE UP (ref 13–44)
LYMPHOCYTES # BLD AUTO: 26.3 % — SIGNIFICANT CHANGE UP (ref 13–44)
MAGNESIUM SERPL-MCNC: 2 MG/DL — SIGNIFICANT CHANGE UP (ref 1.6–2.6)
MAGNESIUM SERPL-MCNC: 2 MG/DL — SIGNIFICANT CHANGE UP (ref 1.6–2.6)
MAGNESIUM SERPL-MCNC: 2.1 MG/DL — SIGNIFICANT CHANGE UP (ref 1.6–2.6)
MAGNESIUM SERPL-MCNC: 2.1 MG/DL — SIGNIFICANT CHANGE UP (ref 1.6–2.6)
MAGNESIUM SERPL-MCNC: 2.3 MG/DL — SIGNIFICANT CHANGE UP (ref 1.6–2.6)
MAGNESIUM SERPL-MCNC: 2.3 MG/DL — SIGNIFICANT CHANGE UP (ref 1.6–2.6)
MCHC RBC-ENTMCNC: 26.1 PG — LOW (ref 27–34)
MCHC RBC-ENTMCNC: 26.1 PG — LOW (ref 27–34)
MCHC RBC-ENTMCNC: 26.3 PG — LOW (ref 27–34)
MCHC RBC-ENTMCNC: 26.7 PG — LOW (ref 27–34)
MCHC RBC-ENTMCNC: 26.7 PG — LOW (ref 27–34)
MCHC RBC-ENTMCNC: 31.3 GM/DL — LOW (ref 32–36)
MCHC RBC-ENTMCNC: 31.3 GM/DL — LOW (ref 32–36)
MCHC RBC-ENTMCNC: 31.5 GM/DL — LOW (ref 32–36)
MCHC RBC-ENTMCNC: 32.6 GM/DL — SIGNIFICANT CHANGE UP (ref 32–36)
MCHC RBC-ENTMCNC: 32.6 GM/DL — SIGNIFICANT CHANGE UP (ref 32–36)
MCV RBC AUTO: 81.8 FL — SIGNIFICANT CHANGE UP (ref 80–100)
MCV RBC AUTO: 81.8 FL — SIGNIFICANT CHANGE UP (ref 80–100)
MCV RBC AUTO: 82.9 FL — SIGNIFICANT CHANGE UP (ref 80–100)
MCV RBC AUTO: 82.9 FL — SIGNIFICANT CHANGE UP (ref 80–100)
MCV RBC AUTO: 83.4 FL — SIGNIFICANT CHANGE UP (ref 80–100)
MCV RBC AUTO: 83.4 FL — SIGNIFICANT CHANGE UP (ref 80–100)
MCV RBC AUTO: 83.9 FL — SIGNIFICANT CHANGE UP (ref 80–100)
MCV RBC AUTO: 83.9 FL — SIGNIFICANT CHANGE UP (ref 80–100)
MONOCYTES # BLD AUTO: 0.63 K/UL — SIGNIFICANT CHANGE UP (ref 0–0.9)
MONOCYTES # BLD AUTO: 0.63 K/UL — SIGNIFICANT CHANGE UP (ref 0–0.9)
MONOCYTES # BLD AUTO: 0.67 K/UL — SIGNIFICANT CHANGE UP (ref 0–0.9)
MONOCYTES # BLD AUTO: 0.67 K/UL — SIGNIFICANT CHANGE UP (ref 0–0.9)
MONOCYTES # BLD AUTO: 0.72 K/UL — SIGNIFICANT CHANGE UP (ref 0–0.9)
MONOCYTES # BLD AUTO: 0.72 K/UL — SIGNIFICANT CHANGE UP (ref 0–0.9)
MONOCYTES NFR BLD AUTO: 7.7 % — SIGNIFICANT CHANGE UP (ref 2–14)
MONOCYTES NFR BLD AUTO: 7.7 % — SIGNIFICANT CHANGE UP (ref 2–14)
MONOCYTES NFR BLD AUTO: 8.1 % — SIGNIFICANT CHANGE UP (ref 2–14)
NEUTROPHILS # BLD AUTO: 4.49 K/UL — SIGNIFICANT CHANGE UP (ref 1.8–7.4)
NEUTROPHILS # BLD AUTO: 4.49 K/UL — SIGNIFICANT CHANGE UP (ref 1.8–7.4)
NEUTROPHILS # BLD AUTO: 4.79 K/UL — SIGNIFICANT CHANGE UP (ref 1.8–7.4)
NEUTROPHILS # BLD AUTO: 4.79 K/UL — SIGNIFICANT CHANGE UP (ref 1.8–7.4)
NEUTROPHILS # BLD AUTO: 6 K/UL — SIGNIFICANT CHANGE UP (ref 1.8–7.4)
NEUTROPHILS # BLD AUTO: 6 K/UL — SIGNIFICANT CHANGE UP (ref 1.8–7.4)
NEUTROPHILS NFR BLD AUTO: 57.4 % — SIGNIFICANT CHANGE UP (ref 43–77)
NEUTROPHILS NFR BLD AUTO: 57.4 % — SIGNIFICANT CHANGE UP (ref 43–77)
NEUTROPHILS NFR BLD AUTO: 57.6 % — SIGNIFICANT CHANGE UP (ref 43–77)
NEUTROPHILS NFR BLD AUTO: 57.6 % — SIGNIFICANT CHANGE UP (ref 43–77)
NEUTROPHILS NFR BLD AUTO: 64.3 % — SIGNIFICANT CHANGE UP (ref 43–77)
NEUTROPHILS NFR BLD AUTO: 64.3 % — SIGNIFICANT CHANGE UP (ref 43–77)
NRBC # BLD: 0 /100 WBCS — SIGNIFICANT CHANGE UP (ref 0–0)
PHOSPHATE SERPL-MCNC: 1.6 MG/DL — LOW (ref 2.5–4.5)
PHOSPHATE SERPL-MCNC: 1.6 MG/DL — LOW (ref 2.5–4.5)
PHOSPHATE SERPL-MCNC: 1.8 MG/DL — LOW (ref 2.5–4.5)
PHOSPHATE SERPL-MCNC: 1.8 MG/DL — LOW (ref 2.5–4.5)
PHOSPHATE SERPL-MCNC: 1.9 MG/DL — LOW (ref 2.5–4.5)
PHOSPHATE SERPL-MCNC: 1.9 MG/DL — LOW (ref 2.5–4.5)
PLATELET # BLD AUTO: 381 K/UL — SIGNIFICANT CHANGE UP (ref 150–400)
PLATELET # BLD AUTO: 381 K/UL — SIGNIFICANT CHANGE UP (ref 150–400)
PLATELET # BLD AUTO: 393 K/UL — SIGNIFICANT CHANGE UP (ref 150–400)
PLATELET # BLD AUTO: 393 K/UL — SIGNIFICANT CHANGE UP (ref 150–400)
PLATELET # BLD AUTO: 396 K/UL — SIGNIFICANT CHANGE UP (ref 150–400)
PLATELET # BLD AUTO: 396 K/UL — SIGNIFICANT CHANGE UP (ref 150–400)
PLATELET # BLD AUTO: 403 K/UL — HIGH (ref 150–400)
PLATELET # BLD AUTO: 403 K/UL — HIGH (ref 150–400)
POTASSIUM SERPL-MCNC: 3.6 MMOL/L — SIGNIFICANT CHANGE UP (ref 3.5–5.3)
POTASSIUM SERPL-MCNC: 3.6 MMOL/L — SIGNIFICANT CHANGE UP (ref 3.5–5.3)
POTASSIUM SERPL-MCNC: 3.7 MMOL/L — SIGNIFICANT CHANGE UP (ref 3.5–5.3)
POTASSIUM SERPL-MCNC: 3.7 MMOL/L — SIGNIFICANT CHANGE UP (ref 3.5–5.3)
POTASSIUM SERPL-MCNC: 3.8 MMOL/L — SIGNIFICANT CHANGE UP (ref 3.5–5.3)
POTASSIUM SERPL-MCNC: 3.8 MMOL/L — SIGNIFICANT CHANGE UP (ref 3.5–5.3)
POTASSIUM SERPL-MCNC: 3.9 MMOL/L — SIGNIFICANT CHANGE UP (ref 3.5–5.3)
POTASSIUM SERPL-MCNC: 3.9 MMOL/L — SIGNIFICANT CHANGE UP (ref 3.5–5.3)
POTASSIUM SERPL-SCNC: 3.6 MMOL/L — SIGNIFICANT CHANGE UP (ref 3.5–5.3)
POTASSIUM SERPL-SCNC: 3.6 MMOL/L — SIGNIFICANT CHANGE UP (ref 3.5–5.3)
POTASSIUM SERPL-SCNC: 3.7 MMOL/L — SIGNIFICANT CHANGE UP (ref 3.5–5.3)
POTASSIUM SERPL-SCNC: 3.7 MMOL/L — SIGNIFICANT CHANGE UP (ref 3.5–5.3)
POTASSIUM SERPL-SCNC: 3.8 MMOL/L — SIGNIFICANT CHANGE UP (ref 3.5–5.3)
POTASSIUM SERPL-SCNC: 3.8 MMOL/L — SIGNIFICANT CHANGE UP (ref 3.5–5.3)
POTASSIUM SERPL-SCNC: 3.9 MMOL/L
POTASSIUM SERPL-SCNC: 3.9 MMOL/L — SIGNIFICANT CHANGE UP (ref 3.5–5.3)
POTASSIUM SERPL-SCNC: 3.9 MMOL/L — SIGNIFICANT CHANGE UP (ref 3.5–5.3)
PROT SERPL-MCNC: 6.7 G/DL — SIGNIFICANT CHANGE UP (ref 6–8.3)
PROT SERPL-MCNC: 6.7 G/DL — SIGNIFICANT CHANGE UP (ref 6–8.3)
PROT SERPL-MCNC: 7.3 G/DL
PROT SERPL-MCNC: 7.3 G/DL — SIGNIFICANT CHANGE UP (ref 6–8.3)
PROT SERPL-MCNC: 7.3 G/DL — SIGNIFICANT CHANGE UP (ref 6–8.3)
RBC # BLD: 4.15 M/UL — LOW (ref 4.2–5.8)
RBC # BLD: 4.15 M/UL — LOW (ref 4.2–5.8)
RBC # BLD: 4.39 M/UL — SIGNIFICANT CHANGE UP (ref 4.2–5.8)
RBC # BLD: 4.39 M/UL — SIGNIFICANT CHANGE UP (ref 4.2–5.8)
RBC # BLD: 4.44 M/UL — SIGNIFICANT CHANGE UP (ref 4.2–5.8)
RBC # BLD: 4.44 M/UL — SIGNIFICANT CHANGE UP (ref 4.2–5.8)
RBC # BLD: 4.53 M/UL — SIGNIFICANT CHANGE UP (ref 4.2–5.8)
RBC # BLD: 4.53 M/UL — SIGNIFICANT CHANGE UP (ref 4.2–5.8)
RBC # FLD: 14.9 % — HIGH (ref 10.3–14.5)
RBC # FLD: 14.9 % — HIGH (ref 10.3–14.5)
RBC # FLD: 15 % — HIGH (ref 10.3–14.5)
RBC # FLD: 15.2 % — HIGH (ref 10.3–14.5)
RBC # FLD: 15.2 % — HIGH (ref 10.3–14.5)
RH IG SCN BLD-IMP: POSITIVE — SIGNIFICANT CHANGE UP
SODIUM SERPL-SCNC: 137 MMOL/L — SIGNIFICANT CHANGE UP (ref 135–145)
SODIUM SERPL-SCNC: 137 MMOL/L — SIGNIFICANT CHANGE UP (ref 135–145)
SODIUM SERPL-SCNC: 138 MMOL/L — SIGNIFICANT CHANGE UP (ref 135–145)
SODIUM SERPL-SCNC: 138 MMOL/L — SIGNIFICANT CHANGE UP (ref 135–145)
SODIUM SERPL-SCNC: 140 MMOL/L
SODIUM SERPL-SCNC: 140 MMOL/L — SIGNIFICANT CHANGE UP (ref 135–145)
WBC # BLD: 7.82 K/UL — SIGNIFICANT CHANGE UP (ref 3.8–10.5)
WBC # BLD: 7.82 K/UL — SIGNIFICANT CHANGE UP (ref 3.8–10.5)
WBC # BLD: 8.3 K/UL — SIGNIFICANT CHANGE UP (ref 3.8–10.5)
WBC # BLD: 8.3 K/UL — SIGNIFICANT CHANGE UP (ref 3.8–10.5)
WBC # BLD: 8.79 K/UL — SIGNIFICANT CHANGE UP (ref 3.8–10.5)
WBC # BLD: 8.79 K/UL — SIGNIFICANT CHANGE UP (ref 3.8–10.5)
WBC # BLD: 9.34 K/UL — SIGNIFICANT CHANGE UP (ref 3.8–10.5)
WBC # BLD: 9.34 K/UL — SIGNIFICANT CHANGE UP (ref 3.8–10.5)
WBC # FLD AUTO: 7.82 K/UL — SIGNIFICANT CHANGE UP (ref 3.8–10.5)
WBC # FLD AUTO: 7.82 K/UL — SIGNIFICANT CHANGE UP (ref 3.8–10.5)
WBC # FLD AUTO: 8.3 K/UL — SIGNIFICANT CHANGE UP (ref 3.8–10.5)
WBC # FLD AUTO: 8.3 K/UL — SIGNIFICANT CHANGE UP (ref 3.8–10.5)
WBC # FLD AUTO: 8.79 K/UL — SIGNIFICANT CHANGE UP (ref 3.8–10.5)
WBC # FLD AUTO: 8.79 K/UL — SIGNIFICANT CHANGE UP (ref 3.8–10.5)
WBC # FLD AUTO: 9.34 K/UL — SIGNIFICANT CHANGE UP (ref 3.8–10.5)
WBC # FLD AUTO: 9.34 K/UL — SIGNIFICANT CHANGE UP (ref 3.8–10.5)

## 2024-01-01 PROCEDURE — 99232 SBSQ HOSP IP/OBS MODERATE 35: CPT | Mod: GC

## 2024-01-01 PROCEDURE — 97161 PT EVAL LOW COMPLEX 20 MIN: CPT

## 2024-01-01 PROCEDURE — 84100 ASSAY OF PHOSPHORUS: CPT

## 2024-01-01 PROCEDURE — 96365 THER/PROPH/DIAG IV INF INIT: CPT

## 2024-01-01 PROCEDURE — 80048 BASIC METABOLIC PNL TOTAL CA: CPT

## 2024-01-01 PROCEDURE — 97116 GAIT TRAINING THERAPY: CPT

## 2024-01-01 PROCEDURE — 86900 BLOOD TYPING SEROLOGIC ABO: CPT

## 2024-01-01 PROCEDURE — 86901 BLOOD TYPING SEROLOGIC RH(D): CPT

## 2024-01-01 PROCEDURE — 99223 1ST HOSP IP/OBS HIGH 75: CPT

## 2024-01-01 PROCEDURE — 80053 COMPREHEN METABOLIC PANEL: CPT

## 2024-01-01 PROCEDURE — 99214 OFFICE O/P EST MOD 30 MIN: CPT | Mod: 25

## 2024-01-01 PROCEDURE — 85025 COMPLETE CBC W/AUTO DIFF WBC: CPT

## 2024-01-01 PROCEDURE — 72131 CT LUMBAR SPINE W/O DYE: CPT | Mod: 26,MA

## 2024-01-01 PROCEDURE — 72131 CT LUMBAR SPINE W/O DYE: CPT | Mod: MA

## 2024-01-01 PROCEDURE — 99285 EMERGENCY DEPT VISIT HI MDM: CPT

## 2024-01-01 PROCEDURE — 99024 POSTOP FOLLOW-UP VISIT: CPT

## 2024-01-01 PROCEDURE — 99204 OFFICE O/P NEW MOD 45 MIN: CPT

## 2024-01-01 PROCEDURE — 72110 X-RAY EXAM L-2 SPINE 4/>VWS: CPT | Mod: 26

## 2024-01-01 PROCEDURE — 82962 GLUCOSE BLOOD TEST: CPT

## 2024-01-01 PROCEDURE — 85027 COMPLETE CBC AUTOMATED: CPT

## 2024-01-01 PROCEDURE — 99284 EMERGENCY DEPT VISIT MOD MDM: CPT

## 2024-01-01 PROCEDURE — 99239 HOSP IP/OBS DSCHRG MGMT >30: CPT

## 2024-01-01 PROCEDURE — 99284 EMERGENCY DEPT VISIT MOD MDM: CPT | Mod: 25

## 2024-01-01 PROCEDURE — 36415 COLL VENOUS BLD VENIPUNCTURE: CPT

## 2024-01-01 PROCEDURE — 86850 RBC ANTIBODY SCREEN: CPT

## 2024-01-01 PROCEDURE — 83735 ASSAY OF MAGNESIUM: CPT

## 2024-01-01 PROCEDURE — 97530 THERAPEUTIC ACTIVITIES: CPT

## 2024-01-01 PROCEDURE — 99215 OFFICE O/P EST HI 40 MIN: CPT

## 2024-01-01 RX ORDER — MULTIVIT-MIN/FOLIC/VIT K/LYCOP 400-300MCG
50 MCG TABLET ORAL
Qty: 30 | Refills: 10 | Status: ACTIVE | COMMUNITY
Start: 2023-01-11 | End: 1900-01-01

## 2024-01-01 RX ORDER — SENNA PLUS 8.6 MG/1
2 TABLET ORAL AT BEDTIME
Refills: 0 | Status: DISCONTINUED | OUTPATIENT
Start: 2024-01-01 | End: 2024-01-01

## 2024-01-01 RX ORDER — BRIMONIDINE TARTRATE 2 MG/MG
1 SOLUTION/ DROPS OPHTHALMIC
Qty: 0 | Refills: 0 | DISCHARGE

## 2024-01-01 RX ORDER — CHLORHEXIDINE GLUCONATE 4 %
1000 LIQUID (ML) TOPICAL DAILY
Qty: 30 | Refills: 0 | Status: ACTIVE | COMMUNITY
Start: 2023-01-01 | End: 1900-01-01

## 2024-01-01 RX ORDER — GLIMEPIRIDE 1 MG
1 TABLET ORAL
Qty: 0 | Refills: 0 | DISCHARGE

## 2024-01-01 RX ORDER — ATORVASTATIN CALCIUM 80 MG/1
1 TABLET, FILM COATED ORAL
Refills: 0 | DISCHARGE

## 2024-01-01 RX ORDER — ZOLEDRONIC ACID 5 MG/100ML
4 INJECTION, SOLUTION INTRAVENOUS ONCE
Refills: 0 | Status: COMPLETED | OUTPATIENT
Start: 2024-01-01 | End: 2024-01-01

## 2024-01-01 RX ORDER — METFORMIN HYDROCHLORIDE 850 MG/1
1 TABLET ORAL
Refills: 0 | DISCHARGE

## 2024-01-01 RX ORDER — APIXABAN 5 MG/1
5 TABLET, FILM COATED ORAL
Qty: 60 | Refills: 0 | Status: ACTIVE | COMMUNITY
Start: 2023-01-01 | End: 1900-01-01

## 2024-01-01 RX ORDER — LISINOPRIL 2.5 MG/1
10 TABLET ORAL EVERY 24 HOURS
Refills: 0 | Status: DISCONTINUED | OUTPATIENT
Start: 2024-01-01 | End: 2024-01-01

## 2024-01-01 RX ORDER — DRONABINOL 2.5 MG/1
2.5 CAPSULE ORAL
Refills: 0 | Status: ACTIVE | COMMUNITY

## 2024-01-01 RX ORDER — LEVOTHYROXINE SODIUM 125 MCG
75 TABLET ORAL DAILY
Refills: 0 | Status: DISCONTINUED | OUTPATIENT
Start: 2024-01-01 | End: 2024-01-01

## 2024-01-01 RX ORDER — LATANOPROST 0.05 MG/ML
1 SOLUTION/ DROPS OPHTHALMIC; TOPICAL
Qty: 0 | Refills: 0 | DISCHARGE

## 2024-01-01 RX ORDER — ACETAMINOPHEN 500 MG
650 TABLET ORAL EVERY 6 HOURS
Refills: 0 | Status: DISCONTINUED | OUTPATIENT
Start: 2024-01-01 | End: 2024-01-01

## 2024-01-01 RX ORDER — LEVOTHYROXINE SODIUM 0.07 MG/1
75 TABLET ORAL
Qty: 30 | Refills: 0 | Status: ACTIVE | COMMUNITY
Start: 2023-01-01 | End: 1900-01-01

## 2024-01-01 RX ORDER — LIDOCAINE 4 G/100G
1 CREAM TOPICAL ONCE
Refills: 0 | Status: COMPLETED | OUTPATIENT
Start: 2024-01-01 | End: 2024-01-01

## 2024-01-01 RX ORDER — LACTULOSE 10 G/15ML
10 SOLUTION ORAL DAILY
Qty: 473 | Refills: 2 | Status: ACTIVE | COMMUNITY
Start: 2024-01-01 | End: 1900-01-01

## 2024-01-01 RX ORDER — DEXTROSE 50 % IN WATER 50 %
12.5 SYRINGE (ML) INTRAVENOUS ONCE
Refills: 0 | Status: DISCONTINUED | OUTPATIENT
Start: 2024-01-01 | End: 2024-01-01

## 2024-01-01 RX ORDER — ACETAMINOPHEN 500 MG
650 TABLET ORAL ONCE
Refills: 0 | Status: COMPLETED | OUTPATIENT
Start: 2024-01-01 | End: 2024-01-01

## 2024-01-01 RX ORDER — FENOFIBRATE,MICRONIZED 130 MG
48 CAPSULE ORAL AT BEDTIME
Refills: 0 | Status: DISCONTINUED | OUTPATIENT
Start: 2024-01-01 | End: 2024-01-01

## 2024-01-01 RX ORDER — FENOFIBRATE,MICRONIZED 130 MG
1 CAPSULE ORAL
Qty: 0 | Refills: 0 | DISCHARGE

## 2024-01-01 RX ORDER — AMLODIPINE BESYLATE 5 MG/1
5 TABLET ORAL DAILY
Qty: 30 | Refills: 5 | Status: ACTIVE | COMMUNITY
Start: 2018-01-05 | End: 1900-01-01

## 2024-01-01 RX ORDER — SODIUM CHLORIDE 9 MG/ML
1000 INJECTION, SOLUTION INTRAVENOUS
Refills: 0 | Status: DISCONTINUED | OUTPATIENT
Start: 2024-01-01 | End: 2024-01-01

## 2024-01-01 RX ORDER — LIDOCAINE 4 G/100G
1 CREAM TOPICAL EVERY 24 HOURS
Refills: 0 | Status: DISCONTINUED | OUTPATIENT
Start: 2024-01-01 | End: 2024-01-01

## 2024-01-01 RX ORDER — LISINOPRIL 10 MG/1
10 TABLET ORAL DAILY
Qty: 90 | Refills: 2 | Status: ACTIVE | COMMUNITY
Start: 2024-01-01 | End: 1900-01-01

## 2024-01-01 RX ORDER — POLYETHYLENE GLYCOL 3350 17 G/17G
17 POWDER, FOR SOLUTION ORAL EVERY 12 HOURS
Refills: 0 | Status: DISCONTINUED | OUTPATIENT
Start: 2024-01-01 | End: 2024-01-01

## 2024-01-01 RX ORDER — DORZOLAMIDE HYDROCHLORIDE TIMOLOL MALEATE 20; 5 MG/ML; MG/ML
1 SOLUTION/ DROPS OPHTHALMIC
Refills: 0 | Status: DISCONTINUED | OUTPATIENT
Start: 2024-01-01 | End: 2024-01-01

## 2024-01-01 RX ORDER — POTASSIUM PHOSPHATE, MONOBASIC POTASSIUM PHOSPHATE, DIBASIC 236; 224 MG/ML; MG/ML
30 INJECTION, SOLUTION INTRAVENOUS ONCE
Refills: 0 | Status: COMPLETED | OUTPATIENT
Start: 2024-01-01 | End: 2024-01-01

## 2024-01-01 RX ORDER — FENOFIBRATE 48 MG/1
48 TABLET ORAL
Qty: 30 | Refills: 0 | Status: ACTIVE | COMMUNITY
Start: 2023-01-01 | End: 1900-01-01

## 2024-01-01 RX ORDER — BRIMONIDINE TARTRATE 2 MG/MG
1 SOLUTION/ DROPS OPHTHALMIC AT BEDTIME
Refills: 0 | Status: DISCONTINUED | OUTPATIENT
Start: 2024-01-01 | End: 2024-01-01

## 2024-01-01 RX ORDER — ATORVASTATIN CALCIUM 20 MG/1
20 TABLET, FILM COATED ORAL DAILY
Qty: 30 | Refills: 0 | Status: ACTIVE | COMMUNITY
Start: 2023-01-01 | End: 1900-01-01

## 2024-01-01 RX ORDER — BRIMONIDINE TARTRATE 2 MG/MG
1 SOLUTION/ DROPS OPHTHALMIC
Refills: 0 | DISCHARGE

## 2024-01-01 RX ORDER — DEXTROSE 50 % IN WATER 50 %
15 SYRINGE (ML) INTRAVENOUS ONCE
Refills: 0 | Status: DISCONTINUED | OUTPATIENT
Start: 2024-01-01 | End: 2024-01-01

## 2024-01-01 RX ORDER — AMLODIPINE BESYLATE 2.5 MG/1
5 TABLET ORAL EVERY 24 HOURS
Refills: 0 | Status: DISCONTINUED | OUTPATIENT
Start: 2024-01-01 | End: 2024-01-01

## 2024-01-01 RX ORDER — SODIUM CHLORIDE 9 MG/ML
1000 INJECTION INTRAMUSCULAR; INTRAVENOUS; SUBCUTANEOUS ONCE
Refills: 0 | Status: COMPLETED | OUTPATIENT
Start: 2024-01-01 | End: 2024-01-01

## 2024-01-01 RX ORDER — DEXTROSE 50 % IN WATER 50 %
25 SYRINGE (ML) INTRAVENOUS ONCE
Refills: 0 | Status: DISCONTINUED | OUTPATIENT
Start: 2024-01-01 | End: 2024-01-01

## 2024-01-01 RX ORDER — GLUCAGON INJECTION, SOLUTION 0.5 MG/.1ML
1 INJECTION, SOLUTION SUBCUTANEOUS ONCE
Refills: 0 | Status: DISCONTINUED | OUTPATIENT
Start: 2024-01-01 | End: 2024-01-01

## 2024-01-01 RX ORDER — ASPIRIN ENTERIC COATED TABLETS 81 MG 81 MG/1
81 TABLET, DELAYED RELEASE ORAL DAILY
Qty: 90 | Refills: 3 | Status: ACTIVE | COMMUNITY
Start: 2024-01-01 | End: 1900-01-01

## 2024-01-01 RX ORDER — LATANOPROST 0.05 MG/ML
1 SOLUTION/ DROPS OPHTHALMIC; TOPICAL AT BEDTIME
Refills: 0 | Status: DISCONTINUED | OUTPATIENT
Start: 2024-01-01 | End: 2024-01-01

## 2024-01-01 RX ORDER — DOCUSATE SODIUM 100 MG/1
100 CAPSULE, LIQUID FILLED ORAL
Qty: 180 | Refills: 2 | Status: COMPLETED | COMMUNITY
Start: 2022-07-18 | End: 2024-01-01

## 2024-01-01 RX ORDER — ACETAMINOPHEN 500 MG
1000 TABLET ORAL ONCE
Refills: 0 | Status: COMPLETED | OUTPATIENT
Start: 2024-01-01 | End: 2024-01-01

## 2024-01-01 RX ORDER — DORZOLAMIDE HYDROCHLORIDE TIMOLOL MALEATE 20; 5 MG/ML; MG/ML
1 SOLUTION/ DROPS OPHTHALMIC
Refills: 0 | DISCHARGE

## 2024-01-01 RX ORDER — ASPIRIN/CALCIUM CARB/MAGNESIUM 324 MG
81 TABLET ORAL DAILY
Refills: 0 | Status: DISCONTINUED | OUTPATIENT
Start: 2024-01-01 | End: 2024-01-01

## 2024-01-01 RX ORDER — GLIMEPIRIDE 2 MG/1
2 TABLET ORAL
Refills: 0 | Status: ACTIVE | COMMUNITY

## 2024-01-01 RX ORDER — ENOXAPARIN SODIUM 100 MG/ML
40 INJECTION SUBCUTANEOUS EVERY 24 HOURS
Refills: 0 | Status: DISCONTINUED | OUTPATIENT
Start: 2024-01-01 | End: 2024-01-01

## 2024-01-01 RX ORDER — OXYCODONE HYDROCHLORIDE 5 MG/1
5 TABLET ORAL EVERY 4 HOURS
Refills: 0 | Status: DISCONTINUED | OUTPATIENT
Start: 2024-01-01 | End: 2024-01-01

## 2024-01-01 RX ORDER — ATORVASTATIN CALCIUM 80 MG/1
20 TABLET, FILM COATED ORAL AT BEDTIME
Refills: 0 | Status: DISCONTINUED | OUTPATIENT
Start: 2024-01-01 | End: 2024-01-01

## 2024-01-01 RX ORDER — INSULIN LISPRO 100/ML
VIAL (ML) SUBCUTANEOUS
Refills: 0 | Status: DISCONTINUED | OUTPATIENT
Start: 2024-01-01 | End: 2024-01-01

## 2024-01-01 RX ORDER — POLYETHYLENE GLYCOL 3350 17 G/17G
17 POWDER, FOR SOLUTION ORAL ONCE
Refills: 0 | Status: COMPLETED | OUTPATIENT
Start: 2024-01-01 | End: 2024-01-01

## 2024-01-01 RX ORDER — LANOLIN ALCOHOL/MO/W.PET/CERES
5 CREAM (GRAM) TOPICAL AT BEDTIME
Refills: 0 | Status: DISCONTINUED | OUTPATIENT
Start: 2024-01-01 | End: 2024-01-01

## 2024-01-01 RX ORDER — AMLODIPINE BESYLATE 2.5 MG/1
5 TABLET ORAL
Qty: 0 | Refills: 0 | DISCHARGE

## 2024-01-01 RX ADMIN — DORZOLAMIDE HYDROCHLORIDE TIMOLOL MALEATE 1 DROP(S): 20; 5 SOLUTION/ DROPS OPHTHALMIC at 13:11

## 2024-01-01 RX ADMIN — ENOXAPARIN SODIUM 40 MILLIGRAM(S): 100 INJECTION SUBCUTANEOUS at 12:27

## 2024-01-01 RX ADMIN — POLYETHYLENE GLYCOL 3350 17 GRAM(S): 17 POWDER, FOR SOLUTION ORAL at 17:36

## 2024-01-01 RX ADMIN — LIDOCAINE 1 PATCH: 4 CREAM TOPICAL at 15:36

## 2024-01-01 RX ADMIN — Medication 650 MILLIGRAM(S): at 08:30

## 2024-01-01 RX ADMIN — POTASSIUM PHOSPHATE, MONOBASIC POTASSIUM PHOSPHATE, DIBASIC 83.33 MILLIMOLE(S): 236; 224 INJECTION, SOLUTION INTRAVENOUS at 14:08

## 2024-01-01 RX ADMIN — Medication 5 MILLIGRAM(S): at 22:55

## 2024-01-01 RX ADMIN — BRIMONIDINE TARTRATE 1 DROP(S): 2 SOLUTION/ DROPS OPHTHALMIC at 23:01

## 2024-01-01 RX ADMIN — Medication 650 MILLIGRAM(S): at 07:35

## 2024-01-01 RX ADMIN — POTASSIUM PHOSPHATE, MONOBASIC POTASSIUM PHOSPHATE, DIBASIC 83.33 MILLIMOLE(S): 236; 224 INJECTION, SOLUTION INTRAVENOUS at 09:20

## 2024-01-01 RX ADMIN — AMLODIPINE BESYLATE 5 MILLIGRAM(S): 2.5 TABLET ORAL at 06:00

## 2024-01-01 RX ADMIN — LISINOPRIL 10 MILLIGRAM(S): 2.5 TABLET ORAL at 12:21

## 2024-01-01 RX ADMIN — Medication 650 MILLIGRAM(S): at 22:21

## 2024-01-01 RX ADMIN — Medication 650 MILLIGRAM(S): at 07:37

## 2024-01-01 RX ADMIN — POLYETHYLENE GLYCOL 3350 17 GRAM(S): 17 POWDER, FOR SOLUTION ORAL at 06:24

## 2024-01-01 RX ADMIN — DORZOLAMIDE HYDROCHLORIDE TIMOLOL MALEATE 1 DROP(S): 20; 5 SOLUTION/ DROPS OPHTHALMIC at 06:25

## 2024-01-01 RX ADMIN — ZOLEDRONIC ACID 4 MILLIGRAM(S): 5 INJECTION, SOLUTION INTRAVENOUS at 14:45

## 2024-01-01 RX ADMIN — Medication 650 MILLIGRAM(S): at 06:53

## 2024-01-01 RX ADMIN — POLYETHYLENE GLYCOL 3350 17 GRAM(S): 17 POWDER, FOR SOLUTION ORAL at 06:00

## 2024-01-01 RX ADMIN — ATORVASTATIN CALCIUM 20 MILLIGRAM(S): 80 TABLET, FILM COATED ORAL at 22:55

## 2024-01-01 RX ADMIN — Medication 650 MILLIGRAM(S): at 13:04

## 2024-01-01 RX ADMIN — Medication 75 MICROGRAM(S): at 06:00

## 2024-01-01 RX ADMIN — POLYETHYLENE GLYCOL 3350 17 GRAM(S): 17 POWDER, FOR SOLUTION ORAL at 23:38

## 2024-01-01 RX ADMIN — DORZOLAMIDE HYDROCHLORIDE TIMOLOL MALEATE 1 DROP(S): 20; 5 SOLUTION/ DROPS OPHTHALMIC at 22:49

## 2024-01-01 RX ADMIN — LIDOCAINE 1 PATCH: 4 CREAM TOPICAL at 11:48

## 2024-01-01 RX ADMIN — ATORVASTATIN CALCIUM 20 MILLIGRAM(S): 80 TABLET, FILM COATED ORAL at 22:50

## 2024-01-01 RX ADMIN — AMLODIPINE BESYLATE 5 MILLIGRAM(S): 2.5 TABLET ORAL at 06:55

## 2024-01-01 RX ADMIN — OXYCODONE HYDROCHLORIDE 5 MILLIGRAM(S): 5 TABLET ORAL at 23:48

## 2024-01-01 RX ADMIN — Medication 81 MILLIGRAM(S): at 09:20

## 2024-01-01 RX ADMIN — ENOXAPARIN SODIUM 40 MILLIGRAM(S): 100 INJECTION SUBCUTANEOUS at 12:20

## 2024-01-01 RX ADMIN — DORZOLAMIDE HYDROCHLORIDE TIMOLOL MALEATE 1 DROP(S): 20; 5 SOLUTION/ DROPS OPHTHALMIC at 14:07

## 2024-01-01 RX ADMIN — DORZOLAMIDE HYDROCHLORIDE TIMOLOL MALEATE 1 DROP(S): 20; 5 SOLUTION/ DROPS OPHTHALMIC at 22:54

## 2024-01-01 RX ADMIN — LIDOCAINE 1 PATCH: 4 CREAM TOPICAL at 23:36

## 2024-01-01 RX ADMIN — LIDOCAINE 1 PATCH: 4 CREAM TOPICAL at 11:57

## 2024-01-01 RX ADMIN — ENOXAPARIN SODIUM 40 MILLIGRAM(S): 100 INJECTION SUBCUTANEOUS at 11:57

## 2024-01-01 RX ADMIN — Medication 81 MILLIGRAM(S): at 12:25

## 2024-01-01 RX ADMIN — ATORVASTATIN CALCIUM 20 MILLIGRAM(S): 80 TABLET, FILM COATED ORAL at 22:54

## 2024-01-01 RX ADMIN — LIDOCAINE 1 PATCH: 4 CREAM TOPICAL at 12:27

## 2024-01-01 RX ADMIN — Medication 650 MILLIGRAM(S): at 23:55

## 2024-01-01 RX ADMIN — DORZOLAMIDE HYDROCHLORIDE TIMOLOL MALEATE 1 DROP(S): 20; 5 SOLUTION/ DROPS OPHTHALMIC at 06:56

## 2024-01-01 RX ADMIN — LISINOPRIL 10 MILLIGRAM(S): 2.5 TABLET ORAL at 12:26

## 2024-01-01 RX ADMIN — Medication 650 MILLIGRAM(S): at 23:05

## 2024-01-01 RX ADMIN — Medication 650 MILLIGRAM(S): at 22:54

## 2024-01-01 RX ADMIN — Medication 48 MILLIGRAM(S): at 22:53

## 2024-01-01 RX ADMIN — Medication 650 MILLIGRAM(S): at 13:00

## 2024-01-01 RX ADMIN — Medication 650 MILLIGRAM(S): at 07:00

## 2024-01-01 RX ADMIN — Medication 75 MICROGRAM(S): at 06:28

## 2024-01-01 RX ADMIN — Medication 48 MILLIGRAM(S): at 22:50

## 2024-01-01 RX ADMIN — DORZOLAMIDE HYDROCHLORIDE TIMOLOL MALEATE 1 DROP(S): 20; 5 SOLUTION/ DROPS OPHTHALMIC at 22:56

## 2024-01-01 RX ADMIN — BRIMONIDINE TARTRATE 1 DROP(S): 2 SOLUTION/ DROPS OPHTHALMIC at 22:54

## 2024-01-01 RX ADMIN — OXYCODONE HYDROCHLORIDE 5 MILLIGRAM(S): 5 TABLET ORAL at 22:48

## 2024-01-01 RX ADMIN — LIDOCAINE 1 PATCH: 4 CREAM TOPICAL at 23:57

## 2024-01-01 RX ADMIN — ENOXAPARIN SODIUM 40 MILLIGRAM(S): 100 INJECTION SUBCUTANEOUS at 11:48

## 2024-01-01 RX ADMIN — LATANOPROST 1 DROP(S): 0.05 SOLUTION/ DROPS OPHTHALMIC; TOPICAL at 22:58

## 2024-01-01 RX ADMIN — Medication 650 MILLIGRAM(S): at 22:55

## 2024-01-01 RX ADMIN — Medication 48 MILLIGRAM(S): at 23:02

## 2024-01-01 RX ADMIN — Medication 1000 MILLIGRAM(S): at 15:29

## 2024-01-01 RX ADMIN — LATANOPROST 1 DROP(S): 0.05 SOLUTION/ DROPS OPHTHALMIC; TOPICAL at 22:55

## 2024-01-01 RX ADMIN — AMLODIPINE BESYLATE 5 MILLIGRAM(S): 2.5 TABLET ORAL at 06:28

## 2024-01-01 RX ADMIN — Medication 650 MILLIGRAM(S): at 17:16

## 2024-01-01 RX ADMIN — Medication 650 MILLIGRAM(S): at 12:04

## 2024-01-01 RX ADMIN — LATANOPROST 1 DROP(S): 0.05 SOLUTION/ DROPS OPHTHALMIC; TOPICAL at 22:49

## 2024-01-01 RX ADMIN — LIDOCAINE 1 PATCH: 4 CREAM TOPICAL at 19:08

## 2024-01-01 RX ADMIN — Medication 650 MILLIGRAM(S): at 23:11

## 2024-01-01 RX ADMIN — POTASSIUM PHOSPHATE, MONOBASIC POTASSIUM PHOSPHATE, DIBASIC 83.33 MILLIMOLE(S): 236; 224 INJECTION, SOLUTION INTRAVENOUS at 15:20

## 2024-01-01 RX ADMIN — LISINOPRIL 10 MILLIGRAM(S): 2.5 TABLET ORAL at 11:57

## 2024-01-01 RX ADMIN — LIDOCAINE 1 PATCH: 4 CREAM TOPICAL at 18:36

## 2024-01-01 RX ADMIN — AMLODIPINE BESYLATE 5 MILLIGRAM(S): 2.5 TABLET ORAL at 06:24

## 2024-01-01 RX ADMIN — ZOLEDRONIC ACID 210 MILLIGRAM(S): 5 INJECTION, SOLUTION INTRAVENOUS at 14:15

## 2024-01-01 RX ADMIN — SENNA PLUS 2 TABLET(S): 8.6 TABLET ORAL at 22:49

## 2024-01-01 RX ADMIN — Medication 75 MICROGRAM(S): at 06:24

## 2024-01-01 RX ADMIN — Medication 650 MILLIGRAM(S): at 21:21

## 2024-01-01 RX ADMIN — LISINOPRIL 10 MILLIGRAM(S): 2.5 TABLET ORAL at 11:48

## 2024-01-01 RX ADMIN — Medication 650 MILLIGRAM(S): at 00:05

## 2024-01-01 RX ADMIN — LATANOPROST 1 DROP(S): 0.05 SOLUTION/ DROPS OPHTHALMIC; TOPICAL at 22:54

## 2024-01-01 RX ADMIN — LIDOCAINE 1 PATCH: 4 CREAM TOPICAL at 19:05

## 2024-01-01 RX ADMIN — Medication 30 MILLILITER(S): at 23:38

## 2024-01-01 RX ADMIN — SENNA PLUS 2 TABLET(S): 8.6 TABLET ORAL at 22:54

## 2024-01-01 RX ADMIN — SODIUM CHLORIDE 1000 MILLILITER(S): 9 INJECTION INTRAMUSCULAR; INTRAVENOUS; SUBCUTANEOUS at 14:26

## 2024-01-01 RX ADMIN — Medication 650 MILLIGRAM(S): at 12:26

## 2024-01-01 RX ADMIN — Medication 5 MILLIGRAM(S): at 22:50

## 2024-01-01 RX ADMIN — Medication 75 MICROGRAM(S): at 06:56

## 2024-01-01 RX ADMIN — Medication 81 MILLIGRAM(S): at 11:57

## 2024-01-01 RX ADMIN — DORZOLAMIDE HYDROCHLORIDE TIMOLOL MALEATE 1 DROP(S): 20; 5 SOLUTION/ DROPS OPHTHALMIC at 06:28

## 2024-01-01 RX ADMIN — DORZOLAMIDE HYDROCHLORIDE TIMOLOL MALEATE 1 DROP(S): 20; 5 SOLUTION/ DROPS OPHTHALMIC at 06:52

## 2024-01-01 RX ADMIN — POLYETHYLENE GLYCOL 3350 17 GRAM(S): 17 POWDER, FOR SOLUTION ORAL at 18:18

## 2024-01-01 RX ADMIN — Medication 650 MILLIGRAM(S): at 06:30

## 2024-01-01 RX ADMIN — Medication 81 MILLIGRAM(S): at 11:47

## 2024-01-01 RX ADMIN — POLYETHYLENE GLYCOL 3350 17 GRAM(S): 17 POWDER, FOR SOLUTION ORAL at 17:11

## 2024-01-01 RX ADMIN — DORZOLAMIDE HYDROCHLORIDE TIMOLOL MALEATE 1 DROP(S): 20; 5 SOLUTION/ DROPS OPHTHALMIC at 22:58

## 2024-01-01 RX ADMIN — SODIUM CHLORIDE 500 MILLILITER(S): 9 INJECTION INTRAMUSCULAR; INTRAVENOUS; SUBCUTANEOUS at 13:20

## 2024-01-01 RX ADMIN — POLYETHYLENE GLYCOL 3350 17 GRAM(S): 17 POWDER, FOR SOLUTION ORAL at 06:55

## 2024-01-01 RX ADMIN — DORZOLAMIDE HYDROCHLORIDE TIMOLOL MALEATE 1 DROP(S): 20; 5 SOLUTION/ DROPS OPHTHALMIC at 13:37

## 2024-01-01 RX ADMIN — BRIMONIDINE TARTRATE 1 DROP(S): 2 SOLUTION/ DROPS OPHTHALMIC at 23:00

## 2024-01-02 NOTE — REVIEW OF SYSTEMS
[Fever] : no fever [Chills] : no chills [Night Sweats] : no night sweats [Fatigue] : fatigue [Recent Change In Weight] : ~T recent weight change [Eye Pain] : no eye pain [Red Eyes] : eyes not red [Dry Eyes] : no dryness of the eyes [Vision Problems] : vision problems [Dysphagia] : dysphagia [Loss of Hearing] : loss of hearing [Nosebleeds] : no nosebleeds [Hoarseness] : no hoarseness [Odynophagia] : odynophagia [Mucosal Pain] : no mucosal pain [Abdominal Pain] : no abdominal pain [Vomiting] : no vomiting [Constipation] : constipation [Confused] : no confusion [Dizziness] : no dizziness [Fainting] : no fainting [Difficulty Walking] : difficulty walking [Negative] : Heme/Lymph [FreeTextEntry3] : glaucoma.  severe vision impairment, cloudy vision [de-identified] : denies numbness/tingling in hands, feet

## 2024-01-03 PROBLEM — E83.52 HYPERCALCEMIA: Status: ACTIVE | Noted: 2024-01-01

## 2024-01-03 NOTE — PHYSICAL EXAM
[Ambulatory and capable of all self care but unable to carry out any work activities] : Status 2- Ambulatory and capable of all self care but unable to carry out any work activities. Up and about more than 50% of waking hours [Normal] : affect appropriate [Capable of only limited self care, confined to bed or chair more than 50% of waking hours] : Status 3- Capable of only limited self care, confined to bed or chair more than 50% of waking hours [Thin] : thin

## 2024-01-04 NOTE — ASSESSMENT
[FreeTextEntry1] : Raghu Brooke is an 83 year old man here with squamous cell carcinoma of the esophagus.  Presented to [facility] with dysphagia, weight loss on 11/30/23.  CT scans showed circumferential wall thickening of the mid-esophagus worrisome for malignancy;  no adenopathy or distant metastatic disease reported on the scan.  EGD performed on 11/30/23 showed a malignant-appearing mass in the lower half of the esophagus extending from 28 to 38 cm, above the GE junction (GE junction at 40 cm).  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  PET scan confirmed the known esophageal cancer and did not show any distant metastatic disease.  Plan: # esophageal squamous cell carcinoma --Discussed diagnosis again w/ the patient.  He understands that he has cancer of the esophagus.   --Discussed imaging results including PET scan.  He has extensive disease in the esophagus but this appears to be localized to the thorax/non-metastatic disease. --tumor board discussion 1/4/24 --standard of care for mid/distal esophageal cancer that is non-metastatic would be trimodality therapy with neoadjuvant chemoradiation followed by surgery, or definitive chemoradiation for a patient who is medically fit but not a surgical candidate.  Unfortunately, my assessment is that this patient has poor performance status, limited support (consisting only of the staff at his SRO), and serious comorbid conditions (including significant visual impairment, decreased hearing, and at least mild cognitive impairment).  He may not be a candidate for aggressive approaches such as concurrent chemoradiation.  An alternative approach such as radiation or chemotherapy alone aimed at palliating symptoms of his disease may be appropriate.  Will discuss at tumor board.  #  dysphagia, weight loss --consulted dietician --soft diet  # elevated calcium level --persistent. non-parathyroid hypercalcemia.  May be contributing to his constipation --treat with IV fluids and zometa today.  # constipation --not responding to miralax.  Sent Rx for lactulose daily.  Can titrate to BID or TID dosing if needed.  # distress --vulnerable elderly individual with no reported family support;  visually impaired and also mildly hard of hearing.  Fortunately lives in supportive housing w/ food provided, has  and home health aide. --consulted oncology SW  --all appts coordinated through his  [facility].   --he says his brother Pollo would be the medical decision-maker if he were unable to do this himself. --unable to reconcile meds - his meds are provided and he is not aware of what he is taking - will need med list for next appt.  RTC 2-3 weeks CBC, CMP  addendum: case discussed at tumor board 1/4/24.  Radiology review raised concern for local invasion of tumor into pericardium and pre-vertebral soft tissue (may be T4 disease) with evidence para-esophageal and subcarinal adenopathy.  Not resectable.   will d/w rad onc optimal treatment strategy - obtain PDL1 CPS on tumor - and consider esophageal stent if further weight loss or symptoms w/ dysphagia.

## 2024-01-04 NOTE — HISTORY OF PRESENT ILLNESS
[de-identified] : Raghu Brooke is an 83 year old man here for evaluation of squamous cell carcinoma of the esophagus.  Oncologic history: 1.  esophageal cancer - squamous cell carcinoma --presentation:  dysphagia, weight loss.  Admitted to [facility] 11/30/23 --CT neck, c/a/p 11/30/23:  circumferential wall thickening of the mid-esophagus.  Multiple liver lesions too small to characterize.  Adrenal mass indeterminate but stable since 2015 --EGD 11/30/23:  malignant-appearing mass in the lower half of the esophagus extending from 28 to 38 cm.  Above the GE junction (GE junction at 40 cm).   --Biopsy of esophageal mass:  poorly differentiated squamous cell carcinoma. -- PET scan 12/18/23 with mass-like FDG avid wall thickening of the mid/distal esophagus measuring 8 cm in length;  no lymphadenopathy or FDG avid nodes reported.  No distant metastatic disease.  Adrenal mass weakly FDG avid, known adenoma.  PMH, PSH reviewed and updated in the chart. FMH:  mother, brother, sister w/ cancer. SH:  lives in an [facility] in [place] ([facility]  Has a  assigned to him there ([employer]).  Brother Pollo phone number .  former smoker. denies ETOH.   12/21/23: PET - 1. Masslike, hypermetabolic wall thickening at the mid to distal esophagus measuring 8.8 cm in length, SUV max 17.3, consistent with known malignancy. 2. Mildly FDG avid 3.0 cm left adrenal nodule, SUV max 3.4. Further evaluation with MRI is recommended. No other sites concerning for FDG avid metastatic disease are identified. [de-identified] : here for follow up to discuss PET scan results.  appt was delayed/rescheduled as pt was unable to make previous appts. Since last visit he was seen in ED for constipation.  He had relief from an enema.  Since then constipation has returned.  He says he is using miralax daily w/o results.  Pt is very preoccupied by the constipation and frequently redirects the conversation to this topic. He continues to have dysphagia and is eating a soft diet.   He says that his foods are being chopped/pureed by the facility where he resides. denies pain. weight down 9 lbs since last seen in this office.  Pt is visually impaired due to glaucoma.  He has a walker at home but doesn't really use it.  He prefers to walk w/ assistance from one of his home health aides.  Balance is OK, but needs assistance w/ walking due to poor vision.  Also has decreased hearing.  Lives alone.  Gets help w/ medications, meals are provided.  He can dress himself and bathe/use the bathroom without assistance.

## 2024-01-10 NOTE — ED ADULT TRIAGE NOTE - CHIEF COMPLAINT QUOTE
Yes Patient PMH blindness to the ED s/p mechanical GLF after tripping today. C/O lower back pain, denies head strike, -LOC, +ASA. No obvious signs of injury/trauma. AAOX4, NAD.

## 2024-01-10 NOTE — ED PROVIDER NOTE - CARE PROVIDER_API CALL
Maged Bhatia  Orthopaedic Surgery  [address] [place] [address], [address]  [place] [address]  Phone: [phone]  Fax: [phone]  Follow Up Time: 1-3 Days   Maged Bhatia  Orthopaedic Surgery  [address] [place] [address], [address]  [place] [address]  Phone: [phone]  Fax: [phone]  Follow Up Time: 1-3 Days

## 2024-01-10 NOTE — ED ADULT NURSE NOTE - NSFALLRISKINTERV_ED_ALL_ED
Assistance OOB with selected safe patient handling equipment if applicable/Assistance with ambulation/Communicate fall risk and risk factors to all staff, patient, and family/Provide visual cue: yellow wristband, yellow gown, etc/Reinforce activity limits and safety measures with patient and family/Call bell, personal items and telephone in reach/Instruct patient to call for assistance before getting out of bed/chair/stretcher/Non-slip footwear applied when patient is off stretcher/[place] to call system/Physically safe environment - no spills, clutter or unnecessary equipment/Purposeful Proactive Rounding/Room/bathroom lighting operational, light cord in reach Assistance OOB with selected safe patient handling equipment if applicable/Assistance with ambulation/Communicate fall risk and risk factors to all staff, patient, and family/Provide visual cue: yellow wristband, yellow gown, etc/Reinforce activity limits and safety measures with patient and family/Call bell, personal items and telephone in reach/Instruct patient to call for assistance before getting out of bed/chair/stretcher/Non-slip footwear applied when patient is off stretcher/[place] to call system/Physically safe environment - no spills, clutter or unnecessary equipment/Purposeful Proactive Rounding/Room/bathroom lighting operational, light cord in reach

## 2024-01-10 NOTE — ED PROVIDER NOTE - MUSCULOSKELETAL, MLM
Spine appears normal, range of motion is not limited, no muscle or joint tenderness Spine appears normal, range of motion is not limited, no muscle or joint tenderness, ttp of L spine

## 2024-01-10 NOTE — ED ADULT NURSE REASSESSMENT NOTE - NS ED NURSE REASSESS COMMENT FT1
Patient care endorsed from prior RN, patient received resting on the stretcher, patient is axox3. Patient made aware that he is awaiting transport back home.

## 2024-01-10 NOTE — ED PROVIDER NOTE - CLINICAL SUMMARY MEDICAL DECISION MAKING FREE TEXT BOX
81 yo M with blindness, CHF, CAD, DM, esophageal cancer presenting after a fall complaining of lower back pain after slipping and falling   denies loc, denies head trauma   vs wnl  pe unremarkable no neurological deficits,    PLAN   labs 83 yo M with blindness, CHF, CAD, DM, esophageal cancer presenting after a fall complaining of lower back pain after slipping and falling   denies loc, denies head trauma   vs wnl  pe unremarkable no neurological deficits,    PLAN   labs 81 yo M with blindness, CHF, CAD, DM, esophageal cancer presenting after a fall complaining of lower back pain after slipping and falling   denies loc, denies head trauma   vs wnl  pe unremarkable no neurological deficits,    PLAN   CT imaging, pain control

## 2024-01-10 NOTE — ED PROVIDER NOTE - CARE PROVIDERS DIRECT ADDRESSES
,[email or web address]

## 2024-01-10 NOTE — ED PROVIDER NOTE - PROGRESS NOTE DETAILS
L1 compression fracture on CT, pt states he wants to go home. refusing ortho eval. Neuro intact. Will recommend pain control, spine fu.

## 2024-01-10 NOTE — ED PROVIDER NOTE - OBJECTIVE STATEMENT
Patient is a 83 year old male with a PMH of blindness, CHF, CAD on ASA. DM, hypothyroid and esophageal cancer presenting after a fall. Patient states he lives in an assisted living facility and has had worsening vision, and this morning slipped and fell and hit his lower back. he states the pain is dull, constant, and 10/10. it does not radiate down his legs He does not remember if he took his ASA today. He denies hitting his head. Denies weakness, humbness, tingling or other neurological concerns.   All other ROS negative. Patient is a 83 year old male with a PMH of blindness, CHF, CAD on ASA. DM, hypothyroid and esophageal cancer presenting after a fall. Patient states he lives in an assisted living facility, and this morning slipped and fell and hit his lower back. he states the pain is dull, constant, and 10/10. it does not radiate down his legs He does not remember if he took his ASA today. He denies hitting his head. Denies weakness, humbness, tingling or other neurological concerns.   All other ROS negative.

## 2024-01-10 NOTE — ED ADULT NURSE NOTE - CHIEF COMPLAINT QUOTE
Patient PMH blindness to the ED s/p mechanical GLF after tripping today. C/O lower back pain, denies head strike, -LOC, +ASA. No obvious signs of injury/trauma. AAOX4, NAD.

## 2024-01-10 NOTE — ED PROVIDER NOTE - NSFOLLOWUPINSTRUCTIONS_ED_ALL_ED_FT
You have a compression fracture of your vertebrae. You should follow up with a spinal surgeon. Take tylenol or motrin as needed. Return for worsening symptoms, numbness, weakness, tingling, difficulty urinating.     I hope you feel better soon.     Sincerely,  Nathan Kimble MD

## 2024-01-10 NOTE — ED ADULT NURSE NOTE - OBJECTIVE STATEMENT
83 y.o male c/o fall, "I misjudged the door frame because of my glaucoma". Pt denies head strike, LOC, dizziness, numbness/tingling, loss of bowel/bladder control.

## 2024-01-10 NOTE — ED PROVIDER NOTE - PROVIDER TOKENS
PROVIDER:[TOKEN:[[id number]],FOLLOWUP:[1-3 Days]] PROVIDER:[TOKEN:[[id number]],FOLLOWUP:[1-3 Days]]

## 2024-01-10 NOTE — ED PROVIDER NOTE - PATIENT PORTAL LINK FT
You can access the FollowMyHealth Patient Portal offered by [facility] by registering at the following website: [email or web address]. By joining [facility]’s FollowMyHealth portal, you will also be able to view your health information using other applications (apps) compatible with our system. You can access the FollowMyHealth Patient Portal offered by [facility] by registering at the following website: [email or web address]. By joining [facility]’s FollowMyHealth portal, you will also be able to view your health information using other applications (apps) compatible with our system.

## 2024-01-12 NOTE — H&P ADULT - PROBLEM SELECTOR PLAN 9
F: none  E: replete as needed  N: soft and bite sized  DVT ppx: lovenox  Dispo: [facility] F: none  E: replete as needed  N: soft and bite sized  DVT ppx: lovenox  Dispo: [facility]

## 2024-01-12 NOTE — H&P ADULT - HISTORY OF PRESENT ILLNESS
HPI: Patient is an 83M with CHF, CAD(on ASA), DM, hypothyroid, esophageal cancer, recent mechanical fall c/b L1 vertebral compression fracture p/w back pain. Taking tylenol as needed for pain medicine. Patient additionally with decreased PO intake, denies N/V, diarrhea, abdominal pain, F/C, lower extremity numbness, bowel/bladder incontinence, weakness.     In the ED:  Initial vital signs: T: 97.5 F, HR: 86, BP: 116/74, R: 18, SpO2: 97% on RA  Labs: significant for Hgb 11.6, Cl 110, HCO3 19  EKG: pending  Medications: tylenol 650mg PO x 1, lidocaine patch   Consults: none  HPI: Patient is an 83M with CHF, CAD(on ASA), DM, hypothyroid, esophageal cancer, recent mechanical fall c/b L1 vertebral compression fracture p/w back pain. Described the feeling as "soreness" that is fairly constant in the lumbar spine. Denies any radiation of pain. Takes tylenol as needed for pain medicine. Patient additionally with decreased PO intake and weight loss, reports he can tolerate PO intake but does not have an appetite, no longer having dysphagia. Denies N/V, diarrhea, abdominal pain, F/C, lower extremity numbness, bowel/bladder incontinence, weakness.     In the ED:  Initial vital signs: T: 97.5 F, HR: 86, BP: 116/74, R: 18, SpO2: 97% on RA  Labs: significant for Hgb 11.6, Cl 110, HCO3 19  EKG: pending  Medications: tylenol 650mg PO x 1, lidocaine patch   Consults: ortho, PT HPI: Patient is an 83M with CHF, CAD(on ASA), DM, hypothyroid, esophageal cancer, recent mechanical fall c/b L1 vertebral compression fracture p/w back pain. Described the feeling as "soreness" that is fairly constant in the lumbar spine. Denies any radiation of pain. Takes tylenol as needed for pain medicine. Patient additionally with decreased PO intake and weight loss, reports he can tolerate PO intake but does not have an appetite, no longer having dysphagia. Denies N/V, diarrhea, abdominal pain, F/C, lower extremity numbness, bowel/bladder incontinence, weakness. Reports has been approx 3 days since last BM.     In the ED:  Initial vital signs: T: 97.5 F, HR: 86, BP: 116/74, R: 18, SpO2: 97% on RA  Labs: significant for Hgb 11.6, Cl 110, HCO3 19  EKG: pending  Medications: tylenol 650mg PO x 1, lidocaine patch   Consults: ortho, PT

## 2024-01-12 NOTE — H&P ADULT - NSHPLABSRESULTS_GEN_ALL_CORE
11.6   9.34  )-----------( 381      ( 12 Jan 2024 17:16 )             36.8       01-12    140  |  110<H>  |  14  ----------------------------<  85  3.9   |  19<L>  |  0.89    Ca    9.6      12 Jan 2024 17:16                Urinalysis Basic - ( 12 Jan 2024 17:16 )    Color: x / Appearance: x / SG: x / pH: x  Gluc: 85 mg/dL / Ketone: x  / Bili: x / Urobili: x   Blood: x / Protein: x / Nitrite: x   Leuk Esterase: x / RBC: x / WBC x   Sq Epi: x / Non Sq Epi: x / Bacteria: x            Lactate Trend            CAPILLARY BLOOD GLUCOSE

## 2024-01-12 NOTE — H&P ADULT - PROBLEM SELECTOR PLAN 7
A1c 7.0 on prior admission. Home med: Glimepiride 2mg qd.   - start ISS  - hold home med (not on formulary)

## 2024-01-12 NOTE — ED PROVIDER NOTE - PHYSICAL EXAMINATION
General: comfortable, resting in ED  HEENT: atraumatic, no eye erythema or discharge  Pulm: no cyanosis, no added work of breathing  Cardiac: extremities warm, intact peripheral pulse  GI: no abdominal distension  Neuro: alert, conversant, 5/5 ankle plantarflexion bilaterally, intact sensation bilateral lower extremities  Psych: neutral affect, cooperative  Msk: no gross deformity or instability  Skin: no erythema or rash

## 2024-01-12 NOTE — H&P ADULT - NSHPPHYSICALEXAM_GEN_ALL_CORE
.  VITAL SIGNS:  T(C): 36.4 (01-12-24 @ 16:19), Max: 36.4 (01-12-24 @ 16:19)  T(F): 97.5 (01-12-24 @ 16:19), Max: 97.5 (01-12-24 @ 16:19)  HR: 86 (01-12-24 @ 16:19) (86 - 86)  BP: 116/74 (01-12-24 @ 16:19) (116/74 - 116/74)  BP(mean): --  RR: 18 (01-12-24 @ 16:19) (18 - 18)  SpO2: 97% (01-12-24 @ 16:19) (97% - 97%)  Wt(kg): --    PHYSICAL EXAM:    Constitutional: resting comfortably in bed; NAD  Head: NC/AT  Eyes: PERRL, EOMI, anicteric sclera  ENT: no nasal discharge; uvula midline, no oropharyngeal erythema or exudates; MMM  Neck: supple; no JVD or thyromegaly  Respiratory: CTA B/L; no W/R/R, no retractions  Cardiac: +S1/S2; RRR; no M/R/G; PMI non-displaced  Gastrointestinal: abdomen soft, NT/ND; no rebound or guarding; +BSx4  Back: spine midline, no bony tenderness or step-offs; no CVAT B/L  Extremities: WWP, no clubbing or cyanosis; no peripheral edema  Musculoskeletal: NROM x4; no joint swelling, tenderness or erythema  Vascular: 2+ radial, DP/PT pulses B/L  Dermatologic: skin warm, dry and intact; no rashes, wounds, or scars  Lymphatic: no submandibular or cervical LAD  Neurologic: AAOx3; CNII-XII grossly intact; no focal deficits  Psychiatric: affect and characteristics of appearance, verbalizations, behaviors are appropriate .  VITAL SIGNS:  T(C): 36.4 (01-12-24 @ 16:19), Max: 36.4 (01-12-24 @ 16:19)  T(F): 97.5 (01-12-24 @ 16:19), Max: 97.5 (01-12-24 @ 16:19)  HR: 86 (01-12-24 @ 16:19) (86 - 86)  BP: 116/74 (01-12-24 @ 16:19) (116/74 - 116/74)  BP(mean): --  RR: 18 (01-12-24 @ 16:19) (18 - 18)  SpO2: 97% (01-12-24 @ 16:19) (97% - 97%)  Wt(kg): --    PHYSICAL EXAM:    Constitutional: resting comfortably in bed; NAD  Head: NC/AT  Eyes: PERRL, EOMI, anicteric sclera; minimal vision in both eyes  ENT: no nasal discharge; uvula midline, no oropharyngeal erythema or exudates; dry MM  Neck: supple; no JVD or thyromegaly  Respiratory: CTA B/L; no W/R/R, no retractions  Cardiac: +S1/S2; RRR; no M/R/G  Gastrointestinal: abdomen soft, NT/ND; no rebound or guarding; +BSx4  Back: spine midline, bony tenderness L1-L2; no CVAT B/L  Extremities: WWP, no clubbing or cyanosis; no peripheral edema  Musculoskeletal: NROM x4; no joint swelling, tenderness or erythema  Vascular: 2+ radial, DP/PT pulses B/L  Dermatologic: skin warm, dry and intact; no rashes, wounds, or scars  Lymphatic: no submandibular or cervical LAD  Neurologic: AAOx3; CNII-XII grossly intact; no focal deficits .  VITAL SIGNS:  T(C): 36.4 (01-12-24 @ 16:19), Max: 36.4 (01-12-24 @ 16:19)  T(F): 97.5 (01-12-24 @ 16:19), Max: 97.5 (01-12-24 @ 16:19)  HR: 86 (01-12-24 @ 16:19) (86 - 86)  BP: 116/74 (01-12-24 @ 16:19) (116/74 - 116/74)  BP(mean): --  RR: 18 (01-12-24 @ 16:19) (18 - 18)  SpO2: 97% (01-12-24 @ 16:19) (97% - 97%)  Wt(kg): --    PHYSICAL EXAM:    Constitutional: resting comfortably in bed; NAD  Head: NC/AT  Eyes: PERRL, EOMI, anicteric sclera; minimal vision in both eyes  ENT: no nasal discharge; uvula midline, no oropharyngeal erythema or exudates; dry MM  Neck: supple; no JVD or thyromegaly  Respiratory: CTA B/L; no W/R/R, no retractions  Cardiac: +S1/S2; RRR; no M/R/G  Gastrointestinal: abdomen soft, nontender moderately distended; no rebound or guarding; +BSx4  Back: spine midline, bony tenderness L1-L2; no CVAT B/L  Extremities: WWP, no clubbing or cyanosis; no peripheral edema  Musculoskeletal: NROM x4; no joint swelling, tenderness or erythema  Vascular: 2+ radial, DP/PT pulses B/L  Dermatologic: skin warm, dry and intact; no rashes, wounds, or scars  Lymphatic: no submandibular or cervical LAD  Neurologic: AAOx3; CNII-XII grossly intact; no focal deficits

## 2024-01-12 NOTE — CONSULT NOTE ADULT - SUBJECTIVE AND OBJECTIVE BOX
Orthopaedic Surgery Consult Note    For Surgeon: Sriram    HPI:  83 y Male presents following mechanical fall. He came to the ED on 1/10 and was found to have a vertebral compression fracture at L1. He was discharged and returned today with continued pain. He is able to ambulate. Denies other injuries at this time. Denies other symptoms at this time. Denies any new injuries. Denies any bowel or bladder symptoms at this time.      Allergies    No Known Allergies    Intolerances      PAST MEDICAL & SURGICAL HISTORY:  Essential hypertension  Hypertension      Hypothyroid      Diabetes mellitus, type 2      Adrenal mass      H/O CHF      CAD (coronary artery disease)      Other postprocedural status  left total knee  replacement one   yr.   ago      H/O thyroidectomy          Physical Exam:  Vital Signs Last 24 Hrs  T(C): 36.4 (12 Jan 2024 16:19), Max: 36.4 (12 Jan 2024 16:19)  T(F): 97.5 (12 Jan 2024 16:19), Max: 97.5 (12 Jan 2024 16:19)  HR: 86 (12 Jan 2024 16:19) (86 - 86)  BP: 116/74 (12 Jan 2024 16:19) (116/74 - 116/74)  BP(mean): --  RR: 18 (12 Jan 2024 16:19) (18 - 18)  SpO2: 97% (12 Jan 2024 16:19) (97% - 97%)    Parameters below as of 12 Jan 2024 16:19  Patient On (Oxygen Delivery Method): room air        VSS  General: Well-appearing adult Male; No apparent distress; A&Ox3  Back: No obvious deformity or signs of trauma; No skin breaks/open wounds present;   Midline TTP over lumbar region; Non-tender elsewhere along spinal column    Lower Extremity Exam: Hip Flex/Knee Ext/TA/EHL/GS 5/5 and symmetric bilaterally  Sensation intact L2-S1 dermatomes bilaterally  Downgoing Babinski bilaterally  symmetric clonus bilaterally  Feet WWP; Palpable DP pulse; Cap Refill <2 sec                              11.6   9.34  )-----------( 381      ( 12 Jan 2024 17:16 )             36.8     01-12    140  |  110<H>  |  14  ----------------------------<  85  3.9   |  19<L>  |  0.89    Ca    9.6      12 Jan 2024 17:16        Urinalysis Basic - ( 12 Jan 2024 17:16 )    Color: x / Appearance: x / SG: x / pH: x  Gluc: 85 mg/dL / Ketone: x  / Bili: x / Urobili: x   Blood: x / Protein: x / Nitrite: x   Leuk Esterase: x / RBC: x / WBC x   Sq Epi: x / Non Sq Epi: x / Bacteria: x        Imaging:   CT scan reveals L1 compression fracture    A/P: 83yMale who presents s/p mechanical fall with L1 vertebral compression fracture    - No acute surgical intervention at this time  - WBAT  - Pain control as per primary team  - Appreciate PT consultation  - Ambulate as tolerated with crutches / walker as needed  - Outpatient follow up with Dr. Bhatia      Ortho Pager [phone]         Orthopaedic Surgery Consult Note    For Surgeon: Sriram    HPI:  83 y Male presents following mechanical fall. He came to the ED on 1/10 and was found to have a vertebral compression fracture at L1. He was discharged and returned today with continued pain. He is able to ambulate. Denies other injuries at this time. Denies other symptoms at this time. Denies any new injuries. Denies any bowel or bladder symptoms at this time.      Allergies    No Known Allergies    Intolerances      PAST MEDICAL & SURGICAL HISTORY:  Essential hypertension  Hypertension      Hypothyroid      Diabetes mellitus, type 2      Adrenal mass      H/O CHF      CAD (coronary artery disease)      Other postprocedural status  left total knee  replacement one   yr.   ago      H/O thyroidectomy          Physical Exam:  Vital Signs Last 24 Hrs  T(C): 36.4 (12 Jan 2024 16:19), Max: 36.4 (12 Jan 2024 16:19)  T(F): 97.5 (12 Jan 2024 16:19), Max: 97.5 (12 Jan 2024 16:19)  HR: 86 (12 Jan 2024 16:19) (86 - 86)  BP: 116/74 (12 Jan 2024 16:19) (116/74 - 116/74)  BP(mean): --  RR: 18 (12 Jan 2024 16:19) (18 - 18)  SpO2: 97% (12 Jan 2024 16:19) (97% - 97%)    Parameters below as of 12 Jan 2024 16:19  Patient On (Oxygen Delivery Method): room air        VSS  General: Well-appearing adult Male; No apparent distress; A&Ox3  Back: No obvious deformity or signs of trauma; No skin breaks/open wounds present;   Midline TTP over lumbar region; Non-tender elsewhere along spinal column    Lower Extremity Exam: Hip Flex/Knee Ext/TA/EHL/GS 5/5 and symmetric bilaterally  Sensation intact L2-S1 dermatomes bilaterally  Downgoing Babinski bilaterally  symmetric clonus bilaterally  Feet WWP; Palpable DP pulse; Cap Refill <2 sec                              11.6   9.34  )-----------( 381      ( 12 Jan 2024 17:16 )             36.8     01-12    140  |  110<H>  |  14  ----------------------------<  85  3.9   |  19<L>  |  0.89    Ca    9.6      12 Jan 2024 17:16        Urinalysis Basic - ( 12 Jan 2024 17:16 )    Color: x / Appearance: x / SG: x / pH: x  Gluc: 85 mg/dL / Ketone: x  / Bili: x / Urobili: x   Blood: x / Protein: x / Nitrite: x   Leuk Esterase: x / RBC: x / WBC x   Sq Epi: x / Non Sq Epi: x / Bacteria: x        Imaging:   CT scan reveals L1 compression fracture    A/P: 83yMale who presents s/p mechanical fall with L1 vertebral compression fracture    - No acute surgical intervention at this time  - WBAT  - Pain control as per primary team  - Appreciate PT consultation  - Ambulate as tolerated with crutches / walker as needed  - Outpatient follow up with Dr. Bhatia      Ortho Pager [phone]         Orthopaedic Surgery Consult Note    For Surgeon: Sriram    HPI:  83 y Male presents following mechanical fall. He came to the ED on 1/10 and was found to have a vertebral compression fracture at L1. He was discharged and returned today with continued pain. He is able to ambulate. Denies other injuries at this time. Denies other symptoms at this time. Denies any new injuries. Denies any bowel or bladder symptoms at this time.      Allergies    No Known Allergies    Intolerances      PAST MEDICAL & SURGICAL HISTORY:  Essential hypertension  Hypertension      Hypothyroid      Diabetes mellitus, type 2      Adrenal mass      H/O CHF      CAD (coronary artery disease)      Other postprocedural status  left total knee  replacement one   yr.   ago      H/O thyroidectomy          Physical Exam:  Vital Signs Last 24 Hrs  T(C): 36.4 (12 Jan 2024 16:19), Max: 36.4 (12 Jan 2024 16:19)  T(F): 97.5 (12 Jan 2024 16:19), Max: 97.5 (12 Jan 2024 16:19)  HR: 86 (12 Jan 2024 16:19) (86 - 86)  BP: 116/74 (12 Jan 2024 16:19) (116/74 - 116/74)  BP(mean): --  RR: 18 (12 Jan 2024 16:19) (18 - 18)  SpO2: 97% (12 Jan 2024 16:19) (97% - 97%)    Parameters below as of 12 Jan 2024 16:19  Patient On (Oxygen Delivery Method): room air        VSS  General: Well-appearing adult Male; No apparent distress; A&Ox3  Back: No obvious deformity or signs of trauma; No skin breaks/open wounds present;   Midline TTP over lumbar region; Non-tender elsewhere along spinal column    Lower Extremity Exam: Hip Flex/Knee Ext/TA/EHL/GS 5/5 and symmetric bilaterally  Sensation intact L2-S1 dermatomes bilaterally  Downgoing Babinski bilaterally  symmetric clonus bilaterally  Feet WWP; Palpable DP pulse; Cap Refill <2 sec                              11.6   9.34  )-----------( 381      ( 12 Jan 2024 17:16 )             36.8     01-12    140  |  110<H>  |  14  ----------------------------<  85  3.9   |  19<L>  |  0.89    Ca    9.6      12 Jan 2024 17:16        Urinalysis Basic - ( 12 Jan 2024 17:16 )    Color: x / Appearance: x / SG: x / pH: x  Gluc: 85 mg/dL / Ketone: x  / Bili: x / Urobili: x   Blood: x / Protein: x / Nitrite: x   Leuk Esterase: x / RBC: x / WBC x   Sq Epi: x / Non Sq Epi: x / Bacteria: x        Imaging:   CT scan reveals L1 compression fracture    A/P: 83yMale who presents s/p mechanical fall with L1 vertebral compression fracture    - No acute surgical intervention at this time  - WBAT  - Pain control as per primary team  - Appreciate PT consultation  - Ambulate as tolerated with crutches / walker as needed  - TLSO brace when [employer] contacted for brace and will deliver  - Outpatient follow up with Dr. Bhatia in 2 weeks      Ortho Pager [phone]         Orthopaedic Surgery Consult Note    For Surgeon: Sriram    HPI:  83 y Male presents following mechanical fall. He came to the ED on 1/10 and was found to have a vertebral compression fracture at L1. He was discharged and returned today with continued pain. He is able to ambulate. Denies other injuries at this time. Denies other symptoms at this time. Denies any new injuries. Denies any bowel or bladder symptoms at this time.      Allergies    No Known Allergies    Intolerances      PAST MEDICAL & SURGICAL HISTORY:  Essential hypertension  Hypertension      Hypothyroid      Diabetes mellitus, type 2      Adrenal mass      H/O CHF      CAD (coronary artery disease)      Other postprocedural status  left total knee  replacement one   yr.   ago      H/O thyroidectomy          Physical Exam:  Vital Signs Last 24 Hrs  T(C): 36.4 (12 Jan 2024 16:19), Max: 36.4 (12 Jan 2024 16:19)  T(F): 97.5 (12 Jan 2024 16:19), Max: 97.5 (12 Jan 2024 16:19)  HR: 86 (12 Jan 2024 16:19) (86 - 86)  BP: 116/74 (12 Jan 2024 16:19) (116/74 - 116/74)  BP(mean): --  RR: 18 (12 Jan 2024 16:19) (18 - 18)  SpO2: 97% (12 Jan 2024 16:19) (97% - 97%)    Parameters below as of 12 Jan 2024 16:19  Patient On (Oxygen Delivery Method): room air        VSS  General: Well-appearing adult Male; No apparent distress; A&Ox3  Back: No obvious deformity or signs of trauma; No skin breaks/open wounds present;   Midline TTP over lumbar region; Non-tender elsewhere along spinal column    Lower Extremity Exam: Hip Flex/Knee Ext/TA/EHL/GS 5/5 and symmetric bilaterally  Sensation intact L2-S1 dermatomes bilaterally  Downgoing Babinski bilaterally  symmetric clonus bilaterally  Feet WWP; Palpable DP pulse; Cap Refill <2 sec                              11.6   9.34  )-----------( 381      ( 12 Jan 2024 17:16 )             36.8     01-12    140  |  110<H>  |  14  ----------------------------<  85  3.9   |  19<L>  |  0.89    Ca    9.6      12 Jan 2024 17:16        Urinalysis Basic - ( 12 Jan 2024 17:16 )    Color: x / Appearance: x / SG: x / pH: x  Gluc: 85 mg/dL / Ketone: x  / Bili: x / Urobili: x   Blood: x / Protein: x / Nitrite: x   Leuk Esterase: x / RBC: x / WBC x   Sq Epi: x / Non Sq Epi: x / Bacteria: x        Imaging:   CT scan reveals L1 compression fracture    A/P: 83yMale who presents s/p mechanical fall with L1 vertebral compression fracture    - No acute surgical intervention at this time  - WBAT  - Pain control as per primary team  - Appreciate PT consultation  - Ambulate as tolerated with crutches / walker as needed  - TLSO brace when [employer] contacted for brace and will deliver  - Outpatient follow up with Dr. Bhatia in 2 weeks      Ortho Pager [phone]         Orthopaedic Surgery Consult Note    For Surgeon: Sriram    HPI:  83 y Male presents following mechanical fall. He came to the ED on 1/10 and was found to have a vertebral compression fracture at L1. He was discharged and returned today with continued pain. He is able to ambulate. Denies other injuries at this time. Denies other symptoms at this time. Denies any new injuries. Denies any bowel or bladder symptoms at this time.      Allergies    No Known Allergies    Intolerances      PAST MEDICAL & SURGICAL HISTORY:  Essential hypertension  Hypertension      Hypothyroid      Diabetes mellitus, type 2      Adrenal mass      H/O CHF      CAD (coronary artery disease)      Other postprocedural status  left total knee  replacement one   yr.   ago      H/O thyroidectomy          Physical Exam:  Vital Signs Last 24 Hrs  T(C): 36.4 (12 Jan 2024 16:19), Max: 36.4 (12 Jan 2024 16:19)  T(F): 97.5 (12 Jan 2024 16:19), Max: 97.5 (12 Jan 2024 16:19)  HR: 86 (12 Jan 2024 16:19) (86 - 86)  BP: 116/74 (12 Jan 2024 16:19) (116/74 - 116/74)  BP(mean): --  RR: 18 (12 Jan 2024 16:19) (18 - 18)  SpO2: 97% (12 Jan 2024 16:19) (97% - 97%)    Parameters below as of 12 Jan 2024 16:19  Patient On (Oxygen Delivery Method): room air        VSS  General: Well-appearing adult Male; No apparent distress; A&Ox3  Back: No obvious deformity or signs of trauma; No skin breaks/open wounds present;   Midline TTP over lumbar region; Non-tender elsewhere along spinal column    Lower Extremity Exam: Hip Flex/Knee Ext/TA/EHL/GS 5/5 and symmetric bilaterally  Sensation intact L2-S1 dermatomes bilaterally  Downgoing Babinski bilaterally  symmetric clonus bilaterally  Feet WWP; Palpable DP pulse; Cap Refill <2 sec                              11.6   9.34  )-----------( 381      ( 12 Jan 2024 17:16 )             36.8     01-12    140  |  110<H>  |  14  ----------------------------<  85  3.9   |  19<L>  |  0.89    Ca    9.6      12 Jan 2024 17:16        Urinalysis Basic - ( 12 Jan 2024 17:16 )    Color: x / Appearance: x / SG: x / pH: x  Gluc: 85 mg/dL / Ketone: x  / Bili: x / Urobili: x   Blood: x / Protein: x / Nitrite: x   Leuk Esterase: x / RBC: x / WBC x   Sq Epi: x / Non Sq Epi: x / Bacteria: x        Imaging:   CT scan reveals L1 compression fracture    A/P: 83yMale who presents s/p mechanical fall with L1 vertebral compression fracture    - No acute surgical intervention at this time  - WBAT  - Pain control as per primary team  - Appreciate PT consultation, wear brace with PT  - Ambulate as tolerated with crutches / walker as needed  - TLSO brace when [employer] contacted for brace and will deliver  - Outpatient follow up with Dr. Bhatia in 2 weeks      Ortho Pager [phone]         Orthopaedic Surgery Consult Note    For Surgeon: Sriram    HPI:  83 y Male presents following mechanical fall. He came to the ED on 1/10 and was found to have a vertebral compression fracture at L1. He was discharged and returned today with continued pain. He is able to ambulate. Denies other injuries at this time. Denies other symptoms at this time. Denies any new injuries. Denies any bowel or bladder symptoms at this time.      Allergies    No Known Allergies    Intolerances      PAST MEDICAL & SURGICAL HISTORY:  Essential hypertension  Hypertension      Hypothyroid      Diabetes mellitus, type 2      Adrenal mass      H/O CHF      CAD (coronary artery disease)      Other postprocedural status  left total knee  replacement one   yr.   ago      H/O thyroidectomy          Physical Exam:  Vital Signs Last 24 Hrs  T(C): 36.4 (12 Jan 2024 16:19), Max: 36.4 (12 Jan 2024 16:19)  T(F): 97.5 (12 Jan 2024 16:19), Max: 97.5 (12 Jan 2024 16:19)  HR: 86 (12 Jan 2024 16:19) (86 - 86)  BP: 116/74 (12 Jan 2024 16:19) (116/74 - 116/74)  BP(mean): --  RR: 18 (12 Jan 2024 16:19) (18 - 18)  SpO2: 97% (12 Jan 2024 16:19) (97% - 97%)    Parameters below as of 12 Jan 2024 16:19  Patient On (Oxygen Delivery Method): room air        VSS  General: Well-appearing adult Male; No apparent distress; A&Ox3  Back: No obvious deformity or signs of trauma; No skin breaks/open wounds present;   Midline TTP over lumbar region; Non-tender elsewhere along spinal column    Lower Extremity Exam: Hip Flex/Knee Ext/TA/EHL/GS 5/5 and symmetric bilaterally  Sensation intact L2-S1 dermatomes bilaterally  Downgoing Babinski bilaterally  symmetric clonus bilaterally  Feet WWP; Palpable DP pulse; Cap Refill <2 sec                              11.6   9.34  )-----------( 381      ( 12 Jan 2024 17:16 )             36.8     01-12    140  |  110<H>  |  14  ----------------------------<  85  3.9   |  19<L>  |  0.89    Ca    9.6      12 Jan 2024 17:16        Urinalysis Basic - ( 12 Jan 2024 17:16 )    Color: x / Appearance: x / SG: x / pH: x  Gluc: 85 mg/dL / Ketone: x  / Bili: x / Urobili: x   Blood: x / Protein: x / Nitrite: x   Leuk Esterase: x / RBC: x / WBC x   Sq Epi: x / Non Sq Epi: x / Bacteria: x        Imaging:   CT scan reveals L1 compression fracture    A/P: 83yMale who presents s/p mechanical fall with L1 vertebral compression fracture    - No acute surgical intervention at this time  - WBAT  - Pain control as per primary team  - Appreciate PT consultation, wear brace with PT  - Ambulate as tolerated with crutches / walker as needed  - TLSO brace when [employer] contacted for brace and will deliver  - Outpatient follow up with Dr. Bhatia in 2 weeks      Ortho Pager [phone]

## 2024-01-12 NOTE — ED ADULT NURSE NOTE - CHIEF COMPLAINT QUOTE
Pt BIBEMS for decreased PO intake, back pain. Pt seen at [facility] wednesday s/p mechanical fall. History of glaucoma. Pt denies cp, sob, n/v/d. Pt BIBEMS for decreased PO intake, back pain. Pt seen at [facility] wednesday s/p mechanical fall. History of glaucoma. Pt denies cp, sob, n/v/d.

## 2024-01-12 NOTE — ED PROVIDER NOTE - OBJECTIVE STATEMENT
As per patient and previous medical record, 83M with CHF CAD(on ASA) DM, hypothyroid, esophageal cancer, recent mechanical fall s/p CT spine with discovery of compression fracture, initially wanted to leave ED 2/2 no pain however now having back pain.  Last pain medication was tylenol 2 days ago.  Decreased PO intake 2/2 pain with no vomiting/diarrhea/abdominal pain/fever.  No groin or lower extremity numbness.  No fevers or hx IVDU.  No bowel/bladder incontinence.  No leg weakness.

## 2024-01-12 NOTE — H&P ADULT - PROBLEM SELECTOR PLAN 3
Hgb 11.6, baseline 12-14 outpatient labs. No s/s of bleeding. Prior iron studies: Iron total 43, TIBC 247, % saturation iron 17, Ferritin 131.  - monitor hgb  - maintain active T&S  - transfuse if hgb < 7

## 2024-01-12 NOTE — PHYSICAL THERAPY INITIAL EVALUATION ADULT - GROSSLY INTACT, SENSORY
Problem: Patient Care Overview  Goal: Plan of Care Review  Outcome: Ongoing (interventions implemented as appropriate)  Patient remains on NPPV SIMV18/6/10/PS10. Patient tolerating FiO2 ranges .21-.35. Duane cannula in place. Site assessment reveals no breakdown or irritation. Will continue to monitor.        Grossly Intact

## 2024-01-12 NOTE — H&P ADULT - ASSESSMENT
Patient is an 83M with CHF, CAD(on ASA), DM, hypothyroid, esophageal cancer, recent mechanical fall c/b L1 vertebral compression fracture p/w back pain.

## 2024-01-12 NOTE — PHYSICAL THERAPY INITIAL EVALUATION ADULT - ADDITIONAL COMMENTS
Pt states that he lives in an correction ( but is an SRO), Pt reports having assistance with his ADLs and IADLs prior to admission. Pt is visually impaired and required assist to get around. Pt denies using any AD for ambulation. Pt states that he lives in an skilled nursing ( but is an SRO), Pt reports having assistance with his ADLs and IADLs prior to admission. Pt is visually impaired and required assist to get around. Pt denies using any AD for ambulation.

## 2024-01-12 NOTE — ED PROVIDER NOTE - PROGRESS NOTE DETAILS
SRO team called, concern for patient's ability to care for self, recommend rehab.  Consult to PT for evaluation for possible rehab, with subsequent request for ortho consult to determine weight bearing status for patient given compression fracture.  Case discussed with ortho spine, pending recommendations. S/p PT evaluation with recommendation for LOYD given fall risk.  Will admit for further workup and management.

## 2024-01-12 NOTE — H&P ADULT - PROBLEM SELECTOR PLAN 6
A1c 7.0 on prior admission. Home med: Glimepiride 2mg qd.   - start ISS Home meds: lisinopril 10mg qd and amlodipine 5mg qd  - c/w home meds with hold parameters

## 2024-01-12 NOTE — PHYSICAL THERAPY INITIAL EVALUATION ADULT - NSACTIVITYREC_GEN_A_PT
Pt. received OOB in chair, +NAD, Heplock. Cleared by Yeyo WANG and Arun Carlson MD. Pt is a 83M admitted to [facility] from an SRO s/p fall on 01/10/24 and found to have a compression fx in the lumbar spine. Pt p/w impaired strength, balance and endurance. Pt has pmhx of glaucoma and is visually impaired. Pt ambulated with a shuffle step,decreased juan francisco, balance and limited by fatigue. Pt was then returned to chair with all needs met and continues to benefit from skilled PT to address deficits. RN made aware. Pt. received OOB in chair, +NAD, Heplock. Cleared by Yeyo WANG and Arun Carlson MD. Pt is a 83M admitted to [facility] from an SRO s/p fall on 01/10/24 and found to have a compression fx in the lumbar spine. Pt p/w impaired strength, balance and endurance. Pt has pmhx of glaucoma and is visually impaired. Pt ambulated with a shuffle step,decreased juan francisco, balance and limited by fatigue. Pt was then returned to chair with all needs met and continues to benefit from skilled PT to address deficits. RN made aware.

## 2024-01-12 NOTE — ED ADULT TRIAGE NOTE - NS ED TRIAGE AVPU SCALE
Alert-The patient is alert, awake and responds to voice. The patient is oriented to time, place, and person. The triage nurse is able to obtain subjective information.
AMA (saw a physician/midlevel provider and clinician was able to provide reasons for staying for treatment & form is signed)

## 2024-01-12 NOTE — PHYSICAL THERAPY INITIAL EVALUATION ADULT - REFERRING PHYSICIAN, REHAB EVAL
57 year old critical patient s/p SIPs procedure with free air concerning for anastomotic leak s/p emergent ex lap with abdominal washout found to have leak of esophagojejunostomy which they could not repair due to its location. GI consulted to evaluate for possible Endoscopic evaluation.  Patient s/p esophageal stent placement, peritoneal washout and internal double pigtail stent placement. During endoscopy second defect noted on enteric side of anastomosis. Patient febrile with noted leukocytosis likely 2/2 to PNA, improving WBC.      - Cont IV abx  - Strict NPO to ensure no stent migration and healing of defect   - cont J tube feeds for nutrition  - Repeat EGD next week for repeat washout and possible placement of internal drain, would give time for defect to heal further as granulation tissue noted.   - Monitor CHECO drain output  - Care per SICU team    GI following. Henry Orona

## 2024-01-12 NOTE — H&P ADULT - PROBLEM SELECTOR PLAN 1
Patient p/w low back pain 2/2 recent fall resulting in L1 vertebral compression fracture. Patient given lidocaine patch and tylenol for pain control. VSS, labs wnl, neuro exam intact. Ortho consulted, no indication for acute surgical intervention. PT consulted, recommending LOYD.   - c/w tylenol prn for pain control  - c/w lidocaine patch qd  - ambulate as tolerated, WBAT  - TLSO brace when OOB  - outpatient ortho follow up

## 2024-01-12 NOTE — ED PROVIDER NOTE - CLINICAL SUMMARY MEDICAL DECISION MAKING FREE TEXT BOX
# back pain: consistent with back pain 2/2 compression fracture, will treat with tylenol given age of patient.  No history or symptoms consistent with current or imminent spinal cord compromise, concern below threshold for repeat imaging, MRI imaging, or neurosurgery consult.    # decreased PO intake: will r/o gross metabolic abnormality ?hypoglycemia ?hypernatremia ?adela given decreased PO intake on history.  No failure of PO, no indication for antiemetics.  No abdominal pain or fever, concern for acute intra-abdominal process ?sbo ?perforation ?appendicitis ?diverticulitis ?intussusception ?volvulus below threshold for CTAP.

## 2024-01-12 NOTE — ED ADULT TRIAGE NOTE - CHIEF COMPLAINT QUOTE
Pt BIBEMS for decreased PO intake, back pain. Pt seen at [facility] wednesday s/p mechanical fall. History of glaucoma. Pt denies cp, sob, n/v/d. Pt BIBEMS for decreased PO intake, back pain. Pt seen at [facility] wednesday s/p mechanical fall. History of glaucoma. Pt denies cp, sob, n/v/d.

## 2024-01-12 NOTE — PHYSICAL THERAPY INITIAL EVALUATION ADULT - GAIT DEVIATIONS NOTED, PT EVAL
decreased juan francisco/decreased step length/decreased stride length/decreased weight-shifting ability

## 2024-01-12 NOTE — H&P ADULT - PROBLEM SELECTOR PLAN 2
Hgb 11.6, baseline 12-14 outpatient labs. No s/s of bleeding. Prior iron studies: Iron total 43, TIBC 247, % saturation iron 17, Ferritin 131. EGD in Nov 2023 showing fungating mass in lower half of esophagus. Patient states he was planned for chemotherapy outpatient but did not follow up. Patient states he is no longer having dysphagia but does c/o poor PO intake and weight loss likely iso malignancy.  - consider heme onc consult  - obtain collateral

## 2024-01-12 NOTE — H&P ADULT - ATTENDING COMMENTS
84 yo M with PMHx glaucoma (minimal vision), CHF, CAD, DM, hypothyroidism, esophageal cancer, recent mechanical fall (c/b L1 compression fracture) BIBEMS for ongoing low back pain and difficulty ambulating admitted for pain control and PT evaluation.     #Lumbar compression fracture – Found to have acute minimally displaced compression fracture of L1 during ED visit 1/10 now px with ongoing low back pain and difficulty ambulating. VSS. CBC/BMP largely unremarkable. Ortho consulted in ED, no acute concerns at this time, no indication for surgical management. Fall precautions. Pain control PRN. PT consult.      Agree with remainder of resident plan as above. 82 yo M with PMHx glaucoma (minimal vision), CHF, CAD, DM, hypothyroidism, esophageal cancer, recent mechanical fall (c/b L1 compression fracture) BIBEMS for ongoing low back pain and difficulty ambulating admitted for pain control and PT evaluation.     #Lumbar compression fracture – Found to have acute minimally displaced compression fracture of L1 during ED visit 1/10 now px with ongoing low back pain and difficulty ambulating. VSS. CBC/BMP largely unremarkable. Ortho consulted in ED, no acute concerns at this time, no indication for surgical management. Fall precautions. Pain control PRN. PT consult.      Agree with remainder of resident plan as above.

## 2024-01-12 NOTE — H&P ADULT - PROBLEM SELECTOR PLAN 8
F: none  E: replete as needed  N: DASH diet  DVT ppx: lovenox  Dispo: [facility] Home med: synthroid 75 mcg  - c/w home med

## 2024-01-12 NOTE — H&P ADULT - NSHPSOCIALHISTORY_GEN_ALL_CORE
Tobacco use: History of smoking 1 ppd for approx 10yrs. Doesn't know when he quit.   EtOH use: Ex heavy drinker   Drug use: No   Living situation: Lives at home alone; has a HHA.

## 2024-01-13 NOTE — CONSULT NOTE ADULT - SUBJECTIVE AND OBJECTIVE BOX
Patient is a 83y old  Male who presents with a chief complaint of back pain (13 Jan 2024 11:24)      HPI:  HPI: Patient is an 83M with CHF, CAD(on ASA), DM, hypothyroid, esophageal cancer, recent mechanical fall c/b L1 vertebral compression fracture p/w back pain. Described the feeling as "soreness" that is fairly constant in the lumbar spine. Denies any radiation of pain. Takes tylenol as needed for pain medicine. Patient additionally with decreased PO intake and weight loss, reports he can tolerate PO intake but does not have an appetite, no longer having dysphagia. Denies N/V, diarrhea, abdominal pain, F/C, lower extremity numbness, bowel/bladder incontinence, weakness. Reports has been approx 3 days since last BM.     In the ED:  Initial vital signs: T: 97.5 F, HR: 86, BP: 116/74, R: 18, SpO2: 97% on RA  Labs: significant for Hgb 11.6, Cl 110, HCO3 19  EKG: pending  Medications: tylenol 650mg PO x 1, lidocaine patch   Consults: ortho, PT (12 Jan 2024 20:25)    PAST MEDICAL & SURGICAL HISTORY:  Essential hypertension  Hypertension      Hypothyroid      Diabetes mellitus, type 2      Adrenal mass      H/O CHF      CAD (coronary artery disease)      Other postprocedural status  left total knee  replacement one   yr.   ago      H/O thyroidectomy        MEDICATIONS  (STANDING):  amLODIPine   Tablet 5 milliGRAM(s) Oral every 24 hours  aspirin  chewable 81 milliGRAM(s) Oral daily  atorvastatin 20 milliGRAM(s) Oral at bedtime  brimonidine 0.2% Ophthalmic Solution 1 Drop(s) Both EYES at bedtime  dextrose 5%. 1000 milliLiter(s) (100 mL/Hr) IV Continuous <Continuous>  dextrose 5%. 1000 milliLiter(s) (50 mL/Hr) IV Continuous <Continuous>  dextrose 50% Injectable 12.5 Gram(s) IV Push once  dextrose 50% Injectable 25 Gram(s) IV Push once  dextrose 50% Injectable 25 Gram(s) IV Push once  dorzolamide 2%/timolol 0.5% Ophthalmic Solution 1 Drop(s) Both EYES <User Schedule>  enoxaparin Injectable 40 milliGRAM(s) SubCutaneous every 24 hours  fenofibrate Tablet 48 milliGRAM(s) Oral at bedtime  glucagon  Injectable 1 milliGRAM(s) IntraMuscular once  insulin lispro (ADMELOG) corrective regimen sliding scale   SubCutaneous three times a day before meals  latanoprost 0.005% Ophthalmic Solution 1 Drop(s) Both EYES at bedtime  levothyroxine 75 MICROGram(s) Oral daily  lidocaine   4% Patch 1 Patch Transdermal every 24 hours  lisinopril 10 milliGRAM(s) Oral every 24 hours  melatonin 5 milliGRAM(s) Oral at bedtime  polyethylene glycol 3350 17 Gram(s) Oral every 12 hours  senna 2 Tablet(s) Oral at bedtime    MEDICATIONS  (PRN):  acetaminophen     Tablet .. 650 milliGRAM(s) Oral every 6 hours PRN Temp greater or equal to 38C (100.4F), Mild Pain (1 - 3)  aluminum hydroxide/magnesium hydroxide/simethicone Suspension 30 milliLiter(s) Oral every 6 hours PRN Dyspepsia  dextrose Oral Gel 15 Gram(s) Oral once PRN Blood Glucose LESS THAN 70 milliGRAM(s)/deciliter  oxyCODONE    IR 5 milliGRAM(s) Oral every 4 hours PRN Severe Pain (7 - 10)          Home Living Status :  lives alone in an elevator accessible apartment building ,   steps to enter ,   ramp access           -  Prior Home Care Services :  none     hrs x  days/week         -  Family support :          -  Occupation :     Baseline Functional Level Prior to Admission :             - ADL's/ IADL's :  independent , requires partial assistance with          - Ambulatory status prior to admission :   walked with a cane , rolling walker , rollator , no assistive devices          FAMILY HISTORY:      CBC Full  -  ( 13 Jan 2024 05:30 )  WBC Count : 8.79 K/uL  RBC Count : 4.15 M/uL  Hemoglobin : 10.9 g/dL  Hematocrit : 34.6 %  Platelet Count - Automated : 393 K/uL  Mean Cell Volume : 83.4 fl  Mean Cell Hemoglobin : 26.3 pg  Mean Cell Hemoglobin Concentration : 31.5 gm/dL  Auto Neutrophil # : x  Auto Lymphocyte # : x  Auto Monocyte # : x  Auto Eosinophil # : x  Auto Basophil # : x  Auto Neutrophil % : x  Auto Lymphocyte % : x  Auto Monocyte % : x  Auto Eosinophil % : x  Auto Basophil % : x      01-13    138  |  110<H>  |  15  ----------------------------<  87  3.8   |  17<L>  |  0.81    Ca    9.4      13 Jan 2024 05:30  Phos  1.8     01-13  Mg     2.1     01-13        Urinalysis Basic - ( 13 Jan 2024 05:30 )    Color: x / Appearance: x / SG: x / pH: x  Gluc: 87 mg/dL / Ketone: x  / Bili: x / Urobili: x   Blood: x / Protein: x / Nitrite: x   Leuk Esterase: x / RBC: x / WBC x   Sq Epi: x / Non Sq Epi: x / Bacteria: x        Radiology :     < from: CT Lumbar Spine No Cont (01.10.24 @ 17:20) >  ACC: [id number] EXAM:  CT LUMBAR SPINE   ORDERED BY: RAY ALVA     PROCEDURE DATE:  01/10/2024          INTERPRETATION:  PROCEDURE: CT lumbar spine without contrast    INDICATION: Back pain status post fall. History of hypothyroid and   esophageal cancer.    TECHNIQUE:  Multiple axial sections were obtained from the thoracolumbar   junction through the sacrum. Sagittal and coronal reformats were obtained   from the axial data set. The images were reviewed in soft tissue and bone   windows.    COMPARISON: Lumbar spinal radiographs 7/15/2012. CT abdomen and pelvis   11/27/2023.    FINDINGS: Acute minimally displaced compression fracture of the   anterosuperior L1 vertebral body. There is no significant prevertebral   edema.  The remainingvertebral body heights are maintained. There is mild to   moderate lumbar scoliosis with apex at L2. There is grade 1   anterolisthesis of L1 on L2 and retrolisthesis of L2 on L3 as well as L3   on L4. There is intervertebral disc space narrowing most prominently at   L2/3 and L3/4. The osseous structures are intact without fracture.    Multilevel cervical spondylosis including anterior and posterior   osteophytes and facet joint arthropathy. There is severe bilateral   neuroforaminal narrowing at L3/4 and L4/5 secondary to facet joint   arthropathy. There is generalized posterior osseous ridging, ligamentum   flavum hypertrophy as well as disc herniation between the levels of L1/2   and L5/S1 resulting in moderate to severe spinal canal stenosis most   salient at L4/5.    Other: There is a 6 x 5 mm ring-shaped hyperdensity with peripheral   lucency of the right ilium just adjacent to the sacroiliac joint (series   5, image 136 and series 7 image 55), likely a bone island. There is a   nonobstructive right lower pole renal calculus measuring 6 x 7 mm and a   right renal cyst. Right hepatic hypodensity, approximately 1.7 cm likely   cyst versus hemangioma.    IMPRESSION:    1.  Acute minimally displaced compression fracture of the anterosuperior   L1 vertebral body.  2.  Ring-shaped hyperdensity with peripheral lucency of the right ilium,   6 mm. Likely a bone island  3.  Punctate nonobstructing right renal calculi.         Review of Systems : per HPI         Vital Signs Last 24 Hrs  T(C): 36.6 (13 Jan 2024 05:45), Max: 36.6 (13 Jan 2024 05:45)  T(F): 97.8 (13 Jan 2024 05:45), Max: 97.8 (13 Jan 2024 05:45)  HR: 83 (13 Jan 2024 05:45) (83 - 90)  BP: 134/86 (13 Jan 2024 05:45) (116/74 - 134/86)  BP(mean): --  RR: 17 (13 Jan 2024 05:45) (17 - 18)  SpO2: 99% (13 Jan 2024 05:45) (97% - 100%)    Parameters below as of 13 Jan 2024 05:45  Patient On (Oxygen Delivery Method): room air            Physical Exam :   83 y o man lying comfortably in semi Tesfaye's position , awake , alert , no acute complaints , feels tired     Head : normocephalic , atraumatic    Eyes : PERRLA , EOMI , no nystagmus , sclera anicteric    ENT / FACE : neg nasal discharge , uvula midline , no oropharyngeal erythema / exudate    Neck : supple , negative JVD , negative carotid bruits , no thyromegaly    Chest : CTA bilaterally , neg wheeze / rhonchi / rales / crackles / egophany    Cardiovascular : regular rate and rhythm , neg murmurs / rubs / gallops    Abdomen : soft , non distended , non tender to palpation in all 4 quadrants ,  normal bowel sounds     Extremities : WWP , neg cyanosis /clubbing / edema     Musculoskeletal :    T12-L2 TTP, neg SLE     Neurologic Exam :     Alert and oriented  x 3        Motor Exam:                Right UE:                     no focal weakness ,  > 3+/5 throughout       Left UE:                       no focal weakness ,  > 3+/5 throughout            Right LE:          no focal weakness ,  > 3+/5 throughout           Left LE:          no focal weakness ,  > 3+/5 throughout            Sensation :         intact to light touch x 4 extremities    DTR :           biceps/brachioradialis : equal                            patella/ankle : equal           neg clonus        Gait :  not tested          PM&R Impression : admitted for c/o back pain , + acute minimally displaced L 1  compression fx    - deconditioned    - no focal weakness       Recommendations / Plan :       1) Physical / Occupational therapy focusing on therapeutic exercises , equipment evaluation , bed mobility/transfer out of bed evaluation , progressive ambulation with assistive devices prn .    2) Current disposition plan recommendation  :     subacute rehab placement         Patient is a 83y old  Male who presents with a chief complaint of back pain (13 Jan 2024 11:24)      HPI:  HPI: Patient is an 83M with CHF, CAD(on ASA), DM, hypothyroid, esophageal cancer, recent mechanical fall c/b L1 vertebral compression fracture p/w back pain. Described the feeling as "soreness" that is fairly constant in the lumbar spine. Denies any radiation of pain. Takes tylenol as needed for pain medicine. Patient additionally with decreased PO intake and weight loss, reports he can tolerate PO intake but does not have an appetite, no longer having dysphagia. Denies N/V, diarrhea, abdominal pain, F/C, lower extremity numbness, bowel/bladder incontinence, weakness. Reports has been approx 3 days since last BM.     In the ED:  Initial vital signs: T: 97.5 F, HR: 86, BP: 116/74, R: 18, SpO2: 97% on RA  Labs: significant for Hgb 11.6, Cl 110, HCO3 19  EKG: pending  Medications: tylenol 650mg PO x 1, lidocaine patch   Consults: ortho, PT (12 Jan 2024 20:25)    PAST MEDICAL & SURGICAL HISTORY:  Essential hypertension  Hypertension      Hypothyroid      Diabetes mellitus, type 2      Adrenal mass      H/O CHF      CAD (coronary artery disease)      Other postprocedural status  left total knee  replacement one   yr.   ago      H/O thyroidectomy        MEDICATIONS  (STANDING):  amLODIPine   Tablet 5 milliGRAM(s) Oral every 24 hours  aspirin  chewable 81 milliGRAM(s) Oral daily  atorvastatin 20 milliGRAM(s) Oral at bedtime  brimonidine 0.2% Ophthalmic Solution 1 Drop(s) Both EYES at bedtime  dextrose 5%. 1000 milliLiter(s) (100 mL/Hr) IV Continuous <Continuous>  dextrose 5%. 1000 milliLiter(s) (50 mL/Hr) IV Continuous <Continuous>  dextrose 50% Injectable 12.5 Gram(s) IV Push once  dextrose 50% Injectable 25 Gram(s) IV Push once  dextrose 50% Injectable 25 Gram(s) IV Push once  dorzolamide 2%/timolol 0.5% Ophthalmic Solution 1 Drop(s) Both EYES <User Schedule>  enoxaparin Injectable 40 milliGRAM(s) SubCutaneous every 24 hours  fenofibrate Tablet 48 milliGRAM(s) Oral at bedtime  glucagon  Injectable 1 milliGRAM(s) IntraMuscular once  insulin lispro (ADMELOG) corrective regimen sliding scale   SubCutaneous three times a day before meals  latanoprost 0.005% Ophthalmic Solution 1 Drop(s) Both EYES at bedtime  levothyroxine 75 MICROGram(s) Oral daily  lidocaine   4% Patch 1 Patch Transdermal every 24 hours  lisinopril 10 milliGRAM(s) Oral every 24 hours  melatonin 5 milliGRAM(s) Oral at bedtime  polyethylene glycol 3350 17 Gram(s) Oral every 12 hours  senna 2 Tablet(s) Oral at bedtime    MEDICATIONS  (PRN):  acetaminophen     Tablet .. 650 milliGRAM(s) Oral every 6 hours PRN Temp greater or equal to 38C (100.4F), Mild Pain (1 - 3)  aluminum hydroxide/magnesium hydroxide/simethicone Suspension 30 milliLiter(s) Oral every 6 hours PRN Dyspepsia  dextrose Oral Gel 15 Gram(s) Oral once PRN Blood Glucose LESS THAN 70 milliGRAM(s)/deciliter  oxyCODONE    IR 5 milliGRAM(s) Oral every 4 hours PRN Severe Pain (7 - 10)          Home Living Status :  lives alone in an elevator accessible apartment building ,   steps to enter ,   ramp access           -  Prior Home Care Services :  none     hrs x  days/week         -  Family support :          -  Occupation :     Baseline Functional Level Prior to Admission :             - ADL's/ IADL's :  independent , requires partial assistance with          - Ambulatory status prior to admission :   walked with a cane , rolling walker , rollator , no assistive devices          FAMILY HISTORY:      CBC Full  -  ( 13 Jan 2024 05:30 )  WBC Count : 8.79 K/uL  RBC Count : 4.15 M/uL  Hemoglobin : 10.9 g/dL  Hematocrit : 34.6 %  Platelet Count - Automated : 393 K/uL  Mean Cell Volume : 83.4 fl  Mean Cell Hemoglobin : 26.3 pg  Mean Cell Hemoglobin Concentration : 31.5 gm/dL  Auto Neutrophil # : x  Auto Lymphocyte # : x  Auto Monocyte # : x  Auto Eosinophil # : x  Auto Basophil # : x  Auto Neutrophil % : x  Auto Lymphocyte % : x  Auto Monocyte % : x  Auto Eosinophil % : x  Auto Basophil % : x      01-13    138  |  110<H>  |  15  ----------------------------<  87  3.8   |  17<L>  |  0.81    Ca    9.4      13 Jan 2024 05:30  Phos  1.8     01-13  Mg     2.1     01-13        Urinalysis Basic - ( 13 Jan 2024 05:30 )    Color: x / Appearance: x / SG: x / pH: x  Gluc: 87 mg/dL / Ketone: x  / Bili: x / Urobili: x   Blood: x / Protein: x / Nitrite: x   Leuk Esterase: x / RBC: x / WBC x   Sq Epi: x / Non Sq Epi: x / Bacteria: x        Radiology :     < from: CT Lumbar Spine No Cont (01.10.24 @ 17:20) >  ACC: [id number] EXAM:  CT LUMBAR SPINE   ORDERED BY: RAY ALVA     PROCEDURE DATE:  01/10/2024          INTERPRETATION:  PROCEDURE: CT lumbar spine without contrast    INDICATION: Back pain status post fall. History of hypothyroid and   esophageal cancer.    TECHNIQUE:  Multiple axial sections were obtained from the thoracolumbar   junction through the sacrum. Sagittal and coronal reformats were obtained   from the axial data set. The images were reviewed in soft tissue and bone   windows.    COMPARISON: Lumbar spinal radiographs 7/15/2012. CT abdomen and pelvis   11/27/2023.    FINDINGS: Acute minimally displaced compression fracture of the   anterosuperior L1 vertebral body. There is no significant prevertebral   edema.  The remainingvertebral body heights are maintained. There is mild to   moderate lumbar scoliosis with apex at L2. There is grade 1   anterolisthesis of L1 on L2 and retrolisthesis of L2 on L3 as well as L3   on L4. There is intervertebral disc space narrowing most prominently at   L2/3 and L3/4. The osseous structures are intact without fracture.    Multilevel cervical spondylosis including anterior and posterior   osteophytes and facet joint arthropathy. There is severe bilateral   neuroforaminal narrowing at L3/4 and L4/5 secondary to facet joint   arthropathy. There is generalized posterior osseous ridging, ligamentum   flavum hypertrophy as well as disc herniation between the levels of L1/2   and L5/S1 resulting in moderate to severe spinal canal stenosis most   salient at L4/5.    Other: There is a 6 x 5 mm ring-shaped hyperdensity with peripheral   lucency of the right ilium just adjacent to the sacroiliac joint (series   5, image 136 and series 7 image 55), likely a bone island. There is a   nonobstructive right lower pole renal calculus measuring 6 x 7 mm and a   right renal cyst. Right hepatic hypodensity, approximately 1.7 cm likely   cyst versus hemangioma.    IMPRESSION:    1.  Acute minimally displaced compression fracture of the anterosuperior   L1 vertebral body.  2.  Ring-shaped hyperdensity with peripheral lucency of the right ilium,   6 mm. Likely a bone island  3.  Punctate nonobstructing right renal calculi.         Review of Systems : per HPI         Vital Signs Last 24 Hrs  T(C): 36.6 (13 Jan 2024 05:45), Max: 36.6 (13 Jan 2024 05:45)  T(F): 97.8 (13 Jan 2024 05:45), Max: 97.8 (13 Jan 2024 05:45)  HR: 83 (13 Jan 2024 05:45) (83 - 90)  BP: 134/86 (13 Jan 2024 05:45) (116/74 - 134/86)  BP(mean): --  RR: 17 (13 Jan 2024 05:45) (17 - 18)  SpO2: 99% (13 Jan 2024 05:45) (97% - 100%)    Parameters below as of 13 Jan 2024 05:45  Patient On (Oxygen Delivery Method): room air            Physical Exam :   83 y o man lying comfortably in semi Tesfaye's position , awake , alert , no acute complaints , feels tired     Head : normocephalic , atraumatic    Eyes : PERRLA , EOMI , no nystagmus , sclera anicteric    ENT / FACE : neg nasal discharge , uvula midline , no oropharyngeal erythema / exudate    Neck : supple , negative JVD , negative carotid bruits , no thyromegaly    Chest : CTA bilaterally , neg wheeze / rhonchi / rales / crackles / egophany    Cardiovascular : regular rate and rhythm , neg murmurs / rubs / gallops    Abdomen : soft , non distended , non tender to palpation in all 4 quadrants ,  normal bowel sounds     Extremities : WWP , neg cyanosis /clubbing / edema     Musculoskeletal :    T12-L2 TTP, neg SLE     Neurologic Exam :     Alert and oriented  x 3        Motor Exam:                Right UE:                     no focal weakness ,  > 3+/5 throughout       Left UE:                       no focal weakness ,  > 3+/5 throughout            Right LE:          no focal weakness ,  > 3+/5 throughout           Left LE:          no focal weakness ,  > 3+/5 throughout            Sensation :         intact to light touch x 4 extremities    DTR :           biceps/brachioradialis : equal                            patella/ankle : equal           neg clonus        Gait :  not tested          PM&R Impression : admitted for c/o back pain , + acute minimally displaced L 1  compression fx    - deconditioned    - no focal weakness       Recommendations / Plan :       1) Physical / Occupational therapy focusing on therapeutic exercises , equipment evaluation , bed mobility/transfer out of bed evaluation , progressive ambulation with assistive devices prn .    2) Current disposition plan recommendation  :     subacute rehab placement

## 2024-01-13 NOTE — PROGRESS NOTE ADULT - SUBJECTIVE AND OBJECTIVE BOX
INTERVAL HPI/OVERNIGHT EVENTS:  As per night team, no overnight events. Patient seen and examined at bedside. Patient denies fever, chills, dizziness, weakness, HA, CP, SOB, N/V/D/C    VITALS  Vital Signs Last 24 Hrs  T(C): 36.6 (13 Jan 2024 05:45), Max: 36.6 (13 Jan 2024 05:45)  T(F): 97.8 (13 Jan 2024 05:45), Max: 97.8 (13 Jan 2024 05:45)  HR: 83 (13 Jan 2024 05:45) (83 - 90)  BP: 134/86 (13 Jan 2024 05:45) (116/74 - 134/86)  BP(mean): --  RR: 17 (13 Jan 2024 05:45) (17 - 18)  SpO2: 99% (13 Jan 2024 05:45) (97% - 100%)    Parameters below as of 13 Jan 2024 05:45  Patient On (Oxygen Delivery Method): room air        CAPILLARY BLOOD GLUCOSE      POCT Blood Glucose.: 83 mg/dL (13 Jan 2024 09:57)      PHYSICAL EXAM  General: NAD, sitting comfortably in bed   HEENT: PERRL/ EOMI, no scleral icterus, MMM  Neck: Supple, no JVD  Respiratory: lungs CTA b/l, no wheezes/crackles, no accessory muscle use  Cardiovascular: Regular rhythm/rate; +S1 +S2, no murmurs  Gastrointestinal: Soft, NTND, normoactive BS, no rebound, no guarding  Genitourinary: no suprapubic tenderness  Extremities: WWP, no cyanosis, no clubbing, no edema, pulses equal  Neurological: A&Ox3, no gross focal deficits, follows commands  Skin: Normal temperature, warm, dry    MEDICATIONS  (STANDING):  amLODIPine   Tablet 5 milliGRAM(s) Oral every 24 hours  aspirin  chewable 81 milliGRAM(s) Oral daily  atorvastatin 20 milliGRAM(s) Oral at bedtime  brimonidine 0.2% Ophthalmic Solution 1 Drop(s) Both EYES at bedtime  dextrose 5%. 1000 milliLiter(s) (50 mL/Hr) IV Continuous <Continuous>  dextrose 5%. 1000 milliLiter(s) (100 mL/Hr) IV Continuous <Continuous>  dextrose 50% Injectable 25 Gram(s) IV Push once  dextrose 50% Injectable 25 Gram(s) IV Push once  dextrose 50% Injectable 12.5 Gram(s) IV Push once  dorzolamide 2%/timolol 0.5% Ophthalmic Solution 1 Drop(s) Both EYES <User Schedule>  enoxaparin Injectable 40 milliGRAM(s) SubCutaneous every 24 hours  fenofibrate Tablet 48 milliGRAM(s) Oral at bedtime  glucagon  Injectable 1 milliGRAM(s) IntraMuscular once  insulin lispro (ADMELOG) corrective regimen sliding scale   SubCutaneous three times a day before meals  latanoprost 0.005% Ophthalmic Solution 1 Drop(s) Both EYES at bedtime  levothyroxine 75 MICROGram(s) Oral daily  lidocaine   4% Patch 1 Patch Transdermal every 24 hours  lisinopril 10 milliGRAM(s) Oral every 24 hours  melatonin 5 milliGRAM(s) Oral at bedtime  polyethylene glycol 3350 17 Gram(s) Oral every 12 hours  senna 2 Tablet(s) Oral at bedtime    MEDICATIONS  (PRN):  acetaminophen     Tablet .. 650 milliGRAM(s) Oral every 6 hours PRN Temp greater or equal to 38C (100.4F), Mild Pain (1 - 3)  aluminum hydroxide/magnesium hydroxide/simethicone Suspension 30 milliLiter(s) Oral every 6 hours PRN Dyspepsia  dextrose Oral Gel 15 Gram(s) Oral once PRN Blood Glucose LESS THAN 70 milliGRAM(s)/deciliter  oxyCODONE    IR 5 milliGRAM(s) Oral every 4 hours PRN Severe Pain (7 - 10)      No Known Allergies      LABS                        10.9   8.79  )-----------( 393      ( 13 Jan 2024 05:30 )             34.6     01-13    138  |  110<H>  |  15  ----------------------------<  87  3.8   |  17<L>  |  0.81    Ca    9.4      13 Jan 2024 05:30  Phos  1.8     01-13  Mg     2.1     01-13        Urinalysis Basic - ( 13 Jan 2024 05:30 )    Color: x / Appearance: x / SG: x / pH: x  Gluc: 87 mg/dL / Ketone: x  / Bili: x / Urobili: x   Blood: x / Protein: x / Nitrite: x   Leuk Esterase: x / RBC: x / WBC x   Sq Epi: x / Non Sq Epi: x / Bacteria: x            RADIOLOGY & ADDITIONAL TESTS: Reviewed INTERVAL HPI/OVERNIGHT EVENTS:  As per night team, no overnight events. Patient seen and examined at bedside. Patient visibly uncomfortable due to pain and soreness in his back. States tylenol is not helping. Patient denies fever, chills, dizziness, weakness, HA, CP, SOB, N/V/D/C. Denies numbness, bowel/bladder incontinence, weakness. Pending LOYD placement    VITALS  Vital Signs Last 24 Hrs  T(C): 36.6 (13 Jan 2024 05:45), Max: 36.6 (13 Jan 2024 05:45)  T(F): 97.8 (13 Jan 2024 05:45), Max: 97.8 (13 Jan 2024 05:45)  HR: 83 (13 Jan 2024 05:45) (83 - 90)  BP: 134/86 (13 Jan 2024 05:45) (116/74 - 134/86)  BP(mean): --  RR: 17 (13 Jan 2024 05:45) (17 - 18)  SpO2: 99% (13 Jan 2024 05:45) (97% - 100%)    Parameters below as of 13 Jan 2024 05:45  Patient On (Oxygen Delivery Method): room air    CAPILLARY BLOOD GLUCOSE  POCT Blood Glucose.: 83 mg/dL (13 Jan 2024 09:57)    PHYSICAL EXAM  Constitutional: resting comfortably in bed; NAD  Head: NC/AT  Eyes: PERRL, EOMI, anicteric sclera; minimal vision in both eyes  ENT: no nasal discharge; uvula midline, no oropharyngeal erythema or exudates; dry MM  Neck: supple; no JVD or thyromegaly  Respiratory: CTA B/L; no W/R/R, no retractions  Cardiac: +S1/S2; RRR; no M/R/G  Gastrointestinal: abdomen soft, nontender moderately distended; no rebound or guarding; +BSx4  Back: spine midline, bony tenderness L1-L2; no CVAT B/L  Extremities: WWP, no clubbing or cyanosis; no peripheral edema  Musculoskeletal: NROM x4; no joint swelling, tenderness or erythema  Vascular: 2+ radial, DP/PT pulses B/L  Dermatologic: skin warm, dry and intact; no rashes, wounds, or scars  Lymphatic: no submandibular or cervical LAD  Neurologic: AAOx3; CNII-XII grossly intact; no focal deficits    MEDICATIONS  (STANDING):  amLODIPine   Tablet 5 milliGRAM(s) Oral every 24 hours  aspirin  chewable 81 milliGRAM(s) Oral daily  atorvastatin 20 milliGRAM(s) Oral at bedtime  brimonidine 0.2% Ophthalmic Solution 1 Drop(s) Both EYES at bedtime  dextrose 5%. 1000 milliLiter(s) (50 mL/Hr) IV Continuous <Continuous>  dextrose 5%. 1000 milliLiter(s) (100 mL/Hr) IV Continuous <Continuous>  dextrose 50% Injectable 25 Gram(s) IV Push once  dextrose 50% Injectable 25 Gram(s) IV Push once  dextrose 50% Injectable 12.5 Gram(s) IV Push once  dorzolamide 2%/timolol 0.5% Ophthalmic Solution 1 Drop(s) Both EYES <User Schedule>  enoxaparin Injectable 40 milliGRAM(s) SubCutaneous every 24 hours  fenofibrate Tablet 48 milliGRAM(s) Oral at bedtime  glucagon  Injectable 1 milliGRAM(s) IntraMuscular once  insulin lispro (ADMELOG) corrective regimen sliding scale   SubCutaneous three times a day before meals  latanoprost 0.005% Ophthalmic Solution 1 Drop(s) Both EYES at bedtime  levothyroxine 75 MICROGram(s) Oral daily  lidocaine   4% Patch 1 Patch Transdermal every 24 hours  lisinopril 10 milliGRAM(s) Oral every 24 hours  melatonin 5 milliGRAM(s) Oral at bedtime  polyethylene glycol 3350 17 Gram(s) Oral every 12 hours  senna 2 Tablet(s) Oral at bedtime    MEDICATIONS  (PRN):  acetaminophen     Tablet .. 650 milliGRAM(s) Oral every 6 hours PRN Temp greater or equal to 38C (100.4F), Mild Pain (1 - 3)  aluminum hydroxide/magnesium hydroxide/simethicone Suspension 30 milliLiter(s) Oral every 6 hours PRN Dyspepsia  dextrose Oral Gel 15 Gram(s) Oral once PRN Blood Glucose LESS THAN 70 milliGRAM(s)/deciliter  oxyCODONE    IR 5 milliGRAM(s) Oral every 4 hours PRN Severe Pain (7 - 10)      No Known Allergies      LABS                        10.9   8.79  )-----------( 393      ( 13 Jan 2024 05:30 )             34.6     01-13    138  |  110<H>  |  15  ----------------------------<  87  3.8   |  17<L>  |  0.81    Ca    9.4      13 Jan 2024 05:30  Phos  1.8     01-13  Mg     2.1     01-13        Urinalysis Basic - ( 13 Jan 2024 05:30 )    Color: x / Appearance: x / SG: x / pH: x  Gluc: 87 mg/dL / Ketone: x  / Bili: x / Urobili: x   Blood: x / Protein: x / Nitrite: x   Leuk Esterase: x / RBC: x / WBC x   Sq Epi: x / Non Sq Epi: x / Bacteria: x        RADIOLOGY & ADDITIONAL TESTS: Reviewed

## 2024-01-13 NOTE — PATIENT PROFILE ADULT - FALL HARM RISK - HARM RISK INTERVENTIONS
Assistance with ambulation/Assistance OOB with selected safe patient handling equipment/Communicate Risk of Fall with Harm to all staff/Discuss with provider need for PT consult/Monitor gait and stability/Provide patient with walking aids - walker, cane, crutches/Reinforce activity limits and safety measures with patient and family/Tailored Fall Risk Interventions/Visual Cue: Yellow wristband and red socks/Bed in lowest position, wheels locked, appropriate side rails in place/Call bell, personal items and telephone in reach/Instruct patient to call for assistance before getting out of bed or chair/Non-slip footwear when patient is out of bed/[place] to call system/Physically safe environment - no spills, clutter or unnecessary equipment/Purposeful Proactive Rounding/Room/bathroom lighting operational, light cord in reach Assistance with ambulation/Assistance OOB with selected safe patient handling equipment/Communicate Risk of Fall with Harm to all staff/Discuss with provider need for PT consult/Monitor gait and stability/Provide patient with walking aids - walker, cane, crutches/Reinforce activity limits and safety measures with patient and family/Tailored Fall Risk Interventions/Visual Cue: Yellow wristband and red socks/Bed in lowest position, wheels locked, appropriate side rails in place/Call bell, personal items and telephone in reach/Instruct patient to call for assistance before getting out of bed or chair/Non-slip footwear when patient is out of bed/[place] to call system/Physically safe environment - no spills, clutter or unnecessary equipment/Purposeful Proactive Rounding/Room/bathroom lighting operational, light cord in reach

## 2024-01-13 NOTE — PROGRESS NOTE ADULT - PROBLEM SELECTOR PLAN 9
F: none  E: replete as needed  N: soft and bite sized  DVT ppx: lovenox  Dispo: [facility] F: none  E: replete as needed  N: soft and bite sized  DVT ppx: lovenox  Dispo: [facility]

## 2024-01-13 NOTE — CONSULT NOTE ADULT - ASSESSMENT
I M    83 y o M with CHF, CAD(on ASA), DM, hypothyroid, esophageal cancer, recent mechanical fall c/b L1 vertebral compression fracture p/w back pain.    Problem/Plan - 1:  ·  Problem: Lumbar back pain.   ·  Plan: Patient p/w low back pain 2/2 recent fall resulting in L1 vertebral compression fracture. Patient given lidocaine patch and tylenol for pain control. VSS, labs wnl, neuro exam intact. Ortho consulted, no indication for acute surgical intervention. PT consulted, recommending LOYD.   - c/w tylenol prn for pain control  - c/w lidocaine patch qd  - ambulate as tolerated, WBAT  - TLSO brace when OOB  - outpatient ortho follow up.    Problem/Plan - 2:  ·  Problem: Esophageal cancer.   ·  Plan: EGD in Nov 2023 showing fungating mass in lower half of esophagus. Patient states he was planned for chemotherapy outpatient but did not follow up. Patient states he is no longer having dysphagia but does c/o poor PO intake and weight loss likely iso malignancy.  - consider heme onc consult  - obtain collateral.    Problem/Plan - 3:  ·  Problem: Anemia.   ·  Plan: Hgb 11.6, baseline 12-14 outpatient labs. No s/s of bleeding. Prior iron studies: Iron total 43, TIBC 247, % saturation iron 17, Ferritin 131.  - monitor hgb  - maintain active T&S  - transfuse if hgb < 7.    Problem/Plan - 4:  ·  Problem: CHF (congestive heart failure).   ·  Plan: Patient with hx of CHF, Echo in 2023 Nov showing mild-mod LVH, normal EF without any regional wall motion abnormalities, indeterminate LV diastolic function and filling pressures. Not on any GDMT.   - no active interventions.    Problem/Plan - 5:  ·  Problem: CAD (coronary artery disease).   ·  Plan: Home meds: ASA 81mg  - c/w ASA.    Problem/Plan - 6:  ·  Problem: Hypertension.   ·  Plan: Home meds: lisinopril 10mg qd and amlodipine 5mg qd  - c/w home meds with hold parameters.    Problem/Plan - 7:  ·  Problem: Diabetes mellitus, type 2.   ·  Plan: A1c 7.0 on prior admission. Home med: Glimepiride 2mg qd.   - start ISS  - hold home med (not on formulary).    Problem/Plan - 8:  ·  Problem: Hypothyroidism.   ·  Plan: Home med: synthroid 75 mcg  - c/w home med.    Problem/Plan - 9:  ·  Problem: Prophylactic measure.   ·  Plan: F: none  E: replete as needed  N: soft and bite sized  DVT ppx: lovenox  Dispo: [facility].

## 2024-01-14 NOTE — DISCHARGE NOTE PROVIDER - DETAILS OF MALNUTRITION DIAGNOSIS/DIAGNOSES
This patient has been assessed with a concern for Malnutrition and was treated during this hospitalization for the following Nutrition diagnosis/diagnoses:     -  01/16/2024: Severe protein-calorie malnutrition

## 2024-01-14 NOTE — DISCHARGE NOTE PROVIDER - CARE PROVIDER_API CALL
Telma Aldana.  NP in Oncology  [address], [address]  [place] [address]  Phone: [phone]  Fax: [phone]  Established Patient  Scheduled Appointment: 01/24/2024   Telma Aldana.  NP in Oncology  [address], [address]  [place] [address]  Phone: [phone]  Fax: [phone]  Established Patient  Scheduled Appointment: 01/24/2024   Telma Aldana.  NP in Oncology  [address], [address]  [place] [address]  Phone: [phone]  Fax: [phone]  Established Patient  Scheduled Appointment: 01/24/2024   Telma Aldana.  NP in Oncology  [address], [address]  [place] [address]  Phone: [phone]  Fax: [phone]  Established Patient  Scheduled Appointment: 01/24/2024   Telma Aldana  NP in Oncology  [address], [address]  [place] [address]  Phone: [phone]  Fax: [phone]  Established Patient  Scheduled Appointment: 01/24/2024    Maged Bhatia  Orthopaedic Surgery  [address] [place] [address], [address]  [place] [address]  Phone: [phone]  Fax: [phone]  Scheduled Appointment: 01/30/2024   Telma Aldana  NP in Oncology  [address], [address]  [place] [address]  Phone: [phone]  Fax: [phone]  Established Patient  Scheduled Appointment: 01/24/2024    Maged Bhatia  Orthopaedic Surgery  [address] [place] [address], [address]  [place] [address]  Phone: [phone]  Fax: [phone]  Scheduled Appointment: 01/30/2024   Telma Aldana  NP in Oncology  [address], [address]  [place] [address]  Phone: [phone]  Fax: [phone]  Established Patient  Scheduled Appointment: 01/24/2024    Maged Bhatia  Orthopaedic Surgery  [address] [place] [address], [address]  [place] [address]  Phone: [phone]  Fax: [phone]  Scheduled Appointment: 01/30/2024   Telma Aldana  NP in Oncology  [address], [address]  [place] [address]  Phone: [phone]  Fax: [phone]  Established Patient  Scheduled Appointment: 01/24/2024    Maged Bhatia  Orthopaedic Surgery  [address] [place] [address], [address]  [place] [address]  Phone: [phone]  Fax: [phone]  Scheduled Appointment: 01/30/2024   Telma Aldana  NP in Oncology  [address], [address]  [place] [address]  Phone: [phone]  Fax: [phone]  Established Patient  Scheduled Appointment: 01/24/2024    Maged Bhatia  Orthopaedic Surgery  [address] [place] [address], [address]  [place] [address]  Phone: [phone]  Fax: [phone]  Scheduled Appointment: 01/30/2024 11:30 AM   Telma Aldana  NP in Oncology  [address], [address]  [place] [address]  Phone: [phone]  Fax: [phone]  Established Patient  Scheduled Appointment: 01/24/2024    Maged Bhatia  Orthopaedic Surgery  [address] [place] [address], [address]  [place] [address]  Phone: [phone]  Fax: [phone]  Scheduled Appointment: 01/30/2024 11:30 AM   Telma Aldana  NP in Oncology  [address], [address]  [place] [address]  Phone: [phone]  Fax: [phone]  Established Patient  Scheduled Appointment: 01/24/2024    Maged Bhatia  Orthopaedic Surgery  [address] [place] [address], [address]  [place] [address]  Phone: [phone]  Fax: [phone]  Scheduled Appointment: 01/30/2024 11:30 AM   Telma Aldana  NP in Oncology  [address], [address]  [place] [address]  Phone: [phone]  Fax: [phone]  Established Patient  Scheduled Appointment: 01/24/2024    Maged Bhatia  Orthopaedic Surgery  [address] [place] [address], [address]  [place] [address]  Phone: [phone]  Fax: [phone]  Scheduled Appointment: 01/30/2024 11:30 AM

## 2024-01-14 NOTE — DISCHARGE NOTE PROVIDER - NSDCCAREPROVSEEN_GEN_ALL_CORE_FT
[facility]

## 2024-01-14 NOTE — PROGRESS NOTE ADULT - PROBLEM SELECTOR PLAN 9
F: none  E: replete as needed  N: soft and bite sized  DVT ppx: lovenox  Dispo: [facility] F: none  E: replete as needed  N: soft and bite sized  DVT ppx: lovenox  Dispo: [facility]

## 2024-01-14 NOTE — DISCHARGE NOTE PROVIDER - PROVIDER TOKENS
PROVIDER:[TOKEN:[[id number]],SCHEDULEDAPPT:[01/24/2024],ESTABLISHEDPATIENT:[T]] PROVIDER:[TOKEN:[[id number]],SCHEDULEDAPPT:[01/24/2024],ESTABLISHEDPATIENT:[T]] PROVIDER:[TOKEN:[[id number]],SCHEDULEDAPPT:[01/24/2024],ESTABLISHEDPATIENT:[T]] PROVIDER:[TOKEN:[[id number]],SCHEDULEDAPPT:[01/24/2024],ESTABLISHEDPATIENT:[T]] PROVIDER:[TOKEN:[[id number]],SCHEDULEDAPPT:[01/24/2024],ESTABLISHEDPATIENT:[T]],PROVIDER:[TOKEN:[[id number]],SCHEDULEDAPPT:[01/30/2024]] PROVIDER:[TOKEN:[[id number]],SCHEDULEDAPPT:[01/24/2024],ESTABLISHEDPATIENT:[T]],PROVIDER:[TOKEN:[[id number]],SCHEDULEDAPPT:[01/30/2024]] PROVIDER:[TOKEN:[[id number]],SCHEDULEDAPPT:[01/24/2024],ESTABLISHEDPATIENT:[T]],PROVIDER:[TOKEN:[[id number]],SCHEDULEDAPPT:[01/30/2024]] PROVIDER:[TOKEN:[[id number]],SCHEDULEDAPPT:[01/24/2024],ESTABLISHEDPATIENT:[T]],PROVIDER:[TOKEN:[[id number]],SCHEDULEDAPPT:[01/30/2024]] PROVIDER:[TOKEN:[[id number]],SCHEDULEDAPPT:[01/24/2024],ESTABLISHEDPATIENT:[T]],PROVIDER:[TOKEN:[[id number]],SCHEDULEDAPPT:[01/30/2024],SCHEDULEDAPPTTIME:[11:30 AM]] PROVIDER:[TOKEN:[[id number]],SCHEDULEDAPPT:[01/24/2024],ESTABLISHEDPATIENT:[T]],PROVIDER:[TOKEN:[[id number]:[id number]],SCHEDULEDAPPT:[01/30/2024],SCHEDULEDAPPTTIME:[11:30 AM]] PROVIDER:[TOKEN:[[id number]],SCHEDULEDAPPT:[01/24/2024],ESTABLISHEDPATIENT:[T]],PROVIDER:[TOKEN:[[id number]],SCHEDULEDAPPT:[01/30/2024],SCHEDULEDAPPTTIME:[11:30 AM]] PROVIDER:[TOKEN:[[id number]],SCHEDULEDAPPT:[01/24/2024],ESTABLISHEDPATIENT:[T]],PROVIDER:[TOKEN:[[id number]],SCHEDULEDAPPT:[01/30/2024],SCHEDULEDAPPTTIME:[11:30 AM]]

## 2024-01-14 NOTE — DISCHARGE NOTE PROVIDER - HOSPITAL COURSE
#Discharge: do not delete    Patient is an 83M with CHF, CAD(on ASA), DM, hypothyroid, esophageal cancer, recent mechanical fall c/b L1 vertebral compression fracture p/w back pain.    Hospital course (by problem):   #Lumbar back pain.    Patient p/w low back pain 2/2 recent fall resulting in L1 vertebral compression fracture. Patient given lidocaine patch and tylenol for pain control. VSS, labs wnl, neuro exam intact. Ortho consulted, no indication for acute surgical intervention. PT consulted, recommending [facility].   - c/w tylenol prn for pain control  - c/w lidocaine patch qd  - Oxycodone 5mg for severe pain  - ambulate as tolerated, WBAT  - TLSO brace when OOB  - outpatient ortho follow up.    #Esophageal cancer.   EGD in Nov 2023 showing fungating mass in lower half of esophagus. Patient states he was planned for chemotherapy outpatient but did not follow up. Patient states he is no longer having dysphagia but does c/o poor PO intake and weight loss likely iso malignancy.  - f/u outpatient.    #Anemia.   Hgb 11.6, baseline 12-14 outpatient labs. No s/s of bleeding. Prior iron studies: Iron total 43, TIBC 247, % saturation iron 17, Ferritin 131.  - monitor hgb  - maintain active T&S  - transfuse if hgb < 7.    # CHF (congestive heart failure).   Patient with hx of CHF, Echo in 2023 Nov showing mild-mod LVH, normal EF without any regional wall motion abnormalities, indeterminate LV diastolic function and filling pressures. Not on any GDMT.   - no active interventions.    #CAD (coronary artery disease).   Home meds: ASA 81mg  - c/w ASA.    #Hypertension.   Home meds: lisinopril 10mg qd and amlodipine 5mg qd  - c/w home meds with hold parameters.    # Diabetes mellitus, type 2.   A1c 7.0 on prior admission. Home med: Glimepiride 2mg qd.   - continue home med on discharge    #Hypothyroidism.    Home med: synthroid 75 mcg  - c/w home med.    Patient was discharged to: [facility]    New medications:   Changes to old medications:  Medications that were stopped:    Items to follow up as outpatient:         #Discharge: do not delete    Patient is an 83M with CHF, CAD(on ASA), DM, hypothyroid, esophageal cancer, recent mechanical fall c/b L1 vertebral compression fracture p/w back pain.    Hospital course (by problem):   #Lumbar back pain.   Patient p/w low back pain 2/2 recent fall resulting in L1 vertebral compression fracture. Patient given lidocaine patch and tylenol for pain control. VSS, labs wnl, neuro exam intact. Ortho consulted, no indication for acute surgical intervention. Continue with pain control and ambulate as tolerated using TLSO brace with out of bed. Follow up with ortho outpatient.     #Esophageal cancer.   EGD in Nov 2023 showing fungating mass in lower half of esophagus. Patient states he was planned for chemotherapy outpatient but did not follow up. Patient states he is no longer having dysphagia but does c/o poor PO intake and weight loss likely in the setting of malignancy. Follow up with outpatient oncologist.  - f/u outpatient.    #Anemia.   Hgb 11.6, baseline 12-14 outpatient labs. No s/s of bleeding. Prior iron studies: Iron total 43, TIBC 247, % saturation iron 17, Ferritin 131. Follow up with primary care physician.     # CHF (congestive heart failure).   Patient with history of CHF, Echo in 2023 Nov showing mild-mod LVH, normal EF without any regional wall motion abnormalities, indeterminate LV diastolic function and filling pressures. Not on any GDMT.   - no active interventions.    #CAD (coronary artery disease).   Home meds: ASA 81mg  - c/w ASA.    #Hypertension.   Home meds: lisinopril 10mg qd and amlodipine 5mg qd  - c/w home meds with hold parameters.    # Diabetes mellitus, type 2.   A1c 7.0 on prior admission. Home med: Glimepiride 2mg qd.   - continue home med on discharge    #Hypothyroidism.    Home med: synthroid 75 mcg  - c/w home med.    Patient was discharged to: [facility]    New medications:   Changes to old medications:  Medications that were stopped:    Items to follow up as outpatient:         #Discharge: do not delete    Patient is an 83M with CHF, CAD(on ASA), DM, hypothyroid, esophageal cancer, recent mechanical fall c/b L1 vertebral compression fracture p/w back pain.    Hospital course (by problem):   #Lumbar back pain.   Patient p/w low back pain 2/2 recent fall resulting in L1 vertebral compression fracture. Patient given lidocaine patch and tylenol for pain control. VSS, labs wnl, neuro exam intact. Ortho consulted, no indication for acute surgical intervention. Continue with pain control and ambulate as tolerated using TLSO brace with out of bed. Follow up with ortho outpatient.     #Esophageal cancer.   EGD in Nov 2023 showing fungating mass in lower half of esophagus. Patient states he was planned for chemotherapy outpatient but did not follow up. Patient states he is no longer having dysphagia but does c/o poor PO intake and weight loss likely in the setting of malignancy. Follow up with outpatient oncologist.    #Anemia.   Hgb 11.6, baseline 12-14 outpatient labs. No s/s of bleeding. Prior iron studies: Iron total 43, TIBC 247, % saturation iron 17, Ferritin 131. Follow up with primary care physician.     # CHF (congestive heart failure).   Patient with history of CHF, Echo in 2023 Nov showing mild-mod LVH, normal EF without any regional wall motion abnormalities, indeterminate LV diastolic function and filling pressures. Not on any GDMT. No acute interventions    #CAD (coronary artery disease).   Home meds: ASA 81mg. Continue with home medications.     #Hypertension.   Home meds: lisinopril 10mg qd and amlodipine 5mg qd. Continue with home medications.     # Diabetes mellitus, type 2.   A1c 7.0 on prior admission. Home med: Glimepiride 2mg qd. Continue with home medications.     #Hypothyroidism.   Home med: synthroid 75 mcg. Continue with home medications.     Patient was discharged to: [facility]    New medications: None  Changes to old medications: None  Medications that were stopped: None    Physical Examination on Discharge:  General: NAD, sitting comfortably in bed   HEENT: PERRL/ EOMI, no scleral icterus, MMM  Neck: Supple, no JVD  Respiratory: lungs CTA b/l, no wheezes/crackles, no accessory muscle use  Cardiovascular: Regular rhythm/rate; +S1 +S2, no murmurs  Gastrointestinal: Soft, NTND, normoactive BS, no rebound, no guarding  Back: No lumbar tenderness to palpation  Genitourinary: no suprapubic tenderness  Extremities: WWP, no cyanosis, no clubbing, no edema, pulses equal  Neurological: A&Ox3, no gross focal deficits, follows commands  Skin: Normal temperature, warm, dry         #Discharge: do not delete    Patient is an 83M with CHF, CAD(on ASA), DM, hypothyroid, esophageal cancer, recent mechanical fall c/b L1 vertebral compression fracture p/w back pain.    Hospital course (by problem):   #Lumbar back pain.   Patient p/w low back pain 2/2 recent fall resulting in L1 vertebral compression fracture. Patient given lidocaine patch and tylenol for pain control. VSS, labs wnl, neuro exam intact. Ortho consulted, no indication for acute surgical intervention. Continue with pain control and ambulate as tolerated using TLSO brace with out of bed. Follow up with ortho outpatient.     #Esophageal cancer.   EGD in Nov 2023 showing fungating mass in lower half of esophagus. Patient states he was planned for chemotherapy outpatient but did not follow up. Patient states he is no longer having dysphagia but does c/o poor PO intake and weight loss likely in the setting of malignancy. Follow up with outpatient oncologist.    #Anemia.   Hgb 11.6, baseline 12-14 outpatient labs. No s/s of bleeding. Prior iron studies: Iron total 43, TIBC 247, % saturation iron 17, Ferritin 131. Follow up with primary care physician.     # CHF (congestive heart failure).   Patient with history of CHF, Echo in 2023 Nov showing mild-mod LVH, normal EF without any regional wall motion abnormalities, indeterminate LV diastolic function and filling pressures. Not on any GDMT. No acute interventions    #CAD (coronary artery disease).   Home meds: ASA 81mg. Continue with home medications.     #Hypertension.   Home meds: lisinopril 10mg qd and amlodipine 5mg qd. Continue with home medications.     # Diabetes mellitus, type 2.   A1c 7.0 on prior admission. Home med: Glimepiride 2mg qd. Continue with home medications.     #Hypothyroidism.   Home med: synthroid 75 mcg. Continue with home medications.     Patient was discharged to: [facility]    New medications: None  Changes to old medications: None  Medications that were stopped: None    Physical Examination on Discharge:  General: NAD, sitting comfortably in bed   HEENT: PERRL/ EOMI, no scleral icterus, MMM  Neck: Supple, no JVD  Respiratory: lungs CTA b/l, no wheezes/crackles, no accessory muscle use  Cardiovascular: Regular rhythm/rate; +S1 +S2, no murmurs  Gastrointestinal: Soft, NTND, normoactive BS, no rebound, no guarding  Back: No lumbar tenderness to palpation  Genitourinary: no suprapubic tenderness  Extremities: WWP, no cyanosis, no clubbing, no edema, pulses equal  Neurological: A&Ox3, no gross focal deficits, follows commands  Skin: Normal temperature, warm, dry         #Discharge: do not delete    Patient is an 83M with CHF, CAD(on ASA), DM, hypothyroid, esophageal cancer, recent mechanical fall c/b L1 vertebral compression fracture p/w back pain.    Hospital course (by problem):   #Lumbar back pain.   Patient p/w low back pain 2/2 recent fall resulting in L1 vertebral compression fracture. Patient given lidocaine patch and tylenol for pain control. VSS, labs wnl, neuro exam intact. Ortho consulted, no indication for acute surgical intervention. Continue with pain control and ambulate as tolerated using TLSO brace with out of bed. Follow up with ortho outpatient.     #Esophageal cancer.   EGD in Nov 2023 showing fungating mass in lower half of esophagus. Patient states he was planned for chemotherapy outpatient but did not follow up. Patient states he is no longer having dysphagia but does c/o poor PO intake and weight loss likely in the setting of malignancy. Follow up with outpatient oncologist.    #Anemia.   Hgb 11.6, baseline 12-14 outpatient labs. No s/s of bleeding. Prior iron studies: Iron total 43, TIBC 247, % saturation iron 17, Ferritin 131. Follow up with primary care physician.     # CHF (congestive heart failure).   Patient with history of CHF, Echo in 2023 Nov showing mild-mod LVH, normal EF without any regional wall motion abnormalities, indeterminate LV diastolic function and filling pressures. Not on any GDMT. No acute interventions    #CAD (coronary artery disease).   Home meds: ASA 81mg. Continue with home medications.     #Hypertension.   Home meds: lisinopril 10mg qd and amlodipine 5mg qd. Continue with home medications.     # Diabetes mellitus, type 2.   A1c 7.0 on prior admission. Home med: Glimepiride 2mg qd. Continue with home medications.     #Hypothyroidism.   Home med: synthroid 75 mcg. Continue with home medications.     Patient was discharged to: [facility]    New medications: None  Changes to old medications: None  Medications that were stopped: None    Physical Examination on Discharge:  General: NAD, sitting comfortably in bed   HEENT: PERRL/ EOMI, no scleral icterus, MMM  Neck: Supple, no JVD  Respiratory: lungs CTA b/l, no wheezes/crackles, no accessory muscle use  Cardiovascular: Regular rhythm/rate; +S1 +S2, no murmurs  Gastrointestinal: Soft, NTND, normoactive BS, no rebound, no guarding  Back: No lumbar tenderness to palpation  Genitourinary: no suprapubic tenderness  Extremities: WWP, no cyanosis, no clubbing, no edema, pulses equal  Neurological: A&Ox3, no gross focal deficits, follows commands  Skin: Normal temperature, warm, dry         #Discharge: do not delete    Patient is an 83M with CHF, CAD(on ASA), DM, hypothyroid, esophageal cancer, recent mechanical fall c/b L1 vertebral compression fracture p/w back pain.    Hospital course (by problem):   #Lumbar back pain.   Patient p/w low back pain 2/2 recent fall resulting in L1 vertebral compression fracture. Patient given lidocaine patch and tylenol for pain control. VSS, labs wnl, neuro exam intact. Ortho consulted, no indication for acute surgical intervention. Continue with pain control and ambulate as tolerated using TLSO brace with out of bed. Follow up with ortho outpatient.     #Esophageal cancer.   EGD in Nov 2023 showing fungating mass in lower half of esophagus. Patient states he was planned for chemotherapy outpatient but did not follow up. Patient states he is no longer having dysphagia but does c/o poor PO intake and weight loss likely in the setting of malignancy. Follow up with outpatient oncologist.    #Anemia.   Hgb 11.6, baseline 12-14 outpatient labs. No s/s of bleeding. Prior iron studies: Iron total 43, TIBC 247, % saturation iron 17, Ferritin 131. Follow up with primary care physician.     # CHF (congestive heart failure).   Patient with history of CHF, Echo in 2023 Nov showing mild-mod LVH, normal EF without any regional wall motion abnormalities, indeterminate LV diastolic function and filling pressures. Not on any GDMT. No acute interventions    #CAD (coronary artery disease).   Home meds: ASA 81mg. Continue with home medications.     #Hypertension.   Home meds: lisinopril 10mg qd and amlodipine 5mg qd. Continue with home medications.     # Diabetes mellitus, type 2.   A1c 7.0 on prior admission. Home med: Glimepiride 2mg qd. Continue with home medications.     #Hypothyroidism.   Home med: synthroid 75 mcg. Continue with home medications.     Patient was discharged to: [facility]    New medications: None  Changes to old medications: None  Medications that were stopped: None    Physical Examination on Discharge:  General: NAD, sitting comfortably in bed   HEENT: PERRL/ EOMI, no scleral icterus, MMM  Neck: Supple, no JVD  Respiratory: lungs CTA b/l, no wheezes/crackles, no accessory muscle use  Cardiovascular: Regular rhythm/rate; +S1 +S2, no murmurs  Gastrointestinal: Soft, NTND, normoactive BS, no rebound, no guarding  Back: No lumbar tenderness to palpation  Genitourinary: no suprapubic tenderness  Extremities: WWP, no cyanosis, no clubbing, no edema, pulses equal  Neurological: A&Ox3, no gross focal deficits, follows commands  Skin: Normal temperature, warm, dry

## 2024-01-14 NOTE — DISCHARGE NOTE PROVIDER - NSDCCPCAREPLAN_GEN_ALL_CORE_FT
PRINCIPAL DISCHARGE DIAGNOSIS  Diagnosis: Lumbar compression fracture  Assessment and Plan of Treatment: You were found to have a lumbar compression fracture, which is a fracture of the spine. In the hospital, you were evaluated by Orthopedic surgery, who determined that no urgent surgery is needed. After you leave the hospital, rehab may help you regain your strength

## 2024-01-14 NOTE — DISCHARGE NOTE PROVIDER - NSDCMRMEDTOKEN_GEN_ALL_CORE_FT
Alphagan 0.2% ophthalmic solution: 1 drop(s) to each affected eye once a day  amLODIPine: 5 milligram(s) orally once a day  aspirin 81 mg oral capsule: 1 cap(s) orally once a day  atorvastatin 20 mg oral tablet: 1 tab(s) orally once a day (at bedtime)  brimonidine 0.15% ophthalmic solution: 1 drop(s) in each affected eye once a day  dorzolamide-timolol 2.23%-0.68% (2%-0.5% base) ophthalmic solution: 1 drop(s) in each eye 3 times a day  fenofibrate 48 mg oral tablet: 1 tab(s) orally once a day  glimepiride 2 mg oral tablet: 1 tab(s) orally once a day  latanoprost 0.005% ophthalmic emulsion: 1 drop(s) in each affected eye  lisinopril 10 mg oral tablet: 1 tab(s) orally every 24 hours  polyethylene glycol 3350 oral powder for reconstitution: 17 gram(s) orally every 12 hours  senna leaf extract oral tablet: 2 tab(s) orally once a day (at bedtime)  Synthroid: 75 milligram(s) orally once a day   amLODIPine: 5 milligram(s) orally once a day  aspirin 81 mg oral capsule: 1 cap(s) orally once a day  atorvastatin 20 mg oral tablet: 1 tab(s) orally once a day (at bedtime)  brimonidine 0.15% ophthalmic solution: 1 drop(s) in each affected eye once a day  dorzolamide-timolol 2.23%-0.68% (2%-0.5% base) ophthalmic solution: 1 drop(s) in each eye 3 times a day  fenofibrate 48 mg oral tablet: 1 tab(s) orally once a day  glimepiride 2 mg oral tablet: 1 tab(s) orally once a day  latanoprost 0.005% ophthalmic emulsion: 1 drop(s) in each affected eye once a day  lisinopril 10 mg oral tablet: 1 tab(s) orally every 24 hours  polyethylene glycol 3350 oral powder for reconstitution: 17 gram(s) orally every 12 hours  senna leaf extract oral tablet: 2 tab(s) orally once a day (at bedtime)  Synthroid: 75 milligram(s) orally once a day

## 2024-01-14 NOTE — DISCHARGE NOTE PROVIDER - NSDCFUADDAPPT_GEN_ALL_CORE_FT
Please bring your Insurance card, Photo ID and Discharge paperwork to the following appointment:    (1) Please follow up with your Orthopaedic Surgery Provider, Dr. Maged Bhatia at [address] [facility], [address], [place] [address] on 1/30/2024 at 11:30am.    Appointment was scheduled by Ms. DENY Gaspar, Referral Coordinator.   Please bring your Insurance card, Photo ID and Discharge paperwork to the following appointment:    (1) Please follow up with your Orthopaedic Surgery Provider, Dr. Maged Bhatia at [address] [facility], [address], [place] [address] on 1/30/2024 at 11:30am.    Appointment was scheduled by Ms. DENY Gaspar, Referral Coordinator.   Please bring your Insurance card, Photo ID and Discharge paperwork to the following appointment:    (1) Please follow up with your Orthopaedic Surgery Provider, Dr. Maged Bhatia at [address] [facility], [address], [place] [address] on 1/30/2024 at 11:30am.    Appointment was scheduled by Ms. DENY Gaspar, Referral Coordinator.   Please bring your Insurance card, Photo ID and Discharge paperwork to the following appointment:    (1) Please follow up with your Orthopaedic Surgery Provider, Dr. Maged Bhatia at [address] [facility], [address], [place] [address] on 1/30/2024 at 11:30am.    Appointment was scheduled by Ms. DENY Gaspar, Referral Coordinator.

## 2024-01-14 NOTE — PROGRESS NOTE ADULT - SUBJECTIVE AND OBJECTIVE BOX
Patient is a 83y old  Male who presents with a chief complaint of back pain (13 Jan 2024 11:26)      OVERNIGHT EVENTS:  No acute events    SUBJECTIVE:  Patient seen and examined at bedside.  Pain improving, ambulated to bathroom with assistance    Vital Signs Last 12 Hrs  T(F): 97.9 (01-14-24 @ 10:00), Max: 97.9 (01-14-24 @ 10:00)  HR: 71 (01-14-24 @ 10:00) (69 - 73)  BP: 140/88 (01-14-24 @ 10:00) (140/88 - 155/84)  BP(mean): --  RR: 16 (01-14-24 @ 10:00) (16 - 17)  SpO2: 100% (01-14-24 @ 10:00) (99% - 100%)  I&O's Summary      PHYSICAL EXAM:  Constitutional: NAD, comfortable in bed.  HEENT: NC/AT, PERRLA, EOMI, no conjunctival pallor or scleral icterus, MMM  Neck: Supple, no JVD  Respiratory: CTA B/L. No w/r/r.   Cardiovascular: RRR, normal S1 and S2, no m/r/g.   Gastrointestinal: +BS, soft NTND, no guarding or rebound tenderness, no palpable masses   Back: mild lumbar tenderness  Extremities: wwp; no cyanosis, clubbing or edema.   Vascular: Pulses equal and strong throughout.   Neurological: AAOx3, no CN deficits, strength and sensation intact throughout.   Skin: No gross skin abnormalities or rashes        LABS:                        11.9   8.30  )-----------( 403      ( 14 Jan 2024 09:07 )             38.0     01-14    140  |  111<H>  |  8   ----------------------------<  89  3.7   |  17<L>  |  0.61    Ca    9.4      14 Jan 2024 09:07  Phos  1.9     01-14  Mg     2.3     01-14    TPro  7.3  /  Alb  3.6  /  TBili  0.5  /  DBili  x   /  AST  22  /  ALT  10  /  AlkPhos  58  01-14      Urinalysis Basic - ( 14 Jan 2024 09:07 )    Color: x / Appearance: x / SG: x / pH: x  Gluc: 89 mg/dL / Ketone: x  / Bili: x / Urobili: x   Blood: x / Protein: x / Nitrite: x   Leuk Esterase: x / RBC: x / WBC x   Sq Epi: x / Non Sq Epi: x / Bacteria: x          RADIOLOGY & ADDITIONAL TESTS:  No new imaging    MEDICATIONS  (STANDING):  amLODIPine   Tablet 5 milliGRAM(s) Oral every 24 hours  aspirin  chewable 81 milliGRAM(s) Oral daily  atorvastatin 20 milliGRAM(s) Oral at bedtime  brimonidine 0.2% Ophthalmic Solution 1 Drop(s) Both EYES at bedtime  dextrose 5%. 1000 milliLiter(s) (50 mL/Hr) IV Continuous <Continuous>  dextrose 5%. 1000 milliLiter(s) (100 mL/Hr) IV Continuous <Continuous>  dextrose 50% Injectable 12.5 Gram(s) IV Push once  dextrose 50% Injectable 25 Gram(s) IV Push once  dextrose 50% Injectable 25 Gram(s) IV Push once  dorzolamide 2%/timolol 0.5% Ophthalmic Solution 1 Drop(s) Both EYES <User Schedule>  enoxaparin Injectable 40 milliGRAM(s) SubCutaneous every 24 hours  fenofibrate Tablet 48 milliGRAM(s) Oral at bedtime  glucagon  Injectable 1 milliGRAM(s) IntraMuscular once  insulin lispro (ADMELOG) corrective regimen sliding scale   SubCutaneous three times a day before meals  latanoprost 0.005% Ophthalmic Solution 1 Drop(s) Both EYES at bedtime  levothyroxine 75 MICROGram(s) Oral daily  lidocaine   4% Patch 1 Patch Transdermal every 24 hours  lisinopril 10 milliGRAM(s) Oral every 24 hours  melatonin 5 milliGRAM(s) Oral at bedtime  polyethylene glycol 3350 17 Gram(s) Oral every 12 hours  potassium phosphate IVPB 30 milliMole(s) IV Intermittent once  senna 2 Tablet(s) Oral at bedtime    MEDICATIONS  (PRN):  acetaminophen     Tablet .. 650 milliGRAM(s) Oral every 6 hours PRN Temp greater or equal to 38C (100.4F), Mild Pain (1 - 3)  aluminum hydroxide/magnesium hydroxide/simethicone Suspension 30 milliLiter(s) Oral every 6 hours PRN Dyspepsia  dextrose Oral Gel 15 Gram(s) Oral once PRN Blood Glucose LESS THAN 70 milliGRAM(s)/deciliter  oxyCODONE    IR 5 milliGRAM(s) Oral every 4 hours PRN Severe Pain (7 - 10)

## 2024-01-14 NOTE — DISCHARGE NOTE PROVIDER - NSDCFUSCHEDAPPT_GEN_ALL_CORE_FT
Telma Aldana  [facility]  HEMONC [address]  Scheduled Appointment: 01/24/2024    Kathleen De La Torre  [facility]  ENDOCRIN [address]  Scheduled Appointment: 04/02/2024     Telma Aldana  [facility]  HEMONC [address]  Scheduled Appointment: 01/24/2024    Kathleen De La Torre  [facility]  ENDOCRIN [address]  Scheduled Appointment: 04/02/2024     Telma Aldana  [facility]  HEMONC [address]  Scheduled Appointment: 01/24/2024    Kathleen De La Torre  [facility]  ENDOCRIN [address]  Scheduled Appointment: 04/02/2024     Telma Aldana  [facility]  HEMONC [address]  Scheduled Appointment: 01/24/2024    Kathleen De La Torre  [facility]  ENDOCRIN [address]  Scheduled Appointment: 04/02/2024     Telma Aldana  [facility] Physician [facility]  HEMONC [address]  Scheduled Appointment: 01/24/2024    Maged Bhatia  [facility] Physician [facility]  ORTHOSURG 5 [facility]  Scheduled Appointment: 01/30/2024    Kathleen De La Torre  [facility] Physician [facility]  ENDOCRIN [address]  Scheduled Appointment: 04/02/2024     Telma Aldana  [facility] Physician [facility]  HEMONC [address]  Scheduled Appointment: 01/24/2024    Maged Bhatia  [facility] Physician [facility]  ORTHOSURG 5 [facility]  Scheduled Appointment: 01/30/2024    Kathleen De La Torre  [facility] Physician [facility]  ENDOCRIN [address]  Scheduled Appointment: 04/02/2024     Telma Aldana  [facility] Physician [facility]  HEMONC [address]  Scheduled Appointment: 01/24/2024    Maged Bhatai  [facility] Physician [facility]  ORTHOSURG 5 [facility]  Scheduled Appointment: 01/30/2024    Kathleen De La Torre  [facility] Physician [facility]  ENDOCRIN [address]  Scheduled Appointment: 04/02/2024     Telma Aldana  [facility] Physician [facility]  HEMONC [address]  Scheduled Appointment: 01/24/2024    Maged Bhatia  [facility] Physician [facility]  ORTHOSURG 5 [facility]  Scheduled Appointment: 01/30/2024    Kathleen De La Torre  [facility] Physician [facility]  ENDOCRIN [address]  Scheduled Appointment: 04/02/2024

## 2024-01-14 NOTE — DISCHARGE NOTE PROVIDER - NPI NUMBER (FOR SYSADMIN USE ONLY) :
[[id number]] [[id number]] [[id number]] [[id number]] [[id number]],[[id number]] [[id number]],[[id number]] [[id number]],[[id number]] [[id number]],[[id number]]

## 2024-01-14 NOTE — DISCHARGE NOTE PROVIDER - CARE PROVIDERS DIRECT ADDRESSES
,[email or web address] ,[email or web address] ,[email or web address] ,[email or web address] ,[email or web address] ,[email or web address] ,[email or web address] ,[email or web address]

## 2024-01-15 NOTE — PROGRESS NOTE ADULT - SUBJECTIVE AND OBJECTIVE BOX
INTERVAL HPI/OVERNIGHT EVENTS:  As per night team, no overnight events. Patient seen and examined at bedside. States that back pain has improved significantly. However states he is constipated. No other acute complaints. Patient denies fever, chills, dizziness, weakness, HA, CP, SOB, N/V/D/C. Pending OLYD placement.     VITALS  Vital Signs Last 24 Hrs  T(C): 36.6 (15 Jonathon 2024 09:08), Max: 36.6 (14 Jan 2024 16:05)  T(F): 97.8 (15 Jonathon 2024 09:08), Max: 97.9 (14 Jan 2024 16:05)  HR: 87 (15 Jonathon 2024 09:08) (64 - 87)  BP: 145/78 (15 Jonathon 2024 09:08) (119/73 - 159/76)  BP(mean): --  RR: 16 (15 Jonathon 2024 09:08) (16 - 18)  SpO2: 98% (15 Jonathon 2024 09:08) (98% - 100%)    Parameters below as of 15 Jonathon 2024 09:08  Patient On (Oxygen Delivery Method): room air    CAPILLARY BLOOD GLUCOSE  POCT Blood Glucose.: 117 mg/dL (15 Jonathon 2024 09:06)  POCT Blood Glucose.: 120 mg/dL (14 Jan 2024 21:55)  POCT Blood Glucose.: 93 mg/dL (14 Jan 2024 19:09)  POCT Blood Glucose.: 139 mg/dL (14 Jan 2024 13:08)    PHYSICAL EXAM  General: NAD, sitting comfortably in bed   HEENT: PERRL/ EOMI, no scleral icterus, MMM  Neck: Supple, no JVD  Respiratory: lungs CTA b/l, no wheezes/crackles, no accessory muscle use  Cardiovascular: Regular rhythm/rate; +S1 +S2, no murmurs  Gastrointestinal: Soft, NTND, normoactive BS, no rebound, no guarding  Back: No lumbar tenderness to palpation  Genitourinary: no suprapubic tenderness  Extremities: WWP, no cyanosis, no clubbing, no edema, pulses equal  Neurological: A&Ox3, no gross focal deficits, follows commands  Skin: Normal temperature, warm, dry    MEDICATIONS  (STANDING):  amLODIPine   Tablet 5 milliGRAM(s) Oral every 24 hours  aspirin  chewable 81 milliGRAM(s) Oral daily  atorvastatin 20 milliGRAM(s) Oral at bedtime  brimonidine 0.2% Ophthalmic Solution 1 Drop(s) Both EYES at bedtime  dextrose 5%. 1000 milliLiter(s) (100 mL/Hr) IV Continuous <Continuous>  dextrose 5%. 1000 milliLiter(s) (50 mL/Hr) IV Continuous <Continuous>  dextrose 50% Injectable 25 Gram(s) IV Push once  dextrose 50% Injectable 12.5 Gram(s) IV Push once  dextrose 50% Injectable 25 Gram(s) IV Push once  dorzolamide 2%/timolol 0.5% Ophthalmic Solution 1 Drop(s) Both EYES <User Schedule>  enoxaparin Injectable 40 milliGRAM(s) SubCutaneous every 24 hours  fenofibrate Tablet 48 milliGRAM(s) Oral at bedtime  glucagon  Injectable 1 milliGRAM(s) IntraMuscular once  insulin lispro (ADMELOG) corrective regimen sliding scale   SubCutaneous three times a day before meals  latanoprost 0.005% Ophthalmic Solution 1 Drop(s) Both EYES at bedtime  levothyroxine 75 MICROGram(s) Oral daily  lidocaine   4% Patch 1 Patch Transdermal every 24 hours  lisinopril 10 milliGRAM(s) Oral every 24 hours  melatonin 5 milliGRAM(s) Oral at bedtime  polyethylene glycol 3350 17 Gram(s) Oral every 12 hours  senna 2 Tablet(s) Oral at bedtime    MEDICATIONS  (PRN):  acetaminophen     Tablet .. 650 milliGRAM(s) Oral every 6 hours PRN Temp greater or equal to 38C (100.4F), Mild Pain (1 - 3)  aluminum hydroxide/magnesium hydroxide/simethicone Suspension 30 milliLiter(s) Oral every 6 hours PRN Dyspepsia  dextrose Oral Gel 15 Gram(s) Oral once PRN Blood Glucose LESS THAN 70 milliGRAM(s)/deciliter  oxyCODONE    IR 5 milliGRAM(s) Oral every 4 hours PRN Severe Pain (7 - 10)    No Known Allergies    LABS                        11.7   7.82  )-----------( 396      ( 15 Jonathon 2024 05:30 )             35.9     01-15    137  |  108  |  6<L>  ----------------------------<  109<H>  3.6   |  19<L>  |  0.60    Ca    8.6      15 Jonathon 2024 05:30  Phos  1.6     01-15  Mg     2.0     01-15    TPro  6.7  /  Alb  3.2<L>  /  TBili  0.4  /  DBili  x   /  AST  15  /  ALT  7<L>  /  AlkPhos  51  01-15      Urinalysis Basic - ( 15 Jonathon 2024 05:30 )    Color: x / Appearance: x / SG: x / pH: x  Gluc: 109 mg/dL / Ketone: x  / Bili: x / Urobili: x   Blood: x / Protein: x / Nitrite: x   Leuk Esterase: x / RBC: x / WBC x   Sq Epi: x / Non Sq Epi: x / Bacteria: x      RADIOLOGY & ADDITIONAL TESTS: Reviewed INTERVAL HPI/OVERNIGHT EVENTS:  As per night team, no overnight events. Patient seen and examined at bedside. States that back pain has improved significantly. However states he is constipated. No other acute complaints. Patient denies fever, chills, dizziness, weakness, HA, CP, SOB, N/V/D/C. Pending LOYD placement.     VITALS  Vital Signs Last 24 Hrs  T(C): 36.6 (15 Jonathon 2024 09:08), Max: 36.6 (14 Jan 2024 16:05)  T(F): 97.8 (15 Jonathon 2024 09:08), Max: 97.9 (14 Jan 2024 16:05)  HR: 87 (15 Jonathon 2024 09:08) (64 - 87)  BP: 145/78 (15 Jonathon 2024 09:08) (119/73 - 159/76)  BP(mean): --  RR: 16 (15 Jonathon 2024 09:08) (16 - 18)  SpO2: 98% (15 Jonathon 2024 09:08) (98% - 100%)    Parameters below as of 15 Jonathon 2024 09:08  Patient On (Oxygen Delivery Method): room air    CAPILLARY BLOOD GLUCOSE  POCT Blood Glucose.: 117 mg/dL (15 Jonathon 2024 09:06)  POCT Blood Glucose.: 120 mg/dL (14 Jan 2024 21:55)  POCT Blood Glucose.: 93 mg/dL (14 Jan 2024 19:09)  POCT Blood Glucose.: 139 mg/dL (14 Jan 2024 13:08)    PHYSICAL EXAM  General: NAD, sitting comfortably in bed   HEENT: PERRL/ EOMI, no scleral icterus, MMM  Neck: Supple, no JVD  Respiratory: lungs CTA b/l, no wheezes/crackles, no accessory muscle use  Cardiovascular: Regular rhythm/rate; +S1 +S2, no murmurs  Gastrointestinal: Soft, NTND, normoactive BS, no rebound, no guarding  Back: No lumbar tenderness to palpation  Genitourinary: no suprapubic tenderness  Extremities: WWP, no cyanosis, no clubbing, no edema, pulses equal  Neurological: A&Ox3, no gross focal deficits, follows commands  Skin: Normal temperature, warm, dry    MEDICATIONS  (STANDING):  amLODIPine   Tablet 5 milliGRAM(s) Oral every 24 hours  aspirin  chewable 81 milliGRAM(s) Oral daily  atorvastatin 20 milliGRAM(s) Oral at bedtime  brimonidine 0.2% Ophthalmic Solution 1 Drop(s) Both EYES at bedtime  dextrose 5%. 1000 milliLiter(s) (100 mL/Hr) IV Continuous <Continuous>  dextrose 5%. 1000 milliLiter(s) (50 mL/Hr) IV Continuous <Continuous>  dextrose 50% Injectable 25 Gram(s) IV Push once  dextrose 50% Injectable 12.5 Gram(s) IV Push once  dextrose 50% Injectable 25 Gram(s) IV Push once  dorzolamide 2%/timolol 0.5% Ophthalmic Solution 1 Drop(s) Both EYES <User Schedule>  enoxaparin Injectable 40 milliGRAM(s) SubCutaneous every 24 hours  fenofibrate Tablet 48 milliGRAM(s) Oral at bedtime  glucagon  Injectable 1 milliGRAM(s) IntraMuscular once  insulin lispro (ADMELOG) corrective regimen sliding scale   SubCutaneous three times a day before meals  latanoprost 0.005% Ophthalmic Solution 1 Drop(s) Both EYES at bedtime  levothyroxine 75 MICROGram(s) Oral daily  lidocaine   4% Patch 1 Patch Transdermal every 24 hours  lisinopril 10 milliGRAM(s) Oral every 24 hours  melatonin 5 milliGRAM(s) Oral at bedtime  polyethylene glycol 3350 17 Gram(s) Oral every 12 hours  senna 2 Tablet(s) Oral at bedtime    MEDICATIONS  (PRN):  acetaminophen     Tablet .. 650 milliGRAM(s) Oral every 6 hours PRN Temp greater or equal to 38C (100.4F), Mild Pain (1 - 3)  aluminum hydroxide/magnesium hydroxide/simethicone Suspension 30 milliLiter(s) Oral every 6 hours PRN Dyspepsia  dextrose Oral Gel 15 Gram(s) Oral once PRN Blood Glucose LESS THAN 70 milliGRAM(s)/deciliter  oxyCODONE    IR 5 milliGRAM(s) Oral every 4 hours PRN Severe Pain (7 - 10)    No Known Allergies    LABS                        11.7   7.82  )-----------( 396      ( 15 Jonathon 2024 05:30 )             35.9     01-15    137  |  108  |  6<L>  ----------------------------<  109<H>  3.6   |  19<L>  |  0.60    Ca    8.6      15 Jonathon 2024 05:30  Phos  1.6     01-15  Mg     2.0     01-15    TPro  6.7  /  Alb  3.2<L>  /  TBili  0.4  /  DBili  x   /  AST  15  /  ALT  7<L>  /  AlkPhos  51  01-15      Urinalysis Basic - ( 15 Jonathon 2024 05:30 )    Color: x / Appearance: x / SG: x / pH: x  Gluc: 109 mg/dL / Ketone: x  / Bili: x / Urobili: x   Blood: x / Protein: x / Nitrite: x   Leuk Esterase: x / RBC: x / WBC x   Sq Epi: x / Non Sq Epi: x / Bacteria: x      RADIOLOGY & ADDITIONAL TESTS: Reviewed

## 2024-01-15 NOTE — PROGRESS NOTE ADULT - PROBLEM SELECTOR PLAN 9
F: none  E: replete as needed  N: soft and bite sized  DVT ppx: lovenox  Dispo: [facility] F: none  E: replete as needed  N: soft and bite sized  DVT ppx: lovenox  Dispo: [facility] F: none  E: replete as needed  N: soft and bite sized  DVT ppx: lovenox  Dispo: [facility]

## 2024-01-16 NOTE — PROGRESS NOTE ADULT - PROBLEM SELECTOR PLAN 7
Alert-The patient is alert, awake and responds to voice. The patient is oriented to time, place, and person. The triage nurse is able to obtain subjective information.
A1c 7.0 on prior admission. Home med: Glimepiride 2mg qd.   - start ISS  - hold home med (not on formulary)

## 2024-01-16 NOTE — DIETITIAN INITIAL EVALUATION ADULT - IDEAL BODY WEIGHT (LBS)
Spoke with patient. Patient states at this time he would like to think about seeing ortho.   Patient states he will call the office back at a later time 160

## 2024-01-16 NOTE — DIETITIAN INITIAL EVALUATION ADULT - OTHER CALCULATIONS
IBW used to calculate needs since questioned accuracy of dosing weight. Needs adjusted for advanced age, CHF and malnutrition. Fluid recs per team.

## 2024-01-16 NOTE — DIETITIAN INITIAL EVALUATION ADULT - PROBLEM SELECTOR PLAN 4
Patient with hx of CHF, Echo in 2023 Nov showing mild-mod LVH, normal EF without any regional wall motion abnormalities, indeterminate LV diastolic function and filling pressures. Not on any GDMT.   - no active interventions

## 2024-01-16 NOTE — PROGRESS NOTE ADULT - ATTENDING COMMENTS
83M with CHF, CAD(on ASA), DM, hypothyroid, esophageal cancer, recent mechanical fall c/b L1 vertebral compression fracture p/w back pain  Trialed pain control at home but had increasing difficulty  - obtain TSLO brace  - ortho consulted, appreciate recs  - pain control  - Dispo for LOYD for increased physical therapy
Patient reports feeling fine, wants to be discharged home, agreed to LOYD after discussion with his . Denies back pain, no weakness or numbness, no N.V, passing flatus, no abdominal pain. No other complaints or events reported.    Labs, imaging reviewed    Patient with L1 fracture after fall, refusing brace, pain controlled. Appreciate Ortho, PT  Patient with known esophageal CA, has scheduled follow-up with Oncology for further treatment plan  No hypercalcemia, continue bowel regimen, aim for daily BM  DVT ppx
83M with CHF, CAD(on ASA), DM, hypothyroid, esophageal cancer, recent mechanical fall c/b L1 vertebral compression fracture p/w back pain  Trialed pain control at home but had increasing difficulty  - TSLO brace  - ortho consulted, appreciate recs  - pain control  - Dispo for LOYD for increased physical therapy
83M with CHF, CAD(on ASA), DM, hypothyroid, esophageal cancer, recent mechanical fall c/b L1 vertebral compression fracture p/w back pain  Trialed pain control at home but had increasing difficulty    Patient reports pain controlled at rest, worsens when ambulates to bathroom    - use TSLO brace when OOB  - ortho consulted, appreciate recs.  no acute surgical interventions  - pain control as above  - Dispo for LOYD for increased physical therapy    Medically stable for DC to LOYD

## 2024-01-16 NOTE — DIETITIAN INITIAL EVALUATION ADULT - PROBLEM SELECTOR PLAN 2
EGD in Nov 2023 showing fungating mass in lower half of esophagus. Patient states he was planned for chemotherapy outpatient but did not follow up. Patient states he is no longer having dysphagia but does c/o poor PO intake and weight loss likely iso malignancy.  - consider heme onc consult  - obtain collateral

## 2024-01-16 NOTE — PROGRESS NOTE ADULT - PROBLEM SELECTOR PLAN 5
Home meds: ASA 81mg  - c/w ASA

## 2024-01-16 NOTE — DIETITIAN INITIAL EVALUATION ADULT - PROBLEM SELECTOR PROBLEM 8
Chief complaint:   Chief Complaint   Patient presents with   • Follow-up     abdominal pain       Vitals:  Visit Vitals  /60   Pulse 72   Temp 98 °F (36.7 °C) (Oral)   Resp 16   Wt 78.5 kg Comment: pt reported   BMI 31.14 kg/m²       HISTORY OF PRESENT ILLNESS     HPI  Patient presents for follow-up on abdominal pain. She states that her symptoms have resolved except for some nausea in the morning which is chronic. She is scheduled to see gastroenterology for follow-up on this. She states that she figured out what causes her symptoms she had some outdated applesauce. Of note she does have a biometric profile from an outside facility with her. She had ABIs performed and there was some mild disease on the right showing KAROLINA 1.37 she denies claudication.  Other significant problems:  Patient Active Problem List    Diagnosis Date Noted   • Pes anserine bursitis 07/19/2017     Priority: Low   • Right knee pain 04/14/2017     Priority: Low   • RT knee PMM; Gr 3 LFC CM 4/11/17 04/11/2017     Priority: Low   • Spinal stenosis, lumbar region, without neurogenic claudication 09/20/2016     Priority: Low   • Low back pain with sciatica 09/20/2016     Priority: Low   • Pain of left lower extremity 09/07/2016     Priority: Low   • Left foot pain 09/07/2016     Priority: Low   • Muscle spasm 09/07/2016     Priority: Low   • Malignant neoplasm of lower-outer quadrant of left female breast (CMS/[facility]) 12/10/2015     Priority: Low   • Lumbago 05/08/2014     Priority: Low   • Spinal stenosis of lumbar region with neurogenic claudication 05/21/2013     Priority: Low   • Lump or mass in breast 08/23/2012     Priority: Low   • Encounter for long-term (current) use of other medications 08/14/2012     Priority: Low   • OA (osteoarthritis) 08/14/2012     Priority: Low   • Rheumatoid arthritis(714.0) 07/10/2012     Priority: Low   • Polymyalgia rheumatica (CMS/[facility]) 04/23/2012     Priority: Low   • Malignant neoplasm of breast (female),  unspecified site 09/28/2011     Priority: Low   • Enthesopathy of ankle and tarsus, unspecified 12/26/2002     Priority: Low   • Achilles bursitis or tendinitis 12/26/2002     Priority: Low       PAST MEDICAL, FAMILY AND SOCIAL HISTORY     Medications:  Current Outpatient Prescriptions   Medication   • aspirin 81 MG tablet   • pantoprazole (PROTONIX) 40 MG tablet   • Probiotic Product (PROBIOTIC DAILY PO)   • DISPENSE   • MULTI-DAY VITAMINS PO TABS     No current facility-administered medications for this visit.        Allergies:  ALLERGIES:   Allergen Reactions   • Biaxin [Clarithromycin] RASH   • Opioid Analgesics      Lightheaded  morphine       Past Medical  History/Surgeries:  Past Medical History:   Diagnosis Date   • Arm pain 7/30/13    Seen ER for cardiac evaluation   • Breast cancer (CMS/HCC) 9/11    Left    • Diaphragmatic hernia without mention of obstruction or gangrene 12/10/09    3 cm   • Diverticulosis of colon (without mention of hemorrhage) 12/10/09    sigmoid colon   • Esophageal reflux 12/10/09   • GERD (gastroesophageal reflux disease)    • Meniscus tear 2016    Right   • Osteoporosis    • PONV (postoperative nausea and vomiting)    • RA (rheumatoid arthritis) (CMS/HCC) 6/12   • Radiation 2011    Ratdiation therapy Left breast 17 tx   • RT knee PMM; Gr 3 LFC CM 4/11/17 4/11/2017   • Stricture and stenosis of esophagus     Has dilated approx Q6 yrs       Past Surgical History:   Procedure Laterality Date   • Appendectomy  1957   • Back surgery  3/27/2015    Lower with Dr. Arnold in [place]   • Colonoscopy diagnostic  2003    Normal    • Colonoscopy w biopsy  12/10/09 recall due 12/2019    Dr. Gusman   • Esophagogastroduodenoscopy transoral flex w/bx single or mult  12/10/09    Dr. Gusman   • Esophagogastroduodenoscopy transoral flex w/bx single or mult  1/8/16    Dr. Faith   • Esophagogastroduodenoscopy w/endoscopic us eso stomach duod  2003    Esophageal Stricture   • Eye service or procedure   2007    Left eye scar tissue removed andmembrane removed   • Hysteroscopy,bio endo/polyptmy   2010    Polypectomy    • Knee surgery Right 04/11/2017    Right knee arthroscopy, partial medial meniscectomy DOS 4/11/17   • Mastectomy partial  9/7/2011    Left, Dr Macario   • Mastectomy partial  9/13/2011    Left, Dr Macario   • Removal gallbladder  1992    Cholecystectomy-open   • Removal of tonsils,13+ y/o  1953   • Remv cataract extracap insert lens  2006    Cataract Removal Lens Implant  BILATERAL   • Us guided brst lesion asp/bx/wire loc  08/17/2011    Left breast at 7:00 oclock       Family History:  Family History   Problem Relation Age of Onset   • Dementia/Alzheimers Father    • Cancer Sister         lymphoma   • Cancer Maternal Grandmother         ovarian       Social History:  Social History   Substance Use Topics   • Smoking status: Never Smoker   • Smokeless tobacco: Never Used   • Alcohol use No       REVIEW OF SYSTEMS     Review of Systems   Constitutional: Negative for appetite change, chills, fatigue, fever and unexpected weight change.   Respiratory: Negative for cough, choking and shortness of breath.    Cardiovascular: Negative for chest pain, palpitations and leg swelling.   Gastrointestinal: Negative for abdominal distention, abdominal pain, blood in stool, constipation, diarrhea, nausea and vomiting.   Genitourinary: Negative for dysuria, frequency, pelvic pain, urgency, vaginal bleeding and vaginal discharge.   Musculoskeletal: Negative for back pain.   Neurological: Negative for dizziness and light-headedness.       PHYSICAL EXAM     Physical Exam   Constitutional: She appears well-developed and well-nourished. No distress.   HENT:   Head: Normocephalic and atraumatic.   Eyes: Conjunctivae are normal. No scleral icterus.   Cardiovascular: Normal rate and regular rhythm.  Exam reveals no gallop and no friction rub.    No murmur heard.  Pulmonary/Chest: Effort normal and breath sounds normal. No  respiratory distress. She has no wheezes. She has no rales. She exhibits no tenderness.   Abdominal: Soft. She exhibits no distension and no mass. There is no tenderness. There is no rebound and no guarding.   Skin: Skin is warm and dry. She is not diaphoretic.       ASSESSMENT/PLAN     1. Abdominal pain, unspecified abdominal location        Her symptoms are essentially resolved. I recommended she follow-up as needed. I discussed with her healthy diet and exercise to help with the weight loss. She will follow-up with GI on her chronic nausea.  The patient indicated understanding of the diagnosis and agreed with the plan of care. Will return to clinic for any new or worsening symptoms.           Hypothyroidism

## 2024-01-16 NOTE — DIETITIAN INITIAL EVALUATION ADULT - HEIGHT FOR BMI (CENTIMETERS)
Inpatient Discharge Summary    BRIEF OVERVIEW  Admitting Provider: JAMAAL Mandel III, MD  Discharge Provider.... same  Primary Care Physician at Discharge: JAMAAL Mandel III, [phone]     Admission Date: 10/19/2018     Discharge Date: 10/23/2018    Primary Discharge Diagnosis  No Principal Problem: There is no principal problem currently on the Problem List. Please update the Problem List and refresh.  Pregnancy at term, del'd by [facility], uneventful.  Secondary Discharge Diagnosis....none    Discharge Disposition  Home     Discharge Medications     Medication List      CONTINUE taking these medications    pre- vitamin 27 mg iron- 800 mcg tablet  Take 1 tablet by mouth daily.            Active Issues Requiring Follow-up  Issue: None  Responsible Individual: Patient  What is Needed: Instructions given.  Follow-up Appointments Arranged: Call for appt.    Outpatient Follow up.  Call for an appt at two weeks.    Test Results Pending at Discharge  None.      DETAILS OF HOSPITAL STAY    Presenting Problem/History of Present Illness  Maternal care for scar from previous  delivery, unspecified prior  delivery type [O34.219]  Maternal care for scar from previous  delivery, unspecified prior  delivery type [O34.219]      Hospital Course/Operative Procedures Performed  Procedure(s):  Repeat c section  Consults    none  Pertinent Test Results  None noted.       175.26

## 2024-01-16 NOTE — DIETITIAN INITIAL EVALUATION ADULT - PERTINENT LABORATORY DATA
01-15    137  |  108  |  6<L>  ----------------------------<  109<H>  3.6   |  19<L>  |  0.60    Ca    8.6      15 Jonathon 2024 05:30  Phos  1.6     01-15  Mg     2.0     01-15    TPro  6.7  /  Alb  3.2<L>  /  TBili  0.4  /  DBili  x   /  AST  15  /  ALT  7<L>  /  AlkPhos  51  01-15  POCT Blood Glucose.: 109 mg/dL (01-16-24 @ 12:58)  A1C with Estimated Average Glucose Result: 7.0 % (11-29-23 @ 05:30)

## 2024-01-16 NOTE — DIETITIAN INITIAL EVALUATION ADULT - ADD RECOMMEND
1. Continue with soft and bite sized diet per patient preference. Recommend to add Ensure Enlive 2x/day (350kcal, 20g protein per serving). Consider adding appetite stimulant medication if medically feasible. Continue with bowel regimen.   2. Encourage pt to meet nutritional needs as able   3. Monitor labs: electrolytes, cmp  4. Monitor weights   5. Pain and bowel regimen per team   6. Will continue to assess/honor food preferences as able

## 2024-01-16 NOTE — DIETITIAN NUTRITION RISK NOTIFICATION - ADDITIONAL COMMENTS/DIETITIAN RECOMMENDATIONS
Continue with soft and bite sized diet per patient preference. Recommend to add Ensure Enlive 2x/day (350kcal, 20g protein per serving). Consider adding appetite stimulant medication if medically feasible. Continue with bowel regimen.

## 2024-01-16 NOTE — DISCHARGE NOTE NURSING/CASE MANAGEMENT/SOCIAL WORK - NSDCPEFALRISK_GEN_ALL_CORE
For information on Fall & Injury Prevention, visit: [email or web address] OR  [email or web address] OR  https://www.cdc.gov/steadi/patient.html For information on Fall & Injury Prevention, visit: [email or web address]://[email or web address]/[email or web address] OR  [email or web address]/[email or web address] OR  https://www.cdc.gov/steadi/patient.html

## 2024-01-16 NOTE — PROGRESS NOTE ADULT - PROBLEM SELECTOR PLAN 8
Home med: synthroid 75 mcg  - c/w home med

## 2024-01-16 NOTE — PROGRESS NOTE ADULT - PROBLEM SELECTOR PLAN 1
Patient p/w low back pain 2/2 recent fall resulting in L1 vertebral compression fracture. Patient given lidocaine patch and tylenol for pain control. VSS, labs wnl, neuro exam intact. Ortho consulted, no indication for acute surgical intervention. PT consulted, recommending LOYD.   - c/w tylenol prn for pain control  - c/w lidocaine patch qd  - Oxycodone 5mg for severe pain  - ambulate as tolerated, WBAT  - TLSO brace when OOB  - outpatient ortho follow up
Patient p/w low back pain 2/2 recent fall resulting in L1 vertebral compression fracture. Patient given lidocaine patch and tylenol for pain control. VSS, labs wnl, neuro exam intact. Ortho consulted, no indication for acute surgical intervention. PT consulted, recommending LOYD.   - c/w tylenol prn for pain control  - c/w lidocaine patch qd  - Oxycodone 5mg for severe pain  - ambulate as tolerated, WBAT  - TLSO brace when OOB
Patient p/w low back pain 2/2 recent fall resulting in L1 vertebral compression fracture. Patient given lidocaine patch and tylenol for pain control. VSS, labs wnl, neuro exam intact. Ortho consulted, no indication for acute surgical intervention. PT consulted, recommending LOYD.   - c/w tylenol prn for pain control  - c/w lidocaine patch qd  - Oxycodone 5mg for severe pain  - ambulate as tolerated, WBAT  - TLSO brace when OOB  - outpatient ortho follow up
Patient p/w low back pain 2/2 recent fall resulting in L1 vertebral compression fracture. Patient given lidocaine patch and tylenol for pain control. VSS, labs wnl, neuro exam intact. Ortho consulted, no indication for acute surgical intervention. PT consulted, recommending LOYD.   - c/w tylenol prn for pain control  - c/w lidocaine patch qd  - Oxycodone 5mg for severe pain  - ambulate as tolerated, WBAT  - TLSO brace when OOB  - outpatient ortho follow up

## 2024-01-16 NOTE — DIETITIAN INITIAL EVALUATION ADULT - OTHER INFO
83M with CHF, CAD(on ASA), DM, hypothyroid, esophageal cancer, recent mechanical fall c/b L1 vertebral compression fracture p/w back pain. Described the feeling as "soreness" that is fairly constant in the lumbar spine. Denies any radiation of pain. Takes tylenol as needed for pain medicine. Patient additionally with decreased PO intake and weight loss, reports he can tolerate PO intake but does not have an appetite, no longer having dysphagia. Denies N/V, diarrhea, abdominal pain, F/C, lower extremity numbness, bowel/bladder incontinence, weakness. Reports has been approx 3 days since last BM.     Patient seen at bedside for nutrition assessment. Current diet order: soft and bite sized. No known food allergies. Reports he prefers softer foods due to missing teeth. Reports poor appetite, only consumed orange juice at breakfast. Patient reports poor appetite is related to constipation, however per RN, patient refused suppository. Provided encouragement to accept suppository when offered in order to improve appetite and PO intake. Has not been eating well for >1 month. Provided encouragement for PO intake, patient agreeable to Ensure Enlive BID. Dosing weight: 170 pounds, BMI 25.1, reports UBW of 180 pounds x2 months ago. Question accuracy of dosing weight as bed scale weight obtained by RD at 146 pounds and patient reports he has lost "a lot of weight" in past 2 months. 34lb/23% weight loss x2 months - clinically significant. No pressure injuries documented. +1 edema to BL feet. Hypophosphatemia - recommend to replete. Meds: bowel regimen. Observed with severe muscle wasting to temples and clavicles and severe fat wasting to buccals and orbitals. Based on ASPEN guidelines, pt meets criteria for severe malnutrition. No cultural, ethnic, Anabaptist food preferences reported. See nutrition recommendations below.  83M with CHF, CAD(on ASA), DM, hypothyroid, esophageal cancer, recent mechanical fall c/b L1 vertebral compression fracture p/w back pain. Described the feeling as "soreness" that is fairly constant in the lumbar spine. Denies any radiation of pain. Takes tylenol as needed for pain medicine. Patient additionally with decreased PO intake and weight loss, reports he can tolerate PO intake but does not have an appetite, no longer having dysphagia. Denies N/V, diarrhea, abdominal pain, F/C, lower extremity numbness, bowel/bladder incontinence, weakness. Reports has been approx 3 days since last BM.     Patient seen at bedside for nutrition assessment. Current diet order: soft and bite sized. No known food allergies. Reports he prefers softer foods due to missing teeth. Reports poor appetite, only consumed orange juice at breakfast. Patient reports poor appetite is related to constipation, however per RN, patient refused suppository. Provided encouragement to accept suppository when offered in order to improve appetite and PO intake. Has not been eating well for >1 month. Provided encouragement for PO intake, patient agreeable to Ensure Enlive BID. Dosing weight: 170 pounds, BMI 25.1, reports UBW of 180 pounds x2 months ago. Question accuracy of dosing weight as bed scale weight obtained by RD at 146 pounds and patient reports he has lost "a lot of weight" in past 2 months. 34lb/23% weight loss x2 months - clinically significant. No pressure injuries documented. +1 edema to BL feet. Hypophosphatemia - recommend to replete. Meds: bowel regimen. Observed with severe muscle wasting to temples and clavicles and severe fat wasting to buccals and orbitals. Based on ASPEN guidelines, pt meets criteria for severe malnutrition. No cultural, ethnic, Shinto food preferences reported. See nutrition recommendations below.

## 2024-01-16 NOTE — PROGRESS NOTE ADULT - PROBLEM SELECTOR PLAN 6
Home meds: lisinopril 10mg qd and amlodipine 5mg qd  - c/w home meds with hold parameters

## 2024-01-16 NOTE — DIETITIAN INITIAL EVALUATION ADULT - PERTINENT MEDS FT
MEDICATIONS  (STANDING):  amLODIPine   Tablet 5 milliGRAM(s) Oral every 24 hours  aspirin  chewable 81 milliGRAM(s) Oral daily  atorvastatin 20 milliGRAM(s) Oral at bedtime  brimonidine 0.2% Ophthalmic Solution 1 Drop(s) Both EYES at bedtime  dextrose 5%. 1000 milliLiter(s) (100 mL/Hr) IV Continuous <Continuous>  dextrose 5%. 1000 milliLiter(s) (50 mL/Hr) IV Continuous <Continuous>  dextrose 50% Injectable 12.5 Gram(s) IV Push once  dextrose 50% Injectable 25 Gram(s) IV Push once  dextrose 50% Injectable 25 Gram(s) IV Push once  dorzolamide 2%/timolol 0.5% Ophthalmic Solution 1 Drop(s) Both EYES <User Schedule>  enoxaparin Injectable 40 milliGRAM(s) SubCutaneous every 24 hours  fenofibrate Tablet 48 milliGRAM(s) Oral at bedtime  glucagon  Injectable 1 milliGRAM(s) IntraMuscular once  insulin lispro (ADMELOG) corrective regimen sliding scale   SubCutaneous three times a day before meals  latanoprost 0.005% Ophthalmic Solution 1 Drop(s) Both EYES at bedtime  levothyroxine 75 MICROGram(s) Oral daily  lidocaine   4% Patch 1 Patch Transdermal every 24 hours  lisinopril 10 milliGRAM(s) Oral every 24 hours  melatonin 5 milliGRAM(s) Oral at bedtime  polyethylene glycol 3350 17 Gram(s) Oral every 12 hours  senna 2 Tablet(s) Oral at bedtime    MEDICATIONS  (PRN):  acetaminophen     Tablet .. 650 milliGRAM(s) Oral every 6 hours PRN Temp greater or equal to 38C (100.4F), Mild Pain (1 - 3)  aluminum hydroxide/magnesium hydroxide/simethicone Suspension 30 milliLiter(s) Oral every 6 hours PRN Dyspepsia  dextrose Oral Gel 15 Gram(s) Oral once PRN Blood Glucose LESS THAN 70 milliGRAM(s)/deciliter  oxyCODONE    IR 5 milliGRAM(s) Oral every 4 hours PRN Severe Pain (7 - 10)

## 2024-01-16 NOTE — DISCHARGE NOTE NURSING/CASE MANAGEMENT/SOCIAL WORK - PATIENT PORTAL LINK FT
You can access the FollowMyHealth Patient Portal offered by [facility] by registering at the following website: [email or web address]. By joining [employer]’s FollowMyHealth portal, you will also be able to view your health information using other applications (apps) compatible with our system. You can access the FollowMyHealth Patient Portal offered by [facility] by registering at the following website: [email or web address]. By joining [employer]’s FollowMyHealth portal, you will also be able to view your health information using other applications (apps) compatible with our system.

## 2024-01-16 NOTE — PROGRESS NOTE ADULT - SUBJECTIVE AND OBJECTIVE BOX
INTERVAL HPI/OVERNIGHT EVENTS:  As per night team, no overnight events. Patient seen and examined at bedside. Complaining of constipation. Given suppository. No other acute complaints. Patient denies fever, chills, dizziness, weakness, HA, CP, SOB, N/V/D/C    VITALS  Vital Signs Last 24 Hrs  T(C): 36.6 (16 Jan 2024 09:12), Max: 36.6 (16 Jan 2024 09:12)  T(F): 97.9 (16 Jan 2024 09:12), Max: 97.9 (16 Jan 2024 09:12)  HR: 71 (16 Jan 2024 11:26) (63 - 86)  BP: 145/84 (16 Jan 2024 11:26) (98/62 - 145/84)  BP(mean): --  RR: 16 (16 Jan 2024 09:12) (14 - 18)  SpO2: 99% (16 Jan 2024 09:12) (97% - 100%)    Parameters below as of 16 Jan 2024 09:12  Patient On (Oxygen Delivery Method): room air    CAPILLARY BLOOD GLUCOSE  POCT Blood Glucose.: 109 mg/dL (16 Jan 2024 12:58)  POCT Blood Glucose.: 112 mg/dL (16 Jan 2024 09:44)  POCT Blood Glucose.: 93 mg/dL (15 Jonathon 2024 22:07)  POCT Blood Glucose.: 140 mg/dL (15 Jonathon 2024 17:54)      PHYSICAL EXAM  General: NAD, sitting comfortably in bed   HEENT: PERRL/ EOMI, no scleral icterus, MMM  Neck: Supple, no JVD  Respiratory: lungs CTA b/l, no wheezes/crackles, no accessory muscle use  Cardiovascular: Regular rhythm/rate; +S1 +S2, no murmurs  Gastrointestinal: Soft, NTND, normoactive BS, no rebound, no guarding  Back: No lumbar tenderness to palpation  Genitourinary: no suprapubic tenderness  Extremities: WWP, no cyanosis, no clubbing, no edema, pulses equal  Neurological: A&Ox3, no gross focal deficits, follows commands  Skin: Normal temperature, warm, dry    MEDICATIONS  (STANDING):  amLODIPine   Tablet 5 milliGRAM(s) Oral every 24 hours  aspirin  chewable 81 milliGRAM(s) Oral daily  atorvastatin 20 milliGRAM(s) Oral at bedtime  brimonidine 0.2% Ophthalmic Solution 1 Drop(s) Both EYES at bedtime  dextrose 5%. 1000 milliLiter(s) (50 mL/Hr) IV Continuous <Continuous>  dextrose 5%. 1000 milliLiter(s) (100 mL/Hr) IV Continuous <Continuous>  dextrose 50% Injectable 25 Gram(s) IV Push once  dextrose 50% Injectable 25 Gram(s) IV Push once  dextrose 50% Injectable 12.5 Gram(s) IV Push once  dorzolamide 2%/timolol 0.5% Ophthalmic Solution 1 Drop(s) Both EYES <User Schedule>  enoxaparin Injectable 40 milliGRAM(s) SubCutaneous every 24 hours  fenofibrate Tablet 48 milliGRAM(s) Oral at bedtime  glucagon  Injectable 1 milliGRAM(s) IntraMuscular once  insulin lispro (ADMELOG) corrective regimen sliding scale   SubCutaneous three times a day before meals  latanoprost 0.005% Ophthalmic Solution 1 Drop(s) Both EYES at bedtime  levothyroxine 75 MICROGram(s) Oral daily  lidocaine   4% Patch 1 Patch Transdermal every 24 hours  lisinopril 10 milliGRAM(s) Oral every 24 hours  melatonin 5 milliGRAM(s) Oral at bedtime  polyethylene glycol 3350 17 Gram(s) Oral every 12 hours  senna 2 Tablet(s) Oral at bedtime    MEDICATIONS  (PRN):  acetaminophen     Tablet .. 650 milliGRAM(s) Oral every 6 hours PRN Temp greater or equal to 38C (100.4F), Mild Pain (1 - 3)  aluminum hydroxide/magnesium hydroxide/simethicone Suspension 30 milliLiter(s) Oral every 6 hours PRN Dyspepsia  dextrose Oral Gel 15 Gram(s) Oral once PRN Blood Glucose LESS THAN 70 milliGRAM(s)/deciliter  oxyCODONE    IR 5 milliGRAM(s) Oral every 4 hours PRN Severe Pain (7 - 10)      No Known Allergies      LABS                        11.7   7.82  )-----------( 396      ( 15 Jonathon 2024 05:30 )             35.9     01-15    137  |  108  |  6<L>  ----------------------------<  109<H>  3.6   |  19<L>  |  0.60    Ca    8.6      15 Jonathon 2024 05:30  Phos  1.6     01-15  Mg     2.0     01-15    TPro  6.7  /  Alb  3.2<L>  /  TBili  0.4  /  DBili  x   /  AST  15  /  ALT  7<L>  /  AlkPhos  51  01-15      Urinalysis Basic - ( 15 Jonathon 2024 05:30 )    Color: x / Appearance: x / SG: x / pH: x  Gluc: 109 mg/dL / Ketone: x  / Bili: x / Urobili: x   Blood: x / Protein: x / Nitrite: x   Leuk Esterase: x / RBC: x / WBC x   Sq Epi: x / Non Sq Epi: x / Bacteria: x      RADIOLOGY & ADDITIONAL TESTS: Reviewed

## 2024-01-16 NOTE — PROGRESS NOTE ADULT - PROBLEM SELECTOR PLAN 9
F: none  E: replete as needed  N: soft and bite sized  DVT ppx: lovenox  Dispo: [facility] F: none  E: replete as needed  N: soft and bite sized  DVT ppx: lovenox  Dispo: [facility]

## 2024-01-16 NOTE — PROGRESS NOTE ADULT - PROBLEM SELECTOR PLAN 2
EGD in Nov 2023 showing fungating mass in lower half of esophagus. Patient states he was planned for chemotherapy outpatient but did not follow up. Patient states he is no longer having dysphagia but does c/o poor PO intake and weight loss likely iso malignancy.  - f/u outpatient

## 2024-01-24 NOTE — ASSESSMENT
[FreeTextEntry1] : Raghu Brooke is an 83 year old man here with squamous cell carcinoma of the esophagus.  Presented to [facility] ER with dysphagia, weight loss on 11/30/23.  CT scans showed circumferential wall thickening of the mid-esophagus worrisome for malignancy;  no adenopathy or distant metastatic disease reported on the scan.  EGD performed on 11/30/23 showed a malignant-appearing mass in the lower half of the esophagus extending from 28 to 38 cm, above the GE junction (GE junction at 40 cm).  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  PET scan confirmed the known esophageal cancer and did not show any distant metastatic disease.  Plan: # esophageal squamous cell carcinoma --Discussed diagnosis again w/ the patient.  He understands that he has cancer of the esophagus.   --Discussed imaging results including PET scan.  He has extensive disease in the esophagus but this appears to be localized to the thorax/non-metastatic disease. --tumor board discussion 1/4/24 --standard of care for mid/distal esophageal cancer that is non-metastatic would be trimodality therapy with neoadjuvant chemoradiation followed by surgery, or definitive chemoradiation for a patient who is medically fit but not a surgical candidate.  Unfortunately, my assessment is that this patient has poor performance status, limited support (consisting only of the staff at his SRO), and serious comorbid conditions (including significant visual impairment, decreased hearing, and at least mild cognitive impairment).  He may not be a candidate for aggressive approaches such as concurrent chemoradiation.  An alternative approach such as radiation or chemotherapy alone aimed at palliating symptoms of his disease may be appropriate.  Will discuss at tumor board.  #  dysphagia, weight loss --consulted dietician --soft diet  # elevated calcium level --persistent. non-parathyroid hypercalcemia.  May be contributing to his constipation --treat with IV fluids and zometa today.  # constipation --not responding to miralax.  Sent Rx for lactulose daily.  Can titrate to BID or TID dosing if needed.  # distress --vulnerable elderly individual with no reported family support;  visually impaired and also mildly hard of hearing.  Fortunately lives in supportive housing w/ food provided, has  and home health aide. --consulted oncology SW  --all appts coordinated through his  [facility].   --he says his brother Pollo would be the medical decision-maker if he were unable to do this himself. --unable to reconcile meds - his meds are provided and he is not aware of what he is taking - will need med list for next appt.  RTC 2-3 weeks CBC, CMP  addendum: case discussed at tumor board 1/4/24.  Radiology review raised concern for local invasion of tumor into pericardium and pre-vertebral soft tissue (may be T4 disease) with evidence para-esophageal and subcarinal adenopathy.  Not resectable.   will d/w rad onc optimal treatment strategy - obtain PDL1 CPS on tumor - and consider esophageal stent if further weight loss or symptoms w/ dysphagia.

## 2024-01-24 NOTE — HISTORY OF PRESENT ILLNESS
[de-identified] : Raghu Brooke is an 83 year old man here for evaluation of squamous cell carcinoma of the esophagus.  Oncologic history: 1.  esophageal cancer - squamous cell carcinoma --presentation:  dysphagia, weight loss.  Admitted to [facility] 11/30/23 --CT neck, c/a/p 11/30/23:  circumferential wall thickening of the mid-esophagus.  Multiple liver lesions too small to characterize.  Adrenal mass indeterminate but stable since 2015 --EGD 11/30/23:  malignant-appearing mass in the lower half of the esophagus extending from 28 to 38 cm.  Above the GE junction (GE junction at 40 cm).   --Biopsy of esophageal mass:  poorly differentiated squamous cell carcinoma. -- PET scan 12/18/23 with mass-like FDG avid wall thickening of the mid/distal esophagus measuring 8 cm in length;  no lymphadenopathy or FDG avid nodes reported.  No distant metastatic disease.  Adrenal mass weakly FDG avid, known adenoma.  PMH, PSH reviewed and updated in the chart. FMH:  mother, brother, sister w/ cancer. SH:  lives in an SRO in [place] ([facility]  Has a  assigned to him there ([employer]).  Brother Pollo phone number .  former smoker. denies ETOH.   12/21/23: PET - 1. Masslike, hypermetabolic wall thickening at the mid to distal esophagus measuring 8.8 cm in length, SUV max 17.3, consistent with known malignancy. 2. Mildly FDG avid 3.0 cm left adrenal nodule, SUV max 3.4. Further evaluation with MRI is recommended. No other sites concerning for FDG avid metastatic disease are identified. [de-identified] : here for follow up to discuss PET scan results.  appt was delayed/rescheduled as pt was unable to make previous appts. Since last visit he was seen in ED for constipation.  He had relief from an enema.  Since then constipation has returned.  He says he is using miralax daily w/o results.  Pt is very preoccupied by the constipation and frequently redirects the conversation to this topic. He continues to have dysphagia and is eating a soft diet.   He says that his foods are being chopped/pureed by the facility where he resides. denies pain. weight down 9 lbs since last seen in this office.  Pt is visually impaired due to glaucoma.  He has a walker at home but doesn't really use it.  He prefers to walk w/ assistance from one of his home health aides.  Balance is OK, but needs assistance w/ walking due to poor vision.  Also has decreased hearing.  Lives alone.  Gets help w/ medications, meals are provided.  He can dress himself and bathe/use the bathroom without assistance.

## 2024-01-29 NOTE — HISTORY OF PRESENT ILLNESS
[FreeTextEntry1] : Raghu Brooke is an 82 y/o man w/ new diagnosis of at least Stage II ([id number]) esophageal SCC of the distal esophagus who was referred to Radiation Oncology by Dr. Otero for discussion of radiation to the esophagus.    1/26/24: SNA Patient presents for reevaluation. Patient reports his appetite is poor. He states his swallowing is a little better since "my throat was opened up", but that he is still eating puree at the [facility]. No CP/SOB.   12/21/23: Consultation  PET scan 12/21/23- results pending. Patient presents for discussion of radiation to the esophagus accompanied by home health aide. Patient reports that he has been having difficulty swallowing food. The patient is not entirely understanding that he has a cancer diagnosis., Believes he is seeing the oncologists to determine his diagnosis. The patient lives in an assisted living and receives [facility] care 3hrs day/7 days a week. Patient has a sister Makenzie who lives in [place] and is his designated health care agent. Patient denies previous h/o radiation or PPM/ICD. As per chart, his  is Sparkle Rajan.   History of Present Illness  ________________________  82 y/o M w/ PMHx CHF, CAD, DM, adrenal mass, hypothyroidism, and gait abnormality presents to  ED w/ dysphagia and weight loss in the last 2-3 weeks. CT imaging shows concern for malignancy. Patient admitted to [facility] for further work up dysphagia and weight loss.   11/27/23: CT neck  No acute pathology. 1.0 cm left thyroid nodule. Consider follow-up and correlation with ultrasound.   11/27/23: CT chest/abdomen/pelvis  1.  No evidence of pulmonary embolism.  2.  Marked circumferential wall thickening of the midesophagus. Malignancy cannot be excluded. Suggest endoscopic correlation.   11/28/23: Esophagram  Masslike defect extending 5 cm along the mid distal esophagus and resulting in moderate luminal narrowing. No delayed passage of liquid contrast material. Findings are highly concerning for malignancy when correlating with recent CT evaluation and history of weight loss. Endoscopy is again recommended.   11/28/23: procedure/pathology  EGD: A fungating mass of malignant appearance was found in the lower half of the esophagus. Mass extended from 28 cm to 38 cm.  Final Diagnosis  Esophageal mass, biopsy:  Poorly differentiated keratinizing squamous cell carcinoma

## 2024-01-29 NOTE — VITALS
[Maximal Pain Intensity: 0/10] : 0/10 [Least Pain Intensity: 0/10] : 0/10 [70: Cares for self; unalbe to carry on normal activity or do active work.] : 70: Cares for self; unable to carry on normal activity or do active work. [ECOG Performance Status: 3 - Capable of only limited self care, confined to bed or chair more than 50% of waking hours] : Performance Status: 3 - Capable of only limited self care, confined to bed or chair more than 50% of waking hours [0 - No Distress] : Distress Level: 0

## 2024-01-29 NOTE — REVIEW OF SYSTEMS
[Dysphagia] : dysphagia [Constipation] : constipation [Muscle Weakness] : muscle weakness [Negative] : Neurological [Constipation: Grade 1 - Occasional or intermittent symptoms; occasional use of stool softeners, laxatives, dietary modification, or enema] : Constipation: Grade 1 - Occasional or intermittent symptoms; occasional use of stool softeners, laxatives, dietary modification, or enema [Dyspepsia: Grade 0] : Dyspepsia: Grade 0 [Dysphagia: Grade 2 - Symptomatic and altered eating/swallowing] : Dysphagia: Grade 2 - Symptomatic and altered eating/swallowing [Nausea: Grade 0] : Nausea: Grade 0 [Localized Edema: Grade 0] : Localized Edema: Grade 0  [Odynophagia] : no odynophagia [FreeTextEntry9] : using wheelchair [FreeTextEntry6] : eating pureed food

## 2024-01-29 NOTE — PHYSICAL EXAM
[General Appearance - Alert] : alert [General Appearance - In No Acute Distress] : in no acute distress [Hearing Threshold Finger Rub Not [place]] : hearing was normal [] : no respiratory distress [Exaggerated Use Of Accessory Muscles For Inspiration] : no accessory muscle use [Nondistended] : nondistended [de-identified] : Elderly, non-cachectic but frail. Wheelchair-bound [de-identified] : A&Ox4 (including situation)

## 2024-02-05 PROBLEM — M54.50 LUMBAR BACK PAIN: Status: ACTIVE | Noted: 2024-01-01

## 2024-02-05 NOTE — PHYSICAL EXAM
[de-identified] : General: No acute distress, conversant, well-nourished. Head: Normocephalic, atraumatic Neck: trachea midline, FROM Heart: normotensive and normal rate and rhythm Lungs: No labored breathing Skin: No abrasions, no rashes, no edema Psych: Alert and oriented to person, place and time Extremities: no peripheral edema or digital cyanosis Vascular: warm and well perfused distally, palpable distal pulses  MSK: Lumbar spine: No tenderness to palpation.  No step-off, no deformity.  NEURO EXAM: Sensation  Left L2  -  2/2             Left L3  -  2/2 Left L4  -  2/2 Left L5  -  2/2 Left S1  -  2/2  Right L2  -  2/2             Right L3  -  2/2 Right L4  -  2/2 Right L5  -  2/2 Right S1  -  2/2  Motor:  Left L2 (hip flexion)                            5/5                 Left L3 (knee extension)                   5/5                 Left L4 (ankle dorsiflexion)                 5/5                 Left L5 (long toe extensor)                5/5                 Left S1 (ankle plantar flexion)           5/5  Right L2 (hip flexion)                            5/5                 Right L3 (knee extension)                   5/5                 Right L4 (ankle dorsiflexion)                 5/5                 Right L5 (long toe extensor)                5/5                 Right S1 (ankle plantar flexion)           5/5  Reflexes: Normal and symmetric Negative clonus.  Down-going Babinski.     [de-identified] : I ordered radiographs to evaluate the patient's symptoms. Lumbar 4 view radiographs taken in the office today show no dislocation or fracture.  Lumbar spondylosis.  No instability on dynamic series.

## 2024-02-05 NOTE — ASSESSMENT
[FreeTextEntry1] : 83 year old male presents with low back pain. He denies radicular pain, recent illness, fevers, numbness, weakness, balance problems, saddle anesthesia, urinary retention or fecal incontinence.  Denies trauma. Activity makes it worse. The patient was given a referral for physical therapy.  Given HEP. F/U in 4-6 weeks. We discussed red flag symptoms that would require emergent evaluation. He knows to call with any questions or concerns or if his symptoms acutely worsen.

## 2024-02-05 NOTE — HISTORY OF PRESENT ILLNESS
[de-identified] : 83 year old male presents with low back pain. He denies radicular pain, recent illness, fevers, numbness, weakness, balance problems, saddle anesthesia, urinary retention or fecal incontinence.  Denies trauma. Activity makes it worse.

## 2024-02-15 PROBLEM — I25.10 CAD (CORONARY ARTERY DISEASE): Status: ACTIVE | Noted: 2020-05-22

## 2024-02-15 PROBLEM — I50.32 CHRONIC DIASTOLIC CONGESTIVE HEART FAILURE: Status: ACTIVE | Noted: 2020-05-22

## 2024-02-15 PROBLEM — E66.01 CLASS 2 SEVERE OBESITY DUE TO EXCESS CALORIES WITH SERIOUS COMORBIDITY AND BODY MASS INDEX (BMI) OF 35.0 TO 35.9 IN ADULT: Status: ACTIVE | Noted: 2018-02-16

## 2024-02-15 PROBLEM — E27.8 ADRENAL MASS: Status: ACTIVE | Noted: 2018-10-19
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
## 2024-02-15 PROBLEM — I10 BENIGN ESSENTIAL HTN: Status: ACTIVE | Noted: 2017-10-24

## 2024-02-15 PROBLEM — K59.00 CONSTIPATION IN MALE: Status: ACTIVE | Noted: 2022-06-16

## 2024-02-15 PROBLEM — E11.9 DIABETES MELLITUS: Status: ACTIVE | Noted: 2022-01-26

## 2024-02-15 PROBLEM — C15.9 ESOPHAGEAL CANCER: Status: ACTIVE | Noted: 2023-01-01

## 2024-02-15 PROBLEM — H40.9 GLAUCOMA: Status: ACTIVE | Noted: 2017-11-03

## 2024-02-15 NOTE — COUNSELING
[Non - Smoker] : non-smoker [Smoke/CO Detectors] : smoke/CO detectors [Improve mobility] : improve mobility [Decrease hospital use] : decrease hospital use [Discussed disease trajectory with patient/caregiver] : discussed disease trajectory with patient/caregiver [Full Code] : Code Status: Full Code [No Limitations] : Treatment Guidelines: No limitations [Long Term Intubation] : Intubation: Long term intubation [[facility] Date: _____] [facility]last [facility] date: [unfilled] [Established patient, extensive review of history, medical and functional status, risk factors and patient education] : Established patient, extensive review of history, medical and functional status, risk factors and patient education and counseling provided for subsequent annual wellness visit [ - New patient with 2 or more chronic conditions; CCM discussed and patient-centered care plan established] : New patient with 2 or more chronic conditions; CCM discussed and patient-centered care plan established [FreeTextEntry4] : Educated patient/legal representative about CCM services and consent.\par  Educated patient/legal representative that this service is available because they have 2 or more chronic conditions, that only one Provider can furnish CCM Services per calendar month and that CCM services are subject to the usual Medicare deductible and coinsurance. \par  Patient/legal representative understands the right to stop the CCM services at any time by notifying [facility] office.\par  Patient/legal representative has verbally consented 10/21\par

## 2024-02-15 NOTE — HISTORY OF PRESENT ILLNESS
[[facility] Co-Management Patient] : [unfilled] is a [facility] co-management patient [Patient] : patient [FreeTextEntry1] : gait and balance abnormality [FreeTextEntry2] : 84 yo male alert and oriented x3. Dx of CHF, CAD, DM T2, Adrenal mass, hypothyroid, abnormality of gait. Pt has lost weight and states "he is doing well".  Seen lying in bed.  Normally see the patient in the clinic.  Unable to leave his room.  More unsteady and declining.  Seen by oncology on 12/12/23  Presented to [facility] ER with dysphagia, weight loss on 11/30/23. CT scans showed circumferential wall thickening of the mid-esophagus worrisome for malignancy; no adenopathy or distant metastatic disease reported on the scan. EGD performed on 11/30/23 showed a malignant-appearing mass in the lower half of the esophagus extending from 28 to 38 cm, above the GE junction (GE junction at 40 cm). 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   Reports compliance with medications.   Lipoma mid back no changes in size, shape denies pain or irritation to the area. Advise him to call the office with any changes will send him to dermatology for further evaluation. Wants to wait and see.  Patient denies fever, cough, trouble breathing, rash, vomiting and diarrhea. Patient has not been in close contact with someone covid positive.  N95 mask, gloves, eye wear and gown used during visit: [Y]. Total face to face time with patient is40 min.  Patient/ patient's caregiver reports no weight loss >10lbs in the past 6 months. No changes in dentition or swallowing reported, No changes in hearing or vision reported. No changes in Cognition reported. Patient denies any symptoms of depression or anxiety. Patient is ADL and IADL independent. No changes in gait or falls reported.  Patient's home environment is safe.

## 2024-02-15 NOTE — PHYSICAL EXAM
[Well Nourished] : well nourished [Well Developed] : well developed [Normal Sclera/Conjunctiva] : normal sclera/conjunctiva [No JVD] : no jugular venous distention [Supple] : the neck was supple [No Respiratory Distress] : no respiratory distress [Clear to Auscultation] : lungs were clear to auscultation bilaterally [No Accessory Muscle Use] : no accessory muscle use [Normal S1, S2] : normal S1 and S2 [No Edema] : there was no peripheral edema [Normal Bowel Sounds] : normal bowel sounds [Soft] : abdomen soft [No CVA Tenderness] : no ~M costovertebral angle tenderness [No Spinal Tenderness] : no spinal tenderness [Normal Gait] : normal gait [No Skin Lesions] : no skin lesions [Cranial Nerves Intact] : cranial nerves 2-12 were intact [No Gross Sensory Deficits] : no gross sensory deficits [Oriented x3] : oriented to person, place, and time [Normal Affect] : the affect was normal [Normal Mood] : the mood was normal [Normal Insight/Judgement] : insight and judgment were intact [Normal] : normal [Foot Ulcers] : no foot ulcers [Full ROM] : with limited range of motion [Swelling] : not swollen [Tenderness] : not tender [Erythema] : not erythematous [de-identified] : Lipoma mid thoracic area, no changes noted, instructed pt to report any pain discomfort or changes in size. [de-identified] : unsteady gait

## 2024-02-15 NOTE — HEALTH RISK ASSESSMENT
[HRA Reviewed] : Health risk assessment reviewed [Independent] : feeding [Some assistance needed] : using telephone [Full assistance needed] : preparing meals [No falls in past year] : Patient reported no falls in the past year [Yes] : The patient does have visual impairment [TimeGetUpGo] : 0 [de-identified] : Now bed bound.  Too weak to ambulate

## 2024-02-15 NOTE — CHRONIC CARE ASSESSMENT
[PPS Score: ____] : Palliative Performance Scale (PPS) Score: [unfilled] [FAST Score: ____] : Functional Assessment Scale (FAST) Score: [unfilled] [de-identified] : fall risk [de-identified] : As tolerated [de-identified] : Diabetic diet

## 2024-03-05 NOTE — HISTORY OF PRESENT ILLNESS
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 Otero for discussion of radiation to the esophagus.   3/5/24: OTV/ Final OTV 800cGy/2000cGy, 2/5fx to esophagus. Mr. Brooke reports his appetite remains poor as he continues to have slight difficulty swallowing and is eating puree. Denies fatigue. Skin is good. Discharge instructions reviewed. Plan to complete radiation and follow up in May.   1/26/24: SNA Patient presents for reevaluation. Patient reports his appetite is poor. He states his swallowing is a little better since "my throat was opened up", but that he is still eating puree at the [facility]. No CP/SOB.   12/21/23: Consultation  PET scan 12/21/23- results pending. Patient presents for discussion of radiation to the esophagus accompanied by home health aide. Patient reports that he has been having difficulty swallowing food. The patient is not entirely understanding that he has a cancer diagnosis., Believes he is seeing the oncologists to determine his diagnosis. The patient lives in an assisted living and receives [facility] care 3hrs day/7 days a week. Patient has a sister Makenzie who lives in [place] and is his designated health care agent. Patient denies previous h/o radiation or PPM/ICD. As per chart, his  is Sparkle Rajan.   History of Present Illness  ________________________  84 y/o M w/ PMHx CHF, CAD, DM, adrenal mass, hypothyroidism, and gait abnormality presents to  ED w/ dysphagia and weight loss in the last 2-3 weeks. CT imaging shows concern for malignancy. Patient admitted to [facility] for further work up dysphagia and weight loss.   11/27/23: CT neck  No acute pathology. 1.0 cm left thyroid nodule. Consider follow-up and correlation with ultrasound.   11/27/23: CT chest/abdomen/pelvis  1.  No evidence of pulmonary embolism.  2.  Marked circumferential wall thickening of the midesophagus. Malignancy cannot be excluded. Suggest endoscopic correlation.   11/28/23: Esophagram  Masslike defect extending 5 cm along the mid distal esophagus and resulting in moderate luminal narrowing. No delayed passage of liquid contrast material. Findings are highly concerning for malignancy when correlating with recent CT evaluation and history of weight loss. Endoscopy is again recommended.   11/28/23: procedure/pathology  EGD: A fungating mass of malignant appearance was found in the lower half of the esophagus. Mass extended from 28 cm to 38 cm.  Final Diagnosis  Esophageal mass, biopsy:  Poorly differentiated keratinizing squamous cell carcinoma

## 2024-03-05 NOTE — VITALS
[Maximal Pain Intensity: 0/10] : 0/10 [NoTreatment Scheduled] : no treatment scheduled [60: Requires occasional assistance, but is able to care for most of his/her needs] : 60: Requires occasional assistance, but is able to care for most of his/her needs [ECOG Performance Status: 3 - Capable of only limited self care, confined to bed or chair more than 50% of waking hours] : Performance Status: 3 - Capable of only limited self care, confined to bed or chair more than 50% of waking hours

## 2024-03-05 NOTE — DISEASE MANAGEMENT
[Clinical] : TNM Stage: c [II] : II [TTNM] : 3 [NTNM] : 0 [MTNM] : 0 [de-identified] : 800cGy [de-identified] : 2000cGy [de-identified] : esophagus

## 2024-04-04 NOTE — ED ADULT TRIAGE NOTE - HEART RATE (BEATS/MIN)
Detail Level: Zone Continue Regimen: Enbrel weekly from rheumatologist Render In Strict Bullet Format?: No 83

## 2024-04-20 NOTE — PROGRESS NOTE ADULT - PROBLEM SELECTOR PLAN 1
Severe sepsis 2/2 viral pneumonia and COVID 19   -See plan below     #SARS- Coronavirus COVID 19 PCR postive   - s/p Azithro, plaquenil, ascorbic acid, thiamine  - S/p Tocilzumab 400mg x 1 (3/27)   - C/w Solumedrol 40mg BID (3/27 PM - ), for 5-7 days  - Supplemental O2 prn Speaking Coherently

## 2024-05-31 NOTE — DIETITIAN INITIAL EVALUATION ADULT - CALCULATED TO (CAL/KG)
Results for orders placed or performed in visit on 05/31/24   Cardiac EP device report    Narrative    BSCI SINGLE ICD (ABD PLACEMENT)/ NOT MRI CONDITIONAL  LATITUDE TRANSMISSION: BATTERY VOLTAGE NEARING MIGUEL ANGEL(7 MTHS).  WILL SCHEDULE MONTHLY BATTERY CHECKS.  1% ALL AVAILABLE LEAD PARAMETERS WITHIN NORMAL LIMITS. NO NEW SIGNIFICANT HIGH RATE EPISODES. NORMAL DEVICE FUNCTION. AM        
2040

## 2024-06-05 NOTE — HISTORY OF PRESENT ILLNESS
[FreeTextEntry1] : Raghu Brooke is an 84 y/o man w/ new diagnosis of at least Stage II ([id number]) esophageal SCC of the distal esophagus who was referred to Radiation Oncology by Dr. Otero for discussion of radiation to the esophagus. He completed 2000cGy to the esophagus on 3/8/24.  5/17/24: PTE Patient doing well overall and improved pain and swallowing following RT. However he has noted that over last 2-3 weeks swallowing bread has become increasingly challenging.   3/5/24: OTV/ Final OTV 800cGy/2000cGy, 2/5fx to esophagus. Mr. Brooke reports his appetite remains poor as he continues to have slight difficulty swallowing and is eating puree. Denies fatigue. Skin is good. Discharge instructions reviewed. Plan to complete radiation and follow up in May.   1/26/24: SNA Patient presents for reevaluation. Patient reports his appetite is poor. He states his swallowing is a little better since "my throat was opened up", but that he is still eating puree at the [facility]. No CP/SOB.   12/21/23: Consultation  PET scan 12/21/23- results pending. Patient presents for discussion of radiation to the esophagus accompanied by home health aide. Patient reports that he has been having difficulty swallowing food. The patient is not entirely understanding that he has a cancer diagnosis., Believes he is seeing the oncologists to determine his diagnosis. The patient lives in an assisted living and receives [facility] care 3hrs day/7 days a week. Patient has a sister Makenzie who lives in [place] and is his designated health care agent. Patient denies previous h/o radiation or PPM/ICD. As per chart, his  is Sparkle Rajan.   History of Present Illness  ________________________  84 y/o M w/ PMHx CHF, CAD, DM, adrenal mass, hypothyroidism, and gait abnormality presents to  ED w/ dysphagia and weight loss in the last 2-3 weeks. CT imaging shows concern for malignancy. Patient admitted to [facility] for further work up dysphagia and weight loss.   11/27/23: CT neck  No acute pathology. 1.0 cm left thyroid nodule. Consider follow-up and correlation with ultrasound.   11/27/23: CT chest/abdomen/pelvis  1.  No evidence of pulmonary embolism.  2.  Marked circumferential wall thickening of the midesophagus. Malignancy cannot be excluded. Suggest endoscopic correlation.   11/28/23: Esophagram  Masslike defect extending 5 cm along the mid distal esophagus and resulting in moderate luminal narrowing. No delayed passage of liquid contrast material. Findings are highly concerning for malignancy when correlating with recent CT evaluation and history of weight loss. Endoscopy is again recommended.   11/28/23: procedure/pathology  EGD: A fungating mass of malignant appearance was found in the lower half of the esophagus. Mass extended from 28 cm to 38 cm.  Final Diagnosis  Esophageal mass, biopsy:  Poorly differentiated keratinizing squamous cell carcinoma

## 2024-06-05 NOTE — DISEASE MANAGEMENT
[TTNM] : 3 [NTNM] : 0 [MTNM] : 0 [de-identified] : 2000cGy [de-identified] : 2000cGy [de-identified] : esophagus

## 2024-06-05 NOTE — VITALS
[Maximal Pain Intensity: 0/10] : 0/10 [Least Pain Intensity: 0/10] : 0/10 [70: Cares for self; unalbe to carry on normal activity or do active work.] : 70: Cares for self; unable to carry on normal activity or do active work. [ECOG Performance Status: 3 - Capable of only limited self care, confined to bed or chair more than 50% of waking hours] : Performance Status: 3 - Capable of only limited self care, confined to bed or chair more than 50% of waking hours

## 2024-06-10 ENCOUNTER — NON-APPOINTMENT (OUTPATIENT)
Age: 84
End: 2024-06-10

## 2024-07-24 NOTE — ED PROVIDER NOTE - NSCAREINITIATED _GEN_ER
LOV 7/16/24   RTO n/a  LRF 2/16/24          Controlled Substance Monitoring:    Acute and Chronic Pain Monitoring:   RX Monitoring Attestation Periodic Controlled Substance Monitoring Chronic Pain > 80 MEDD   2/14/2019  11:43 AM The Prescription Monitoring Report for this patient was reviewed today. No signs of potential drug abuse or diversion identified. Dose reduction has been attempted.;Reviewed the patient's functional status and documentation.           
Robina Barker(PA)

## 2024-08-02 NOTE — H&P ADULT - NSICDXPASTMEDICALHX_GEN_ALL_CORE_FT
PAST MEDICAL HISTORY:  Adrenal mass     CAD (coronary artery disease)     Diabetes mellitus, type 2     Essential hypertension Hypertension    H/O CHF     Hypothyroid     
none

## 2024-08-08 NOTE — PHYSICAL EXAM
The afib is doing well and is on the eliquis for embolic protection.  He is to have cataract surgery and will hole the eliquis 3 days before   [Well Nourished] : well nourished [Well Developed] : well developed [Supple] : the neck was supple [Normal Sclera/Conjunctiva] : normal sclera/conjunctiva [No Respiratory Distress] : no respiratory distress [Clear to Auscultation] : lungs were clear to auscultation bilaterally [No Accessory Muscle Use] : no accessory muscle use [Normal S1, S2] : normal S1 and S2 [No Edema] : there was no peripheral edema [Normal Bowel Sounds] : normal bowel sounds [Soft] : abdomen soft [Normal Gait] : normal gait [Cranial Nerves Intact] : cranial nerves 2-12 were intact [No Skin Lesions] : no skin lesions [No Gross Sensory Deficits] : no gross sensory deficits [Normal Affect] : the affect was normal [Oriented x3] : oriented to person, place, and time [Normal Insight/Judgement] : insight and judgment were intact [Normal Mood] : the mood was normal [Foot Ulcers] : no foot ulcers [de-identified] : pain, skin break and redness to penis [de-identified] : unsteady gait order PT,

## 2024-09-09 NOTE — PHYSICAL THERAPY INITIAL EVALUATION ADULT - IMPAIRMENTS FOUND, PT EVAL
Ok to increase the number further to 90 per month.  Not sure how to get this to [employer]   aerobic capacity/endurance/gait, locomotion, and balance/muscle strength

## 2024-12-13 NOTE — H&P ADULT - PROBLEM SELECTOR PLAN 4
Head,  normocephalic,  atraumatic,  Face,  Face within normal limits,  Ears,  External ears within normal limits,  Nose/Nasopharynx and mouth: patient wearing mask Patient with hx of CHF, Echo in 2023 Nov showing mild-mod LVH, normal EF without any regional wall motion abnormalities, indeterminate LV diastolic function and filling pressures. Not on any GDMT.   - no active interventions

## 2025-02-28 NOTE — PROGRESS NOTE ADULT - PROBLEM SELECTOR PROBLEM 4
Pts daughter called, stated pt needs medications refills. Daughter states there is a communication issue with caregiver and pt will be out of medications.  ty   Diabetes

## 2025-04-14 NOTE — ED PROVIDER NOTE - ED STEMI HIDDEN
"Chief Complaint   Patient presents with    RECHECK     Autoimmune Hepatitis       /79 (BP Location: Right arm, Patient Position: Sitting)   Pulse (!) 48   Ht 1.753 m (5' 9.02\")   Wt 84.8 kg (187 lb)   SpO2 95%   BMI 27.60 kg/m    Kg Temple CMA on 4/14/2025 at 8:02 AM    "
hide

## 2025-07-22 NOTE — HEALTH RISK ASSESSMENT
Shave Biopsy Wound Care    Your doctor has performed a shave biopsy today.  A band aid and vaseline ointment has been placed over the site.  This should remain in place for 24 hours.  It is recommended that you keep the area dry for the first 24 hours.  After 24 hours, you may remove the band aid and wash the area with warm soap and water and apply Vaseline jelly.  Many patients prefer to use Neosporin or Bacitracin ointment.  This is acceptable; however, know that you can develop an allergy to this medication even if you have used it safely for years.  It is important to keep the area moist.  Letting it dry out and get air slows healing time, and will worsen the scar.  Band aid is optional after first 24 hours.      If you notice increasing redness, tenderness, pain, or yellow drainage at the biopsy site, please notify your doctor.  These are signs of an infection.    If your biopsy site is bleeding, apply firm pressure for 15 minutes straight.  Repeat for another 15 minutes, if it is still bleeding.   If the surgical site continues to bleed, then please contact your doctor.      [facility] [place] [address]/ [phone] FAX/ [email or web address]     CRYOSURGERY      Your doctor has used a method called cryosurgery to treat your skin condition. Cryosurgery refers to the use of very cold substances to treat a variety of skin conditions such as warts, pre-skin cancers, molluscum contagiosum, sun spots, and several benign growths. The substance we use in cryosurgery is liquid nitrogen and is so cold (-195 degrees Celsius) that is burns when administered.     Following treatment in the office, the skin may immediately burn and become red. You may find the area around the lesion is affected as well. It is sometimes necessary to treat not only the lesion, but a small area of the surrounding normal skin to achieve a good response.     A blister, and even a blood filled blister, may form  after treatment.   This is a normal response. If the blister is painful, it is acceptable to sterilize a needle and with rubbing alcohol and gently pop the blister. It is important that you gently wash the area with soap and warm water as the blister fluid may contain wart virus if a wart was treated. Do no remove the roof of the blister.     The area treated can take anywhere from 1-3 weeks to heal. Healing time depends on the kind skin lesion treated, the location, and how aggressively the lesion was treated. It is recommended that the areas treated are covered with Vaseline or bacitracin ointment and a band-aid. If a band-aid is not practical, just ointment applied several times per day will do. Keeping these areas moist will speed the healing time.    Treatment with liquid nitrogen can leave a scar. In dark skin, it may be a light or dark scar, in light skin it may be a white or pink scar. These will generally fade with time, but may never go away completely.     If you have any concerns after your treatment, please feel free to call the office.       [facility] [place] [address]/ [phone] FAX/ [email or web address]    [HRA Reviewed] : Health risk assessment reviewed [Independent] : feeding [Some assistance needed] : using telephone [Full assistance needed] : preparing meals [No falls in past year] : Patient reported no falls in the past year [Yes] : The patient does have visual impairment

## (undated) DEVICE — FORCEP RADIAL JAW 4 240CM DISP